# Patient Record
Sex: MALE | Race: WHITE | Employment: OTHER | URBAN - METROPOLITAN AREA
[De-identification: names, ages, dates, MRNs, and addresses within clinical notes are randomized per-mention and may not be internally consistent; named-entity substitution may affect disease eponyms.]

---

## 2018-06-18 ENCOUNTER — HOSPITAL ENCOUNTER (OUTPATIENT)
Dept: CT IMAGING | Age: 83
Discharge: HOME | End: 2018-06-18
Attending: FAMILY MEDICINE
Payer: MEDICARE

## 2018-06-18 DIAGNOSIS — R31.9 HEMATURIA SYNDROME: ICD-10-CM

## 2018-06-18 PROCEDURE — 74011636320 HC RX REV CODE- 636/320: Performed by: FAMILY MEDICINE

## 2018-06-18 PROCEDURE — 74011250636 HC RX REV CODE- 250/636: Performed by: FAMILY MEDICINE

## 2018-06-18 PROCEDURE — 82565 ASSAY OF CREATININE: CPT

## 2018-06-18 PROCEDURE — 74178 CT ABD&PLV WO CNTR FLWD CNTR: CPT

## 2018-06-18 RX ORDER — SODIUM CHLORIDE 0.9 % (FLUSH) 0.9 %
10 SYRINGE (ML) INJECTION
Status: COMPLETED | OUTPATIENT
Start: 2018-06-18 | End: 2018-06-18

## 2018-06-18 RX ORDER — SODIUM CHLORIDE 9 MG/ML
50 INJECTION, SOLUTION INTRAVENOUS
Status: COMPLETED | OUTPATIENT
Start: 2018-06-18 | End: 2018-06-18

## 2018-06-18 RX ADMIN — SODIUM CHLORIDE 50 ML/HR: 900 INJECTION, SOLUTION INTRAVENOUS at 13:58

## 2018-06-18 RX ADMIN — Medication 10 ML: at 13:58

## 2018-06-18 RX ADMIN — IOPAMIDOL 100 ML: 755 INJECTION, SOLUTION INTRAVENOUS at 13:58

## 2018-06-21 LAB — CREAT BLD-MCNC: 1.6 MG/DL (ref 0.6–1.3)

## 2018-10-09 ENCOUNTER — APPOINTMENT (OUTPATIENT)
Dept: GENERAL RADIOLOGY | Age: 83
DRG: 813 | End: 2018-10-09
Attending: EMERGENCY MEDICINE
Payer: MEDICARE

## 2018-10-09 ENCOUNTER — HOSPITAL ENCOUNTER (INPATIENT)
Age: 83
LOS: 3 days | Discharge: NURSING, REHAB OR LONG-TERM CARE | DRG: 813 | End: 2018-10-12
Attending: EMERGENCY MEDICINE | Admitting: INTERNAL MEDICINE
Payer: MEDICARE

## 2018-10-09 DIAGNOSIS — D64.9 ANEMIA, UNSPECIFIED TYPE: Primary | ICD-10-CM

## 2018-10-09 DIAGNOSIS — R04.0 EPISTAXIS: ICD-10-CM

## 2018-10-09 DIAGNOSIS — N17.9 AKI (ACUTE KIDNEY INJURY) (HCC): ICD-10-CM

## 2018-10-09 DIAGNOSIS — R79.1 SUPRATHERAPEUTIC INR: ICD-10-CM

## 2018-10-09 DIAGNOSIS — E03.9 HYPOTHYROIDISM, UNSPECIFIED TYPE: ICD-10-CM

## 2018-10-09 PROBLEM — Z95.2 H/O MECHANICAL AORTIC VALVE REPLACEMENT: Status: ACTIVE | Noted: 2018-10-09

## 2018-10-09 LAB
ALBUMIN SERPL-MCNC: 3 G/DL (ref 3.5–5)
ALBUMIN/GLOB SERPL: 0.9 {RATIO} (ref 1.1–2.2)
ALP SERPL-CCNC: 68 U/L (ref 45–117)
ALT SERPL-CCNC: 22 U/L (ref 12–78)
ANION GAP SERPL CALC-SCNC: 12 MMOL/L (ref 5–15)
AST SERPL-CCNC: 39 U/L (ref 15–37)
BASOPHILS # BLD: 0 K/UL (ref 0–0.1)
BASOPHILS NFR BLD: 0 % (ref 0–1)
BILIRUB SERPL-MCNC: 1.5 MG/DL (ref 0.2–1)
BNP SERPL-MCNC: 1870 PG/ML (ref 0–450)
BUN SERPL-MCNC: 60 MG/DL (ref 6–20)
BUN/CREAT SERPL: 26 (ref 12–20)
CALCIUM SERPL-MCNC: 8 MG/DL (ref 8.5–10.1)
CHLORIDE SERPL-SCNC: 102 MMOL/L (ref 97–108)
CO2 SERPL-SCNC: 21 MMOL/L (ref 21–32)
CREAT SERPL-MCNC: 2.34 MG/DL (ref 0.7–1.3)
DIFFERENTIAL METHOD BLD: ABNORMAL
EOSINOPHIL # BLD: 0 K/UL (ref 0–0.4)
EOSINOPHIL NFR BLD: 0 % (ref 0–7)
ERYTHROCYTE [DISTWIDTH] IN BLOOD BY AUTOMATED COUNT: 13.9 % (ref 11.5–14.5)
GLOBULIN SER CALC-MCNC: 3.2 G/DL (ref 2–4)
GLUCOSE SERPL-MCNC: 129 MG/DL (ref 65–100)
HCT VFR BLD AUTO: 22.4 % (ref 36.6–50.3)
HEMOCCULT STL QL: NEGATIVE
HGB BLD-MCNC: 7.5 G/DL (ref 12.1–17)
IMM GRANULOCYTES # BLD: 0 K/UL (ref 0–0.04)
IMM GRANULOCYTES NFR BLD AUTO: 0 % (ref 0–0.5)
INR PPP: >12.3 (ref 0.9–1.1)
LYMPHOCYTES # BLD: 0.5 K/UL (ref 0.8–3.5)
LYMPHOCYTES NFR BLD: 5 % (ref 12–49)
MAGNESIUM SERPL-MCNC: 1.9 MG/DL (ref 1.6–2.4)
MCH RBC QN AUTO: 32.8 PG (ref 26–34)
MCHC RBC AUTO-ENTMCNC: 33.5 G/DL (ref 30–36.5)
MCV RBC AUTO: 97.8 FL (ref 80–99)
MONOCYTES # BLD: 0.8 K/UL (ref 0–1)
MONOCYTES NFR BLD: 8 % (ref 5–13)
NEUTS SEG # BLD: 8.1 K/UL (ref 1.8–8)
NEUTS SEG NFR BLD: 87 % (ref 32–75)
NRBC # BLD: 0 K/UL (ref 0–0.01)
NRBC BLD-RTO: 0 PER 100 WBC
PLATELET # BLD AUTO: 162 K/UL (ref 150–400)
PMV BLD AUTO: 10.5 FL (ref 8.9–12.9)
POTASSIUM SERPL-SCNC: 3.5 MMOL/L (ref 3.5–5.1)
PROT SERPL-MCNC: 6.2 G/DL (ref 6.4–8.2)
PROTHROMBIN TIME: >120 SEC (ref 9–11.1)
RBC # BLD AUTO: 2.29 M/UL (ref 4.1–5.7)
RBC MORPH BLD: ABNORMAL
SODIUM SERPL-SCNC: 135 MMOL/L (ref 136–145)
TROPONIN I SERPL-MCNC: 0.07 NG/ML
TROPONIN I SERPL-MCNC: 0.09 NG/ML
TSH SERPL DL<=0.05 MIU/L-ACNC: 39.1 UIU/ML (ref 0.36–3.74)
WBC # BLD AUTO: 9.4 K/UL (ref 4.1–11.1)

## 2018-10-09 PROCEDURE — 84443 ASSAY THYROID STIM HORMONE: CPT

## 2018-10-09 PROCEDURE — 71046 X-RAY EXAM CHEST 2 VIEWS: CPT

## 2018-10-09 PROCEDURE — 83735 ASSAY OF MAGNESIUM: CPT

## 2018-10-09 PROCEDURE — 85025 COMPLETE CBC W/AUTO DIFF WBC: CPT

## 2018-10-09 PROCEDURE — 99285 EMERGENCY DEPT VISIT HI MDM: CPT

## 2018-10-09 PROCEDURE — 86901 BLOOD TYPING SEROLOGIC RH(D): CPT

## 2018-10-09 PROCEDURE — 82272 OCCULT BLD FECES 1-3 TESTS: CPT

## 2018-10-09 PROCEDURE — 83880 ASSAY OF NATRIURETIC PEPTIDE: CPT

## 2018-10-09 PROCEDURE — 74011250637 HC RX REV CODE- 250/637: Performed by: INTERNAL MEDICINE

## 2018-10-09 PROCEDURE — 84484 ASSAY OF TROPONIN QUANT: CPT

## 2018-10-09 PROCEDURE — 80053 COMPREHEN METABOLIC PANEL: CPT

## 2018-10-09 PROCEDURE — 96361 HYDRATE IV INFUSION ADD-ON: CPT

## 2018-10-09 PROCEDURE — 85610 PROTHROMBIN TIME: CPT

## 2018-10-09 PROCEDURE — 65660000000 HC RM CCU STEPDOWN

## 2018-10-09 PROCEDURE — 74011250636 HC RX REV CODE- 250/636: Performed by: EMERGENCY MEDICINE

## 2018-10-09 PROCEDURE — 96365 THER/PROPH/DIAG IV INF INIT: CPT

## 2018-10-09 PROCEDURE — 86923 COMPATIBILITY TEST ELECTRIC: CPT

## 2018-10-09 PROCEDURE — 74011000258 HC RX REV CODE- 258: Performed by: EMERGENCY MEDICINE

## 2018-10-09 PROCEDURE — 36415 COLL VENOUS BLD VENIPUNCTURE: CPT

## 2018-10-09 PROCEDURE — 93005 ELECTROCARDIOGRAM TRACING: CPT

## 2018-10-09 RX ORDER — SODIUM CHLORIDE 9 MG/ML
75 INJECTION, SOLUTION INTRAVENOUS CONTINUOUS
Status: DISPENSED | OUTPATIENT
Start: 2018-10-09 | End: 2018-10-10

## 2018-10-09 RX ORDER — TAMSULOSIN HYDROCHLORIDE 0.4 MG/1
0.4 CAPSULE ORAL DAILY
Status: DISCONTINUED | OUTPATIENT
Start: 2018-10-10 | End: 2018-10-12 | Stop reason: HOSPADM

## 2018-10-09 RX ORDER — NALOXONE HYDROCHLORIDE 0.4 MG/ML
0.4 INJECTION, SOLUTION INTRAMUSCULAR; INTRAVENOUS; SUBCUTANEOUS AS NEEDED
Status: DISCONTINUED | OUTPATIENT
Start: 2018-10-09 | End: 2018-10-12 | Stop reason: HOSPADM

## 2018-10-09 RX ORDER — PAROXETINE HYDROCHLORIDE 20 MG/1
20 TABLET, FILM COATED ORAL DAILY
Status: DISCONTINUED | OUTPATIENT
Start: 2018-10-10 | End: 2018-10-12 | Stop reason: HOSPADM

## 2018-10-09 RX ORDER — METOPROLOL SUCCINATE 25 MG/1
TABLET, EXTENDED RELEASE ORAL
COMMUNITY
Start: 2018-08-15 | End: 2018-10-12

## 2018-10-09 RX ORDER — ATORVASTATIN CALCIUM 40 MG/1
40 TABLET, FILM COATED ORAL
Status: DISCONTINUED | OUTPATIENT
Start: 2018-10-09 | End: 2018-10-12 | Stop reason: HOSPADM

## 2018-10-09 RX ORDER — ONDANSETRON 2 MG/ML
4 INJECTION INTRAMUSCULAR; INTRAVENOUS
Status: DISCONTINUED | OUTPATIENT
Start: 2018-10-09 | End: 2018-10-12 | Stop reason: HOSPADM

## 2018-10-09 RX ORDER — SODIUM CHLORIDE 9 MG/ML
250 INJECTION, SOLUTION INTRAVENOUS AS NEEDED
Status: DISCONTINUED | OUTPATIENT
Start: 2018-10-09 | End: 2018-10-12 | Stop reason: HOSPADM

## 2018-10-09 RX ORDER — ALLOPURINOL 300 MG/1
300 TABLET ORAL DAILY
Status: DISCONTINUED | OUTPATIENT
Start: 2018-10-10 | End: 2018-10-12 | Stop reason: HOSPADM

## 2018-10-09 RX ORDER — SODIUM CHLORIDE 0.9 % (FLUSH) 0.9 %
5-10 SYRINGE (ML) INJECTION EVERY 8 HOURS
Status: DISCONTINUED | OUTPATIENT
Start: 2018-10-09 | End: 2018-10-12 | Stop reason: HOSPADM

## 2018-10-09 RX ORDER — METOPROLOL SUCCINATE 25 MG/1
25 TABLET, EXTENDED RELEASE ORAL DAILY
Status: DISCONTINUED | OUTPATIENT
Start: 2018-10-10 | End: 2018-10-12 | Stop reason: HOSPADM

## 2018-10-09 RX ORDER — FINASTERIDE 5 MG/1
5 TABLET, FILM COATED ORAL DAILY
Status: DISCONTINUED | OUTPATIENT
Start: 2018-10-10 | End: 2018-10-12 | Stop reason: HOSPADM

## 2018-10-09 RX ORDER — HYDROCODONE BITARTRATE AND ACETAMINOPHEN 5; 325 MG/1; MG/1
0.5 TABLET ORAL
Status: DISCONTINUED | OUTPATIENT
Start: 2018-10-09 | End: 2018-10-09

## 2018-10-09 RX ORDER — HYDROCODONE BITARTRATE AND ACETAMINOPHEN 5; 325 MG/1; MG/1
0.5 TABLET ORAL
Status: DISCONTINUED | OUTPATIENT
Start: 2018-10-09 | End: 2018-10-12 | Stop reason: HOSPADM

## 2018-10-09 RX ORDER — OLMESARTAN MEDOXOMIL AND HYDROCHLOROTHIAZIDE 20/12.5 20; 12.5 MG/1; MG/1
TABLET ORAL
COMMUNITY
Start: 2018-09-22 | End: 2018-10-12

## 2018-10-09 RX ORDER — LEVOTHYROXINE SODIUM 100 UG/1
100 TABLET ORAL
Status: DISCONTINUED | OUTPATIENT
Start: 2018-10-10 | End: 2018-10-12 | Stop reason: HOSPADM

## 2018-10-09 RX ORDER — AMLODIPINE BESYLATE 5 MG/1
2.5 TABLET ORAL DAILY
Status: DISCONTINUED | OUTPATIENT
Start: 2018-10-10 | End: 2018-10-12

## 2018-10-09 RX ORDER — SODIUM CHLORIDE 0.9 % (FLUSH) 0.9 %
5-10 SYRINGE (ML) INJECTION AS NEEDED
Status: DISCONTINUED | OUTPATIENT
Start: 2018-10-09 | End: 2018-10-12 | Stop reason: HOSPADM

## 2018-10-09 RX ADMIN — ATORVASTATIN CALCIUM 40 MG: 40 TABLET, FILM COATED ORAL at 23:30

## 2018-10-09 RX ADMIN — Medication 10 ML: at 23:29

## 2018-10-09 RX ADMIN — SODIUM CHLORIDE 1000 ML: 900 INJECTION, SOLUTION INTRAVENOUS at 19:07

## 2018-10-09 RX ADMIN — PHYTONADIONE 5 MG: 10 INJECTION, EMULSION INTRAMUSCULAR; INTRAVENOUS; SUBCUTANEOUS at 17:53

## 2018-10-09 RX ADMIN — SODIUM CHLORIDE 1000 ML: 900 INJECTION, SOLUTION INTRAVENOUS at 16:14

## 2018-10-09 NOTE — ED NOTES
Pt. Presents to ED today for complaints of CP that started yesterday. PT. Also reports a nose bleed yesterday. Pt. Alert and oriented x4. PT. Placed in position of comfort with call bell in reach.

## 2018-10-09 NOTE — ED NOTES
PT. BP trending down at this time. Pt. Asymptomatic. Spoke with Dr. Nash Jones who is aware. MD to place orders.

## 2018-10-09 NOTE — ED NOTES
Pt. Type and screen hemolysed for a second time. Spoke with Dr. Raymon Torres who is aware. Will send third tube.

## 2018-10-09 NOTE — CONSULTS
Pt seen and examined. Full consult dictated.     Recc Echo, correct INR, repeat trop in AM. No clear evidence of MI.

## 2018-10-09 NOTE — ED TRIAGE NOTES
Dr. Randa Santos at bedside o/a of patient to ED, ekg given to MD by Darius Reyes 7 ems o/a.  Dr. Anel Hercules is patient's cards

## 2018-10-09 NOTE — ED PROVIDER NOTES
EMERGENCY DEPARTMENT HISTORY AND PHYSICAL EXAM      Date: 10/9/2018  Patient Name: Ovi Kendrick    History of Presenting Illness     Chief Complaint   Patient presents with    Chest Pain     from PCP with irreg heart rate, chest pain since last night. , /60. Cardioogis is [place]       History Provided By: Patient, EMS and PCP    HPI: Ovi Kendrick, 80 y.o. male with PMHx significant for aortic valve replacement on Coumadin, HTN, hyperlipidemia, thyroid disease, presents via EMS referred by his PCP to the ED for evaluation of irregular heart rate and hypoxia in the mid 80s on RA while he was at his PCP's office. Per EMS, 12 lead was concerning for STEMI. Pt received 4 tablets of ASA en route. Pt reports constant CP since last night as wells as worsening LENNON x 1 week. He also notes nosebleed from R nare ongoing since last night. Pt states nose bleed is likely from him scratching his nose. He reports SOB has been worsening for the past week and is only able to walk short distances. Pt further adds decreased appetite for the past 3 days. He states he has had very little PO intake including water. Pt endorses N/V 3 days ago, but denies any N/V today. He denies any home treatment or modifying symptoms. Pt specifically denies any recent abdominal pain, diarrhea, fevers, chills, urinary symptoms, leg pain, leg swelling, diaphoresis, or any other complaints. There are no other complaints, changes, or physical findings at this time.     PCP: Frankie Odom., MD    Current Facility-Administered Medications   Medication Dose Route Frequency Provider Last Rate Last Dose    0.9% sodium chloride infusion 250 mL  250 mL IntraVENous PRN Perez Treviño MD        allopurinol (ZYLOPRIM) tablet 300 mg  300 mg Oral DAILY Dinah Potts MD        amLODIPine (NORVASC) tablet 2.5 mg  2.5 mg Oral DAILY Dinah Potts MD        atorvastatin (LIPITOR) tablet 40 mg  40 mg Oral QHS [place] [facility] Gricelda Aaron MD   40 mg at 10/09/18 2330    finasteride (PROSCAR) tablet 5 mg  5 mg Oral DAILY Daisy Sierra MD        levothyroxine (SYNTHROID) tablet 100 mcg  100 mcg Oral 6am Daisy Sierra MD        metoprolol succinate (TOPROL-XL) XL tablet 25 mg  25 mg Oral DAILY Daisy Sierra MD        PARoxetine (PAXIL) tablet 20 mg  20 mg Oral DAILY Daisy Sierra MD        tamsulosin (FLOMAX) capsule 0.4 mg  0.4 mg Oral DAILY Daisy Sierra MD        0.9% sodium chloride infusion  75 mL/hr IntraVENous CONTINUOUS Daisy Sierra MD        sodium chloride (NS) flush 5-10 mL  5-10 mL IntraVENous Q8H Daisy Sierra MD   10 mL at 10/09/18 2329    sodium chloride (NS) flush 5-10 mL  5-10 mL IntraVENous PRN Daisy Sierra MD        naloxone [facility]) injection 0.4 mg  0.4 mg IntraVENous PRN Daisy Sierra MD        ondansetron (ZOFRAN) injection 4 mg  4 mg IntraVENous Q4H PRN Daisy Sierra MD        WARFARIN INFORMATION NOTE (COUMADIN)   Other QPM Zack Hong MD        Warfarin - HOLD dose 10/9   Other Rubén Richardson MD   Stopped at 10/09/18 2210    HYDROcodone-acetaminophen (NORCO) 5-325 mg per tablet 0.5 Tab  0.5 Tab Oral Q4H PRN Zack Hong MD           Past History     Past Medical History:  Past Medical History:   Diagnosis Date    Anger     TAKES PAROXETINE TO CONTROL ANGER ISSUES    CAD (coronary artery disease)     Foot drop, left     WEARS METAL BRACE    Gout     Hard of hearing     High cholesterol     Hypertension     Prostate enlargement     Thyroid disease     Unspecified sleep apnea     DOESN'T USE CPAP; \"IT MADE HIM SICK-COLDS, SINUS\", PER WIFE       Past Surgical History:  Past Surgical History:   Procedure Laterality Date    CARDIAC SURG PROCEDURE UNLIST      ANGIOPLASTY WITH CORONARY STENT    CARDIAC SURG PROCEDURE UNLIST  1996    AORTIC VALVE REPLACEMENT    HX ORTHOPAEDIC  2001    ORIF COMPOUND FRACTURE LEFT ANKLE  HX RETINAL DETACHMENT REPAIR  1980s    LEFT EYE       Family History:  Family History   Problem Relation Age of Onset    Stroke Mother      ANEURYSM    Stroke Father        Social History:  Social History   Substance Use Topics    Smoking status: Never Smoker    Smokeless tobacco: None    Alcohol use 1.5 oz/week     3 Standard drinks or equivalent per week       Allergies:  No Known Allergies      Review of Systems   Review of Systems   Constitutional: Positive for appetite change (decreased). Negative for chills, fatigue and fever. HENT: Positive for nosebleeds. Negative for congestion, ear pain and rhinorrhea. Eyes: Negative for pain and visual disturbance. Respiratory: Positive for shortness of breath. Negative for cough. Cardiovascular: Positive for chest pain. Negative for leg swelling. Gastrointestinal: Negative for abdominal pain, diarrhea, nausea and vomiting. Genitourinary: Negative for dysuria and flank pain. Musculoskeletal: Negative for back pain and neck pain. Skin: Negative for rash and wound. Neurological: Negative for dizziness, syncope and headaches. Psychiatric/Behavioral: Negative for self-injury and suicidal ideas. Physical Exam   Physical Exam     GENERAL: alert and oriented, no acute distress  EYES: PEERL, No injection, discharge or icterus. HENT: Mucous membranes pink and moist.  NECK: Supple  LUNGS: Airway patent. Non-labored respirations. Breath sounds clear with good air entry bilaterally. HEART: tachycardiac in 120s and irregularly irregular rhythm. No peripheral edema  ABDOMEN: Non-distended and non-tender, without guarding or rebound.   SKIN:  warm, dry, he appears pale  MSK/ EXTREMITIES: Without swelling, tenderness or deformity, symmetric with normal ROM  NEUROLOGICAL: Alert, oriented    Diagnostic Study Results     Labs -     Recent Results (from the past 12 hour(s))   CBC WITH AUTOMATED DIFF    Collection Time: 10/09/18  3:32 PM   Result Value Ref Range    WBC 9.4 4.1 - 11.1 K/uL    RBC 2.29 (L) 4.10 - 5.70 M/uL    HGB 7.5 (L) 12.1 - 17.0 g/dL    HCT 22.4 (L) 36.6 - 50.3 %    MCV 97.8 80.0 - 99.0 FL    MCH 32.8 26.0 - 34.0 PG    MCHC 33.5 30.0 - 36.5 g/dL    RDW 13.9 11.5 - 14.5 %    PLATELET 474 974 - 130 K/uL    MPV 10.5 8.9 - 12.9 FL    NRBC 0.0 0  WBC    ABSOLUTE NRBC 0.00 0.00 - 0.01 K/uL    NEUTROPHILS 87 (H) 32 - 75 %    LYMPHOCYTES 5 (L) 12 - 49 %    MONOCYTES 8 5 - 13 %    EOSINOPHILS 0 0 - 7 %    BASOPHILS 0 0 - 1 %    IMMATURE GRANULOCYTES 0 0.0 - 0.5 %    ABS. NEUTROPHILS 8.1 (H) 1.8 - 8.0 K/UL    ABS. LYMPHOCYTES 0.5 (L) 0.8 - 3.5 K/UL    ABS. MONOCYTES 0.8 0.0 - 1.0 K/UL    ABS. EOSINOPHILS 0.0 0.0 - 0.4 K/UL    ABS. BASOPHILS 0.0 0.0 - 0.1 K/UL    ABS. IMM. GRANS. 0.0 0.00 - 0.04 K/UL    DF SMEAR SCANNED      RBC COMMENTS ANISOCYTOSIS  1+       METABOLIC PANEL, COMPREHENSIVE    Collection Time: 10/09/18  3:32 PM   Result Value Ref Range    Sodium 135 (L) 136 - 145 mmol/L    Potassium 3.5 3.5 - 5.1 mmol/L    Chloride 102 97 - 108 mmol/L    CO2 21 21 - 32 mmol/L    Anion gap 12 5 - 15 mmol/L    Glucose 129 (H) 65 - 100 mg/dL    BUN 60 (H) 6 - 20 MG/DL    Creatinine 2.34 (H) 0.70 - 1.30 MG/DL    BUN/Creatinine ratio 26 (H) 12 - 20      GFR est AA 32 (L) >60 ml/min/1.73m2    GFR est non-AA 27 (L) >60 ml/min/1.73m2    Calcium 8.0 (L) 8.5 - 10.1 MG/DL    Bilirubin, total 1.5 (H) 0.2 - 1.0 MG/DL    ALT (SGPT) 22 12 - 78 U/L    AST (SGOT) 39 (H) 15 - 37 U/L    Alk.  phosphatase 68 45 - 117 U/L    Protein, total 6.2 (L) 6.4 - 8.2 g/dL    Albumin 3.0 (L) 3.5 - 5.0 g/dL    Globulin 3.2 2.0 - 4.0 g/dL    A-G Ratio 0.9 (L) 1.1 - 2.2     MAGNESIUM    Collection Time: 10/09/18  3:32 PM   Result Value Ref Range    Magnesium 1.9 1.6 - 2.4 mg/dL   TROPONIN I    Collection Time: 10/09/18  3:32 PM   Result Value Ref Range    Troponin-I, Qt. 0.07 (H) <0.05 ng/mL   NT-PRO BNP    Collection Time: 10/09/18  3:32 PM   Result Value Ref Range    NT pro-BNP 1870 (H) 0 - 450 PG/ML   PROTHROMBIN TIME + INR    Collection Time: 10/09/18  3:32 PM   Result Value Ref Range    INR >12.3 (HH) 0.9 - 1.1    Prothrombin time >120.0 (H) 9.0 - 11.1 sec   TSH 3RD GENERATION    Collection Time: 10/09/18  3:58 PM   Result Value Ref Range    TSH 39.10 (H) 0.36 - 3.74 uIU/mL   OCCULT BLOOD, STOOL    Collection Time: 10/09/18  5:14 PM   Result Value Ref Range    Occult blood, stool NEGATIVE  NEG     TROPONIN I    Collection Time: 10/09/18  7:14 PM   Result Value Ref Range    Troponin-I, Qt. 0.09 (H) <0.05 ng/mL   TYPE + CROSSMATCH    Collection Time: 10/09/18  7:50 PM   Result Value Ref Range    Crossmatch Expiration 10/12/2018     ABO/Rh(D) O POSITIVE     Antibody screen NEG     Unit number [id number]     Blood component type  LR     Unit division 00     Status of unit ALLOCATED     Crossmatch result Compatible     Unit number [id number]     Blood component type  LR     Unit division 00     Status of unit ISSUED     Crossmatch result Compatible        Radiologic Studies -   XR CHEST PA LAT   Final Result        CT Results  (Last 48 hours)    None        CXR Results  (Last 48 hours)               10/09/18 1724  XR CHEST PA LAT Final result    Impression:  IMPRESSION: No evidence of pneumonia. Unchanged cardiomegaly. Narrative:  EXAM:  XR CHEST PA LAT       INDICATION:   Chest Pain       COMPARISON: Chest radiograph 1/29/2006. FINDINGS: PA and lateral radiographs of the chest were obtained. There are postsurgical changes of aortic valve replacement. There are chronic   healed right-sided rib fractures. There is unchanged cardiomegaly. There is no   focal consolidation, pleural effusion, or pneumothorax. The level degenerative   changes of the thoracic spine. Medical Decision Making   I am the first provider for this patient.     I reviewed the vital signs, available nursing notes, past medical history, past surgical history, family history and social history. Vital Signs-Reviewed the patient's vital signs. Patient Vitals for the past 12 hrs:   Temp Pulse Resp BP SpO2   10/10/18 0129 97.9 °F (36.6 °C) 83 18 123/62 100 %   10/10/18 0055 97.8 °F (36.6 °C) 66 18 (!) 108/91 92 %   10/10/18 0036 97.8 °F (36.6 °C) 78 18 102/70 97 %   10/10/18 0022 97.6 °F (36.4 °C) 78 18 98/51 98 %   10/09/18 2238 97 °F (36.1 °C) 83 18 117/57 100 %   10/09/18 2153 - (!) 104 15 131/54 96 %   10/09/18 2130 - 84 24 121/70 -   10/09/18 2000 - 76 20 110/70 97 %   10/09/18 1931 - 82 21 101/53 91 %   10/09/18 1918 - 79 17 92/64 99 %   10/09/18 1900 98.1 °F (36.7 °C) 79 21 (!) 83/49 98 %   10/09/18 1845 - 82 24 93/59 99 %   10/09/18 1830 - 75 22 95/61 99 %   10/09/18 1800 - 78 20 98/45 97 %   10/09/18 1730 - 86 18 92/73 98 %   10/09/18 1700 - 78 19 (!) 88/59 97 %   10/09/18 1630 - 86 18 (!) 86/53 93 %   10/09/18 1612 - 87 17 (!) 81/49 98 %   10/09/18 1600 - 96 24 (!) 72/50 100 %   10/09/18 1533 - 91 23 (!) 119/104 -       Pulse Oximetry Analysis - 100% on RA    Cardiac Monitor:   Rate: 93 bpm  Rhythm: Atrial Fibrillation     EKG interpretation: (Preliminary) 1522  Rhythm: afib with premature ventricular or aberrantly conducted complexes; and irregular. Rate (approx.): 93; Axis: normal; QRS interval: normal ; ST/T wave: normal; Other findings: LBBB. No ischemia. Written by EDMOND Messer, as dictated by Chu Vale MD.    Records Reviewed: Nursing Notes, Old Medical Records, Previous electrocardiograms, Ambulance Run Sheet, Previous Radiology Studies and Previous Laboratory Studies    Provider Notes (Medical Decision Making): On presentation the patient is  in no acute distress with reassurnig vital signs. Based on the history and exam the differential diagnosis for this patient includes ACS, PE, anemia, arrhyhtmia, dehydration, electrolyte abnormalities.   Labs notable for anemia with hgb 7.4, elevated trop 0.07, AYAZ, hypothyroidism and an INR 12.  Rectal exam with hemoccult negative stool so feel that anemia likely 2/2 epistaxis yesterday evening which has since resolved. With the patient's aortic valve, will be careful to not over correct INR in the abscess of bleeding so will give 5mg vitamin K and plan to hold coumadin. With AYAZ, elevated trop and LENNON will transfuse 1 unit PRBC after fluid resuscitation. Will admit for further management. ED Course:   Initial assessment performed. The patients presenting problems have been discussed, and they are in agreement with the care plan formulated and outlined with them. I have encouraged them to ask questions as they arise throughout their visit. Wayne Older  1935  Time EMS pre-alert: 1753  Time STEMI called: 7404  Time arrived on Unit: 1520, Zenia Christy, * in room  Time EKG completed: 1522  Time EKG read by provider: 1522  Time cardiology/cath lab paged: 1520  Time Cardiologist arrived on Unit: 1523  Code STEMI canceled after Dr. Elvin Mehta and Dr. Tyson Joshua read the EKG. CONSULT NOTE:   3:23 PM  Aashish Joshua MD spoke with Dr. Elvin Mehta,   Specialty: cardiology  Consultant is at bedside evaluating patient. PROGRESS NOTE  3:40 PM   Dr. Elvin Mehta evaluated pt and asks he is updated. 4:11 PM  Reassessed pt. He currently denies any CP and is asymptomatic. Procedure Note - Rectal Exam:   5:10 PM  Performed by: Dane Watt. Tyson Joshua MD  Chaperoned by: Alysia Holm RN  Rectal exam performed. Brown stool was collected. No blood noted. Stool was collected and sent to the lab for Hemoccult testing. The procedure took 1-15 minutes, and pt tolerated well    CONSULT NOTE:   5:41 PM  Aashish Joshua MD spoke with Dr. Abhijit Jason,   Specialty: cardiology  Discussed pt's hx, disposition, and available diagnostic and imaging results. Reviewed care plans. Consultant agrees with plans as outlined.  Agrees with plan for vitamin K and blood transfusion and admission to hospitalist. CONSULT NOTE:   7:30 PM  Aashish Oviedo MD spoke with Dr. Aurora Mishra,   Specialty: Hospitalist  Discussed pt's hx, disposition, and available diagnostic and imaging results. Reviewed care plans. Consultant will evaluate pt for admission. CRITICAL CARE NOTE:  6:01 PM  IMPENDING DETERIORATION -Cardiovascular and Renal  ASSOCIATED RISK FACTORS - Hypotension, Bleeding, Dysrhythmia and Dehydration  MANAGEMENT- Bedside Assessment and Supervision of Care  INTERPRETATION -  Xrays and ECG  INTERVENTIONS - fluid resuscitation blood transfusion, vitamin K  CASE REVIEW - Hospitalist and Medical Sub-Specialist  TREATMENT RESPONSE -Improved  PERFORMED BY - Self  NOTES   :  I have spent 35 minutes of critical care time involved in lab review, consultations with specialist, family decision- making, bedside attention and documentation. During this entire length of time I was immediately available to the patient. PLAN:  1. Admit to Hospitalist    Admission Note:  7:34 PM  Patient is being admitted to the hospital by Dr. Aurora Mishra. The results of their tests and reasons for their admission have been discussed with them and available family. They convey agreement and understanding for the need to be admitted and for their admission diagnosis. Diagnosis     Clinical Impression:   1. Anemia, unspecified type    2. Epistaxis    3. Supratherapeutic INR    4. AYAZ (acute kidney injury) ([address]    5. Hypothyroidism, unspecified type        Attestations: This note is prepared by Katheryn Kowalski, acting as Scribe for [employer]. MD Kayleen.    The scribe's documentation has been prepared under my direction and personally reviewed by me in its entirety. I confirm that the note above accurately reflects all work, treatment, procedures, and medical decision making performed by me. Mary Oviedo MD

## 2018-10-09 NOTE — IP AVS SNAPSHOT
[address] [facility] 
[phone] Patient: Denver Cha MRN: [id number] About your hospitalization You were admitted on:  October 9, 2018 You last received care in the:  [facility] 2 CARDIOPULMONARY CARE You were discharged on:  October 12, 2018 Why you were hospitalized Your primary diagnosis was:  Not on File Your diagnoses also included:  Anemia, Hypothyroidism, Supratherapeutic Inr, H/O Mechanical Aortic Valve Replacement Follow-up Information Follow up With Details Comments Contact Info [address] ([facility] [address])   [address] [facility] 
[phone] Renard Mckenzie MD In 1 day hospital follow-up [address] [facility] 
[phone] Jessica Ricardo MD In 2 days hospital follow-up [address] [facility] 
[phone] Jose L Beaulieu MD In 1 week hospital follow-up [address] [facility] 
[phone] Discharge Orders None A check vic indicates which time of day the medication should be taken. My Medications START taking these medications Instructions Each Dose to Equal  
 Morning Noon Evening Bedtime  
 aspirin 81 mg chewable tablet Start taking on:  10/13/2018 Your last dose was: Your next dose is: Take 1 Tab by mouth daily for 30 days. 81 mg  
    
   
   
   
  
 losartan 50 mg tablet Commonly known as:  COZAAR Start taking on:  10/13/2018 Your last dose was: Your next dose is: Take 1 Tab by mouth daily for 30 days. 50 mg  
    
   
   
   
  
 warfarin 5 mg tablet Commonly known as:  COUMADIN Your last dose was: Your next dose is: Take 1 Tab by mouth daily for 30 days. 5 mg CHANGE how you take these medications Instructions Each Dose to Equal  
 Morning Noon Evening Bedtime  
 allopurinol 300 mg tablet Commonly known as:  [place]en Rodriguez Start taking on:  10/13/2018 What changed:  how much to take Your last dose was: Your next dose is: Take 1 Tab by mouth daily for 30 days. 300 mg  
    
   
   
   
  
 atorvastatin 40 mg tablet Commonly known as:  LIPITOR What changed:   
- how much to take - when to take this Your last dose was: Your next dose is: Take 1 Tab by mouth nightly for 30 days. 40 mg  
    
   
   
   
  
 cholecalciferol (vitamin D3) 2,000 unit Tab Commonly known as:  VITAMIN D3 What changed:  how much to take Your last dose was: Your next dose is: Take 1 Tab by mouth daily for 30 days. 2000 Int'l Units  
    
   
   
   
  
 folic acid 1 mg tablet Commonly known as:  Google What changed:  medication strength Your last dose was: Your next dose is: Take 2.5 Tabs by mouth daily for 30 days. TAKES WHEN TAKING METHOTREXATE FOR PSORIASIS OUTBREAKS ONLY. 2.5 mg  
    
   
   
   
  
 levothyroxine 100 mcg tablet Commonly known as:  SYNTHROID Start taking on:  10/13/2018 What changed:   
- how much to take - when to take this Your last dose was: Your next dose is: Take 1 Tab by mouth every morning for 30 days. 100 mcg  
    
   
   
   
  
 metoprolol succinate 25 mg XL tablet Commonly known as:  TOPROL-XL Start taking on:  10/13/2018 What changed:   
- how much to take 
- how to take this - when to take this Your last dose was: Your next dose is: Take 1 Tab by mouth daily for 30 days. 25 mg CONTINUE taking these medications Instructions Each Dose to Equal  
 Morning Noon Evening Bedtime  
 finasteride 5 mg tablet Commonly known as:  PROSCAR Start taking on:  10/13/2018 Your last dose was: Your next dose is: Take 1 Tab by mouth daily for 30 days. 5 mg PARoxetine 20 mg tablet Commonly known as:  PAXIL Your last dose was: Your next dose is: Take 1 Tab by mouth daily for 30 days. 20 mg  
    
   
   
   
  
 tamsulosin 0.4 mg capsule Commonly known as:  FLOMAX Start taking on:  10/13/2018 Your last dose was: Your next dose is: Take 1 Cap by mouth daily for 30 days. 0.4 mg  
    
   
   
   
  
  
STOP taking these medications   
 amLODIPine 2.5 mg tablet Commonly known as:  NORVASC  
   
  
 aspirin, buffered 81 mg Tab CENTRUM SILVER PO  
   
  
 COLCRYS 0.6 mg tablet Generic drug:  colchicine DIOVAN -12.5 mg per tablet Generic drug:  valsartan-hydroCHLOROthiazide FISH OIL PO  
   
  
 GLUCOSAMINE-CHONDROITIN PO  
   
  
 HYDROcodone-acetaminophen 2.5-500 mg per tablet Commonly known as:  [place] METAMUCIL PO  
   
  
 olmesartan-hydroCHLOROthiazide 20-12.5 mg per tablet Commonly known as:  BENICAR HCT  
   
  
 oxybutynin chloride XL 10 mg CR tablet Commonly known as:  DITROPAN XL  
   
  
 oxyCODONE IR 5 mg immediate release tablet Commonly known as:  ROXICODONE  
   
  
 SUPER B COMPLEX PO  
   
  
 VITAMIN C PO Where to Get Your Medications Information on where to get these meds will be given to you by the nurse or doctor. ! Ask your nurse or doctor about these medications  
  allopurinol 300 mg tablet  
 aspirin 81 mg chewable tablet  
 atorvastatin 40 mg tablet  
 cholecalciferol (vitamin D3) 2,000 unit Tab  
 finasteride 5 mg tablet  
 folic acid 1 mg tablet  
 levothyroxine 100 mcg tablet  
 losartan 50 mg tablet  
 metoprolol succinate 25 mg XL tablet PARoxetine 20 mg tablet  
 tamsulosin 0.4 mg capsule warfarin 5 mg tablet Discharge Instructions HOSPITALIST DISCHARGE INSTRUCTIONS 
 
NAME: Alexey Rebolledo :  1935 MRN:  [id number] Date/Time:  10/12/2018 10:10 AM 
 
ADMIT DATE: 10/9/2018 DISCHARGE DATE: 10/12/2018 Attending Physician: Lonia Snellen, MD 
 
DISCHARGE DIAGNOSIS: 
Presented with Nose bleed secondary to  Coumadin toxicity POA H/o Mechanical Aortic Valve x  Newly found Afib -rate controlled, likely Chronic Anemia POA- likely due to chronic blood loss Pro-BNP elevated but patient with History of congestive heart failure, known left ventricular systolic dysfunction s/p 1 PRBC transfusion, hgb Within Acceptable Range. S/p Vit K x1 in ER Bridging coumadin with Lovenox for a therapeutic goal of 2.5 to3.5 Cardiology help appreciated Resumed ASA Continue current medications. likely AYAZ on CKD  
 
CAD s/p CABG Systolic CHF 
HTN Cont home meds Hypothyroidism Elevated TSH 
pt is not compliant with meds a per wife, needs follow up by PCP Also needs weekly coumadin check. Cont synthroid BPH Cont proscar, flomax Gout Cont allopurinol Body mass index is 30.3 kg/(m^2).: 18.5 - 24.9 Normal weight 
  
Code status: Full Prophylaxis: Coumadin Recommended Disposition: SNF Medications: Per above medication reconciliation. Pain Management: per above medications Recommended diet: Cardiac Diet, Low fat, Low cholesterol and Renal Diet Recommended activity: PT/OT Eval and Treat Wound care: None Indwelling devices:  None Supplemental Oxygen: None Required Lab work: Weekly BMP, Weekly CBC and Daily INR Glucose management:  None Code status: Full Outside physician follow up: Follow-up Information Follow up With Details Comments Contact Info [address] 
[phone] Vishal Wright MD In 1 day hospital follow-up [address] [facility] 
[phone] Margo Nj MD In 2 days hospital follow-up [address] [facility] 
[phone] Skilled nursing facility/ SNF MD responsible for above on discharge. Information obtained by : 
I understand that if any problems occur once I am at home I am to contact my physician. I understand and acknowledge receipt of the instructions indicated above. Physician's or R.N.'s Signature                                                                  Date/Time Patient or Repres PathJumphart Announcement We are excited to announce that we are making your provider's discharge notes available to you in Al[employer]. You will see these notes when they are completed and signed by the physician that discharged you from your recent hospital stay. If you have any questions or concerns about any information you see in AltaV[employer], please call the Health Information Department where you were seen or reach out to your Primary Care Provider for more information about your plan of care. Introducing [facility]! Neto Stroud introduces Al[employer]Vitas patient portal. Now you can access parts of your medical record, email your doctor's office, and request medication refills online. 1. In your internet browser, go to [email or web address] 2. Click on the First Time User? Click Here link in the Sign In box. You will see the New Member Sign Up page. 3. Enter your [employer] Access Code exactly as it appears below.  You will not need to use this code after youve completed the sign-up process. If you do not sign up before the expiration date, you must request a new code. · [employer] Access Code: [email or web address] Expires: 1/7/2019  3:41 PM 
 
4. Enter the last four digits of your Social Security Number (xxxx) and Date of Birth (mm/dd/yyyy) as indicated and click Submit. You will be taken to the next sign-up page. 5. Create a GridMarkets ID. This will be your GridMarkets login ID and cannot be changed, so think of one that is secure and easy to remember. 6. Create a [employer]Markets password. You can change your password at any time. 7. Enter your Password Reset Question and Answer. This can be used at a later time if you forget your password. 8. Enter your e-mail address. You will receive e-mail notification when new information is available in [address] 9. Click Sign Up. You can now view and download portions of your medical record. 10. Click the Download Summary menu link to download a portable copy of your medical information. If you have questions, please visit the Frequently Asked Questions section of the [employer] website. Remember, [employer]ets is NOT to be used for urgent needs. For medical emergencies, dial 911. Now available from your iPhone and Android! Introducing Gio Robles As a [facility] patient, I wanted to make you aware of our electronic visit tool called Gio Jaxsonabenaalejandro. [facility] 24/7 allows you to connect within minutes with a medical provider 24 hours a day, seven days a week via a mobile device or tablet or logging into a secure website from your computer. You can access Gio Robles from anywhere in the [place].  
 
A virtual visit might be right for you when you have a simple condition and feel like you just dont want to get out of bed, or cant get away from work for an appointment, when your regular [facility] Zacarias provider is not available (evenings, weekends or holidays), or when youre out of town and need minor care. Electronic visits cost only $49 and if the [employer] 24/7 provider determines a prescription is needed to treat your condition, one can be electronically transmitted to a nearby pharmacy*. Please take a moment to enroll today if you have not already done so. The enrollment process is free and takes just a few minutes. To enroll, please download the [employer] 24/7 anh to your tablet or phone, or visit [email or web address] to enroll on your computer. And, as an [address] [place] [address] patient with a Pull account, the results of your visits will be scanned into your electronic medical record and your primary care provider will be able to view the scanned results. We urge you to continue to see your regular [employer] provider for your ongoing medical care. And while your primary care provider may not be the one available when you seek a Digital Trowelabenafin virtual visit, the peace of mind you get from getting a real diagnosis real time can be priceless. For more information on Digital Trowelabenafin, view our Frequently Asked Questions (FAQs) at [email or web address]. Sincerely, 
 
Mercedes Low MD 
Chief Medical Officer [address] [place] [address] Segun *:  certain medications cannot be prescribed via Digital Trowelabenafin Providers Seen During Your Hospitalization Provider Specialty Primary office phone Mackenzie Alcantara MD Emergency Medicine [phone] Isha Rodriguez MD Hospitalist [phone] Petr Galeana MD Internal Medicine [phone] Your Primary Care Physician (PCP) Primary Care Physician Office Phone Office Fax Sandra Light [phone] You are allergic to the following No active allergies Recent Documentation Height Weight BMI Smoking Status 1.651 m 83.6 kg 30.67 kg/m2 Never Smoker Emergency Contacts Name Discharge Info Relation Home Work Mobile [address] CAREGIVER [3] Spouse [3] [phone] Patient Belongings The following personal items are in your possession at time of discharge: 
  Dental Appliances: Partials  Visual Aid: None      Home Medications: None   Jewelry: None  Clothing: At bedside    Other Valuables: None Please provide this summary of care documentation to your next provider. Signatures-by signing, you are acknowledging that this After Visit Summary has been reviewed with you and you have received a copy. Patient Signature:  ____________________________________________________________ Date:  ____________________________________________________________  
  
[facility] Provider Signature:  ____________________________________________________________ Date:  ____________________________________________________________

## 2018-10-09 NOTE — CONSULTS
[address] Slava Chambers  MR#: [id number]  : 1935  ACCOUNT #: [de-identified]   DATE OF SERVICE: 10/09/2018    REQUESTING PHYSICIAN:  Dr. Cyrus Moss MD, [address] [place] [address]    REASON FOR CONSULTATION:  Possible MI.      CHIEF COMPLAINT:  Weakness. HISTORY OF PRESENT ILLNESS:  Patient is an 70-year-old man who is well known to me. He has history of coronary artery disease and prior coronary artery bypass grafting as well as mechanical aortic valve replacement back in . He has known left ventricular dysfunction and when last seen in the office in  had an ejection fraction of around 30% by echo. He had a nuclear study showing a fixed inferior defect with periinfarct ischemia. It was decided to treat him conservatively. The patient for some reason has not followed up for over 3 years. He came into the [address] [place] [address] ER today by rescue squad as a possible acute ST elevation MI. He states that he has been weak for the last few days and yesterday had some chest discomfort and abdominal discomfort. He felt very fatigued today and decided to call the rescue squad. His EKG showed what appeared to be atrial fibrillation as well as an interventricular conduction delay. EKG in the emergency room did not show ST elevation. Cardiac risk factors include hypertension and dyslipidemia. PAST MEDICAL HISTORY:  Remarkable for hypothyroidism, sleep apnea, gout, nephrolithiasis. PAST SURGICAL HISTORY:  Prior mechanical aortic valve replacement and bypass surgery, prior surgery for spinal stenosis about 3 years ago, prior right eye surgery. CURRENT MEDICATIONS:  Not entirely clear. MEDICATION LIST:  Includes Norvasc, Lortab, aspirin, valsartan, Lipitor, warfarin, iron sulfate, Paxil, Flomax, Proscar, L-thyroxine, colchicine. SOCIAL HISTORY:  The patient does not smoke. He drinks alcohol occasionally. FAMILY HISTORY:  Noncontributory.     REVIEW OF SYSTEMS:  No fever, no chills, no melena, no hematochezia, no unusual back pain, positive abdominal pain. No nausea or vomiting. PHYSICAL EXAMINATION:  GENERAL:  Reveals an elderly white male in no acute distress. VITAL SIGNS:  Blood pressure 95/61, pulse 75, respirations 22. HEENT:  Pupils are equal.  Oropharynx showed moist oral mucosa. NECK:  Supple. No masses or thyromegaly. No cervical or supraclavicular adenopathy. No definite carotid bruits or JVD. CHEST:  Relatively clear. No wheezes or crackles. SKIN:  Warm and dry. CARDIAC:  Irregular rhythm, II/VI systolic murmur with crisp valve sounds. No diastolic murmur or gallop heard. ABDOMEN:  Soft, nontender. No obvious masses or organomegaly. Bowel sounds positive. EXTREMITIES:  No cyanosis or clubbing. Trace pedal edema. Distal pulses not palpable in the feet. NEUROLOGIC:  No obvious gross motor deficits. LABORATORY DATA:  TSH 39, hemoglobin 7.5. INR greater than 12. BUN 60, creatinine 2.3, proBNP 1870. Troponin 0.07. Chest x-ray showed cardiomegaly, no definite infiltrate. EKG probable AFib, nonspecific interventricular conduction delay, nonspecific ST-T changes. IMPRESSION:    1. Anemia with probable GI bleed of uncertain source. 2.  Supratherapeutic INR. 3.  Prior mechanical aortic valve replacement. 4.  Coronary artery disease with prior coronary artery bypass grafting. No clear evidence of an acute coronary syndrome. 5.  History of congestive heart failure, known left ventricular systolic dysfunction. 6.  Hypertension. 7.  Dyslipidemia. 8.  Possible atrial fibrillation. 9.  Acute on chronic renal failure. 10.  Hypothyroidism. 11.  Probable medication noncompliance. 12.  Sleep apnea. 15.  Advanced age. RECOMMENDATIONS:  The patient will be admitted by the hospitalist service. Obviously, his INR needs to be corrected and the anemia and possible GI bleeding needs to be worked up.   I would repeat a troponin in the morning and order an echocardiogram.  At this point, there is no clear evidence of an acute coronary syndrome. Treatment should probably be conservative, given the patient's age and comorbidities. We will make additional recommendations after the repeat echocardiogram.    Thank you for this consult.       Marge Quinn MD       [address] [place] [address] / LAURA  D: 10/09/2018 19:03     T: 10/09/2018 19:28  JOB #: [id number]  CC: Chase Baker MD

## 2018-10-10 LAB
ANION GAP SERPL CALC-SCNC: 9 MMOL/L (ref 5–15)
BUN SERPL-MCNC: 53 MG/DL (ref 6–20)
BUN/CREAT SERPL: 34 (ref 12–20)
CALCIUM SERPL-MCNC: 7.9 MG/DL (ref 8.5–10.1)
CHLORIDE SERPL-SCNC: 108 MMOL/L (ref 97–108)
CO2 SERPL-SCNC: 23 MMOL/L (ref 21–32)
CREAT SERPL-MCNC: 1.55 MG/DL (ref 0.7–1.3)
ERYTHROCYTE [DISTWIDTH] IN BLOOD BY AUTOMATED COUNT: 14.5 % (ref 11.5–14.5)
GLUCOSE SERPL-MCNC: 115 MG/DL (ref 65–100)
HCT VFR BLD AUTO: 23 % (ref 36.6–50.3)
HGB BLD-MCNC: 7.7 G/DL (ref 12.1–17)
INR PPP: 1.5 (ref 0.9–1.1)
MCH RBC QN AUTO: 32.2 PG (ref 26–34)
MCHC RBC AUTO-ENTMCNC: 33.5 G/DL (ref 30–36.5)
MCV RBC AUTO: 96.2 FL (ref 80–99)
NRBC # BLD: 0 K/UL (ref 0–0.01)
NRBC BLD-RTO: 0 PER 100 WBC
PLATELET # BLD AUTO: 128 K/UL (ref 150–400)
PMV BLD AUTO: 9.8 FL (ref 8.9–12.9)
POTASSIUM SERPL-SCNC: 3.2 MMOL/L (ref 3.5–5.1)
PROTHROMBIN TIME: 15.2 SEC (ref 9–11.1)
RBC # BLD AUTO: 2.39 M/UL (ref 4.1–5.7)
SODIUM SERPL-SCNC: 140 MMOL/L (ref 136–145)
WBC # BLD AUTO: 6.4 K/UL (ref 4.1–11.1)

## 2018-10-10 PROCEDURE — 74011250637 HC RX REV CODE- 250/637: Performed by: INTERNAL MEDICINE

## 2018-10-10 PROCEDURE — 65660000000 HC RM CCU STEPDOWN

## 2018-10-10 PROCEDURE — 80048 BASIC METABOLIC PNL TOTAL CA: CPT | Performed by: INTERNAL MEDICINE

## 2018-10-10 PROCEDURE — 93005 ELECTROCARDIOGRAM TRACING: CPT

## 2018-10-10 PROCEDURE — 74011250636 HC RX REV CODE- 250/636: Performed by: INTERNAL MEDICINE

## 2018-10-10 PROCEDURE — 30233N1 TRANSFUSION OF NONAUTOLOGOUS RED BLOOD CELLS INTO PERIPHERAL VEIN, PERCUTANEOUS APPROACH: ICD-10-PCS | Performed by: INTERNAL MEDICINE

## 2018-10-10 PROCEDURE — 85027 COMPLETE CBC AUTOMATED: CPT | Performed by: INTERNAL MEDICINE

## 2018-10-10 PROCEDURE — 74011250636 HC RX REV CODE- 250/636: Performed by: HOSPITALIST

## 2018-10-10 PROCEDURE — 93306 TTE W/DOPPLER COMPLETE: CPT

## 2018-10-10 PROCEDURE — 85610 PROTHROMBIN TIME: CPT | Performed by: INTERNAL MEDICINE

## 2018-10-10 PROCEDURE — 74011250637 HC RX REV CODE- 250/637: Performed by: NURSE PRACTITIONER

## 2018-10-10 PROCEDURE — 36415 COLL VENOUS BLD VENIPUNCTURE: CPT | Performed by: INTERNAL MEDICINE

## 2018-10-10 PROCEDURE — 36430 TRANSFUSION BLD/BLD COMPNT: CPT

## 2018-10-10 PROCEDURE — P9016 RBC LEUKOCYTES REDUCED: HCPCS

## 2018-10-10 RX ORDER — WARFARIN SODIUM 5 MG/1
5 TABLET ORAL ONCE
Status: COMPLETED | OUTPATIENT
Start: 2018-10-10 | End: 2018-10-10

## 2018-10-10 RX ORDER — LORAZEPAM 2 MG/ML
0.5 INJECTION INTRAMUSCULAR ONCE
Status: COMPLETED | OUTPATIENT
Start: 2018-10-10 | End: 2018-10-10

## 2018-10-10 RX ORDER — POTASSIUM CHLORIDE 20 MEQ/1
20 TABLET, EXTENDED RELEASE ORAL ONCE
Status: COMPLETED | OUTPATIENT
Start: 2018-10-10 | End: 2018-10-10

## 2018-10-10 RX ORDER — ENOXAPARIN SODIUM 100 MG/ML
80 INJECTION SUBCUTANEOUS EVERY 12 HOURS
Status: DISCONTINUED | OUTPATIENT
Start: 2018-10-10 | End: 2018-10-12 | Stop reason: HOSPADM

## 2018-10-10 RX ADMIN — PAROXETINE 20 MG: 20 TABLET, FILM COATED ORAL at 08:47

## 2018-10-10 RX ADMIN — Medication 10 ML: at 05:37

## 2018-10-10 RX ADMIN — SODIUM CHLORIDE 75 ML/HR: 900 INJECTION, SOLUTION INTRAVENOUS at 03:38

## 2018-10-10 RX ADMIN — WARFARIN SODIUM 5 MG: 5 TABLET ORAL at 17:58

## 2018-10-10 RX ADMIN — ATORVASTATIN CALCIUM 40 MG: 40 TABLET, FILM COATED ORAL at 21:26

## 2018-10-10 RX ADMIN — METOPROLOL SUCCINATE 25 MG: 25 TABLET, EXTENDED RELEASE ORAL at 08:47

## 2018-10-10 RX ADMIN — ENOXAPARIN SODIUM 80 MG: 100 INJECTION SUBCUTANEOUS at 16:03

## 2018-10-10 RX ADMIN — AMLODIPINE BESYLATE 2.5 MG: 5 TABLET ORAL at 08:47

## 2018-10-10 RX ADMIN — LORAZEPAM 0.5 MG: 2 INJECTION INTRAMUSCULAR; INTRAVENOUS at 23:36

## 2018-10-10 RX ADMIN — LEVOTHYROXINE SODIUM 100 MCG: 100 TABLET ORAL at 05:34

## 2018-10-10 RX ADMIN — POTASSIUM CHLORIDE 20 MEQ: 20 TABLET, EXTENDED RELEASE ORAL at 13:25

## 2018-10-10 RX ADMIN — Medication 10 ML: at 21:26

## 2018-10-10 RX ADMIN — FINASTERIDE 5 MG: 5 TABLET, FILM COATED ORAL at 08:47

## 2018-10-10 RX ADMIN — TAMSULOSIN HYDROCHLORIDE 0.4 MG: 0.4 CAPSULE ORAL at 08:47

## 2018-10-10 RX ADMIN — Medication 10 ML: at 13:25

## 2018-10-10 RX ADMIN — ALLOPURINOL 300 MG: 300 TABLET ORAL at 08:47

## 2018-10-10 NOTE — PROGRESS NOTES
TRANSFER - IN REPORT:    Verbal report received from RHYS Mon(name) on Gilda Hernadez  being received from ED(unit) for routine progression of care      Report consisted of patients Situation, Background, Assessment and   Recommendations(SBAR). Information from the following report(s) SBAR, Kardex, Intake/Output, Recent Results and Cardiac Rhythm AFib was reviewed with the receiving nurse. Opportunity for questions and clarification was provided. Assessment completed upon patients arrival to unit and care assumed. 65 Pt does not know current medications and his wife has list and is not with pt. Pt also stated he has advanced directive that may not be on file. Called home number given by patient with did not get an answer. No option to leave an answer. Will pass on to day shift nurse and encouraged pt to ask wife to bring in if he speaks to her.    Arthur Bending in pt room to get VS and pt has nosebleed. Multiple blood soaked tissues on bedside table. Pt encouraged to not shove tissues up his nose. Bleeding from nose minimal and seems to be stopped but I will continue to monitor. Pt has no complaints of pain or SOB. Call bell in reach. Primary Nurse Abel Moya and Seymour Martinez RN performed a dual skin assessment on this patient No impairment noted  Jose score is 20      Bedside shift change report given to David Lilly RN (oncoming nurse) by Olya Vega RN (offgoing nurse). Report included the following information SBAR, Kardex, Intake/Output, MAR, Recent Results and Cardiac Rhythm AFib.

## 2018-10-10 NOTE — PROGRESS NOTES
Pharmacy Daily Dosing of Warfarin    Indication: Mechanical AVR  Goal INR: 2-3    PTA Warfarin Dose: 4 mg on Mon; 3 mg on Tues/Wed; 4 mg Thurs/Fri/Sat/Sun    Concurrent anticoagulants: None  Concurrent antiplatelet: None    Major Interacting Medications    Drugs that may increase INR: Allopurinol    Drugs that may decrease INR: None    Conditions that may increase/decrease INR (CHF, C. diff, cirrhosis, thyroid disorder, hypoalbuminemia): None    Labs:  Recent Labs      10/10/18   0451  10/09/18   1532   INR  1.5*  >12.3*   HGB  7.7*  7.5*   PLT  128*  162   SGOT   --   39*   TBILI   --   1.5*   ALB   --   3.0*           Impression/Plan: Will give 5 mg x 1 dose today   Patient received vitamin K 5 mg IV once on 10/9/18. Daily INR  CBC w/o diff QOD     Pharmacy will follow daily and adjust the dose as appropriate. Thanks  Lady Manuel PHARMD      [email or web address]

## 2018-10-10 NOTE — H&P
Hospitalist Admission Note    NAME: Gretchen Hutchins   :  1935   MRN:  [id number]     Date/Time:  10/9/2018 9:30 PM    Patient PCP: Chacorta Torrez MD  ______________________________________________________________________  Given the patient's current clinical presentation, I have a high level of concern for decompensation if discharged from the emergency department. Complex decision making was performed, which includes reviewing the patient's available past medical records, laboratory results, and x-ray films. My assessment of this patient's clinical condition and my plan of care is as follows. Assessment / Plan:  Coumadin toxicity POA- INR >12  Causing Nose bleed POA- now seems to have stopped in ER  H/o Mechanical Aortic Valve x   Newly found Afib -rate controlled, likely Chronic  Anemia POA- likely due to chronic blood loss  CXR neg  ProBNP  Trop neg x 2  Hb 7.5  Stool Guaiac negative in ER    Admit to tele bed  S/p Vit K x1 in ER, follow INR daily, resume Coumadin once close to 3.0  Cardiology consulted in ER - recommends conservative management  Check Echo  Cont metoprolol  Cont statin  Holding ASA for now  Agree to transfuse 1 unit PRBC  CBC in AM    Probable AYAZ POA  R/o CKD   Pt has not seen Dr Her Seen in a while  Nephrology Consulted for insight on Cr  IVF overnight  BMP in AM    CAD s/p CABG  Systolic CHF  HTN  Cont home meds    Hypothyroidism- pt is not compliant with meds a per wife  Cont synthroid    BPH  Cont proscar, flomax    Gout  Cont allopurinol        Code Status: Full  Surrogate Decision Maker: wife Robby # [phone]    DVT Prophylaxis: INR elevated/coumadin  GI Prophylaxis: IV PPI    Baseline: Pt is living with wife at home, seems to be non compliant with some meds as per wife at home      Subjective:   CHIEF COMPLAINT: Palpitation with chest discomfort at PCP's office    HISTORY OF PRESENT ILLNESS:     Kareem Stanton is a 80 y.o.    male who presents with above complains from PCP's office via EMS. Pt presented with CC of palpitation with generalized weakness x 1 week  H/o associated nose bleed- now stooped in ER  H/o mechanical AV in 1996- on coumadin  H/o CAD s/p CABG with systolic CHF EF 11% in 4784    Pt was found to have Hb 7.5  & Cr 2.4 on workup in ER with INR >12. We were asked to admit for work up and evaluation of the above problems. Past Medical History:   Diagnosis Date    Anger     TAKES PAROXETINE TO CONTROL ANGER ISSUES    CAD (coronary artery disease)     Foot drop, left     WEARS METAL BRACE    Gout     Hard of hearing     High cholesterol     Hypertension     Prostate enlargement     Thyroid disease     Unspecified sleep apnea     DOESN'T USE CPAP; \"IT MADE HIM SICK-COLDS, SINUS\", PER WIFE        Past Surgical History:   Procedure Laterality Date    CARDIAC SURG PROCEDURE UNLIST      ANGIOPLASTY WITH CORONARY STENT    CARDIAC SURG PROCEDURE UNLIST  1996    AORTIC VALVE REPLACEMENT    HX ORTHOPAEDIC  2001    ORIF COMPOUND FRACTURE LEFT ANKLE    HX RETINAL DETACHMENT REPAIR  1980s    LEFT EYE       Social History   Substance Use Topics    Smoking status: Never Smoker    Smokeless tobacco: Not on file    Alcohol use 1.5 oz/week     3 Standard drinks or equivalent per week        Family History   Problem Relation Age of Onset    Stroke Mother      ANEURYSM    Stroke Father      No Known Allergies     Prior to Admission medications    Medication Sig Start Date End Date Taking? Authorizing Provider   olmesartan-hydroCHLOROthiazide [facility] HCT) 20-12.5 mg per tablet  9/22/18   Historical Provider   metoprolol succinate (TOPROL-XL) 25 mg XL tablet  8/15/18   Historical Provider   amLODIPine (NORVASC) 2.5 mg tablet Take 1 Tab by mouth daily. 10/29/13   Ian Wilkinson NP   HYDROcodone-acetaminophen (LORTAB) 2.5-500 mg per tablet Take 1 Tab by mouth every four (4) hours as needed for Pain.  10/29/13   Darline Vishal Anton NP   oxyCODONE IR (ROXICODONE) 5 mg immediate release tablet Take 1 Tab by mouth every four (4) hours as needed for Pain. 10/25/13   STEPAN Laguna   MULTIVITAMIN W-MINERALS/LUTEIN (CENTRUM SILVER PO) Take  by mouth daily. Historical Provider   ASCORBATE CALCIUM (VITAMIN C PO) Take 1,000 mg by mouth daily. Historical Provider   Aspirin, Buffered 81 mg Tab Take  by mouth daily. Historical Provider   allopurinol (ZYLOPRIM) 300 mg tablet Take  by mouth daily. Historical Provider   atorvastatin (LIPITOR) 40 mg tablet Take  by mouth daily. Historical Provider   FERROUS FUMARATE/VIT BCOMP&C (SUPER B COMPLEX PO) Take  by mouth daily. Historical Provider   PARoxetine (PAXIL) 20 mg tablet Take 20 mg by mouth daily. Historical Provider   oxybutynin chloride XL 10 mg CR tablet Take 10 mg by mouth daily. Historical Provider   amLODIPine (NORVASC) 2.5 mg tablet Take  by mouth daily. Historical Provider   DOCOSAHEXANOIC ACID/EPA (FISH OIL PO) Take 1,200 mg by mouth daily. Historical Provider   PSYLLIUM SEED, WITH SUGAR, (METAMUCIL PO) Take  by mouth daily. Historical Provider   GLUCOSAMINE/CHONDROITIN SULF A (GLUCOSAMINE-CHONDROITIN PO) Take  by mouth daily. Historical Provider   levothyroxine (SYNTHROID) 100 mcg tablet Take  by mouth Daily (before breakfast). Historical Provider   colchicine (COLCRYS) 0.6 mg tablet Take 0.6 mg by mouth daily as needed. Indications: GOUT    Historical Provider   FOLIC ACID PO Take 2.5 mg by mouth daily. TAKES WHEN TAKING METHOTREXATE FOR PSORIASIS OUTBREAKS ONLY. Historical Provider   finasteride (PROSCAR) 5 mg tablet Take 5 mg by mouth daily. Historical Provider   tamsulosin (FLOMAX) 0.4 mg capsule Take 0.4 mg by mouth daily. Historical Provider   cholecalciferol, vitamin D3, (VITAMIN D3) 2,000 unit Tab Take  by mouth daily.     Historical Provider       REVIEW OF SYSTEMS:           Total of 12 systems reviewed as follows: POSITIVE= underlined text  Negative = text not underlined  General:  fever, chills, sweats, generalized weakness, weight loss/gain,      loss of appetite   Eyes:    blurred vision, eye pain, loss of vision, double vision  ENT:    rhinorrhea, pharyngitis , Nose bleed   Respiratory:   cough, sputum production, SOB, LENNON, wheezing, pleuritic pain   Cardiology:   chest pain, palpitations, orthopnea, PND, edema, syncope   Gastrointestinal:  abdominal pain , N/V, diarrhea, dysphagia, constipation, bleeding   Genitourinary:  frequency, urgency, dysuria, hematuria, incontinence   Muskuloskeletal :  arthralgia, myalgia, back pain  Hematology:  easy bruising, nose or gum bleeding, lymphadenopathy   Dermatological: rash, ulceration, pruritis, color change / jaundice  Endocrine:   hot flashes or polydipsia   Neurological:  headache, dizziness, confusion, focal weakness, paresthesia,     Speech difficulties, memory loss, gait difficulty  Psychological: Feelings of anxiety, depression, agitation    Objective:   VITALS:    Visit Vitals    /70    Pulse 76    Temp 98.1 °F (36.7 °C)    Resp 20    SpO2 97%       PHYSICAL EXAM:    General:    Alert, cooperative, no distress, appears stated age. HEENT: Atraumatic, anicteric sclerae, pink conjunctivae, Dried blood in the Bares noted +     No oral ulcers, mucosa moist, throat clear, dentition fair  Neck:  Supple, symmetrical,  thyroid: non tender  Lungs:   Clear to auscultation bilaterally. No Wheezing or Rhonchi. No rales. Chest wall:  No tenderness  No Accessory muscle use. Heart:   irregular Rhythm noted +,  Harsh systolic murmur noted +  No edema  Abdomen:   Soft, non-tender. Not distended. Bowel sounds normal  Extremities: No cyanosis. No clubbing,      Skin turgor normal, Capillary refill normal, Radial dial pulse 2+  Skin:     Not pale. Not Jaundiced  No rashes, multiple bruises noted on extremities   Psych:  Good insight. Not depressed.   Not anxious or agitated. Neurologic: EOMs intact. No facial asymmetry. No aphasia or slurred speech. Symmetrical strength, Sensation grossly intact. Alert and oriented X 4.     _______________________________________________________________________  Care Plan discussed with:    Comments   Patient x    Family  x At bedside in ER 23   RN x    Care Manager                    Consultant:  x ED MD Kenna Steiner   _______________________________________________________________________  Expected  Disposition:   Home with Family x   HH/PT/OT/RN x   SNF/LTC    ROXY    ________________________________________________________________________  TOTAL TIME:  64 Minutes    Critical Care Provided     Minutes non procedure based      Comments    x Reviewed previous records   >50% of visit spent in counseling and coordination of care x Discussion with patient and family and questions answered       ________________________________________________________________________  Signed: Dante Alicia MD    Procedures: see electronic medical records for all procedures/Xrays and details which were not copied into this note but were reviewed prior to creation of Plan. LAB DATA REVIEWED:    Recent Results (from the past 24 hour(s))   CBC WITH AUTOMATED DIFF    Collection Time: 10/09/18  3:32 PM   Result Value Ref Range    WBC 9.4 4.1 - 11.1 K/uL    RBC 2.29 (L) 4.10 - 5.70 M/uL    HGB 7.5 (L) 12.1 - 17.0 g/dL    HCT 22.4 (L) 36.6 - 50.3 %    MCV 97.8 80.0 - 99.0 FL    MCH 32.8 26.0 - 34.0 PG    MCHC 33.5 30.0 - 36.5 g/dL    RDW 13.9 11.5 - 14.5 %    PLATELET 141 080 - 900 K/uL    MPV 10.5 8.9 - 12.9 FL    NRBC 0.0 0  WBC    ABSOLUTE NRBC 0.00 0.00 - 0.01 K/uL    NEUTROPHILS 87 (H) 32 - 75 %    LYMPHOCYTES 5 (L) 12 - 49 %    MONOCYTES 8 5 - 13 %    EOSINOPHILS 0 0 - 7 %    BASOPHILS 0 0 - 1 %    IMMATURE GRANULOCYTES 0 0.0 - 0.5 %    ABS. NEUTROPHILS 8.1 (H) 1.8 - 8.0 K/UL    ABS. LYMPHOCYTES 0.5 (L) 0.8 - 3.5 K/UL    ABS.  MONOCYTES 0.8 0.0 - 1.0 K/UL ABS. EOSINOPHILS 0.0 0.0 - 0.4 K/UL    ABS. BASOPHILS 0.0 0.0 - 0.1 K/UL    ABS. IMM. GRANS. 0.0 0.00 - 0.04 K/UL    DF SMEAR SCANNED      RBC COMMENTS ANISOCYTOSIS  1+       METABOLIC PANEL, COMPREHENSIVE    Collection Time: 10/09/18  3:32 PM   Result Value Ref Range    Sodium 135 (L) 136 - 145 mmol/L    Potassium 3.5 3.5 - 5.1 mmol/L    Chloride 102 97 - 108 mmol/L    CO2 21 21 - 32 mmol/L    Anion gap 12 5 - 15 mmol/L    Glucose 129 (H) 65 - 100 mg/dL    BUN 60 (H) 6 - 20 MG/DL    Creatinine 2.34 (H) 0.70 - 1.30 MG/DL    BUN/Creatinine ratio 26 (H) 12 - 20      GFR est AA 32 (L) >60 ml/min/1.73m2    GFR est non-AA 27 (L) >60 ml/min/1.73m2    Calcium 8.0 (L) 8.5 - 10.1 MG/DL    Bilirubin, total 1.5 (H) 0.2 - 1.0 MG/DL    ALT (SGPT) 22 12 - 78 U/L    AST (SGOT) 39 (H) 15 - 37 U/L    Alk.  phosphatase 68 45 - 117 U/L    Protein, total 6.2 (L) 6.4 - 8.2 g/dL    Albumin 3.0 (L) 3.5 - 5.0 g/dL    Globulin 3.2 2.0 - 4.0 g/dL    A-G Ratio 0.9 (L) 1.1 - 2.2     MAGNESIUM    Collection Time: 10/09/18  3:32 PM   Result Value Ref Range    Magnesium 1.9 1.6 - 2.4 mg/dL   TROPONIN I    Collection Time: 10/09/18  3:32 PM   Result Value Ref Range    Troponin-I, Qt. 0.07 (H) <0.05 ng/mL   NT-PRO BNP    Collection Time: 10/09/18  3:32 PM   Result Value Ref Range    NT pro-BNP 1870 (H) 0 - 450 PG/ML   PROTHROMBIN TIME + INR    Collection Time: 10/09/18  3:32 PM   Result Value Ref Range    INR >12.3 (HH) 0.9 - 1.1    Prothrombin time >120.0 (H) 9.0 - 11.1 sec   TSH 3RD GENERATION    Collection Time: 10/09/18  3:58 PM   Result Value Ref Range    TSH 39.10 (H) 0.36 - 3.74 uIU/mL   OCCULT BLOOD, STOOL    Collection Time: 10/09/18  5:14 PM   Result Value Ref Range    Occult blood, stool NEGATIVE  NEG     TROPONIN I    Collection Time: 10/09/18  7:14 PM   Result Value Ref Range    Troponin-I, Qt. 0.09 (H) <0.05 ng/mL

## 2018-10-10 NOTE — PROGRESS NOTES
Hospitalist Progress Note    NAME: Margaret Lucia   :  1935   MRN:  [id number]       Assessment / Plan:  Coumadin toxicity POA- INR >12   Causing Nose bleed POA  H/o Mechanical Aortic Valve x   Newly found Afib -rate controlled, likely Chronic  Anemia POA- likely due to chronic blood loss  Pro-BNP elevated but patient with History of congestive heart failure, known left ventricular systolic dysfunction  Trop neg x 2  Stool Guaiac negative in ER  s/p 1 PRBC transfusion, hgb is 7.5 in am.  Admit to tele bed  S/p Vit K x1 in ER, INR this morning was 1.5   Back on coumadin and pharmacy helping dosing. Bridging with Lovenox for a therapeutic goal of 2.5 to3.5  Cardiology help appreciated   Holding ASA for now  Continue current medications. likely AYAZ on CKD   Cr better this morning  2.34-->1.55   CAD s/p CABG  Systolic CHF  HTN  Cont home meds  Hypothyroidism  Elevated TSH  pt is not compliant with meds a per wife  Cont synthroid  BPH  Cont proscar, flomax  Gout  Cont allopurinol  There is no height or weight on file to calculate BMI.: 18.5 - 24.9 Normal weight    Code status: Full  Prophylaxis: Coumadin  Recommended Disposition: TBD     Subjective:     Chief Complaint / Reason for Physician Visit  \"i do not have any more nose bleeding \". Discussed with RN events overnight. Review of Systems:  Symptom Y/N Comments  Symptom Y/N Comments   Fever/Chills n   Chest Pain n    Poor Appetite    Edema     Cough    Abdominal Pain n    Sputum    Joint Pain     SOB/LENNON n   Pruritis/Rash     Nausea/vomit n   Tolerating PT/OT     Diarrhea    Tolerating Diet     Constipation    Other       Could NOT obtain due to:      Objective:     VITALS:   Last 24hrs VS reviewed since prior progress note.  Most recent are:  Patient Vitals for the past 24 hrs:   Temp Pulse Resp BP SpO2   10/10/18 0745 97.4 °F (36.3 °C) 78 18 122/70 100 %   10/10/18 0420 97.8 °F (36.6 °C) 84 18 100/59 99 %   10/10/18 0305 97.9 °F (36.6 °C) 97 20 114/77 98 %   10/10/18 0200 98.1 °F (36.7 °C) 71 18 114/50 100 %   10/10/18 0129 97.9 °F (36.6 °C) 83 18 123/62 100 %   10/10/18 0055 97.8 °F (36.6 °C) 66 18 (!) 108/91 92 %   10/10/18 0036 97.8 °F (36.6 °C) 78 18 102/70 97 %   10/10/18 0022 97.6 °F (36.4 °C) 78 18 98/51 98 %   10/09/18 2238 97 °F (36.1 °C) 83 18 117/57 100 %   10/09/18 2153 - (!) 104 15 131/54 96 %   10/09/18 2130 - 84 24 121/70 -   10/09/18 2000 - 76 20 110/70 97 %   10/09/18 1931 - 82 21 101/53 91 %   10/09/18 1918 - 79 17 92/64 99 %   10/09/18 1900 98.1 °F (36.7 °C) 79 21 (!) 83/49 98 %   10/09/18 1845 - 82 24 93/59 99 %   10/09/18 1830 - 75 22 95/61 99 %   10/09/18 1800 - 78 20 98/45 97 %   10/09/18 1730 - 86 18 92/73 98 %   10/09/18 1700 - 78 19 (!) 88/59 97 %   10/09/18 1630 - 86 18 (!) 86/53 93 %   10/09/18 1612 - 87 17 (!) 81/49 98 %   10/09/18 1600 - 96 24 (!) 72/50 100 %   10/09/18 1533 - 91 23 (!) 119/104 -       Intake/Output Summary (Last 24 hours) at 10/10/18 0844  Last data filed at 10/10/18 0835   Gross per 24 hour   Intake              340 ml   Output              950 ml   Net             -610 ml        PHYSICAL EXAM:  General: WD, WN. Alert, cooperative, no acute distress    EENT:  EOMI. Anicteric sclerae. MMM. No more nose bleeding. Resp:  CTA bilaterally, no wheezing or rales. No accessory muscle use  CV:  Regular  rhythm,  No edema  GI:  Soft, Non distended, Non tender.  +Bowel sounds  Neurologic:  Alert and oriented X 3, normal speech, some cognitive function decline noted.     Reviewed most current lab test results and cultures  YES  Reviewed most current radiology test results   YES  Review and summation of old records today    NO  Reviewed patient's current orders and MAR    YES  PMH/ reviewed - no change compared to H&P  ________________________________________________________________________  Care Plan discussed with:    Comments   Patient y    Family      RN y    Care Manager     Consultant Multidiciplinary team rounds were held today with , nursing, pharmacist and clinical coordinator. Patient's plan of care was discussed; medications were reviewed and discharge planning was addressed. ________________________________________________________________________  Total NON critical care TIME:  30  Minutes    Total CRITICAL CARE TIME Spent:   Minutes non procedure based      Comments   >50% of visit spent in counseling and coordination of care     ________________________________________________________________________  Isha Rodriguez MD     Procedures: see electronic medical records for all procedures/Xrays and details which were not copied into this note but were reviewed prior to creation of Plan. LABS:  I reviewed today's most current labs and imaging studies. Pertinent labs include:  Recent Labs      10/10/18   0451  10/09/18   1532   WBC  6.4  9.4   HGB  7.7*  7.5*   HCT  23.0*  22.4*   PLT  128*  162     Recent Labs      10/10/18   0451  10/09/18   1532   NA  140  135*   K  3.2*  3.5   CL  108  102   CO2  23  21   GLU  115*  129*   BUN  53*  60*   CREA  1.55*  2.34*   CA  7.9*  8.0*   MG   --   1.9   ALB   --   3.0*   TBILI   --   1.5*   SGOT   --   39*   ALT   --   22   INR  1.5*  >12.3*       Signed:  Isha Rodriguez MD

## 2018-10-10 NOTE — PROGRESS NOTES
0700: Received bedside report from Harpreet Iverson, off going nurse. Assumed care of patient    1100: Received verbal orders from Dr. Tamir Alvarez for an EKG    1520: Pt showing possible Vtach on tele, called telemetry, advised it was not Vtach, rather his afib. Pt has been in afib consistently. Pt stable, /59, HR 86, no palpitations or dizziness. Will continue to monitor. 1900: Bedside shift change report given to , RN (oncoming nurse). Report included the following information SBAR, Kardex, Intake/Output, MAR, Recent Results and Cardiac Rhythm Afib. SHIFT SUMMARY: pt more confused this afternoon, able to reorient. Echo done today, possible D/C tomorrow. [address] NURSING NOTE   Admission Date 10/9/2018   Admission Diagnosis Supratherapeutic INR  Anemia  H/O mechanical aortic valve replacement  Hypothyroidism   Consults IP CONSULT TO NEPHROLOGY      Cardiac Monitoring [x] Yes [] No      Purposeful Hourly Rounding [x] Yes    Juany Score Total Score: 4   Juany score 3 or > [x] Bed Alarm [] Avasys [] 1:1 sitter [] Patient refused (Signed refusal form in chart)   Jose Score Jose Score: 20   Jose score 14 or < [] PMT consult [] Wound Care consult    []  Specialty bed  [] Nutrition consult      Influenza Vaccine Received Flu Vaccine for Current Season (usually Sept-March): Yes           Oxygen needs? [x] Room air Oxygen @  []1L    []2L    []3L   []4L    []5L   []6L via  NC   Chronic home O2 use?  [] Yes [] No  Perform O2 challenge test and document in progress note using smartphrase (.Homeoxygen)      Last bowel movement Last Bowel Movement Date: 10/06/18      Urinary Catheter             LDAs               Peripheral IV 10/09/18 Right Antecubital (Active)   Site Assessment Clean, dry, & intact 10/10/2018  4:00 PM   Phlebitis Assessment 0 10/10/2018  4:00 PM   Infiltration Assessment 0 10/10/2018  4:00 PM   Dressing Status Clean, dry, & intact 10/10/2018  4:00 PM   Dressing Type Transparent 10/10/2018  4:00 PM   Hub Color/Line Status Pink;Flushed;Patent 10/10/2018  4:00 PM       Peripheral IV 10/09/18 Left Hand (Active)   Site Assessment Clean, dry, & intact 10/10/2018  4:00 PM   Phlebitis Assessment 0 10/10/2018  4:00 PM   Infiltration Assessment 0 10/10/2018  4:00 PM   Dressing Status Clean, dry, & intact 10/10/2018  4:00 PM   Dressing Type Transparent 10/10/2018  4:00 PM   Hub Color/Line Status Pink;Flushed;Patent 10/10/2018  4:00 PM                         Readmission Risk Assessment Tool Score Low Risk            10       Total Score        3 Has Seen PCP in Last 6 Months (Yes=3, No=0)    2 . Living with Significant Other. Assisted Living. LTAC. SNF. or   Rehab    5 Pt.  Coverage (Medicare=5 , Medicaid, or Self-Pay=4)        Criteria that do not apply:    Patient Length of Stay (>5 days = 3)    IP Visits Last 12 Months (1-3=4, 4=9, >4=11)    Charlson Comorbidity Score (Age + Comorbid Conditions)       Expected Length of Stay 2d 7h   Actual Length of Stay 1

## 2018-10-10 NOTE — PROGRESS NOTES
Cardiology Progress Note      10/10/2018 10:13 AM    Admit Date: 10/9/2018          Subjective:Denies further chest pain or other new c/o          Visit Vitals    /70 (BP 1 Location: Left arm, BP Patient Position: Supine; Head of bed elevated (Comment degrees))  Comment (BP Patient Position): 30 degrees    Pulse 78    Temp 97.4 °F (36.3 °C)    Resp 18    SpO2 100%     10/08 1901 - 10/10 0700  In: 100 [P.O.:100]  Out: 950 [Urine:950]        Objective:      Physical Exam:  VS as above  Chest scattered crackles bilat  Card Ireeg irreg crisp valve sounds 3/6 CELIA     Data Review:   Labs:    Hgb 7.7  BUN 53  Creat 1.55  INR 1.5     Telemetry: afib IVCD R 80       Assessment:     1. Anemia with probable GI bleed of uncertain source. 2.  Supratherapeutic INR. 3.  Prior mechanical aortic valve replacement. 4.  Coronary artery disease with prior coronary artery bypass grafting. No clear evidence of an acute coronary syndrome. 5.  History of congestive heart failure, known left ventricular systolic dysfunction. 6.  Hypertension. 7.  Dyslipidemia. 8.  atrial fibrillation. ? Duration   9.  renal insuff- better   10. Hypothyroidism. 11.  Probable medication noncompliance. 12.  Sleep apnea. 15.  Advanced age. ? Dementia        Plan: Await echo. Will reorder 12 lead lead. Needs to get back on anticoag if needs active GI bleeding. Would cover with Lovenox until INR over 2.  May need SNF if compliance an issue

## 2018-10-10 NOTE — PROGRESS NOTES
Pharmacy Daily Dosing of Warfarin    Indication: mech. AVR    Goal INR: 2-3    PTA Warfarin Dose: 4 mg on Mon; 3 mg on Tues/Wed; 4 mg Thurs/Fri/Sat/Sun    Concurrent anticoagulants:     Concurrent antiplatelet:     Major Interacting Medications    Drugs that may increase INR:    Drugs that may decrease INR:     Conditions that may increase/decrease INR (CHF, C. diff, cirrhosis, thyroid disorder, hypoalbuminemia):     Labs:  Recent Labs      10/09/18   1532   INR  >12.3*   HGB  7.5*   PLT  162   SGOT  39*   TBILI  1.5*   ALB  3.0*           Impression/Plan: HOLD warfarin x 1 dose. Patient received vitamin K 5 mg IV once in ED. Patient did not take warfarin today. Daily INR  CBC w/o diff QOD     Pharmacy will follow daily and adjust the dose as appropriate.     Thanks  Justa Zhao, PHARMD

## 2018-10-10 NOTE — ED NOTES
Pharmacy called to confirm patient's coumadin dose patient informed this nurse that he takes 4mg on Mon, 3mg on Tuesday and Wednesday, then 4mg for the remaining of the week.

## 2018-10-10 NOTE — PROGRESS NOTES
Bedside and Verbal shift change report given to Stephanie Gallardo RN (oncoming nurse). Report included the following information SBAR, Intake/Output, MAR and Recent Results. SHIFT SUMMARY:        [address] NURSING NOTE   Admission Date 10/9/2018   Admission Diagnosis Supratherapeutic INR  Anemia  H/O mechanical aortic valve replacement  Hypothyroidism   Consults IP CONSULT TO NEPHROLOGY      Cardiac Monitoring [x] Yes [] No      Purposeful Hourly Rounding [x] Yes    Juany Score Total Score: 4   Juany score 3 or > [x] Bed Alarm [] Avasys [] 1:1 sitter [] Patient refused (Signed refusal form in chart)   Jose Score Jose Score: 20   Jose score 14 or < [] PMT consult [] Wound Care consult    []  Specialty bed  [] Nutrition consult      Influenza Vaccine Received Flu Vaccine for Current Season (usually Sept-March): Yes           Oxygen needs? [x] Room air Oxygen @  []1L    []2L    []3L   []4L    []5L   []6L via  NC   Chronic home O2 use?  [x] Yes [] No  Perform O2 challenge test and document in progress note using smartphrase (.Homeoxygen)      Last bowel movement Last Bowel Movement Date: 10/06/18      Urinary Catheter             LDAs               Peripheral IV 10/09/18 Right Antecubital (Active)   Site Assessment Clean, dry, & intact 10/10/2018  7:19 PM   Phlebitis Assessment 0 10/10/2018  4:00 PM   Infiltration Assessment 0 10/10/2018  4:00 PM   Dressing Status Clean, dry, & intact 10/10/2018  4:00 PM   Dressing Type Transparent 10/10/2018  4:00 PM   Hub Color/Line Status Pink;Flushed;Patent 10/10/2018  4:00 PM       Peripheral IV 10/09/18 Left Hand (Active)   Site Assessment Clean, dry, & intact 10/10/2018  7:19 PM   Phlebitis Assessment 0 10/10/2018  4:00 PM   Infiltration Assessment 0 10/10/2018  4:00 PM   Dressing Status Clean, dry, & intact 10/10/2018  4:00 PM   Dressing Type Transparent 10/10/2018  4:00 PM   Hub Color/Line Status Pink;Flushed;Patent 10/10/2018  4:00 PM                         Readmission Risk Assessment Tool Score Low Risk            10       Total Score        3 Has Seen PCP in Last 6 Months (Yes=3, No=0)    2 . Living with Significant Other. Assisted Living. LTAC. SNF. or   Rehab    5 Pt.  Coverage (Medicare=5 , Medicaid, or Self-Pay=4)        Criteria that do not apply:    Patient Length of Stay (>5 days = 3)    IP Visits Last 12 Months (1-3=4, 4=9, >4=11)    Charlson Comorbidity Score (Age + Comorbid Conditions)       Expected Length of Stay 2d 7h   Actual Length of Stay 1

## 2018-10-10 NOTE — PROGRESS NOTES
Problem: Falls - Risk of  Goal: *Absence of Falls  Document Juany Fall Risk and appropriate interventions in the flowsheet.    Outcome: Progressing Towards Goal  Fall Risk Interventions:  Mobility Interventions: Bed/chair exit alarm, Communicate number of staff needed for ambulation/transfer, OT consult for ADLs, Patient to call before getting OOB, PT Consult for mobility concerns, Utilize walker, cane, or other assistive device, Strengthening exercises (ROM-active/passive), PT Consult for assist device competence         Medication Interventions: Bed/chair exit alarm, Evaluate medications/consider consulting pharmacy, Patient to call before getting OOB, Teach patient to arise slowly         History of Falls Interventions: Consult care management for discharge planning, Bed/chair exit alarm, Door open when patient unattended, Evaluate medications/consider consulting pharmacy, Investigate reason for fall, Room close to nurse's station

## 2018-10-11 LAB
ALBUMIN SERPL-MCNC: 3.1 G/DL (ref 3.5–5)
ALBUMIN/GLOB SERPL: 1 {RATIO} (ref 1.1–2.2)
ALP SERPL-CCNC: 71 U/L (ref 45–117)
ALT SERPL-CCNC: 25 U/L (ref 12–78)
ANION GAP SERPL CALC-SCNC: 6 MMOL/L (ref 5–15)
AST SERPL-CCNC: 46 U/L (ref 15–37)
ATRIAL RATE: 72 BPM
ATRIAL RATE: 96 BPM
BASOPHILS # BLD: 0 K/UL (ref 0–0.1)
BASOPHILS NFR BLD: 0 % (ref 0–1)
BILIRUB SERPL-MCNC: 1.9 MG/DL (ref 0.2–1)
BUN SERPL-MCNC: 42 MG/DL (ref 6–20)
BUN/CREAT SERPL: 34 (ref 12–20)
CALCIUM SERPL-MCNC: 8.2 MG/DL (ref 8.5–10.1)
CALCULATED P AXIS, ECG09: 91 DEGREES
CALCULATED R AXIS, ECG10: -7 DEGREES
CALCULATED T AXIS, ECG11: -163 DEGREES
CALCULATED T AXIS, ECG11: 154 DEGREES
CHLORIDE SERPL-SCNC: 111 MMOL/L (ref 97–108)
CO2 SERPL-SCNC: 25 MMOL/L (ref 21–32)
CREAT SERPL-MCNC: 1.25 MG/DL (ref 0.7–1.3)
DIAGNOSIS, 93000: NORMAL
DIAGNOSIS, 93000: NORMAL
DIFFERENTIAL METHOD BLD: ABNORMAL
EOSINOPHIL # BLD: 0.1 K/UL (ref 0–0.4)
EOSINOPHIL NFR BLD: 3 % (ref 0–7)
ERYTHROCYTE [DISTWIDTH] IN BLOOD BY AUTOMATED COUNT: 14.8 % (ref 11.5–14.5)
GLOBULIN SER CALC-MCNC: 3.2 G/DL (ref 2–4)
GLUCOSE SERPL-MCNC: 102 MG/DL (ref 65–100)
HCT VFR BLD AUTO: 23.1 % (ref 36.6–50.3)
HGB BLD-MCNC: 7.9 G/DL (ref 12.1–17)
IMM GRANULOCYTES # BLD: 0 K/UL (ref 0–0.04)
IMM GRANULOCYTES NFR BLD AUTO: 1 % (ref 0–0.5)
INR PPP: 1.5 (ref 0.9–1.1)
LYMPHOCYTES # BLD: 0.8 K/UL (ref 0.8–3.5)
LYMPHOCYTES NFR BLD: 15 % (ref 12–49)
MCH RBC QN AUTO: 33.1 PG (ref 26–34)
MCHC RBC AUTO-ENTMCNC: 34.2 G/DL (ref 30–36.5)
MCV RBC AUTO: 96.7 FL (ref 80–99)
MONOCYTES # BLD: 0.4 K/UL (ref 0–1)
MONOCYTES NFR BLD: 8 % (ref 5–13)
NEUTS SEG # BLD: 4 K/UL (ref 1.8–8)
NEUTS SEG NFR BLD: 73 % (ref 32–75)
NRBC # BLD: 0 K/UL (ref 0–0.01)
NRBC BLD-RTO: 0 PER 100 WBC
P-R INTERVAL, ECG05: 210 MS
PLATELET # BLD AUTO: 147 K/UL (ref 150–400)
PMV BLD AUTO: 9.7 FL (ref 8.9–12.9)
POTASSIUM SERPL-SCNC: 3.7 MMOL/L (ref 3.5–5.1)
PROT SERPL-MCNC: 6.3 G/DL (ref 6.4–8.2)
PROTHROMBIN TIME: 14.9 SEC (ref 9–11.1)
Q-T INTERVAL, ECG07: 388 MS
Q-T INTERVAL, ECG07: 416 MS
QRS DURATION, ECG06: 160 MS
QRS DURATION, ECG06: 160 MS
QTC CALCULATION (BEZET), ECG08: 455 MS
QTC CALCULATION (BEZET), ECG08: 482 MS
RBC # BLD AUTO: 2.39 M/UL (ref 4.1–5.7)
SODIUM SERPL-SCNC: 142 MMOL/L (ref 136–145)
VENTRICULAR RATE, ECG03: 72 BPM
VENTRICULAR RATE, ECG03: 93 BPM
WBC # BLD AUTO: 5.5 K/UL (ref 4.1–11.1)

## 2018-10-11 PROCEDURE — 97116 GAIT TRAINING THERAPY: CPT

## 2018-10-11 PROCEDURE — G8988 SELF CARE GOAL STATUS: HCPCS | Performed by: OCCUPATIONAL THERAPIST

## 2018-10-11 PROCEDURE — 74011250637 HC RX REV CODE- 250/637: Performed by: INTERNAL MEDICINE

## 2018-10-11 PROCEDURE — 97162 PT EVAL MOD COMPLEX 30 MIN: CPT

## 2018-10-11 PROCEDURE — 97535 SELF CARE MNGMENT TRAINING: CPT | Performed by: OCCUPATIONAL THERAPIST

## 2018-10-11 PROCEDURE — 65660000000 HC RM CCU STEPDOWN

## 2018-10-11 PROCEDURE — 97165 OT EVAL LOW COMPLEX 30 MIN: CPT | Performed by: OCCUPATIONAL THERAPIST

## 2018-10-11 PROCEDURE — 85025 COMPLETE CBC W/AUTO DIFF WBC: CPT | Performed by: INTERNAL MEDICINE

## 2018-10-11 PROCEDURE — G8979 MOBILITY GOAL STATUS: HCPCS

## 2018-10-11 PROCEDURE — G8978 MOBILITY CURRENT STATUS: HCPCS

## 2018-10-11 PROCEDURE — 74011250636 HC RX REV CODE- 250/636: Performed by: INTERNAL MEDICINE

## 2018-10-11 PROCEDURE — 36415 COLL VENOUS BLD VENIPUNCTURE: CPT | Performed by: INTERNAL MEDICINE

## 2018-10-11 PROCEDURE — 85610 PROTHROMBIN TIME: CPT | Performed by: INTERNAL MEDICINE

## 2018-10-11 PROCEDURE — G8987 SELF CARE CURRENT STATUS: HCPCS | Performed by: OCCUPATIONAL THERAPIST

## 2018-10-11 PROCEDURE — 80053 COMPREHEN METABOLIC PANEL: CPT | Performed by: INTERNAL MEDICINE

## 2018-10-11 PROCEDURE — 97530 THERAPEUTIC ACTIVITIES: CPT

## 2018-10-11 RX ORDER — GUAIFENESIN 100 MG/5ML
81 LIQUID (ML) ORAL DAILY
Status: DISCONTINUED | OUTPATIENT
Start: 2018-10-11 | End: 2018-10-12 | Stop reason: HOSPADM

## 2018-10-11 RX ORDER — WARFARIN SODIUM 5 MG/1
5 TABLET ORAL ONCE
Status: COMPLETED | OUTPATIENT
Start: 2018-10-11 | End: 2018-10-11

## 2018-10-11 RX ADMIN — Medication 10 ML: at 21:14

## 2018-10-11 RX ADMIN — ASPIRIN 81 MG CHEWABLE TABLET 81 MG: 81 TABLET CHEWABLE at 10:52

## 2018-10-11 RX ADMIN — Medication 10 ML: at 14:00

## 2018-10-11 RX ADMIN — METOPROLOL SUCCINATE 25 MG: 25 TABLET, EXTENDED RELEASE ORAL at 08:33

## 2018-10-11 RX ADMIN — ALLOPURINOL 300 MG: 300 TABLET ORAL at 08:32

## 2018-10-11 RX ADMIN — Medication 10 ML: at 05:30

## 2018-10-11 RX ADMIN — TAMSULOSIN HYDROCHLORIDE 0.4 MG: 0.4 CAPSULE ORAL at 08:32

## 2018-10-11 RX ADMIN — LEVOTHYROXINE SODIUM 100 MCG: 100 TABLET ORAL at 05:29

## 2018-10-11 RX ADMIN — PAROXETINE 20 MG: 20 TABLET, FILM COATED ORAL at 08:33

## 2018-10-11 RX ADMIN — ATORVASTATIN CALCIUM 40 MG: 40 TABLET, FILM COATED ORAL at 21:13

## 2018-10-11 RX ADMIN — WARFARIN SODIUM 5 MG: 5 TABLET ORAL at 17:14

## 2018-10-11 RX ADMIN — ENOXAPARIN SODIUM 80 MG: 100 INJECTION SUBCUTANEOUS at 16:50

## 2018-10-11 RX ADMIN — AMLODIPINE BESYLATE 2.5 MG: 5 TABLET ORAL at 08:33

## 2018-10-11 RX ADMIN — FINASTERIDE 5 MG: 5 TABLET, FILM COATED ORAL at 08:32

## 2018-10-11 RX ADMIN — ENOXAPARIN SODIUM 80 MG: 100 INJECTION SUBCUTANEOUS at 02:34

## 2018-10-11 NOTE — CONSULTS
Consultation Note    NAME: Gilda Hernadez   :  1935   MRN:  [id number]     Date/Time:  10/10/2018 8:50 PM    I have been asked to see this patient by Dr. Enoc Quiroz  for advice/opinion re: teresa on ckd. Assessment :    Plan:  teresa on ckd  Hypotension - resolved  Hypokalemia  BPH  Anemia  Thrombocytopenia, chronic  H/o kidney stones  Systolic HF (EF 20 %)  Admitted with coumadin toxicity/nose bleed  Mechanical AV  CAD/CABD Creatinine peaked at 2.3, now 1.55; bland urine; ARB/HCT held    Mr. Abimael Nieves admits to sporadic medication adherence and with poor office f/u    Given bland urine, low BP and improvement in creatinine by holding arb/hct and NS bolus, seems like prerenal azotemia. Given poor EF, he is at risk for cardiorenal syndrome as well. Continue as is and will follow along closely    On proscar/flomax       Subjective:   CHIEF COMPLAINT:  teresa on ckd     HISTORY OF PRESENT ILLNESS:     Giorgi Winchester is a 80 y.o.   male who has a history of ckd followed by Dr. Dilma Hansen. Mr. Abimael Nieves states he came to the ER b/c weakness after working on yard equipment. He has some bruising to his legs. He states decrease intake for a week or more. Decreased uop prior to admission. No hematuria. No nsaids. He has not been taking medications as directed, tired of taking medications after all these years. Some abdominal and chest pains.      Past Medical History:   Diagnosis Date    Anger     TAKES PAROXETINE TO CONTROL ANGER ISSUES    CAD (coronary artery disease)     Foot drop, left     WEARS METAL BRACE    Gout     Hard of hearing     High cholesterol     Hypertension     Prostate enlargement     Thyroid disease     Unspecified sleep apnea     DOESN'T USE CPAP; \"IT MADE HIM SICK-COLDS, SINUS\", PER WIFE      Past Surgical History:   Procedure Laterality Date    CARDIAC SURG PROCEDURE UNLIST      ANGIOPLASTY WITH CORONARY STENT    CARDIAC SURG PROCEDURE UNLIST      AORTIC VALVE REPLACEMENT    HX ORTHOPAEDIC 2001    ORIF COMPOUND FRACTURE LEFT ANKLE    HX RETINAL DETACHMENT REPAIR  1980s    LEFT EYE     Social History   Substance Use Topics    Smoking status: Never Smoker    Smokeless tobacco: Not on file    Alcohol use 1.5 oz/week     3 Standard drinks or equivalent per week      Family History   Problem Relation Age of Onset    Stroke Mother      ANEURYSM    Stroke Father       No Known Allergies   Prior to Admission medications    Medication Sig Start Date End Date Taking? Authorizing Provider   amLODIPine (NORVASC) 2.5 mg tablet Take  by mouth daily. Yes Historical Provider   olmesartan-hydroCHLOROthiazide [facility] HCT) 20-12.5 mg per tablet  9/22/18   Historical Provider   metoprolol succinate (TOPROL-XL) 25 mg XL tablet  8/15/18   Historical Provider   amLODIPine (NORVASC) 2.5 mg tablet Take 1 Tab by mouth daily. 10/29/13   Viola Ha NP   HYDROcodone-acetaminophen (LORTAB) 2.5-500 mg per tablet Take 1 Tab by mouth every four (4) hours as needed for Pain. 10/29/13   Viola Ha NP   oxyCODONE IR (ROXICODONE) 5 mg immediate release tablet Take 1 Tab by mouth every four (4) hours as needed for Pain. 10/25/13   STEPAN Zarate   MULTIVITAMIN W-MINERALS/LUTEIN (CENTRUM SILVER PO) Take  by mouth daily. Historical Provider   ASCORBATE CALCIUM (VITAMIN C PO) Take 1,000 mg by mouth daily. Historical Provider   Aspirin, Buffered 81 mg Tab Take  by mouth daily. Historical Provider   allopurinol (ZYLOPRIM) 300 mg tablet Take  by mouth daily. Historical Provider   atorvastatin (LIPITOR) 40 mg tablet Take  by mouth daily. Historical Provider   FERROUS FUMARATE/VIT BCOMP&C (SUPER B COMPLEX PO) Take  by mouth daily. Historical Provider   PARoxetine (PAXIL) 20 mg tablet Take 20 mg by mouth daily. Historical Provider   oxybutynin chloride XL 10 mg CR tablet Take 10 mg by mouth daily. Historical Provider   DOCOSAHEXANOIC ACID/EPA (FISH OIL PO) Take 1,200 mg by mouth daily. Historical Provider   PSYLLIUM SEED, WITH SUGAR, (METAMUCIL PO) Take  by mouth daily. Historical Provider   GLUCOSAMINE/CHONDROITIN SULF A (GLUCOSAMINE-CHONDROITIN PO) Take  by mouth daily. Historical Provider   levothyroxine (SYNTHROID) 100 mcg tablet Take  by mouth Daily (before breakfast). Historical Provider   colchicine (COLCRYS) 0.6 mg tablet Take 0.6 mg by mouth daily as needed. Indications: GOUT    Historical Provider   FOLIC ACID PO Take 2.5 mg by mouth daily. TAKES WHEN TAKING METHOTREXATE FOR PSORIASIS OUTBREAKS ONLY. Historical Provider   finasteride (PROSCAR) 5 mg tablet Take 5 mg by mouth daily. Historical Provider   tamsulosin (FLOMAX) 0.4 mg capsule Take 0.4 mg by mouth daily. Historical Provider   cholecalciferol, vitamin D3, (VITAMIN D3) 2,000 unit Tab Take  by mouth daily.     Historical Provider     REVIEW OF SYSTEMS:     []  Unable to obtain reliable ROS due to  [] mental status  [] sedated   [] intubated   [x] Total of 12 systems reviewed as follows:  Constitutional: negative fever, negative chills, negative weight loss  Eyes:   negative visual changes  ENT:   negative sore throat, tongue or lip swelling  Respiratory:  negative cough, negative dyspnea  Cards:  negative for chest pain, palpitations, lower extremity edema  GI:   negative for nausea, vomiting, diarrhea, and abdominal pain  :  negative for frequency, dysuria  Integument:  negative for rash and pruritus  Heme:  negative for easy bruising and gum/nose bleeding  Musculoskel: negative for myalgias,  back pain +muscle weakness  Neuro:  negative for headaches, dizziness, vertigo  Psych:  negative for feelings of anxiety, depression   Travel?: none    Objective:   VITALS:    Visit Vitals    /68 (BP 1 Location: Left arm, BP Patient Position: At rest)    Pulse 96    Temp 97.3 °F (36.3 °C)    Resp 18    Ht 5' 5\" (1.651 m)    Wt 82.6 kg (182 lb 1.6 oz)    SpO2 100%    BMI 30.3 kg/m2     PHYSICAL EXAM:  Gen:  [x]  WD [x]  WN  [] cachectic []  thin []  obese []  disheveled             []  ill apearing  []   Critical  []   Chronic    []  No acute distress    HEENT:   [x] NC/AT/PERRLA/EOMI    [] pink conjunctivae      [] pale conjunctivae                  PERRL  [] yes  [] no      [] moist mucosa    [] dry mucosa    hearing intact to voice [] yes  [] No                 NECK:   supple [x] yes  [] no        masses [] yes  [] No               thyroid  []  non tender  []  tender    RESP:   [x] CTA bilaterally/no wheezing/rhonchi/rales/crackles    [] rhonchi bilaterally - no dullness  [] wheezing   [] rhonchi   [] crackles     use of accessory muscles [] yes [x] no    CARD:   [x]  regular rate and rhythm/No murmurs/rubs/gallops    murmur  [] yes ()  [] no      Rubs  [] yes  [] no       Gallops [] yes  [] no    Rate []  regular  [x]  irregular        carotid bruits  [] Right  []  Left                 LE edema [] yes  [x] no           JVP  []  yes   []  no    ABD:    [x] soft/non distended/non tender/+bowel sounds/no HSM    []  Rigid    tenderness [] yes [] no   Liver enlargement  []  yes []  no                Spleen enlargement  []  yes []  no     distended []  yes [] no     bowel sound  [] hypoactive   [] hyperactive    LYMPH:    Neck []  yes [x]  no       Axillae []  yes []  no    SKIN:   Rashes []  yes   [x]  no    Ulcers []  yes   []  no               [] tight to palpitation    skin turgor []  good  [] poor  [] decreased               Cyanosis/clubbing []  yes []  no    NEUR:   [x] cranial nerves II-XII grossly intact       [] Cranial nerves deficit                 []  facial droop    []  slurred speech   [] aphasic     [] Strength normal     []  weakness  []  LUE  []   RUE/ []  LLE  []   RLE    follows commands  [x]  yes []  no           PSYCH:   insight [] poor [] good   Alert and Oriented to  [x] person  [x] place  [x]  time                    [] depressed [] anxious [] agitated  [] lethargic [] stuporous  [] sedated     LAB DATA REVIEWED:    Recent Labs      10/10/18   0451  10/09/18   1532   WBC  6.4  9.4   HGB  7.7*  7.5*   HCT  23.0*  22.4*   PLT  128*  162     Recent Labs      10/10/18   0451  10/09/18   1532   NA  140  135*   K  3.2*  3.5   CL  108  102   CO2  23  21   BUN  53*  60*   CREA  1.55*  2.34*   GLU  115*  129*   CA  7.9*  8.0*   MG   --   1.9     Recent Labs      10/09/18   1532   SGOT  39*   ALT  22   AP  68   TBILI  1.5*   ALB  3.0*   GLOB  3.2     Recent Labs      10/10/18   0451  10/09/18   1532   INR  1.5*  >12.3*   PTP  15.2*  >120.0*      No results for input(s): FE, TIBC, PSAT, FERR in the last 72 hours. No results for input(s): PH, PCO2, PO2 in the last 72 hours. No results for input(s): CPK, CKMB in the last 72 hours. No lab exists for component: TROPONINI  Lab Results   Component Value Date/Time    Glucose (POC) 119 (H) 10/24/2013 06:28 AM    Glucose (POC) 112 (H) 10/23/2013 09:54 PM       Procedures: see electronic medical records for all procedures/Xrays and details which were not copied into this note but were reviewed prior to creation of Plan.    ________________________________________________________________________       ___________________________________________________  Consulting Physician:  Luda Solitario MD

## 2018-10-11 NOTE — PROGRESS NOTES
Reason for Admission:   Supratherapeutic INR, anemia, h/o mechanical aortic valve replacement,       hypothyroidism                   RRAT Score:          10           Plan for utilizing home health:      discharpe plan includs SNF                    Likelihood of Readmission:  Low to moderate                         Transition of Care Plan:           SNF    Cm met with patient and wife. Patient is hard of hearing and pleasant. Sitting in chair on room air. Pt name and  confirmed as pt identifiers. Demographics confirmed. Patient is  and lives in a 2 story home with his wife. 8 steps entry, unknown number of steps between floors. Wife informs Cm patient was independent with steps prior to admission. ADL's/iADL's  - independent prior to admission. To include driving. DME - occassional use of cane. Preferred RX - [id number] [place] - yes  SNf - no previous experience. CM reviewed PTOT recommendations of SNF with patient and wife. They would like referral submitted to [facility]. FOC signed. Copy on chart and to patient. Referral submitted via cclink. Care Management Interventions  PCP Verified by CM:  Yes  Mode of Transport at Discharge:  (tbd)  Transition of Care Consult (CM Consult): SNF  Partner SNF: Yes  Discharge Durable Medical Equipment: No  Physical Therapy Consult: Yes  Occupational Therapy Consult: Yes  Current Support Network: Lives with Spouse  Confirm Follow Up Transport: Family  Plan discussed with Pt/Family/Caregiver: Yes  Discharge Location  Discharge Placement: Skilled nursing facility    Steph Burciaga RN CM  Ext [phone]

## 2018-10-11 NOTE — PROGRESS NOTES
NAME: Primo Chan        :  1935        MRN:  [id number]        Assessment :    Plan:  --July Ramachandran on ckd  Hypotension - resolved  Hypokalemia  BPH  Anemia  Thrombocytopenia, chronic  H/o kidney stones  Systolic HF (EF 20 %)  Admitted with coumadin toxicity/nose bleed  Mechanical AV  CAD/CABD --Creatinine peaked at 2.3, now 1.55 to 1.3; bland urine; ARB/HCT is on hold      Given bland urine, low BP and improvement in creatinine by holding arb/hct and NS bolus, seems like prerenal azotemia. Given poor EF, he is at risk for cardiorenal syndrome as well.     Continue as is and will follow along closely     On proscar/flomax       Subjective:     Chief Complaint:  oob in chair. Denies complaint. Feeling better. Review of Systems:    Symptom Y/N Comments  Symptom Y/N Comments   Fever/Chills    Chest Pain     Poor Appetite    Edema     Cough    Abdominal Pain     Sputum    Joint Pain     SOB/LENNON    Pruritis/Rash     Nausea/vomit    Tolerating PT/OT     Diarrhea    Tolerating Diet     Constipation    Other       Could not obtain due to:      Objective:     VITALS:   Last 24hrs VS reviewed since prior progress note. Most recent are:  Visit Vitals    /47 (BP 1 Location: Left arm, BP Patient Position: At rest;Sitting)    Pulse 63    Temp 97.6 °F (36.4 °C)    Resp 16    Ht 5' 5\" (1.651 m)    Wt 82.6 kg (182 lb 1.6 oz)    SpO2 100%    BMI 30.3 kg/m2       Intake/Output Summary (Last 24 hours) at 10/11/18 1250  Last data filed at 10/11/18 0942   Gross per 24 hour   Intake              480 ml   Output              575 ml   Net              -95 ml      Telemetry Reviewed:     PHYSICAL EXAM:  General: WD, WN. Alert, cooperative, no acute distress  Resp:  CTA Bilaterally. No Wheezing/Rhonchi/Rales. No access. muscle use  CV:  Regular  rhythm,  No murmur (), No Rubs, No Gallops.  Trace at most edema  GI:  Soft, Non distended, Non tender.  +Bowel sounds, no HSM      Lab Data Reviewed: (see below)    Medications Reviewed: (see below)    PMH/SH reviewed - no change compared to H&P  ________________________________________________________________________  Care Plan discussed with:  Patient y    Family      RN     Care Manager                    Consultant:          Comments   >50% of visit spent in counseling and coordination of care       ________________________________________________________________________  Gerardo Mejia MD     Procedures: see electronic medical records for all procedures/Xrays and details which  were not copied into this note but were reviewed prior to creation of Plan. LABS:  Recent Labs      10/11/18   0220  10/10/18   0451   WBC  5.5  6.4   HGB  7.9*  7.7*   HCT  23.1*  23.0*   PLT  147*  128*     Recent Labs      10/11/18   0220  10/10/18   0451  10/09/18   1532   NA  142  140  135*   K  3.7  3.2*  3.5   CL  111*  108  102   CO2  25  23  21   BUN  42*  53*  60*   CREA  1.25  1.55*  2.34*   GLU  102*  115*  129*   CA  8.2*  7.9*  8.0*   MG   --    --   1.9     Recent Labs      10/11/18   0220  10/09/18   1532   SGOT  46*  39*   AP  71  68   TP  6.3*  6.2*   ALB  3.1*  3.0*   GLOB  3.2  3.2     Recent Labs      10/11/18   0220  10/10/18   0451  10/09/18   1532   INR  1.5*  1.5*  >12.3*   PTP  14.9*  15.2*  >120.0*      No results for input(s): FE, TIBC, PSAT, FERR in the last 72 hours. No results found for: FOL, RBCF   No results for input(s): PH, PCO2, PO2 in the last 72 hours. No results for input(s): CPK, CKMB in the last 72 hours.     No lab exists for component: TROPONINI  No components found for: [place]  Lab Results   Component Value Date/Time    Color YELLOW/STRAW 10/08/2013 10:41 AM    Appearance CLEAR 10/08/2013 10:41 AM    Specific gravity 1.014 10/08/2013 10:41 AM    pH (UA) 6.5 10/08/2013 10:41 AM    Protein NEGATIVE  10/08/2013 10:41 AM    Glucose NEGATIVE  10/08/2013 10:41 AM    Ketone NEGATIVE 10/08/2013 10:41 AM    Bilirubin NEGATIVE  10/08/2013 10:41 AM    Urobilinogen 0.2 10/08/2013 10:41 AM    Nitrites NEGATIVE  10/08/2013 10:41 AM    Leukocyte Esterase NEGATIVE  10/08/2013 10:41 AM    Epithelial cells FEW 10/08/2013 10:41 AM    Bacteria NEGATIVE  10/08/2013 10:41 AM    WBC 0-4 10/08/2013 10:41 AM    RBC 0-5 10/08/2013 10:41 AM       MEDICATIONS:  Current Facility-Administered Medications   Medication Dose Route Frequency    warfarin (COUMADIN) tablet 5 mg  5 mg Oral ONCE    aspirin chewable tablet 81 mg  81 mg Oral DAILY    enoxaparin (LOVENOX) injection 80 mg  80 mg SubCUTAneous Q12H    0.9% sodium chloride infusion 250 mL  250 mL IntraVENous PRN    allopurinol (ZYLOPRIM) tablet 300 mg  300 mg Oral DAILY    amLODIPine (NORVASC) tablet 2.5 mg  2.5 mg Oral DAILY    atorvastatin (LIPITOR) tablet 40 mg  40 mg Oral QHS    finasteride (PROSCAR) tablet 5 mg  5 mg Oral DAILY    levothyroxine (SYNTHROID) tablet 100 mcg  100 mcg Oral 6am    metoprolol succinate (TOPROL-XL) XL tablet 25 mg  25 mg Oral DAILY    PARoxetine (PAXIL) tablet 20 mg  20 mg Oral DAILY    tamsulosin (FLOMAX) capsule 0.4 mg  0.4 mg Oral DAILY    sodium chloride (NS) flush 5-10 mL  5-10 mL IntraVENous Q8H    sodium chloride (NS) flush 5-10 mL  5-10 mL IntraVENous PRN    naloxone (NARCAN) injection 0.4 mg  0.4 mg IntraVENous PRN    ondansetron (ZOFRAN) injection 4 mg  4 mg IntraVENous Q4H PRN    WARFARIN INFORMATION NOTE (COUMADIN)   Other QPM    HYDROcodone-acetaminophen (NORCO) 5-325 mg per tablet 0.5 Tab  0.5 Tab Oral Q4H PRN

## 2018-10-11 NOTE — PROGRESS NOTES
Pharmacy Daily Dosing of Warfarin    Indication: Hx of Mechanical AVR / New Afib   Goal INR: 2-3    PTA Warfarin Dose: 4 mg on Mon; 3 mg on Tues/Wed; 4 mg Thurs/Fri/Sat/Sun    Concurrent anticoagulants: None  Concurrent antiplatelet: None    Major Interacting Medications    Drugs that may increase INR: Allopurinol    Drugs that may decrease INR: None    Conditions that may increase/decrease INR (CHF, C. diff, cirrhosis, thyroid disorder, hypoalbuminemia): CHF (EF 20%)    Labs:  Recent Labs      10/11/18   0220  10/10/18   0451  10/09/18   1532   INR  1.5*  1.5*  >12.3*   HGB  7.9*  7.7*  7.5*   PLT  147*  128*  162   SGOT  46*   --   39*   TBILI  1.9*   --   1.5*   ALB  3.1*   --   3.0*       Impression/Plan:   INR remains subtherapeutic for goal today   Anemic   Given recent history of supratherapeutic INR and vitamin K  x 1 administration in the ED, will remain cautious and order warfarin 5 mg x 1 for tonight. Continue with enoxaparin 80 mg subQ Q12h bridge with warfarin until INR > 2     Pharmacy will follow daily and adjust the dose as appropriate.     Thank you,  Thu Denney Dancer, PharmD

## 2018-10-11 NOTE — PROGRESS NOTES
Hospitalist Progress Note    NAME: Primo Chan   :  1935   MRN:  [id number]       Assessment / Plan:  Presented with Nose bleed secondary to  Coumadin toxicity POA  H/o Mechanical Aortic Valve x   Newly found Afib -rate controlled, likely Chronic  Anemia POA- likely due to chronic blood loss  Pro-BNP elevated but patient with History of congestive heart failure, known left ventricular systolic dysfunction  s/p 1 PRBC transfusion, hgb Within Acceptable Range. S/p Vit K x1 in ER, INR this morning was 1.5   Bridging coumadin with Lovenox for a therapeutic goal of 2.5 to3.5  Cardiology help appreciated   Holding ASA for now  Continue current medications. likely AYAZ on CKD   Cr improvin.34-->1.55-->1.25  CAD s/p CABG  Systolic CHF  HTN  Cont home meds  Hypothyroidism  Elevated TSH  pt is not compliant with meds a per wife, needs follow up by PCP   Also needs weekly coumadin check. Cont synthroid  BPH  Cont proscar, flomax  Gout  Cont allopurinol  Body mass index is 30.3 kg/(m^2).: 18.5 - 24.9 Normal weight    Code status: Full  Prophylaxis: Coumadin  Recommended Disposition: TBD     Subjective:     Chief Complaint / Reason for Physician Visit  \"i do not have any more nose bleeding and I am feeling better now. \".  Discussed with RN events overnight. Review of Systems:  Symptom Y/N Comments  Symptom Y/N Comments   Fever/Chills n   Chest Pain n    Poor Appetite    Edema     Cough    Abdominal Pain n    Sputum    Joint Pain     SOB/LENNON n   Pruritis/Rash     Nausea/vomit n   Tolerating PT/OT     Diarrhea    Tolerating Diet     Constipation    Other       Could NOT obtain due to:      Objective:     VITALS:   Last 24hrs VS reviewed since prior progress note.  Most recent are:  Patient Vitals for the past 24 hrs:   Temp Pulse Resp BP SpO2   10/11/18 1101 97.6 °F (36.4 °C) 63 16 102/47 100 %   10/11/18 0926 - 100 - 129/71 100 %   10/11/18 0924 - (!) 104 - 147/72 -   10/11/18 0920 - 89 - 129/77 - 10/11/18 0740 97.4 °F (36.3 °C) 85 16 130/52 100 %   10/11/18 0246 97.1 °F (36.2 °C) (!) 54 16 137/71 100 %   10/10/18 2322 97.4 °F (36.3 °C) (!) 101 18 129/85 100 %   10/10/18 1932 97.3 °F (36.3 °C) 96 18 111/68 100 %   10/10/18 1821 - 91 - 134/68 -   10/10/18 1545 97.9 °F (36.6 °C) 71 20 109/68 99 %       Intake/Output Summary (Last 24 hours) at 10/11/18 1154  Last data filed at 10/11/18 0942   Gross per 24 hour   Intake              480 ml   Output              575 ml   Net              -95 ml        PHYSICAL EXAM:  General: WD, WN. Alert, cooperative, no acute distress    EENT:  EOMI. Anicteric sclerae. MMM. No more nose bleeding. Resp:  CTA bilaterally, no wheezing or rales. No accessory muscle use  CV:  Regular  rhythm,  No edema  GI:  Soft, Non distended, Non tender.  +Bowel sounds  Neurologic:  Alert and oriented X 3, normal speech, some cognitive function decline noted. Reviewed most current lab test results and cultures  YES  Reviewed most current radiology test results   YES  Review and summation of old records today    NO  Reviewed patient's current orders and MAR    YES  PMH/ reviewed - no change compared to H&P  ________________________________________________________________________  Care Plan discussed with:    Comments   Patient y    Family      RN y    Care Manager     Consultant                        Multidiciplinary team rounds were held today with , nursing, pharmacist and clinical coordinator. Patient's plan of care was discussed; medications were reviewed and discharge planning was addressed.      ________________________________________________________________________  Total NON critical care TIME:  30  Minutes    Total CRITICAL CARE TIME Spent:   Minutes non procedure based      Comments   >50% of visit spent in counseling and coordination of care     ________________________________________________________________________  Gunjan Shell MD     Procedures: see electronic medical records for all procedures/Xrays and details which were not copied into this note but were reviewed prior to creation of Plan. LABS:  I reviewed today's most current labs and imaging studies. Pertinent labs include:  Recent Labs      10/11/18   0220  10/10/18   0451  10/09/18   1532   WBC  5.5  6.4  9.4   HGB  7.9*  7.7*  7.5*   HCT  23.1*  23.0*  22.4*   PLT  147*  128*  162     Recent Labs      10/11/18   0220  10/10/18   0451  10/09/18   1532   NA  142  140  135*   K  3.7  3.2*  3.5   CL  111*  108  102   CO2  25  23  21   GLU  102*  115*  129*   BUN  42*  53*  60*   CREA  1.25  1.55*  2.34*   CA  8.2*  7.9*  8.0*   MG   --    --   1.9   ALB  3.1*   --   3.0*   TBILI  1.9*   --   1.5*   SGOT  46*   --   39*   ALT  25   --   22   INR  1.5*  1.5*  >12.3*       Signed:  Isha Rodriguez MD

## 2018-10-11 NOTE — PROGRESS NOTES
Problem: Self Care Deficits Care Plan (Adult)  Goal: *Acute Goals and Plan of Care (Insert Text)  Occupational Therapy Goals:  Initiated 10/11/2018  1. Patient will perform grooming standing with modified independence within 7 days. 2. Patient will perform lower body dressing with item retrieval with modified independence within 7 days. 3. Patient will perform toileting with modified independence within 7 days. 4. Patient will obtain objects from high and low surfaces with modified independence within 7 days. 5. Patient will transfer from toilet with modified independence using the least restrictive device and appropriate durable medical equipment within 7 days. Occupational Therapy EVALUATION  Patient: Angela Elizabeth (81 y.o. male)  Date: 10/11/2018  Primary Diagnosis: Supratherapeutic INR  Anemia  H/O mechanical aortic valve replacement  Hypothyroidism        Precautions:   Bed Alarm    ASSESSMENT :  Based on the objective data described below, the patient presents with mild confusion with [place]. Pt was very impulsive at times during ADL mobility with multiple losses of balance with min assist to correct overall. Performed bed mobility with CGA and pt was very impulsive and quick moving with sit to stand initial moderate assist for posterior loss of balance. Pt uses momentum Pt did have on major loss of balance in bathroom with turns without assist devices and needed moderate assist to correct. Pt was able to perform toileting managing urinal and boxers with min assist for balance. Stood at sink with CGA to occasional min assist to wash hands. Declined further ADLS or mobility and returned back to bedside chair attempting to sit prior to completion of being aligned with surface. Pt does not like using AD at baseline but reinforced with pt on the importance of using AD for safe mobility and decreased risk for falls. Pt was not terribly receptive to this.   Recommend either home health with initial 24/7 assist vs. SNF. Patient will benefit from skilled intervention to address the above impairments. Patients rehabilitation potential is considered to be Fair  Factors which may influence rehabilitation potential include:   []             None noted  []             Mental ability/status  []             Medical condition  []             Home/family situation and support systems  [x]             Safety awareness  []             Pain tolerance/management  []             Other:      PLAN :  Recommendations and Planned Interventions:  [x]               Self Care Training                  [x]        Therapeutic Activities  [x]               Functional Mobility Training    []        Cognitive Retraining  [x]               Therapeutic Exercises           []        Endurance Activities  [x]               Balance Training                   []        Neuromuscular Re-Education  []               Visual/Perceptual Training     [x]   Home Safety Training  [x]               Patient Education                 [x]        Family Training/Education  []               Other (comment):    Frequency/Duration: Patient will be followed by occupational therapy 4 times a week to address goals. Discharge Recommendations: Home Health with initial 24/7 assist vs. SNF  Further Equipment Recommendations for Discharge: shower chair     SUBJECTIVE:   Patient stated I don't like using the cane. I don't .     OBJECTIVE DATA SUMMARY:   HISTORY:   Past Medical History:   Diagnosis Date    Anger     TAKES PAROXETINE TO CONTROL ANGER ISSUES    CAD (coronary artery disease)     Foot drop, left     WEARS METAL BRACE    Gout     Hard of hearing     High cholesterol     Hypertension     Prostate enlargement     Thyroid disease     Unspecified sleep apnea     DOESN'T USE CPAP; \"IT MADE HIM SICK-COLDS, SINUS\", PER WIFE     Past Surgical History:   Procedure Laterality Date    CARDIAC SURG PROCEDURE UNLIST      ANGIOPLASTY WITH CORONARY STENT    CARDIAC SURG PROCEDURE UNLIST  1996    AORTIC VALVE REPLACEMENT    HX ORTHOPAEDIC  2001    ORIF COMPOUND FRACTURE LEFT ANKLE    HX RETINAL DETACHMENT REPAIR  1980s    LEFT EYE       Prior Level of Function/Environment/Context: ambulated with SPC at times but does not like to use it; reports performing all ADLS/IADLs without assist; reports chronic balance deficits   Expanded or extensive additional review of patient history:     Home Situation  Home Environment: Private residence  # Steps to Enter: 7  Rails to Enter: Yes  Hand Rails : Left  One/Two Story Residence: Two story  # of Interior Steps: 23 (with landing)  [employer]: Right  Living Alone: No  Support Systems: Spouse/Significant Other/Partner (3 cats)  Patient Expects to be Discharged to[de-identified] Private residence  Current DME Used/Available at Home: [employer] Romance, straight, Walker, rolling  Tub or Shower Type: Tub/Shower combination    Hand dominance: Right    EXAMINATION OF PERFORMANCE DEFICITS:  Cognitive/Behavioral Status:  Neurologic State: Alert  Orientation Level: Oriented to person;Oriented to place;Oriented to time  Cognition: Decreased attention/concentration  Perception: Appears intact  Perseveration: No perseveration noted  Safety/Judgement: Awareness of environment; Fall prevention;Home safety      Hearing: Auditory  Auditory Impairment: Hard of hearing, bilateral    Vision/Perceptual:                           Acuity:  (grossly intact)         Range of Motion:    AROM: Generally decreased, functional  PROM: Generally decreased, functional                      Strength:    Strength: Generally decreased, functional                Coordination:  Coordination: Within functional limits  Fine Motor Skills-Upper: Left Intact; Right Intact    Gross Motor Skills-Upper: Left Intact; Right Intact    Tone & Sensation:    Tone: Normal                         Balance:  Sitting: Intact; Without support  Standing: Impaired; With support  Standing - Static: Constant support; Fair  Standing - Dynamic : Poor    Functional Mobility and Transfers for ADLs:  Bed Mobility:  Rolling: Stand-by assistance  Supine to Sit: Stand-by assistance  Scooting: Stand-by assistance    Transfers:  Sit to Stand: Contact guard assistance;Minimum assistance  Stand to Sit: Minimum assistance  Bed to Chair: Minimum assistance; Moderate assistance;Assist x1  Bathroom Mobility: Minimum assistance (with one moment of moderate assist for LOB with turns)  Toilet Transfer : Minimum assistance    ADL Assessment:  Feeding: Independent    Oral Facial Hygiene/Grooming: Minimum assistance (balance standing)    Bathing: Minimum assistance    Upper Body Dressing: Setup    Lower Body Dressing: Minimum assistance (balance)    Toileting: Minimum assistance                ADL Intervention and task modifications:  See assessment       Cognitive Retraining  Safety/Judgement: Awareness of environment; Fall prevention;Home safety      Functional Measure:  Barthel Index:    Bathin  Bladder: 10  Bowels: 10  Groomin  Dressin  Feeding: 10  Mobility: 0  Stairs: 0  Toilet Use: 5  Transfer (Bed to Chair and Back): 10  Total: 55       Barthel and G-code impairment scale:  Percentage of impairment CH  0% CI  1-19% CJ  20-39% CK  40-59% CL  60-79% CM  80-99% CN  100%   Barthel Score 0-100 100 99-80 79-60 59-40 20-39 1-19   0   Barthel Score 0-20 20 17-19 13-16 9-12 5-8 1-4 0      The Barthel ADL Index: Guidelines  1. The index should be used as a record of what a patient does, not as a record of what a patient could do. 2. The main aim is to establish degree of independence from any help, physical or verbal, however minor and for whatever reason. 3. The need for supervision renders the patient not independent. 4. A patient's performance should be established using the best available evidence.  Asking the patient, friends/relatives and nurses are the usual sources, but direct observation and common sense are also important. However direct testing is not needed. 5. Usually the patient's performance over the preceding 24-48 hours is important, but occasionally longer periods will be relevant. 6. Middle categories imply that the patient supplies over 50 per cent of the effort. 7. Use of aids to be independent is allowed. Carmine Mock., Barthel, D.W. ([id number]). Functional evaluation: the Barthel Index. [facility] (14)2. Mattie Herrera demi EDMUND Palmer, Lucrecia Nguyen., Cyrus Vegas., [place], [address] [place] [address] (1999). Measuring the change indisability after inpatient rehabilitation; comparison of the responsiveness of the Barthel Index and Functional [place] Measure. Journal of Neurology, Neurosurgery, and Psychiatry, 66(4), 312-068. REYNALDO Nunez, YOKASTA Conway, & Sandip Brown MARSH. (2004.) Assessment of post-stroke quality of life in cost-effectiveness studies: The usefulness of the Barthel Index and the EuroQoL-5D. Quality of Life Research, 13, 086-92         G codes: In compliance with CMSs Claims Based Outcome Reporting, the following G-code set was chosen for this patient based on their primary functional limitation being treated: The outcome measure chosen to determine the severity of the functional limitation was the barthel with a score of 55/100 which was correlated with the impairment scale. ?  Self Care:     - CURRENT STATUS: CK - 40%-59% impaired, limited or restricted    - GOAL STATUS: CJ - 20%-39% impaired, limited or restricted    - D/C STATUS:  ---------------To be determined---------------     Occupational Therapy Evaluation Charge Determination   History Examination Decision-Making   LOW Complexity : Brief history review  LOW Complexity : 1-3 performance deficits relating to physical, cognitive , or psychosocial skils that result in activity limitations and / or participation restrictions  MEDIUM Complexity : Patient may present with comorbidities that affect occupational performnce. Miniml to moderate modification of tasks or assistance (eg, physical or verbal ) with assesment(s) is necessary to enable patient to complete evaluation       Based on the above components, the patient evaluation is determined to be of the following complexity level: LOW   Pain:  Pain Scale 1: Numeric (0 - 10)  Pain Intensity 1: 0              Activity Tolerance:     Please refer to the flowsheet for vital signs taken during this treatment. After treatment:   [x] Patient left in no apparent distress sitting up in chair  [] Patient left in no apparent distress in bed  [x] Call bell left within reach  [x] Nursing notified  [] Caregiver present  [x] Bed alarm activated    COMMUNICATION/EDUCATION:   The patients plan of care was discussed with: Physical Therapist, Registered Nurse and patient. [x] Home safety education was provided and the patient/caregiver indicated understanding. [x] Patient have participated as able in goal setting and plan of care. [] Patient/family agree to work toward stated goals and plan of care. [] Patient understands intent and goals of therapy, but is neutral about his/her participation. [] Patient is unable to participate in goal setting and plan of care. This patients plan of care is appropriate for delegation to [facility].     Thank you for this referral.  Hugo Stewart OTR/L  Time Calculation: 26 mins

## 2018-10-11 NOTE — PROGRESS NOTES
Cardiology Progress Note      10/11/2018 9:17 AM    Admit Date: 10/9/2018          Subjective:  Echo showed EF 20%, mild- mod MR, 19 mm gradient across AV. On lovenox now while INR subtherapeutic. No chest pain. Unsteady on feet. Visit Vitals    /52 (BP 1 Location: Left arm, BP Patient Position: At rest)    Pulse 85    Temp 97.4 °F (36.3 °C)    Resp 16    Ht 5' 5\" (1.651 m)    Wt 82.6 kg (182 lb 1.6 oz)    SpO2 100%    BMI 30.3 kg/m2     10/09 1901 - 10/11 0700  In: 580 [P.O.:580]  Out: 1450 [Urine:1450]        Objective:      Physical Exam:  VS as above   Chest occas crackles  Card Irreg 2/6 sys murmur, valve sounds crisp     Data Review:   Labs:    INR 1.5  BUN 42  Creat 1.3  Hgb 7.9     Telemetry: afib  EKG does appear to show some SR with 1st deg AVB       Assessment:     1.  Anemia , ? GI blood loss   2.  Supratherapeutic INR- resolved   3.  Prior mechanical aortic valve replacement. - stable   4.  Coronary artery disease with prior coronary artery bypass grafting.  No clear evidence of an acute coronary syndrome. 5.  History of congestive heart failure, EF 20% by echo   6.  Hypertension. 7.  Dyslipidemia. 8.  atrial fibrillation. , intermittant   9.  renal insuff- better   10.  Hypothyroidism. 11.  Probable medication noncompliance. 12.  Sleep apnea. 15.  Advanced age. ? Dementia     Plan:adjust meds with INR 2-3. Suspect he needs assisted living at this point. Consrvative Rx. Would not cath at this point.  Add low dose ASA

## 2018-10-11 NOTE — PROGRESS NOTES
0700: Received bedside report from VODECLIC, [facility] care of patient. 1900: Bedside shift change report given to Linette Rivera RN (oncoming nurse). Report included the following information SBAR, Kardex, Intake/Output, MAR, Recent Results and Cardiac Rhythm Afib. SHIFT SUMMARY: pt occasionally confused to situation and place, able to reorient, worked w/ PT/OT, up to chair. [facility] NURSING NOTE   Admission Date 10/9/2018   Admission Diagnosis Supratherapeutic INR  Anemia  H/O mechanical aortic valve replacement  Hypothyroidism   Consults IP CONSULT TO NEPHROLOGY      Cardiac Monitoring [x] Yes [] No      Purposeful Hourly Rounding [x] Yes    Juany Score Total Score: 5   Juany score 3 or > [x] Bed Alarm [] Avasys [] 1:1 sitter [] Patient refused (Signed refusal form in chart)   Jose Score Jose Score: 20   Jose score 14 or < [] PMT consult [] Wound Care consult    []  Specialty bed  [] Nutrition consult      Influenza Vaccine Received Flu Vaccine for Current Season (usually Sept-March): Yes           Oxygen needs? [x] Room air Oxygen @  []1L    []2L    []3L   []4L    []5L   []6L via  NC   Chronic home O2 use?  [] Yes [] No  Perform O2 challenge test and document in progress note using smartphrase (.Homeoxygen)      Last bowel movement Last Bowel Movement Date: 10/06/18      Urinary Catheter             LDAs               Peripheral IV 10/09/18 Right Antecubital (Active)   Site Assessment Clean, dry, & intact 10/11/2018  2:57 PM   Phlebitis Assessment 0 10/11/2018  2:57 PM   Infiltration Assessment 0 10/11/2018  2:57 PM   Dressing Status Clean, dry, & intact 10/11/2018  2:57 PM   Dressing Type Transparent 10/11/2018  2:57 PM   Hub Color/Line Status Pink;Flushed;Patent 10/11/2018  2:57 PM       Peripheral IV 10/09/18 Left Hand (Active)   Site Assessment Clean, dry, & intact 10/11/2018  2:57 PM   Phlebitis Assessment 0 10/11/2018  2:57 PM   Infiltration Assessment 0 10/11/2018  2:57 PM   Dressing Status Clean, dry, & intact 10/11/2018  2:57 PM   Dressing Type Transparent 10/11/2018  2:57 PM   Hub Color/Line Status Pink;Flushed;Patent 10/11/2018  2:57 PM                         Readmission Risk Assessment Tool Score Low Risk            10       Total Score        3 Has Seen PCP in Last 6 Months (Yes=3, No=0)    2 . Living with Significant Other. Assisted Living. LTAC. SNF. or   Rehab    5 Pt.  Coverage (Medicare=5 , Medicaid, or Self-Pay=4)        Criteria that do not apply:    Patient Length of Stay (>5 days = 3)    IP Visits Last 12 Months (1-3=4, 4=9, >4=11)    Charlson Comorbidity Score (Age + Comorbid Conditions)       Expected Length of Stay 2d 7h   Actual Length of Stay 2

## 2018-10-12 VITALS
OXYGEN SATURATION: 97 % | HEART RATE: 96 BPM | WEIGHT: 184.3 LBS | RESPIRATION RATE: 16 BRPM | TEMPERATURE: 98 F | DIASTOLIC BLOOD PRESSURE: 72 MMHG | BODY MASS INDEX: 30.71 KG/M2 | SYSTOLIC BLOOD PRESSURE: 104 MMHG | HEIGHT: 65 IN

## 2018-10-12 LAB
ALBUMIN SERPL-MCNC: 3 G/DL (ref 3.5–5)
ALBUMIN/GLOB SERPL: 1 {RATIO} (ref 1.1–2.2)
ALP SERPL-CCNC: 69 U/L (ref 45–117)
ALT SERPL-CCNC: 23 U/L (ref 12–78)
ANION GAP SERPL CALC-SCNC: 5 MMOL/L (ref 5–15)
AST SERPL-CCNC: 33 U/L (ref 15–37)
BASOPHILS # BLD: 0 K/UL (ref 0–0.1)
BASOPHILS NFR BLD: 1 % (ref 0–1)
BILIRUB SERPL-MCNC: 1.3 MG/DL (ref 0.2–1)
BUN SERPL-MCNC: 40 MG/DL (ref 6–20)
BUN/CREAT SERPL: 36 (ref 12–20)
CALCIUM SERPL-MCNC: 7.9 MG/DL (ref 8.5–10.1)
CHLORIDE SERPL-SCNC: 110 MMOL/L (ref 97–108)
CO2 SERPL-SCNC: 26 MMOL/L (ref 21–32)
CREAT SERPL-MCNC: 1.12 MG/DL (ref 0.7–1.3)
DIFFERENTIAL METHOD BLD: ABNORMAL
EOSINOPHIL # BLD: 0.2 K/UL (ref 0–0.4)
EOSINOPHIL NFR BLD: 3 % (ref 0–7)
ERYTHROCYTE [DISTWIDTH] IN BLOOD BY AUTOMATED COUNT: 15 % (ref 11.5–14.5)
GLOBULIN SER CALC-MCNC: 3 G/DL (ref 2–4)
GLUCOSE SERPL-MCNC: 115 MG/DL (ref 65–100)
HCT VFR BLD AUTO: 22.8 % (ref 36.6–50.3)
HGB BLD-MCNC: 7.4 G/DL (ref 12.1–17)
IMM GRANULOCYTES # BLD: 0 K/UL (ref 0–0.04)
IMM GRANULOCYTES NFR BLD AUTO: 1 % (ref 0–0.5)
INR PPP: 2.6 (ref 0.9–1.1)
LYMPHOCYTES # BLD: 0.7 K/UL (ref 0.8–3.5)
LYMPHOCYTES NFR BLD: 15 % (ref 12–49)
MCH RBC QN AUTO: 32.2 PG (ref 26–34)
MCHC RBC AUTO-ENTMCNC: 32.5 G/DL (ref 30–36.5)
MCV RBC AUTO: 99.1 FL (ref 80–99)
MONOCYTES # BLD: 0.4 K/UL (ref 0–1)
MONOCYTES NFR BLD: 7 % (ref 5–13)
NEUTS SEG # BLD: 3.5 K/UL (ref 1.8–8)
NEUTS SEG NFR BLD: 73 % (ref 32–75)
NRBC # BLD: 0 K/UL (ref 0–0.01)
NRBC BLD-RTO: 0 PER 100 WBC
PLATELET # BLD AUTO: 155 K/UL (ref 150–400)
PMV BLD AUTO: 9.4 FL (ref 8.9–12.9)
POTASSIUM SERPL-SCNC: 3.8 MMOL/L (ref 3.5–5.1)
PROT SERPL-MCNC: 6 G/DL (ref 6.4–8.2)
PROTHROMBIN TIME: 25.5 SEC (ref 9–11.1)
RBC # BLD AUTO: 2.3 M/UL (ref 4.1–5.7)
SODIUM SERPL-SCNC: 141 MMOL/L (ref 136–145)
WBC # BLD AUTO: 4.8 K/UL (ref 4.1–11.1)

## 2018-10-12 PROCEDURE — 74011250637 HC RX REV CODE- 250/637: Performed by: INTERNAL MEDICINE

## 2018-10-12 PROCEDURE — 74011250636 HC RX REV CODE- 250/636: Performed by: INTERNAL MEDICINE

## 2018-10-12 PROCEDURE — 36415 COLL VENOUS BLD VENIPUNCTURE: CPT | Performed by: INTERNAL MEDICINE

## 2018-10-12 PROCEDURE — 85610 PROTHROMBIN TIME: CPT | Performed by: INTERNAL MEDICINE

## 2018-10-12 PROCEDURE — 80053 COMPREHEN METABOLIC PANEL: CPT | Performed by: INTERNAL MEDICINE

## 2018-10-12 PROCEDURE — 85025 COMPLETE CBC W/AUTO DIFF WBC: CPT | Performed by: INTERNAL MEDICINE

## 2018-10-12 RX ORDER — WARFARIN SODIUM 5 MG/1
5 TABLET ORAL DAILY
Qty: 30 TAB | Refills: 0 | Status: SHIPPED | OUTPATIENT
Start: 2018-10-12 | End: 2018-11-11

## 2018-10-12 RX ORDER — ALLOPURINOL 300 MG/1
300 TABLET ORAL DAILY
Qty: 30 TAB | Refills: 0 | Status: SHIPPED | OUTPATIENT
Start: 2018-10-13 | End: 2018-11-12

## 2018-10-12 RX ORDER — GUAIFENESIN 100 MG/5ML
81 LIQUID (ML) ORAL DAILY
Qty: 30 TAB | Refills: 0 | Status: SHIPPED | OUTPATIENT
Start: 2018-10-13 | End: 2018-11-12

## 2018-10-12 RX ORDER — LOSARTAN POTASSIUM 50 MG/1
50 TABLET ORAL DAILY
Qty: 30 TAB | Refills: 0 | Status: SHIPPED | OUTPATIENT
Start: 2018-10-13 | End: 2018-11-12

## 2018-10-12 RX ORDER — LOSARTAN POTASSIUM 50 MG/1
50 TABLET ORAL DAILY
Status: DISCONTINUED | OUTPATIENT
Start: 2018-10-12 | End: 2018-10-12 | Stop reason: HOSPADM

## 2018-10-12 RX ORDER — ATORVASTATIN CALCIUM 40 MG/1
40 TABLET, FILM COATED ORAL
Qty: 30 TAB | Refills: 0 | Status: SHIPPED | OUTPATIENT
Start: 2018-10-12 | End: 2018-11-11

## 2018-10-12 RX ORDER — FOLIC ACID 1 MG/1
2.5 TABLET ORAL DAILY
Qty: 75 TAB | Refills: 0 | Status: SHIPPED | OUTPATIENT
Start: 2018-10-12 | End: 2018-11-11

## 2018-10-12 RX ORDER — FINASTERIDE 5 MG/1
5 TABLET, FILM COATED ORAL DAILY
Qty: 30 TAB | Refills: 0 | Status: SHIPPED | OUTPATIENT
Start: 2018-10-13 | End: 2018-11-12

## 2018-10-12 RX ORDER — WARFARIN 2 MG/1
2 TABLET ORAL ONCE
Status: DISCONTINUED | OUTPATIENT
Start: 2018-10-12 | End: 2018-10-12 | Stop reason: HOSPADM

## 2018-10-12 RX ORDER — CHOLECALCIFEROL (VITAMIN D3) 125 MCG
2000 CAPSULE ORAL DAILY
Qty: 30 TAB | Refills: 0 | Status: SHIPPED | OUTPATIENT
Start: 2018-10-12 | End: 2018-11-11

## 2018-10-12 RX ORDER — TAMSULOSIN HYDROCHLORIDE 0.4 MG/1
0.4 CAPSULE ORAL DAILY
Qty: 30 CAP | Refills: 0 | Status: SHIPPED | OUTPATIENT
Start: 2018-10-13 | End: 2018-11-12

## 2018-10-12 RX ORDER — PAROXETINE HYDROCHLORIDE 20 MG/1
20 TABLET, FILM COATED ORAL DAILY
Qty: 30 TAB | Refills: 0 | Status: SHIPPED | OUTPATIENT
Start: 2018-10-12 | End: 2018-11-11

## 2018-10-12 RX ORDER — LEVOTHYROXINE SODIUM 100 UG/1
100 TABLET ORAL
Qty: 30 TAB | Refills: 0 | Status: SHIPPED | OUTPATIENT
Start: 2018-10-13 | End: 2018-11-12

## 2018-10-12 RX ORDER — METOPROLOL SUCCINATE 25 MG/1
25 TABLET, EXTENDED RELEASE ORAL DAILY
Qty: 30 TAB | Refills: 0 | Status: SHIPPED | OUTPATIENT
Start: 2018-10-13 | End: 2018-11-12

## 2018-10-12 RX ADMIN — ASPIRIN 81 MG CHEWABLE TABLET 81 MG: 81 TABLET CHEWABLE at 09:00

## 2018-10-12 RX ADMIN — ENOXAPARIN SODIUM 80 MG: 100 INJECTION SUBCUTANEOUS at 02:56

## 2018-10-12 RX ADMIN — PAROXETINE 20 MG: 20 TABLET, FILM COATED ORAL at 09:00

## 2018-10-12 RX ADMIN — LOSARTAN POTASSIUM 50 MG: 50 TABLET ORAL at 09:01

## 2018-10-12 RX ADMIN — LEVOTHYROXINE SODIUM 100 MCG: 100 TABLET ORAL at 06:29

## 2018-10-12 RX ADMIN — ALLOPURINOL 300 MG: 300 TABLET ORAL at 09:00

## 2018-10-12 RX ADMIN — METOPROLOL SUCCINATE 25 MG: 25 TABLET, EXTENDED RELEASE ORAL at 09:01

## 2018-10-12 RX ADMIN — Medication 10 ML: at 06:29

## 2018-10-12 RX ADMIN — FINASTERIDE 5 MG: 5 TABLET, FILM COATED ORAL at 09:01

## 2018-10-12 RX ADMIN — TAMSULOSIN HYDROCHLORIDE 0.4 MG: 0.4 CAPSULE ORAL at 09:00

## 2018-10-12 NOTE — PROGRESS NOTES
NAME: Zoya Fsoter        :  1935        MRN:  [id number]        Assessment :    Plan:  --Ellyarias Velacsoa on ckd    BPH    Anemia    Thrombocytopenia, chronic    H/o kidney stones    Systolic HF (EF 20 %)  Mechanical AV  CAD/CABD --Creatinine peaked at 2.3, now 1.1; bland urine      Resolved prerenal azotemia. Given poor EF, he is at risk for cardiorenal syndrome as well. Start Losartan 50 mg daily (not hct). Stop amlodipine as still somewhat labile bp which is low at times.      On proscar/flomax    Weight is up a couple of pounds; room air sat 100    To go to SNF today. Will arrange for f/u with Dr. Lidia Merlin in a few weeks. Subjective:     Chief Complaint:  In bed. Denies complaint. Feeling better. We discussed the above. Review of Systems:    Symptom Y/N Comments  Symptom Y/N Comments   Fever/Chills    Chest Pain     Poor Appetite    Edema     Cough    Abdominal Pain     Sputum    Joint Pain     SOB/LENNON    Pruritis/Rash     Nausea/vomit    Tolerating PT/OT     Diarrhea    Tolerating Diet     Constipation    Other       Could not obtain due to:      Objective:     VITALS:   Last 24hrs VS reviewed since prior progress note. Most recent are:  Visit Vitals    BP 93/55    Pulse 73    Temp 97.4 °F (36.3 °C)    Resp 16    Ht 5' 5\" (1.651 m)    Wt 83.6 kg (184 lb 4.9 oz)    SpO2 94%    BMI 30.67 kg/m2       Intake/Output Summary (Last 24 hours) at 10/12/18 0292  Last data filed at 10/11/18 1451   Gross per 24 hour   Intake              480 ml   Output              375 ml   Net              105 ml      Telemetry Reviewed:     PHYSICAL EXAM:  General: WD, WN. Alert, cooperative, no acute distress  Resp:  CTA Bilaterally. No Wheezing/Rhonchi/Rales. No access. muscle use  CV:  Regular  rhythm,  No murmur (), No Rubs, No Gallops.  No le edema  GI:  Soft, Non distended, Non tender.  +Bowel sounds, no HSM      Lab Data Reviewed: (see below)    Medications Reviewed: (see below)    PMH/SH reviewed - no change compared to H&P  ________________________________________________________________________  Care Plan discussed with:  Patient y    Family      RN     Care Manager                    Consultant:          Comments   >50% of visit spent in counseling and coordination of care       ________________________________________________________________________  Jez Rm MD     Procedures: see electronic medical records for all procedures/Xrays and details which  were not copied into this note but were reviewed prior to creation of Plan. LABS:  Recent Labs      10/12/18   0259  10/11/18   0220   WBC  4.8  5.5   HGB  7.4*  7.9*   HCT  22.8*  23.1*   PLT  155  147*     Recent Labs      10/12/18   0259  10/11/18   0220  10/10/18   0451  10/09/18   1532   NA  141  142  140  135*   K  3.8  3.7  3.2*  3.5   CL  110*  111*  108  102   CO2  26  25  23  21   BUN  40*  42*  53*  60*   CREA  1.12  1.25  1.55*  2.34*   GLU  115*  102*  115*  129*   CA  7.9*  8.2*  7.9*  8.0*   MG   --    --    --   1.9     Recent Labs      10/12/18   0259  10/11/18   0220  10/09/18   1532   SGOT  33  46*  39*   AP  69  71  68   TP  6.0*  6.3*  6.2*   ALB  3.0*  3.1*  3.0*   GLOB  3.0  3.2  3.2     Recent Labs      10/12/18   0259  10/11/18   0220  10/10/18   0451   INR  2.6*  1.5*  1.5*   PTP  25.5*  14.9*  15.2*      No results for input(s): FE, TIBC, PSAT, FERR in the last 72 hours. No results found for: FOL, RBCF   No results for input(s): PH, PCO2, PO2 in the last 72 hours. No results for input(s): CPK, CKMB in the last 72 hours.     No lab exists for component: TROPONINI  No components found for: [place]  Lab Results   Component Value Date/Time    Color YELLOW/STRAW 10/08/2013 10:41 AM    Appearance CLEAR 10/08/2013 10:41 AM    Specific gravity 1.014 10/08/2013 10:41 AM    pH (UA) 6.5 10/08/2013 10:41 AM    Protein NEGATIVE  10/08/2013 10:41 AM    Glucose NEGATIVE  10/08/2013 10:41 AM    Ketone NEGATIVE  10/08/2013 10:41 AM    Bilirubin NEGATIVE  10/08/2013 10:41 AM    Urobilinogen 0.2 10/08/2013 10:41 AM    Nitrites NEGATIVE  10/08/2013 10:41 AM    Leukocyte Esterase NEGATIVE  10/08/2013 10:41 AM    Epithelial cells FEW 10/08/2013 10:41 AM    Bacteria NEGATIVE  10/08/2013 10:41 AM    WBC 0-4 10/08/2013 10:41 AM    RBC 0-5 10/08/2013 10:41 AM       MEDICATIONS:  Current Facility-Administered Medications   Medication Dose Route Frequency    aspirin chewable tablet 81 mg  81 mg Oral DAILY    enoxaparin (LOVENOX) injection 80 mg  80 mg SubCUTAneous Q12H    0.9% sodium chloride infusion 250 mL  250 mL IntraVENous PRN    allopurinol (ZYLOPRIM) tablet 300 mg  300 mg Oral DAILY    amLODIPine (NORVASC) tablet 2.5 mg  2.5 mg Oral DAILY    atorvastatin (LIPITOR) tablet 40 mg  40 mg Oral QHS    finasteride (PROSCAR) tablet 5 mg  5 mg Oral DAILY    levothyroxine (SYNTHROID) tablet 100 mcg  100 mcg Oral 6am    metoprolol succinate (TOPROL-XL) XL tablet 25 mg  25 mg Oral DAILY    PARoxetine (PAXIL) tablet 20 mg  20 mg Oral DAILY    tamsulosin (FLOMAX) capsule 0.4 mg  0.4 mg Oral DAILY    sodium chloride (NS) flush 5-10 mL  5-10 mL IntraVENous Q8H    sodium chloride (NS) flush 5-10 mL  5-10 mL IntraVENous PRN    naloxone (NARCAN) injection 0.4 mg  0.4 mg IntraVENous PRN    ondansetron (ZOFRAN) injection 4 mg  4 mg IntraVENous Q4H PRN    WARFARIN INFORMATION NOTE (COUMADIN)   Other QPM    HYDROcodone-acetaminophen (NORCO) 5-325 mg per tablet 0.5 Tab  0.5 Tab Oral Q4H PRN

## 2018-10-12 NOTE — DISCHARGE SUMMARY
Hospitalist Discharge Summary     Patient ID:  Vaishali Rodriguez  [id number]  84 y.o.  1935    PCP on record: Shannon Tellez MD    Admit date: 10/9/2018  Discharge date and time: 10/12/2018      DISCHARGE DIAGNOSIS:    Presented with Nose bleed secondary to  Coumadin toxicity POA  H/o Mechanical Aortic Valve x   Newly found Afib -rate controlled, likely Chronic  Anemia POA- likely due to chronic blood loss  Pro-BNP elevated but patient with History of congestive heart failure, known left ventricular systolic dysfunction  s/p 1 PRBC transfusion, hgb Within Acceptable Range. S/p Vit K x1 in ER, INR therapeutic  Bridging coumadin with Lovenox for a therapeutic goal of 2.5 to 3.5  Cardiology help appreciated   Resume asa  Continue current medications. likely AYAZ on CKD   Cr improvin.34-->1.55-->1.25-->1.1  CAD s/p CABG  Systolic CHF  HTN  Cont home meds  Hypothyroidism  Elevated TSH  pt is not compliant with meds a per wife, needs follow up by PCP   Also needs weekly coumadin check. Cont synthroid  BPH  Cont proscar, flomax  Gout  Cont allopurinol  Body mass index is 30.3 kg/(m^2).: 18.5 - 24.9 Normal weight     Code status: Full  Prophylaxis: Coumadin  Recommended Disposition: snf      CONSULTATIONS:  IP CONSULT TO NEPHROLOGY    Excerpted HPI from H&P of Dante Alicia MD:  CHIEF COMPLAINT: Palpitation with chest discomfort at PCP's office     HISTORY OF PRESENT ILLNESS:     Rakan Brown is a 80 y.o.  male who presents with above complains from PCP's office via EMS. Pt presented with CC of palpitation with generalized weakness x 1 week  H/o associated nose bleed- now stooped in ER  H/o mechanical AV in - on coumadin  H/o CAD s/p CABG with systolic CHF EF 86% in 5442     Pt was found to have Hb 7.5  & Cr 2.4 on workup in ER with INR >12.     We were asked to admit for work up and evaluation of the above problems.      ______________________________________________________________________  DISCHARGE SUMMARY/HOSPITAL COURSE:  for full details see H&P, daily progress notes, labs, consult notes. _______________________________________________________________________  Patient seen and examined by me on discharge day. Pertinent Findings:  Gen:    Not in distress  Chest: Clear lungs  CVS:   Regular rhythm. No edema  Abd:  Soft, not distended, not tender  Neuro:  Alert, awake, oriented x 3, grossly intact  _______________________________________________________________________  DISCHARGE MEDICATIONS:   Current Discharge Medication List      START taking these medications    Details   aspirin 81 mg chewable tablet Take 1 Tab by mouth daily for 30 days. Qty: 30 Tab, Refills: 0      losartan (COZAAR) 50 mg tablet Take 1 Tab by mouth daily for 30 days. Qty: 30 Tab, Refills: 0      warfarin (COUMADIN) 5 mg tablet Take 1 Tab by mouth daily for 30 days. Qty: 30 Tab, Refills: 0         CONTINUE these medications which have CHANGED    Details   tamsulosin (FLOMAX) 0.4 mg capsule Take 1 Cap by mouth daily for 30 days. Qty: 30 Cap, Refills: 0      PARoxetine (PAXIL) 20 mg tablet Take 1 Tab by mouth daily for 30 days. Qty: 30 Tab, Refills: 0      metoprolol succinate (TOPROL-XL) 25 mg XL tablet Take 1 Tab by mouth daily for 30 days. Qty: 30 Tab, Refills: 0      levothyroxine (SYNTHROID) 100 mcg tablet Take 1 Tab by mouth every morning for 30 days. Qty: 30 Tab, Refills: 0      allopurinol (ZYLOPRIM) 300 mg tablet Take 1 Tab by mouth daily for 30 days. Qty: 30 Tab, Refills: 0      atorvastatin (LIPITOR) 40 mg tablet Take 1 Tab by mouth nightly for 30 days. Qty: 30 Tab, Refills: 0      cholecalciferol, vitamin D3, (VITAMIN D3) 2,000 unit tab Take 1 Tab by mouth daily for 30 days. Qty: 30 Tab, Refills: 0      finasteride (PROSCAR) 5 mg tablet Take 1 Tab by mouth daily for 30 days.   Qty: 30 Tab, Refills: 0      folic acid (FOLVITE) 1 mg tablet Take 2.5 Tabs by mouth daily for 30 days. TAKES WHEN TAKING METHOTREXATE FOR PSORIASIS OUTBREAKS ONLY. Qty: 75 Tab, Refills: 0         STOP taking these medications       amLODIPine (NORVASC) 2.5 mg tablet Comments:   Reason for Stopping:         olmesartan-hydroCHLOROthiazide (BENICAR HCT) 20-12.5 mg per tablet Comments:   Reason for Stopping:         amLODIPine (NORVASC) 2.5 mg tablet Comments:   Reason for Stopping:         HYDROcodone-acetaminophen (LORTAB) 2.5-500 mg per tablet Comments:   Reason for Stopping:         oxyCODONE IR (ROXICODONE) 5 mg immediate release tablet Comments:   Reason for Stopping:         MULTIVITAMIN W-MINERALS/LUTEIN (CENTRUM SILVER PO) Comments:   Reason for Stopping:         ASCORBATE CALCIUM (VITAMIN C PO) Comments:   Reason for Stopping:         Aspirin, Buffered 81 mg Tab Comments:   Reason for Stopping:         FERROUS FUMARATE/VIT BCOMP&C (SUPER B COMPLEX PO) Comments:   Reason for Stopping:         oxybutynin chloride XL 10 mg CR tablet Comments:   Reason for Stopping:         DOCOSAHEXANOIC ACID/EPA (FISH OIL PO) Comments:   Reason for Stopping:         PSYLLIUM SEED, WITH SUGAR, (METAMUCIL PO) Comments:   Reason for Stopping:         GLUCOSAMINE/CHONDROITIN SULF A (GLUCOSAMINE-CHONDROITIN PO) Comments:   Reason for Stopping:         colchicine (COLCRYS) 0.6 mg tablet Comments:   Reason for Stopping:         valsartan-hydrochlorothiazide (DIOVAN HCT) 160-12.5 mg per tablet Comments:   Reason for Stopping:               My Recommended Diet, Activity, Wound Care, and follow-up labs are listed in the patient's Discharge Insturctions which I have personally completed and reviewed.     ______________________________________________________________________    Risk of deterioration: Low    Condition at Discharge: Stable  ______________________________________________________________________    Disposition  SNF/LTC  ______________________________________________________________________    Care Plan discussed with:   Patient, Family, RN, Care Manager, Consultant    ______________________________________________________________________    Code Status: Full Code  ______________________________________________________________________      Follow up with:   PCP : Adele Clarke MD  Follow-up Information     Follow up With Details Comments Contact Info    [address] [facility]   [address]      Adele Clarke MD In 1 day hospital follow-up [address]  [address]      Nash Cazares MD In 2 days hospital follow-up [address]  [address] [facility]  [address]  [phone]      Cristofer Ibarra MD In 1 week hospital follow-up [address]  [id number]  [address] [phone]                Total time in minutes spent coordinating this discharge (includes going over instructions, follow-up, prescriptions, and preparing report for sign off to her PCP) :  60 minutes    Signed:  Sukhdeep Wade MD

## 2018-10-12 NOTE — DISCHARGE INSTRUCTIONS
HOSPITALIST DISCHARGE INSTRUCTIONS    NAME: Lizy Ma   :  1935   MRN:  [id number]     Date/Time:  10/12/2018 10:10 AM    ADMIT DATE: 10/9/2018   DISCHARGE DATE: 10/12/2018     Attending Physician: Aleksandr Arcos MD    DISCHARGE DIAGNOSIS:  Presented with Nose bleed secondary to  Coumadin toxicity POA  H/o Mechanical Aortic Valve x   Newly found Afib -rate controlled, likely Chronic  Anemia POA- likely due to chronic blood loss  Pro-BNP elevated but patient with History of congestive heart failure, known left ventricular systolic dysfunction  s/p 1 PRBC transfusion, hgb Within Acceptable Range. S/p Vit K x1 in ER  Bridging coumadin with Lovenox for a therapeutic goal of 2.5 to3.5  Cardiology help appreciated   Resumed ASA  Continue current medications. likely AYAZ on CKD     CAD s/p CABG  Systolic CHF  HTN  Cont home meds  Hypothyroidism  Elevated TSH  pt is not compliant with meds a per wife, needs follow up by PCP   Also needs weekly coumadin check. Cont synthroid  BPH  Cont proscar, flomax  Gout  Cont allopurinol  Body mass index is 30.3 kg/(m^2).: 18.5 - 24.9 Normal weight     Code status: Full  Prophylaxis: Coumadin  Recommended Disposition: SNF      Medications: Per above medication reconciliation. Pain Management: per above medications    Recommended diet: Cardiac Diet, Low fat, Low cholesterol and Renal Diet    Recommended activity: PT/OT Eval and Treat    Wound care: None    Indwelling devices:  None    Supplemental Oxygen: None    Required Lab work: Weekly BMP, Weekly CBC and Daily INR    Glucose management:  None    Code status: Full        Outside physician follow up:     Follow-up Information     Follow up With Details Comments Contact Info    [address] [facility]   [address]      Azalia Verdin MD In 1 day hospital follow-up [address]  [phone]      Michael Gurrola Gena Cox MD [facility]  [address]  [facility]  [phone]                 Skilled nursing facility/ SNF MD responsible for above on discharge. Information obtained by :  I understand that if any problems occur once I am at home I am to contact my physician. I understand and acknowledge receipt of the instructions indicated above.                                                                                                                                            Physician's or R.N.'s Signature                                                                  Date/Time                                                                                                                                              Patient or Repres

## 2018-10-12 NOTE — PROGRESS NOTES
0700: Bedside report received from Maryann brown, [facility].    4176: Discharge instructions reviewed including medications and side-effects and follow-up appointments. Opportunity for questions provided. Pt stable for discharge to [address] [place] [address],[address] at this time.

## 2018-10-12 NOTE — PROGRESS NOTES
NOÉ's made q 2 hours this shift no new changes noted, Patient is resting without distress at this time denies needs or c/o.

## 2018-10-12 NOTE — PROGRESS NOTES
Hospital to [address] [place] [address]                                                                        80 y.o.   male    [address]   Room: [address] [place] [address] 2 CARDIOPULMONARY CARE  Unit Phone# :  [phone]      [address]  [facility] [address] [facility]  [address] [address]  Dept: [phone]  Loc: [phone]                    SITUATION     Admitted:  10/9/2018         Attending Provider:  Soha Cleaning MD       Consultations:  IP CONSULT TO NEPHROLOGY    PCP:  Marquez Garrido MD   [phone]    Treatment Team: Attending Provider: Soha Cleaning MD; Consulting Provider:  Dania Nielson MD; Utilization Review: Jovanni Melara RN; Care Manager: Pilar Goodpasture, RN    Admitting Dx:  Supratherapeutic INR  Anemia  H/O mechanical aortic valve replacement  Hypothyroidism       Principal Problem: <principal problem not specified>    * No surgery found * of      BY: * Surgery not found *             ON: * No surgery found *                  Code Status: Full Code                Advance Directives:   Advance Care Planning 10/9/2018   Patient's Healthcare Decision Maker is: Legal Next of Bria 69   Primary Decision Maker Name Uri Bassett   Primary Decision Maker Phone Number [phone]   Primary Decision Maker Relationship to Patient Spouse   Confirm Advance Directive Yes, not on file    (Send w/patient)   Yes Not W Pt       Isolation:  There are currently no Active Isolations       MDRO: No current active infections    Pain Medications given:  none    Last dose    Special Equipment needed: no  Type of equipment:    (Not currently on dialysis)  (Not currently on dialysis)  (Not currently on dialysis)     BACKGROUND     Allergies:  No Known Allergies    Past Medical History:   Diagnosis Date    Anger     TAKES PAROXETINE TO CONTROL ANGER ISSUES    CAD (coronary artery disease)     Foot drop, left     WEARS METAL BRACE    Gout     Hard of hearing     High cholesterol     Hypertension     Prostate enlargement     Thyroid disease     Unspecified sleep apnea     DOESN'T USE CPAP; \"IT MADE HIM SICK-COLDS, SINUS\", PER WIFE       Past Surgical History:   Procedure Laterality Date    CARDIAC SURG PROCEDURE UNLIST      ANGIOPLASTY WITH CORONARY STENT    CARDIAC SURG PROCEDURE UNLIST  1996    AORTIC VALVE REPLACEMENT    HX ORTHOPAEDIC  2001    ORIF COMPOUND FRACTURE LEFT ANKLE    HX RETINAL DETACHMENT REPAIR  1980s    LEFT EYE       Prescriptions Prior to Admission   Medication Sig    amLODIPine (NORVASC) 2.5 mg tablet Take  by mouth daily.  olmesartan-hydroCHLOROthiazide (BENICAR HCT) 20-12.5 mg per tablet     metoprolol succinate (TOPROL-XL) 25 mg XL tablet     amLODIPine (NORVASC) 2.5 mg tablet Take 1 Tab by mouth daily.  HYDROcodone-acetaminophen (LORTAB) 2.5-500 mg per tablet Take 1 Tab by mouth every four (4) hours as needed for Pain.  oxyCODONE IR (ROXICODONE) 5 mg immediate release tablet Take 1 Tab by mouth every four (4) hours as needed for Pain.  MULTIVITAMIN W-MINERALS/LUTEIN (CENTRUM SILVER PO) Take  by mouth daily.  ASCORBATE CALCIUM (VITAMIN C PO) Take 1,000 mg by mouth daily.  Aspirin, Buffered 81 mg Tab Take  by mouth daily.  allopurinol (ZYLOPRIM) 300 mg tablet Take  by mouth daily.  atorvastatin (LIPITOR) 40 mg tablet Take  by mouth daily.  FERROUS FUMARATE/VIT BCOMP&C (SUPER B COMPLEX PO) Take  by mouth daily.  oxybutynin chloride XL 10 mg CR tablet Take 10 mg by mouth daily.  DOCOSAHEXANOIC ACID/EPA (FISH OIL PO) Take 1,200 mg by mouth daily.  PSYLLIUM SEED, WITH SUGAR, (METAMUCIL PO) Take  by mouth daily.  GLUCOSAMINE/CHONDROITIN SULF A (GLUCOSAMINE-CHONDROITIN PO) Take  by mouth daily.  levothyroxine (SYNTHROID) 100 mcg tablet Take  by mouth Daily (before breakfast).  colchicine (COLCRYS) 0.6 mg tablet Take 0.6 mg by mouth daily as needed.  Indications: GOUT    finasteride (PROSCAR) 5 mg tablet Take 5 mg by mouth daily.  tamsulosin (FLOMAX) 0.4 mg capsule Take 0.4 mg by mouth daily. Hard scripts included in transfer packet yes    Vaccinations:    Immunization History   Administered Date(s) Administered    Influenza High Dose Vaccine PF 10/24/2013       Readmission Risks:    Known Risks: Falls        The Charlson CoMorbitiy Index tool is an evidenced based tool that has more automatic generated information. The tool looks at many different items such as the age of the patient, how many times they were admitted in the last calendar year, current length of stay in the hospital and their diagnosis. All of these items are pulled automatically from information documented in the chart from various places and will generate a score that predicts whether a patient is at low (less than 13), medium (13-20) or high (21 or greater) risk of being readmitted. ASSESSMENT                Temp: 98 °F (36.7 °C) (10/12/18 1201) Pulse (Heart Rate): 96 (10/12/18 1201)     Resp Rate: 16 (10/12/18 1201)           BP: 104/72 (10/12/18 1201)     O2 Sat (%): 97 % (10/12/18 1201)     Weight: 83.6 kg (184 lb 4.9 oz)    Height: 5' 5\" (165.1 cm) (10/10/18 1139)       If above not within 1 hour of discharge:    BP:_____  P:____  R:____ T:_____ O2 Sat: ___%  O2: ______    Active Orders   Diet    DIET CARDIAC Regular         Orientation: oriented to time, place, person and situation, periodic confusion    Active Behaviors: None                                   Active Lines/Drains:  (Peg Tube / Underwood / CL or S/L?): no    Urinary Status: Voiding     Last BM: Last Bowel Movement Date: 10/09/18     Skin Integrity: Scars (comment)             Mobility: Slightly limited   Weight Bearing Status: WBAT (Weight Bearing as Tolerated)      Gait Training  Assistive Device: Gait belt  Ambulation - Level of Assistance:  Moderate assistance, Minimal assistance, Assist x1  Distance (ft): 50 Feet (ft)         Lab Results Component Value Date/Time    Glucose 115 (H) 10/12/2018 02:59 AM    Hemoglobin A1c 5.9 10/08/2013 10:41 AM    INR 2.6 (H) 10/12/2018 02:59 AM    INR 1.5 (H) 10/11/2018 02:20 AM    HGB 7.4 (L) 10/12/2018 02:59 AM    HGB 7.9 (L) 10/11/2018 02:20 AM        RECOMMENDATION     See After Visit Summary (AVS) for:  · Discharge instructions  · [facility]   · Special equipment needed (entered pre-discharge by Care Management)  · Medication Reconciliation    · Follow up Appointment(s)         Report given/sent by:  Francisco Javier Pedroza                    Verbal report given to:  Juan Joseph           Estimated discharge time:  10/12/2018 at 1300

## 2018-10-12 NOTE — PROGRESS NOTES
[address] has accepted patient. Attending informed. This Cm has acknowledged discharge order. CM informed Sania at [address] of discharge for today. Patient can be accepted today. Room ass 103A - [place] [facility]  RN to call report to:  [phone]    Requested transport from [address] for 1300. They cannot accommodate. [facility] will provide transport at [phone]  Patient is pleasantly confused. Decreased endurance and general weakness. Fall Risk. PCS, H&P and facesheet on chart. RN informed. 1045am - facesheet and H&P faxed to [facility]  [phone]  This CM informed patient and wife of discharge plan for today. Care Management Interventions  PCP Verified by CM:  Yes  Mode of Transport at Discharge: BLS  Transition of Care Consult (CM Consult): SNF  Partner SNF: Yes  Discharge Durable Medical Equipment: No  Physical Therapy Consult: Yes  Occupational Therapy Consult: Yes  Current Support Network: Lives with Spouse  Confirm Follow Up Transport: Family  Plan discussed with Pt/Family/Caregiver: Yes  Discharge Location  Discharge Placement: Skilled nursing facility        Edwin Rivas RN CM  Ext [phone]

## 2018-10-13 LAB
ABO + RH BLD: NORMAL
BLD PROD TYP BPU: NORMAL
BLD PROD TYP BPU: NORMAL
BLOOD GROUP ANTIBODIES SERPL: NORMAL
BPU ID: NORMAL
BPU ID: NORMAL
CROSSMATCH RESULT,%XM: NORMAL
CROSSMATCH RESULT,%XM: NORMAL
SPECIMEN EXP DATE BLD: NORMAL
STATUS OF UNIT,%ST: NORMAL
STATUS OF UNIT,%ST: NORMAL
UNIT DIVISION, %UDIV: 0
UNIT DIVISION, %UDIV: 0

## 2019-02-28 ENCOUNTER — OFFICE VISIT (OUTPATIENT)
Dept: CARDIOLOGY CLINIC | Age: 84
End: 2019-02-28

## 2019-02-28 VITALS
BODY MASS INDEX: 32.09 KG/M2 | HEART RATE: 89 BPM | HEIGHT: 65 IN | RESPIRATION RATE: 16 BRPM | WEIGHT: 192.6 LBS | SYSTOLIC BLOOD PRESSURE: 110 MMHG | OXYGEN SATURATION: 94 % | DIASTOLIC BLOOD PRESSURE: 60 MMHG

## 2019-02-28 DIAGNOSIS — I10 HYPERTENSION, ESSENTIAL: ICD-10-CM

## 2019-02-28 DIAGNOSIS — I50.22 CHRONIC SYSTOLIC CONGESTIVE HEART FAILURE (HCC): Primary | ICD-10-CM

## 2019-02-28 DIAGNOSIS — I25.110 CORONARY ARTERY DISEASE INVOLVING NATIVE CORONARY ARTERY OF NATIVE HEART WITH UNSTABLE ANGINA PECTORIS (HCC): ICD-10-CM

## 2019-02-28 DIAGNOSIS — I49.3 PVC (PREMATURE VENTRICULAR CONTRACTION): ICD-10-CM

## 2019-02-28 DIAGNOSIS — R06.09 DOE (DYSPNEA ON EXERTION): ICD-10-CM

## 2019-02-28 DIAGNOSIS — E78.5 DYSLIPIDEMIA: ICD-10-CM

## 2019-02-28 DIAGNOSIS — I35.0 SEVERE AORTIC STENOSIS: ICD-10-CM

## 2019-02-28 DIAGNOSIS — Z95.1 S/P CABG (CORONARY ARTERY BYPASS GRAFT): ICD-10-CM

## 2019-02-28 DIAGNOSIS — Z95.2 H/O MECHANICAL AORTIC VALVE REPLACEMENT: ICD-10-CM

## 2019-02-28 DIAGNOSIS — T82.898S: ICD-10-CM

## 2019-02-28 RX ORDER — AMOXICILLIN 875 MG/1
875 TABLET, FILM COATED ORAL 2 TIMES DAILY
COMMUNITY
Start: 2019-02-19 | End: 2019-06-21 | Stop reason: ALTCHOICE

## 2019-02-28 RX ORDER — FINASTERIDE 5 MG/1
5 TABLET, FILM COATED ORAL DAILY
COMMUNITY
Start: 2019-02-08

## 2019-02-28 RX ORDER — WARFARIN 4 MG/1
4 TABLET ORAL
COMMUNITY
Start: 2018-12-07 | End: 2019-03-07

## 2019-02-28 RX ORDER — WARFARIN 1 MG/1
TABLET ORAL
COMMUNITY
Start: 2018-12-07 | End: 2019-03-01

## 2019-02-28 RX ORDER — ATORVASTATIN CALCIUM 40 MG/1
40 TABLET, FILM COATED ORAL DAILY
COMMUNITY
Start: 2019-01-11 | End: 2020-02-20

## 2019-02-28 RX ORDER — OXYBUTYNIN CHLORIDE 10 MG/1
10 TABLET, EXTENDED RELEASE ORAL DAILY
COMMUNITY
Start: 2019-02-08 | End: 2019-06-25 | Stop reason: SDUPTHER

## 2019-02-28 RX ORDER — TAMSULOSIN HYDROCHLORIDE 0.4 MG/1
0.4 CAPSULE ORAL
COMMUNITY
Start: 2018-11-29

## 2019-02-28 RX ORDER — LEVOTHYROXINE SODIUM 100 UG/1
100 TABLET ORAL
COMMUNITY
Start: 2019-02-08 | End: 2019-03-07

## 2019-02-28 RX ORDER — FUROSEMIDE 20 MG/1
20 TABLET ORAL DAILY
Qty: 90 TAB | Refills: 1 | Status: SHIPPED | OUTPATIENT
Start: 2019-02-28

## 2019-02-28 RX ORDER — PAROXETINE HYDROCHLORIDE 20 MG/1
20 TABLET, FILM COATED ORAL DAILY
COMMUNITY
Start: 2019-02-08 | End: 2020-06-11

## 2019-02-28 RX ORDER — WARFARIN 2 MG/1
2 TABLET ORAL
Status: ON HOLD | COMMUNITY
Start: 2018-12-07 | End: 2019-03-07 | Stop reason: SDUPTHER

## 2019-02-28 RX ORDER — ALLOPURINOL 300 MG/1
300 TABLET ORAL DAILY
COMMUNITY
Start: 2018-12-27 | End: 2020-04-05

## 2019-02-28 NOTE — PROGRESS NOTES
Visit Vitals  /60 (BP 1 Location: Left arm)   Pulse 89   Resp 16   Ht 5' 5\" (1.651 m)   Wt 192 lb 9.6 oz (87.4 kg)   SpO2 94%   BMI 32.05 kg/m²
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
## 2019-02-28 NOTE — PATIENT INSTRUCTIONS
Your ejection fraction is low which means your heart is not pumping well and you need to be admitted for your MEMO and a pacemaker. Please report to [facility] ER at 8am tomorrow to be admitted.

## 2019-03-01 ENCOUNTER — HOSPITAL ENCOUNTER (INPATIENT)
Age: 84
LOS: 6 days | Discharge: NURSING, REHAB OR LONG-TERM CARE | DRG: 226 | End: 2019-03-07
Attending: EMERGENCY MEDICINE | Admitting: SPECIALIST
Payer: MEDICARE

## 2019-03-01 ENCOUNTER — APPOINTMENT (OUTPATIENT)
Dept: GENERAL RADIOLOGY | Age: 84
DRG: 226 | End: 2019-03-01
Attending: EMERGENCY MEDICINE
Payer: MEDICARE

## 2019-03-01 ENCOUNTER — ANESTHESIA (OUTPATIENT)
Dept: CARDIAC CATH/INVASIVE PROCEDURES | Age: 84
DRG: 226 | End: 2019-03-01
Payer: MEDICARE

## 2019-03-01 ENCOUNTER — ANESTHESIA EVENT (OUTPATIENT)
Dept: CARDIAC CATH/INVASIVE PROCEDURES | Age: 84
DRG: 226 | End: 2019-03-01
Payer: MEDICARE

## 2019-03-01 ENCOUNTER — APPOINTMENT (OUTPATIENT)
Dept: CARDIAC CATH/INVASIVE PROCEDURES | Age: 84
DRG: 226 | End: 2019-03-01
Attending: SPECIALIST
Payer: MEDICARE

## 2019-03-01 DIAGNOSIS — Z95.0 BIVENTRICULAR CARDIAC PACEMAKER IN SITU: ICD-10-CM

## 2019-03-01 DIAGNOSIS — Z71.89 ADVANCED CARE PLANNING/COUNSELING DISCUSSION: ICD-10-CM

## 2019-03-01 DIAGNOSIS — R79.1 SUPRATHERAPEUTIC INR: ICD-10-CM

## 2019-03-01 DIAGNOSIS — R06.02 SOB (SHORTNESS OF BREATH): ICD-10-CM

## 2019-03-01 DIAGNOSIS — Z95.2 HEART VALVE REPLACED BY TRANSPLANT: ICD-10-CM

## 2019-03-01 DIAGNOSIS — I48.20 CHRONIC ATRIAL FIBRILLATION (HCC): ICD-10-CM

## 2019-03-01 DIAGNOSIS — Z95.2 H/O MECHANICAL AORTIC VALVE REPLACEMENT: ICD-10-CM

## 2019-03-01 DIAGNOSIS — Z71.89 GOALS OF CARE, COUNSELING/DISCUSSION: ICD-10-CM

## 2019-03-01 DIAGNOSIS — I48.92 ATRIAL FLUTTER, UNSPECIFIED TYPE (HCC): Primary | ICD-10-CM

## 2019-03-01 DIAGNOSIS — R06.02 SHORTNESS OF BREATH: ICD-10-CM

## 2019-03-01 DIAGNOSIS — R52 PAIN: ICD-10-CM

## 2019-03-01 DIAGNOSIS — R53.1 WEAKNESS GENERALIZED: ICD-10-CM

## 2019-03-01 PROBLEM — I50.23 ACUTE ON CHRONIC SYSTOLIC HEART FAILURE, NYHA CLASS 4 (HCC): Status: ACTIVE | Noted: 2019-03-01

## 2019-03-01 PROBLEM — I48.91 A-FIB (HCC): Status: ACTIVE | Noted: 2019-03-01

## 2019-03-01 LAB
ALBUMIN SERPL-MCNC: 3.8 G/DL (ref 3.5–5)
ALBUMIN/GLOB SERPL: 1.2 {RATIO} (ref 1.1–2.2)
ALP SERPL-CCNC: 130 U/L (ref 45–117)
ALT SERPL-CCNC: 70 U/L (ref 12–78)
ANION GAP SERPL CALC-SCNC: 10 MMOL/L (ref 5–15)
AST SERPL-CCNC: 61 U/L (ref 15–37)
ATRIAL RATE: 300 BPM
BASOPHILS # BLD: 0 K/UL (ref 0–0.1)
BASOPHILS NFR BLD: 0 % (ref 0–1)
BILIRUB SERPL-MCNC: 1.2 MG/DL (ref 0.2–1)
BNP SERPL-MCNC: ABNORMAL PG/ML (ref 0–450)
BUN SERPL-MCNC: 24 MG/DL (ref 6–20)
BUN/CREAT SERPL: 14 (ref 12–20)
CALCIUM SERPL-MCNC: 9 MG/DL (ref 8.5–10.1)
CALCULATED P AXIS, ECG09: 85 DEGREES
CALCULATED R AXIS, ECG10: 136 DEGREES
CALCULATED T AXIS, ECG11: -39 DEGREES
CHLORIDE SERPL-SCNC: 107 MMOL/L (ref 97–108)
CO2 SERPL-SCNC: 26 MMOL/L (ref 21–32)
COMMENT, HOLDF: NORMAL
CREAT SERPL-MCNC: 1.7 MG/DL (ref 0.7–1.3)
DIAGNOSIS, 93000: NORMAL
DIFFERENTIAL METHOD BLD: ABNORMAL
EOSINOPHIL # BLD: 0.1 K/UL (ref 0–0.4)
EOSINOPHIL NFR BLD: 1 % (ref 0–7)
ERYTHROCYTE [DISTWIDTH] IN BLOOD BY AUTOMATED COUNT: 15.8 % (ref 11.5–14.5)
GLOBULIN SER CALC-MCNC: 3.1 G/DL (ref 2–4)
GLUCOSE SERPL-MCNC: 132 MG/DL (ref 65–100)
HCT VFR BLD AUTO: 46.9 % (ref 36.6–50.3)
HGB BLD-MCNC: 14.5 G/DL (ref 12.1–17)
IMM GRANULOCYTES # BLD AUTO: 0.1 K/UL (ref 0–0.04)
IMM GRANULOCYTES NFR BLD AUTO: 1 % (ref 0–0.5)
INR PPP: 3.2 (ref 0.9–1.1)
LYMPHOCYTES # BLD: 0.4 K/UL (ref 0.8–3.5)
LYMPHOCYTES NFR BLD: 6 % (ref 12–49)
MAGNESIUM SERPL-MCNC: 2 MG/DL (ref 1.6–2.4)
MCH RBC QN AUTO: 31.2 PG (ref 26–34)
MCHC RBC AUTO-ENTMCNC: 30.9 G/DL (ref 30–36.5)
MCV RBC AUTO: 100.9 FL (ref 80–99)
MONOCYTES # BLD: 0.3 K/UL (ref 0–1)
MONOCYTES NFR BLD: 5 % (ref 5–13)
NEUTS SEG # BLD: 5.3 K/UL (ref 1.8–8)
NEUTS SEG NFR BLD: 87 % (ref 32–75)
NRBC # BLD: 0 K/UL (ref 0–0.01)
NRBC BLD-RTO: 0 PER 100 WBC
PLATELET # BLD AUTO: 112 K/UL (ref 150–400)
PMV BLD AUTO: 11.2 FL (ref 8.9–12.9)
POTASSIUM SERPL-SCNC: 3.5 MMOL/L (ref 3.5–5.1)
PROT SERPL-MCNC: 6.9 G/DL (ref 6.4–8.2)
PROTHROMBIN TIME: 30.3 SEC (ref 9–11.1)
Q-T INTERVAL, ECG07: 414 MS
QRS DURATION, ECG06: 166 MS
QTC CALCULATION (BEZET), ECG08: 474 MS
RBC # BLD AUTO: 4.65 M/UL (ref 4.1–5.7)
RBC MORPH BLD: ABNORMAL
SAMPLES BEING HELD,HOLD: NORMAL
SODIUM SERPL-SCNC: 143 MMOL/L (ref 136–145)
TROPONIN I SERPL-MCNC: 0.11 NG/ML
TSH SERPL DL<=0.05 MIU/L-ACNC: 45 UIU/ML (ref 0.36–3.74)
VENTRICULAR RATE, ECG03: 79 BPM
WBC # BLD AUTO: 6.2 K/UL (ref 4.1–11.1)

## 2019-03-01 PROCEDURE — 83735 ASSAY OF MAGNESIUM: CPT

## 2019-03-01 PROCEDURE — 74011250636 HC RX REV CODE- 250/636: Performed by: PHYSICIAN ASSISTANT

## 2019-03-01 PROCEDURE — B246ZZ4 ULTRASONOGRAPHY OF RIGHT AND LEFT HEART, TRANSESOPHAGEAL: ICD-10-PCS | Performed by: SPECIALIST

## 2019-03-01 PROCEDURE — 83880 ASSAY OF NATRIURETIC PEPTIDE: CPT

## 2019-03-01 PROCEDURE — 80053 COMPREHEN METABOLIC PANEL: CPT

## 2019-03-01 PROCEDURE — 94760 N-INVAS EAR/PLS OXIMETRY 1: CPT

## 2019-03-01 PROCEDURE — 85025 COMPLETE CBC W/AUTO DIFF WBC: CPT

## 2019-03-01 PROCEDURE — 84443 ASSAY THYROID STIM HORMONE: CPT

## 2019-03-01 PROCEDURE — 93312 ECHO TRANSESOPHAGEAL: CPT

## 2019-03-01 PROCEDURE — 84484 ASSAY OF TROPONIN QUANT: CPT

## 2019-03-01 PROCEDURE — 71046 X-RAY EXAM CHEST 2 VIEWS: CPT

## 2019-03-01 PROCEDURE — 74011250636 HC RX REV CODE- 250/636

## 2019-03-01 PROCEDURE — 74011250637 HC RX REV CODE- 250/637: Performed by: PHYSICIAN ASSISTANT

## 2019-03-01 PROCEDURE — 99285 EMERGENCY DEPT VISIT HI MDM: CPT

## 2019-03-01 PROCEDURE — 36415 COLL VENOUS BLD VENIPUNCTURE: CPT

## 2019-03-01 PROCEDURE — 93005 ELECTROCARDIOGRAM TRACING: CPT

## 2019-03-01 PROCEDURE — 74011000250 HC RX REV CODE- 250: Performed by: SPECIALIST

## 2019-03-01 PROCEDURE — 65660000000 HC RM CCU STEPDOWN

## 2019-03-01 PROCEDURE — 76060000031 HC ANESTHESIA FIRST 0.5 HR

## 2019-03-01 PROCEDURE — 85610 PROTHROMBIN TIME: CPT

## 2019-03-01 PROCEDURE — 76450000000

## 2019-03-01 RX ORDER — WARFARIN 4 MG/1
4 TABLET ORAL
Status: DISCONTINUED | OUTPATIENT
Start: 2019-03-01 | End: 2019-03-01

## 2019-03-01 RX ORDER — FUROSEMIDE 40 MG/1
40 TABLET ORAL DAILY
Status: DISCONTINUED | OUTPATIENT
Start: 2019-03-01 | End: 2019-03-02

## 2019-03-01 RX ORDER — WARFARIN 2 MG/1
2 TABLET ORAL
Status: DISCONTINUED | OUTPATIENT
Start: 2019-03-01 | End: 2019-03-01

## 2019-03-01 RX ORDER — ASPIRIN 81 MG/1
81 TABLET ORAL DAILY
Status: DISCONTINUED | OUTPATIENT
Start: 2019-03-01 | End: 2019-03-07 | Stop reason: HOSPADM

## 2019-03-01 RX ORDER — TAMSULOSIN HYDROCHLORIDE 0.4 MG/1
0.8 CAPSULE ORAL
Status: DISCONTINUED | OUTPATIENT
Start: 2019-03-01 | End: 2019-03-07 | Stop reason: HOSPADM

## 2019-03-01 RX ORDER — FINASTERIDE 5 MG/1
5 TABLET, FILM COATED ORAL DAILY
Status: DISCONTINUED | OUTPATIENT
Start: 2019-03-01 | End: 2019-03-07 | Stop reason: HOSPADM

## 2019-03-01 RX ORDER — THERA TABS 400 MCG
1 TAB ORAL DAILY
COMMUNITY
End: 2022-10-07

## 2019-03-01 RX ORDER — ASPIRIN 81 MG/1
81 TABLET ORAL DAILY
COMMUNITY

## 2019-03-01 RX ORDER — SODIUM CHLORIDE 0.9 % (FLUSH) 0.9 %
10 SYRINGE (ML) INJECTION AS NEEDED
Status: DISCONTINUED | OUTPATIENT
Start: 2019-03-01 | End: 2019-03-01

## 2019-03-01 RX ORDER — ASCORBIC ACID 500 MG
1000 TABLET ORAL DAILY
COMMUNITY
End: 2022-10-07

## 2019-03-01 RX ORDER — FENTANYL CITRATE 50 UG/ML
25-200 INJECTION, SOLUTION INTRAMUSCULAR; INTRAVENOUS
Status: DISCONTINUED | OUTPATIENT
Start: 2019-03-01 | End: 2019-03-01

## 2019-03-01 RX ORDER — ATORVASTATIN CALCIUM 40 MG/1
40 TABLET, FILM COATED ORAL DAILY
Status: DISCONTINUED | OUTPATIENT
Start: 2019-03-01 | End: 2019-03-07 | Stop reason: HOSPADM

## 2019-03-01 RX ORDER — ACETAMINOPHEN 325 MG/1
650 TABLET ORAL
Status: DISCONTINUED | OUTPATIENT
Start: 2019-03-01 | End: 2019-03-07 | Stop reason: HOSPADM

## 2019-03-01 RX ORDER — ALLOPURINOL 300 MG/1
300 TABLET ORAL DAILY
Status: DISCONTINUED | OUTPATIENT
Start: 2019-03-01 | End: 2019-03-07 | Stop reason: HOSPADM

## 2019-03-01 RX ORDER — MIDAZOLAM HYDROCHLORIDE 1 MG/ML
.5-1 INJECTION, SOLUTION INTRAMUSCULAR; INTRAVENOUS
Status: DISCONTINUED | OUTPATIENT
Start: 2019-03-01 | End: 2019-03-01

## 2019-03-01 RX ORDER — LEVOTHYROXINE SODIUM 100 UG/1
100 TABLET ORAL
Status: DISCONTINUED | OUTPATIENT
Start: 2019-03-02 | End: 2019-03-06

## 2019-03-01 RX ORDER — ATROPINE SULFATE 0.1 MG/ML
1 INJECTION INTRAVENOUS AS NEEDED
Status: DISCONTINUED | OUTPATIENT
Start: 2019-03-01 | End: 2019-03-01

## 2019-03-01 RX ORDER — WARFARIN 2 MG/1
2 TABLET ORAL ONCE
Status: COMPLETED | OUTPATIENT
Start: 2019-03-01 | End: 2019-03-01

## 2019-03-01 RX ORDER — SODIUM CHLORIDE 9 MG/ML
INJECTION, SOLUTION INTRAVENOUS
Status: DISCONTINUED | OUTPATIENT
Start: 2019-03-01 | End: 2019-03-01 | Stop reason: HOSPADM

## 2019-03-01 RX ORDER — MELATONIN
1000
COMMUNITY
End: 2020-08-18 | Stop reason: ALTCHOICE

## 2019-03-01 RX ORDER — MILRINONE LACTATE 0.2 MG/ML
0.3 INJECTION, SOLUTION INTRAVENOUS CONTINUOUS
Status: DISCONTINUED | OUTPATIENT
Start: 2019-03-01 | End: 2019-03-03

## 2019-03-01 RX ORDER — CARVEDILOL 3.12 MG/1
3.12 TABLET ORAL 2 TIMES DAILY
Status: DISCONTINUED | OUTPATIENT
Start: 2019-03-01 | End: 2019-03-02

## 2019-03-01 RX ORDER — PROPOFOL 10 MG/ML
INJECTION, EMULSION INTRAVENOUS AS NEEDED
Status: DISCONTINUED | OUTPATIENT
Start: 2019-03-01 | End: 2019-03-01 | Stop reason: HOSPADM

## 2019-03-01 RX ORDER — SPIRONOLACTONE 25 MG/1
25 TABLET ORAL DAILY
Status: DISCONTINUED | OUTPATIENT
Start: 2019-03-01 | End: 2019-03-07 | Stop reason: HOSPADM

## 2019-03-01 RX ADMIN — WARFARIN SODIUM 2 MG: 2 TABLET ORAL at 16:57

## 2019-03-01 RX ADMIN — PROPOFOL 20 MG: 10 INJECTION, EMULSION INTRAVENOUS at 13:50

## 2019-03-01 RX ADMIN — SPIRONOLACTONE 25 MG: 25 TABLET ORAL at 15:24

## 2019-03-01 RX ADMIN — ALLOPURINOL 300 MG: 300 TABLET ORAL at 15:24

## 2019-03-01 RX ADMIN — CARVEDILOL 3.12 MG: 3.12 TABLET, FILM COATED ORAL at 15:25

## 2019-03-01 RX ADMIN — PROPOFOL 30 MG: 10 INJECTION, EMULSION INTRAVENOUS at 13:48

## 2019-03-01 RX ADMIN — CARVEDILOL 3.12 MG: 3.12 TABLET, FILM COATED ORAL at 22:45

## 2019-03-01 RX ADMIN — PROPOFOL 30 MG: 10 INJECTION, EMULSION INTRAVENOUS at 13:43

## 2019-03-01 RX ADMIN — SACUBITRIL AND VALSARTAN 1 TABLET: 24; 26 TABLET, FILM COATED ORAL at 22:44

## 2019-03-01 RX ADMIN — SODIUM CHLORIDE: 9 INJECTION, SOLUTION INTRAVENOUS at 13:32

## 2019-03-01 RX ADMIN — PROPOFOL 20 MG: 10 INJECTION, EMULSION INTRAVENOUS at 13:52

## 2019-03-01 RX ADMIN — ASPIRIN 81 MG: 81 TABLET, COATED ORAL at 15:24

## 2019-03-01 RX ADMIN — FUROSEMIDE 40 MG: 40 TABLET ORAL at 15:24

## 2019-03-01 RX ADMIN — BENZOCAINE, BUTAMBEN, AND TETRACAINE HYDROCHLORIDE 1 SPRAY: .028; .004; .004 AEROSOL, SPRAY TOPICAL at 13:35

## 2019-03-01 RX ADMIN — TAMSULOSIN HYDROCHLORIDE 0.8 MG: 0.4 CAPSULE ORAL at 22:44

## 2019-03-01 RX ADMIN — PROPOFOL 20 MG: 10 INJECTION, EMULSION INTRAVENOUS at 13:46

## 2019-03-01 RX ADMIN — PROPOFOL 20 MG: 10 INJECTION, EMULSION INTRAVENOUS at 13:44

## 2019-03-01 RX ADMIN — ATORVASTATIN CALCIUM 40 MG: 40 TABLET, FILM COATED ORAL at 15:24

## 2019-03-01 RX ADMIN — MILRINONE LACTATE IN DEXTROSE 0.2 MCG/KG/MIN: 200 INJECTION, SOLUTION INTRAVENOUS at 11:32

## 2019-03-01 NOTE — Clinical Note
clipped, prepped with ChloraPrep and draped. Wet prep solution applied at: 1552. Wet prep solution dried at: 1558. Wet prep elapsed drying time: 6 mins.

## 2019-03-01 NOTE — PROGRESS NOTES
Spiritual Care Assessment/Progress Note  [facility]      NAME: Antoinette Coyle      MRN: [id number]  AGE: 80 y.o. SEX: male  Yarsanism Affiliation: Protestant   Language: English     3/1/2019     Total Time (in minutes): 15     Spiritual Assessment begun in [facility] through conversation with:         []Patient        [x] Family    [] Friend(s)        Reason for Consult: Follow-up, critical     Spiritual beliefs: (Please include comment if needed)     [x] Identifies with a keyana tradition:         [x] Supported by a keyana community:            [] Claims no spiritual orientation:           [] Seeking spiritual identity:                [] Adheres to an individual form of spirituality:           [] Not able to assess:                           Identified resources for coping:      [x] Prayer                               [] Music                  [] Guided Imagery     [x] Family/friends                 [] Pet visits     [] Devotional reading                         [] Unknown     [] Other:                                             Interventions offered during this visit: (See comments for more details)          Family/Friend(s):  Affirmation of emotions/emotional suffering, Affirmation of keyana, Coping skills reviewed/reinforced, Iconic (affirming the presence of God/Higher Power), Prayer (actual), Prayer (assurance of), Yarsanism beliefs/image of God discussed     Plan of Care:     [x] Support spiritual and/or cultural needs    [] Support AMD and/or advance care planning process      [] Support grieving process   [] Coordinate Rites and/or Rituals    [] Coordination with community clergy   [] No spiritual needs identified at this time   [] Detailed Plan of Care below (See Comments)  [] Make referral to Music Therapy  [] Make referral to Pet Therapy     [] Make referral to Addiction services  [] Make referral to [facility]  [] Make referral to Spiritual Care Partner  [] No future visits requested [x] Follow up visits as needed     Comments:Provided prayer and support to pt and family     Chaplain Radha Intern, MDiv  [phone])

## 2019-03-01 NOTE — PROGRESS NOTES
1315:   Cardiac Cath Lab Procedure Area Arrival Note:    Ortiz Shah arrived to Cardiac Cath Lab, Procedure Area. Patient identifiers verified with NAME and DATE OF BIRTH. Procedure verified with patient. Consent forms verified. Allergies verified. Patient informed of procedure and plan of care. Questions answered with review. Patient voiced understanding of procedure and plan of care. Patient on cardiac monitor, non-invasive blood pressure, SPO2 monitor. On RA placed on O2 @ 2 lpm via NC.  IV of NS on pump at 25 ml/hr Milrinone at 5.3ml/hr. Patient status doing well without problems. Patient is A&Ox 4. Patient reports no pain. Patient medicated during procedure with orders obtained and verified by Dr. Darlin Castanon.    Refer to patients Cardiac Cath Lab PROCEDURE REPORT for vital signs, assessment, status, and response during procedure, printed at end of case. Printed report on chart or scanned into chart. 1425: TRANSFER - OUT REPORT:    Verbal report given to Roel Chung RN(name) on Ortiz Shah  being transferred to (unit) for routine post - op       Report consisted of patients Situation, Background, Assessment and   Recommendations(SBAR). Information from the following report(s) SBAR, Procedure Summary and MAR was reviewed with the receiving nurse. Lines:   Peripheral IV 03/01/19 Left Antecubital (Active)   Site Assessment Clean, dry, & intact 3/1/2019  9:11 AM   Phlebitis Assessment 0 3/1/2019  9:11 AM   Infiltration Assessment 0 3/1/2019  9:11 AM   Dressing Status Clean, dry, & intact 3/1/2019  9:11 AM   Hub Color/Line Status Pink 3/1/2019  9:11 AM        Opportunity for questions and clarification was provided.       Patient transported with:   Monitor  O2 @ 2 liters  Registered Nurse

## 2019-03-01 NOTE — ED TRIAGE NOTES
TRIAGE:Pt arrives with c/o fatigue, sob and cp for 6 months. Was at Dr Celestina Cloud office yesterday and told to come in to get a pacemaker today at ten. Denies any pain or dizziness or complaints at this time.  +dyspnea on exertion

## 2019-03-01 NOTE — ANESTHESIA PREPROCEDURE EVALUATION
Anesthetic History   No history of anesthetic complications            Review of Systems / Medical History  Patient summary reviewed, nursing notes reviewed and pertinent labs reviewed    Pulmonary        Sleep apnea           Neuro/Psych   Within defined limits           Cardiovascular    Hypertension        Dysrhythmias   CAD         GI/Hepatic/Renal  Within defined limits              Endo/Other      Hypothyroidism       Other Findings              Physical Exam    Airway  Mallampati: II  TM Distance: > 6 cm  Neck ROM: normal range of motion   Mouth opening: Normal     Cardiovascular  Regular rate and rhythm,  S1 and S2 normal,  no murmur, click, rub, or gallop             Dental  No notable dental hx       Pulmonary  Breath sounds clear to auscultation               Abdominal  GI exam deferred       Other Findings            Anesthetic Plan    ASA: 4  Anesthesia type: MAC            Anesthetic plan and risks discussed with: Patient

## 2019-03-01 NOTE — PROGRESS NOTES
TRANSFER - IN REPORT:    Verbal report received from Tamara Libman, [facility] (name) on Betsy Krishnamurthy  being received from ED (unit) for routine progression of care      Report consisted of patients Situation, Background, Assessment and Recommendations(SBAR). Information from the following report(s) SBAR, Kardex, ED Summary, Intake/Output, MAR, Accordion, Recent Results, Med Rec Status and Cardiac Rhythm A flutter was reviewed with the receiving nurse. Opportunity for questions and clarification was provided. Assessment completed upon patients arrival to unit and care assumed.

## 2019-03-01 NOTE — Clinical Note
Patient transported with a Registered Nurse. Patient transported to: HOLDING AREA.   
Report to be given in holding area at bedside

## 2019-03-01 NOTE — Clinical Note
A venogram was performed on the coronary sinus. Injected with single hand injection. Injection volume  = 5 mL.

## 2019-03-01 NOTE — PROGRESS NOTES
Admission Medication Reconciliation:    Information obtained from: Patient, RX query, wife, RX bottles, medication list    Significant PMH/Disease States:   Past Medical History:   Diagnosis Date    Anger     TAKES PAROXETINE TO CONTROL ANGER ISSUES    CAD (coronary artery disease)     Foot drop, left     WEARS METAL BRACE    Gout     Hard of hearing     High cholesterol     Hypertension     Prostate enlargement     Thyroid disease     Unspecified sleep apnea     DOESN'T USE CPAP; \"IT MADE HIM SICK-COLDS, SINUS\", PER WIFE       Chief Complaint for this Admission:  Referral    Allergies:  Patient has no known allergies. Prior to Admission Medications:   Prior to Admission Medications   Prescriptions Last Dose Informant Patient Reported? Taking? B.infantis-B.ani-B.long-B.bifi (PROBIOTIC 4X) 10-15 mg TbEC 2/28/2019 at Unknown time  Yes Yes   Sig: Take 1 Tab by mouth daily. FERROUS GLUCONATE PO 2/28/2019 at Unknown time  Yes Yes   Sig: Take 325 mg by mouth daily. PARoxetine (PAXIL) 20 mg tablet 2/28/2019 at Unknown time  Yes Yes   Sig: Take 20 mg by mouth daily. SYNTHROID 100 mcg tablet 2/28/2019 at Unknown time  Yes Yes   Sig: Take 100 mcg by mouth Daily (before breakfast). allopurinol (ZYLOPRIM) 300 mg tablet 2/28/2019 at Unknown time  Yes Yes   Sig: Take 300 mg by mouth daily. amoxicillin (AMOXIL) 875 mg tablet 2/28/2019 at Unknown time  Yes Yes   Sig: Take 875 mg by mouth two (2) times a day. ascorbic acid, vitamin C, (VITAMIN C) 500 mg tablet 2/28/2019 at Unknown time  Yes Yes   Sig: Take 1,000 mg by mouth daily. aspirin delayed-release 81 mg tablet 2/28/2019 at Unknown time  Yes Yes   Sig: Take 81 mg by mouth daily. atorvastatin (LIPITOR) 40 mg tablet 2/28/2019 at Unknown time  Yes Yes   Sig: Take 40 mg by mouth daily. calcium citrate (CITRACAL PO) 2/28/2019 at Unknown time  Yes Yes   Sig: Take 1 Tab by mouth nightly.    cholecalciferol (VITAMIN D3) 1,000 unit tablet 2/28/2019 at Unknown time  Yes Yes   Sig: Take 1,000 Units by mouth nightly. finasteride (PROSCAR) 5 mg tablet 2/28/2019 at Unknown time  Yes Yes   Sig: Take 5 mg by mouth daily. furosemide (LASIX) 20 mg tablet 2/28/2019 at Unknown time  No Yes   Sig: Take 1 Tab by mouth daily. oxybutynin chloride XL (DITROPAN XL) 10 mg CR tablet 2/28/2019 at Unknown time  Yes Yes   Sig: Take 10 mg by mouth daily. tamsulosin (FLOMAX) 0.4 mg capsule 2/28/2019 at Unknown time  Yes Yes   Sig: Take 0.8 mg by mouth nightly. therapeutic multivitamin (THERAGRAN) tablet 2/28/2019 at Unknown time  Yes Yes   Sig: Take 1 Tab by mouth daily. warfarin (COUMADIN) 2 mg tablet 2/28/2019 at Unknown time  Yes Yes   Sig: Take 2 mg by mouth six (6) days a week. warfarin (COUMADIN) 4 mg tablet 2/22/2019  Yes yes   Sig: Take 4 mg by mouth Every Friday. EVERY FRIDAY      Facility-Administered Medications: None         Comments/Recommendations:   1. Patient's warfarin regimen currently 2mg every day except 4mg on Friday. Last dose taken yesterday. 2. Added probiotic, Coq10, aspirin, Vitamin C, Vitamin D, Citracal, MVI  3. Wife expressed concern about potential calcium plaques from supplementation affecting valves based on literature she has read Calcium was recommended by nephrologist. Eduardo Parada to discuss with cardiologist.   4. Pt currently on day 6 of 10 of amoxicillin for cough.

## 2019-03-01 NOTE — CONSULTS
Cardiac Electrophysiology Hospital Consultation Note     Subjective:      Cali Colon is a 80 y.o. patient who is seen for evaluation/management of AF/AFL with RVR & consideration for ICD for primary convention of sudden cardiac death. He was admitted on 03/01/2019 for acute on chronic systolic heart failure, NYHA III-IV. He was seen in clinic on 02/28/2019, & direct admit was not available, so he presented to the ED for admission. Echo on 02/28/2019 showed LVEF 10% (20% in 2016). Started furosemide & milrinone. Reports worsening SOB with exertion. Occasional chest discomfort, not associated with SOB. MEMO today, results pending. Anticoagulated with warfarin, no bleeding issues. AF/AFL on telemetry, rate controlled 70s-80s. BP stable, 151/91 post procedure. Echo Preliminary (02/28/2019): LVEF <15%, mod LV dilation, mod concentric LVH. RV global systolic function mod reduced. Mod MAC, mod MR. Prosthetic mechanical aortic valve, mild to mod AS, mild centralized leaking. Mod TR, mod pulmonary hypertension. Mild MI. Mild ascending aorta dilatation. Mod elevated CVP. Previous:  CABG & mechanical AVR 1996. Known occlusion to RCA & SVG-RCA. Lexiscan stress test (06/24/2015): LVEF 38%, inferior septal kinesis. Inferior infarction with small amount of emani infarct ischemia. Holter (04/2013): 21 beat VT & NSVT (4 beats) rate 140.  14% total beats ventricular. Previous ARB-HCTZ, discontinued due to renal dysfunction. Followed by nephrology.     Followed by Dr. Felton Schaffer.      Problem List  Date Reviewed: 10/9/2018          Codes Class Noted    A-fib ([facility] [address] ICD-10-CM: I48.91  ICD-9-CM: 427.31  3/1/2019        Acute on chronic systolic heart failure, NYHA class 4 ([facility] [address] ICD-10-CM: I50.23  ICD-9-CM: 428.23  3/1/2019        Anemia ICD-10-CM: D64.9  ICD-9-CM: 285.9  10/9/2018        Hypothyroidism ICD-10-CM: E03.9  ICD-9-CM: 244.9  10/9/2018        Supratherapeutic INR ICD-10-CM: R79.1  ICD-9-CM: 790.92  10/9/2018        H/O mechanical aortic valve replacement ICD-10-CM: Z95.2  ICD-9-CM: V43.3  10/9/2018              Current Facility-Administered Medications   Medication Dose Route Frequency Provider Last Rate Last Dose    acetaminophen (TYLENOL) tablet 650 mg  650 mg Oral Q4H PRN Marline Martinez PA-C        allopurinol (ZYLOPRIM) tablet 300 mg  300 mg Oral DAILY Marline Martinez PA-C   300 mg at 03/01/19 1524    aspirin delayed-release tablet 81 mg  81 mg Oral DAILY Marline Martinez PA-C   81 mg at 03/01/19 1524    atorvastatin (LIPITOR) tablet 40 mg  40 mg Oral DAILY Marline Martinez PA-C   40 mg at 03/01/19 1524    finasteride (PROSCAR) tablet 5 mg  5 mg Oral DAILY Marline Martinez PA-C        [START ON 3/2/2019] levothyroxine (SYNTHROID) tablet 100 mcg  100 mcg Oral ACB Marline Martinez PA-C        tamsulosin [facility]) capsule 0.8 mg  0.8 mg Oral QHS Marline Martinez PA-C        carvedilol (COREG) tablet 3.125 mg  3.125 mg Oral BID Marline Martinez PA-C   3.125 mg at 03/01/19 1525    sacubitril-valsartan (ENTRESTO) 24-26 mg tablet 1 Tab  1 Tab Oral Q12H Marline Martinez PA-C        spironolactone (ALDACTONE) tablet 25 mg  25 mg Oral DAILY Marline Martinez PA-C   25 mg at 03/01/19 1524    furosemide (LASIX) tablet 40 mg  40 mg Oral DAILY Marline Martinez PA-C   40 mg at 03/01/19 1524    milrinone (PRIMACOR) 20 MG/100 ML D5W infusion  0.2 mcg/kg/min IntraVENous CONTINUOUS Marline Martinez PA-C 5.2 mL/hr at 03/01/19 1132 0.2 mcg/kg/min at 03/01/19 1132    Warfarin - pharmacy to dose   Other Rx Dosing/Monitoring Marline Martinez PA-C        warfarin (COUMADIN) tablet 2 mg  2 mg Oral ONCE Jennifer NEUMANN PA-C         No Known Allergies  Past Medical History:   Diagnosis Date    Anger     TAKES PAROXETINE TO CONTROL ANGER ISSUES    CAD (coronary artery disease)     Foot drop, left     WEARS METAL BRACE    Gout     Hard of hearing     High cholesterol     Hypertension     Prostate enlargement     Thyroid disease     Unspecified sleep apnea     DOESN'T USE CPAP; \"IT MADE HIM SICK-COLDS, SINUS\", PER WIFE     Past Surgical History:   Procedure Laterality Date    CARDIAC SURG PROCEDURE UNLIST      ANGIOPLASTY WITH CORONARY STENT    CARDIAC SURG PROCEDURE UNLIST  1996    AORTIC VALVE REPLACEMENT    HX ORTHOPAEDIC  2001    ORIF COMPOUND FRACTURE LEFT ANKLE    HX RETINAL DETACHMENT REPAIR  1980s    LEFT EYE     Family History   Problem Relation Age of Onset    Stroke Mother         ANEURYSM    Stroke Father      Social History     Tobacco Use    Smoking status: Never Smoker    Smokeless tobacco: Never Used   Substance Use Topics    Alcohol use: Yes     Alcohol/week: 1.5 oz     Types: 3 Standard drinks or equivalent per week        Review of Systems:   Constitutional: Negative for fever, chills, weight loss, malaise/fatigue. HEENT: Negative for nosebleeds, vision changes. Respiratory: Negative for cough, hemoptysis  Cardiovascular: + occasional chest pain, + palpitations, + leg swelling, no syncope, and no PND. + LENNON  Gastrointestinal: Negative for nausea, vomiting, diarrhea, blood in stool and melena. Genitourinary: Negative for dysuria, and hematuria. Musculoskeletal: Negative for myalgias, arthralgia. Skin: Negative for rash. Heme: Does not bleed or bruise easily. Neurological: Negative for speech change and focal weakness     Objective:     Visit Vitals  BP (!) 151/91   Pulse 73   Temp 97.5 °F (36.4 °C)   Resp 16   Ht 5' 5\" (1.651 m)   Wt 192 lb (87.1 kg)   SpO2 99%   BMI 31.95 kg/m²      Physical Exam:   Constitutional: well-developed and well-nourished. No respiratory distress. Head: Normocephalic and atraumatic. Eyes: Pupils are equal, round  ENT: hearing normal  Neck: supple. No JVD present. Cardiovascular: Normal rate, irregular rhythm. Exam reveals no gallop and no friction rub. No murmur heard.   Pulmonary/Chest: Effort normal at rest.  Rales right base. Abdominal: Soft, no tenderness. Obese. Musculoskeletal: Bilat lower extremity edema, R>L. Neurological: Alert,oriented. Skin: Skin is warm and dry  Psychiatric: Normal mood and affect. Behavior is normal. Judgment and thought content normal.      EKG: Atrial flutter with PVCs, ventricular rate 79 bpm.   IVCD, QRSd 166 ms. Assessment/Plan:   Mr. Devin Hernandez has acute on chronic systolic heart failure, NYHA III-IV, now on milrinone gtt, LVEF 10%. Started carvedilol, Entresto, & spironolactone. Atrial flutter with RVR upon admission, now rate controlled. However, he may have recurrence of RVR due to milrinone over the weekend. Some aspect of worsening LVEF may be tachycardia induced. Previous CABG, known RCA disease. Per Dr. Osiel Winslow, consider cardiac cath at some point, but not at the moment. MEMO results pending. Preliminary report indicates that valve is not source of heart failure exacerbation. Anticoagulated with warfarin, denies bleeding issues. Patient is unlikely to tolerate AF ablation or have a successful ablation. Would recommend AV node ablation for long-term rate control. He meets indication for biventricular ICD implant per Medicare guidelines. He will be pacer dependent, has LVEF 10%, & QRSd 166 ms. Risks/benefits of procedures discussed, & he agrees to procedure, scheduled for 03/04/2019.     JULIA Minor  [facility] [place]  03/01/19    Addendum from EP attending:  I have seen, examined patient, and discussed with nurse practitioner, registered nurse, reviewed, updated note and agree with the assessment and plan    I have talked to him today after and before MEMO  He has been dyspneic and has leg edema  Vital signs with atrial fibrillation and occasional RVR  Exam shows irregular rhythm   Lungs with crackles  Legs edema  Assessment and Plan:  Continue to diurese and try to rate control with av node ablation Monday  Hence he will need biventricular pacing  With LVEF 10% he will also need ICD at the same time  Stay on coumadin with mechanical valve  He agrees to proceed Monday 3 pm  Keep NPO after breakfast Monday    Thank you for involving me in this patient's care and please call with further concerns or questions. Herminio Careron M.D.   Electrophysiology/Cardiology  [facility] [place]  [address], [facility] [address] [place] [address]  [phone]

## 2019-03-01 NOTE — Clinical Note
A venogram was performed on the Other (document in comment). Injected with single hand injection. Injection volume  = 10 mL.  Injection of the RFV and the ABD IVC

## 2019-03-01 NOTE — PROGRESS NOTES
Pt in Cath Lab at time of visit.  met with family in Cath lab waiting area. Family stated that they have dealt with several medical emergencies in the last few months. Family relies on their keyana as a means of coping.  listened as family members shared about family dynamics. Family shared that their mother is in ER and would like for the  to visit.  assured family that he would visit.  prayed with family and assured family of continued prayer and spiritual support.     Valerie Maldonado,  Intern, MDiv  [phone]

## 2019-03-01 NOTE — PROGRESS NOTES
Pharmacist Note - Warfarin Dosing  Consult provided for this 83 y.o.male to manage warfarin for Atrial Fibrillation    INR Goal: 2 - 3    Home regimen/ tablet size: 4 mg every Friday and 2 mg on all other days    Drugs that may increase INR: None  Drugs that may decrease INR: None  Other current anticoagulants/ drugs that may increase bleeding risk: allopurinol  Risk factors: Age > 65 and Decompensated Heart Failure  Daily INR ordered: YES    Recent Labs     03/01/19  0930   HGB 14.5   INR 3.2*     Date               INR                  Dose  3/1  3.2  2 mg                                                                                Assessment/ Plan:  INR slightly elevated today. Will order warfarin 2 mg PO x 1 dose today. This is a decrease from his usual Friday dose of 4 mg. Pharmacy will continue to monitor daily and adjust therapy as indicated. Please contact the pharmacist at  for outpatient recommendations if needed.

## 2019-03-01 NOTE — H&P
Cardiology Admission Note      Patient Name: Joseline Sweet  : 1935 MRN: [id number]  Date: 3/1/2019  Time: 10:35 AM    Admit Diagnosis: Acute on chronic systolic heart failure, NYHA class 4 ([facility] [address] [I50.23]  A-fib ([facility] [I48.91]    Admitting/Attending Cardiologist: Andres Martinez. David Mackenzie M.D.    HPI:  Joseline Sweet is a 80 y.o. male admitted on 3/1/2019  for Acute on chronic systolic heart failure, NYHA class 4 ([facility] [address] [I50.23]  A-fib ([facility] [I48.91]. has a past medical history of Anger, CAD (coronary artery disease), Foot drop, left, Gout, Hard of hearing, High cholesterol, Hypertension, Prostate enlargement, Thyroid disease, and Unspecified sleep apnea. Presents to the ED for admission regarding acute on chronic systolic heart failure and Afib. Unable to be directly admitted. Evaluated in office by Dr. David Mackenzie, his EF is below 10%. He has Afib with RVR, LENNON, intermittent CP and cough with sputum production. He is being admitted for management of his HF and afib with RVR. Plan for BiV PPM and cardiac catheterization +/- on this visit. Subjective:  No c/o CP at this time. Still with LENNON. Aflutter on telemetry      Assessment and Plan     1. Systolic HF - acute on chronic   - EF 10% with LV dilation. Global systolic function is reduced. AV prosthetic valve noted. Mild to moderate AV stenosis, moderate MR, moderate PHTN   - NYHA class IV   - Daily weights, strick I/O   - Milrinone gtt   - Coreg 3.125 mg BID   - Entresto 24/26 mg BID   - Lasix 40 mg daily - no volume overloaded   - Aldactone 25 mg daily   - proBNP 26800+  2. Aflutter   - with some RVR   - Coreg   - Coumadin PTA - Pharmacy to dose   - Consult placed to Dr. Tali Roa for evaluation of BiV ICD/aflutter  3. CAD   - h/o CABG   - will need cath at some point   - Coreg, ASA, Lipitor  4. HLD   - Lipitor 40 mg daily  5. HTN   - Coreg, Lisinopril, Lasix, Aldactone  6. Hypothyroidism   - Synthroid 100 mcg   - check TSH  7. AS   - s/p AVR   - coumadin per pharm    Admission for management of A/c systolic HF, EF 22%. Plan for MEMO to evaluate prosthetic AV. Consult to EP for evaluation of BiV AICD and afib. Will need cath, +/- on this admission. Assessment/Plan/Discussion:Cardiology Attending:     Patient seen on the day of progress note and examined  and agree with Advance Practice Provider (YVETTE, NP,PA)  assessment and plans. Jhon Musa is a 80 y.o. male   CABG mechanical AVR 1996  Ef 3 years ago was 20%  In afib last October  Not been seeing cardiologist for some time  Now with AF RVR, LENNON at 100ft, CHF with EF 10%  AVR looks ok on MEMO today  Plan for EP evalu for BiV AICD  EP to decide on anti-coagulation emani-procedure  CAD likely worse but not sure cath right now would be best course  Will see how he does with inotropes and device  Ree Allen MD            Patient Active Problem List   Diagnosis Code    Anemia D64.9    Hypothyroidism E03.9    Supratherapeutic INR R79.1    H/O mechanical aortic valve replacement Z95.2    A-fib ([address] I48.91    Acute on chronic systolic heart failure, NYHA class 4 ([address] I50.23     No specialty comments available.     Review of Systems:     GENERAL   Recent weight loss - no   Fever -----------------   no   Chills -----------------   no     EYES, VISION   Visual Changes - no     EARS, NOSE, THROAT   Hearing loss ----------- no   Swallowing difficulties - no     CARDIOVASCULAR   Chest pain/pressure ---- no   Arrhythmia/palpitations - no       RESPIRATORY   Cough ------------------ no   Shortness of breath - yes   Wheezing -------------- no   GASTROINTESTINAL   Abdominal pain - no   Heartburn -------- no   Bloody stool ----- no     GENITOURINARY   Frequent urination - no   Urgency -------------- no     MUSCULOSKELETAL   Joint pain/swelling ---- no   Musculoskeletal pain - no     SKIN & INTEGUMENTARY   Rashes - no   Sores --- no         NEUROLOGICAL   Numbness/tingling - no   Sensation loss ------ no     PSYCHIATRIC   Nervousness/anxiety - no   Depression -------------- no     ENDOCRINE   Heat/cold intolerance - no   Excessive thirst -------- no     HEMATOLOGIC/LYMPHATIC   Abnormal bleeding - no     ALL/IMMUN   Allergic reaction ------ no   Recurrent infections - no       Previous treatment/evaluation includes   Coronary Artery Bypass Graft, echocardiogram and cardiac catheterization . Cardiac risk factors:   smoking/ tobacco exposure, dyslipidemia, male gender, hypertension.     Past Medical History:   Diagnosis Date    Anger     TAKES PAROXETINE TO CONTROL ANGER ISSUES    CAD (coronary artery disease)     Foot drop, left     WEARS METAL BRACE    Gout     Hard of hearing     High cholesterol     Hypertension     Prostate enlargement     Thyroid disease     Unspecified sleep apnea     DOESN'T USE CPAP; \"IT MADE HIM SICK-COLDS, SINUS\", PER WIFE     Past Surgical History:   Procedure Laterality Date    CARDIAC SURG PROCEDURE UNLIST      ANGIOPLASTY WITH CORONARY STENT    CARDIAC SURG PROCEDURE UNLIST  1996    AORTIC VALVE REPLACEMENT    HX ORTHOPAEDIC  2001    ORIF COMPOUND FRACTURE LEFT ANKLE    HX RETINAL DETACHMENT REPAIR  1980s    LEFT EYE     Current Facility-Administered Medications   Medication Dose Route Frequency    acetaminophen (TYLENOL) tablet 650 mg  650 mg Oral Q4H PRN    allopurinol (ZYLOPRIM) tablet 300 mg  300 mg Oral DAILY    aspirin delayed-release tablet 81 mg  81 mg Oral DAILY    atorvastatin (LIPITOR) tablet 40 mg  40 mg Oral DAILY    finasteride (PROSCAR) tablet 5 mg  5 mg Oral DAILY    [START ON 3/2/2019] levothyroxine (SYNTHROID) tablet 100 mcg  100 mcg Oral ACB    tamsulosin (FLOMAX) capsule 0.8 mg  0.8 mg Oral QHS    warfarin (COUMADIN) tablet 2 mg  2 mg Oral Once per day on Mon Tue Wed Thu Fri Sat    warfarin (COUMADIN) tablet 4 mg  4 mg Oral every Friday    carvedilol (COREG) tablet 3.125 mg  3.125 mg Oral BID    sacubitril-valsartan (ENTRESTO) 24-26 mg tablet 1 Tab  1 Tab Oral Q12H    spironolactone (ALDACTONE) tablet 25 mg  25 mg Oral DAILY    furosemide (LASIX) tablet 40 mg  40 mg Oral DAILY     Current Outpatient Medications   Medication Sig    aspirin delayed-release 81 mg tablet Take 81 mg by mouth daily.  therapeutic multivitamin (THERAGRAN) tablet Take 1 Tab by mouth daily.  ascorbic acid, vitamin C, (VITAMIN C) 500 mg tablet Take 1,000 mg by mouth daily.  B.infantis-B.ani-B.long-B.bifi (PROBIOTIC 4X) 10-15 mg TbEC Take 1 Tab by mouth daily.  cholecalciferol (VITAMIN D3) 1,000 unit tablet Take 1,000 Units by mouth nightly.  calcium citrate (CITRACAL PO) Take 1 Tab by mouth nightly.  COQ10, UBIQUINOL, PO Take 100-200 mg by mouth daily.  allopurinol (ZYLOPRIM) 300 mg tablet Take 300 mg by mouth daily.  amoxicillin (AMOXIL) 875 mg tablet Take 875 mg by mouth two (2) times a day.  atorvastatin (LIPITOR) 40 mg tablet Take 40 mg by mouth daily.  finasteride (PROSCAR) 5 mg tablet Take 5 mg by mouth daily.  SYNTHROID 100 mcg tablet Take 100 mcg by mouth Daily (before breakfast).  oxybutynin chloride XL (DITROPAN XL) 10 mg CR tablet Take 10 mg by mouth daily.  PARoxetine (PAXIL) 20 mg tablet Take 20 mg by mouth daily.  warfarin (COUMADIN) 2 mg tablet Take 2 mg by mouth six (6) days a week.  tamsulosin (FLOMAX) 0.4 mg capsule Take 0.8 mg by mouth nightly.  FERROUS GLUCONATE PO Take 325 mg by mouth daily.  furosemide (LASIX) 20 mg tablet Take 1 Tab by mouth daily.  warfarin (COUMADIN) 4 mg tablet Take 4 mg by mouth Every Friday.  EVERY FRIDAY       No Known Allergies   Family History   Problem Relation Age of Onset    Stroke Mother         ANEURYSM    Stroke Father       Social History     Socioeconomic History    Marital status:      Spouse name: Not on file    Number of children: Not on file    Years of education: Not on file  Highest education level: Not on file   Tobacco Use    Smoking status: Never Smoker    Smokeless tobacco: Never Used   Substance and Sexual Activity    Alcohol use: Yes     Alcohol/week: 1.5 oz     Types: 3 Standard drinks or equivalent per week    Drug use: No       Objective:    Physical Exam    Vitals:   Vitals:    03/01/19 0909   BP: 153/86   Pulse: 90   Resp: 16   Temp: 98.1 °F (36.7 °C)   SpO2: 97%       General:    Alert, cooperative, no distress, appears stated age. Neck:   Supple, no carotid bruit and no JVD. Back:     Symmetric, normal curvature. Lungs:     Diminished BS to auscultation bilaterally. Heart[de-identified]    Irregular rate and rhythm, S1, S2 normal, no murmur, click, rub or gallop. Abdomen:     Soft, non-tender. Bowel sounds normal.    Extremities:   Extremities normal, atraumatic, no cyanosis or edema. Vascular:   Pulses - 2+ radials   Skin:   Skin color normal. No rashes or lesions   Neurologic:   CN II-XII grossly intact. Telemetry: AFIB    ECG:   EKG Results     Procedure 720 Value Units Date/Time    EKG 12 LEAD INITIAL [[id number]] Collected:  03/01/19 0914    Order Status:  Completed Updated:  03/01/19 0916     Ventricular Rate 79 BPM      Atrial Rate 300 BPM      QRS Duration 166 ms      Q-T Interval 414 ms      QTC Calculation (Bezet) 474 ms      Calculated P Axis 85 degrees      Calculated R Axis 136 degrees      Calculated T Axis -39 degrees      Diagnosis --     Atrial flutter with variable AV block with premature ventricular or   aberrantly conducted complexes  Nonspecific intraventricular block  When compared with ECG of 10-OCT-2018 12:40,  Atrial flutter has replaced Atrial fibrillation  QRS axis shifted right              Data Review:     Radiology:   XR Results (most recent):  Results from Hospital Encounter encounter on 03/01/19   XR CHEST PA LAT    Narrative EXAM:  XR CHEST PA LAT    INDICATION:   sob    COMPARISON: 10/9/2018.     FINDINGS: PA and lateral radiographs of the chest demonstrate shallow depth of  inspiration with minor basilar atelectasis. . Cardiomegaly is stable. Patient  status post aortic valve replacement. .  There are degenerative changes in the  mid to lower thoracic spine. Impression IMPRESSION: Shallow inspiration. No acute process is identified. No results for input(s): CPK, TROIQ in the last 72 hours. No lab exists for component: CKQMB, CPKMB, BMPP  No results for input(s): NA, K, CL, CO2, BUN, CREA, GLU, PHOS, CA in the last 72 hours. Recent Labs     03/01/19  0930   WBC 6.2   HGB 14.5   HCT 46.9   *     No results for input(s): PTP, INR, GPT, SGOT, AP in the last 72 hours. No lab exists for component: PTTP, INREXT  No results for input(s): CHOL, LDLC in the last 72 hours. No lab exists for component: TGL, HDLC,  HBA1C  No results for input(s): CRP, TSH, TSHEXT in the last 72 hours. No lab exists for component: ESR    Panchito Winslow M.D. [facility] [address] [place] [address] Rd[address] [facility] [address] [facility] [address]     [facility]     [phone]    CC: Jm Iglesias DO

## 2019-03-01 NOTE — Clinical Note
Bilateral groin clipped, and draped. Wet prep solution applied at: 1425. Wet prep solution dried at: 1431. Wet prep elapsed drying time: 6 mins.

## 2019-03-01 NOTE — ED PROVIDER NOTES
80 y.o. male with past medical history significant for CAD, HTN, [place], high cholesterol, thyroid disease, prostate enlargement, sleep apnea, s/p angioplasty with coronary stent, aortic valve replacement, who presents ambulatory with wife to the ED with cc of referral for admission. Pt reports he was evaluated by Dr. Obi Mukherjee yesterday and instructed to come to [facility] ED at 8:00AM today for admission for pacemaker placement at 10:00AM (see below). Pt reports shortness of breath on exertion x 4-5 months, for which he was previously followed by a different cardiologist. He reports associated diaphoresis \"at times,\" intermittent mild chest pain, and cough productive of yellow sputum. Pt endorses use of Coumadin, last taken last night. Pt denies history of PNA. He specifically denies any fevers, chills, nausea, or vomiting. From Dr. Jocelyn Aceves note from yesterday's office visit:  \"Your ejection fraction is low which means your heart is not pumping well and you need to be admitted for your MEMO and a pacemaker. Please report to [facility] ER at 8am tomorrow to be admitted. \"    There are no other acute medical concerns at this time. Social Hx: Never Tobacco use, yes EtOH use, denies Illicit Drug use  PCP: Lux Montes., MD  Cardiologist: Jesusita Solitario MD    Note written by Nathan Mello, as dictated by Heather Hutchins MD 9:16 AM        The history is provided by the patient. No  was used.         Past Medical History:   Diagnosis Date    Anger     TAKES PAROXETINE TO CONTROL ANGER ISSUES    CAD (coronary artery disease)     Foot drop, left     WEARS METAL BRACE    Gout     Hard of hearing     High cholesterol     Hypertension     Prostate enlargement     Thyroid disease     Unspecified sleep apnea     DOESN'T USE CPAP; \"IT MADE HIM SICK-COLDS, SINUS\", PER WIFE       Past Surgical History:   Procedure Laterality Date    CARDIAC SURG PROCEDURE UNLIST      ANGIOPLASTY WITH CORONARY STENT    CARDIAC SURG PROCEDURE UNLIST  1996    AORTIC VALVE REPLACEMENT    HX ORTHOPAEDIC  2001    ORIF COMPOUND FRACTURE LEFT ANKLE    HX RETINAL DETACHMENT REPAIR  1980s    LEFT EYE         Family History:   Problem Relation Age of Onset    Stroke Mother         ANEURYSM    Stroke Father        Social History     Socioeconomic History    Marital status:      Spouse name: Not on file    Number of children: Not on file    Years of education: Not on file    Highest education level: Not on file   Social Needs    Financial resource strain: Not on file    Food insecurity - worry: Not on file    Food insecurity - inability: Not on file   [employer] needs - medical: Not on file   [employer] needs - non-medical: Not on file   Occupational History    Not on file   Tobacco Use    Smoking status: Never Smoker    Smokeless tobacco: Never Used   Substance and Sexual Activity    Alcohol use: Yes     Alcohol/week: 1.5 oz     Types: 3 Standard drinks or equivalent per week    Drug use: No    Sexual activity: Not on file   Other Topics Concern    Not on file   Social History Narrative    Not on file         ALLERGIES: Patient has no known allergies. Review of Systems   Constitutional: Positive for diaphoresis. Negative for chills and fever. HENT: Negative for congestion, postnasal drip, rhinorrhea and sore throat. Eyes: Negative for photophobia, discharge, redness and visual disturbance. Respiratory: Positive for cough and shortness of breath. Negative for chest tightness and wheezing. Cardiovascular: Positive for chest pain. Negative for palpitations and leg swelling. Gastrointestinal: Negative for abdominal distention, abdominal pain, blood in stool, constipation, diarrhea, nausea and vomiting. Genitourinary: Negative for difficulty urinating, dysuria, frequency, hematuria and urgency.    Musculoskeletal: Negative for arthralgias, back pain, joint swelling and myalgias. Skin: Negative for color change and rash. Neurological: Negative for dizziness, speech difficulty, weakness, light-headedness, numbness and headaches. Psychiatric/Behavioral: Negative for confusion. The patient is not nervous/anxious. All other systems reviewed and are negative. Vitals:    03/01/19 0909   BP: 153/86   Pulse: 90   Resp: 16   Temp: 98.1 °F (36.7 °C)   SpO2: 97%            Physical Exam   Constitutional: He is oriented to person, place, and time. He appears well-developed and well-nourished. No distress. HENT:   Head: Normocephalic and atraumatic. Right Ear: External ear normal.   Left Ear: External ear normal.   Nose: Nose normal.   Mouth/Throat: Oropharynx is clear and moist.   Eyes: Conjunctivae and EOM are normal. Pupils are equal, round, and reactive to light. No scleral icterus. Neck: Normal range of motion. Neck supple. No JVD present. No tracheal deviation present. No thyromegaly present. Cardiovascular: Normal rate and normal heart sounds. An irregularly irregular rhythm present. Exam reveals no gallop and no friction rub. No murmur heard. Pulmonary/Chest: Effort normal and breath sounds normal. No respiratory distress. He has no wheezes. He has no rales. He exhibits no tenderness. Abdominal: Soft. Bowel sounds are normal. He exhibits no distension and no mass. There is no tenderness. There is no rebound and no guarding. Musculoskeletal: Normal range of motion. He exhibits edema (trace pre-tibial). He exhibits no tenderness. Lymphadenopathy:     He has no cervical adenopathy. Neurological: He is alert and oriented to person, place, and time. He has normal strength. He displays no atrophy and no tremor. No cranial nerve deficit. He exhibits normal muscle tone. Coordination and gait normal.   Skin: Skin is warm and dry. No rash noted. He is not diaphoretic. No erythema. Psychiatric: He has a normal mood and affect.  His behavior is normal. Judgment and thought content normal.   Nursing note and vitals reviewed. Note written by Christina Rodriguez, as dictated by Gracie Sparks MD 9:16 AM    MDM  Number of Diagnoses or Management Options  Diagnosis management comments: Leny Jerome:  Impression: 43-year-old male presenting to the emergency department for admission to the hospital for a MEMO echocardiogram as well as pacemaker placement. The patient was sent in  to the emergency department as her no hospital beds for direct admission. Plan of care B. Baseline labs will contact cardiology service. Procedures    ED EKG interpretation: 0914  Atrial flutter with variable AV block with premature ventricular or aberrantly conducted complexes, rate 79, nonspecific intraventricular block. Note written by Christina Rodriguez, as dictated by Gracie Sparks MD 9:15 AM    CONSULT NOTE:  10:45 AM Gracie Sparks MD spoke with Osie Heimlich, PA, Consult for Cardiology in the ED. Discussed available diagnostic tests and clinical findings. Osie Heimlich, PA, states pt will admitted to cardiology service for pacemaker. ADMIT NOTE:  10:50 AM  Patient is being admitted to the hospital.  The results of their tests and reasons for their admission have been discussed with them and/or available family. They convey agreement and understanding for the need to be admitted and for their admission diagnosis.

## 2019-03-01 NOTE — CONSULTS
Palliative medicine    Consult received ,appreciate opportunity to be part of pt's care. Currently in cardiac cath lab. Will see later today or Monday 3/4/19.

## 2019-03-01 NOTE — ROUTINE PROCESS
TRANSFER - OUT REPORT:    Verbal report given to [facility] RN(name) on Fabi Man  being transferred to [facility] (unit) for routine progression of care       Report consisted of patients Situation, Background, Assessment and   Recommendations(SBAR). Information from the following report(s) SBAR and ED Summary was reviewed with the receiving nurse. Lines:   Peripheral IV 03/01/19 Left Antecubital (Active)   Site Assessment Clean, dry, & intact 3/1/2019  9:11 AM   Phlebitis Assessment 0 3/1/2019  9:11 AM   Infiltration Assessment 0 3/1/2019  9:11 AM   Dressing Status Clean, dry, & intact 3/1/2019  9:11 AM   Hub Color/Line Status Pink 3/1/2019  9:11 AM        Opportunity for questions and clarification was provided.       Patient transported with:   [employer]

## 2019-03-01 NOTE — Clinical Note
A Bovie was used. Blend setting: blend. Mode: monopolar. Coagulation Settin.  Cut Settin. Site (pad location): upper leg. Laterality: left.

## 2019-03-01 NOTE — ANESTHESIA POSTPROCEDURE EVALUATION
* No procedures listed *. Anesthesia Post Evaluation      Multimodal analgesia: multimodal analgesia used between 6 hours prior to anesthesia start to PACU discharge  Patient location during evaluation: bedside  Patient participation: waiting for patient participation  Level of consciousness: awake  Pain management: adequate  Airway patency: patent  Anesthetic complications: no  Cardiovascular status: acceptable  Respiratory status: unassisted  Hydration status: acceptable  Comments: Post-Anesthesia Evaluation and Assessment    I have evaluated the patient and they are ready for PACU discharge. Patient: Jennyfer Schaeffer MRN: [id number]  SSN: [id number]   YOB: 1935  Age: 80 y.o. Sex: male      Cardiovascular Function/Vital Signs  /75 (BP 1 Location: Right arm, BP Patient Position: At rest)   Pulse 88   Temp 36.7 °C (98.1 °F)   Resp 21   SpO2 98%     Patient is status post * No anesthesia type entered * anesthesia for * No procedures listed *. Nausea/Vomiting: None    Postoperative hydration reviewed and adequate. Pain:  Pain Scale 1: Numeric (0 - 10) (03/01/19 0909)  Pain Intensity 1: 0 (03/01/19 0909)   Managed    Neurological Status: At baseline    Mental Status, Level of Consciousness: Alert and  oriented to person, place, and time    Pulmonary Status:   O2 Device: CO2 nasal cannula (03/01/19 1410)   Adequate oxygenation and airway patent    Complications related to anesthesia: None    Post-anesthesia assessment completed.  No concerns    Signed By: Scooter Hunter MD    March 1, 2019                   Visit Vitals  /75 (BP 1 Location: Right arm, BP Patient Position: At rest)   Pulse 88   Temp 36.7 °C (98.1 °F)   Resp 21   SpO2 98%

## 2019-03-02 LAB
ANION GAP SERPL CALC-SCNC: 8 MMOL/L (ref 5–15)
BUN SERPL-MCNC: 20 MG/DL (ref 6–20)
BUN/CREAT SERPL: 15 (ref 12–20)
CALCIUM SERPL-MCNC: 8.6 MG/DL (ref 8.5–10.1)
CHLORIDE SERPL-SCNC: 109 MMOL/L (ref 97–108)
CO2 SERPL-SCNC: 27 MMOL/L (ref 21–32)
CREAT SERPL-MCNC: 1.32 MG/DL (ref 0.7–1.3)
ERYTHROCYTE [DISTWIDTH] IN BLOOD BY AUTOMATED COUNT: 15.6 % (ref 11.5–14.5)
GLUCOSE SERPL-MCNC: 109 MG/DL (ref 65–100)
HCT VFR BLD AUTO: 40.7 % (ref 36.6–50.3)
HGB BLD-MCNC: 12.9 G/DL (ref 12.1–17)
INR PPP: 3.2 (ref 0.9–1.1)
MCH RBC QN AUTO: 31.2 PG (ref 26–34)
MCHC RBC AUTO-ENTMCNC: 31.7 G/DL (ref 30–36.5)
MCV RBC AUTO: 98.3 FL (ref 80–99)
NRBC # BLD: 0 K/UL (ref 0–0.01)
NRBC BLD-RTO: 0 PER 100 WBC
PLATELET # BLD AUTO: 106 K/UL (ref 150–400)
PMV BLD AUTO: 11 FL (ref 8.9–12.9)
POTASSIUM SERPL-SCNC: 3.3 MMOL/L (ref 3.5–5.1)
PROTHROMBIN TIME: 30.4 SEC (ref 9–11.1)
RBC # BLD AUTO: 4.14 M/UL (ref 4.1–5.7)
SODIUM SERPL-SCNC: 144 MMOL/L (ref 136–145)
WBC # BLD AUTO: 5.2 K/UL (ref 4.1–11.1)

## 2019-03-02 PROCEDURE — 85610 PROTHROMBIN TIME: CPT

## 2019-03-02 PROCEDURE — 80048 BASIC METABOLIC PNL TOTAL CA: CPT

## 2019-03-02 PROCEDURE — 74011250636 HC RX REV CODE- 250/636: Performed by: SPECIALIST

## 2019-03-02 PROCEDURE — 74011250637 HC RX REV CODE- 250/637: Performed by: SPECIALIST

## 2019-03-02 PROCEDURE — 36415 COLL VENOUS BLD VENIPUNCTURE: CPT

## 2019-03-02 PROCEDURE — 74011250637 HC RX REV CODE- 250/637: Performed by: PHYSICIAN ASSISTANT

## 2019-03-02 PROCEDURE — 74011250636 HC RX REV CODE- 250/636: Performed by: PHYSICIAN ASSISTANT

## 2019-03-02 PROCEDURE — 65660000000 HC RM CCU STEPDOWN

## 2019-03-02 PROCEDURE — 85027 COMPLETE CBC AUTOMATED: CPT

## 2019-03-02 RX ORDER — SODIUM CHLORIDE 0.9 % (FLUSH) 0.9 %
5-40 SYRINGE (ML) INJECTION AS NEEDED
Status: DISCONTINUED | OUTPATIENT
Start: 2019-03-02 | End: 2019-03-04 | Stop reason: HOSPADM

## 2019-03-02 RX ORDER — CARVEDILOL 6.25 MG/1
6.25 TABLET ORAL 2 TIMES DAILY
Status: DISCONTINUED | OUTPATIENT
Start: 2019-03-02 | End: 2019-03-07 | Stop reason: HOSPADM

## 2019-03-02 RX ORDER — SODIUM CHLORIDE 0.9 % (FLUSH) 0.9 %
5-40 SYRINGE (ML) INJECTION EVERY 8 HOURS
Status: DISCONTINUED | OUTPATIENT
Start: 2019-03-02 | End: 2019-03-04 | Stop reason: HOSPADM

## 2019-03-02 RX ORDER — CEFAZOLIN SODIUM/WATER 2 G/20 ML
2 SYRINGE (ML) INTRAVENOUS ONCE
Status: COMPLETED | OUTPATIENT
Start: 2019-03-04 | End: 2019-03-04

## 2019-03-02 RX ORDER — WARFARIN 1 MG/1
1 TABLET ORAL ONCE
Status: COMPLETED | OUTPATIENT
Start: 2019-03-02 | End: 2019-03-02

## 2019-03-02 RX ADMIN — FUROSEMIDE 40 MG: 40 TABLET ORAL at 08:15

## 2019-03-02 RX ADMIN — TAMSULOSIN HYDROCHLORIDE 0.8 MG: 0.4 CAPSULE ORAL at 22:27

## 2019-03-02 RX ADMIN — CARVEDILOL 6.25 MG: 6.25 TABLET, FILM COATED ORAL at 17:07

## 2019-03-02 RX ADMIN — MILRINONE LACTATE IN DEXTROSE 0.2 MCG/KG/MIN: 200 INJECTION, SOLUTION INTRAVENOUS at 04:10

## 2019-03-02 RX ADMIN — WARFARIN SODIUM 1 MG: 1 TABLET ORAL at 17:23

## 2019-03-02 RX ADMIN — ASPIRIN 81 MG: 81 TABLET, COATED ORAL at 08:13

## 2019-03-02 RX ADMIN — ALLOPURINOL 300 MG: 300 TABLET ORAL at 08:14

## 2019-03-02 RX ADMIN — LEVOTHYROXINE SODIUM 100 MCG: 100 TABLET ORAL at 07:23

## 2019-03-02 RX ADMIN — FINASTERIDE 5 MG: 5 TABLET, FILM COATED ORAL at 08:55

## 2019-03-02 RX ADMIN — SACUBITRIL AND VALSARTAN 1 TABLET: 24; 26 TABLET, FILM COATED ORAL at 11:52

## 2019-03-02 RX ADMIN — CARVEDILOL 3.12 MG: 3.12 TABLET, FILM COATED ORAL at 08:14

## 2019-03-02 RX ADMIN — SPIRONOLACTONE 25 MG: 25 TABLET ORAL at 08:14

## 2019-03-02 RX ADMIN — ATORVASTATIN CALCIUM 40 MG: 40 TABLET, FILM COATED ORAL at 08:14

## 2019-03-02 RX ADMIN — MILRINONE LACTATE IN DEXTROSE 0.3 MCG/KG/MIN: 200 INJECTION, SOLUTION INTRAVENOUS at 17:59

## 2019-03-02 NOTE — PROGRESS NOTES
Bedside and Verbal shift change report given to [address] (oncoming nurse) by Prasanna Magaña (offgoing nurse). Report included the following information SBAR, Kardex, Procedure Summary, Intake/Output, MAR, Med Rec Status and Cardiac Rhythm a fib.

## 2019-03-02 NOTE — CDMP QUERY
#2 
 
Patient admitted with CHF, noted to have elevated troponin level If possible, please document in progress notes and d/c summary if you are evaluating and/or treating any of the following: 
 
=> Type 2 MI 
=> NSTEMI 
=> Other Explanation of clinical findings 
=> Clinically Undetermined (no explanation for clinical findings) The medical record reflects the following: 
   Risk Factors: CHF, A fib Clinical Indicators: Troponin-I, Qt.: 0.11 ED-TRIAGE:Pt arrives with c/o fatigue, sob and cp for 6 months Reports worsening SOB with exertion. Occasional chest discomfort, not associated with SOB. Atrial flutter with variable AV block with premature ventricular or  
aberrantly conducted complexes Nonspecific intraventricular block When compared with ECG of 10-OCT-2018 12:40, Atrial flutter has replaced Atrial fibrillation QRS axis shifted right Treatment: EKG Please clarify and document your clinical opinion in the progress notes and discharge summary including the definitive and/or presumptive diagnosis, (suspected or probable), related to the above clinical findings. Please include clinical findings supporting your diagnosis. Type 2 (MI secondary to an ischemic imbalance): MI consequent to increased oxygen demand or decreased supply (eg, coronary endothelial dysfunction, coronary artery spasm, coronary artery embolus, tachy-/jim-arrhythmias, anemia, respiratory failure, hypertension, or hypotension). Reference Cora TABARES, Cynthia Mcduffie MD MPH, Liz Lima. (July 5, 2016). Criteria for the Diagnosis of Acute Myocardial Infarction. In UpToDate (Topic  52, Version 36.0). Retrieved from [employer] Thank you, 
       [facility], [address]

## 2019-03-02 NOTE — PROGRESS NOTES
Pharmacist Note - Warfarin Dosing  Consult provided for this 83 y.o.male to manage warfarin for Atrial Fibrillation     INR Goal: 2 - 3     Home regimen/ tablet size: 4 mg every Friday and 2 mg on all other days     Drugs that may increase INR: None  Drugs that may decrease INR: None  Other current anticoagulants/ drugs that may increase bleeding risk: allopurinol  Risk factors: Age > 65 and Decompensated Heart Failure  Daily INR ordered: YES    Recent Labs     03/02/19  0409 03/01/19  0930   HGB 12.9 14.5   INR 3.2* 3.2*     Date               INR                  Dose  3/1    3.2  2 mg  3/2                   3.2                  1 mg                                                                                Assessment/ Plan:  INR remains slightly elevated Will order warfarin 1 mg PO x 1 dose. Pharmacy will continue to monitor daily and adjust therapy as indicated. Please contact the pharmacist at   for outpatient recommendations if needed.

## 2019-03-02 NOTE — PROGRESS NOTES
3/2/2019   Harry Hutchins MD  [facility]    . Jamilyn Daily Michaelyn Daily Michaelyn Daily Devin Hernandez is a 80 y.o. male   Feels better with the milrinone  Less sob but still weak, frail  LENNON at minimal exertion  No angina  AFib with better rate since he is sedentary  MEMO done yesterday with intact mechanical AVR  Very poor LV fx, EF 10%  EP saw and I have spoken to Dr Elizabeth Gutiérrez for plans Monday  TSH is 45! Probably not been taking meds? Discussion/Plans/Recs    1. Acute on chronic systolic CHF  --EF 48%KUAF LV dilation. Global systolic function is reduced. AV prosthetic valve noted. Mild to moderate AV stenosis, moderate MR, moderate PHTN  Milrinone gtt              - Coreg 3.125 mg BID              - Entresto 24/26 mg BID              - Lasix 40 mg daily - no volume overloaded              - Aldactone 25 mg daily              - proBNP 42730+        NYHA 3 Patient seems to be NYHA Class 3 ( Comfortable at rest; Less than ordinary physical activity results in fatigue, dyspnea, palpitations or anginal pain.)  On milrinone    2. AFlutter with RVR  --rate control meds, ablation of AVN on Monday  Coreg              - Coumadin PTA - Pharmacy to dose              - Consult placed to Dr. Elizabeth Gutiérrez for evaluation of BiV ICD/aflutter      3. CRT  Plan BiV AICD on Monday    4. CAD CABg 1995, needs cath at some point   h/o CABG              - will need cath at some point              - Coreg, ASA, Lipitor      5. AS- Mechanical AVR intact on MEMO    6. HTN multiple meds  7. Hypothyroidism              - Synthroid 100 mcg              - check TSH    Lab Results   Component Value Date/Time    TSH 45.00 (H) 03/01/2019 09:10 AM    note this is new finding       Cardiac Studies/Hx:  No specialty comments available.    Patient Vitals for the past 12 hrs:   Temp Pulse Resp BP SpO2   03/02/19 0751 97.3 °F (36.3 °C) 68 18 134/90 96 %   03/02/19 0402 97.7 °F (36.5 °C) 77 18 122/78 96 %   03/01/19 2336 97.8 °F (36.6 °C) 69 18 123/67 93 %          Past Medical History:   Diagnosis Date    Anger     TAKES PAROXETINE TO CONTROL ANGER ISSUES    CAD (coronary artery disease)     Foot drop, left     WEARS METAL BRACE    Gout     Hard of hearing     High cholesterol     Hypertension     Prostate enlargement     Thyroid disease     Unspecified sleep apnea     DOESN'T USE CPAP; \"IT MADE HIM SICK-COLDS, SINUS\", PER WIFE      ROS-pertinents  negative except as above  The pertinent portions of the medical history,physician and nursing notes, meds,vitals , labs and Ins/Outs,are reviewed in the electronic record.     Results for orders placed or performed during the hospital encounter of 03/01/19   EKG, 12 LEAD, INITIAL   Result Value Ref Range    Ventricular Rate 79 BPM    Atrial Rate 300 BPM    QRS Duration 166 ms    Q-T Interval 414 ms    QTC Calculation (Bezet) 474 ms    Calculated P Axis 85 degrees    Calculated R Axis 136 degrees    Calculated T Axis -39 degrees    Diagnosis       Atrial flutter with variable AV block with premature ventricular or   aberrantly conducted complexes  Nonspecific intraventricular block  When compared with ECG of 10-OCT-2018 12:40,  Atrial flutter has replaced Atrial fibrillation  QRS axis shifted right  Confirmed by Anthony Kumar MD. ([id number]) on 3/1/2019 10:40:26 PM        Vitals:    03/01/19 2244 03/01/19 2336 03/02/19 0402 03/02/19 0751   BP: 128/78 123/67 122/78 134/90   BP 1 Location:  Right arm Right arm Right arm   BP Patient Position:  At rest At rest At rest;Head of bed elevated (Comment degrees)   Pulse: 76 69 77 68   Resp:  18 18 18   Temp:  97.8 °F (36.6 °C) 97.7 °F (36.5 °C) 97.3 °F (36.3 °C)   SpO2:  93% 96% 96%   Weight:   186 lb 1.6 oz (84.4 kg)    Height:           Objective:    Physical Exam:   Patient Vitals for the past 12 hrs:   Temp Pulse Resp BP SpO2   03/02/19 0751 97.3 °F (36.3 °C) 68 18 134/90 96 %   03/02/19 0402 97.7 °F (36.5 °C) 77 18 122/78 96 %   03/01/19 2336 97.8 °F (36.6 °C) 69 18 123/67 93 %      General:  alert, cooperative, no distress, appears stated age   ENT, Neck:  no jvd   Chest Wall: inspection normal - no chest wall deformities or tenderness, tachypnea   Lung: Diminished BS to auscultation bilaterally   Heart:  no murmurs noted, no gallops noted, irregularly irregular rhythm with rate 80   Abdomen: nondistended   Extremities: extremities normal, atraumatic, no cyanosis or edema     Last 24hr Input/Output:    Intake/Output Summary (Last 24 hours) at 3/2/2019 1111  Last data filed at 3/2/2019 0751  Gross per 24 hour   Intake 445.65 ml   Output 2200 ml   Net -1754.35 ml        Data Review:   Recent Results (from the past 24 hour(s))   PROTHROMBIN TIME + INR    Collection Time: 03/02/19  4:09 AM   Result Value Ref Range    INR 3.2 (H) 0.9 - 1.1      Prothrombin time 30.4 (H) 9.0 - 12.3 sec   METABOLIC PANEL, BASIC    Collection Time: 03/02/19  4:09 AM   Result Value Ref Range    Sodium 144 136 - 145 mmol/L    Potassium 3.3 (L) 3.5 - 5.1 mmol/L    Chloride 109 (H) 97 - 108 mmol/L    CO2 27 21 - 32 mmol/L    Anion gap 8 5 - 15 mmol/L    Glucose 109 (H) 65 - 100 mg/dL    BUN 20 6 - 20 MG/DL    Creatinine 1.32 (H) 0.70 - 1.30 MG/DL    BUN/Creatinine ratio 15 12 - 20      GFR est AA >60 >60 ml/min/1.73m2    GFR est non-AA 52 (L) >60 ml/min/1.73m2    Calcium 8.6 8.5 - 10.1 MG/DL   CBC W/O DIFF    Collection Time: 03/02/19  4:09 AM   Result Value Ref Range    WBC 5.2 4.1 - 11.1 K/uL    RBC 4.14 4.10 - 5.70 M/uL    HGB 12.9 12.1 - 17.0 g/dL    HCT 40.7 36.6 - 50.3 %    MCV 98.3 80.0 - 99.0 FL    MCH 31.2 26.0 - 34.0 PG    MCHC 31.7 30.0 - 36.5 g/dL    RDW 15.6 (H) 11.5 - 14.5 %    PLATELET 532 (L) 589 - 400 K/uL    MPV 11.0 8.9 - 12.9 FL    NRBC 0.0 0  WBC    ABSOLUTE NRBC 0.00 0.00 - 0.01 K/uL      Charlie Chung MD 3/2/2019

## 2019-03-02 NOTE — PROGRESS NOTES
Problem: Falls - Risk of  Goal: *Absence of Falls  Document Juany Fall Risk and appropriate interventions in the flowsheet.   Outcome: Progressing Towards Goal  Fall Risk Interventions:                 Elimination Interventions: Call light in reach, Urinal in reach

## 2019-03-02 NOTE — CDMP QUERY
#1 
 
Patient admitted with CHF and on lasix at home Noted to have abnormal renal function tests If possible, please document in progress notes and d/c summary if you are evaluating and/or treating any of the following: 
 
=> Acute kidney injury 
=> Acute kidney insufficiency  
=> Other explanation of clinical findings 
=> Clinically Undetermined (no explanation for clinical findings) The medical record reflects the following: 
   Risk Factors: lasix Clinical Indicators:  
admission labs        GFR 39     BUN: 24     Creatinine: 1.70  
baseline  labs        GFR >60   BUN: 40     Creatinine: 1.12 Treatment: lab monitoring Thank you, 
       Theodor [facility], [address] [facility]

## 2019-03-03 LAB
ANION GAP SERPL CALC-SCNC: 9 MMOL/L (ref 5–15)
BUN SERPL-MCNC: 20 MG/DL (ref 6–20)
BUN/CREAT SERPL: 15 (ref 12–20)
CALCIUM SERPL-MCNC: 7.9 MG/DL (ref 8.5–10.1)
CHLORIDE SERPL-SCNC: 106 MMOL/L (ref 97–108)
CO2 SERPL-SCNC: 28 MMOL/L (ref 21–32)
CREAT SERPL-MCNC: 1.37 MG/DL (ref 0.7–1.3)
ERYTHROCYTE [DISTWIDTH] IN BLOOD BY AUTOMATED COUNT: 15.5 % (ref 11.5–14.5)
GLUCOSE SERPL-MCNC: 114 MG/DL (ref 65–100)
HCT VFR BLD AUTO: 43.5 % (ref 36.6–50.3)
HGB BLD-MCNC: 13.8 G/DL (ref 12.1–17)
INR PPP: 4 (ref 0.9–1.1)
MCH RBC QN AUTO: 31.1 PG (ref 26–34)
MCHC RBC AUTO-ENTMCNC: 31.7 G/DL (ref 30–36.5)
MCV RBC AUTO: 98 FL (ref 80–99)
NRBC # BLD: 0 K/UL (ref 0–0.01)
NRBC BLD-RTO: 0 PER 100 WBC
PLATELET # BLD AUTO: 118 K/UL (ref 150–400)
PMV BLD AUTO: 10.6 FL (ref 8.9–12.9)
POTASSIUM SERPL-SCNC: 3.3 MMOL/L (ref 3.5–5.1)
PROTHROMBIN TIME: 37.7 SEC (ref 9–11.1)
RBC # BLD AUTO: 4.44 M/UL (ref 4.1–5.7)
SODIUM SERPL-SCNC: 143 MMOL/L (ref 136–145)
WBC # BLD AUTO: 6.5 K/UL (ref 4.1–11.1)

## 2019-03-03 PROCEDURE — 74011250637 HC RX REV CODE- 250/637: Performed by: PHYSICIAN ASSISTANT

## 2019-03-03 PROCEDURE — 85610 PROTHROMBIN TIME: CPT

## 2019-03-03 PROCEDURE — 74011250637 HC RX REV CODE- 250/637: Performed by: SPECIALIST

## 2019-03-03 PROCEDURE — 80048 BASIC METABOLIC PNL TOTAL CA: CPT

## 2019-03-03 PROCEDURE — 65660000000 HC RM CCU STEPDOWN

## 2019-03-03 PROCEDURE — 36415 COLL VENOUS BLD VENIPUNCTURE: CPT

## 2019-03-03 PROCEDURE — 85027 COMPLETE CBC AUTOMATED: CPT

## 2019-03-03 RX ADMIN — LEVOTHYROXINE SODIUM 100 MCG: 100 TABLET ORAL at 07:26

## 2019-03-03 RX ADMIN — ALLOPURINOL 300 MG: 300 TABLET ORAL at 10:46

## 2019-03-03 RX ADMIN — ASPIRIN 81 MG: 81 TABLET, COATED ORAL at 08:10

## 2019-03-03 RX ADMIN — FINASTERIDE 5 MG: 5 TABLET, FILM COATED ORAL at 08:13

## 2019-03-03 RX ADMIN — SPIRONOLACTONE 25 MG: 25 TABLET ORAL at 08:10

## 2019-03-03 RX ADMIN — CARVEDILOL 6.25 MG: 6.25 TABLET, FILM COATED ORAL at 08:10

## 2019-03-03 RX ADMIN — SACUBITRIL AND VALSARTAN 1 TABLET: 24; 26 TABLET, FILM COATED ORAL at 10:46

## 2019-03-03 RX ADMIN — Medication 10 ML: at 21:58

## 2019-03-03 RX ADMIN — Medication 10 ML: at 13:46

## 2019-03-03 RX ADMIN — ATORVASTATIN CALCIUM 40 MG: 40 TABLET, FILM COATED ORAL at 08:11

## 2019-03-03 RX ADMIN — TAMSULOSIN HYDROCHLORIDE 0.8 MG: 0.4 CAPSULE ORAL at 21:57

## 2019-03-03 RX ADMIN — CARVEDILOL 6.25 MG: 6.25 TABLET, FILM COATED ORAL at 17:53

## 2019-03-03 RX ADMIN — SACUBITRIL AND VALSARTAN 1 TABLET: 24; 26 TABLET, FILM COATED ORAL at 22:00

## 2019-03-03 NOTE — PROGRESS NOTES
Reason for Admission:   Heart failure                  RRAT Score:    14              Do you (patient/family) have any concerns for transition/discharge? no              Plan for utilizing home health:   Yes, prefers [facility] [address] of readmission?   moderate            Transition of Care Plan:    Pt lives with with wife in a 2-story private residence with 8 steps to enter. Pt has been to [facility] for SNF rehab placement in the past.  And pt/wife prefer [facility] [address] for home health services, or Outpatient therapy at [facility]. Cm received a consult for \"heart failure\" so cm sent home health orders to [facility] [address] through [employer], and they have confirmed that they will accept. CM will continue to follow. Care Management Interventions  Mode of Transport at Discharge:  Other (see comment)  Transition of Care Consult (CM Consult): [address]: No  Reason Outside [place]: Patient already serviced by other home care/hospice agency([facility])  Discharge Durable Medical Equipment: No  Physical Therapy Consult: No  Occupational Therapy Consult: No  Speech Therapy Consult: No  Current Support Network: Lives with Spouse  Confirm Follow Up Transport: Family  Plan discussed with Pt/Family/Caregiver: Yes  Freedom of Choice Offered: Yes  [place] Resource Information Provided?: No

## 2019-03-03 NOTE — PROGRESS NOTES
Pharmacist Note - Warfarin Dosing  Consult provided for this 83 y.o.male to manage warfarin for Atrial Fibrillation     INR Goal: 2 - 3     Home regimen/ tablet size: 4 mg every Friday and 2 mg on all other days     Drugs that may increase INR: None  Drugs that may decrease INR: None  Other current anticoagulants/ drugs that may increase bleeding risk: allopurinol  Risk factors: Age > 65 and Decompensated Heart Failure  Daily INR ordered: YES    Recent Labs     03/03/19  0603 03/02/19  0409 03/01/19  0930   HGB 13.8 12.9 14.5   INR 4.0* 3.2* 3.2*     Date               INR                  Dose  3/1    3.2  2 mg  3/2                   3.2                  1 mg   3/3                   4.0                   HOLD                                                                               Assessment/ Plan: Will hold warfarin today. Pharmacy will continue to monitor daily and adjust therapy as indicated. Please contact the pharmacist at   for outpatient recommendations if needed.

## 2019-03-03 NOTE — PROGRESS NOTES
Pt in aflutter with frequent multifocal PVCs, pt also with new hypotension. Paged on call MD to alert to change in condition. Awaiting return call. 0200  Milrinone discontinued per md order at time specified on MAR. Will continue to monitor.

## 2019-03-03 NOTE — PROGRESS NOTES
3/3/2019   Jamil Park MD  [facility]    . Naoma Broccoli Naoma Broccoli Dimple Liu Ace is a 80 y.o. male   Feels better with the milrinone  Less sob but still weak, frail  LENNON at minimal exertion  No angina  AFib with better rate since he is sedentary  MEMO done yesterday with intact mechanical AVR  Very poor LV fx, EF 10%  EP saw and I have spoken to Dr Brittney Thayer for plans Monday  TSH is 45! Probably not been taking meds? Discussion/Plans/Recs    1. Acute on chronic systolic CHF  --EF 69%RCOS LV dilation. Global systolic function is reduced. AV prosthetic valve noted. Mild to moderate AV stenosis, moderate MR, moderate PHTN  Milrinone gtt              - Coreg 3.125 mg BID              - Entresto 24/26 mg BID              - Lasix 40 mg daily - no volume overloaded              - Aldactone 25 mg daily              - proBNP 16867+        NYHA 3 Patient seems to be NYHA Class 3 ( Comfortable at rest; Less than ordinary physical activity results in fatigue, dyspnea, palpitations or anginal pain.)  On milrinone    2. AFlutter with RVR  --rate control meds, ablation of AVN on Monday  Coreg              - Coumadin PTA - Pharmacy to dose              - Consult placed to Dr. Brittney Thayer for evaluation of BiV ICD/aflutter      3. CRT  Plan BiV AICD on Monday    4. CAD CABg 1995, needs cath at some point   h/o CABG              - will need cath at some point              - Coreg, ASA, Lipitor      5. AS- Mechanical AVR intact on MEMO    6. HTN multiple meds  7. Hypothyroidism              - Synthroid 100 mcg              - check TSH    Lab Results   Component Value Date/Time    TSH 45.00 (H) 03/01/2019 09:10 AM    note this is new finding     He is feeling better for AVN ablation and BiV pacer tomorrow. Milrinone stopped overnight for frequent PVC's and low BP. Cardiac Studies/Hx:  No specialty comments available.    Patient Vitals for the past 12 hrs:   Temp Pulse Resp BP SpO2   03/03/19 1227 98.2 °F (36.8 °C) 70 16 124/85 98 %   03/03/19 0823 97.6 °F (36.4 °C) 65 16 131/83 95 %   03/03/19 0535 97.4 °F (36.3 °C) 71 16 124/76 98 %          Past Medical History:   Diagnosis Date    Anger     TAKES PAROXETINE TO CONTROL ANGER ISSUES    CAD (coronary artery disease)     Foot drop, left     WEARS METAL BRACE    Gout     Hard of hearing     High cholesterol     Hypertension     Prostate enlargement     Thyroid disease     Unspecified sleep apnea     DOESN'T USE CPAP; \"IT MADE HIM SICK-COLDS, SINUS\", PER WIFE      ROS-pertinents  negative except as above  The pertinent portions of the medical history,physician and nursing notes, meds,vitals , labs and Ins/Outs,are reviewed in the electronic record.     Results for orders placed or performed during the hospital encounter of 03/01/19   EKG, 12 LEAD, INITIAL   Result Value Ref Range    Ventricular Rate 79 BPM    Atrial Rate 300 BPM    QRS Duration 166 ms    Q-T Interval 414 ms    QTC Calculation (Bezet) 474 ms    Calculated P Axis 85 degrees    Calculated R Axis 136 degrees    Calculated T Axis -39 degrees    Diagnosis       Atrial flutter with variable AV block with premature ventricular or   aberrantly conducted complexes  Nonspecific intraventricular block  When compared with ECG of 10-OCT-2018 12:40,  Atrial flutter has replaced Atrial fibrillation  QRS axis shifted right  Confirmed by Steven Clarke MD. ([id number]) on 3/1/2019 10:40:26 PM        Vitals:    03/03/19 0535 03/03/19 0654 03/03/19 0823 03/03/19 1227   BP: 124/76  131/83 124/85   BP 1 Location:   Right arm Left arm   BP Patient Position:   At rest At rest;Head of bed elevated (Comment degrees)  Comment: 50 degrees   Pulse: 71  65 70   Resp: 16  16 16   Temp: 97.4 °F (36.3 °C)  97.6 °F (36.4 °C) 98.2 °F (36.8 °C)   SpO2: 98%  95% 98%   Weight:  84.9 kg (187 lb 2.7 oz)     Height:           Objective:    Physical Exam:   Patient Vitals for the past 12 hrs:   Temp Pulse Resp BP SpO2   03/03/19 1227 98.2 °F (36.8 °C) 70 16 124/85 98 %   03/03/19 0823 97.6 °F (36.4 °C) 65 16 131/83 95 %   03/03/19 0535 97.4 °F (36.3 °C) 71 16 124/76 98 %      General:  alert, cooperative, no distress, appears stated age   ENT, Neck:  no jvd   Chest Wall: inspection normal - no chest wall deformities or tenderness, tachypnea   Lung: Diminished BS to auscultation bilaterally   Heart:  no murmurs noted, no gallops noted, irregularly irregular rhythm with rate 80   Abdomen: nondistended   Extremities: extremities normal, atraumatic, no cyanosis or edema     Last 24hr Input/Output:    Intake/Output Summary (Last 24 hours) at 3/3/2019 1537  Last data filed at 3/3/2019 0823  Gross per 24 hour   Intake 282.4 ml   Output 1040 ml   Net -757.6 ml        Data Review:   Recent Results (from the past 24 hour(s))   PROTHROMBIN TIME + INR    Collection Time: 03/03/19  6:03 AM   Result Value Ref Range    INR 4.0 (H) 0.9 - 1.1      Prothrombin time 37.7 (H) 9.0 - 82.8 sec   METABOLIC PANEL, BASIC    Collection Time: 03/03/19  6:03 AM   Result Value Ref Range    Sodium 143 136 - 145 mmol/L    Potassium 3.3 (L) 3.5 - 5.1 mmol/L    Chloride 106 97 - 108 mmol/L    CO2 28 21 - 32 mmol/L    Anion gap 9 5 - 15 mmol/L    Glucose 114 (H) 65 - 100 mg/dL    BUN 20 6 - 20 MG/DL    Creatinine 1.37 (H) 0.70 - 1.30 MG/DL    BUN/Creatinine ratio 15 12 - 20      GFR est AA >60 >60 ml/min/1.73m2    GFR est non-AA 50 (L) >60 ml/min/1.73m2    Calcium 7.9 (L) 8.5 - 10.1 MG/DL   CBC W/O DIFF    Collection Time: 03/03/19  6:03 AM   Result Value Ref Range    WBC 6.5 4.1 - 11.1 K/uL    RBC 4.44 4.10 - 5.70 M/uL    HGB 13.8 12.1 - 17.0 g/dL    HCT 43.5 36.6 - 50.3 %    MCV 98.0 80.0 - 99.0 FL    MCH 31.1 26.0 - 34.0 PG    MCHC 31.7 30.0 - 36.5 g/dL    RDW 15.5 (H) 11.5 - 14.5 %    PLATELET 690 (L) 349 - 400 K/uL    MPV 10.6 8.9 - 12.9 FL    NRBC 0.0 0  WBC    ABSOLUTE NRBC 0.00 0.00 - 0.01 K/uL      Priti Resendez MD 3/3/2019

## 2019-03-04 ENCOUNTER — APPOINTMENT (OUTPATIENT)
Dept: GENERAL RADIOLOGY | Age: 84
DRG: 226 | End: 2019-03-04
Attending: NURSE PRACTITIONER
Payer: MEDICARE

## 2019-03-04 ENCOUNTER — ANESTHESIA EVENT (OUTPATIENT)
Dept: CARDIAC CATH/INVASIVE PROCEDURES | Age: 84
End: 2019-03-04

## 2019-03-04 ENCOUNTER — PATIENT OUTREACH (OUTPATIENT)
Dept: CASE MANAGEMENT | Age: 84
End: 2019-03-04

## 2019-03-04 ENCOUNTER — ANESTHESIA (OUTPATIENT)
Dept: CARDIAC CATH/INVASIVE PROCEDURES | Age: 84
End: 2019-03-04

## 2019-03-04 PROBLEM — Z98.890 STATUS POST CATHETER ABLATION OF ATRIAL FLUTTER: Status: ACTIVE | Noted: 2019-03-04

## 2019-03-04 PROBLEM — I48.3 TYPICAL ATRIAL FLUTTER (HCC): Status: ACTIVE | Noted: 2019-03-04

## 2019-03-04 PROBLEM — Z95.810 BIVENTRICULAR ICD (IMPLANTABLE CARDIOVERTER-DEFIBRILLATOR) IN PLACE: Status: ACTIVE | Noted: 2019-03-04

## 2019-03-04 LAB
ANION GAP SERPL CALC-SCNC: 6 MMOL/L (ref 5–15)
BUN SERPL-MCNC: 18 MG/DL (ref 6–20)
BUN/CREAT SERPL: 13 (ref 12–20)
CALCIUM SERPL-MCNC: 8.1 MG/DL (ref 8.5–10.1)
CHLORIDE SERPL-SCNC: 106 MMOL/L (ref 97–108)
CO2 SERPL-SCNC: 30 MMOL/L (ref 21–32)
CREAT SERPL-MCNC: 1.35 MG/DL (ref 0.7–1.3)
ERYTHROCYTE [DISTWIDTH] IN BLOOD BY AUTOMATED COUNT: 15.6 % (ref 11.5–14.5)
GLUCOSE SERPL-MCNC: 108 MG/DL (ref 65–100)
HCT VFR BLD AUTO: 45.9 % (ref 36.6–50.3)
HGB BLD-MCNC: 14.5 G/DL (ref 12.1–17)
INR PPP: 3.1 (ref 0.9–1.1)
MCH RBC QN AUTO: 31.3 PG (ref 26–34)
MCHC RBC AUTO-ENTMCNC: 31.6 G/DL (ref 30–36.5)
MCV RBC AUTO: 99.1 FL (ref 80–99)
NRBC # BLD: 0 K/UL (ref 0–0.01)
NRBC BLD-RTO: 0 PER 100 WBC
PLATELET # BLD AUTO: 123 K/UL (ref 150–400)
PMV BLD AUTO: 10.6 FL (ref 8.9–12.9)
POTASSIUM SERPL-SCNC: 3.8 MMOL/L (ref 3.5–5.1)
PROTHROMBIN TIME: 30 SEC (ref 9–11.1)
RBC # BLD AUTO: 4.63 M/UL (ref 4.1–5.7)
SODIUM SERPL-SCNC: 142 MMOL/L (ref 136–145)
WBC # BLD AUTO: 5.8 K/UL (ref 4.1–11.1)

## 2019-03-04 PROCEDURE — C1730 CATH, EP, 19 OR FEW ELECT: HCPCS | Performed by: INTERNAL MEDICINE

## 2019-03-04 PROCEDURE — C1898 LEAD, PMKR, OTHER THAN TRANS: HCPCS | Performed by: INTERNAL MEDICINE

## 2019-03-04 PROCEDURE — 77030018733 HC ELECTRD KT ENSITE STJU -G: Performed by: INTERNAL MEDICINE

## 2019-03-04 PROCEDURE — 77030019580 HC CBL PACE MEDT -B: Performed by: INTERNAL MEDICINE

## 2019-03-04 PROCEDURE — 77030013797 HC KT TRNSDUC PRSSR EDWD -A: Performed by: INTERNAL MEDICINE

## 2019-03-04 PROCEDURE — 77030018673: Performed by: INTERNAL MEDICINE

## 2019-03-04 PROCEDURE — C1894 INTRO/SHEATH, NON-LASER: HCPCS | Performed by: INTERNAL MEDICINE

## 2019-03-04 PROCEDURE — C1769 GUIDE WIRE: HCPCS | Performed by: INTERNAL MEDICINE

## 2019-03-04 PROCEDURE — 74011000250 HC RX REV CODE- 250: Performed by: INTERNAL MEDICINE

## 2019-03-04 PROCEDURE — 02HK3KZ INSERTION OF DEFIBRILLATOR LEAD INTO RIGHT VENTRICLE, PERCUTANEOUS APPROACH: ICD-10-PCS | Performed by: INTERNAL MEDICINE

## 2019-03-04 PROCEDURE — 85610 PROTHROMBIN TIME: CPT

## 2019-03-04 PROCEDURE — C1900 LEAD, CORONARY VENOUS: HCPCS | Performed by: INTERNAL MEDICINE

## 2019-03-04 PROCEDURE — 77030010880 HC CBL EP SUPRME STJU -C: Performed by: INTERNAL MEDICINE

## 2019-03-04 PROCEDURE — 74011250636 HC RX REV CODE- 250/636

## 2019-03-04 PROCEDURE — 77030018547 HC SUT ETHBND1 J&J -B: Performed by: INTERNAL MEDICINE

## 2019-03-04 PROCEDURE — 02K83ZZ MAP CONDUCTION MECHANISM, PERCUTANEOUS APPROACH: ICD-10-PCS | Performed by: INTERNAL MEDICINE

## 2019-03-04 PROCEDURE — 99152 MOD SED SAME PHYS/QHP 5/>YRS: CPT | Performed by: INTERNAL MEDICINE

## 2019-03-04 PROCEDURE — C1751 CATH, INF, PER/CENT/MIDLINE: HCPCS | Performed by: INTERNAL MEDICINE

## 2019-03-04 PROCEDURE — C1733 CATH, EP, OTHR THAN COOL-TIP: HCPCS | Performed by: INTERNAL MEDICINE

## 2019-03-04 PROCEDURE — 71045 X-RAY EXAM CHEST 1 VIEW: CPT

## 2019-03-04 PROCEDURE — 74011250637 HC RX REV CODE- 250/637: Performed by: NURSE PRACTITIONER

## 2019-03-04 PROCEDURE — 74011250636 HC RX REV CODE- 250/636: Performed by: INTERNAL MEDICINE

## 2019-03-04 PROCEDURE — 77030031139 HC SUT VCRL2 J&J -A: Performed by: INTERNAL MEDICINE

## 2019-03-04 PROCEDURE — 80048 BASIC METABOLIC PNL TOTAL CA: CPT

## 2019-03-04 PROCEDURE — 77030039046 HC PAD DEFIB RADIOTRNSPNT CNMD -B: Performed by: INTERNAL MEDICINE

## 2019-03-04 PROCEDURE — 0JH609Z INSERTION OF CARDIAC RESYNCHRONIZATION DEFIBRILLATOR PULSE GENERATOR INTO CHEST SUBCUTANEOUS TISSUE AND FASCIA, OPEN APPROACH: ICD-10-PCS | Performed by: INTERNAL MEDICINE

## 2019-03-04 PROCEDURE — 4A0234Z MEASUREMENT OF CARDIAC ELECTRICAL ACTIVITY, PERCUTANEOUS APPROACH: ICD-10-PCS | Performed by: INTERNAL MEDICINE

## 2019-03-04 PROCEDURE — 65660000000 HC RM CCU STEPDOWN

## 2019-03-04 PROCEDURE — C1893 INTRO/SHEATH, FIXED,NON-PEEL: HCPCS | Performed by: INTERNAL MEDICINE

## 2019-03-04 PROCEDURE — 99153 MOD SED SAME PHYS/QHP EA: CPT | Performed by: INTERNAL MEDICINE

## 2019-03-04 PROCEDURE — 02HL3KZ INSERTION OF DEFIBRILLATOR LEAD INTO LEFT VENTRICLE, PERCUTANEOUS APPROACH: ICD-10-PCS | Performed by: INTERNAL MEDICINE

## 2019-03-04 PROCEDURE — C1882 AICD, OTHER THAN SING/DUAL: HCPCS | Performed by: INTERNAL MEDICINE

## 2019-03-04 PROCEDURE — 74011250637 HC RX REV CODE- 250/637: Performed by: PHYSICIAN ASSISTANT

## 2019-03-04 PROCEDURE — 74011250637 HC RX REV CODE- 250/637: Performed by: SPECIALIST

## 2019-03-04 PROCEDURE — 84481 FREE ASSAY (FT-3): CPT

## 2019-03-04 PROCEDURE — 33224 INSERT PACING LEAD & CONNECT: CPT | Performed by: INTERNAL MEDICINE

## 2019-03-04 PROCEDURE — 77030016899 HC CBL EP EXT4 BSC -B: Performed by: INTERNAL MEDICINE

## 2019-03-04 PROCEDURE — 93621 COMP EP EVL L PAC&REC C SINS: CPT | Performed by: INTERNAL MEDICINE

## 2019-03-04 PROCEDURE — 74011000272 HC RX REV CODE- 272: Performed by: INTERNAL MEDICINE

## 2019-03-04 PROCEDURE — 93653 COMPRE EP EVAL TX SVT: CPT | Performed by: INTERNAL MEDICINE

## 2019-03-04 PROCEDURE — 77030032060 HC PWDR HEMSTAT ARISTA ASRB 3GM BARD -C: Performed by: INTERNAL MEDICINE

## 2019-03-04 PROCEDURE — 93642 EP EVL 1/2CHMB TRNSVNS CVDFB: CPT | Performed by: INTERNAL MEDICINE

## 2019-03-04 PROCEDURE — 74011250636 HC RX REV CODE- 250/636: Performed by: NURSE PRACTITIONER

## 2019-03-04 PROCEDURE — C1887 CATHETER, GUIDING: HCPCS | Performed by: INTERNAL MEDICINE

## 2019-03-04 PROCEDURE — 77030003390 HC NDL ANGI MRTM -A: Performed by: INTERNAL MEDICINE

## 2019-03-04 PROCEDURE — 02583ZZ DESTRUCTION OF CONDUCTION MECHANISM, PERCUTANEOUS APPROACH: ICD-10-PCS | Performed by: INTERNAL MEDICINE

## 2019-03-04 PROCEDURE — 85027 COMPLETE CBC AUTOMATED: CPT

## 2019-03-04 PROCEDURE — 93613 INTRACARDIAC EPHYS 3D MAPG: CPT | Performed by: INTERNAL MEDICINE

## 2019-03-04 PROCEDURE — 77030037029 HC IMPL ENV ICD ANTIBACT ABSRB TYRX MEDT -G: Performed by: INTERNAL MEDICINE

## 2019-03-04 PROCEDURE — 94760 N-INVAS EAR/PLS OXIMETRY 1: CPT

## 2019-03-04 PROCEDURE — 36415 COLL VENOUS BLD VENIPUNCTURE: CPT

## 2019-03-04 PROCEDURE — C1777 LEAD, AICD, ENDO SINGLE COIL: HCPCS | Performed by: INTERNAL MEDICINE

## 2019-03-04 PROCEDURE — 33249 INSJ/RPLCMT DEFIB W/LEAD(S): CPT | Performed by: INTERNAL MEDICINE

## 2019-03-04 DEVICE — LEFT HEART LEAD
Type: IMPLANTABLE DEVICE | Site: HEART | Status: FUNCTIONAL
Brand: QUARTET™

## 2019-03-04 DEVICE — LEAD PCMKR TENDRIL 52CM --: Type: IMPLANTABLE DEVICE | Site: HEART | Status: FUNCTIONAL

## 2019-03-04 DEVICE — DEFIBRILLATION LEAD
Type: IMPLANTABLE DEVICE | Site: HEART | Status: FUNCTIONAL
Brand: DURATA™

## 2019-03-04 DEVICE — ENVELOPE CMRM6133 ABSORB LRG US
Type: IMPLANTABLE DEVICE | Site: CHEST  WALL | Status: FUNCTIONAL
Brand: TYRX™

## 2019-03-04 DEVICE — CARDIAC RESYNCHRONIZATION DEVICE, TIERED-THERAPY CARDIOVERTER/DEFIBRILLATOR VVED DDDRV
Type: IMPLANTABLE DEVICE | Site: HEART | Status: FUNCTIONAL
Brand: QUADRA ASSURA MP™

## 2019-03-04 RX ORDER — SODIUM CHLORIDE 0.9 % (FLUSH) 0.9 %
5-40 SYRINGE (ML) INJECTION AS NEEDED
Status: DISCONTINUED | OUTPATIENT
Start: 2019-03-04 | End: 2019-03-07 | Stop reason: HOSPADM

## 2019-03-04 RX ORDER — CEFAZOLIN SODIUM/WATER 2 G/20 ML
2 SYRINGE (ML) INTRAVENOUS EVERY 8 HOURS
Status: COMPLETED | OUTPATIENT
Start: 2019-03-04 | End: 2019-03-05

## 2019-03-04 RX ORDER — ONDANSETRON 2 MG/ML
4 INJECTION INTRAMUSCULAR; INTRAVENOUS
Status: DISCONTINUED | OUTPATIENT
Start: 2019-03-04 | End: 2019-03-07 | Stop reason: HOSPADM

## 2019-03-04 RX ORDER — SODIUM CHLORIDE 0.9 % (FLUSH) 0.9 %
5-40 SYRINGE (ML) INJECTION EVERY 8 HOURS
Status: DISCONTINUED | OUTPATIENT
Start: 2019-03-04 | End: 2019-03-07 | Stop reason: HOSPADM

## 2019-03-04 RX ORDER — FENTANYL CITRATE 50 UG/ML
INJECTION, SOLUTION INTRAMUSCULAR; INTRAVENOUS AS NEEDED
Status: DISCONTINUED | OUTPATIENT
Start: 2019-03-04 | End: 2019-03-04 | Stop reason: HOSPADM

## 2019-03-04 RX ORDER — MIDAZOLAM HYDROCHLORIDE 1 MG/ML
INJECTION, SOLUTION INTRAMUSCULAR; INTRAVENOUS AS NEEDED
Status: DISCONTINUED | OUTPATIENT
Start: 2019-03-04 | End: 2019-03-04 | Stop reason: HOSPADM

## 2019-03-04 RX ORDER — LIDOCAINE HYDROCHLORIDE 10 MG/ML
INJECTION INFILTRATION; PERINEURAL AS NEEDED
Status: DISCONTINUED | OUTPATIENT
Start: 2019-03-04 | End: 2019-03-04 | Stop reason: HOSPADM

## 2019-03-04 RX ORDER — CEPHALEXIN 250 MG/1
250 CAPSULE ORAL 3 TIMES DAILY
Status: DISCONTINUED | OUTPATIENT
Start: 2019-03-05 | End: 2019-03-07 | Stop reason: HOSPADM

## 2019-03-04 RX ORDER — HYDROCODONE BITARTRATE AND ACETAMINOPHEN 5; 325 MG/1; MG/1
1 TABLET ORAL
Status: DISCONTINUED | OUTPATIENT
Start: 2019-03-04 | End: 2019-03-07 | Stop reason: HOSPADM

## 2019-03-04 RX ADMIN — TAMSULOSIN HYDROCHLORIDE 0.8 MG: 0.4 CAPSULE ORAL at 22:00

## 2019-03-04 RX ADMIN — HYDROCODONE BITARTRATE AND ACETAMINOPHEN 1 TABLET: 5; 325 TABLET ORAL at 23:06

## 2019-03-04 RX ADMIN — CARVEDILOL 6.25 MG: 6.25 TABLET, FILM COATED ORAL at 19:28

## 2019-03-04 RX ADMIN — Medication 2 G: at 22:01

## 2019-03-04 RX ADMIN — FINASTERIDE 5 MG: 5 TABLET, FILM COATED ORAL at 10:07

## 2019-03-04 RX ADMIN — ASPIRIN 81 MG: 81 TABLET, COATED ORAL at 10:05

## 2019-03-04 RX ADMIN — LEVOTHYROXINE SODIUM 100 MCG: 100 TABLET ORAL at 07:08

## 2019-03-04 RX ADMIN — SACUBITRIL AND VALSARTAN 1 TABLET: 24; 26 TABLET, FILM COATED ORAL at 10:05

## 2019-03-04 RX ADMIN — Medication 10 ML: at 22:00

## 2019-03-04 RX ADMIN — SACUBITRIL AND VALSARTAN 1 TABLET: 24; 26 TABLET, FILM COATED ORAL at 22:00

## 2019-03-04 RX ADMIN — Medication 10 ML: at 05:28

## 2019-03-04 RX ADMIN — ALLOPURINOL 300 MG: 300 TABLET ORAL at 10:05

## 2019-03-04 RX ADMIN — SPIRONOLACTONE 25 MG: 25 TABLET ORAL at 10:05

## 2019-03-04 RX ADMIN — ATORVASTATIN CALCIUM 40 MG: 40 TABLET, FILM COATED ORAL at 10:05

## 2019-03-04 RX ADMIN — CARVEDILOL 6.25 MG: 6.25 TABLET, FILM COATED ORAL at 10:05

## 2019-03-04 NOTE — PROGRESS NOTES
Left message for patient that she is very iron deficient, his very very low iron stores and her bone marrow. She is also significantly anemic, which could contribute greatly to her tachycardia.   Patient has been instructed to obtain [facility], and take 1 thais TRANSFER - OUT REPORT:    Verbal report given to Gila on Cali Colon being transferred to  for routine progression of care       Report consisted of patients Situation, Background, Assessment and   Recommendations(SBAR). Information from the following report(s) SBAR, Procedure Summary and MAR was reviewed with the receiving nurse. Opportunity for questions and clarification was provided.

## 2019-03-04 NOTE — PROGRESS NOTES
TRANSFER - IN REPORT:    Verbal report received from [address] [place] [address] on Steffen Henson  being received from procedure area for routine progression of care. Report consisted of patients Situation, Background, Assessment and Recommendations(SBAR). Information from the following report(s) SBAR, Procedure Summary, MAR, Recent Results and Cardiac Rhythm Paced was reviewed with the receiving clinician. Opportunity for questions and clarification was provided. Assessment completed upon patients arrival to [facility] and care assumed. Cardiac Cath Lab Recovery Arrival Note:    Steffen Henson arrived to [facility] recovery area. Patient procedure= BIVI/ICD. Patient on cardiac monitor, non-invasive blood pressure, SPO2 monitor. On O2 @ 2 lpm via NC.  IV  of NS on pump at 25 ml/hr. Patient status doing well without problems. Patient is A&Ox 4. Patient reports No PAin. PROCEDURE SITE CHECK:    Procedure site:without any bleeding and No Hematoma, No pain/discomfort reported at procedure site. No change in patient status. Continue to monitor patient and status.

## 2019-03-04 NOTE — CONSULTS
PALLIATIVE MEDICINE          Consult noted and appreciated. Pt is off the floor at this time. Will see tomorrow 3/5/19. Brian Trujillo.  [address] MSN, FNP-BC, [facility]

## 2019-03-04 NOTE — PROGRESS NOTES
Problem: Falls - Risk of  Goal: *Absence of Falls  Document Juany Fall Risk and appropriate interventions in the flowsheet. Outcome: Progressing Towards Goal  Fall Risk Interventions:       Mentation Interventions: Adequate sleep, hydration, pain control    Medication Interventions: Assess postural VS orthostatic hypotension    Elimination Interventions: Bed/chair exit alarm, Call light in reach    History of Falls Interventions: Door open when patient unattended        Problem: Pressure Injury - Risk of  Goal: *Prevention of pressure injury  Document Jose Scale and appropriate interventions in the flowsheet.   Outcome: Progressing Towards Goal  Pressure Injury Interventions:       Moisture Interventions: Absorbent underpads    Activity Interventions: Assess need for specialty bed, PT/OT evaluation         Nutrition Interventions: Document food/fluid/supplement intake    Friction and Shear Interventions: HOB 30 degrees or less

## 2019-03-04 NOTE — PROCEDURES
Cardiac Procedure Note   Patient: Alejo Trammell  MRN: [id number]  SSN: [id number]   YOB: 1935 Age: 80 y.o. Sex: male    Date of Procedure: 3/4/2019   Pre-procedure Diagnosis: old MI, mechanical AVR, ischemic cardiomyopathy, CAD, NYHA class 3, LBBB 166 ms, LVEF 10%, typical atrial flutter  Post-procedure Diagnosis: same  Procedure:  1. EP study and ablation of typical atrial flutter  2. Biventricular pacer ICD  3.  Defibrillation threshold test  :  Dr. Sugar Bond MD    Assistant(s):  None  Anesthesia: Moderate Sedation   Estimated Blood Loss: Less than 20 mL   Specimens Removed: None  Findings:  Atrial flutter ablation to sinus rhythm with LBBB and long MT over 350 ms  BIV ICD implant with RV lead apex, LV lead in anterolateral branch  RAA lead in appendage  DFT < 25 J  Complications: None   Implants: [employer] BIV ICD  Signed by:  Sugar Bond MD  3/4/2019  5:55 PM

## 2019-03-04 NOTE — PROGRESS NOTES
Cardiac Cath Lab Procedure Area Arrival Note:    Jackelyn Mclaughlin arrived to Cardiac Cath Lab, Procedure Area. Patient identifiers verified with NAME and DATE OF BIRTH. Procedure verified with patient. Consent forms verified. Allergies verified. Patient informed of procedure and plan of care. Questions answered with review. Patient voiced understanding of procedure and plan of care. Patient on cardiac monitor, non-invasive blood pressure, SPO2 monitor. On ra, placed on O2 @ 2 lpm via nc. IV of ns on pump at 25 ml/hr. Patient status doing well without problems. Patient is A&Ox 3. Patient reports no pain. Patient medicated during procedure with orders obtained and verified by Dr. Charmayne Angst. Refer to patients Cardiac Cath Lab PROCEDURE REPORT for vital signs, assessment, status, and response during procedure, printed at end of case. Printed report on chart or scanned into chart.

## 2019-03-04 NOTE — PROGRESS NOTES
Cardiac Electrophysiology Hospital Progress Note     Subjective:      Brenda Gallegos is a 80 y.o. patient who is seen for evaluation/management of AF/AFL with RVR & consideration for ICD for primary convention of sudden cardiac death. He was admitted on 03/01/2019 for acute on chronic systolic heart failure, NYHA III-IV. He was seen in clinic on 02/28/2019, & direct admit was not available, so he presented to the ED for admission. Echo on 02/28/2019 showed LVEF 10% (20% in 2016). Started furosemide & milrinone. Reported worsening SOB with exertion. Occasional chest discomfort, not associated with SOB. MEMO 03/01/2019, LVEF <15% & no significant findings regarding mechanical aortic valve. Milrinone discontinued on 03/03/2019. Anticoagulated with warfarin, no bleeding issues. Warfarin held overnight, INR today 3.1. Typical AFL on telemetry, rate controlled 70s. BP stable, 137/84 this morning. No new complaints. Echo (02/28/2019): LVEF <15%, mod LV dilation, mod concentric LVH. RV global systolic function mod reduced. Mod MAC, mod MR. Prosthetic mechanical aortic valve, mild to mod AS. Mod TR, mod pulmonary hypertension. Mild AK. Ascending aorta mod dilated. Mod elevated CVP. MEMO (03/01/2019): LVEF <15%, mechanical prosthetic aortic valve. Previous:  Echo (10/10/2018): LVEF 20%, severe diffuse hypokinesis, mod concentric LVH. LA mod to severely dilated, RA mildly dilated. Mild to mod MR. Mechanical prosthetic aortic valve, mean grad 19 mmHg. Mild TR. CABG & mechanical AVR 1996. Known occlusion to RCA & SVG-RCA. Lexiscan stress test (06/24/2015): LVEF 38%, inferior septal kinesis. Inferior infarction with small amount of emani infarct ischemia. Holter (04/2013): 21 beat VT & NSVT (4 beats) rate 140.  14% total beats ventricular. Previous ARB-HCTZ, discontinued due to renal dysfunction. Followed by nephrology.     Followed by  Robina.      Problem List  Date Reviewed: 10/9/2018          Codes Class Noted    A-fib [facility]) ICD-10-CM: I48.91  ICD-9-CM: 427.31  3/1/2019        Acute on chronic systolic heart failure, NYHA class 4 ([facility] [address] ICD-10-CM: I50.23  ICD-9-CM: 428.23  3/1/2019        Anemia ICD-10-CM: D64.9  ICD-9-CM: 285.9  10/9/2018        Hypothyroidism ICD-10-CM: E03.9  ICD-9-CM: 244.9  10/9/2018        Supratherapeutic INR ICD-10-CM: R79.1  ICD-9-CM: 790.92  10/9/2018        H/O mechanical aortic valve replacement ICD-10-CM: Z95.2  ICD-9-CM: V43.3  10/9/2018          . med    Current Facility-Administered Medications   Medication Dose Route Frequency Provider Last Rate Last Dose    sodium chloride (NS) flush 5-40 mL  5-40 mL IntraVENous Q8H Cong SYED NP   10 mL at 03/04/19 0528    sodium chloride (NS) flush 5-40 mL  5-40 mL IntraVENous PRN Jorge Ronquillo NP        ceFAZolin (ANCEF) 2 g/20 mL in sterile water IV syringe  2 g IntraVENous ONCE Jorge Ronquillo NP        carvedilol (COREG) tablet 6.25 mg  6.25 mg Oral BID Jose Maria Quarles MD   6.25 mg at 03/03/19 1753    acetaminophen (TYLENOL) tablet 650 mg  650 mg Oral Q4H PRN Dyana Patton PA-C        allopurinol (ZYLOPRIM) tablet 300 mg  300 mg Oral DAILY Dyana Patton PA-C   300 mg at 03/03/19 1046    aspirin delayed-release tablet 81 mg  81 mg Oral DAILY Dyana Patton PA-C   81 mg at 03/03/19 0810    atorvastatin (LIPITOR) tablet 40 mg  40 mg Oral DAILY Dyana Patton PA-C   40 mg at 03/03/19 8514    finasteride (PROSCAR) tablet 5 mg  5 mg Oral DAILY Dyana Patton PA-C   5 mg at 03/03/19 0813    levothyroxine (SYNTHROID) tablet 100 mcg  100 mcg Oral ACB Dyana Patton PA-C   100 mcg at 03/04/19 0708    tamsulosin (FLOMAX) capsule 0.8 mg  0.8 mg Oral QHS Dyana Patton PA-C   0.8 mg at 03/03/19 2157    sacubitril-valsartan (ENTRESTO) 24-26 mg tablet 1 Tab  1 Tab Oral Q12H Dyana Patton PA-C   1 Tab at 03/03/19 2200    spironolactone (ALDACTONE) tablet 25 mg  25 mg Oral DAILY Anurag Koehler PA-C   25 mg at 03/03/19 0830    Warfarin - pharmacy to dose   Other Rx Dosing/Monitoring Anurag Koehler PA-C         No Known Allergies  Past Medical History:   Diagnosis Date    Anger     TAKES PAROXETINE TO CONTROL ANGER ISSUES    CAD (coronary artery disease)     Foot drop, left     WEARS METAL BRACE    Gout     Hard of hearing     High cholesterol     Hypertension     Prostate enlargement     Thyroid disease     Unspecified sleep apnea     DOESN'T USE CPAP; \"IT MADE HIM SICK-COLDS, SINUS\", PER WIFE     Past Surgical History:   Procedure Laterality Date    CARDIAC SURG PROCEDURE UNLIST      ANGIOPLASTY WITH CORONARY STENT    CARDIAC SURG PROCEDURE UNLIST  1996    AORTIC VALVE REPLACEMENT    HX ORTHOPAEDIC  2001    ORIF COMPOUND FRACTURE LEFT ANKLE    HX RETINAL DETACHMENT REPAIR  1980s    LEFT EYE     Family History   Problem Relation Age of Onset    Stroke Mother         ANEURYSM    Stroke Father      Social History     Tobacco Use    Smoking status: Never Smoker    Smokeless tobacco: Never Used   Substance Use Topics    Alcohol use: Yes     Alcohol/week: 1.5 oz     Types: 3 Standard drinks or equivalent per week        Review of Systems:   Constitutional: Negative for fever, chills, weight loss, malaise/fatigue. HEENT: Negative for nosebleeds, vision changes. Respiratory: Negative for cough, hemoptysis  Cardiovascular: + occasional chest pain, + palpitations, + leg swelling, no syncope, and no PND. + LENNON  Gastrointestinal: Negative for nausea, vomiting, diarrhea, blood in stool and melena. Genitourinary: Negative for dysuria, and hematuria. Musculoskeletal: Negative for myalgias, arthralgia. Skin: Negative for rash. Heme: Does not bleed or bruise easily.    Neurological: Negative for speech change and focal weakness     Objective:     Visit Vitals  /84 (BP 1 Location: Left arm, BP Patient Position: At rest;Supine)   Pulse 74   Temp 97.7 °F (36.5 °C)   Resp 16   Ht 5' 5\" (1.651 m)   Wt 180 lb 3.2 oz (81.7 kg)   SpO2 97%   BMI 29.99 kg/m²      Physical Exam:   Constitutional: well-developed and well-nourished. No respiratory distress. Head: Normocephalic and atraumatic. Eyes: Pupils are equal, round  ENT: hearing normal  Neck: supple. No JVD present. Cardiovascular: Normal rate, irregular rhythm. Exam reveals no gallop and no friction rub. No murmur heard. Pulmonary/Chest: Effort normal at rest.  Rales right base. Abdominal: Soft, no tenderness. Obese. Musculoskeletal: Bilat lower extremity edema, R>L. Neurological: Alert,oriented. Skin: Skin is warm and dry  Psychiatric: Normal mood and affect. Behavior is normal. Judgment and thought content normal.      EKG (03/01/2019): typical Atrial flutter with PVCs, ventricular rate 79 bpm.   IVCD, QRSd 166 ms. Assessment/Plan:   Mr. Lali Higginbotham has acute on chronic systolic heart failure, NYHA III-IV, was temporarily on milrinone gtt, LVEF 10%. Started carvedilol, Entresto, & spironolactone. Typical Atrial flutter with RVR upon admission, now rate controlled. Some aspect of worsening LVEF may be tachycardia induced. Previous CABG, known RCA disease. MEMO indicates that valve is not source of heart failure exacerbation. Anticoagulated with warfarin, denies bleeding issues. Patient is unlikely to tolerate AF ablation or have a successful AF ablation. He needs AV node ablation to ensure biventricular pacing for systolic CHF. He has typical atrial flutter now so he could have av synchrony with atrial flutter ablation. Discussed biventricular ICD implant for primary prevention of sudden cardiac death (LVEF 10%, QRSd 166 ms). LVEF had been documented at 20% in 10/2018, had been unable to complete 3 months of BB with ACE/ARB due to renal dysfunction & low BP.   Risks/benefits of procedures discussed with patient & family, & he agrees to procedure later today. JULIA Hardin [address] [facility]  03/04/19    Addendum from EP attending:  I have seen, examined patient, and discussed with nurse practitioner, registered nurse, reviewed, updated note and agree with the assessment and plan    I have talked to him and his wife this am. He is feeling better after milrinone over the weekend and it is off this morning  Vital signs as above  Visit Vitals  /84 (BP 1 Location: Left arm, BP Patient Position: At rest;Supine)   Pulse 74   Temp 97.7 °F (36.5 °C)   Resp 16   Ht 5' 5\" (1.651 m)   Wt 180 lb 3.2 oz (81.7 kg)   SpO2 97%   BMI 29.99 kg/m²       Exam shows irregular rhythm and 2/6 systolic murmur with valve click  Lungs clear  No leg edema  Assessment and Plan:  He has had chronic systolic CHF and LVEF has been lower than 35% over 3 months  It is worse now and he has IVCD/LBBB with QRS > 160 ms  NYHA class 3 now  He is candidate for BIV ICD  With atrial fibrillation and occasional RVR in the past AV node ablation would be needed for BIV pacing long term  This admission he has typical atrial flutter and ablation of atrial flutter can help with AV synchrony  He agrees to proceed  INR 3.1 due to mechanical AVR    Thank you for involving me in this patient's care and please call with further concerns or questions. Chelsi Riley M.D.   Electrophysiology/Cardiology  [facility] [place]  [address], [place] [address]  [phone]

## 2019-03-04 NOTE — PROGRESS NOTES
TRANSFER - OUT REPORT:    Verbal report given to Jenn JOINER on Lali Higginbotham being transferred to Room 362 for routine progression of care       Report consisted of patients Situation, Background, Assessment and   Recommendations(SBAR). Information from the following report(s) SBAR, Procedure Summary, MAR, Recent Results and Cardiac Rhythm Paced was reviewed with the receiving nurse. Opportunity for questions and clarification was provided.

## 2019-03-04 NOTE — PROGRESS NOTES
Pharmacist Note - Warfarin Dosing  Consult provided for this 83 y.o.male to manage warfarin for Atrial Fibrillation  -mechanical AVR 1996     INR Goal: 2 - 3     Home regimen/ tablet size: 4 mg every Friday and 2 mg on all other days     Drugs that may increase INR: None  Drugs that may decrease INR: None  Other current anticoagulants/ drugs that may increase bleeding risk: allopurinol  Risk factors: Age > 65 and Decompensated Heart Failure  Daily INR ordered: YES    Recent Labs     03/04/19  0527 03/03/19  0603 03/02/19  0409   HGB 14.5 13.8 12.9   INR 3.1* 4.0* 3.2*     Date               INR                  Dose  3/1    3.2  2 mg  3/2                   3.2                  1 mg   3/3                   4.0                   HOLD  3/4  3.1  Hold per cardiology                                                                                 Assessment/ Plan: Will hold warfarin today. Pharmacy will continue to monitor daily and adjust therapy as indicated. Please contact the pharmacist at   for outpatient recommendations if needed.         Yayo Darling, PharmD

## 2019-03-04 NOTE — DISCHARGE INSTRUCTIONS
Patient Instructions Post-ICD Implant    1. No heavy lifting or exercises with the left arm for 4 weeks. This includes the following:  Do not raise arm above the shoulder level to comb hair, pull on clothes, etc... Do not use the affected arm to pull up or push up from a seated or laying  down position. Do not allow anyone else to pull on the affected arm. 2.  Keep site clean & dry. No showers until after follow up. 3.  Dressing will be removed in clinic. 4.  Do not rub or massage your ICD site. 5.  Do not drive for 3 days. 6.  Call Dr. Yolanda Ferris at [phone] if you experience any of the following symptoms:  Redness at the ICD site  Swelling at or around the ICD or in the left arm  Pain around the ICD  Dizziness, lightheadedness, fainting spells  Lack of energy  Shortness of breath  Rapid heart rate  Chest or muscle twitches    7. Call Dr. Yolanda Ferris at [phone] if your ICD delivers a shock. The physician may   or may not want to see you in the office (that decision will be made when you call). 8.  Follow-up with Dr. Etta Duron office for wound check on 03/14/2019 at 3:30 pm.  Future Appointments   Date Time Provider Lexie Moreno   3/14/2019  3:30  [place], [address] [facility]   3/14/2019  3:40 PM MD Africa Beck M.D. [facility]  Electrophysiology/Cardiology  [facility] [place]  [address], [place] [address] [place] [address]  [phone]               Patient Instructions Post-EP study and Ablation    1. No heavy lifting or exercises for 1 week. This includes the following:  Do not push or move furniture, jog or run    2. Do not drive for 3 days.     3.  Call Dr. Yolanda Ferris at [phone] if you experience any of the following symptoms:  Dizziness, lightheadedness, fainting spells  Lack of energy  Shortness of breath  Rapid heart rate  Chest or muscle twitches  Blurry vision, double vision, weakness, numbness  Nausea, vomiting  Fever  Bleeding in the stool, black stool  Leg swelling, pain    4.   Follow-up with Dr. Alethea Lake   Date Time Provider Lexie Tiffanie   3/14/2019  3:30  [facility], [address] [facility]   3/14/2019  3:40 PM Buzz Bliss  [address]

## 2019-03-04 NOTE — NURSE NAVIGATOR
Chart reviewed by Heart Failure Nurse Navigator. Heart Failure database completed. EF:  10%    ACEi/ARB/ARNi: sacubitril-valsartan 24/26 mg every 12 hours    BB: coreg 3.125 mg twice daily    Aldosterone Antagonist: spironolactone 25 mg daily    Obstructive Sleep Apnea Screening: unspecified sleep apnea per history, does not use CPAP as \"it makes him sick\"   STOP-BANG score:   Referred to Sleep Medicine:     CRT: scheduled for 3/4/19 for primary prevention of sudden cardiac death    NYHA Functional Class III on admission. Heart Failure Teach Back in Patient Education. Heart Failure Avoiding Triggers on Discharge Instructions. Cardiologist: Dr. Aiden Trevizo. Dena Jacobo (CAV)      Post discharge follow up phone call to be made within 48-72 hours of discharge.

## 2019-03-04 NOTE — PROGRESS NOTES
[address] [place] [address] met with patient and wife today to provide an introduction to self, the [id number] at [facility]. Bundled Payment Care Program:    Patient was provided a copy of the [facility] letter. Patient states that they have a functioning scale at home. Patient was not provided a scale during this visit. Reinforced the importance of checking their weight at the same time daily and calling the cardiologist if their weight is up 3 lbs. in a day or 5 lbs. in a week (or per MD guidelines). Patient  has received a \"Living with Heart Failure\" patient education book. [facility] At Home Program:    Provided patient demonstration of [employer] program on training iPad. Patient was not interested in the [employer] program.     All questions answered at this time. Provided patient contact number for the [address] [place] [address] for further questions or concerns. Patient verbalized understanding of all information discussed.       Appointments:     Patient stated best day(s) of the week for provider appointment(s): no preference    Patient stated best time of day for provider appointment(s):  PM

## 2019-03-05 ENCOUNTER — DOCUMENTATION ONLY (OUTPATIENT)
Dept: CASE MANAGEMENT | Age: 84
End: 2019-03-05

## 2019-03-05 LAB
INR PPP: 2.6 (ref 0.9–1.1)
PROTHROMBIN TIME: 24.7 SEC (ref 9–11.1)

## 2019-03-05 PROCEDURE — 85610 PROTHROMBIN TIME: CPT

## 2019-03-05 PROCEDURE — 74011250637 HC RX REV CODE- 250/637: Performed by: SPECIALIST

## 2019-03-05 PROCEDURE — 94760 N-INVAS EAR/PLS OXIMETRY 1: CPT

## 2019-03-05 PROCEDURE — 36415 COLL VENOUS BLD VENIPUNCTURE: CPT

## 2019-03-05 PROCEDURE — 74011250637 HC RX REV CODE- 250/637: Performed by: NURSE PRACTITIONER

## 2019-03-05 PROCEDURE — 74011250637 HC RX REV CODE- 250/637: Performed by: PHYSICIAN ASSISTANT

## 2019-03-05 PROCEDURE — 65660000000 HC RM CCU STEPDOWN

## 2019-03-05 PROCEDURE — 74011250636 HC RX REV CODE- 250/636: Performed by: NURSE PRACTITIONER

## 2019-03-05 RX ORDER — WARFARIN 2 MG/1
2 TABLET ORAL ONCE
Status: COMPLETED | OUTPATIENT
Start: 2019-03-05 | End: 2019-03-05

## 2019-03-05 RX ORDER — WARFARIN 2 MG/1
2 TABLET ORAL
Status: DISCONTINUED | OUTPATIENT
Start: 2019-03-05 | End: 2019-03-05 | Stop reason: DRUGHIGH

## 2019-03-05 RX ADMIN — Medication 2 G: at 07:09

## 2019-03-05 RX ADMIN — SPIRONOLACTONE 25 MG: 25 TABLET ORAL at 09:13

## 2019-03-05 RX ADMIN — WARFARIN SODIUM 2 MG: 2 TABLET ORAL at 17:24

## 2019-03-05 RX ADMIN — ALLOPURINOL 300 MG: 300 TABLET ORAL at 09:13

## 2019-03-05 RX ADMIN — CEPHALEXIN 250 MG: 250 CAPSULE ORAL at 17:24

## 2019-03-05 RX ADMIN — LEVOTHYROXINE SODIUM 100 MCG: 100 TABLET ORAL at 07:08

## 2019-03-05 RX ADMIN — Medication 10 ML: at 06:00

## 2019-03-05 RX ADMIN — TAMSULOSIN HYDROCHLORIDE 0.8 MG: 0.4 CAPSULE ORAL at 21:30

## 2019-03-05 RX ADMIN — CEPHALEXIN 250 MG: 250 CAPSULE ORAL at 21:31

## 2019-03-05 RX ADMIN — CARVEDILOL 6.25 MG: 6.25 TABLET, FILM COATED ORAL at 09:13

## 2019-03-05 RX ADMIN — SACUBITRIL AND VALSARTAN 1 TABLET: 24; 26 TABLET, FILM COATED ORAL at 23:43

## 2019-03-05 RX ADMIN — SACUBITRIL AND VALSARTAN 1 TABLET: 24; 26 TABLET, FILM COATED ORAL at 11:15

## 2019-03-05 RX ADMIN — ATORVASTATIN CALCIUM 40 MG: 40 TABLET, FILM COATED ORAL at 09:13

## 2019-03-05 RX ADMIN — Medication 10 ML: at 21:31

## 2019-03-05 RX ADMIN — HYDROCODONE BITARTRATE AND ACETAMINOPHEN 1 TABLET: 5; 325 TABLET ORAL at 11:15

## 2019-03-05 RX ADMIN — FINASTERIDE 5 MG: 5 TABLET, FILM COATED ORAL at 11:15

## 2019-03-05 RX ADMIN — ASPIRIN 81 MG: 81 TABLET, COATED ORAL at 09:13

## 2019-03-05 NOTE — PROGRESS NOTES
Problem: Falls - Risk of  Goal: *Absence of Falls  Document Juany Fall Risk and appropriate interventions in the flowsheet. Outcome: Progressing Towards Goal  Fall Risk Interventions:       Mentation Interventions: Adequate sleep, hydration, pain control    Medication Interventions: Assess postural VS orthostatic hypotension    Elimination Interventions: Bed/chair exit alarm    History of Falls Interventions: Door open when patient unattended        Problem: Pressure Injury - Risk of  Goal: *Prevention of pressure injury  Document Jose Scale and appropriate interventions in the flowsheet.   Outcome: Progressing Towards Goal  Pressure Injury Interventions:       Moisture Interventions: Absorbent underpads    Activity Interventions: Assess need for specialty bed         Nutrition Interventions: Document food/fluid/supplement intake    Friction and Shear Interventions: Feet elevated on foot rest

## 2019-03-05 NOTE — PROGRESS NOTES
Transitional Care Team: Follow-Up [facility] Note    Date of Assessment: 03/05/19  Time of Assessment:  3:40 PM    Assessment/Plan: The following opportunities were noted following assessment:   Advance Directive: on file; will try to review with him before D/C. Medication reconciliation not yet performed.      Discharge Needs: Has worked with PT today. Their recommendation is skilled nursing for rehab and he and his family are considering [address] [facility] [facility] which is near his home in [place]. He seems to still want to return home but doesn't know that his wife has suffered a mild stroke and was just discharged from here this weekend. Mrs. Suyapa Trevino and the family have felt it best to keep this information from him while he has been undergoing his procedure.       Awareness of medical conditions: did not discuss today      Patient's willingness to go to SNF or inpt rehab if necessary: considering [facility] [facility] at this point.       [facility] NP will return with [facility] calender/follow up appointments/ Ambulatory Nurse Navigation information when patient ready for discharge. Left my card at bedside. Spoke today to Mrs. Suyapa Trevino and described the [facility] program to her. She is happy to have this extra support and has taken down my phone # for any additional questions she may have.  [address] information given to patient.       Continue to follow  documentation.     Discussed this information with Mrs. Suyapa Trevino, focused on readmission zones as described as: The Red Zone: High risk for readmission, days 1-21                          The Yellow Zone: Moderate risk for readmission, days 22-29                          The Green Zone: Lower risk for readmission, days 30 and after    Charlott Cheadle is a 80 y.o. male inpatient at [facility] admitted for pacemaker placement on  3/1/19. Chart reviewed by Donna Manuel NP and [facility] Understanding of Goals) program partially introduced to patient.   Discussed program with Mrs. Rena Bacon today by phone. The Transitional Care Team bridges the gaps in care and education surrounding discharge from the acute care facility. The objective is to empower the patient and family in taking a proactive role in the task of preventing readmission within the first thirty days after discharge from the acute care setting. The team is also involved in the efforts to reduce readmission to the acute care setting after stabilization and discharge from the acute care environment either to the skilled nursing facilities or community. The TC Team will follow patient from a distance while inpatient as well as be available for further transition disposition as needed. The BELEN TEAM will continue to offer support during the 30- 90 day discharge from acute care setting. Will notify Ambulatory [facility] Nurse Navigator, Julian Adame RN, when patient is ready for D/C.     Past Medical History:   Diagnosis Date    Anger     TAKES PAROXETINE TO CONTROL ANGER ISSUES    CAD (coronary artery disease)     Foot drop, left     WEARS METAL BRACE    Gout     Hard of hearing     High cholesterol     Hypertension     Prostate enlargement     Thyroid disease     Unspecified sleep apnea     DOESN'T USE CPAP; \"IT MADE HIM Sushil Creamer PER WIFE       Advance Care Planning 3/1/2019   Patient's Healthcare Decision Maker is: Legal Next of Kin   Primary Decision Maker Name -   Primary Decision Maker Phone Number -   Primary Decision Maker Relationship to Patient -   Confirm Advance Directive Yes, not on file   Does the patient have other document types Power of        Primary Diagnosis  Acute on chronic systolic HF, Class IV on admission
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
## 2019-03-05 NOTE — PROGRESS NOTES
Pharmacist Note - Warfarin Dosing  Consult provided for this 83 y.o.male to manage warfarin for atrial fibrillation + mechanical aortic valve. INR Goal: 2 - 3     Home regimen/ tablet size: 2 mg x 6 days; 4 mg on Friday     Drugs that may increase INR: None  Drugs that may decrease INR: None  Other current anticoagulants/ drugs that may increase bleeding risk: Aspirin  Risk factors: Age > 65  Daily INR ordered: YES    Recent Labs     03/05/19  0415 03/04/19  0527 03/03/19  0603   HGB  --  14.5 13.8   INR 2.6* 3.1* 4.0*     Date               INR                  Dose  3/1  3.2  2 mg    3/2  3.2  1 mg    3/3  4  Held    3/4  3.1  Held (per Dr. Elizabeth Gutiérrez)   3/5  2.6  2 mg                                                                                 Assessment/ Plan: Will order warfarin 2 mg PO x 1 dose. Pharmacy will continue to monitor daily and adjust therapy as indicated. Please contact the pharmacist at x [phone] or  for outpatient recommendations if needed.

## 2019-03-05 NOTE — PROGRESS NOTES
Bedside and Verbal shift change report given to Tiffany Haskins (oncoming nurse) by Mickey Frye (offgoing nurse). Report included the following information SBAR, Kardex, ED Summary, Procedure Summary, Intake/Output, MAR, Recent Results, Alarm Parameters  and Procedure Verification.

## 2019-03-05 NOTE — CARDIO/PULMONARY
Cardiac Rehab: Living with Heart Failure Booklet given to Vicente Zhao. Added [address] education. Educated using teach back method. Discussed diagnosis definition and assessed patient understanding. Reviewed importance of daily weight monitoring and Low Sodium diet (2889-4637 mg. Daily). Vicente Zhao checks his weight at home and knows the weight parameters. Encouraged activity and rest periods within symptom limitations and as ordered by physician. Reviewed coreg purpose of medication, potential side effects, compliance, and what to do if dose is missed. Discussed importance of reporting signs and symptoms of exacerbation, and when to report them to the doctor, to prevent re-hospitalization. Vicente Zhao was encouraged to keep all appointments with doctor. His daughter joined us during the education and was included. Discussed the purpose of the AICD and post discharge instructions including pain management, left arm movement restrictions, daily temperature monitoring,showering and driving restrictions and signs and symptoms of infection to observe for at incision. Vicente Zhao  Currently has the left arm in a sling but admits to soreness and requesting pain medicine. Staff nurse was notified and will address. Vicente Zhao and his daughter verbalized understanding. Ricky Mcburney, RN      .

## 2019-03-05 NOTE — PROGRESS NOTES
Reviewed chart for transitions of care. At Home care can accept for [facility] visit for \"heart failure\" diagnosis. CM will follow for any other discharge needs.     Souleymane Lawson [facility]

## 2019-03-05 NOTE — PROGRESS NOTES
Cardiac Electrophysiology Hospital Progress Note     Subjective:      Lali Higginbotham is a 80 y.o. patient who was seen for evaluation/management of AF/AFL with RVR & consideration for ICD for primary convention of sudden cardiac death. He was admitted on 03/01/2019 for acute on chronic systolic heart failure, NYHA III-IV. He was seen in clinic on 02/28/2019, & direct admit was not available, so he presented to the ED for admission. Echo on 02/28/2019 showed LVEF 10% (20% in 2016). Started furosemide & milrinone. Reported worsening SOB with exertion. Occasional chest discomfort, not associated with SOB. MEMO 03/01/2019, LVEF <15% & no significant findings regarding mechanical aortic valve. Milrinone discontinued on 03/03/2019. He underwent ablation of typical atrial flutter & [employer] Jordan biventricular ICD implant on 03/04/2019. Device check this morning shows proper lead & generator function. Slightly suboptimal biventricular pacing due to PVCs. Anticoagulated with warfarin, no bleeding issues. Warfarin held for a few days, INR today 2.6. Atrial sensing, ventricular pacing on telemetry, occasional PVCs. BP stable, 114/73 this morning. No new complaints. Echo (02/28/2019): LVEF <15%, mod LV dilation, mod concentric LVH. RV global systolic function mod reduced. Mod MAC, mod MR. Prosthetic mechanical aortic valve, mild to mod AS. Mod TR, mod pulmonary hypertension. Mild MT. Ascending aorta mod dilated. Mod elevated CVP. MEMO (03/01/2019): LVEF <15%, mechanical prosthetic aortic valve. Previous:  Echo (10/10/2018): LVEF 20%, severe diffuse hypokinesis, mod concentric LVH. LA mod to severely dilated, RA mildly dilated. Mild to mod MR. Mechanical prosthetic aortic valve, mean grad 19 mmHg. Mild TR. CABG & mechanical AVR 1996. Known occlusion to RCA & SVG-RCA. Lexiscan stress test (06/24/2015): LVEF 38%, inferior septal kinesis.   Inferior infarction with small amount of emani infarct ischemia. Holter (04/2013): 21 beat VT & NSVT (4 beats) rate 140.  14% total beats ventricular. Previous ARB-HCTZ, discontinued due to renal dysfunction. Followed by nephrology.     Followed by Dr. Jessica Dunbar.      Problem List  Date Reviewed: 10/9/2018          Codes Class Noted    Typical atrial flutter ([facility] [address] ICD-10-CM: I48.3  ICD-9-CM: 427.32  3/4/2019        Status post catheter ablation of atrial flutter ICD-10-CM: Z98.890  ICD-9-CM: V45.89  3/4/2019    Overview Signed 3/4/2019  5:55 PM by Ej Beth MD     3/4/2019 to NSR and bidrectional line of block             Biventricular ICD (implantable cardioverter-defibrillator) in place ICD-10-CM: Z95.810  ICD-9-CM: V45.02  3/4/2019    Overview Signed 3/4/2019  5:55 PM by Ej Beth MD     3/4/2019 [employer] Jordan BIV ICD             A-fib [facility]) ICD-10-CM: I48.91  ICD-9-CM: 427.31  3/1/2019        Acute on chronic systolic heart failure, NYHA class 4 ([facility] [address] ICD-10-CM: I50.23  ICD-9-CM: 428.23  3/1/2019        Anemia ICD-10-CM: D64.9  ICD-9-CM: 285.9  10/9/2018        Hypothyroidism ICD-10-CM: E03.9  ICD-9-CM: 244.9  10/9/2018        Supratherapeutic INR ICD-10-CM: R79.1  ICD-9-CM: 790.92  10/9/2018        H/O mechanical aortic valve replacement ICD-10-CM: Z95.2  ICD-9-CM: V43.3  10/9/2018              Current Facility-Administered Medications   Medication Dose Route Frequency Provider Last Rate Last Dose    warfarin (COUMADIN) tablet 2 mg  2 mg Oral QHS Ann Grumbles B, NP        sodium chloride (NS) flush 5-40 mL  5-40 mL IntraVENous Q8H Ann Grumbles B, NP   10 mL at 03/05/19 0600    sodium chloride (NS) flush 5-40 mL  5-40 mL IntraVENous PRN Adriana Shiver, NP        HYDROcodone-acetaminophen (NORCO) 5-325 mg per tablet 1 Tab  1 Tab Oral Q4H PRN Ann Lucia B, NP   1 Tab at 03/04/19 2306    ondansetron (ZOFRAN) injection 4 mg  4 mg IntraVENous Q4H PRN Adriana Shiver, NP        cephALEXin (KEFLEX) capsule 250 mg  250 mg Oral TID Donna Murillo NP        carvedilol (COREG) tablet 6.25 mg  6.25 mg Oral BID Jose Maria Quarles MD   6.25 mg at 03/04/19 1928    acetaminophen (TYLENOL) tablet 650 mg  650 mg Oral Q4H PRN Kenyon Handley PA-C        allopurinol (ZYLOPRIM) tablet 300 mg  300 mg Oral DAILY Kenyon Handley PA-C   300 mg at 03/04/19 1005    aspirin delayed-release tablet 81 mg  81 mg Oral DAILY Kenyon Handley PA-C   81 mg at 03/04/19 1005    atorvastatin (LIPITOR) tablet 40 mg  40 mg Oral DAILY Kenyon Handley PA-C   40 mg at 03/04/19 1005    finasteride (PROSCAR) tablet 5 mg  5 mg Oral DAILY Kenyon Handley PA-C   5 mg at 03/04/19 1007    levothyroxine (SYNTHROID) tablet 100 mcg  100 mcg Oral ACB Kenyon Handley PA-C   100 mcg at 03/05/19 0580    tamsulosin (FLOMAX) capsule 0.8 mg  0.8 mg Oral QHS Kenyon Handley PA-C   0.8 mg at 03/04/19 2200    sacubitril-valsartan (ENTRESTO) 24-26 mg tablet 1 Tab  1 Tab Oral Q12H Kenyon Handley PA-C   1 Tab at 03/04/19 2200    spironolactone (ALDACTONE) tablet 25 mg  25 mg Oral DAILY Kenyon Handley PA-C   25 mg at 03/04/19 1005    Warfarin - pharmacy to dose   Other Rx Dosing/Monitoring Kenyon Handley PA-C         No Known Allergies  Past Medical History:   Diagnosis Date    Anger     TAKES PAROXETINE TO CONTROL ANGER ISSUES    CAD (coronary artery disease)     Foot drop, left     WEARS METAL BRACE    Gout     Hard of hearing     High cholesterol     Hypertension     Prostate enlargement     Thyroid disease     Unspecified sleep apnea     DOESN'T USE CPAP; \"IT MADE HIM SICK-COLDS, SINUS\", PER WIFE     Past Surgical History:   Procedure Laterality Date    CARDIAC SURG PROCEDURE UNLIST      ANGIOPLASTY WITH CORONARY STENT    CARDIAC SURG PROCEDURE UNLIST  1996    AORTIC VALVE REPLACEMENT    HX ORTHOPAEDIC  2001    ORIF COMPOUND FRACTURE LEFT ANKLE    HX RETINAL DETACHMENT REPAIR  1980s    LEFT EYE     Family History   Problem Relation Age of Onset    Stroke Mother         ANEURYSM    Stroke Father      Social History     Tobacco Use    Smoking status: Never Smoker    Smokeless tobacco: Never Used   Substance Use Topics    Alcohol use: Yes     Alcohol/week: 1.5 oz     Types: 3 Standard drinks or equivalent per week        Review of Systems:   Constitutional: Negative for fever, chills, weight loss, malaise/fatigue. HEENT: Negative for nosebleeds, vision changes. Respiratory: Negative for cough, hemoptysis  Cardiovascular: + occasional chest pain, + recent palpitations, + leg swelling, no syncope, and no PND. + LENNON  Gastrointestinal: Negative for nausea, vomiting, diarrhea, blood in stool and melena. Genitourinary: Negative for dysuria, and hematuria. Musculoskeletal: Negative for myalgias, arthralgia. Skin: Negative for rash. Heme: Does not bleed or bruise easily. Neurological: Negative for speech change and focal weakness     Objective:     Visit Vitals  /73 (BP 1 Location: Right arm, BP Patient Position: At rest;Head of bed elevated (Comment degrees)) Comment (BP Patient Position): 10 degrees   Pulse 83   Temp 97.9 °F (36.6 °C)   Resp 18   Ht 5' 5\" (1.651 m)   Wt 185 lb 9.6 oz (84.2 kg)   SpO2 94%   BMI 30.89 kg/m²      Physical Exam:   Constitutional: well-developed and well-nourished. No respiratory distress. Head: Normocephalic and atraumatic. Eyes: Pupils are equal, round  ENT: hearing normal  Neck: supple. No JVD present. Cardiovascular: Normal rate, irregular rhythm. Exam reveals no gallop and no friction rub. No murmur heard. Pulmonary/Chest: Effort normal at rest.  Rales right base. Abdominal: Soft, no tenderness. Obese. Musculoskeletal: Bilat lower extremity edema, R>L. Neurological: Alert,oriented. Skin: Skin is warm and dry. Left chest ICD site without active drainage or hematoma, small area of sanguinous drainage noted on dressing appears to be old.   RFV site without drainage or hematoma. Psychiatric: Normal mood and affect. Behavior is normal. Judgment and thought content normal.         Assessment/Plan:   Mr. Mickey Lopes has acute on chronic systolic heart failure, NYHA III, was temporarily on milrinone gtt, LVEF 10%. Started carvedilol, Entresto, & spironolactone. Continue GDMT. S/p [employer]. Jordan biventricular ICD (DOI 03/04/2019), shows proper lead & generator function on device check. Slightly suboptimal biventricular pacing due to PVCs. Carvedilol currently prescribed, should help to suppress. Typical atrial flutter with RVR upon admission, now s/p ablation of typical atrial flutter on 03/04/2019. No further AFL noted on telemetry. Previous AVR, known RCA disease. MEMO indicates that mechanical valve is not source of heart failure exacerbation. Anticoagulated with warfarin, denies bleeding issues. Warfarin had been held x a few days, may restart today with previous parameters. JULIA [address] [facility] [place]  03/05/19    Addendum from EP attending:   I have seen, examined patient, and discussed with nurse practitioner, registered nurse, reviewed, updated note and agree with the assessment and plan   I am seeing him today for Dr Renee Woo since he is out today   I have talked to him this am. He is feeling fine, no concerns  Vital signs are stable  Exam shows regular rhythm and no rub  Lungs clear  Chest wall without swelling in pacer/ICD pocket. There is dressing over  Assessment and Plan:  Continue with GDMT and coumadin  Ambulate today  Device check showed proper function, remained in NSR and BIV pacing PVCs      Thank you for involving me in this patient's care and please call with further concerns or questions. Tata Benavidez M.D.   Electrophysiology/Cardiology  [facility] [place]  [address], [place] [address], [address] [facility] [address]  [phone]                                        [phone]

## 2019-03-06 ENCOUNTER — APPOINTMENT (OUTPATIENT)
Dept: ULTRASOUND IMAGING | Age: 84
DRG: 226 | End: 2019-03-06
Attending: PHYSICIAN ASSISTANT
Payer: MEDICARE

## 2019-03-06 LAB
INR PPP: 2.3 (ref 0.9–1.1)
PROTHROMBIN TIME: 21.9 SEC (ref 9–11.1)
T3FREE SERPL-MCNC: 1.8 PG/ML (ref 2.2–4)
T4 SERPL-MCNC: 4.9 UG/DL (ref 4.5–12.1)

## 2019-03-06 PROCEDURE — 97161 PT EVAL LOW COMPLEX 20 MIN: CPT

## 2019-03-06 PROCEDURE — 84436 ASSAY OF TOTAL THYROXINE: CPT

## 2019-03-06 PROCEDURE — 76536 US EXAM OF HEAD AND NECK: CPT

## 2019-03-06 PROCEDURE — 94760 N-INVAS EAR/PLS OXIMETRY 1: CPT

## 2019-03-06 PROCEDURE — 74011250637 HC RX REV CODE- 250/637: Performed by: SPECIALIST

## 2019-03-06 PROCEDURE — 74011250637 HC RX REV CODE- 250/637: Performed by: PHYSICIAN ASSISTANT

## 2019-03-06 PROCEDURE — 97116 GAIT TRAINING THERAPY: CPT

## 2019-03-06 PROCEDURE — 65660000000 HC RM CCU STEPDOWN

## 2019-03-06 PROCEDURE — 97535 SELF CARE MNGMENT TRAINING: CPT

## 2019-03-06 PROCEDURE — 74011250637 HC RX REV CODE- 250/637: Performed by: NURSE PRACTITIONER

## 2019-03-06 PROCEDURE — 97165 OT EVAL LOW COMPLEX 30 MIN: CPT

## 2019-03-06 PROCEDURE — 36415 COLL VENOUS BLD VENIPUNCTURE: CPT

## 2019-03-06 PROCEDURE — 85610 PROTHROMBIN TIME: CPT

## 2019-03-06 RX ORDER — WARFARIN 2 MG/1
2 TABLET ORAL ONCE
Status: COMPLETED | OUTPATIENT
Start: 2019-03-06 | End: 2019-03-06

## 2019-03-06 RX ORDER — LEVOTHYROXINE SODIUM 150 UG/1
150 TABLET ORAL
Status: DISCONTINUED | OUTPATIENT
Start: 2019-03-07 | End: 2019-03-07 | Stop reason: HOSPADM

## 2019-03-06 RX ADMIN — Medication 10 ML: at 06:00

## 2019-03-06 RX ADMIN — Medication 10 ML: at 21:13

## 2019-03-06 RX ADMIN — ASPIRIN 81 MG: 81 TABLET, COATED ORAL at 08:10

## 2019-03-06 RX ADMIN — LEVOTHYROXINE SODIUM 100 MCG: 100 TABLET ORAL at 06:55

## 2019-03-06 RX ADMIN — ALLOPURINOL 300 MG: 300 TABLET ORAL at 08:10

## 2019-03-06 RX ADMIN — CARVEDILOL 6.25 MG: 6.25 TABLET, FILM COATED ORAL at 17:16

## 2019-03-06 RX ADMIN — CEPHALEXIN 250 MG: 250 CAPSULE ORAL at 08:10

## 2019-03-06 RX ADMIN — SPIRONOLACTONE 25 MG: 25 TABLET ORAL at 08:10

## 2019-03-06 RX ADMIN — FINASTERIDE 5 MG: 5 TABLET, FILM COATED ORAL at 08:16

## 2019-03-06 RX ADMIN — ATORVASTATIN CALCIUM 40 MG: 40 TABLET, FILM COATED ORAL at 08:10

## 2019-03-06 RX ADMIN — CEPHALEXIN 250 MG: 250 CAPSULE ORAL at 17:16

## 2019-03-06 RX ADMIN — SACUBITRIL AND VALSARTAN 1 TABLET: 24; 26 TABLET, FILM COATED ORAL at 10:56

## 2019-03-06 RX ADMIN — CEPHALEXIN 250 MG: 250 CAPSULE ORAL at 21:12

## 2019-03-06 RX ADMIN — CARVEDILOL 6.25 MG: 6.25 TABLET, FILM COATED ORAL at 08:10

## 2019-03-06 RX ADMIN — TAMSULOSIN HYDROCHLORIDE 0.8 MG: 0.4 CAPSULE ORAL at 21:12

## 2019-03-06 RX ADMIN — ACETAMINOPHEN 650 MG: 325 TABLET ORAL at 08:36

## 2019-03-06 RX ADMIN — WARFARIN SODIUM 2 MG: 2 TABLET ORAL at 17:16

## 2019-03-06 RX ADMIN — Medication 10 ML: at 17:17

## 2019-03-06 RX ADMIN — SACUBITRIL AND VALSARTAN 1 TABLET: 24; 26 TABLET, FILM COATED ORAL at 22:15

## 2019-03-06 NOTE — PROGRESS NOTES
Cm received phone call from patient's wife concerned about her 's discharge disposition. CM noted that the patient is adamant about going home. MD is recommending patient needing SNF. CM met with patient and his daughter in the room to discuss discharge placement. CM explained that it is important to have a safe discharge plan and that he would benefit from going to a SNF, patient and daughter are agreeable. Mrs. Anita Luz would like the patient to go to [facility] in [place] as the patient has been to in the past. CM send referral, and will update accordingly.      Conrado Moeller, [facility]

## 2019-03-06 NOTE — PROGRESS NOTES
Bedside and Verbal shift change report given to johan (oncoming nurse) by lorri (offgoing nurse). Report included the following information SBAR, MAR and Cardiac Rhythm paced.

## 2019-03-06 NOTE — PROGRESS NOTES
Problem: Heart Failure: Day 5  Goal: Medications  Outcome: Progressing Towards Goal  VS stable, bp controlled. Explained medications and benefit for heart failure.

## 2019-03-06 NOTE — PROGRESS NOTES
Pharmacist Note - Warfarin Dosing  Consult provided for this 83 y.o.male to manage warfarin for atrial fibrillation + mechanical aortic valve. INR Goal: 2 - 3     Home regimen/ tablet size: 2 mg x 6 days; 4 mg on Friday     Drugs that may increase INR: None  Drugs that may decrease INR: None  Other current anticoagulants/ drugs that may increase bleeding risk: Aspirin  Risk factors: Age > 65  Daily INR ordered: YES    Recent Labs     03/06/19  0405 03/05/19  0415 03/04/19  0527   HGB  --   --  14.5   INR 2.3* 2.6* 3.1*     Date               INR                  Dose  3/1  3.2  2 mg    3/2  3.2  1 mg    3/3  4  Held    3/4  3.1  Held (per Dr. Susi Jimenez)   3/5  2.6  2 mg    3/6  2.2  2 mg                                                                                 Assessment/ Plan: Will order warfarin 2 mg PO x 1 dose. Pharmacy will continue to monitor daily and adjust therapy as indicated. Please contact the pharmacist at x [phone] or  for outpatient recommendations if needed.

## 2019-03-06 NOTE — PROGRESS NOTES
Cardiology Progress Note     Subjective:      Joseline Sweet is a 80 y.o. patient who is seen for Dr David Mackenzei today for cardiology  She was seen for evaluation/management of AF/AFL with RVR & consideration for ICD for primary convention of sudden cardiac death. He was admitted on 03/01/2019 for acute on chronic systolic heart failure, NYHA III-IV. He was seen in clinic on 02/28/2019, & direct admit was not available, so he presented to the ED for admission. Echo on 02/28/2019 showed LVEF 10% (20% in 2016). Started furosemide & milrinone. Reported worsening SOB with exertion. Occasional chest discomfort, not associated with SOB. MEMO 03/01/2019, LVEF <15% & no significant findings regarding mechanical aortic valve. Milrinone discontinued on 03/03/2019. He underwent ablation of typical atrial flutter & [employer] Jordan biventricular ICD implant on 03/04/2019. Device check on 03/05/2019 shows proper lead & generator function. Slightly suboptimal biventricular pacing due to PVCs. Anticoagulated with warfarin, no bleeding issues. Warfarin held for a few days, restarted yesterday. INR today 2.3. Atrial sensing, biventricular pacing on telemetry, frequent PVCs. BP stable, 131/75 this morning. Some discomfort with arm movement on left side that he relates to ICD, but states this is improving. When discussing discharge planning, he vocalizes that he prefers home health vs SNF or rehab. He has had home health previously. Echo (02/28/2019): LVEF <15%, mod LV dilation, mod concentric LVH. RV global systolic function mod reduced. Mod MAC, mod MR. Prosthetic mechanical aortic valve, mild to mod AS. Mod TR, mod pulmonary hypertension. Mild [place]. Ascending aorta mod dilated. Mod elevated CVP. MEMO (03/01/2019): LVEF <15%, mechanical prosthetic aortic valve. Previous:  Echo (10/10/2018): LVEF 20%, severe diffuse hypokinesis, mod concentric LVH.   LA mod to severely dilated, RA mildly dilated. Mild to mod MR. Mechanical prosthetic aortic valve, mean grad 19 mmHg. Mild TR. CABG & mechanical AVR 1996. Known occlusion to RCA & SVG-RCA. Lexiscan stress test (06/24/2015): LVEF 38%, inferior septal kinesis. Inferior infarction with small amount of emani infarct ischemia. Holter (04/2013): 21 beat VT & NSVT (4 beats) rate 140.  14% total beats ventricular. Previous ARB-HCTZ, discontinued due to renal dysfunction. Followed by nephrology.     Followed by Dr. Carranza.      Problem List  Date Reviewed: 10/9/2018          Codes Class Noted    Typical atrial flutter ([facility] [address] ICD-10-CM: I48.3  ICD-9-CM: 427.32  3/4/2019        Status post catheter ablation of atrial flutter ICD-10-CM: Z98.890  ICD-9-CM: V45.89  3/4/2019    Overview Signed 3/4/2019  5:55 PM by Rachael Key MD     3/4/2019 to NSR and bidrectional line of block             Biventricular ICD (implantable cardioverter-defibrillator) in place ICD-10-CM: Z95.810  ICD-9-CM: V45.02  3/4/2019    Overview Signed 3/4/2019  5:55 PM by Rachael Key MD     3/4/2019 [employer] Jordan BIV ICD             A-fib [facility]) ICD-10-CM: I48.91  ICD-9-CM: 427.31  3/1/2019        Acute on chronic systolic heart failure, NY[facility] class [facility] ICD-10-CM: I50.23  ICD-9-CM: 428.23  3/1/2019        Anemia ICD-10-CM: D64.9  ICD-9-CM: 285.9  10/9/2018        Hypothyroidism ICD-10-CM: E03.9  ICD-9-CM: 244.9  10/9/2018        Supratherapeutic INR ICD-10-CM: R79.1  ICD-9-CM: 790.92  10/9/2018        H/O mechanical aortic valve replacement ICD-10-CM: Z95.2  ICD-9-CM: V43.3  10/9/2018              Current Facility-Administered Medications   Medication Dose Route Frequency Provider Last Rate Last Dose    sodium chloride (NS) flush 5-40 mL  5-40 mL IntraVENous Q8H Karri SYED NP   10 mL at 03/06/19 0600    sodium chloride (NS) flush 5-40 mL  5-40 mL IntraVENous PRN Nestor Muro NP        HYDROcodone-acetaminophen (NORCO) 5-325 mg per tablet 1 Tab  1 Tab Oral Q4H PRN Viktor Brasher NP   1 Tab at 03/05/19 1115    ondansetron (ZOFRAN) injection 4 mg  4 mg IntraVENous Q4H PRN Viktor Brasher NP        cephALEXin (KEFLEX) capsule 250 mg  250 mg Oral TID Jarrett SYED NP   250 mg at 03/06/19 0810    carvedilol (COREG) tablet 6.25 mg  6.25 mg Oral BID Jose Maria Quarles MD   6.25 mg at 03/06/19 0810    acetaminophen (TYLENOL) tablet 650 mg  650 mg Oral Q4H PRN Franca Lopez PA-C   650 mg at 03/06/19 4463    allopurinol (ZYLOPRIM) tablet 300 mg  300 mg Oral DAILY Franca Lopez PA-C   300 mg at 03/06/19 7000    aspirin delayed-release tablet 81 mg  81 mg Oral DAILY Franca Lopez PA-C   81 mg at 03/06/19 4749    atorvastatin (LIPITOR) tablet 40 mg  40 mg Oral DAILY Franca Lopez PA-C   40 mg at 03/06/19 0720    finasteride (PROSCAR) tablet 5 mg  5 mg Oral DAILY Franca Lopez PA-C   5 mg at 03/06/19 4606    levothyroxine (SYNTHROID) tablet 100 mcg  100 mcg Oral ACB Franca Lopez PA-C   100 mcg at 03/06/19 2777    tamsulosin (FLOMAX) capsule 0.8 mg  0.8 mg Oral QHS Franca Lopez PA-C   0.8 mg at 03/05/19 2130    sacubitril-valsartan (ENTRESTO) 24-26 mg tablet 1 Tab  1 Tab Oral Q12H Franca Lopez PA-C   1 Tab at 03/05/19 2343    spironolactone (ALDACTONE) tablet 25 mg  25 mg Oral DAILY Franca Lopez PA-C   25 mg at 03/06/19 3837    Warfarin - pharmacy to dose   Other Rx Dosing/Monitoring Franca Lopez PA-C         No Known Allergies  Past Medical History:   Diagnosis Date    Anger     TAKES PAROXETINE TO CONTROL ANGER ISSUES    CAD (coronary artery disease)     Foot drop, left     WEARS METAL BRACE    Gout     Hard of hearing     High cholesterol     Hypertension     Prostate enlargement     Thyroid disease     Unspecified sleep apnea     DOESN'T USE CPAP; \"IT MADE HIM SICK-COLDS, SINUS\", PER WIFE     Past Surgical History:   Procedure Laterality Date    CARDIAC SURG PROCEDURE UNLIST      ANGIOPLASTY WITH CORONARY STENT    CARDIAC SURG PROCEDURE UNLIST  1996    AORTIC VALVE REPLACEMENT    HX ORTHOPAEDIC  2001    ORIF COMPOUND FRACTURE LEFT ANKLE    HX RETINAL DETACHMENT REPAIR  1980s    LEFT EYE    WI COMPRE ELECTROPHYSIOL XM W/LEFT ATRIAL PACNG/REC N/A 3/4/2019    Lt Atrial Pace & Record During Ep Study performed by Estrella Serna MD at [place] [address], [facility] CATH LAB    WI EPHYS EVAL PACG CVDFB PRGRMG/REPRGRMG PARAMETERS N/A 3/4/2019    Eval Icd Generator & Leads W Testing At Implant performed by Estrella Serna MD at [place] [address], [facility] CATH LAB    WI EPHYS EVAL W/ABLATION [address] N/A 3/4/2019    Ablation A-Flutter performed by Estrella Serna MD at [place] [address], [facility] [address] CATH LAB    WI INSJ/RPLCMT PERM DFB W/TRNSVNS LDS 1/DUAL CHMBR N/A 3/4/2019    INSERT ICD BIV MULTI performed by Estrella Serna MD at [place] [address], [facility] [address] CATH LAB    WI INTRACARDIAC ELECTROPHYSIOLOGIC 3D MAPPING N/A 3/4/2019    Ep 3d Mapping performed by Estrella Serna MD at [place] [address], [facility] [address] CATH LAB     Family History   Problem Relation Age of Onset    Stroke Mother         ANEURYSM    Stroke Father      Social History     Tobacco Use    Smoking status: Never Smoker    Smokeless tobacco: Never Used   Substance Use Topics    Alcohol use: Yes     Alcohol/week: 1.5 oz     Types: 3 Standard drinks or equivalent per week        Review of Systems:   Constitutional: Negative for fever, chills, weight loss, malaise/fatigue. HEENT: Negative for nosebleeds, vision changes. Respiratory: Negative for cough, hemoptysis  Cardiovascular: + occasional chest pain, + recent palpitations, + leg swelling, no syncope, and no PND. + LENNON  Gastrointestinal: Negative for nausea, vomiting, diarrhea, blood in stool and melena. Genitourinary: Negative for dysuria, and hematuria. Musculoskeletal: Negative for myalgias, arthralgia. Skin: Negative for rash. Heme: Does not bleed or bruise easily.    Neurological: Negative for speech change and focal weakness     Objective:     Visit Vitals  /75 (BP 1 Location: Right arm, BP Patient Position: At rest)   Pulse 89   Temp 98 °F (36.7 °C)   Resp 18   Ht 5' 5\" (1.651 m)   Wt 178 lb 3.2 oz (80.8 kg)   SpO2 97%   BMI 29.65 kg/m²      Physical Exam:   Constitutional: well-developed and well-nourished. No respiratory distress. Head: Normocephalic and atraumatic. Eyes: Pupils are equal, round  ENT: hearing normal  Neck: supple. No JVD present. Cardiovascular: Normal rate, irregular rhythm. Exam reveals no gallop and no friction rub. No murmur heard. Pulmonary/Chest: Effort normal at rest.  Rales right base. Abdominal: Soft, no tenderness. Obese. Musculoskeletal: Trace lower extremity edema, R>L. Neurological: Alert,oriented. Skin: Skin is warm and dry. Left chest ICD site without active drainage or hematoma, small area of sanguinous drainage noted on dressing appears to be old. Psychiatric: Normal mood and affect. Behavior is normal. Judgment and thought content normal.         Assessment/Plan:   Mr. Guerda Dietz has acute on chronic systolic heart failure, NYHA III, was temporarily on milrinone gtt, LVEF 10%. Started carvedilol, Entresto, & spironolactone. Continue GDMT as currently prescribed. S/p [employer] biventricular ICD (DOI 03/04/2019), shows proper lead & generator function on device check. Slightly suboptimal biventricular pacing due to PVCs. Carvedilol currently prescribed, should help to suppress. Increase in carvedilol not currently possible due to intermittent low BP (98/62 on 03/05/2019). Typical atrial flutter with RVR upon admission, now s/p ablation of typical atrial flutter on 03/04/2019. No further AFL noted on telemetry. Previous AVR, known RCA disease. Continue statin & ASA. MEMO indicates that mechanical valve is not source of heart failure exacerbation. Anticoagulated with warfarin, denies bleeding issues.    Warfarin had been held x a few days, restarted 03/05/2019 today with INR target 2-3. Physical therapy &  to evaluate patient's discharge needs today, begin arrangements as indicated. Seen for Dr. Niraj Germain, [facility]  [address]  03/06/19    Addendum from Cardiology attending   I have seen, examined patient, and discussed with nurse practitioner, registered nurse, reviewed, updated note and agree with the assessment and plan    I have talked to him this am. She is feeling fine, but still weak and need 1:1 help per his nurse. His gait is not steady per her  He wants to decide today about where to go, home vs SNF  He needs to talk to his wife  Vital signs are stable  Exam shows regular rhythm    Chest wall without swelling in BIV ICD pocket  Assessment and Plan:  His bp tolerates beta blocker and entresto  Coumadin per pharmacist  I am in favor of him going to SNF because if he falls on coumadin it can be worse  He wants to agree to SNF but he needs to discuss with his family first and then contact   Dr Ele Coe will see him tomorrow    Thank you for involving me in this patient's care and please call with further concerns or questions. Mela Keller M.D.   Electrophysiology/Cardiology  [facility] [place]  [address], [place] [address] , [address]  [phone]

## 2019-03-06 NOTE — PROGRESS NOTES
Problem: Self Care Deficits Care Plan (Adult)  Goal: *Acute Goals and Plan of Care (Insert Text)  Occupational Therapy Goals  Initiated 3/6/2019  1. Patient will perform upper body dressing with modified independence within 7 day(s). 2.  Patient will perform upper body dressing with modified independence within 7 day(s). 3.  Patient will perform grooming with modified independence within 7 day(s). 4.  Patient will perform toilet transfers with independence within 7 day(s). 5.  Patient will perform all aspects of toileting with modified independence within 7 day(s). 6.  Patient will utilize energy conservation techniques during functional activities with verbal cues within 7 day(s). Occupational Therapy EVALUATION  Patient: Blas Oro (20 y.o. male)  Date: 3/6/2019  Primary Diagnosis: Acute on chronic systolic heart failure, NYHA class 4 ([facility]) [I50.23]  A-fib ([facility]) [I48.91]  Procedure(s) (LRB):  INSERT ICD BIV MULTI (N/A)  Ablation A-Flutter (N/A)  Ep 3d Mapping (N/A)  Lt Atrial Pace & Record During Ep Study (N/A)  Eval Icd Generator & Leads W Testing At Implant (N/A) 2 Days Post-Op   Precautions:  Fall(sling L arm due to ICD placement 3/4)    ASSESSMENT :  Based on the objective data described below, the patient presents with largely MIN A-CGA for ADL transfers and tasks s/p ICD placement POD 2 with patient presenting with the following performance deficits including but not limited to: impaired dynamic standing balance, generalized weakness, decreased endurance, hard of hearing, decreased insight into deficits, and decreased safety awareness. PTA, patient was largely MOD I for ADL transfers and tasks using a cane, grocery shopping, performing medication management, however patient's wife managing finances. Patient presenting with significant safety concerns with functional mobility and will require 24/7 supervision and HHOT at discharge.      Patient will benefit from skilled intervention to address the above impairments. Patients rehabilitation potential is considered to be Good  Factors which may influence rehabilitation potential include:   []             None noted  []             Mental ability/status  []             Medical condition  []             Home/family situation and support systems  []             Safety awareness  []             Pain tolerance/management  []             Other:      PLAN :  Recommendations and Planned Interventions:  []               Self Care Training                  []        Therapeutic Activities  []               Functional Mobility Training    []        Cognitive Retraining  []               Therapeutic Exercises           []        Endurance Activities  []               Balance Training                   []        Neuromuscular Re-Education  []               Visual/Perceptual Training     []   Home Safety Training  []               Patient Education                 []        Family Training/Education  []               Other (comment):    Frequency/Duration: Patient will be followed by occupational therapy 5 times a week to address goals. Discharge Recommendations: Home Health  Further Equipment Recommendations for Discharge: TBD     SUBJECTIVE:   Patient stated I don't know why I have to where this sling, y'all gave it to me (OT explained LUE precautions s/p ICD placement).     OBJECTIVE DATA SUMMARY:   HISTORY:   Past Medical History:   Diagnosis Date    Anger     TAKES PAROXETINE TO CONTROL ANGER ISSUES    CAD (coronary artery disease)     Foot drop, left     WEARS METAL BRACE    Gout     Hard of hearing     High cholesterol     Hypertension     Prostate enlargement     Thyroid disease     Unspecified sleep apnea     DOESN'T USE CPAP; \"IT MADE HIM SICK-COLDS, SINUS\", PER WIFE     Past Surgical History:   Procedure Laterality Date    CARDIAC SURG PROCEDURE UNLIST      ANGIOPLASTY WITH CORONARY STENT    CARDIAC SURG PROCEDURE UNLIST  1996    AORTIC VALVE REPLACEMENT    HX ORTHOPAEDIC  2001    ORIF COMPOUND FRACTURE LEFT ANKLE    HX RETINAL DETACHMENT REPAIR  1980s    LEFT EYE    AK COMPRE ELECTROPHYSIOL XM W/LEFT ATRIAL PACNG/REC N/A 3/4/2019    Lt Atrial Pace & Record During Ep Study performed by Keyon Rivers MD at [place] [address], [facility]  CATH LAB    AK EPHYS EVAL PACG CVDFB PRGRMG/REPRGRMG PARAMETERS N/A 3/4/2019    Eval Icd Generator & Leads W Testing At Implant performed by Keyon Rivers MD at [place] [address], [facility] Dr CATH LAB    AK EPHYS EVAL W/ABLATION [address] N/A 3/4/2019    Ablation A-Flutter performed by Keyon Rivers MD at [place] [address], [facility] CATH LAB    AK INSJ/RPLCMT PERM DFB W/TRNSVNS LDS 1/DUAL CHMBR N/A 3/4/2019    INSERT ICD BIV MULTI performed by Keyon Rivers MD at [place] [address], [facility]  CATH LAB    AK INTRACARDIAC ELECTROPHYSIOLOGIC 3D MAPPING N/A 3/4/2019    Ep 3d Mapping performed by Keyon Rivers MD at [place] [address], [facility] CATH LAB       Prior Level of Function/Environment/Context: PTA, patient was largely IND for ADL and IADL tasks with the exception of financial management in which patient's wife managed.     Expanded or extensive additional review of patient history:     Home Situation  Home Environment: Private residence  # Steps to Enter: 5  Rails to Enter: Yes  Hand Rails : Left  One/Two Story Residence: Two story  # of Interior Steps: 21  Interior Rails: Both  Lift Chair Available: Yes  Living Alone: No  Support Systems: Spouse/Significant Other/Partner  Patient Expects to be Discharged to[de-identified] Private residence  Current DME Used/Available at Home: Donna Handy, rolling, Cane, straight, Wheelchair, Commode, bedside, Other (comment), Shower chair(slide board)  Tub or Shower Type: Tub/Shower combination    Hand dominance: Right    EXAMINATION OF PERFORMANCE DEFICITS:  Cognitive/Behavioral Status:  Neurologic State: Alert  Orientation Level: Oriented X4  Cognition: Appropriate for age attention/concentration  Perception: Appears intact  Perseveration: No perseveration noted  Safety/Judgement: Decreased insight into deficits; Decreased awareness of need for safety;Decreased awareness of need for assistance    Skin: exposed areas grossly intact    Edema: none noted    Hearing: Auditory  Auditory Impairment: Hard of hearing, bilateral, Hearing aid(s)  Hearing Aids/Status: At bedside    Vision/Perceptual:                                Corrective Lenses: Glasses    Range of Motion:  BUE  AROM: Generally decreased, functional(LUE PPM)                         Strength:  BUE  Strength: Generally decreased, functional(LUE PPM)                Coordination:  Coordination: Generally decreased, functional  Fine Motor Skills-Upper: Left Impaired;Right Intact    Gross Motor Skills-Upper: Left Impaired;Right Intact(LUE PPM)    Tone & Sensation:  BUE  Tone: Normal                         Balance:  Sitting: Intact; With support  Standing: Impaired; Without support  Standing - Static: Fair  Standing - Dynamic : Poor    Functional Mobility and Transfers for ADLs:  Bed Mobility:  Supine to Sit: Modified independent  Sit to Supine: Modified independent    Transfers:  Sit to Stand: Contact guard assistance  Stand to Sit: Contact guard assistance  Bathroom Mobility: Contact guard assistance  Toilet Transfer : Contact guard assistance    ADL Assessment:  Feeding: Modified independent    Oral Facial Hygiene/Grooming: Stand-by assistance(infer 2* static standing balance and LUE PPM)    Bathing: Contact guard assistance(infer 2* dynamic standing balance)    Upper Body Dressing: Minimum assistance(infer A for LUE sling)    Lower Body Dressing: Contact guard assistance(infer 2* dynamic standing balance)    Toileting: Contact guard assistance(infer 2* dynamic standing balance)                ADL Intervention and task modifications:      OT facilitated functional mobility <> bathroom with patient requiring MIN A for dynamic standing balance and verbal cues for safety with toilet transfers x MIN A using sink for support. OT educated patient on LUE precautions s/p ICD placement and adjusted patient's LUE sling 2* patient moving shoulder and sling too loose. Patient required education for energy conservation strategies secondary to decreased endurance and activity tolerance. OT also provided education on home safety to include keeping all areas well lit, free of clutter, and utilizing AD for support. Patient needs reinforcement with safety training to maximize safety post discharge. Lower Body Dressing Assistance  Socks: Supervision/set-up(simulated)  Leg Crossed Method Used: Yes  Position Performed: Seated in chair  Cues: Doff;Don;Verbal cues provided         Cognitive Retraining  Safety/Judgement: Decreased insight into deficits; Decreased awareness of need for safety;Decreased awareness of need for assistance      Functional Measure:  Barthel Index:    Bathin  Bladder: 10  Bowels: 5  Groomin  Dressin  Feeding: 10  Mobility: 10  Stairs: 5  Toilet Use: 5  Transfer (Bed to Chair and Back): 10  Total: 65/100        Percentage of impairment   0%   1-19%   20-39%   40-59%   60-79%   80-99%   100%   Barthel Score 0-100 100 99-80 79-60 59-40 20-39 1-19   0     The Barthel ADL Index: Guidelines  1. The index should be used as a record of what a patient does, not as a record of what a patient could do. 2. The main aim is to establish degree of independence from any help, physical or verbal, however minor and for whatever reason. 3. The need for supervision renders the patient not independent. 4. A patient's performance should be established using the best available evidence. Asking the patient, friends/relatives and nurses are the usual sources, but direct observation and common sense are also important. However direct testing is not needed. 5. Usually the patient's performance over the preceding 24-48 hours is important, but occasionally longer periods will be relevant.   6. Middle categories imply that the patient supplies over 50 per cent of the effort. 7. Use of aids to be independent is allowed. Arminda Santana., Barthel, D.W. ([id number]). Functional evaluation: the Barthel Index. [address] (14)2. EDMUND Reveles Cathern Arnt., Silvina Ernandez., [place], [address] (1999). Measuring the change indisability after inpatient rehabilitation; comparison of the responsiveness of the Barthel Index and Functional [place] Measure. Journal of Neurology, Neurosurgery, and Psychiatry, 66(4), 221-175. KAYDEN Cowart.A, YOKASTA Conway, & Jaspal Washington M.A. (2004.) Assessment of post-stroke quality of life in cost-effectiveness studies: The usefulness of the Barthel Index and the EuroQoL-5D. Quality of Life Research, 15, 700-34       Occupational Therapy Evaluation Charge Determination   History Examination Decision-Making   LOW Complexity : Brief history review  MEDIUM Complexity : 3-5 performance deficits relating to physical, cognitive , or psychosocial skils that result in activity limitations and / or participation restrictions MEDIUM Complexity : Patient may present with comorbidities that affect occupational performnce. Miniml to moderate modification of tasks or assistance (eg, physical or verbal ) with assesment(s) is necessary to enable patient to complete evaluation       Based on the above components, the patient evaluation is determined to be of the following complexity level: LOW   Pain:  Pain Scale 1: Numeric (0 - 10)  Pain Intensity 1: 0  Pain Location 1: Shoulder  Pain Orientation 1: Left  Pain Description 1: Aching; Sore  Pain Intervention(s) 1: Medication (see MAR); Rest;Repositioned  Activity Tolerance:   VSS  Please refer to the flowsheet for vital signs taken during this treatment.   After treatment:   [x] Patient left in no apparent distress sitting up in chair  [] Patient left in no apparent distress in bed  [x] Call bell left within reach  [x] Nursing notified  [] Caregiver present  [] Bed alarm activated    COMMUNICATION/EDUCATION:   The patients plan of care was discussed with: Physical Therapist and Registered Nurse. [x] Home safety education was provided and the patient/caregiver indicated understanding. [x] Patient/family have participated as able in goal setting and plan of care. [x] Patient/family agree to work toward stated goals and plan of care. [] Patient understands intent and goals of therapy, but is neutral about his/her participation. [] Patient is unable to participate in goal setting and plan of care. This patients plan of care is appropriate for delegation to [facility].     Thank you for this referral.  Nikole Latif  Time Calculation: 15 mins

## 2019-03-06 NOTE — PROGRESS NOTES
Problem: Falls - Risk of  Goal: *Absence of Falls  Document Juany Fall Risk and appropriate interventions in the flowsheet.   Outcome: Progressing Towards Goal  Fall Risk Interventions:  Mobility Interventions: Patient to call before getting OOB    Mentation Interventions: Increase mobility    Medication Interventions: Patient to call before getting OOB, Teach patient to arise slowly    Elimination Interventions: Patient to call for help with toileting needs    History of Falls Interventions: Door open when patient unattended

## 2019-03-07 VITALS
DIASTOLIC BLOOD PRESSURE: 84 MMHG | HEIGHT: 65 IN | HEART RATE: 80 BPM | SYSTOLIC BLOOD PRESSURE: 129 MMHG | WEIGHT: 176.59 LBS | OXYGEN SATURATION: 93 % | RESPIRATION RATE: 18 BRPM | TEMPERATURE: 97.6 F | BODY MASS INDEX: 29.42 KG/M2

## 2019-03-07 LAB
ANION GAP SERPL CALC-SCNC: 7 MMOL/L (ref 5–15)
BUN SERPL-MCNC: 20 MG/DL (ref 6–20)
BUN/CREAT SERPL: 17 (ref 12–20)
CALCIUM SERPL-MCNC: 8.5 MG/DL (ref 8.5–10.1)
CHLORIDE SERPL-SCNC: 105 MMOL/L (ref 97–108)
CO2 SERPL-SCNC: 28 MMOL/L (ref 21–32)
CREAT SERPL-MCNC: 1.16 MG/DL (ref 0.7–1.3)
GLUCOSE SERPL-MCNC: 113 MG/DL (ref 65–100)
INR PPP: 3.8 (ref 0.9–1.1)
POTASSIUM SERPL-SCNC: 3.9 MMOL/L (ref 3.5–5.1)
PROTHROMBIN TIME: 36 SEC (ref 9–11.1)
SODIUM SERPL-SCNC: 140 MMOL/L (ref 136–145)

## 2019-03-07 PROCEDURE — 97116 GAIT TRAINING THERAPY: CPT

## 2019-03-07 PROCEDURE — 36415 COLL VENOUS BLD VENIPUNCTURE: CPT

## 2019-03-07 PROCEDURE — 85610 PROTHROMBIN TIME: CPT

## 2019-03-07 PROCEDURE — 80048 BASIC METABOLIC PNL TOTAL CA: CPT

## 2019-03-07 PROCEDURE — 94760 N-INVAS EAR/PLS OXIMETRY 1: CPT

## 2019-03-07 PROCEDURE — 74011250637 HC RX REV CODE- 250/637: Performed by: NURSE PRACTITIONER

## 2019-03-07 PROCEDURE — 74011250637 HC RX REV CODE- 250/637: Performed by: PHYSICIAN ASSISTANT

## 2019-03-07 PROCEDURE — 97535 SELF CARE MNGMENT TRAINING: CPT

## 2019-03-07 PROCEDURE — 74011250637 HC RX REV CODE- 250/637: Performed by: SPECIALIST

## 2019-03-07 RX ORDER — CEPHALEXIN 250 MG/1
250 CAPSULE ORAL 3 TIMES DAILY
Qty: 9 CAP | Refills: 0 | Status: SHIPPED | OUTPATIENT
Start: 2019-03-07 | End: 2019-03-10

## 2019-03-07 RX ORDER — SPIRONOLACTONE 25 MG/1
25 TABLET ORAL DAILY
Qty: 30 TAB | Refills: 2 | Status: SHIPPED | OUTPATIENT
Start: 2019-03-08 | End: 2019-06-25 | Stop reason: SDUPTHER

## 2019-03-07 RX ORDER — LEVOTHYROXINE SODIUM 150 UG/1
150 TABLET ORAL
Qty: 30 TAB | Refills: 2 | Status: SHIPPED | OUTPATIENT
Start: 2019-03-08 | End: 2020-06-11

## 2019-03-07 RX ORDER — CARVEDILOL 6.25 MG/1
6.25 TABLET ORAL 2 TIMES DAILY
Qty: 60 TAB | Refills: 3 | Status: SHIPPED | OUTPATIENT
Start: 2019-03-07 | End: 2019-06-04 | Stop reason: SDUPTHER

## 2019-03-07 RX ADMIN — LEVOTHYROXINE SODIUM 150 MCG: 150 TABLET ORAL at 09:12

## 2019-03-07 RX ADMIN — ALLOPURINOL 300 MG: 300 TABLET ORAL at 09:12

## 2019-03-07 RX ADMIN — SACUBITRIL AND VALSARTAN 1 TABLET: 24; 26 TABLET, FILM COATED ORAL at 12:39

## 2019-03-07 RX ADMIN — ATORVASTATIN CALCIUM 40 MG: 40 TABLET, FILM COATED ORAL at 09:12

## 2019-03-07 RX ADMIN — CEPHALEXIN 250 MG: 250 CAPSULE ORAL at 16:02

## 2019-03-07 RX ADMIN — Medication 10 ML: at 07:10

## 2019-03-07 RX ADMIN — CARVEDILOL 6.25 MG: 6.25 TABLET, FILM COATED ORAL at 09:12

## 2019-03-07 RX ADMIN — Medication 10 ML: at 13:45

## 2019-03-07 RX ADMIN — CEPHALEXIN 250 MG: 250 CAPSULE ORAL at 09:12

## 2019-03-07 RX ADMIN — FINASTERIDE 5 MG: 5 TABLET, FILM COATED ORAL at 09:12

## 2019-03-07 RX ADMIN — SPIRONOLACTONE 25 MG: 25 TABLET ORAL at 09:12

## 2019-03-07 RX ADMIN — ACETAMINOPHEN 650 MG: 325 TABLET ORAL at 03:58

## 2019-03-07 RX ADMIN — ASPIRIN 81 MG: 81 TABLET, COATED ORAL at 09:12

## 2019-03-07 NOTE — ROUTINE PROCESS
Verbal shift change report given to Seth Cid (oncoming nurse) by Pily Chu (offgoing nurse). Report included the following information SBAR, Kardex, Procedure Summary, Recent Results and Cardiac Rhythm Paced.

## 2019-03-07 NOTE — PROGRESS NOTES
Problem: Falls - Risk of  Goal: *Absence of Falls  Document Juany Fall Risk and appropriate interventions in the flowsheet. Outcome: Progressing Towards Goal  Fall Risk Interventions:  Mobility Interventions: Patient to call before getting OOB    Mentation Interventions: Adequate sleep, hydration, pain control, Door open when patient unattended, Toileting rounds, Update white board    Medication Interventions: Patient to call before getting OOB, Teach patient to arise slowly    Elimination Interventions: Patient to call for help with toileting needs, Toilet paper/wipes in reach, Toileting schedule/hourly rounds, Urinal in reach    History of Falls Interventions: Door open when patient unattended        Problem: Pressure Injury - Risk of  Goal: *Prevention of pressure injury  Document Jose Scale and appropriate interventions in the flowsheet.   Outcome: Progressing Towards Goal  Pressure Injury Interventions:  Sensory Interventions: Assess changes in LOC, Minimize linen layers    Moisture Interventions: Absorbent underpads, Offer toileting Q_hr, Check for incontinence Q2 hours and as needed    Activity Interventions: Pressure redistribution bed/mattress(bed type)    Mobility Interventions: Pressure redistribution bed/mattress (bed type)    Nutrition Interventions: Document food/fluid/supplement intake    Friction and Shear Interventions: Minimize layers, Transferring/repositioning devices

## 2019-03-07 NOTE — PROGRESS NOTES
Transitional Care Team: Initial HUG Note    Date of Assessment: 03/07/19  Time of Assessment:  2:17 PM  Hospital Nurse Navigator: Dyan Cai. Namrata Corral is a 80 y.o. male inpatient at [facility] with Acute on chronic systolic heart failure, NYHA class 4 ([facility]) [I50.23]  A-fib ([address] [I48.91] on  3/1/2019. Assessment & Plan   Discharge pending to [address] [place] [address],[address]. NN reviewed chart patient's INR 3.8 today. Contacted PharmD Sheridan Walsh who will make Coumadin dosing  recommendations prior to patient discharge to SNF. Dr. Ubaldo Schirmer also aware of elevated INR. Patient's remains unaware that his wife has sustained a stroke however she  is at home, communicative and independent in ADLs except for driving. Patient has daughter who can assist. He is not happy about returning to [facility] however feels like he can tolerate one week or so. Patient has Advance Care Plan. He is not interested in [facility]. Please note   documentation for further information regarding discharge. Medication Reconciliation:  was not  Performed however PharmD is aware of patient needs. 9. Can patient afford medications: Yes    10. Who manages medications at home: Patients wife. [address] is aware of patient's hospital admission and will resume care once patient is discharged from SNF. 15. NN is sending patient's wife [facility] information and Care & Capabilities booklet. The Transitional Care Team bridges the gaps in care and education surrounding discharge from the acute care facility.  The objective is to empower the patient and family in taking a proactive role in the task of preventing readmission within the first thirty days after discharge from the acute care setting, The team is also involved in the efforts to reduce readmission to the acute care setting after stabilization and discharge from the acute care environment either to skilled nursing facilities or community. Future Appointments   Date Time Provider Lexie Tiffanie   3/11/2019  3:40 PM Oretha Boxer [address]   3/14/2019  3:30 PM Bia Saunders Located within [facility]   3/14/2019  3:40 PM Ginny Stafford  [address]     Patient education focused on readmission zones as described as: The Red Zone: High risk for readmission, days 1-21   The Yellow Zone: Moderate  risk for readmission, days 22-29   The Green Zone: Lower risk for readmission, days 30 and after    The TC Team will follow patient from a distance while inpatient as well as be available for further transition disposition as needed. The BELEN TEAM will continue to offer support during the 30- 90 day discharge from acute care setting.      Past Medical History:   Diagnosis Date    Anger     TAKES PAROXETINE TO CONTROL ANGER ISSUES    CAD (coronary artery disease)     Foot drop, left     WEARS METAL BRACE    Gout     Hard of hearing     High cholesterol     Hypertension     Prostate enlargement     Thyroid disease     Unspecified sleep apnea     DOESN'T USE CPAP; \"IT MADE HIM SICK-COLDS, SINUS\", PER WIFE

## 2019-03-07 NOTE — PROGRESS NOTES
DISCHARGE - TRANSFER - OUT REPORT:    Verbal report given to Brooks Muñiz on Charlott Cheadle  being transferred to [address] [place] [address] for routine progression of care- discharge to rehab. Report consisted of patients Situation, Background, Assessment and Recommendations(SBAR). Information from the following report(s) SBAR, Kardex, Procedure Summary, Intake/Output, MAR, Accordion, Recent Results, Med Rec Status and Cardiac Rhythm AV Paced was reviewed with the receiving nurse. Opportunity for questions and clarification was provided.       Patient transported with:   Registered Nurse

## 2019-03-07 NOTE — PROGRESS NOTES
CM arranged transportation for patient to discharge to [facility] in [place] for 1630 p.m. Report can be called to . Patient has used [facility] in the past, and they will follow him while he is at [facility]. Folder placed on chart.     Christopher Mojica [facility]

## 2019-03-07 NOTE — PROGRESS NOTES
[facility] SNF can accept patient when ready for discharge.     Conrado Moeller [facility]

## 2019-03-07 NOTE — DISCHARGE SUMMARY
Cardiology Discharge Summary     Patient ID:  Jasmin Archer  [id number]  28 y.o.  1935    Admit Date: 3/1/2019  Discharge Date: 03/07/19  Admitting Physician: Yayo Martinez MD   Discharge Physician: Flavia Mcelroy. Marylin Ho M.D. Admission Diagnoses:   Acute on chronic systolic heart failure, NYHA class 4 (HCC) [I50.23]  A-fib ([address] [I48.91]    Discharge Diagnoses: Active Problems:    A-fib ([address] (3/1/2019)      Acute on chronic systolic heart failure, NYHA class 4 ([address] (3/1/2019)      Typical atrial flutter ([address] (3/4/2019)      Status post catheter ablation of atrial flutter (3/4/2019)      Overview: 3/4/2019 to NSR and bidrectional line of block      Biventricular ICD (implantable cardioverter-defibrillator) in place (3/4/2019)      Overview: 3/4/2019 [employer] BIV ICD        Discharge Condition: Good    Cardiology Procedures this Admission:    1. EP study and ablation of typical atrial flutter  2. Biventricular pacer ICD  3. Defibrillation threshold test  4. MEMO    Consults: EP    Hospital Course:  Jasmin Archer is a 80 y.o. male admitted on 3/1/2019  for Acute on chronic systolic heart failure, NYHA class 4 (HCC) [I50.23]  A-fib ([address] [I48.91]. has a past medical history of Anger, CAD (coronary artery disease), Foot drop, left, Gout, Hard of hearing, High cholesterol, Hypertension, Prostate enlargement, Thyroid disease, and Unspecified sleep apnea.    Presents to the ED for admission regarding acute on chronic systolic heart failure and Afib. Unable to be directly admitted. Evaluated in office by Dr. Marylin Ho, his EF is below 10%. He has Afib with RVR, LENNON, intermittent CP and cough with sputum production. He is being admitted for management of his HF and afib with RVR. Plan for BiV PPM and cardiac catheterization +/- on this visit.   Admitted for CHF cardiomyopathy, EF less than 10%, LENNON and aflutter.     Started on [employer] as well as Entresto,  Coreg and Aldactone  MEMO showed normal fx of AVR  Under went BiV ICD placement. ablation of typical atrial flutter & [employer] biventricular ICD implant on 03/04/2019. Device check on 03/05/2019 shows proper lead & generator function. Slightly suboptimal biventricular pacing due to PVCs  TSH noted to be elevated to 45, thyroid US negative. Synthroid increased  LENNON improved as well as overall condition  Stable for discharge to SNF. Plan on ischemic work up in near future    Lab Results   Component Value Date/Time    NT pro-BNP 10,528 (H) 03/01/2019 09:30 AM    NT pro-BNP 1,870 (H) 10/09/2018 03:32 PM     Lab Results   Component Value Date/Time    Creatinine (POC) 1.6 (H) 06/18/2018 01:48 PM    Creatinine 1.16 03/07/2019 03:51 AM      03/01/19   ECHO MEMO W OR WO CONTRAST 03/06/2019 3/6/2019    Narrative · Left ventricular severely decreased systolic function. Estimated left   ventricular ejection fraction is <15%. Left ventricular cavity size is   dilated. Left ventricular mild concentric hypertrophy. Left ventricular   global hypokinesis. · Aortic valve is prosthetic. Mild aortic valve stenosis is present. There   is a mechanical aortic valve. Prosthesis is normal. Prosthetic valve   function is sufficient. · Left atrial cavity size is moderately dilated. No spontaneous echo   contrast  · Right ventricular global systolic function is reduced. Signed by: Nancy Campos MD        Visit Vitals  /84   Pulse 80   Temp 97.6 °F (36.4 °C)   Resp 18   Ht 5' 5\" (1.651 m)   Wt 176 lb 9.4 oz (80.1 kg)   SpO2 93%   BMI 29.39 kg/m²       Physical Exam  Extremities: no LE edema, + PP bilaterally   Heart: regular rate and rhythm, S1, S2 normal, no murmurs, clicks, rubs or gallops  Lungs: clear to auscultation bilaterally  Neurologic: Grossly normal    Labs: No results for input(s): WBC, HGB, HCT, PLT, HGBEXT, HCTEXT, PLTEXT, HGBEXT, HCTEXT, PLTEXT in the last 72 hours.   Recent Labs     03/07/19  0351 03/06/19  0405 03/05/19  0415     --   --    K 3.9  --   --      --   --    CO2 28  --   --    *  --   --    BUN 20  --   --    CREA 1.16  --   --    CA 8.5  --   --    INR 3.8* 2.3* 2.6*       Lab Results   Component Value Date/Time    NT pro-BNP 10,528 (H) 03/01/2019 09:30 AM    NT pro-BNP 1,870 (H) 10/09/2018 03:32 PM          Disposition: [facility]    Patient Instructions:      Medication List      START taking these medications    carvedilol 6.25 mg tablet  Commonly known as:  COREG  Take 1 Tab by mouth two (2) times a day. cephALEXin 250 mg capsule  Commonly known as:  KEFLEX  Take 1 Cap by mouth three (3) times daily for 3 days. sacubitril-valsartan 24-26 mg tablet  Commonly known as:  ENTRESTO  Take 1 Tab by mouth every twelve (12) hours. spironolactone 25 mg tablet  Commonly known as:  ALDACTONE  Take 1 Tab by mouth daily. Start taking on:  3/8/2019        CHANGE how you take these medications    levothyroxine 150 mcg tablet  Commonly known as:  SYNTHROID  Take 1 Tab by mouth Daily (before breakfast). Start taking on:  3/8/2019  What changed:    · medication strength  · how much to take     warfarin 1 mg tablet  Commonly known as:  COUMADIN  Take 1 Tab by mouth daily. Start taking on:  3/9/2019  What changed:    · medication strength  · how much to take  · when to take this  · These instructions start on 3/9/2019. If you are unsure what to do until then, ask your doctor or other care provider. · Another medication with the same name was removed. Continue taking this medication, and follow the directions you see here.         CONTINUE taking these medications    allopurinol 300 mg tablet  Commonly known as:  ZYLOPRIM     amoxicillin 875 mg tablet  Commonly known as:  AMOXIL     ascorbic acid (vitamin C) 500 mg tablet  Commonly known as:  VITAMIN C     aspirin delayed-release 81 mg tablet     atorvastatin 40 mg tablet  Commonly known as:  LIPITOR     cholecalciferol 1,000 unit tablet  Commonly known as:  VITAMIN D3     CITRACAL PO COQ10 (UBIQUINOL) PO     FERROUS GLUCONATE PO     finasteride 5 mg tablet  Commonly known as:  PROSCAR     furosemide 20 mg tablet  Commonly known as:  LASIX  Take 1 Tab by mouth daily. oxybutynin chloride XL 10 mg CR tablet  Commonly known as:  DITROPAN XL     PARoxetine 20 mg tablet  Commonly known as:  PAXIL     PROBIOTIC 4X 10-15 mg Tbec  Generic drug:  B.infantis-B.ani-B.long-B.bifi     tamsulosin 0.4 mg capsule  Commonly known as:  FLOMAX     therapeutic multivitamin tablet  Commonly known as:  Gustavo Dowse           Where to Get Your Medications      These medications were sent to [facility], [address] [place] [address], [address] [place] [address],[address]    Phone:  [phone]   · carvedilol 6.25 mg tablet  · cephALEXin 250 mg capsule  · levothyroxine 150 mcg tablet  · sacubitril-valsartan 24-26 mg tablet  · spironolactone 25 mg tablet  · warfarin 1 mg tablet         Reference discharge instructions provided by nursing for diet and activity. Follow-up with   Future Appointments   Date Time Provider Lexie Moreno   3/11/2019  3:40 PM Beverlyn Mohs [address]   3/14/2019  3:30 PM Iron Hassan [place] [address] [facility]   3/14/2019  3:40 PM Andriy Barbosa  [address]       Signed:  Art Damian PA-C      Patient seen on the day of progress note and examined  and agree with Advance Practice Provider (YVETTE, NP,PA)  assessment and plans as amended.  Michael Lorenzo MD

## 2019-03-07 NOTE — PROGRESS NOTES
Problem: Self Care Deficits Care Plan (Adult)  Goal: *Acute Goals and Plan of Care (Insert Text)  Occupational Therapy Goals  Initiated 3/6/2019  1. Patient will perform upper body dressing with modified independence within 7 day(s). 2.  Patient will perform upper body dressing with modified independence within 7 day(s). 3.  Patient will perform grooming with modified independence within 7 day(s). 4.  Patient will perform toilet transfers with independence within 7 day(s). 5.  Patient will perform all aspects of toileting with modified independence within 7 day(s). 6.  Patient will utilize energy conservation techniques during functional activities with verbal cues within 7 day(s). Occupational Therapy TREATMENT  Patient: Yessenia Lopez (50 y.o. male)  Date: 3/7/2019  Diagnosis: Acute on chronic systolic heart failure, NYHA class 4 ([facility]) [I50.23]  A-fib ([facility]) [I48.91] <principal problem not specified>  Procedure(s) (LRB):  INSERT ICD BIV MULTI (N/A)  Ablation A-Flutter (N/A)  Ep 3d Mapping (N/A)  Lt Atrial Pace & Record During Ep Study (N/A)  Eval Icd Generator & Leads W Testing At Implant (N/A) 3 Days Post-Op  Precautions: Fall(sling L arm due to ICD placement 3/4)  Chart, occupational therapy assessment, plan of care, and goals were reviewed. ASSESSMENT:  Patient received in bed willing to work; min A bed mobility, min/mod A sit to stand and scooting; max A for socks 1 handed but decreased memory for doing task 1 handed; donned and doffed sling and hospital gown x 2- patient with marked impairment in Not using L shoulder in abduction with physical restraint needed. He will benefit from training/retetion for max safety and he acknowledges that his wife is unable to assist for all he needs. Aware of plan for recommended SNF level rehab. Recommend [facility] next tx session.   Progression toward goals:  [x]       Improving appropriately and progressing toward goals  []       Improving slowly and progressing toward goals  []       Not making progress toward goals and plan of care will be adjusted     PLAN:  Patient continues to benefit from skilled intervention to address the above impairments. Continue treatment per established plan of care. Discharge Recommendations:  Skilled Nursing Facility  Further Equipment Recommendations for Discharge:  TBA     SUBJECTIVE:   Patient stated My wife just had skin cancer surgery.     OBJECTIVE DATA SUMMARY:   Cognitive/Behavioral Status:  Neurologic State: Alert  Orientation Level: Oriented X4  Cognition: Follows commands; Impulsive; Impaired decision making;Decreased attention/concentration(\"I cant move my arm 2* it hurts\")        Safety/Judgement: Decreased insight into deficits; Decreased awareness of need for safety;Decreased awareness of need for assistance    Functional Mobility and Transfers for ADLs:  Bed Mobility:  Rolling: Minimum assistance  Supine to Sit: Supervision; Additional time; Adaptive equipment  Sit to Supine: Supervision  Scooting: Minimum assistance; Moderate assistance; Additional time    Transfers:  Sit to Stand: Minimum assistance; Moderate assistance; Additional time(repeatedly trying to pull on staff)     Bed to Chair: Minimum assistance; Moderate assistance; Additional time    Balance:  Standing: Impaired; Without support  Standing - Static: Good;Constant support  Standing - Dynamic : Poor(decreased balance in stepping )    ADL Intervention:  Feeding  Container Management: Supervision/set-up  Cutting Food: Supervision/set-up(increased time 1 handed which he needs or he over uses L UE)                   Upper Body Dressing Assistance  Orthotics(Brace): (D for sling- trained thumb strap use; better by end of sessi)  Hospital Gown: Maximum assistance;Proximal stability; Compensatory technique training(max A to not move L shoulder with UE dressing)    Lower Body Dressing Assistance  Socks: Maximum assistance(unable to perform with 1 hand on)         Cognitive Retraining  Safety/Judgement: Decreased insight into deficits; Decreased awareness of need for safety;Decreased awareness of need for assistance               Therapeutic Exercises:   Trained [facility] exercises for AROM and edema management  Pain:  Pain Scale 1: Numeric (0 - 10)  Pain Intensity 1: 0              Activity Tolerance:   improving  Please refer to the flowsheet for vital signs taken during this treatment.   After treatment:   [] Patient left in no apparent distress sitting up in chair  [x] Patient left in no apparent distress in bed  [x] Call bell left within reach  [x] Nursing notified  [] Caregiver present  [] Bed alarm activated    COMMUNICATION/COLLABORATION:   The patients plan of care was discussed with: Registered Nurse, Physician and     Clydene Krabbe  Time Calculation: 22 mins

## 2019-03-07 NOTE — PROGRESS NOTES
Pharmacist Note - Warfarin Dosing  Consult provided for this 84 y.o.male to manage warfarin for atrial fibrillation + mechanical aortic valve. INR Goal: 2 - 3     Home regimen/ tablet size: 2 mg x 6 days; 4 mg on Friday     Drugs that may increase INR: None  Drugs that may decrease INR: None  Other current anticoagulants/ drugs that may increase bleeding risk: Aspirin  Risk factors: Age > 65  Daily INR ordered: YES    Recent Labs     03/07/19  0351 03/06/19  0405 03/05/19  0415   INR 3.8* 2.3* 2.6*     Date               INR                  Dose  3/1  3.2  2 mg    3/2  3.2  1 mg    3/3  4  Held    3/4  3.1  Held (per Dr. Narcisa Archer)   3/5  2.6  2 mg    3/6  2.2  2 mg    3/7  3.8  Held                                                                                Assessment/ Plan: Will hold warfarin x 1 dose. For discharge I would recommend restarting warfarin at 1 mg PO daily on 3/9/19. Pharmacy will continue to monitor daily and adjust therapy as indicated. Please contact the pharmacist at [phone] or  for outpatient recommendations if needed.

## 2019-03-07 NOTE — PROGRESS NOTES
Problem: Mobility Impaired (Adult and Pediatric)  Goal: *Acute Goals and Plan of Care (Insert Text)  Physical Therapy Goals  Initiated 3/6/2019  1. Patient will transfer from bed to chair and chair to bed with modified independence using the least restrictive device within 7 day(s). 2.  Patient will perform sit to stand with modified independence within 7 day(s). 3.  Patient will ambulate with modified independence for 500 feet with the least restrictive device within 7 day(s). 4.  Patient will ascend/descend 21 stairs with 1 handrail(s) with modified independence within 7 day(s). physical Therapy TREATMENT  Patient: Robert Flores (78 y.o. male)  Date: 3/7/2019  Diagnosis: Acute on chronic systolic heart failure, NYHA class 4 ([facility]) [I50.23]  A-fib ([facility]) [I48.91] <principal problem not specified>  Procedure(s) (LRB):  INSERT ICD BIV MULTI (N/A)  Ablation A-Flutter (N/A)  Ep 3d Mapping (N/A)  Lt Atrial Pace & Record During Ep Study (N/A)  Eval Icd Generator & Leads W Testing At Implant (N/A) 3 Days Post-Op  Precautions: Fall(sling L arm due to ICD placement 3/4)  Chart, physical therapy assessment, plan of care and goals were reviewed. ASSESSMENT:  Pt agreeable to therapy. Reviewed pacer precautions. Pt required v.c to adhere. Pt was able to complete 6MWT. Pt has an unsteady gait requiring min A. Pt typically uses a cane in the right UE. Pt could benefit from SNF to progress balance and safety with mobility. Progression toward goals:  [x]    Improving appropriately and progressing toward goals  []    Improving slowly and progressing toward goals  []    Not making progress toward goals and plan of care will be adjusted     PLAN:  Patient continues to benefit from skilled intervention to address the above impairments. Continue treatment per established plan of care.   Discharge Recommendations:  Yariel Rodriguez  Further Equipment Recommendations for Discharge:  NATHAN     SUBJECTIVE:   Patient stated My legs are a little soft.     OBJECTIVE DATA SUMMARY:   Critical Behavior:  Neurologic State: Alert  Orientation Level: Oriented X4  Cognition: Follows commands, Impulsive, Impaired decision making, Decreased attention/concentration(\"I cant move my arm 2* it hurts\")  Safety/Judgement: Decreased insight into deficits, Decreased awareness of need for safety, Decreased awareness of need for assistance  Functional Mobility Training:  Bed Mobility:  Rolling: Minimum assistance  Supine to Sit: Supervision; Additional time; Adaptive equipment  Sit to Supine: Supervision  Scooting: Minimum assistance; Moderate assistance; Additional time        Transfers:  Sit to Stand: Minimum assistance           Bed to Chair: Minimum assistance; Moderate assistance; Additional time                    Balance:  Sitting: Impaired  Sitting - Static: Fair (occasional)  Sitting - Dynamic: Fair (occasional)  Standing: Impaired  Standing - Static: Fair  Standing - Dynamic : Fair  Ambulation/Gait Training:  Distance (ft): 401 Feet (ft)  Assistive Device: Gait belt(periodic HHA)  Ambulation - Level of Assistance: Minimal assistance        Gait Abnormalities: Decreased step clearance; Path deviations;Trunk sway increased        Base of Support: Widened     Speed/Niki: Pace decreased (<100 feet/min)  Step Length: Left shortened;Right shortened          6 MWT results: (Specify if any supplemental oxygen is used, the type, pre, during and post sats.)  Distance Walked in Feet (ft): 401 ft. Post Heart Rate: 79   Post O2 Saturation: 95   Assistive device used: Assistive Device: Gait belt(periodic HHA)       Normative data:   Men 39-80 years old = 1889 feet; Women 3680 years oyy=0404 feet  Modified 10 point Ashely RPE scale utilized:  0 = no breathlessness at all ---> 10 = maximum exertion  Please refer to the flowsheet for any additional vital signs taken during this treatment.                  Stairs:              Neuro Re-Education:    Therapeutic Exercises:     Pain:  Pain Scale 1: Numeric (0 - 10)  Pain Intensity 1: 0              Activity Tolerance:   Limited   Please refer to the flowsheet for vital signs taken during this treatment.   After treatment:   [x]    Patient left in no apparent distress sitting up in chair  []    Patient left in no apparent distress in bed  [x]    Call bell left within reach  [x]    Nursing notified  []    Caregiver present  []    Bed alarm activated     COMMUNICATION/COLLABORATION:   The patients plan of care was discussed with: Registered Nurse    Leonides Burciaga PTA   Time Calculation: 18 mins

## 2019-03-11 ENCOUNTER — TELEPHONE (OUTPATIENT)
Dept: CARDIOLOGY CLINIC | Age: 84
End: 2019-03-11

## 2019-03-11 NOTE — TELEPHONE ENCOUNTER
Left a message for Alirio Cortes about r/s this appointment and that I do not have appointments until June/July that patient needs to keep this appointment because we need to have a wound check and device check.

## 2019-03-11 NOTE — TELEPHONE ENCOUNTER
Karlo Hanks from [facility] is calling to see if she r/s patient's pacemaker and wound checks. They are trying to figure out transportation and it would be easier to r/s.    Phone: [phone]

## 2019-03-14 ENCOUNTER — PATIENT OUTREACH (OUTPATIENT)
Dept: CASE MANAGEMENT | Age: 84
End: 2019-03-14

## 2019-03-14 ENCOUNTER — OFFICE VISIT (OUTPATIENT)
Dept: CARDIOLOGY CLINIC | Age: 84
End: 2019-03-14

## 2019-03-14 ENCOUNTER — CLINICAL SUPPORT (OUTPATIENT)
Dept: CARDIOLOGY CLINIC | Age: 84
End: 2019-03-14

## 2019-03-14 VITALS
BODY MASS INDEX: 29.16 KG/M2 | HEIGHT: 65 IN | WEIGHT: 175 LBS | DIASTOLIC BLOOD PRESSURE: 60 MMHG | HEART RATE: 68 BPM | SYSTOLIC BLOOD PRESSURE: 90 MMHG | OXYGEN SATURATION: 97 %

## 2019-03-14 DIAGNOSIS — Z95.810 BIVENTRICULAR ICD (IMPLANTABLE CARDIOVERTER-DEFIBRILLATOR) IN PLACE: Primary | ICD-10-CM

## 2019-03-14 DIAGNOSIS — Z95.810 CARDIAC DEFIBRILLATOR IN SITU: Primary | ICD-10-CM

## 2019-03-14 DIAGNOSIS — Z95.2 H/O MECHANICAL AORTIC VALVE REPLACEMENT: ICD-10-CM

## 2019-03-14 DIAGNOSIS — I48.0 PAF (PAROXYSMAL ATRIAL FIBRILLATION) (HCC): ICD-10-CM

## 2019-03-14 DIAGNOSIS — Z98.890 STATUS POST CATHETER ABLATION OF ATRIAL FLUTTER: ICD-10-CM

## 2019-03-14 DIAGNOSIS — I48.3 TYPICAL ATRIAL FLUTTER (HCC): ICD-10-CM

## 2019-03-14 NOTE — PROGRESS NOTES
Wound Check   Patient is here for wound check. No fever, drainage   I removed dressing and steri strips  Some ecchymosis   Physical Exam   Constitutional: well-developed and well-nourished. Skin: Left side pocket is healed without redness, drainage, hematoma       ASSESSMENT and PLAN       ICD-10-CM ICD-9-CM    1. Biventricular ICD (implantable cardioverter-defibrillator) in place Z95.810 V45.02    2. H/O mechanical aortic valve replacement Z95.2 V43.3    3. Status post catheter ablation of atrial flutter Z98.890 V45.89    4. Typical atrial flutter (HCC) I48.3 427.32    5.  PAF (paroxysmal atrial fibrillation) (HCC) I48.0 427.31        current treatment plan is effective, no change in therapy continue with coumadin  Device check shows proper lead and generator functions  PAF and atypical atrial flutter noted  73% CRT    Follow-up Disposition:  Return 3 months   May need AF left sided AFL ablation or AV node ablation in 3 months if BIV pacing is not adequate

## 2019-03-14 NOTE — PROGRESS NOTES
Community Care Team documentation for patient in [facility]  Initial Follow Up       Patient was discharged to [facility]. Information included in this progress note has been provided to SNF. Hospital Admission and Diagnosis: [facility] 3/1-3/7  A-fib   RRAT Score: 17   Advance Care Planning:  Advance Directive on file      PCP : Ragini Marquez, DO    SNF Attending:  Rachel Mcduffie MD    Spoke with SNF team. Ensured patient arrived to SNF safely with admission packet in order. Provided needed hospital follow up appointments: Cardiology Matt Haskins MD on 3/14 at 3:30 PM for pacemaker check and 3:40 to see doctor; Cardiology Nancy Faith PA-C on 3/11/2019 at 3:40 PM was canceled due to no transportation and no communication from wife. Patient is full code in SNF. PT,OT,ST providing skilled therapy in SNF. ST for cognition. Currently ambulating 100 ft with single point cane and contact guard assist with rest breaks. Contact guard assist with transfers. Stand by assist with all ADLs. Anticipate 2-4 more weeks LOS. Barrier - spouse (had recent stroke) concerned about transportation to follow up appointment with Dr. Neeraj Garcia on 3/14, and will contact daughter to see if she can help. Plan for discharge to home with wife. Has previously used At [facility], [facility], or [facility] outpatient therapy. Community Care Team will follow up weekly with [facility] until discharge. Medications were not reconciled and general patient assessment was not completed during this [facility] outreach.       Leny Bravo, MSN, RN, ACNS-BC, [facility]  Nurse Navigator, [place] [phone]

## 2019-03-21 ENCOUNTER — PATIENT OUTREACH (OUTPATIENT)
Dept: CASE MANAGEMENT | Age: 84
End: 2019-03-21

## 2019-03-21 NOTE — PROGRESS NOTES
Community Care Team Documentation for Patient in [facility]  Subsequent Follow up     Patient remains at [facility] ([facility]). See previous [facility] Team notes. PCP : Marge Hernández    Spoke with SNF team.  PT/OT continue. Currently ambulating 200ft x 3 SBA to CGA with safety cues, ambulate w/o device 20ft x2 at [facility], ADLs stand by assist. 2-3 wks LOS anticipated due to impulsivity and wife health. Cardiology follow up attended last week with Dr Charmayne Angst. Plan for discharge to home with wife. Has previously used At [facility], [facility], or [facility] outpatient therapy. Medications were not reconciled and general patient assessment was not completed during this skilled nursing facility outreach.

## 2019-03-28 ENCOUNTER — PATIENT OUTREACH (OUTPATIENT)
Dept: CASE MANAGEMENT | Age: 84
End: 2019-03-28

## 2019-03-28 NOTE — PROGRESS NOTES
Community Care Team Documentation for Patient in [facility]  Subsequent Follow up     Patient remains at [facility] ([facility]). See previous [facility] Team notes. PCP : Morgan Benítez    Spoke with SNF team. PT/OT continue. Currently ambulating 200 ft supervision with cane, completing 4 steps with handrail and cane, upper body ADLs set up, lower body ADLs supervision. Wife requested discharge 4/1/19 home with [facility]. Medications were not reconciled and general patient assessment was not completed during this skilled nursing facility outreach.

## 2019-04-03 ENCOUNTER — OFFICE VISIT (OUTPATIENT)
Dept: CARDIOLOGY CLINIC | Age: 84
End: 2019-04-03

## 2019-04-03 DIAGNOSIS — Z95.810 PRESENCE OF BIVENTRICULAR AICD: Primary | ICD-10-CM

## 2019-04-04 ENCOUNTER — PATIENT OUTREACH (OUTPATIENT)
Dept: CASE MANAGEMENT | Age: 84
End: 2019-04-04

## 2019-04-04 NOTE — PROGRESS NOTES
Community Care Team Documentation for Patient in [facility]  Subsequent Follow up     Patient remains at [facility] ([facility]). See previous [facility] Team notes. PCP : Jp Matos DO    Spoke with SNF team.  Confirmed pt discharged 4/1/19 home with wife and [facility]. Medications were not reconciled and general patient assessment was not completed during this skilled nursing facility outreach.

## 2019-04-05 ENCOUNTER — TELEPHONE (OUTPATIENT)
Dept: CARDIOLOGY CLINIC | Age: 84
End: 2019-04-05

## 2019-04-05 NOTE — TELEPHONE ENCOUNTER
Call placed to [facility] at [facility]. Informed her atients coumadin does not get followed at this office. [facility] reports she had called another patients doctor as well to report result because they were not sure who was managing.

## 2019-04-05 NOTE — TELEPHONE ENCOUNTER
Katie with [facility] [place] would like to report the patient's INR 2.7 and PT 32.3 and is on 2.5 mg of coumadin/daily.     London Aguila  Phone #: [phone]  Thanks

## 2019-04-07 ENCOUNTER — DOCUMENTATION ONLY (OUTPATIENT)
Dept: CARDIOLOGY CLINIC | Age: 84
End: 2019-04-07

## 2019-04-07 PROBLEM — Z95.1 S/P CABG (CORONARY ARTERY BYPASS GRAFT): Status: ACTIVE | Noted: 2019-04-07

## 2019-04-07 PROBLEM — I35.0 SEVERE AORTIC STENOSIS: Status: ACTIVE | Noted: 2019-04-07

## 2019-04-07 PROBLEM — T82.898A BLOCKAGE OF CORONARY ARTERY BYPASS VEIN GRAFT (HCC): Status: ACTIVE | Noted: 2019-04-07

## 2019-04-07 PROBLEM — I50.22 CHRONIC SYSTOLIC CONGESTIVE HEART FAILURE (HCC): Status: ACTIVE | Noted: 2019-04-07

## 2019-04-07 PROBLEM — R06.09 DOE (DYSPNEA ON EXERTION): Status: ACTIVE | Noted: 2019-04-07

## 2019-04-07 PROBLEM — E78.5 DYSLIPIDEMIA: Status: ACTIVE | Noted: 2019-04-07

## 2019-04-07 PROBLEM — I25.110 CORONARY ARTERY DISEASE INVOLVING NATIVE CORONARY ARTERY OF NATIVE HEART WITH UNSTABLE ANGINA PECTORIS (HCC): Status: ACTIVE | Noted: 2019-04-07

## 2019-04-07 PROBLEM — I49.3 PVC (PREMATURE VENTRICULAR CONTRACTION): Status: ACTIVE | Noted: 2019-04-07

## 2019-04-07 PROBLEM — I10 HYPERTENSION, ESSENTIAL: Status: ACTIVE | Noted: 2019-04-07

## 2019-04-07 NOTE — PROGRESS NOTES
Since his in clinic check on 3-14-19, BiV pacing has gone from 73% to 63%. Upper rated was decreased to 110 bpm at his initial visit. AVN conduction or PVC's possible source for low BiV pacing (s/p Atrial flutter ablation). Peak V rate noted to be 151 bpm.   Paced/sensed AV delay is programmed to 350ms/325ms (not a factor during his mode switch during atrial fib).  This will be reprogrammed at his in clinic visit.      Will recheck in clinic    Future Appointments   Date Time Provider Lexie Moreno   6/20/2019  1:45 PM Nesha Hand, [address] [address] [facility]   6/20/2019  2:00 PM Coral Fernandez  [address]

## 2019-04-15 ENCOUNTER — OFFICE VISIT (OUTPATIENT)
Dept: CARDIOLOGY CLINIC | Age: 84
End: 2019-04-15

## 2019-04-15 VITALS
HEART RATE: 64 BPM | SYSTOLIC BLOOD PRESSURE: 130 MMHG | OXYGEN SATURATION: 98 % | BODY MASS INDEX: 28.92 KG/M2 | RESPIRATION RATE: 16 BRPM | DIASTOLIC BLOOD PRESSURE: 80 MMHG | WEIGHT: 173.6 LBS | HEIGHT: 65 IN

## 2019-04-15 DIAGNOSIS — I10 HYPERTENSION, ESSENTIAL: ICD-10-CM

## 2019-04-15 DIAGNOSIS — I35.0 SEVERE AORTIC STENOSIS: ICD-10-CM

## 2019-04-15 DIAGNOSIS — I25.118 CORONARY ARTERY DISEASE OF NATIVE ARTERY OF NATIVE HEART WITH STABLE ANGINA PECTORIS (HCC): ICD-10-CM

## 2019-04-15 DIAGNOSIS — I48.20 CHRONIC ATRIAL FIBRILLATION (HCC): ICD-10-CM

## 2019-04-15 DIAGNOSIS — I50.22 CHRONIC SYSTOLIC CONGESTIVE HEART FAILURE (HCC): Primary | ICD-10-CM

## 2019-04-15 DIAGNOSIS — E78.5 DYSLIPIDEMIA: ICD-10-CM

## 2019-04-15 NOTE — PATIENT INSTRUCTIONS
Please get your blood work completed Friday or Monday. You will be scheduled for a 2 month follow up. You will be scheduled for nuclear stress testing after your appointment today. Wear comfortable clothing (shorts or pants with a shirt or blouse- no underwire bras) and walking or athletic shoes. Do not eat or drink anything, except water, for at least 2 hours prior to your appointment. Avoid tobacco products for at least 6 hours prior to your test.    Do not eat or drink anything containing caffeine, including but not limited to the following: chocolate, regular and decaffeinated coffee, soft drinks, or tea for at least 12-24 hours prior to your test.    Do not hold your scheduled medications prior to your test. If you are a diabetic, please ask your physician how to adjust your food and insulin prior to your test. Please bring all medications you are currently taking. Your test will be performed on a 1 day protocol. This is determined by your height, weight, and other risk factors. For a 2 day test, please allow for 2 hours in the office each day. For a 1 day test, please allow for 4 hours in the office that day. The radioactive isotope used for your testing is different from any of the dyes that are commonly used in x-ray procedures, and is ordered specially for your test. Please call to cancel or reschedule your appointment at least 24 hours prior to your scheduled appointment to avoid being billed for the expensive isotope.

## 2019-04-15 NOTE — PROGRESS NOTES
Visit Vitals  /80 (BP 1 Location: Left arm, BP Patient Position: Sitting)   Pulse 64   Resp 16   Ht 5' 5\" (1.651 m)   Wt 173 lb 9.6 oz (78.7 kg)   SpO2 98%   BMI 28.89 kg/m²

## 2019-04-23 ENCOUNTER — HOSPITAL ENCOUNTER (OUTPATIENT)
Dept: LAB | Age: 84
Discharge: HOME | End: 2019-04-23
Payer: MEDICARE

## 2019-04-23 PROCEDURE — 36415 COLL VENOUS BLD VENIPUNCTURE: CPT

## 2019-04-23 PROCEDURE — 80048 BASIC METABOLIC PNL TOTAL CA: CPT

## 2019-04-23 PROCEDURE — 84443 ASSAY THYROID STIM HORMONE: CPT

## 2019-04-23 PROCEDURE — 85018 HEMOGLOBIN: CPT

## 2019-04-23 PROCEDURE — 83880 ASSAY OF NATRIURETIC PEPTIDE: CPT

## 2019-04-24 ENCOUNTER — TELEPHONE (OUTPATIENT)
Dept: CARDIOLOGY CLINIC | Age: 84
End: 2019-04-24

## 2019-04-24 LAB
BNP SERPL-MCNC: 339.7 PG/ML (ref 0–100)
BUN SERPL-MCNC: 20 MG/DL (ref 8–27)
BUN/CREAT SERPL: 19 (ref 10–24)
CALCIUM SERPL-MCNC: 8.7 MG/DL (ref 8.6–10.2)
CHLORIDE SERPL-SCNC: 105 MMOL/L (ref 96–106)
CO2 SERPL-SCNC: 26 MMOL/L (ref 20–29)
CREAT SERPL-MCNC: 1.06 MG/DL (ref 0.76–1.27)
GLUCOSE SERPL-MCNC: 91 MG/DL (ref 65–99)
HCT VFR BLD AUTO: 37.9 % (ref 37.5–51)
HGB BLD-MCNC: 12.3 G/DL (ref 13–17.7)
POTASSIUM SERPL-SCNC: 4.1 MMOL/L (ref 3.5–5.2)
SODIUM SERPL-SCNC: 143 MMOL/L (ref 134–144)
TSH SERPL DL<=0.005 MIU/L-ACNC: 0.74 UIU/ML (ref 0.45–4.5)

## 2019-04-24 NOTE — PROGRESS NOTES
Labs look good  Kidneys have recovered  Minimal anemia, really pretty good!   Most importantly the thyroid has gone all the way back to normal  Keep same meds etc

## 2019-04-24 NOTE — TELEPHONE ENCOUNTER
Attempted to reach patient by telephone. A message was left for return call. Verified patient with two types of identifiers. Spoke to Robby (patient's wife on HIPAA) and gave her results of labs. Reiterated instructions for nuclear stress test and follow up appointment in June. She verbalized understanding.     ----- Message from Katharina Woodruff MD sent at 4/24/2019 12:08 PM EDT -----  Labs look good  Kidneys have recovered  Minimal anemia, really pretty good!   Most importantly the thyroid has gone all the way back to normal  Keep same meds etc

## 2019-05-17 DIAGNOSIS — I50.22 CHRONIC SYSTOLIC CONGESTIVE HEART FAILURE (HCC): Primary | ICD-10-CM

## 2019-05-17 NOTE — TELEPHONE ENCOUNTER
Pharmacy confirmed. Patient's wife states he took the last pill today. Requesting a 90 day supply because it would save them some money.      Phone: [phone]

## 2019-05-17 NOTE — TELEPHONE ENCOUNTER
Request for entresto 24/26mg BID. Last office visit 4-15-19, next office visit 6-21-19.  Refills per verbal order from Dr. Foster Koo.

## 2019-06-04 DIAGNOSIS — I50.22 CHRONIC SYSTOLIC CONGESTIVE HEART FAILURE (HCC): Primary | ICD-10-CM

## 2019-06-04 DIAGNOSIS — I48.20 CHRONIC ATRIAL FIBRILLATION (HCC): ICD-10-CM

## 2019-06-04 RX ORDER — CARVEDILOL 3.12 MG/1
3.12 TABLET ORAL 2 TIMES DAILY WITH MEALS
Qty: 180 TAB | Refills: 3 | OUTPATIENT
Start: 2019-06-04

## 2019-06-04 RX ORDER — WARFARIN 2.5 MG/1
TABLET ORAL
Qty: 30 TAB | Refills: 0 | OUTPATIENT
Start: 2019-06-04

## 2019-06-04 RX ORDER — CARVEDILOL 6.25 MG/1
6.25 TABLET ORAL 2 TIMES DAILY
Qty: 180 TAB | Refills: 2 | Status: SHIPPED | OUTPATIENT
Start: 2019-06-04 | End: 2020-06-12

## 2019-06-06 ENCOUNTER — TELEPHONE (OUTPATIENT)
Dept: CARDIOLOGY CLINIC | Age: 84
End: 2019-06-06

## 2019-06-06 NOTE — TELEPHONE ENCOUNTER
Verified patient with two types of identifiers. Spoke to Inga Brunson (on HIPAA) and verified dosage of carvedilol at 6.25mg BID. Patient's wife verbalized understanding and will call with any other questions.

## 2019-06-20 ENCOUNTER — OFFICE VISIT (OUTPATIENT)
Dept: CARDIOLOGY CLINIC | Age: 84
End: 2019-06-20

## 2019-06-20 VITALS
HEIGHT: 65 IN | DIASTOLIC BLOOD PRESSURE: 70 MMHG | WEIGHT: 173 LBS | SYSTOLIC BLOOD PRESSURE: 110 MMHG | HEART RATE: 70 BPM | RESPIRATION RATE: 14 BRPM | BODY MASS INDEX: 28.82 KG/M2 | OXYGEN SATURATION: 99 %

## 2019-06-20 DIAGNOSIS — Z95.810 BIVENTRICULAR ICD (IMPLANTABLE CARDIOVERTER-DEFIBRILLATOR) IN PLACE: Primary | ICD-10-CM

## 2019-06-20 DIAGNOSIS — I48.3 TYPICAL ATRIAL FLUTTER (HCC): ICD-10-CM

## 2019-06-20 DIAGNOSIS — Z98.890 STATUS POST CATHETER ABLATION OF ATRIAL FLUTTER: ICD-10-CM

## 2019-06-20 DIAGNOSIS — I48.20 CHRONIC ATRIAL FIBRILLATION (HCC): ICD-10-CM

## 2019-06-20 DIAGNOSIS — I10 HYPERTENSION, ESSENTIAL: ICD-10-CM

## 2019-06-20 DIAGNOSIS — I48.0 PAF (PAROXYSMAL ATRIAL FIBRILLATION) (HCC): ICD-10-CM

## 2019-06-20 DIAGNOSIS — I35.0 SEVERE AORTIC STENOSIS: ICD-10-CM

## 2019-06-20 DIAGNOSIS — I50.22 CHRONIC SYSTOLIC CONGESTIVE HEART FAILURE (HCC): ICD-10-CM

## 2019-06-20 NOTE — PROGRESS NOTES
Cardiac Electrophysiology OFFICE Note     Subjective:      Reynold Miller is a 80 y.o. patient who is seen for follow up, is s/p [employer] biventricular ICD (DOI 03/04/2019). Device check shows proper lead & generator function. Generator longevity estimated 8.1 years. 9.8% AP, 69% biventricular pacing since last check, but closer to 80% recently. AT/AF burden 54% (compared to 68% previously), longest episode 32 days. 3.5% PVCs, may be more with occasional shorter intrinsic conduction. Sync AV turned on, longer AV delays to allow for triple fusion between biventricular pacing & intrinsic conduction. He saw Dr. Lokesh Venegas in 04/2019, reported improvement to NYHA II from NYHA IV, started Entresto. He is due to see him again tomorrow. SOB has improved, now only notes it when working in the yard for hours. Anticoagulated with warfarin, denies bleeding issues. PCP had been monitoring INR, is looking for new PCP who has in house lab. Previous:  Nuclear stress test (04/26/2019): Fixed infarct in inferior wall in known area of occluded RCA & RCA graft closure; unchanged from previous. Echo (04/15/2019): LVEF 40%, mild LVH. RV mildly dilated. RA mildly dilated. Mild MAC, mild to mod MR. Mechanical prosthetic aortic valve, mild AR. Mild to mod TR, pulmonary HTN. ICD check in 04/2019 showed that since check in 03/2019, biventricular pacing decreased from 73% to 63%. Upper rate decreased to 110 bpm at initial visit. AV node conduction or PVCs are possible source for low biventricular pacing (s/p AFL RFA). Peak V rate 151. Paced/sensed AV delay programmed to 350 ms/325 ms (not a factor during AMS during AF), to be reprogrammed.         Problem List  Date Reviewed: 4/19/2019          Codes Class Noted    Chronic systolic congestive heart failure ([facility] [address] ICD-10-CM: I50.22  ICD-9-CM: 428.22, 428.0  4/7/2019        LENNON (dyspnea on exertion) ICD-10-CM: R06.09  ICD-9-CM: 786.09  4/7/2019 Coronary artery disease involving native coronary artery of native heart with unstable angina pectoris (HCC) ICD-10-CM: I25.110  ICD-9-CM: 414.01, 411.1  4/7/2019        S/P CABG (coronary artery bypass graft) ICD-10-CM: Z95.1  ICD-9-CM: V45.81  4/7/2019        Severe aortic stenosis ICD-10-CM: I35.0  ICD-9-CM: 424.1  4/7/2019        Hypertension, essential ICD-10-CM: I10  ICD-9-CM: 401.9  4/7/2019        Dyslipidemia ICD-10-CM: E78.5  ICD-9-CM: 272.4  4/7/2019        PVC (premature ventricular contraction) ICD-10-CM: I49.3  ICD-9-CM: 427.69  4/7/2019        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 ([facility] [address] ICD-10-CM: V41.396K  ICD-9-CM: 996.72  4/7/2019        Typical atrial flutter (HCC) ICD-10-CM: I48.3  ICD-9-CM: 427.32  3/4/2019        Status post catheter ablation of atrial flutter ICD-10-CM: Z98.890  ICD-9-CM: V45.89  3/4/2019    Overview Signed 3/4/2019  5:55 PM by Bianca Barber MD     3/4/2019 to NSR and bidrectional line of block             Biventricular ICD (implantable cardioverter-defibrillator) in place ICD-10-CM: Z95.810  ICD-9-CM: V45.02  3/4/2019    Overview Signed 3/4/2019  5:55 PM by Bianca Barber MD     3/4/2019 [employer] BIV ICD             A-fib [facility]) ICD-10-CM: I48.91  ICD-9-CM: 427.31  3/1/2019        Acute on chronic systolic heart failure, NYHA class 4 ([facility] [address] ICD-10-CM: I50.23  ICD-9-CM: 428.23  3/1/2019        Anemia ICD-10-CM: D64.9  ICD-9-CM: 285.9  10/9/2018        Hypothyroidism ICD-10-CM: E03.9  ICD-9-CM: 244.9  10/9/2018        Supratherapeutic INR ICD-10-CM: R79.1  ICD-9-CM: 790.92  10/9/2018        H/O mechanical aortic valve replacement ICD-10-CM: Z95.2  ICD-9-CM: V43.3  10/9/2018              Current Outpatient Medications   Medication Sig Dispense Refill    carvedilol (COREG) 6.25 mg tablet Take 1 Tab by mouth two (2) times a day. 180 Tab 2    sacubitril-valsartan (ENTRESTO) 24 mg/26 mg tablet Take 1 Tab by mouth every twelve (12) hours.  180 Tab 3    levothyroxine (SYNTHROID) 150 mcg tablet Take 1 Tab by mouth Daily (before breakfast). 30 Tab 2    spironolactone (ALDACTONE) 25 mg tablet Take 1 Tab by mouth daily. 30 Tab 2    warfarin (COUMADIN) 1 mg tablet Take 1 Tab by mouth daily. (Patient taking differently: Take 2.5 mg by mouth daily.) 60 Tab 1    aspirin delayed-release 81 mg tablet Take 81 mg by mouth daily.  therapeutic multivitamin (THERAGRAN) tablet Take 1 Tab by mouth daily.  ascorbic acid, vitamin C, (VITAMIN C) 500 mg tablet Take 1,000 mg by mouth daily.  B.infantis-B.ani-B.long-B.bifi (PROBIOTIC 4X) 10-15 mg TbEC Take 1 Tab by mouth daily.  cholecalciferol (VITAMIN D3) 1,000 unit tablet Take 1,000 Units by mouth nightly.  calcium citrate (CITRACAL PO) Take 1 Tab by mouth nightly.  COQ10, UBIQUINOL, PO Take 100-200 mg by mouth daily.  allopurinol (ZYLOPRIM) 300 mg tablet Take 300 mg by mouth daily.  atorvastatin (LIPITOR) 40 mg tablet Take 40 mg by mouth daily.  finasteride (PROSCAR) 5 mg tablet Take 5 mg by mouth daily.  oxybutynin chloride XL (DITROPAN XL) 10 mg CR tablet Take 10 mg by mouth daily.  PARoxetine (PAXIL) 20 mg tablet Take 20 mg by mouth daily.  tamsulosin (FLOMAX) 0.4 mg capsule Take 0.8 mg by mouth nightly.  FERROUS GLUCONATE PO Take 325 mg by mouth daily.  furosemide (LASIX) 20 mg tablet Take 1 Tab by mouth daily. 90 Tab 1    amoxicillin (AMOXIL) 875 mg tablet Take 875 mg by mouth two (2) times a day.        No Known Allergies  Past Medical History:   Diagnosis Date    Anger     TAKES PAROXETINE TO CONTROL ANGER ISSUES    CAD (coronary artery disease)     Foot drop, left     WEARS METAL BRACE    Gout     Hard of hearing     High cholesterol     Hypertension     Prostate enlargement     Thyroid disease     Unspecified sleep apnea     DOESN'T USE CPAP; \"IT MADE HIM SICK-COLDS, SINUS\", PER WIFE     Past Surgical History:   Procedure Laterality Date    CARDIAC SURG PROCEDURE UNLIST      ANGIOPLASTY WITH CORONARY STENT    CARDIAC SURG PROCEDURE UNLIST  1996    AORTIC VALVE REPLACEMENT    HX ORTHOPAEDIC  2001    ORIF COMPOUND FRACTURE LEFT ANKLE    HX RETINAL DETACHMENT REPAIR  1980s    LEFT EYE    [place] COMPRE ELECTROPHYSIOL XM W/LEFT ATRIAL PACNG/REC N/A 3/4/2019    Lt Atrial Pace & Record During Ep Study performed by Emil Mojica MD at [place] [address], [facility] Dr CATH LAB    [place] EPHYS EVAL PACG CVDFB PRGRMG/REPRGRMG PARAMETERS N/A 3/4/2019    Eval Icd Generator & Leads W Testing At Implant performed by Emil Mojica MD at [place] [address], [facility]/[address] CATH LAB    [place] EPHYS EVAL W/ABLATION [address] N/A 3/4/2019    Ablation A-Flutter performed by Emil Mojica MD at [place] [address], [facility] [address] CATH LAB    [place] INSJ/RPLCMT PERM DFB W/TRNSVNS LDS 1/DUAL CHMBR N/A 3/4/2019    INSERT ICD BIV MULTI performed by Emil Mojica MD at [place] [address], [facility] Dr CATH LAB    [place] INTRACARDIAC ELECTROPHYSIOLOGIC 3D MAPPING N/A 3/4/2019    Ep 3d Mapping performed by Emil Mojica MD at [place] [address], [facility] [address] CATH LAB     Family History   Problem Relation Age of Onset    Stroke Mother         ANEURYSM    Stroke Father      Social History     Tobacco Use    Smoking status: Never Smoker    Smokeless tobacco: Never Used   Substance Use Topics    Alcohol use: Yes     Alcohol/week: 1.5 oz     Types: 3 Standard drinks or equivalent per week        Review of Systems:   Constitutional: Negative for fever, chills, weight loss, malaise/fatigue. HEENT: Negative for nosebleeds, vision changes. Respiratory: Negative for cough, hemoptysis  Cardiovascular: Negative for chest pain, palpitations, orthopnea, claudication, leg swelling, syncope, and PND. + mild SOB with significant exertion  Gastrointestinal: Negative for nausea, vomiting, diarrhea, blood in stool and melena. Genitourinary: Negative for dysuria, and hematuria. Musculoskeletal: + chronic left leg pain r/t previous surgery  Skin: Negative for rash. Heme: Does not bleed or bruise easily.    Neurological: Negative for speech change and focal weakness     Objective:     Visit Vitals  /70 (BP 1 Location: Left arm, BP Patient Position: Sitting)   Pulse 70   Resp 14   Ht 5' 5\" (1.651 m)   Wt 173 lb (78.5 kg)   SpO2 99%   BMI 28.79 kg/m²      Physical Exam:   Constitutional: well-developed and well-nourished. No respiratory distress. Head: Normocephalic and atraumatic. Eyes: Pupils are equal, round  ENT: Platinum. Hearing aid in place. Neck: supple. No JVD present. Cardiovascular: Normal rate, irregular rhythm. Exam reveals no gallop and no friction rub. No murmur heard. Pulmonary/Chest: Effort normal and breath sounds normal. No wheezes. Abdominal: Soft, no tenderness. Musculoskeletal: Trace lower extremity edema, L>R. Neurological: alert,oriented. Skin: Skin is warm and dry. Left chest ICD site well healed. Psychiatric: normal mood and affect. Behavior is normal. Judgment and thought content normal.        Assessment/Plan:       ICD-10-CM ICD-9-CM    1. Biventricular ICD (implantable cardioverter-defibrillator) in place Z95.810 V45.02    2. Chronic atrial fibrillation (HCC) I48.2 427.31    3. Chronic systolic congestive heart failure (HCC) I50.22 428.22      428.0    4. Severe aortic stenosis I35.0 424.1    5. Hypertension, essential I10 401.9    6. Typical atrial flutter ([facility]) I48.3 427.32    7. Status post catheter ablation of atrial flutter Z98.890 V45.89    8. PAF (paroxysmal atrial fibrillation) ([facility]) I48.0 427.31      Mr. Lawrence Aggarwal is s/p [employer] biventricular ICD (DOI 03/04/2019), shows proper lead & generator function on device check. Generator longevity estimated 8.1 years. 9.8% AP, 69% biventricular pacing since last check, but closer to 80% recently. AT/AF burden 54%, longest episode 32 days. 3.5% PVCs, may be more with occasional shorter intrinsic conduction.   Sync AV turned on, longer AV delays to allow for triple fusion between biventricular pacing & intrinsic conduction. Chronic systolic heart failure, improved to NYHA II since CRT. Well compensated on exam.  Tolerating Entresto well as prescribed by Dr. Michael Hernandez as previously prescribed. Unable to increase carvedilol due to intermittently low normal BP.     S/p ablation of atrial flutter at time of device implant. AT/AF burden decreasing on device checks, now 54%. Previous AVR, known RCA disease. Continue statin & ASA.     /70 today. Remote ICD checks q 3 months. EP clinic follow up in 1 year, sooner for new/worsening issues. Hopefully CRT % will continue to increase & AT/AF burden will continue to decrease. Follow up with Dr. Andrew Maddox as previously scheduled. Future Appointments   Date Time Provider [facility]   6/21/2019 11:20 AM Aleksandra Neumann  [address]   10/2/2019  3:45 PM REMOTE1, [address] [address]   1/20/2020  2:00 PM REMOTE1, [address]   4/29/2020  8:30 AM REMOTE1, [address] [address]   8/6/2020  3:00 PM [address] [place] [address] [place], [address]   8/6/2020  3:20 PM Coral Fernandez  [address]       Thank you for involving me in this patient's care and please call with further concerns or questions.     JULIA BrownMorgan  [facility]  06/21/19

## 2019-06-21 ENCOUNTER — OFFICE VISIT (OUTPATIENT)
Dept: CARDIOLOGY CLINIC | Age: 84
End: 2019-06-21

## 2019-06-21 VITALS
OXYGEN SATURATION: 97 % | RESPIRATION RATE: 16 BRPM | SYSTOLIC BLOOD PRESSURE: 80 MMHG | HEART RATE: 68 BPM | DIASTOLIC BLOOD PRESSURE: 50 MMHG | HEIGHT: 65 IN | BODY MASS INDEX: 29.24 KG/M2 | WEIGHT: 175.5 LBS

## 2019-06-21 DIAGNOSIS — Z95.2 H/O MECHANICAL AORTIC VALVE REPLACEMENT: ICD-10-CM

## 2019-06-21 DIAGNOSIS — I10 HYPERTENSION, ESSENTIAL: ICD-10-CM

## 2019-06-21 DIAGNOSIS — Z95.810 PRESENCE OF BIVENTRICULAR AICD: ICD-10-CM

## 2019-06-21 DIAGNOSIS — I35.0 SEVERE AORTIC STENOSIS: ICD-10-CM

## 2019-06-21 DIAGNOSIS — I25.118 CORONARY ARTERY DISEASE OF NATIVE ARTERY OF NATIVE HEART WITH STABLE ANGINA PECTORIS (HCC): ICD-10-CM

## 2019-06-21 DIAGNOSIS — Z95.810 BIVENTRICULAR ICD (IMPLANTABLE CARDIOVERTER-DEFIBRILLATOR) IN PLACE: ICD-10-CM

## 2019-06-21 DIAGNOSIS — Z95.1 S/P CABG (CORONARY ARTERY BYPASS GRAFT): ICD-10-CM

## 2019-06-21 DIAGNOSIS — E78.5 DYSLIPIDEMIA: ICD-10-CM

## 2019-06-21 DIAGNOSIS — I48.20 CHRONIC ATRIAL FIBRILLATION (HCC): ICD-10-CM

## 2019-06-21 DIAGNOSIS — I50.22 CHRONIC SYSTOLIC CONGESTIVE HEART FAILURE (HCC): Primary | ICD-10-CM

## 2019-06-21 RX ORDER — MULTIVIT WITH MINERALS/HERBS
1 TABLET ORAL DAILY
COMMUNITY

## 2019-06-21 NOTE — PROGRESS NOTES
Chief Complaint   Patient presents with    Follow-up     2 months. Occassional chest pain. Improved shortness of breath. Denies swelling/dizziness.        Visit Vitals  BP (!) 80/50 (BP 1 Location: Right arm, BP Patient Position: Sitting)   Pulse 68   Resp 16   Ht 5' 5\" (1.651 m)   Wt 175 lb 8 oz (79.6 kg)   SpO2 97%   BMI 29.20 kg/m²

## 2019-06-21 NOTE — PATIENT INSTRUCTIONS
Please restart your spironolactone 25mg daily. Please follow up with Dr. Noemy Beebe [phone]. Please keep a blood pressure diary. Please check your blood pressure daily. Be sure to make sure you are sitting still for at least five minutes with both feet flat on the floor and arm heart level. Please write down the blood pressure, heart rate, date and time. Bring the numbers to your 2 week follow up.

## 2019-06-21 NOTE — PROGRESS NOTES
Joana Cao     1935       Jose Maria Jenkins MD, [facility]  Date of Visit-6/21/2019   PCP is none  [facility] [place]  [facility] [address] [place] [address]  HPI:  Joana Cao is a 80 y.o. male   Seen in April. Had a hospital stay in March for acute heart failure. We did lab work, his kidney and thyroid function has improved. He had a nuclear stress test which showed a fixed infarct in the inferior wall. This was similar to 2015. This defect is in a known area of occluded native and graft RCA. Hospital stay 03/04-03/07 for acute heart failure NYHA 4   had ablation of atrial flutter on 03/04 with an upgrade to a biventricular pacer. The ejection fraction in the hospital was below 10%. His TSH was very high at 45 and went to nursing facility. Rehab from 03/07 to 04/01. He saw Dr. Tangela Hernandez for wound check on 03/14/2019. Home health has been following INR. Overall the pt states he is doing well. Pt reports having increased SOB with exertion. Blood count was checked 2 weeks ago with Dr. Evette Morton at [facility]. Pt states feeling some CP that is relieved when he lays on his side with his knees pulled to his chest. BP is low in office today. Assessment/Plan:     1. Chronic systolic congestive heart failure (HCC)  Restart Aldactone, watching  for hypotension, keep BP diary. has improved from NYHA 4 to NYHA 1-2. EF has gone from below 10% to 40%. Was in the hospital on Primicor (milrinone). Has now been on Coreg and Lasix & Entresto   EF is better because of the BI-V, the Afib rate control, and the thyroid. Initially his thyroid was a large factor in the EF. Presence of biventricular AICD  2. Coronary artery disease of native artery of native heart with stable angina pectoris (HCC)S/P CABG (coronary artery bypass graft)    Reviewed last stress test.   Native, No Angina: Given drop in EF, did stress test though I will be conservative in terms of a Cath.     3. Severe aortic stenosis-H/O mechanical aortic valve replacement    4. Chronic atrial fibrillation (HCC)  had RVR, now with ablation of flutter and being paced. Continue coreg, coumadin. 5. Biventricular ICD (implantable cardioverter-defibrillator) in place    6. Hypertension, essential-stable    7. Dyslipidemia Lipids on high potency statin as appropriate for secondary prevention. F/u in 2 weeks with INR. Future Appointments   Date Time Provider Lexie Moreno   10/2/2019  3:45 PM Edi REID [facility] [address]   10/4/2019 11:40 AM Maximo Roy  [address]   10/8/2019  9:30 AM Young Peñaloza MD [address]   1/20/2020  2:00 PM REMOTE1, [address]   4/29/2020  8:30 AM REMOTE1, [address]   8/6/2020  3:00 PM PACEMAKER3, [address]   8/6/2020  3:20 PM Emilie Jacob  [address]      Patient Instructions   Please restart your spironolactone 25mg daily  Please keep a blood pressure diary. Please check your blood pressure daily. Be sure to make sure you are sitting still for at least five minutes with both feet flat on the floor and arm heart level. Please write down the blood pressure, heart rate, date and time. Bring the numbers to your 2 week follow up. Key CAD CHF Meds             carvedilol (COREG) 6.25 mg tablet Take 1 Tab by mouth two (2) times a day. sacubitril-valsartan (ENTRESTO) 24 mg/26 mg tablet Take 1 Tab by mouth every twelve (12) hours. spironolactone (ALDACTONE) 25 mg tablet Take 1 Tab by mouth daily. warfarin (COUMADIN) 1 mg tablet Take 1 Tab by mouth daily. aspirin delayed-release 81 mg tablet Take 81 mg by mouth daily. atorvastatin (LIPITOR) 40 mg tablet Take 40 mg by mouth daily. furosemide (LASIX) 20 mg tablet Take 1 Tab by mouth daily. Impression:   1. Chronic systolic congestive heart failure ([address]    2.  Coronary artery disease of native artery of native heart with stable angina pectoris ([address]    3. Severe aortic stenosis    4. Chronic atrial fibrillation (HCC)    5. Biventricular ICD (implantable cardioverter-defibrillator) in place    6. Hypertension, essential    7. Dyslipidemia    8. H/O mechanical aortic valve replacement    9. Presence of biventricular AICD    10. S/P CABG (coronary artery bypass graft)       Cardiac History:   CAD s/p CABG 1996 with mechanical AVR for severe AS,     known occlusion to the RCA and vein graft to the RCA. [facility] [place] nuclear 6/24/15 showed inferior infarction with a small amount of emani infarct ischemia. There was inferior septal kinesis with an EF of 38%. Carotids 1/7/13 showed minimal disease. Monitor holter 4/11/13 showed 21 beats of Vtach and 4 beats of Vtach with at rate of 140, 14% of the beats were ventricular. Echo October 2018 showed EF showed EF of 20, moderate LVH, with dilated atrial, mechanical AVR seems to be functioning well, mild to moderate MR.     ROS-except as noted above. . A complete cardiac and respiratory are reviewed and negative except as above ; Resp-denies wheezing  or productive cough,. Const- No unusual weight loss or fever; Neuro-no recent seizure or CVA ; GI- No BRBPR, abdom pain, bloating ; - no  hematuria   see supplement sheet, initialed and to be scanned by staff  Past Medical History:   Diagnosis Date    Anger     TAKES PAROXETINE TO CONTROL ANGER ISSUES    CAD (coronary artery disease)     Foot drop, left     WEARS METAL BRACE    Gout     Hard of hearing     High cholesterol     Hypertension     Prostate enlargement     Thyroid disease     Unspecified sleep apnea     DOESN'T USE CPAP; \"IT MADE HIM SICK-COLDS, SINUS\", PER WIFE      Social Hx= reports that he has never smoked. He has never used smokeless tobacco. He reports that he drinks about 1.5 oz of alcohol per week. He reports that he does not use drugs.      Exam and Labs:    Visit Vitals  Wt 175 lb 1.6 oz (79.4 kg)   BMI 29.14 kg/m² @Constitutional:  NAD, comfortable  Head: NC,AT. Eyes: No scleral icterus. Neck:  Neck supple. No JVD present. Throat: moist mucous membranes. Chest: Effort normal & normal respiratory excursion . Neurological: alert, conversant and oriented . Skin: Skin is not cold. No obvious systemic rash noted. Not diaphoretic. No erythema. Psychiatric:  Grossly normal mood and affect. Behavior appears normal. Extremities:  no clubbing or cyanosis. Abdomen: non distended    Lungs:breath sounds normal. No stridor. distress, wheezes or  Rales. Heart:  IRIR with a mechanical A2, normal S1, S2, no murmurs, rubs, clicks or gallops, PMI non displaced. Edema: Edema is none. No results found for: CHOL, CHOLX, CHLST, CHOLV, HDL, LDL, LDLC, DLDLP, TGLX, TRIGL, TRIGP, CHHD, CHHDX  Lab Results   Component Value Date/Time    Sodium 143 04/23/2019 03:59 PM    Potassium 4.1 04/23/2019 03:59 PM    Chloride 105 04/23/2019 03:59 PM    CO2 26 04/23/2019 03:59 PM    Anion gap 7 03/07/2019 03:51 AM    Glucose 91 04/23/2019 03:59 PM    BUN 20 04/23/2019 03:59 PM    Creatinine 1.06 04/23/2019 03:59 PM    BUN/Creatinine ratio 19 04/23/2019 03:59 PM    GFR est AA 74 04/23/2019 03:59 PM    GFR est non-AA 64 04/23/2019 03:59 PM    Calcium 8.7 04/23/2019 03:59 PM      Wt Readings from Last 3 Encounters:   06/21/19 175 lb 1.6 oz (79.4 kg)   06/20/19 173 lb (78.5 kg)   04/26/19 173 lb (78.5 kg)      BP Readings from Last 3 Encounters:   06/20/19 110/70   04/15/19 130/80   03/14/19 90/60      Current Outpatient Medications   Medication Sig    carvedilol (COREG) 6.25 mg tablet Take 1 Tab by mouth two (2) times a day.  sacubitril-valsartan (ENTRESTO) 24 mg/26 mg tablet Take 1 Tab by mouth every twelve (12) hours.  levothyroxine (SYNTHROID) 150 mcg tablet Take 1 Tab by mouth Daily (before breakfast).  spironolactone (ALDACTONE) 25 mg tablet Take 1 Tab by mouth daily.  warfarin (COUMADIN) 1 mg tablet Take 1 Tab by mouth daily.  (Patient taking differently: Take 2.5 mg by mouth daily.)    aspirin delayed-release 81 mg tablet Take 81 mg by mouth daily.  therapeutic multivitamin (THERAGRAN) tablet Take 1 Tab by mouth daily.  ascorbic acid, vitamin C, (VITAMIN C) 500 mg tablet Take 1,000 mg by mouth daily.  B.infantis-B.ani-B.long-B.bifi (PROBIOTIC 4X) 10-15 mg TbEC Take 1 Tab by mouth daily.  cholecalciferol (VITAMIN D3) 1,000 unit tablet Take 1,000 Units by mouth nightly.  calcium citrate (CITRACAL PO) Take 1 Tab by mouth nightly.  COQ10, UBIQUINOL, PO Take 100-200 mg by mouth daily.  allopurinol (ZYLOPRIM) 300 mg tablet Take 300 mg by mouth daily.  amoxicillin (AMOXIL) 875 mg tablet Take 875 mg by mouth two (2) times a day.  atorvastatin (LIPITOR) 40 mg tablet Take 40 mg by mouth daily.  finasteride (PROSCAR) 5 mg tablet Take 5 mg by mouth daily.  oxybutynin chloride XL (DITROPAN XL) 10 mg CR tablet Take 10 mg by mouth daily.  PARoxetine (PAXIL) 20 mg tablet Take 20 mg by mouth daily.  tamsulosin (FLOMAX) 0.4 mg capsule Take 0.8 mg by mouth nightly.  FERROUS GLUCONATE PO Take 325 mg by mouth daily.  furosemide (LASIX) 20 mg tablet Take 1 Tab by mouth daily. No current facility-administered medications for this visit. Needs PCP in [place], given area practice doctors   Impression see above.

## 2019-06-24 ENCOUNTER — TELEPHONE (OUTPATIENT)
Dept: CARDIOLOGY CLINIC | Age: 84
End: 2019-06-24

## 2019-06-24 NOTE — TELEPHONE ENCOUNTER
Patients wife is calling again to let you know dr Azra Tai is accepting new patients but they wouldn't be able to get in until December   Phone: [phone]

## 2019-06-24 NOTE — TELEPHONE ENCOUNTER
Patient's wife states there were two prescriptions needing to be called in for patient that Dr. Andrew Maddox told him to start. She states when they went to pick them up at the drug store (confimed) they had not been called in. Please advise.      Phone: [phone]

## 2019-06-25 RX ORDER — SPIRONOLACTONE 25 MG/1
25 TABLET ORAL DAILY
Qty: 90 TAB | Refills: 3 | Status: SHIPPED | OUTPATIENT
Start: 2019-06-25 | End: 2019-12-07

## 2019-06-25 RX ORDER — OXYBUTYNIN CHLORIDE 10 MG/1
10 TABLET, EXTENDED RELEASE ORAL DAILY
Qty: 90 TAB | Refills: 1 | Status: SHIPPED | OUTPATIENT
Start: 2019-06-25 | End: 2019-11-22

## 2019-06-25 NOTE — TELEPHONE ENCOUNTER
Ok they can also try Dr Davonte Barillas and Joann Keller or CRISTELA Zaldivar also as near them  All of them plus Dr Méndez Heading would be great choices for PCP  In meantime until he can establish PCP near him, we can fill both meds including oxybutynin

## 2019-07-01 ENCOUNTER — ANTI-COAG VISIT (OUTPATIENT)
Dept: CARDIOLOGY CLINIC | Age: 84
End: 2019-07-01

## 2019-07-01 ENCOUNTER — OFFICE VISIT (OUTPATIENT)
Dept: CARDIOLOGY CLINIC | Age: 84
End: 2019-07-01

## 2019-07-01 VITALS
HEIGHT: 65 IN | OXYGEN SATURATION: 99 % | RESPIRATION RATE: 18 BRPM | DIASTOLIC BLOOD PRESSURE: 60 MMHG | SYSTOLIC BLOOD PRESSURE: 100 MMHG | WEIGHT: 175.6 LBS | BODY MASS INDEX: 29.26 KG/M2 | HEART RATE: 62 BPM

## 2019-07-01 DIAGNOSIS — I10 HYPERTENSION, ESSENTIAL: ICD-10-CM

## 2019-07-01 DIAGNOSIS — Z95.810 PRESENCE OF BIVENTRICULAR AICD: ICD-10-CM

## 2019-07-01 DIAGNOSIS — I48.20 CHRONIC ATRIAL FIBRILLATION (HCC): ICD-10-CM

## 2019-07-01 DIAGNOSIS — Z98.890 STATUS POST CATHETER ABLATION OF ATRIAL FLUTTER: ICD-10-CM

## 2019-07-01 DIAGNOSIS — Z95.2 H/O MECHANICAL AORTIC VALVE REPLACEMENT: ICD-10-CM

## 2019-07-01 DIAGNOSIS — I35.0 SEVERE AORTIC STENOSIS: ICD-10-CM

## 2019-07-01 DIAGNOSIS — E78.5 DYSLIPIDEMIA: ICD-10-CM

## 2019-07-01 DIAGNOSIS — I25.118 CORONARY ARTERY DISEASE OF NATIVE ARTERY OF NATIVE HEART WITH STABLE ANGINA PECTORIS (HCC): ICD-10-CM

## 2019-07-01 DIAGNOSIS — Z95.1 S/P CABG (CORONARY ARTERY BYPASS GRAFT): ICD-10-CM

## 2019-07-01 DIAGNOSIS — I48.3 TYPICAL ATRIAL FLUTTER (HCC): Primary | ICD-10-CM

## 2019-07-01 DIAGNOSIS — I50.22 CHRONIC SYSTOLIC CONGESTIVE HEART FAILURE (HCC): Primary | ICD-10-CM

## 2019-07-01 LAB
INR BLD: 2.3
PT POC: NORMAL SECONDS
VALID INTERNAL CONTROL?: YES

## 2019-07-01 NOTE — Clinical Note
7/1/19 Patient: Darius Vegas YOB: 1935 Date of Visit: 7/1/2019 Eloisa Mondragon MD 
[address] [facility] [address] [address] VIA In Basket Dear Eloisa Mondragon MD, Thank you for referring Mr. Darius Vegas to [facility] for evaluation. My notes for this consultation are attached. If you have questions, please do not hesitate to call me. I look forward to following your patient along with you.  
 
 
Sincerely, 
 
Michael Sherman MD

## 2019-07-01 NOTE — PROGRESS NOTES
Visit Vitals  /60 (BP 1 Location: Left arm, BP Patient Position: Sitting)   Pulse 62   Resp 18   Ht 5' 5\" (1.651 m)   Wt 175 lb 9.6 oz (79.7 kg)   SpO2 99%   BMI 29.22 kg/m²

## 2019-07-01 NOTE — PATIENT INSTRUCTIONS
You will be scheduled for a 3 month follow up, please get your blood work completed about one week prior. Please get INR rechecked at [facility] by [facility] on Monday July 15th, they have a standing order. Please keep a BP diary, if the top number is under 90 and you feel dizzy or weak please call Esequiel at [phone].

## 2019-07-01 NOTE — PROGRESS NOTES
A full discussion of the nature of anticoagulants has been carried out. A benefit risk analysis has been presented to the patient, so that they understand the justification for choosing anticoagulation at this time. The need for frequent and regular monitoring, precise dosage adjustment and compliance is stressed. Side effects of potential bleeding are discussed. The patient should avoid any OTC items containing aspirin or ibuprofen, and should avoid great swings in general diet. Avoid alcohol consumption. Call if any signs of abnormal bleeding. Next PT/INR test in 2 weeks. Patients INR was being monitored by [facility] previously. PCP has retired. Trying to get new PCP but cant get appt till December. Patient lives in [place]. Will go to [facility] for [facility]R checks across from [facility].  Standing order sent. Instructed patient to recheck levels in 2 weeks and call office after labs drawn. Patient verbalized understanding. Patient thinks he is on 2.5 mg daily. Unsure of INR goal range.         Anticoagulation Summary  As of 2019    INR goal:   2.0-3.0   TTR:   --   INR used for dosin.3 (2019)   Warfarin maintenance plan:   2.5 mg (2.5 mg x 1) every day   Weekly warfarin total:   17.5 mg   Plan last modified:   Shravan Dorman RN (2019)   Next INR check:   7/15/2019   Target end date:       Indications    A-fib ([address] [I48.91]  H/O mechanical aortic valve replacement [Z95.2]  Typical atrial flutter ([address] [I48.3]             Anticoagulation Episode Summary     INR check location:       Preferred lab:       Send INR reminders to:       Comments:         Anticoagulation Care Providers     Provider Role Specialty Phone number    Sloane Steen MD Responsible Cardiology [phone]          Future Appointments   Date Time Provider Lexie Moreno   10/2/2019  3:45 PM [address], [address] [address]   2019 10:00 AM Mark Copeland MD [address]   2020 2:00 PM REMOTE1, [address]   4/29/2020  8:30 AM REMOTE1, [address]   8/6/2020  3:00 PM [address] [place] [address], [address]   8/6/2020  3:20 PM Michael Amaro  [address]

## 2019-07-01 NOTE — PROGRESS NOTES
Jonelle Nuno     1935       Jose Maria Crews MD, [facility]  Date of Visit-7/1/2019   PCP is Héctor James MD   [facility] [place]  [facility]  HPI:  Jonelle Nuno is a 80 y.o. male   Admitted for acute CHF in March, f/u visit in April with a fixed defect in the inferior wall. Similar to 2015, this is in an area of a known occlusion of the right coronary artery. When last seen 06/21/19, he had some increasing SOB and so we restarted Aldactone. Pt was hypotensive at 80 but asymptomatic. Today's weight is stable and his BP has improved to 100/60. INR at 2.3. Overall the pt states he is doing well. Mr. Viviana Gonzales has not yet noticed improvement in SOB with Aldactone since last visit. However, he states that he does feel SOB on exertion, for example walking in the grocery store. He is here with his wife today, and she inquired about a referral to a different PCP. Of note, he was given a prescription by urologist for Myrbetriq. Denies chest pain, edema, or syncope  Has no tachycardia, palpitations or sense of arrythmia     Assessment/Plan:     1. Chronic systolic congestive heart failure (HCC)  NYHA 2. BP is at about the limit of what we can add for meds. On Coreg and Entresto and spironolactone. Has AICD and BiV. On optimal medical therapy. 04/15/19   ECHO ADULT COMPLETE 04/15/2019 4/15/2019    Narrative · Left ventricular moderately decreased systolic function. Calculated left   ventricular ejection fraction is 40%. Left ventricular mild concentric   hypertrophy. · Left atrial cavity size is moderately dilated. · Right ventricular cavity size is mildly dilated. Pacer/ICD present. · Right atrial cavity size is mildly dilated. · Aortic valve is mechanical prosthetic. Mild aortic valve regurgitation   is present. · Mild mitral annular calcification. Mild to moderate mitral valve   regurgitation. · Mild to moderate tricuspid valve regurgitation is present. Pulmonary   hypertension is present. Signed by: Tammie Beaulieu MD     2. Coronary artery disease of native artery of native heart with stable angina pectoris ([address]  Fixed RCA disease with CABG in 1996, has no angina. 3. Severe aortic stenosis  Mechanical prosthesis is well functioning by echo in April. 4. Chronic atrial fibrillation (HCC)  Remains likely in flutter and fib. This may impact use of BiV, will discuss with Dr. Radha Ramirez is an AVN ablation would be helpful. 5. Hypertension, essential  BP diary varies between 84 and 133. We discussed that BP even in 80's is okay as long as he is not symptomatic. 6. Dyslipidemia  Lipids on high potency statin as appropriate for secondary prevention. 7. Status post catheter ablation of atrial flutter  8. H/O mechanical aortic valve replacement  9. Presence of biventricular AICD  10. S/P CABG (coronary artery bypass graft)    F/u in 3 months. We will assist with PCP referral     Future Appointments   Date Time Provider Lexie Moreno   10/2/2019  3:45 PM [address], [address]   10/4/2019 11:40 AM Tammie Beaulieu  [address]   10/8/2019  9:30 AM Young Lee MD [address]   1/20/2020  2:00 PM REMOTE1, [address]   4/29/2020  8:30 AM REMOTE1, [address]   8/6/2020  3:00  [place], [address]   8/6/2020  3:20 PM Ben Boswell  [address]      Patient Instructions   You will be scheduled for a 3 month follow up, please get your blood work completed about one week prior. Please get INR rechecked at [facility] on Monday July 15th, they have a standing order. Please keep a BP diary, if the top number is under 90 and you feel dizzy or weak please call Esequiel at [phone]. Key CAD CHF Meds             spironolactone (ALDACTONE) 25 mg tablet (Taking) Take 1 Tab by mouth daily.     carvedilol (COREG) 6.25 mg tablet (Taking) Take 1 Tab by mouth two (2) times a day. sacubitril-valsartan (ENTRESTO) 24 mg/26 mg tablet (Taking) Take 1 Tab by mouth every twelve (12) hours. warfarin (COUMADIN) 1 mg tablet (Taking) Take 1 Tab by mouth daily. aspirin delayed-release 81 mg tablet (Taking) Take 81 mg by mouth daily. atorvastatin (LIPITOR) 40 mg tablet (Taking) Take 40 mg by mouth daily. furosemide (LASIX) 20 mg tablet (Taking) Take 1 Tab by mouth daily. Impression:   1. Chronic systolic congestive heart failure ([address]    2. Coronary artery disease of native artery of native heart with stable angina pectoris ([facility]    3. Severe aortic stenosis    4. Chronic atrial fibrillation (HCC)    5. Hypertension, essential    6. Dyslipidemia    7. Status post catheter ablation of atrial flutter    8. H/O mechanical aortic valve replacement    9. Presence of biventricular AICD    10. S/P CABG (coronary artery bypass graft)       Cardiac History:   CAD s/p CABG 1996 with mechanical AVR for severe AS,     known occlusion to the RCA and vein graft to the RCA. [place] nuclear 6/24/15 showed inferior infarction with a small amount of emani infarct ischemia. There was inferior septal kinesis with an EF of 38%. Carotids 1/7/13 showed minimal disease. Monitor holter 4/11/13 showed 21 beats of Vtach and 4 beats of Vtach with at rate of 140, 14% of the beats were ventricular. Echo October 2018 showed EF showed EF of 20, moderate LVH, with dilated atrial, mechanical AVR seems to be functioning well, mild to moderate MR.     ROS-except as noted above. . A complete cardiac and respiratory are reviewed and negative except as above ; Resp-denies wheezing  or productive cough,.  Const- No unusual weight loss or fever; Neuro-no recent seizure or CVA ; GI- No BRBPR, abdom pain, bloating ; - no  hematuria   see supplement sheet, initialed and to be scanned by staff  Past Medical History:   Diagnosis Date    Anger     TAKES PAROXETINE TO CONTROL ANGER ISSUES    CAD (coronary artery disease)     Foot drop, left     WEARS METAL BRACE    Gout     Hard of hearing     High cholesterol     Hypertension     Prostate enlargement     Thyroid disease     Unspecified sleep apnea     DOESN'T USE CPAP; \"IT MADE HIM SICK-COLDS, SINUS\", PER WIFE      Social Hx= reports that he has never smoked. He has never used smokeless tobacco. He reports that he drinks about 1.5 oz of alcohol per week. He reports that he does not use drugs. Exam and Labs:    Visit Vitals  /60 (BP 1 Location: Left arm, BP Patient Position: Sitting)   Pulse 62   Resp 18   Ht 5' 5\" (1.651 m)   Wt 175 lb 9.6 oz (79.7 kg)   SpO2 99%   BMI 29.22 kg/m²    @Constitutional:  NAD, comfortable  Head: NC,AT. Eyes: No scleral icterus. Neck:  Neck supple. No JVD present. Throat: moist mucous membranes. Chest: Effort normal & normal respiratory excursion . Neurological: alert, conversant and oriented . Skin: Skin is not cold. No obvious systemic rash noted. Not diaphoretic. No erythema. Psychiatric:  Grossly normal mood and affect. Behavior appears normal. Extremities:  no clubbing or cyanosis. Abdomen: non distended    Lungs:  breath sounds normal. No stridor. distress, wheezes or  Rales. Heart: IRIR with a 8-4/4 systolic murmur and a mechanical A2, normal S1, S2, no rubs, clicks or gallops, PMI non displaced. Edema:  Edema is none.     Lab Results   Component Value Date/Time    Sodium 143 04/23/2019 03:59 PM    Potassium 4.1 04/23/2019 03:59 PM    Chloride 105 04/23/2019 03:59 PM    CO2 26 04/23/2019 03:59 PM    Anion gap 7 03/07/2019 03:51 AM    Glucose 91 04/23/2019 03:59 PM    BUN 20 04/23/2019 03:59 PM    Creatinine 1.06 04/23/2019 03:59 PM    BUN/Creatinine ratio 19 04/23/2019 03:59 PM    GFR est AA 74 04/23/2019 03:59 PM    GFR est non-AA 64 04/23/2019 03:59 PM    Calcium 8.7 04/23/2019 03:59 PM      Wt Readings from Last 3 Encounters:   07/01/19 175 lb 9.6 oz (79.7 kg)   06/21/19 175 lb 8 oz (79.6 kg)   06/20/19 173 lb (78.5 kg)      BP Readings from Last 3 Encounters:   07/01/19 100/60   06/21/19 (!) 80/50   06/20/19 110/70      Current Outpatient Medications   Medication Sig    spironolactone (ALDACTONE) 25 mg tablet Take 1 Tab by mouth daily.  b complex vitamins (B COMPLEX 1) tablet Take 1 Tab by mouth daily.  CYANOCOBALAMIN, VITAMIN B-12, PO Take 1 Tab by mouth daily.  carvedilol (COREG) 6.25 mg tablet Take 1 Tab by mouth two (2) times a day.  sacubitril-valsartan (ENTRESTO) 24 mg/26 mg tablet Take 1 Tab by mouth every twelve (12) hours.  levothyroxine (SYNTHROID) 150 mcg tablet Take 1 Tab by mouth Daily (before breakfast).  warfarin (COUMADIN) 1 mg tablet Take 1 Tab by mouth daily. (Patient taking differently: Take 2.5 mg by mouth daily.)    aspirin delayed-release 81 mg tablet Take 81 mg by mouth daily.  therapeutic multivitamin (THERAGRAN) tablet Take 1 Tab by mouth daily.  ascorbic acid, vitamin C, (VITAMIN C) 500 mg tablet Take 1,000 mg by mouth daily.  B.infantis-B.ani-B.long-B.bifi (PROBIOTIC 4X) 10-15 mg TbEC Take 1 Tab by mouth daily.  cholecalciferol (VITAMIN D3) 1,000 unit tablet Take 1,000 Units by mouth nightly.  calcium citrate (CITRACAL PO) Take 1 Tab by mouth nightly.  COQ10, UBIQUINOL, PO Take 100-200 mg by mouth daily.  allopurinol (ZYLOPRIM) 300 mg tablet Take 300 mg by mouth daily.  atorvastatin (LIPITOR) 40 mg tablet Take 40 mg by mouth daily.  finasteride (PROSCAR) 5 mg tablet Take 5 mg by mouth daily.  PARoxetine (PAXIL) 20 mg tablet Take 20 mg by mouth daily.  tamsulosin (FLOMAX) 0.4 mg capsule Take 0.8 mg by mouth nightly.  FERROUS GLUCONATE PO Take 325 mg by mouth daily.  furosemide (LASIX) 20 mg tablet Take 1 Tab by mouth daily.  oxybutynin chloride XL (DITROPAN XL) 10 mg CR tablet Take 1 Tab by mouth daily. No current facility-administered medications for this visit. Impression see above. Written by Mini Quezada, as dictated by Fawn Aquino MD.

## 2019-07-15 ENCOUNTER — HOSPITAL ENCOUNTER (OUTPATIENT)
Dept: LAB | Age: 84
Discharge: HOME | End: 2019-07-15
Payer: MEDICARE

## 2019-07-15 PROCEDURE — 80048 BASIC METABOLIC PNL TOTAL CA: CPT

## 2019-07-15 PROCEDURE — 85025 COMPLETE CBC W/AUTO DIFF WBC: CPT

## 2019-07-15 PROCEDURE — 36415 COLL VENOUS BLD VENIPUNCTURE: CPT

## 2019-07-16 ENCOUNTER — TELEPHONE (OUTPATIENT)
Dept: CARDIOLOGY CLINIC | Age: 84
End: 2019-07-16

## 2019-07-16 LAB
BASOPHILS # BLD AUTO: 0 X10E3/UL (ref 0–0.2)
BASOPHILS NFR BLD AUTO: 0 %
BUN SERPL-MCNC: 24 MG/DL (ref 8–27)
BUN/CREAT SERPL: 19 (ref 10–24)
CALCIUM SERPL-MCNC: 9.6 MG/DL (ref 8.6–10.2)
CHLORIDE SERPL-SCNC: 105 MMOL/L (ref 96–106)
CO2 SERPL-SCNC: 26 MMOL/L (ref 20–29)
CREAT SERPL-MCNC: 1.25 MG/DL (ref 0.76–1.27)
EOSINOPHIL # BLD AUTO: 0.2 X10E3/UL (ref 0–0.4)
EOSINOPHIL NFR BLD AUTO: 4 %
ERYTHROCYTE [DISTWIDTH] IN BLOOD BY AUTOMATED COUNT: 13.8 % (ref 12.3–15.4)
GLUCOSE SERPL-MCNC: 82 MG/DL (ref 65–99)
HCT VFR BLD AUTO: 39.2 % (ref 37.5–51)
HGB BLD-MCNC: 13.6 G/DL (ref 13–17.7)
INTERPRETATION: NORMAL
LYMPHOCYTES # BLD AUTO: 0.7 X10E3/UL (ref 0.7–3.1)
LYMPHOCYTES NFR BLD AUTO: 13 %
MCH RBC QN AUTO: 32.3 PG (ref 26.6–33)
MCHC RBC AUTO-ENTMCNC: 34.7 G/DL (ref 31.5–35.7)
MCV RBC AUTO: 93 FL (ref 79–97)
MONOCYTES # BLD AUTO: 0.4 X10E3/UL (ref 0.1–0.9)
MONOCYTES NFR BLD AUTO: 8 %
NEUTROPHILS # BLD AUTO: 4.1 X10E3/UL (ref 1.4–7)
NEUTROPHILS NFR BLD AUTO: 75 %
PLATELET # BLD AUTO: 170 X10E3/UL (ref 150–450)
POTASSIUM SERPL-SCNC: 4 MMOL/L (ref 3.5–5.2)
RBC # BLD AUTO: 4.21 X10E6/UL (ref 4.14–5.8)
SODIUM SERPL-SCNC: 144 MMOL/L (ref 134–144)
WBC # BLD AUTO: 5.4 X10E3/UL (ref 3.4–10.8)

## 2019-07-16 NOTE — PROGRESS NOTES
His labs look fine  Kidney fx is good on his CHF meds and no anemia  Future Appointments  10/2/2019  3:45 PM    REMOTE1, [address]  10/4/2019  11:40 AM   MD [address]  10/8/2019  9:30 AM    Young Ridley MD    [address]  1/20/2020  2:00 PM    REMOTE1, [address]  4/29/2020  8:30 AM    REMOTE1, [address]  8/6/2020   3:00 PM    [address] [place] [address], [address]  8/6/2020   3:20 PM    MD [address]

## 2019-07-16 NOTE — TELEPHONE ENCOUNTER
Called and spoke with patients wife, identifiers x2. Patient was instructed to have INR lab drawn 7/15/19. When looking for result in chart, other blood work was drawn but not INR. Standing order had been sent to [facility] for INR previously. Patients wife states patient went alone to lab so she is unsure what he told them. Informed her patient will need to go back to the lab to have it drawn. She states patient will go tomorrow. Will refax over standing order again in case [facility] does not have.

## 2019-07-17 ENCOUNTER — TELEPHONE (OUTPATIENT)
Dept: CARDIOLOGY CLINIC | Age: 84
End: 2019-07-17

## 2019-07-17 LAB
INR PPP: 1.5 (ref 0.8–1.2)
INR, EXTERNAL: 1.5
PROTHROMBIN TIME: 15.2 SEC (ref 9.1–12)

## 2019-07-17 NOTE — TELEPHONE ENCOUNTER
Standing lab order faxed to [facility] 2x prior (7/1/19 and 7/16/19)- refaxed standing order again to number listed below with confirmation

## 2019-07-17 NOTE — TELEPHONE ENCOUNTER
[facility] is calling requesting an order for PTINR. The patient is in the office. Please advise.     Phone #: [phone]  Fax #: [phone]  Thanks

## 2019-07-18 ENCOUNTER — TELEPHONE (OUTPATIENT)
Dept: CARDIOLOGY CLINIC | Age: 84
End: 2019-07-18

## 2019-07-18 ENCOUNTER — TELEPHONE ANTICOAG (OUTPATIENT)
Dept: CARDIOLOGY CLINIC | Age: 84
End: 2019-07-18

## 2019-07-18 DIAGNOSIS — Z95.2 H/O MECHANICAL AORTIC VALVE REPLACEMENT: ICD-10-CM

## 2019-07-18 DIAGNOSIS — I48.3 TYPICAL ATRIAL FLUTTER (HCC): ICD-10-CM

## 2019-07-18 DIAGNOSIS — I48.20 CHRONIC ATRIAL FIBRILLATION (HCC): ICD-10-CM

## 2019-07-18 RX ORDER — WARFARIN 2.5 MG/1
2.5 TABLET ORAL DAILY
COMMUNITY
End: 2020-02-06 | Stop reason: SDUPTHER

## 2019-07-18 NOTE — PROGRESS NOTES
INR reported as 1.7 by [facility]. Please call him and advise what he needs to do with his coumadin dose.

## 2019-07-18 NOTE — PROGRESS NOTES
Spoke with patients wife, identifiers x2. She reports no missed doses of Coumadin. Level was 1.5. Wife reports she received a call last night at midnight and was told to give the patient 2 tabs. Instructed to take 1 tab daily except Wednesdays take 2 tabs. Recheck labs in I week (7/26/19). Verbalized understanding.

## 2019-08-01 LAB — INR, EXTERNAL: 2.4

## 2019-08-02 ENCOUNTER — TELEPHONE ANTICOAG (OUTPATIENT)
Dept: CARDIOLOGY CLINIC | Age: 84
End: 2019-08-02

## 2019-08-02 DIAGNOSIS — I48.20 CHRONIC ATRIAL FIBRILLATION (HCC): ICD-10-CM

## 2019-08-02 DIAGNOSIS — I48.3 TYPICAL ATRIAL FLUTTER (HCC): ICD-10-CM

## 2019-08-02 DIAGNOSIS — Z95.2 H/O MECHANICAL AORTIC VALVE REPLACEMENT: ICD-10-CM

## 2019-08-02 LAB
INR PPP: 2.4 (ref 0.8–1.2)
PROTHROMBIN TIME: 23.9 SEC (ref 9.1–12)

## 2019-08-02 NOTE — PROGRESS NOTES
Called and spoke with patients wife, identifiers x2. Informed her patients INR from [facility] was 2.4. Wife reports patient has been taking 2.5 mg daily which is different than instructed regimen from last phone call. Instructed to continue the 2.5 mg daily and recheck levels in 2 weeks (8/16/19). Wife verbalized understanding.

## 2019-08-21 ENCOUNTER — TELEPHONE (OUTPATIENT)
Dept: CARDIOLOGY CLINIC | Age: 84
End: 2019-08-21

## 2019-08-21 NOTE — TELEPHONE ENCOUNTER
A voicemail was left requesting a call back. I would like Mr Jomar Rodriguez to come into clinic on a Tuesday/Thursday, next available slot. In clinic testing of the LV lead is needed.

## 2019-08-30 ENCOUNTER — TELEPHONE ANTICOAG (OUTPATIENT)
Dept: CARDIOLOGY CLINIC | Age: 84
End: 2019-08-30

## 2019-08-30 DIAGNOSIS — I48.3 TYPICAL ATRIAL FLUTTER (HCC): ICD-10-CM

## 2019-08-30 DIAGNOSIS — I48.20 CHRONIC ATRIAL FIBRILLATION (HCC): ICD-10-CM

## 2019-08-30 DIAGNOSIS — Z95.2 H/O MECHANICAL AORTIC VALVE REPLACEMENT: ICD-10-CM

## 2019-08-30 LAB
INR PPP: 2 (ref 0.8–1.2)
PROTHROMBIN TIME: 20 SEC (ref 9.1–12)

## 2019-08-30 NOTE — PROGRESS NOTES
Spoke to patient's wife. Identifiers x 2. Informed of INR results. To continue coumadin 2.5 mg daily and recheck INR in 2 weeks. Verbalized understanding.

## 2019-09-11 LAB — INR, EXTERNAL: 1.3

## 2019-09-12 ENCOUNTER — DOCUMENTATION ONLY (OUTPATIENT)
Dept: CARDIOLOGY CLINIC | Age: 84
End: 2019-09-12

## 2019-09-12 ENCOUNTER — CLINICAL SUPPORT (OUTPATIENT)
Dept: CARDIOLOGY CLINIC | Age: 84
End: 2019-09-12

## 2019-09-12 ENCOUNTER — HOSPITAL ENCOUNTER (OUTPATIENT)
Dept: GENERAL RADIOLOGY | Age: 84
Discharge: HOME | End: 2019-09-12
Attending: INTERNAL MEDICINE
Payer: MEDICARE

## 2019-09-12 ENCOUNTER — TELEPHONE ANTICOAG (OUTPATIENT)
Dept: CARDIOLOGY CLINIC | Age: 84
End: 2019-09-12

## 2019-09-12 DIAGNOSIS — T82.120A DISPLACEMENT OF IMPLANTABLE CARDIOVERTER-DEFIBRILLATOR (ICD) LEAD, INITIAL ENCOUNTER: ICD-10-CM

## 2019-09-12 DIAGNOSIS — T82.120A DISPLACEMENT OF IMPLANTABLE CARDIOVERTER-DEFIBRILLATOR (ICD) LEAD, INITIAL ENCOUNTER: Primary | ICD-10-CM

## 2019-09-12 DIAGNOSIS — I48.3 TYPICAL ATRIAL FLUTTER (HCC): ICD-10-CM

## 2019-09-12 DIAGNOSIS — I48.20 CHRONIC ATRIAL FIBRILLATION (HCC): ICD-10-CM

## 2019-09-12 DIAGNOSIS — Z95.810 PRESENCE OF BIVENTRICULAR AUTOMATIC CARDIOVERTER/DEFIBRILLATOR (AICD): ICD-10-CM

## 2019-09-12 DIAGNOSIS — Z95.2 H/O MECHANICAL AORTIC VALVE REPLACEMENT: ICD-10-CM

## 2019-09-12 LAB
INR PPP: 1.3 (ref 0.8–1.2)
PROTHROMBIN TIME: 12.7 SEC (ref 9.1–12)

## 2019-09-12 PROCEDURE — 71046 X-RAY EXAM CHEST 2 VIEWS: CPT

## 2019-09-12 NOTE — PROGRESS NOTES
LV lead pacing threshold went up remotely so he came in today with his wife and I was present during test  Will get chest xray for LV lead displacement to CS proper or tricuspid valve    Future Appointments   Date Time Provider Lexie Moreno   10/4/2019 11:40 AM Rafael Spencer  [address]   10/8/2019  9:30 AM Young Hall MD [address]   1/20/2020  2:00 PM REMOTE1, [address]   4/29/2020  8:30 AM REMOTE1, [address]   8/6/2020  3:00 PM [address] [place] [address] Crossing, [address]   8/6/2020  3:20 PM Desire Gastelum  [address]

## 2019-09-12 NOTE — PROGRESS NOTES
Patient was in office for pacemaker check. Informed patient/wife that INR from [facility] was 1.3. Unsure if missed any doses. Wife reports increase in vit K foods. Previously therapeutic on current regimen. Instructed to take 2 tabs today then resume prior dosing and recheck levels in 1 week. Verbalized understanding.

## 2019-09-17 ENCOUNTER — TELEPHONE (OUTPATIENT)
Dept: CARDIOLOGY CLINIC | Age: 84
End: 2019-09-17

## 2019-09-17 DIAGNOSIS — I48.3 TYPICAL ATRIAL FLUTTER (HCC): Primary | ICD-10-CM

## 2019-09-17 DIAGNOSIS — I48.20 CHRONIC ATRIAL FIBRILLATION (HCC): ICD-10-CM

## 2019-09-17 DIAGNOSIS — T82.120A DISPLACEMENT OF IMPLANTABLE CARDIOVERTER-DEFIBRILLATOR (ICD) LEAD, INITIAL ENCOUNTER: ICD-10-CM

## 2019-09-17 NOTE — TELEPHONE ENCOUNTER
Called Patient Left a message to return call to office at their earliest convenience. Called to notify pt about changing procedure to 9/3/2019 @ 2:30.

## 2019-09-17 NOTE — LETTER
9/17/2019 11:10 AM 
 
Mr. Mauricio Lora [address] [address] Your Lead Revision  procedure has been scheduled for 9/30/2019 at 2:30, at [facility]. 
 
Please report to Admitting Department by 1:30, or 1 hour prior to your scheduled procedure. Please bring a list of your current medications and medication bottles, if able, to the hospital on this day. You will be unable to drive after your procedure so please make sure to bring someone with you to your procedure. You will need to have nothing to eat or drink after midnight, the night prior to your procedure. You may have small sips of water, if needed, to take with your medication. You will need labs drawn prior to your procedure. Please go to [facility] to have this done as soon as possible. You should not stop your medication. After your procedure, you will need to follow up with Dr. Daya Hart.  Your follow-up appointment has been scheduled for 10/11/19 at 8:45 am.  
 
 
 
 
 
Sincerely, 
 
 
Roro Colin MD

## 2019-09-19 NOTE — TELEPHONE ENCOUNTER
Called Patient Left a message to return call to office at their earliest convenience.     Changed procedure to 9/30/19 not 9/3/2019

## 2019-09-20 NOTE — TELEPHONE ENCOUNTER
Pt wife called back. Notified wife of date and time change and to look out for instructions coming in the mail. Pt verbalized understanding of information discussed w/ no further questions at this time.

## 2019-09-23 ENCOUNTER — HOSPITAL ENCOUNTER (OUTPATIENT)
Dept: LAB | Age: 84
Discharge: HOME | End: 2019-09-23
Payer: MEDICARE

## 2019-09-23 ENCOUNTER — TELEPHONE (OUTPATIENT)
Dept: CARDIOLOGY CLINIC | Age: 84
End: 2019-09-23

## 2019-09-23 PROCEDURE — 80048 BASIC METABOLIC PNL TOTAL CA: CPT

## 2019-09-23 PROCEDURE — 36415 COLL VENOUS BLD VENIPUNCTURE: CPT

## 2019-09-23 PROCEDURE — 85025 COMPLETE CBC W/AUTO DIFF WBC: CPT

## 2019-09-23 NOTE — TELEPHONE ENCOUNTER
Called Patient Left a message to return call to office at their earliest convenience.     Called Pt to notify procedure was changed to 10/2/2019 @10:45

## 2019-09-24 LAB
BASOPHILS # BLD AUTO: 0 X10E3/UL (ref 0–0.2)
BASOPHILS NFR BLD AUTO: 0 %
BUN SERPL-MCNC: 29 MG/DL (ref 8–27)
BUN/CREAT SERPL: 21 (ref 10–24)
CALCIUM SERPL-MCNC: 9.4 MG/DL (ref 8.6–10.2)
CHLORIDE SERPL-SCNC: 100 MMOL/L (ref 96–106)
CO2 SERPL-SCNC: 24 MMOL/L (ref 20–29)
CREAT SERPL-MCNC: 1.36 MG/DL (ref 0.76–1.27)
EOSINOPHIL # BLD AUTO: 0.1 X10E3/UL (ref 0–0.4)
EOSINOPHIL NFR BLD AUTO: 2 %
ERYTHROCYTE [DISTWIDTH] IN BLOOD BY AUTOMATED COUNT: 14 % (ref 12.3–15.4)
GLUCOSE SERPL-MCNC: 105 MG/DL (ref 65–99)
HCT VFR BLD AUTO: 38.6 % (ref 37.5–51)
HGB BLD-MCNC: 13.5 G/DL (ref 13–17.7)
INTERPRETATION: NORMAL
LYMPHOCYTES # BLD AUTO: 0.8 X10E3/UL (ref 0.7–3.1)
LYMPHOCYTES NFR BLD AUTO: 15 %
MCH RBC QN AUTO: 32.9 PG (ref 26.6–33)
MCHC RBC AUTO-ENTMCNC: 35 G/DL (ref 31.5–35.7)
MCV RBC AUTO: 94 FL (ref 79–97)
MONOCYTES # BLD AUTO: 0.4 X10E3/UL (ref 0.1–0.9)
MONOCYTES NFR BLD AUTO: 8 %
NEUTROPHILS # BLD AUTO: 4 X10E3/UL (ref 1.4–7)
NEUTROPHILS NFR BLD AUTO: 75 %
PLATELET # BLD AUTO: 139 X10E3/UL (ref 150–450)
POTASSIUM SERPL-SCNC: 4.2 MMOL/L (ref 3.5–5.2)
RBC # BLD AUTO: 4.1 X10E6/UL (ref 4.14–5.8)
SODIUM SERPL-SCNC: 141 MMOL/L (ref 134–144)
WBC # BLD AUTO: 5.4 X10E3/UL (ref 3.4–10.8)

## 2019-09-24 NOTE — TELEPHONE ENCOUNTER
Platelets slightly low (139), but not patient's lowest.  Cr 1.36, slightly higher than baseline. OK to proceed with upcoming procedure as scheduled.

## 2019-09-25 NOTE — TELEPHONE ENCOUNTER
Called Patient Left a message to return call to office at their earliest convenience. When pt calls back please do not leave message need to speak with pt.

## 2019-09-26 NOTE — TELEPHONE ENCOUNTER
Called Patient Left a message to return call to office at their earliest convenience. When the pt calls back we need to talk to him please don't send a message.

## 2019-09-26 NOTE — TELEPHONE ENCOUNTER
Nell Thayer called Irlanda Booutant Carmela's daughter. Suellen Bowers stated Mrs. Mariola Castro had a stroke and is at [address] her Dad's procedure has been changed to 10/2/2019 at 10:45 am. Suellen Bowers verbalized understanding of information discussed w/ no further questions at this time.

## 2019-09-27 RX ORDER — CEFAZOLIN SODIUM/WATER 2 G/20 ML
2 SYRINGE (ML) INTRAVENOUS ONCE
Status: CANCELLED | OUTPATIENT
Start: 2019-09-27 | End: 2019-09-27

## 2019-09-27 RX ORDER — SODIUM CHLORIDE 0.9 % (FLUSH) 0.9 %
5-40 SYRINGE (ML) INJECTION AS NEEDED
Status: CANCELLED | OUTPATIENT
Start: 2019-09-27

## 2019-09-27 RX ORDER — SODIUM CHLORIDE 0.9 % (FLUSH) 0.9 %
5-40 SYRINGE (ML) INJECTION EVERY 8 HOURS
Status: CANCELLED | OUTPATIENT
Start: 2019-09-27

## 2019-10-02 ENCOUNTER — TELEPHONE (OUTPATIENT)
Dept: CARDIOLOGY CLINIC | Age: 84
End: 2019-10-02

## 2019-10-02 ENCOUNTER — HOSPITAL ENCOUNTER (OUTPATIENT)
Age: 84
Setting detail: OUTPATIENT SURGERY
Discharge: HOME | End: 2019-10-04
Attending: INTERNAL MEDICINE | Admitting: INTERNAL MEDICINE

## 2019-10-02 DIAGNOSIS — Z95.810 STATUS POST INTERNAL CARDIAC DEFIBRILLATOR PROCEDURE: ICD-10-CM

## 2019-10-02 NOTE — TELEPHONE ENCOUNTER
----- Message from Vero Ding MD sent at 10/2/2019 10:16 AM EDT -----  Daughter Page Judge at [phone] is with him, said his house well water has been infected and is waiting for repair   He did not use hibiclens scrubs before coming to hospital today for LV lead repositioning    I canceled his procedure today  Please call Rigo Hicks to arrange new date but he needs antibiotic scrubbing solution prescribed again and she will pick it up and make sure he will use

## 2019-10-02 NOTE — H&P
Cardiac Electrophysiology H/P Note     Subjective:      Tameka  is a 80 y.o. patient who is seen for evaluation of LV lead dislodgement  LV lead pacing threshold went up remotely and confirmed in office  chest xray showed LV lead displacement out of coronary sinus   Wife is now sick in rehab after stroke  Daughter Bebe Wnog at [phone] is with him, said his house well water has been infected and is waiting for repair   He did not use hibiclens scrubs before coming to hospital today for LV lead repositioning    Dr Haroon Mckay is his cardiologist  He had AFL with RVR and was ablated     He was admitted on 03/01/2019 for acute on chronic systolic heart failure, NYHA III-IV. He was seen in clinic on 02/28/2019, & direct admit was not available, so he presented to the ED for admission. Echo on 02/28/2019 showed LVEF 10% (20% in 2016). MEMO 03/01/2019, LVEF <15%   [employer] biventricular ICD implant on 03/04/2019. Device check on 03/05/2019 shows proper lead & generator function. Slightly suboptimal biventricular pacing due to PVCs.     Anticoagulated with warfarin, no bleeding issues.        Echo (02/28/2019): LVEF <15%, mod LV dilation, mod concentric LVH. RV global systolic function mod reduced. Mod MAC, mod MR. Prosthetic mechanical aortic valve, mild to mod AS. Mod TR, mod pulmonary hypertension. Mild AL. Ascending aorta mod dilated. Mod elevated CVP.     MEMO (03/01/2019): LVEF <15%, mechanical prosthetic aortic valve.        Previous:  Echo (10/10/2018): LVEF 20%, severe diffuse hypokinesis, mod concentric LVH. LA mod to severely dilated, RA mildly dilated. Mild to mod MR. Mechanical prosthetic aortic valve, mean grad 19 mmHg. Mild TR.     CABG & mechanical AVR 1996. Known occlusion to RCA & SVG-RCA.     Lexiscan stress test (06/24/2015): LVEF 38%, inferior septal kinesis.   Inferior infarction with small amount of emani infarct ischemia.     Holter (04/2013): 21 beat VT & NSVT (4 beats) rate 140.  14% total beats ventricular.     Previous ARB-HCTZ, discontinued due to renal dysfunction.     Followed by nephrology.     04/15/19   ECHO ADULT COMPLETE 04/15/2019 4/15/2019    Narrative · Left ventricular moderately decreased systolic function. Calculated left   ventricular ejection fraction is 40%. Left ventricular mild concentric   hypertrophy. · Left atrial cavity size is moderately dilated. · Right ventricular cavity size is mildly dilated. Pacer/ICD present. · Right atrial cavity size is mildly dilated. · Aortic valve is mechanical prosthetic. Mild aortic valve regurgitation   is present. · Mild mitral annular calcification. Mild to moderate mitral valve   regurgitation. · Mild to moderate tricuspid valve regurgitation is present. Pulmonary   hypertension is present. Signed by: Lisa Scherer MD         Patient Active Problem List   Diagnosis Code    Anemia D64.9    Hypothyroidism E03.9    Supratherapeutic INR R79.1    H/O mechanical aortic valve replacement Z95.2    A-fib ([address] I48.91    Acute on chronic systolic heart failure, NYHA class 4 ([facility]) I50.23    Typical atrial flutter ([facility]) I48.3    Status post catheter ablation of atrial flutter Z98.890    Biventricular ICD (implantable cardioverter-defibrillator) in place Z95.810    Chronic systolic congestive heart failure ([facility]) I50.22    LENNON (dyspnea on exertion) R06.09    Coronary artery disease involving native coronary artery of native heart with unstable angina pectoris ([facility]) I25.110    S/P CABG (coronary artery bypass graft) Z95.1    Severe aortic stenosis I35.0    Hypertension, essential I10    Dyslipidemia E78.5    PVC (premature ventricular contraction) I49.3    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       Current Outpatient Medications on File Prior to Encounter   Medication Sig Dispense Refill    warfarin (COUMADIN) 2.5 mg tablet Take 2.5 mg by mouth daily. Take 1 tab daily      spironolactone (ALDACTONE) 25 mg tablet Take 1 Tab by mouth daily. 90 Tab 3    oxybutynin chloride XL (DITROPAN XL) 10 mg CR tablet Take 1 Tab by mouth daily. 90 Tab 1    b complex vitamins (B COMPLEX 1) tablet Take 1 Tab by mouth daily.  CYANOCOBALAMIN, VITAMIN B-12, PO Take 1 Tab by mouth daily.  carvedilol (COREG) 6.25 mg tablet Take 1 Tab by mouth two (2) times a day. 180 Tab 2    sacubitril-valsartan (ENTRESTO) 24 mg/26 mg tablet Take 1 Tab by mouth every twelve (12) hours. 180 Tab 3    levothyroxine (SYNTHROID) 150 mcg tablet Take 1 Tab by mouth Daily (before breakfast). 30 Tab 2    warfarin (COUMADIN) 1 mg tablet Take 1 Tab by mouth daily. (Patient taking differently: Take 2.5 mg by mouth daily.) 60 Tab 1    aspirin delayed-release 81 mg tablet Take 81 mg by mouth daily.  therapeutic multivitamin (THERAGRAN) tablet Take 1 Tab by mouth daily.  ascorbic acid, vitamin C, (VITAMIN C) 500 mg tablet Take 1,000 mg by mouth daily.  B.infantis-B.ani-B.long-B.bifi (PROBIOTIC 4X) 10-15 mg TbEC Take 1 Tab by mouth daily.  cholecalciferol (VITAMIN D3) 1,000 unit tablet Take 1,000 Units by mouth nightly.  calcium citrate (CITRACAL PO) Take 1 Tab by mouth nightly.  COQ10, UBIQUINOL, PO Take 100-200 mg by mouth daily.  allopurinol (ZYLOPRIM) 300 mg tablet Take 300 mg by mouth daily.  atorvastatin (LIPITOR) 40 mg tablet Take 40 mg by mouth daily.  finasteride (PROSCAR) 5 mg tablet Take 5 mg by mouth daily.  PARoxetine (PAXIL) 20 mg tablet Take 20 mg by mouth daily.  tamsulosin (FLOMAX) 0.4 mg capsule Take 0.8 mg by mouth nightly.  FERROUS GLUCONATE PO Take 325 mg by mouth daily.  furosemide (LASIX) 20 mg tablet Take 1 Tab by mouth daily.  90 Tab 1       No Known Allergies  Past Medical History:   Diagnosis Date    Anger     TAKES PAROXETINE TO CONTROL ANGER ISSUES    CAD (coronary artery disease)     Foot drop, left     WEARS METAL BRACE    Gout     Hard of hearing     High cholesterol     Hypertension     Prostate enlargement     Thyroid disease     Unspecified sleep apnea     DOESN'T USE CPAP; \"IT MADE HIM SICK-COLDS, SINUS\", PER WIFE     Past Surgical History:   Procedure Laterality Date    CARDIAC SURG PROCEDURE UNLIST      ANGIOPLASTY WITH CORONARY STENT    CARDIAC SURG PROCEDURE UNLIST  1996    AORTIC VALVE REPLACEMENT    HX ORTHOPAEDIC  2001    ORIF COMPOUND FRACTURE LEFT ANKLE    HX RETINAL DETACHMENT REPAIR  1980s    LEFT EYE    VT COMPRE ELECTROPHYSIOL XM W/LEFT ATRIAL PACNG/REC N/A 3/4/2019    Lt Atrial Pace & Record During Ep Study performed by Catalina Michel MD at [place] [address], [facility] [address] CATH LAB    VT EPHYS EVAL PACG CVDFB PRGRMG/REPRGRMG PARAMETERS N/A 3/4/2019    Eval Icd Generator & Leads W Testing At Implant performed by Catalina Michel MD at [place] [address], [facility] Dr CATH LAB    VT EPHYS EVAL W/ABLATION [address] N/A 3/4/2019    Ablation A-Flutter performed by Catalina Michel MD at [place] [address], [facility] [address] CATH LAB    VT INSJ/RPLCMT PERM DFB W/TRNSVNS LDS 1/DUAL CHMBR N/A 3/4/2019    INSERT ICD BIV MULTI performed by Catalina Michel MD at [place] [address], [facility] Dr CATH LAB    VT INTRACARDIAC ELECTROPHYSIOLOGIC 3D MAPPING N/A 3/4/2019    Ep 3d Mapping performed by Catalina Michel MD at [place] [address], [facility] Dr CATH LAB     Family History   Problem Relation Age of Onset    Stroke Mother         ANEURYSM    Stroke Father      Social History     Tobacco Use    Smoking status: Never Smoker    Smokeless tobacco: Never Used   Substance Use Topics    Alcohol use: Yes     Alcohol/week: 2.5 standard drinks     Types: 3 Standard drinks or equivalent per week        Review of Systems:   Constitutional: Negative for fever, chills, weight loss, malaise/fatigue. HEENT: Negative for nosebleeds, vision changes.    Respiratory: Negative for cough, hemoptysis  Cardiovascular: Negative for chest pain, palpitations, orthopnea, claudication, leg swelling, syncope, and PND. Gastrointestinal: Negative for nausea, vomiting, diarrhea, blood in stool and melena. Genitourinary: Negative for dysuria, and hematuria. Musculoskeletal: Negative for myalgias, arthralgia. Skin: Negative for rash. Heme: Does not bleed or bruise easily. Neurological: Negative for speech change and focal weakness     Objective:        Physical Exam:   Constitutional: well-developed and well-nourished. No respiratory distress. Head: Normocephalic and atraumatic. Eyes: Pupils are equal, round  ENT: hearing normal  Neck: supple. No JVD present. Cardiovascular: Normal rate, regular rhythm. Exam reveals no gallop and no friction rub. No murmur heard. Pulmonary/Chest: Effort normal and breath sounds normal. No wheezes. Abdominal: Soft, no tenderness. Musculoskeletal: no edema. Neurological: alert,oriented. Skin: Skin is warm and dry  Psychiatric: normal mood and affect. Behavior is normal. Judgment and thought content normal.         Assessment/Plan:   LV lead of BIV ICD dislodgement  With BIV pacing his LVEF had improved to 40% from 15% and felt better  Patient is stable for now without BIV pacing and was going to have LV lead reimplant but in light of the recent house water source infection and he did not use hibiclens scrubbing last 5 days there is risk of pocket infection if procedure is done today so I recommend and his daughter agree to postpone this until the house water source is clean and he will need to use hibiclens scrubs before the procedure     She will discuss with my nurse  His wife is in rehab     Thank you for involving me in this patient's care and please call with further concerns or questions. Melvi Hernández M.D.   Electrophysiology/Cardiology  [address] [place] [address] and [facility]  [address] [place] [address]                             [phone]

## 2019-10-02 NOTE — TELEPHONE ENCOUNTER
Called Viviane pt [address] her VMB was full will call her back later.      Scheduled Pt for 10/14/2019 at 2:30 pm

## 2019-10-03 RX ORDER — CHLORHEXIDINE GLUCONATE 4 G/100ML
1 SOLUTION TOPICAL
Qty: 118 ML | Refills: 0 | Status: SHIPPED | OUTPATIENT
Start: 2019-10-03 | End: 2019-10-03

## 2019-10-03 NOTE — TELEPHONE ENCOUNTER
Called Gloria Pt daughter two identifiers confirmed. Notified Mp Graft of when the procedure has been moved to 10/14/2019. Notified Mp Graft to make sure to restart coumadin but hold it 2 days before on 10/12/2019. Pt verbalized understanding of information discussed w/ no further questions at this time.

## 2019-10-03 NOTE — DISCHARGE INSTRUCTIONS
Procedure is postponed  Future Appointments   Date Time Provider Lexie Moreno   10/4/2019 11:40 AM Migel Baltazar  [address]   10/8/2019  9:30 AM [facility]    10/11/2019  8:45 AM Wahl Kush, [address]   10/11/2019  9:00 AM HOLTER, [address]   1/20/2020  2:00 PM REMOTE1, [address]   4/29/2020  8:30 AM REMOTE1, [address]   8/6/2020  3:00 PM Wahl Kush, [address]   8/6/2020  3:20 PM Grant Navarro  [address]

## 2019-10-07 ENCOUNTER — TELEPHONE (OUTPATIENT)
Dept: CARDIOLOGY CLINIC | Age: 84
End: 2019-10-07

## 2019-10-07 NOTE — TELEPHONE ENCOUNTER
Verified patient with two types of identifiers. Patient's daughter wanted to know if patient should take antibiotic before procedure. Notified daughter that unless patient is showing signs of possible infection no abx would be needed. Patient will get abx during procedure and after. Reviewed pre-procedure instructions with daughter. Daughter did state that patient cut his leg the other day. Notified daughter to have leg evaluated if concerned. Patient verbalized understanding and will call with any other questions.

## 2019-10-07 NOTE — TELEPHONE ENCOUNTER
Patient's daughter, Sridhar Mooney, stated that she has questions regarding the patient's upcoming procedure. Please advise.     Phone #: [phone]  Thanks

## 2019-10-09 ENCOUNTER — TELEPHONE (OUTPATIENT)
Dept: CARDIOLOGY CLINIC | Age: 84
End: 2019-10-09

## 2019-10-09 NOTE — TELEPHONE ENCOUNTER
Patient daughter has a few questions regarding the patients upcoming procedure. She states that the patients well water was contaminated and that the patient has not drank or showered in it recently but would like to know If he needs to have his blood tested before the surgery. Please advise.      Phone: [phone]

## 2019-10-09 NOTE — TELEPHONE ENCOUNTER
Vern Long not on HIPAA form. Discussed concerns in message with patient's daughter, Cody Ruiz, who is on HIPAA form on 10/7/19. Attempted to reach patient's daughter, Cody Ruiz, by telephone. A message was left for return call to answer any follow up questions. ,

## 2019-10-10 ENCOUNTER — TELEPHONE (OUTPATIENT)
Dept: INTERNAL MEDICINE CLINIC | Age: 84
End: 2019-10-10

## 2019-10-10 NOTE — TELEPHONE ENCOUNTER
Appointment not available     Caller's first and last name and relationship to patient (if not the patient):       Best contact number:       Preferred date and time:       Scheduled appointment date and time:       Reason for appointment:   [facility] Patient appointment     Details to clarify the request: Patient is a heart patient and drank some contaminated well water he had a New Patient appointment on December 5, 2019 it was cancelled but he does not remember canceling.  His number is [phone] this is his daughter's number [address] [facility] [address]

## 2019-10-10 NOTE — TELEPHONE ENCOUNTER
Verified patient with two types of identifiers. Spoke with daughter, verified on HIPAA who states that they are concerned because patient used ice cubes from well water that had been infected. Logan Navarro states patient is now reporting diarrhea, not feeling well for the past 2 days, and not a lot of color to his face. Logan Navarro is unsure regarding fever. Spoke with Dr. Génesis Winkler who states that since the well has been treated the ice should be okay. Dr. Génesis Winkler suggests patient go see his PCP to be further evaluated for listed symptoms. As of now we will proceed as scheduled for procedure Monday. Dr. Génesis Winkler states procedure is not urgent so if they would prefer to wait that is okay as well. Logan Navarro states she will call back with update after having patient evaluated. Patient's daughter verbalized understanding and will call with any other questions.

## 2019-10-11 ENCOUNTER — TELEPHONE (OUTPATIENT)
Dept: CARDIOLOGY CLINIC | Age: 84
End: 2019-10-11

## 2019-10-11 NOTE — TELEPHONE ENCOUNTER
Verified patient with two types of identifiers. Spoke with Reinier Moya, verified on HIPAA and patient is requesting to reschedule his procedure currently scheduled for Monday. Notified Reinier Moya once everything was sorted out with their well system and patient is feeling better we will reschedule procedure. Patient's daughter verbalized understanding and will call with any other questions.

## 2019-10-11 NOTE — TELEPHONE ENCOUNTER
Patient's daughter calling states she called patient's PCP and was told his appt had been canceled and the doctor was not taking NP appts until March. They are now concerned because she explained the whole water situation to them and asked if doctor could still order a blood culture. They told her they would send message to doctor and call back. They are worried that they won't receive a call back in time to get blood culture done today. She is requesting a call back to discuss other options. Please call back at [phone].

## 2019-10-12 LAB
INR PPP: 2.4 (ref 0.8–1.2)
PROTHROMBIN TIME: 23.6 SEC (ref 9.1–12)

## 2019-10-15 ENCOUNTER — TELEPHONE ANTICOAG (OUTPATIENT)
Dept: CARDIOLOGY CLINIC | Age: 84
End: 2019-10-15

## 2019-10-15 DIAGNOSIS — I48.91 ATRIAL FIBRILLATION, UNSPECIFIED TYPE (HCC): ICD-10-CM

## 2019-10-15 DIAGNOSIS — Z95.2 H/O MECHANICAL AORTIC VALVE REPLACEMENT: ICD-10-CM

## 2019-10-15 DIAGNOSIS — I48.3 TYPICAL ATRIAL FLUTTER (HCC): ICD-10-CM

## 2019-10-16 ENCOUNTER — TELEPHONE (OUTPATIENT)
Dept: CARDIOLOGY CLINIC | Age: 84
End: 2019-10-16

## 2019-10-16 NOTE — PROGRESS NOTES
Called and spoke with patients wife, identifiers x2. Informed her patients INR from [facility] was 2.4. Confirmed current regimen. Instructed to continue same regimen and recheck levels in 1 month. Verbalized understanding.

## 2019-10-16 NOTE — TELEPHONE ENCOUNTER
Spoke with patients wife. She reports their well water is contaminated and advised patient not to drink or use the water but states patient has been using ice. She cancelled an upcoming procedure with Dr. Génesis Winkler due to this issue and is requesting if one of the providers can order bloodwork for pathogens for the patient to be done at [address] [place] [address].  They currently have no primary care since the previous one left and cant get an appt with a new one till March.

## 2019-10-16 NOTE — TELEPHONE ENCOUNTER
Verified patient with two types of identifiers. Patient's wife wanted to let us know that she thinks patient is anemic and was told that one of the bugs found in their water lie \"eats iron. \" Patient's wife reported recent Hgb as 13 and Hct 38. Notified patient's wife those are WNL. Patients wife also reported normal VS. Notified wife that when she comes in for her wound check we will reschedule patient's procedure at that point. Patient's wife verbalized understanding and will call with any other questions.

## 2019-10-25 ENCOUNTER — DOCUMENTATION ONLY (OUTPATIENT)
Dept: CARDIOLOGY CLINIC | Age: 84
End: 2019-10-25

## 2019-10-25 DIAGNOSIS — I48.3 TYPICAL ATRIAL FLUTTER (HCC): Primary | ICD-10-CM

## 2019-10-25 DIAGNOSIS — I48.20 CHRONIC ATRIAL FIBRILLATION (HCC): ICD-10-CM

## 2019-10-25 DIAGNOSIS — I25.118 CORONARY ARTERY DISEASE OF NATIVE ARTERY OF NATIVE HEART WITH STABLE ANGINA PECTORIS (HCC): ICD-10-CM

## 2019-10-25 DIAGNOSIS — I48.91 ATRIAL FIBRILLATION, UNSPECIFIED TYPE (HCC): ICD-10-CM

## 2019-10-25 NOTE — PROGRESS NOTES
Received labs from [facility], dated 10/11/2019.     WBC 4.6  Hgb 13  Hct 38.3  Plt 124  K 4  CO2 25  Glu 119  BUN 33  Cr 1.6

## 2019-11-08 LAB — CREATININE, EXTERNAL: 1.14

## 2019-11-20 ENCOUNTER — HOSPITAL ENCOUNTER (OUTPATIENT)
Dept: LAB | Age: 84
Discharge: HOME | End: 2019-11-20
Payer: MEDICARE

## 2019-11-20 PROCEDURE — 80048 BASIC METABOLIC PNL TOTAL CA: CPT

## 2019-11-20 PROCEDURE — 36415 COLL VENOUS BLD VENIPUNCTURE: CPT

## 2019-11-20 PROCEDURE — 85027 COMPLETE CBC AUTOMATED: CPT

## 2019-11-21 LAB
BUN SERPL-MCNC: 21 MG/DL (ref 8–27)
BUN/CREAT SERPL: 16 (ref 10–24)
CALCIUM SERPL-MCNC: 9.6 MG/DL (ref 8.6–10.2)
CHLORIDE SERPL-SCNC: 101 MMOL/L (ref 96–106)
CO2 SERPL-SCNC: 27 MMOL/L (ref 20–29)
CREAT SERPL-MCNC: 1.29 MG/DL (ref 0.76–1.27)
ERYTHROCYTE [DISTWIDTH] IN BLOOD BY AUTOMATED COUNT: 12.7 % (ref 12.3–15.4)
GLUCOSE SERPL-MCNC: 80 MG/DL (ref 65–99)
HCT VFR BLD AUTO: 38.9 % (ref 37.5–51)
HGB BLD-MCNC: 13.3 G/DL (ref 13–17.7)
INR PPP: 2.8 (ref 0.8–1.2)
INTERPRETATION: NORMAL
MCH RBC QN AUTO: 33.5 PG (ref 26.6–33)
MCHC RBC AUTO-ENTMCNC: 34.2 G/DL (ref 31.5–35.7)
MCV RBC AUTO: 98 FL (ref 79–97)
PLATELET # BLD AUTO: 145 X10E3/UL (ref 150–450)
POTASSIUM SERPL-SCNC: 4.7 MMOL/L (ref 3.5–5.2)
PROTHROMBIN TIME: 26.7 SEC (ref 9.1–12)
RBC # BLD AUTO: 3.97 X10E6/UL (ref 4.14–5.8)
SODIUM SERPL-SCNC: 140 MMOL/L (ref 134–144)
WBC # BLD AUTO: 5.3 X10E3/UL (ref 3.4–10.8)

## 2019-11-22 ENCOUNTER — OFFICE VISIT (OUTPATIENT)
Dept: CARDIOLOGY CLINIC | Age: 84
End: 2019-11-22

## 2019-11-22 ENCOUNTER — TELEPHONE ANTICOAG (OUTPATIENT)
Dept: CARDIOLOGY CLINIC | Age: 84
End: 2019-11-22

## 2019-11-22 VITALS
DIASTOLIC BLOOD PRESSURE: 52 MMHG | OXYGEN SATURATION: 98 % | HEART RATE: 69 BPM | HEIGHT: 65 IN | WEIGHT: 180 LBS | SYSTOLIC BLOOD PRESSURE: 98 MMHG | RESPIRATION RATE: 16 BRPM | BODY MASS INDEX: 29.99 KG/M2

## 2019-11-22 VITALS
WEIGHT: 180 LBS | HEART RATE: 69 BPM | RESPIRATION RATE: 16 BRPM | BODY MASS INDEX: 29.99 KG/M2 | SYSTOLIC BLOOD PRESSURE: 98 MMHG | DIASTOLIC BLOOD PRESSURE: 52 MMHG | HEIGHT: 65 IN

## 2019-11-22 DIAGNOSIS — Z95.2 H/O MECHANICAL AORTIC VALVE REPLACEMENT: ICD-10-CM

## 2019-11-22 DIAGNOSIS — I48.3 TYPICAL ATRIAL FLUTTER (HCC): ICD-10-CM

## 2019-11-22 DIAGNOSIS — I48.20 CHRONIC ATRIAL FIBRILLATION (HCC): ICD-10-CM

## 2019-11-22 DIAGNOSIS — I48.91 ATRIAL FIBRILLATION, UNSPECIFIED TYPE (HCC): ICD-10-CM

## 2019-11-22 DIAGNOSIS — I50.22 CHRONIC SYSTOLIC CONGESTIVE HEART FAILURE (HCC): Primary | ICD-10-CM

## 2019-11-22 DIAGNOSIS — I50.22 CHRONIC SYSTOLIC CONGESTIVE HEART FAILURE (HCC): ICD-10-CM

## 2019-11-22 DIAGNOSIS — I35.0 SEVERE AORTIC STENOSIS: ICD-10-CM

## 2019-11-22 DIAGNOSIS — Z95.810 BIVENTRICULAR ICD (IMPLANTABLE CARDIOVERTER-DEFIBRILLATOR) IN PLACE: ICD-10-CM

## 2019-11-22 DIAGNOSIS — Z98.890 STATUS POST CATHETER ABLATION OF ATRIAL FLUTTER: ICD-10-CM

## 2019-11-22 DIAGNOSIS — I49.3 PVC (PREMATURE VENTRICULAR CONTRACTION): ICD-10-CM

## 2019-11-22 DIAGNOSIS — Z95.1 S/P CABG (CORONARY ARTERY BYPASS GRAFT): ICD-10-CM

## 2019-11-22 DIAGNOSIS — I25.10 CORONARY ARTERY DISEASE INVOLVING NATIVE CORONARY ARTERY OF NATIVE HEART WITHOUT ANGINA PECTORIS: ICD-10-CM

## 2019-11-22 DIAGNOSIS — I10 HYPERTENSION, ESSENTIAL: ICD-10-CM

## 2019-11-22 DIAGNOSIS — Z95.810 PRESENCE OF BIVENTRICULAR AUTOMATIC CARDIOVERTER/DEFIBRILLATOR (AICD): ICD-10-CM

## 2019-11-22 DIAGNOSIS — T82.120D DISPLACEMENT OF IMPLANTABLE CARDIOVERTER-DEFIBRILLATOR (ICD) LEAD, SUBSEQUENT ENCOUNTER: Primary | ICD-10-CM

## 2019-11-22 DIAGNOSIS — E78.5 DYSLIPIDEMIA: ICD-10-CM
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
## 2019-11-22 NOTE — Clinical Note
11/22/19 Patient: Tameka  YOB: 1935 Date of Visit: 11/22/2019 None None (395) Patient Stated That They Have No Pcp 
VIA Dear None, Thank you for referring Mr. Tameka Marquez to [facility] for evaluation. My notes for this consultation are attached. If you have questions, please do not hesitate to call me. I look forward to following your patient along with you.  
 
 
Sincerely, 
 
Ambar Curtis MD
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
## 2019-11-22 NOTE — PATIENT INSTRUCTIONS
Your Left Ventricular Lead Placement procedure has been scheduled for 12/6/19 at 2:30 pm, at [facility].    Please report to Admitting Department by 12:30 pm, or 2 hours prior to your scheduled procedure. Please bring a list of your current medications and medication bottles, if able, to the hospital on this day. You will be unable to drive after your procedure so please make sure to bring someone with you to your procedure. You will need to have nothing to eat or drink after 8:00 am the morning of your procedure,  You may have small sips of water, if needed, to take with your medication. Please have your PT/INR checked at [facility] on 12/3/19. Based on that value we will decide if you need to hold your Warfarin prior to your procedure. .    After your procedure, you will need to follow up with Dr. Farhad Singh nurse for a wound and device check. Your follow-up appointment has been scheduled for 12/20/19 at 11:15 am.     Hibiclens 4% topical solution has been ordered and sent into your pharmacy  Patient it start Hibiclens application 5 days prior to procedure date    Directions Hibiclens 4%: Start cleanse 5 days prior to procedure   1. Rinse area (upper chest and upper arms) with water. 2. Apply minimum amount necessary to scrub the upper chest area from shoulder/neck to mid line of chest and to below the nipple each of  5 nights before the day of the procedure  3. Let solution dry.

## 2019-11-22 NOTE — PROGRESS NOTES
INR from [facility] was 2.8. Patient and wife in office to see MD.  Informed them of INR result. Current regimen confirmed. Instructed to return to the lab in 1 month. Verbalized understanding.      Anticoagulation Summary  As of 2019    INR goal:   2.0-3.0   TTR:   65.1 % (4.4 mo)   INR used for dosin.8 (2019)   Warfarin maintenance plan:   2.5 mg (2.5 mg x 1) every day   Weekly warfarin total:   17.5 mg   Plan last modified:   Addy Gusman RN (2019)   Next INR check:   2019   Target end date:       Indications    A-fib ([address] [I48.91]  H/O mechanical aortic valve replacement [Z95.2]  Typical atrial flutter ([address] [I48.3]             Anticoagulation Episode Summary     INR check location:       Preferred lab:       Send INR reminders to:       Comments:         Anticoagulation Care Providers     Provider Role Specialty Phone number    Dayami Elliott MD Responsible Cardiology [phone]          Future Appointments   Date Time Provider Lexie Cooli   2019 10:20 AM Dayami Elliott  [address]   2019 11:40 AM Fernando Magdaleno  [address]   2019 11:15 AM PACEMAKER3,  [address]   2019 11:30 AM HOLTER,  [address]   2020  2:00 PM REMOTE1,  [address]   3/10/2020 10:00 AM Jose Manuel Hopkins MD [address]   2020  8:30 AM Clem Kush,  [address]   2020  3:00  [place],  [address]   2020  3:20 PM Carine Al  [address]

## 2019-11-22 NOTE — PROGRESS NOTES
Visit Vitals  BP 98/52 (BP 1 Location: Left arm, BP Patient Position: Sitting)   Pulse 69   Resp 16   Ht 5' 5\" (1.651 m)   Wt 180 lb (81.6 kg)   SpO2 98%   BMI 29.95 kg/m²

## 2019-11-29 RX ORDER — SODIUM CHLORIDE 0.9 % (FLUSH) 0.9 %
5-40 SYRINGE (ML) INJECTION AS NEEDED
Status: CANCELLED | OUTPATIENT
Start: 2019-11-29

## 2019-11-29 RX ORDER — SODIUM CHLORIDE 0.9 % (FLUSH) 0.9 %
5-40 SYRINGE (ML) INJECTION EVERY 8 HOURS
Status: CANCELLED | OUTPATIENT
Start: 2019-11-29

## 2019-11-29 RX ORDER — CEFAZOLIN SODIUM/WATER 2 G/20 ML
2 SYRINGE (ML) INTRAVENOUS ONCE
Status: CANCELLED | OUTPATIENT
Start: 2019-11-29 | End: 2019-11-29

## 2019-12-03 ENCOUNTER — HOSPITAL ENCOUNTER (OUTPATIENT)
Dept: LAB | Age: 84
Discharge: HOME | End: 2019-12-03
Payer: MEDICARE

## 2019-12-03 DIAGNOSIS — Z95.2 H/O MECHANICAL AORTIC VALVE REPLACEMENT: ICD-10-CM

## 2019-12-03 DIAGNOSIS — I49.3 PVC (PREMATURE VENTRICULAR CONTRACTION): ICD-10-CM

## 2019-12-03 DIAGNOSIS — T82.120D DISPLACEMENT OF IMPLANTABLE CARDIOVERTER-DEFIBRILLATOR (ICD) LEAD, SUBSEQUENT ENCOUNTER: ICD-10-CM

## 2019-12-03 DIAGNOSIS — I48.3 TYPICAL ATRIAL FLUTTER (HCC): ICD-10-CM

## 2019-12-03 DIAGNOSIS — I48.91 ATRIAL FIBRILLATION, UNSPECIFIED TYPE (HCC): ICD-10-CM

## 2019-12-03 DIAGNOSIS — Z95.1 S/P CABG (CORONARY ARTERY BYPASS GRAFT): ICD-10-CM

## 2019-12-03 DIAGNOSIS — I50.22 CHRONIC SYSTOLIC CONGESTIVE HEART FAILURE (HCC): ICD-10-CM

## 2019-12-03 DIAGNOSIS — Z95.810 PRESENCE OF BIVENTRICULAR AUTOMATIC CARDIOVERTER/DEFIBRILLATOR (AICD): ICD-10-CM

## 2019-12-03 PROCEDURE — 36415 COLL VENOUS BLD VENIPUNCTURE: CPT

## 2019-12-03 PROCEDURE — 85610 PROTHROMBIN TIME: CPT

## 2019-12-04 LAB
INR PPP: 2.4 (ref 0.8–1.2)
PROTHROMBIN TIME: 23.2 SEC (ref 9.1–12)

## 2019-12-06 ENCOUNTER — HOSPITAL ENCOUNTER (OUTPATIENT)
Age: 84
Setting detail: OBSERVATION
Discharge: HOME | End: 2019-12-07
Attending: INTERNAL MEDICINE | Admitting: INTERNAL MEDICINE
Payer: MEDICARE

## 2019-12-06 ENCOUNTER — APPOINTMENT (OUTPATIENT)
Dept: GENERAL RADIOLOGY | Age: 84
End: 2019-12-06
Attending: NURSE PRACTITIONER
Payer: MEDICARE

## 2019-12-06 DIAGNOSIS — Z95.810 S/P ICD (INTERNAL CARDIAC DEFIBRILLATOR) PROCEDURE: Primary | ICD-10-CM

## 2019-12-06 DIAGNOSIS — T82.120S: ICD-10-CM

## 2019-12-06 PROBLEM — Z95.0 HX OF CARDIAC PACEMAKER: Status: ACTIVE | Noted: 2019-12-06

## 2019-12-06 PROBLEM — T82.120A DISPLACEMENT OF ELECTRODE LEAD OF CARDIAC PACEMAKER: Status: ACTIVE | Noted: 2019-12-06

## 2019-12-06 LAB
ANION GAP SERPL CALC-SCNC: 4 MMOL/L (ref 5–15)
BUN SERPL-MCNC: 23 MG/DL (ref 6–20)
BUN/CREAT SERPL: 18 (ref 12–20)
CALCIUM SERPL-MCNC: 8.8 MG/DL (ref 8.5–10.1)
CHLORIDE SERPL-SCNC: 109 MMOL/L (ref 97–108)
CO2 SERPL-SCNC: 26 MMOL/L (ref 21–32)
CREAT SERPL-MCNC: 1.29 MG/DL (ref 0.7–1.3)
ERYTHROCYTE [DISTWIDTH] IN BLOOD BY AUTOMATED COUNT: 13.3 % (ref 11.5–14.5)
GLUCOSE SERPL-MCNC: 99 MG/DL (ref 65–100)
HCT VFR BLD AUTO: 41.4 % (ref 36.6–50.3)
HGB BLD-MCNC: 13.6 G/DL (ref 12.1–17)
INR PPP: 2.2 (ref 0.9–1.1)
MCH RBC QN AUTO: 33.3 PG (ref 26–34)
MCHC RBC AUTO-ENTMCNC: 32.9 G/DL (ref 30–36.5)
MCV RBC AUTO: 101.5 FL (ref 80–99)
NRBC # BLD: 0 K/UL (ref 0–0.01)
NRBC BLD-RTO: 0 PER 100 WBC
PLATELET # BLD AUTO: 153 K/UL (ref 150–400)
PMV BLD AUTO: 10.1 FL (ref 8.9–12.9)
POTASSIUM SERPL-SCNC: 5.7 MMOL/L (ref 3.5–5.1)
PROTHROMBIN TIME: 21.8 SEC (ref 9–11.1)
RBC # BLD AUTO: 4.08 M/UL (ref 4.1–5.7)
SODIUM SERPL-SCNC: 139 MMOL/L (ref 136–145)
WBC # BLD AUTO: 7.1 K/UL (ref 4.1–11.1)

## 2019-12-06 PROCEDURE — 99218 HC RM OBSERVATION: CPT

## 2019-12-06 PROCEDURE — C1773 RET DEV, INSERTABLE: HCPCS | Performed by: INTERNAL MEDICINE

## 2019-12-06 PROCEDURE — 33225 L VENTRIC PACING LEAD ADD-ON: CPT | Performed by: INTERNAL MEDICINE

## 2019-12-06 PROCEDURE — C1893 INTRO/SHEATH, FIXED,NON-PEEL: HCPCS | Performed by: INTERNAL MEDICINE

## 2019-12-06 PROCEDURE — C1751 CATH, INF, PER/CENT/MIDLINE: HCPCS | Performed by: INTERNAL MEDICINE

## 2019-12-06 PROCEDURE — C1887 CATHETER, GUIDING: HCPCS | Performed by: INTERNAL MEDICINE

## 2019-12-06 PROCEDURE — 77030018673: Performed by: INTERNAL MEDICINE

## 2019-12-06 PROCEDURE — 36415 COLL VENOUS BLD VENIPUNCTURE: CPT

## 2019-12-06 PROCEDURE — 77030019593 HC DEV LD CUT SPEC -B: Performed by: INTERNAL MEDICINE

## 2019-12-06 PROCEDURE — C1769 GUIDE WIRE: HCPCS | Performed by: INTERNAL MEDICINE

## 2019-12-06 PROCEDURE — 77030019580 HC CBL PACE MEDT -B: Performed by: INTERNAL MEDICINE

## 2019-12-06 PROCEDURE — 85027 COMPLETE CBC AUTOMATED: CPT

## 2019-12-06 PROCEDURE — 33217 INSERT 2 ELECTRODE PM-DEFIB: CPT

## 2019-12-06 PROCEDURE — 74011250637 HC RX REV CODE- 250/637: Performed by: INTERNAL MEDICINE

## 2019-12-06 PROCEDURE — 71045 X-RAY EXAM CHEST 1 VIEW: CPT

## 2019-12-06 PROCEDURE — 77030032060 HC PWDR HEMSTAT ARISTA ASRB 3GM BARD -C: Performed by: INTERNAL MEDICINE

## 2019-12-06 PROCEDURE — 85610 PROTHROMBIN TIME: CPT

## 2019-12-06 PROCEDURE — 74011636320 HC RX REV CODE- 636/320: Performed by: INTERNAL MEDICINE

## 2019-12-06 PROCEDURE — 33244 REMOVE ELCTRD TRANSVENOUSLY: CPT | Performed by: INTERNAL MEDICINE

## 2019-12-06 PROCEDURE — 74011000272 HC RX REV CODE- 272: Performed by: INTERNAL MEDICINE

## 2019-12-06 PROCEDURE — 99152 MOD SED SAME PHYS/QHP 5/>YRS: CPT | Performed by: INTERNAL MEDICINE

## 2019-12-06 PROCEDURE — 74011000250 HC RX REV CODE- 250: Performed by: INTERNAL MEDICINE

## 2019-12-06 PROCEDURE — 77030019594 HC DEV LD LCK SPEC -C: Performed by: INTERNAL MEDICINE

## 2019-12-06 PROCEDURE — 80048 BASIC METABOLIC PNL TOTAL CA: CPT

## 2019-12-06 PROCEDURE — 77030039046 HC PAD DEFIB RADIOTRNSPNT CNMD -B: Performed by: INTERNAL MEDICINE

## 2019-12-06 PROCEDURE — 77030031139 HC SUT VCRL2 J&J -A: Performed by: INTERNAL MEDICINE

## 2019-12-06 PROCEDURE — 77030033819 HC SELCT VEIN WORLEY MRTM -C: Performed by: INTERNAL MEDICINE

## 2019-12-06 PROCEDURE — 33224 INSERT PACING LEAD & CONNECT: CPT | Performed by: INTERNAL MEDICINE

## 2019-12-06 PROCEDURE — C1900 LEAD, CORONARY VENOUS: HCPCS | Performed by: INTERNAL MEDICINE

## 2019-12-06 PROCEDURE — 93642 EP EVL 1/2CHMB TRNSVNS CVDFB: CPT | Performed by: INTERNAL MEDICINE

## 2019-12-06 PROCEDURE — 77030028698 HC BLD TISS PLSM MEDT -D: Performed by: INTERNAL MEDICINE

## 2019-12-06 PROCEDURE — 99153 MOD SED SAME PHYS/QHP EA: CPT | Performed by: INTERNAL MEDICINE

## 2019-12-06 PROCEDURE — 74011250636 HC RX REV CODE- 250/636: Performed by: INTERNAL MEDICINE

## 2019-12-06 PROCEDURE — 77030018547 HC SUT ETHBND1 J&J -B: Performed by: INTERNAL MEDICINE

## 2019-12-06 PROCEDURE — C1730 CATH, EP, 19 OR FEW ELECT: HCPCS | Performed by: INTERNAL MEDICINE

## 2019-12-06 DEVICE — LEFT HEART LEAD
Type: IMPLANTABLE DEVICE | Status: FUNCTIONAL
Brand: QUARTET™

## 2019-12-06 RX ORDER — SODIUM CHLORIDE 0.9 % (FLUSH) 0.9 %
5-40 SYRINGE (ML) INJECTION EVERY 8 HOURS
Status: DISCONTINUED | OUTPATIENT
Start: 2019-12-06 | End: 2019-12-07 | Stop reason: HOSPADM

## 2019-12-06 RX ORDER — SODIUM CHLORIDE 0.9 % (FLUSH) 0.9 %
5-40 SYRINGE (ML) INJECTION AS NEEDED
Status: DISCONTINUED | OUTPATIENT
Start: 2019-12-06 | End: 2019-12-07 | Stop reason: HOSPADM

## 2019-12-06 RX ORDER — TAMSULOSIN HYDROCHLORIDE 0.4 MG/1
0.8 CAPSULE ORAL
Status: DISCONTINUED | OUTPATIENT
Start: 2019-12-06 | End: 2019-12-07 | Stop reason: HOSPADM

## 2019-12-06 RX ORDER — PAROXETINE HYDROCHLORIDE 20 MG/1
20 TABLET, FILM COATED ORAL DAILY
Status: DISCONTINUED | OUTPATIENT
Start: 2019-12-07 | End: 2019-12-07 | Stop reason: HOSPADM

## 2019-12-06 RX ORDER — MULTIVIT WITH MINERALS/HERBS
1 TABLET ORAL DAILY
Status: DISCONTINUED | OUTPATIENT
Start: 2019-12-07 | End: 2019-12-07 | Stop reason: HOSPADM

## 2019-12-06 RX ORDER — ASPIRIN 81 MG/1
81 TABLET ORAL DAILY
Status: DISCONTINUED | OUTPATIENT
Start: 2019-12-07 | End: 2019-12-07 | Stop reason: HOSPADM

## 2019-12-06 RX ORDER — HYDROCODONE BITARTRATE AND ACETAMINOPHEN 5; 325 MG/1; MG/1
1 TABLET ORAL
Status: DISCONTINUED | OUTPATIENT
Start: 2019-12-06 | End: 2019-12-07 | Stop reason: HOSPADM

## 2019-12-06 RX ORDER — CARVEDILOL 6.25 MG/1
6.25 TABLET ORAL 2 TIMES DAILY
Status: DISCONTINUED | OUTPATIENT
Start: 2019-12-06 | End: 2019-12-07 | Stop reason: HOSPADM

## 2019-12-06 RX ORDER — ALLOPURINOL 300 MG/1
300 TABLET ORAL DAILY
Status: DISCONTINUED | OUTPATIENT
Start: 2019-12-07 | End: 2019-12-07 | Stop reason: HOSPADM

## 2019-12-06 RX ORDER — HYDROCODONE BITARTRATE AND ACETAMINOPHEN 5; 325 MG/1; MG/1
1 TABLET ORAL
Status: DISCONTINUED | OUTPATIENT
Start: 2019-12-06 | End: 2019-12-06

## 2019-12-06 RX ORDER — ATORVASTATIN CALCIUM 40 MG/1
40 TABLET, FILM COATED ORAL DAILY
Status: DISCONTINUED | OUTPATIENT
Start: 2019-12-07 | End: 2019-12-07 | Stop reason: HOSPADM

## 2019-12-06 RX ORDER — LEVOTHYROXINE SODIUM 150 UG/1
150 TABLET ORAL
Status: DISCONTINUED | OUTPATIENT
Start: 2019-12-07 | End: 2019-12-07 | Stop reason: HOSPADM

## 2019-12-06 RX ORDER — CEFAZOLIN SODIUM/WATER 2 G/20 ML
2 SYRINGE (ML) INTRAVENOUS ONCE
Status: COMPLETED | OUTPATIENT
Start: 2019-12-06 | End: 2019-12-06

## 2019-12-06 RX ORDER — ONDANSETRON 2 MG/ML
4 INJECTION INTRAMUSCULAR; INTRAVENOUS
Status: DISCONTINUED | OUTPATIENT
Start: 2019-12-06 | End: 2019-12-07 | Stop reason: HOSPADM

## 2019-12-06 RX ORDER — CEPHALEXIN 250 MG/1
250 CAPSULE ORAL 3 TIMES DAILY
Qty: 15 CAP | Refills: 0 | Status: SHIPPED | OUTPATIENT
Start: 2019-12-06 | End: 2019-12-11

## 2019-12-06 RX ORDER — ACETAMINOPHEN 325 MG/1
650 TABLET ORAL
Status: DISCONTINUED | OUTPATIENT
Start: 2019-12-06 | End: 2019-12-07 | Stop reason: HOSPADM

## 2019-12-06 RX ORDER — FENTANYL CITRATE 50 UG/ML
INJECTION, SOLUTION INTRAMUSCULAR; INTRAVENOUS AS NEEDED
Status: DISCONTINUED | OUTPATIENT
Start: 2019-12-06 | End: 2019-12-06 | Stop reason: HOSPADM

## 2019-12-06 RX ORDER — ACETAMINOPHEN 325 MG/1
650 TABLET ORAL
Status: DISCONTINUED | OUTPATIENT
Start: 2019-12-06 | End: 2019-12-06

## 2019-12-06 RX ORDER — ASCORBIC ACID 500 MG
1000 TABLET ORAL DAILY
Status: DISCONTINUED | OUTPATIENT
Start: 2019-12-07 | End: 2019-12-07 | Stop reason: HOSPADM

## 2019-12-06 RX ORDER — VANCOMYCIN HYDROCHLORIDE 1 G/20ML
INJECTION, POWDER, LYOPHILIZED, FOR SOLUTION INTRAVENOUS AS NEEDED
Status: DISCONTINUED | OUTPATIENT
Start: 2019-12-06 | End: 2019-12-06 | Stop reason: HOSPADM

## 2019-12-06 RX ORDER — FUROSEMIDE 20 MG/1
20 TABLET ORAL DAILY
Status: DISCONTINUED | OUTPATIENT
Start: 2019-12-07 | End: 2019-12-07 | Stop reason: HOSPADM

## 2019-12-06 RX ORDER — WARFARIN 2.5 MG/1
2.5 TABLET ORAL DAILY
Status: DISCONTINUED | OUTPATIENT
Start: 2019-12-06 | End: 2019-12-07 | Stop reason: HOSPADM

## 2019-12-06 RX ORDER — MIDAZOLAM HYDROCHLORIDE 1 MG/ML
INJECTION, SOLUTION INTRAMUSCULAR; INTRAVENOUS AS NEEDED
Status: DISCONTINUED | OUTPATIENT
Start: 2019-12-06 | End: 2019-12-06 | Stop reason: HOSPADM

## 2019-12-06 RX ORDER — LIDOCAINE HYDROCHLORIDE 10 MG/ML
INJECTION INFILTRATION; PERINEURAL AS NEEDED
Status: DISCONTINUED | OUTPATIENT
Start: 2019-12-06 | End: 2019-12-06 | Stop reason: HOSPADM

## 2019-12-06 RX ORDER — FINASTERIDE 5 MG/1
5 TABLET, FILM COATED ORAL DAILY
Status: DISCONTINUED | OUTPATIENT
Start: 2019-12-07 | End: 2019-12-07 | Stop reason: HOSPADM

## 2019-12-06 RX ORDER — MELATONIN
1000
Status: DISCONTINUED | OUTPATIENT
Start: 2019-12-06 | End: 2019-12-07 | Stop reason: HOSPADM

## 2019-12-06 RX ADMIN — Medication 10 ML: at 21:48

## 2019-12-06 RX ADMIN — SACUBITRIL AND VALSARTAN 1 TABLET: 24; 26 TABLET, FILM COATED ORAL at 21:47

## 2019-12-06 RX ADMIN — WARFARIN SODIUM 2.5 MG: 2.5 TABLET ORAL at 21:48

## 2019-12-06 RX ADMIN — TAMSULOSIN HYDROCHLORIDE 0.8 MG: 0.4 CAPSULE ORAL at 21:48

## 2019-12-06 RX ADMIN — CARVEDILOL 6.25 MG: 6.25 TABLET, FILM COATED ORAL at 21:48

## 2019-12-06 RX ADMIN — MELATONIN 1 TABLET: at 21:48

## 2019-12-06 NOTE — PROCEDURES
Cardiac Procedure Note   Patient: Ashby Claude  MRN: [id number]  SSN: [id number]   YOB: 1935 Age: 80 y.o. Sex: male    Date of Procedure: 12/6/2019   Pre-procedure Diagnosis: BIV ICD lead dislodgement  Post-procedure Diagnosis: same  Procedure:  1. LV pacing lead extraction   2. Implantation of new LV pacing lead  3.  BIV ICD DFT  :  Dr. Puneet Goodrich MD    Assistant(s):  None  Anesthesia: Moderate Sedation   Estimated Blood Loss: Less than 30 mL INR 2.2 mechanical AVR  Specimens Removed:[employer] LV lead  Findings: LV lead needs extraction, tight binding at left subclavian vein, innominate vein  12/6/2019 LV lead of BIV ICD dislodgement so it was extracted and replaced with a new LV lead  DFT 25 J failed, 36 J successful  Complications: None   Implants: [employer] [place] new LV lead to BIV ICD  Signed by:  Puneet Goodrich MD  12/6/2019  6:25 PM

## 2019-12-06 NOTE — ROUTINE PROCESS
Cardiac Cath Lab Recovery Arrival Note:      SNOBSWAPnasirZazengo arrived to Cardiac Cath Lab, Recovery Area. Staff introduced to patient. Patient identifiers verified with NAME and DATE OF BIRTH. Procedure verified with patient. Consent forms reviewed and signed by patient or authorized representative and verified. Allergies verified. Patient informed of procedure and plan of care. Questions answered with review. Patient prepped for procedure, per orders from physician, prior to arrival.    Patient on cardiac monitor, non-invasive blood pressure, SPO2 monitor. Patient is A&Ox 4. Patient reports no pain, no chest pain, no pain, no n/v. Patient in stretcher, in low position, with side rails up, call bell within reach, patient instructed to call of assistance as needed. Patient prep in: [facility] Recovery Area, Bed# 2. Family in: [facility] [phone].    Prep by: Yadira Brooks RN

## 2019-12-06 NOTE — PROGRESS NOTES
TRANSFER - OUT REPORT:    Verbal report given to Niki Fernandez (name) on [facility]  being transferred to cath lab recovery room (unit) for routine progression of care       Report consisted of patients Situation, Background, Assessment and   Recommendations(SBAR). Information from the following report(s) Procedure Summary was reviewed with the receiving nurse. Lines:   Peripheral IV 12/06/19 Left Arm (Active)   Site Assessment Clean, dry, & intact; Clean;Dry 12/6/2019  3:30 PM   Phlebitis Assessment 0 12/6/2019  3:30 PM   Infiltration Assessment 0 12/6/2019  3:30 PM   Dressing Status Clean, dry, & intact; Clean;Dry 12/6/2019  3:30 PM   Alcohol Cap Used Yes 12/6/2019  3:30 PM        Opportunity for questions and clarification was provided.       Patient transported with:   [employer]

## 2019-12-06 NOTE — INTERVAL H&P NOTE
H&P Update:  Yohana Collado was seen and examined. History and physical has been reviewed. The patient has been examined.  There have been no significant clinical changes since the completion of the originally dated History and Physical.

## 2019-12-06 NOTE — Clinical Note
Patient transported with a Registered Nurse. Patient transported to: \[facility]\""iding area.    Report to be given at bedside in the holding area

## 2019-12-06 NOTE — PROGRESS NOTES
TRANSFER - IN REPORT:    Verbal report received from Audrey Delgado CVT on Lesa Spence  being received from procedure area for routine progression of care. Report consisted of patients Situation, Background, Assessment and Recommendations(SBAR). Information from the following report(s) SBAR, Procedure Summary, MAR, Recent Results and Cardiac Rhythm Paced was reviewed with the receiving clinician. Opportunity for questions and clarification was provided. Assessment completed upon patients arrival to [address] [place] [address] and care assumed. Cardiac Cath Lab Recovery Arrival Note:    Lesa Spence arrived to [facility] recovery area. Patient procedure= Lead Revision. Patient on cardiac monitor, non-invasive blood pressure, SPO2 monitor. On O2 @ 2 lpm via NC.  IV  of NS on pump at 25 ml/hr. Patient status doing well without problems. Patient is sedated and arousable to name. Patient reports No Pain. PROCEDURE SITE CHECK:    Procedure site:without any bleeding and No Hematoma, No pain/discomfort reported at procedure site. No change in patient status. Continue to monitor patient and status.

## 2019-12-06 NOTE — DISCHARGE INSTRUCTIONS
Patient Instructions Post-ICD Lead     Stop spironolactone (high potassium)    1. No heavy lifting or exercises with the left arm for 4 weeks. This includes the following:  Do not raise arm above the shoulder level to comb hair, pull on clothes, etc... Do not use the affected arm to pull up or push up from a seated or laying  down position. Do not allow anyone else to pull on the affected arm. 2.  Keep site clean & dry. No showers until after follow up. 3.  Dressing will be removed in clinic at your follow up appointment. 4.  Do not rub or massage your ICD site. 5.  Do not drive for 3 days. 6.  Call Dr. Shaniqua Her at [phone] if you experience any of the following symptoms:  Redness at the ICD site  Swelling at or around the ICD or in the left arm  Pain around the ICD  Dizziness, lightheadedness, fainting spells  Lack of energy  Shortness of breath  Rapid heart rate  Chest or muscle twitches    7. Call Dr. Shaniqua Her at [phone] if your ICD delivers a shock. The physician may   or may not want to see you in the office (that decision will be made when you call). 8.  Follow-up with Dr. Boom Smith office for wound check on 12/20/2019 at 11:15 AM.  Future Appointments   Date Time Provider Lexie Moreno   12/20/2019 11:15 AM PACEMAKER3, [address]   12/20/2019 11:30 AM WOUND CHECK (NURSE), [address]   1/20/2020  2:00 PM REMOTE1, [address]   2/19/2020  2:20 PM Kevyn Del Valle  [address]   3/10/2020 10:00 AM Sruthi Camejo MD [address]   4/29/2020  8:30 AM REMOTE1, [address]   5/29/2020 11:40 AM Kevyn Del Valle  [address]   8/6/2020  3:00 PM [address] [place] [address], [address]   8/6/2020  3:20 PM Otf Valentine  [address]     9. A prescription for antibiotics was sent to your pharmacy on record. Please pick it up & take as directed.       Leatha Bui M.D. [facility]  Electrophysiology/Cardiology  [facility] [place]  [address], [address] , Henry Dacosta [address] [place] [address]  [phone]

## 2019-12-06 NOTE — PROGRESS NOTES
Cardiac Cath Lab Procedure Area Arrival Note:    Rachael Minor arrived to Cardiac Cath Lab, Procedure Area. Patient identifiers verified with NAME and DATE OF BIRTH. Procedure verified with patient. Consent forms verified. Allergies verified. Patient informed of procedure and plan of care. Questions answered with review. Patient voiced understanding of procedure and plan of care. Patient on cardiac monitor, non-invasive blood pressure, SPO2 monitor. On room air then placed on O2 @ 2 lpm via NC.  IV of normal saline on pump at 25 ml/hr. Patient status doing well without problems. Patient is A&Ox 4. Patient reports no pain. Patient medicated during procedure with orders obtained and verified by Dr. Buffy Villatoro. Refer to patients Cardiac Cath Lab PROCEDURE REPORT for vital signs, assessment, status, and response during procedure, printed at end of case. Printed report on chart or scanned into chart.

## 2019-12-07 VITALS
BODY MASS INDEX: 27.38 KG/M2 | RESPIRATION RATE: 15 BRPM | SYSTOLIC BLOOD PRESSURE: 106 MMHG | WEIGHT: 180.69 LBS | OXYGEN SATURATION: 98 % | DIASTOLIC BLOOD PRESSURE: 59 MMHG | HEART RATE: 70 BPM | HEIGHT: 68 IN | TEMPERATURE: 97.8 F

## 2019-12-07 LAB
COMMENT, HOLDF: NORMAL
INR PPP: 1.9 (ref 0.9–1.1)
PROTHROMBIN TIME: 18.5 SEC (ref 9–11.1)
SAMPLES BEING HELD,HOLD: NORMAL

## 2019-12-07 PROCEDURE — 74011250637 HC RX REV CODE- 250/637: Performed by: INTERNAL MEDICINE

## 2019-12-07 PROCEDURE — 85610 PROTHROMBIN TIME: CPT

## 2019-12-07 PROCEDURE — 36415 COLL VENOUS BLD VENIPUNCTURE: CPT

## 2019-12-07 PROCEDURE — 99218 HC RM OBSERVATION: CPT

## 2019-12-07 RX ADMIN — ALLOPURINOL 300 MG: 300 TABLET ORAL at 09:00

## 2019-12-07 RX ADMIN — ATORVASTATIN CALCIUM 40 MG: 40 TABLET, FILM COATED ORAL at 08:15

## 2019-12-07 RX ADMIN — Medication 1 TABLET: at 09:00

## 2019-12-07 RX ADMIN — OXYCODONE HYDROCHLORIDE AND ACETAMINOPHEN 1000 MG: 500 TABLET ORAL at 08:15

## 2019-12-07 RX ADMIN — Medication 10 ML: at 07:02

## 2019-12-07 RX ADMIN — SACUBITRIL AND VALSARTAN 1 TABLET: 24; 26 TABLET, FILM COATED ORAL at 08:16

## 2019-12-07 RX ADMIN — ASPIRIN 81 MG: 81 TABLET, COATED ORAL at 08:15

## 2019-12-07 RX ADMIN — LEVOTHYROXINE SODIUM 150 MCG: 150 TABLET ORAL at 07:02

## 2019-12-07 RX ADMIN — CARVEDILOL 6.25 MG: 6.25 TABLET, FILM COATED ORAL at 08:15

## 2019-12-07 RX ADMIN — PAROXETINE HYDROCHLORIDE 20 MG: 20 TABLET, FILM COATED ORAL at 08:16

## 2019-12-07 RX ADMIN — FUROSEMIDE 20 MG: 20 TABLET ORAL at 08:17

## 2019-12-07 RX ADMIN — FINASTERIDE 5 MG: 5 TABLET, FILM COATED ORAL at 08:15

## 2019-12-07 NOTE — DISCHARGE SUMMARY
Cardiology Discharge Summary               Patient ID:  Tameka Marquez  [id number]  82 y.o.  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 (12/6/2019)      Overview: 12/6/2019 LV lead of BIV ICD dislodgement so it was extracted and replaced       with a new LV lead    Active Problems:    Biventricular ICD (implantable cardioverter-defibrillator) in place (3/4/2019)      Overview: 3/4/2019 [employer] BIV ICD      LV lead 12/6/2019        Discharge Condition: Stable    Cardiology Procedures this Admission:  new LV lead implant and removal of the old dislodged LV lead    Hospital Course: he stayed overnight because of late procedure and need to observe after sedation  His wife lives with him and she requested that  He did not have any event  This am BIV ICD was checked and showed proper function  Best LV pacing now is from electrodes 2-4    Consults: None    Discharge Exam:     Visit Vitals  /59 (BP 1 Location: Right arm, BP Patient Position: At rest)   Pulse 70   Temp 97.8 °F (36.6 °C)   Resp 15   Ht 5' 8\" (1.727 m)   Wt 180 lb 11 oz (82 kg)   SpO2 98%   BMI 27.47 kg/m²     General Appearance:  Well developed, well nourished,alert and oriented x 3, and individual in no acute distress. Ears/Nose/Mouth/Throat:   Hard hearing          Neck: Supple. Chest:   Lungs clear to auscultation bilaterally. Cardiovascular:  Regular rhythm, no murmur. Abdomen:   Soft, non-tender    Extremities: No edema bilaterally. Skin: Warm and dry.  Left sided BIV ICD pocket no hematoma and dressing is clean               Disposition: home    Patient Instructions:   Current Discharge Medication List      START taking these medications    Details   cephALEXin (KEFLEX) 250 mg capsule Take 1 Cap by mouth three (3) times daily for 5 days. Indications: Post surgical infection prophylaxis  Qty: 15 Cap, Refills: 0    Associated Diagnoses: S/P ICD (internal cardiac defibrillator) procedure         CONTINUE these medications which have NOT CHANGED    Details   !! warfarin (COUMADIN) 2.5 mg tablet Take 2.5 mg by mouth daily. Take 1 tab daily      b complex vitamins (B COMPLEX 1) tablet Take 1 Tab by mouth daily. CYANOCOBALAMIN, VITAMIN B-12, PO Take 1 Tab by mouth daily. carvedilol (COREG) 6.25 mg tablet Take 1 Tab by mouth two (2) times a day. Qty: 180 Tab, Refills: 2    Associated Diagnoses: Chronic systolic congestive heart failure ([address] Chronic atrial fibrillation      sacubitril-valsartan (ENTRESTO) 24 mg/26 mg tablet Take 1 Tab by mouth every twelve (12) hours. Qty: 180 Tab, Refills: 3    Associated Diagnoses: Chronic systolic congestive heart failure (HCC)      levothyroxine (SYNTHROID) 150 mcg tablet Take 1 Tab by mouth Daily (before breakfast). Qty: 30 Tab, Refills: 2      !! warfarin (COUMADIN) 1 mg tablet Take 1 Tab by mouth daily. Qty: 60 Tab, Refills: 1      aspirin delayed-release 81 mg tablet Take 81 mg by mouth daily. therapeutic multivitamin (THERAGRAN) tablet Take 1 Tab by mouth daily. ascorbic acid, vitamin C, (VITAMIN C) 500 mg tablet Take 1,000 mg by mouth daily. B.infantis-B.ani-B.long-B.bifi (PROBIOTIC 4X) 10-15 mg TbEC Take 1 Tab by mouth daily. cholecalciferol (VITAMIN D3) 1,000 unit tablet Take 1,000 Units by mouth nightly. calcium citrate (CITRACAL PO) Take 1 Tab by mouth nightly. COQ10, UBIQUINOL, PO Take 100-200 mg by mouth daily. allopurinol (ZYLOPRIM) 300 mg tablet Take 300 mg by mouth daily. atorvastatin (LIPITOR) 40 mg tablet Take 40 mg by mouth daily.       finasteride (PROSCAR) 5 mg tablet Take 5 mg by mouth daily.      PARoxetine (PAXIL) 20 mg tablet Take 20 mg by mouth daily. tamsulosin (FLOMAX) 0.4 mg capsule Take 0.8 mg by mouth nightly. FERROUS GLUCONATE PO Take 325 mg by mouth daily. furosemide (LASIX) 20 mg tablet Take 1 Tab by mouth daily. Qty: 90 Tab, Refills: 1    Associated Diagnoses: Chronic systolic congestive heart failure ([address]       ! ! - Potential duplicate medications found. Please discuss with provider. STOP taking these medications       spironolactone (ALDACTONE) 25 mg tablet Comments:   Reason for Stopping: elevated potassium                Referenced discharge instructions provided by nursing for diet and activity.     Follow-up    Future Appointments   Date Time Provider Lexie Moreno   12/20/2019 11:15 AM PACEMAKER3, [address]   12/20/2019 11:30 AM Wound check, [address]   1/20/2020  2:00 PM REMOTE1, [address]   2/19/2020  2:20 PM Kurtis Bobo  [address]   3/10/2020 10:00 AM Michael Martin MD [address]   4/29/2020  8:30 AM REMOTE1, [address]   5/29/2020 11:40 AM Kurtis Bobo  [address]   8/6/2020  3:00 PM [address] [place] [address] Crossing, [address]   8/6/2020  3:20 PM Shamir Mccray  [address]       Signed:  Willie Bynum MD  12/7/2019  7:56 AM

## 2019-12-07 NOTE — PROGRESS NOTES
0730 Bedside shift change report given to Logan Navarro RN (oncoming nurse) by Celine Mccann RN (offgoing nurse). Report included the following information SBAR, Kardex, Intake/Output, MAR and Recent Results. 1130 PIV and tele removed. I have reviewed discharge instructions with the patient and daughter. The patient and daughter verbalized understanding. Current Discharge Medication List      START taking these medications    Details   cephALEXin (KEFLEX) 250 mg capsule Take 1 Cap by mouth three (3) times daily for 5 days. Indications: Post surgical infection prophylaxis  Qty: 15 Cap, Refills: 0    Associated Diagnoses: S/P ICD (internal cardiac defibrillator) procedure         CONTINUE these medications which have NOT CHANGED    Details   !! warfarin (COUMADIN) 2.5 mg tablet Take 2.5 mg by mouth daily. Take 1 tab daily      b complex vitamins (B COMPLEX 1) tablet Take 1 Tab by mouth daily. CYANOCOBALAMIN, VITAMIN B-12, PO Take 1 Tab by mouth daily. carvedilol (COREG) 6.25 mg tablet Take 1 Tab by mouth two (2) times a day. Qty: 180 Tab, Refills: 2    Associated Diagnoses: Chronic systolic congestive heart failure ([facility] [address] [facility] Chronic atrial fibrillation      sacubitril-valsartan (ENTRESTO) 24 mg/26 mg tablet Take 1 Tab by mouth every twelve (12) hours. Qty: 180 Tab, Refills: 3    Associated Diagnoses: Chronic systolic congestive heart failure (HCC)      levothyroxine (SYNTHROID) 150 mcg tablet Take 1 Tab by mouth Daily (before breakfast). Qty: 30 Tab, Refills: 2      !! warfarin (COUMADIN) 1 mg tablet Take 1 Tab by mouth daily. Qty: 60 Tab, Refills: 1      aspirin delayed-release 81 mg tablet Take 81 mg by mouth daily. therapeutic multivitamin (THERAGRAN) tablet Take 1 Tab by mouth daily. ascorbic acid, vitamin C, (VITAMIN C) 500 mg tablet Take 1,000 mg by mouth daily. B.infantis-B.ani-B.long-B.bifi (PROBIOTIC 4X) 10-15 mg TbEC Take 1 Tab by mouth daily.       cholecalciferol (VITAMIN D3) 1,000 unit tablet Take 1,000 Units by mouth nightly. calcium citrate (CITRACAL PO) Take 1 Tab by mouth nightly. COQ10, UBIQUINOL, PO Take 100-200 mg by mouth daily. allopurinol (ZYLOPRIM) 300 mg tablet Take 300 mg by mouth daily. atorvastatin (LIPITOR) 40 mg tablet Take 40 mg by mouth daily. finasteride (PROSCAR) 5 mg tablet Take 5 mg by mouth daily. PARoxetine (PAXIL) 20 mg tablet Take 20 mg by mouth daily. tamsulosin (FLOMAX) 0.4 mg capsule Take 0.8 mg by mouth nightly. FERROUS GLUCONATE PO Take 325 mg by mouth daily. furosemide (LASIX) 20 mg tablet Take 1 Tab by mouth daily. Qty: 90 Tab, Refills: 1    Associated Diagnoses: Chronic systolic congestive heart failure ([facility] [address] [facility]       ! ! - Potential duplicate medications found. Please discuss with provider.       STOP taking these medications       spironolactone (ALDACTONE) 25 mg tablet Comments:   Reason for Stopping:

## 2019-12-07 NOTE — PROGRESS NOTES
.Observation notice provided in writing to patient and/or caregiver as well as verbal explanation of the policy. Patients who are in outpatient status also receive the Observation notice. Patient's daughter signed the Medicare and State Obs. Letters. Patient will be discharged home today and daughter will provide transportation home. Amelia Robb MSA, RN, CRM.

## 2019-12-07 NOTE — PROGRESS NOTES
TRANSFER - OUT REPORT:    Verbal report given to [facility] RN on Anamaria Martinez being transferred to Room 469 for routine progression of care       Report consisted of patients Situation, Background, Assessment and   Recommendations(SBAR). Information from the following report(s) SBAR, Procedure Summary, MAR, Recent Results and Cardiac Rhythm Paced was reviewed with the receiving nurse. Opportunity for questions and clarification was provided.

## 2019-12-07 NOTE — PROGRESS NOTES
1921: TRANSFER - IN REPORT:    Verbal report received from [facility]) on Micheal Miramontes  being received from cath lab(unit) for routine progression of care      Report consisted of patients Situation, Background, Assessment and   Recommendations(SBAR). Information from the following report(s) SBAR, Kardex, Intake/Output, MAR and Recent Results was reviewed with the receiving nurse. Opportunity for questions and clarification was provided. 1930: Bedside and Verbal shift change report given to [address] [place] [address] (oncoming nurse) by Maria Fernanda/Aleah JOINER (offgoing nurse). Report included the following information SBAR, Kardex, Intake/Output, MAR and Recent Results.

## 2019-12-07 NOTE — PROGRESS NOTES
1930: Bedside and Verbal shift change report given to RHYS De León (oncoming nurse) by Logan Matthews RN (offgoing nurse). Report included the following information SBAR, Kardex, Intake/Output, MAR, Recent Results and Cardiac Rhythm Paced. 0730: Bedside and Verbal shift change report given to Cody Ruiz RN (oncoming nurse) by RHYS De León (offgoing nurse). Report included the following information SBAR, Kardex, Intake/Output, MAR, Recent Results and Cardiac Rhythm Paced.

## 2019-12-15 PROBLEM — I50.23 ACUTE ON CHRONIC SYSTOLIC HEART FAILURE, NYHA CLASS 4 (HCC): Status: RESOLVED | Noted: 2019-03-01 | Resolved: 2019-12-15

## 2019-12-20 ENCOUNTER — CLINICAL SUPPORT (OUTPATIENT)
Dept: CARDIOLOGY CLINIC | Age: 84
End: 2019-12-20

## 2019-12-20 DIAGNOSIS — Z95.810 PRESENCE OF BIVENTRICULAR AUTOMATIC CARDIOVERTER/DEFIBRILLATOR (AICD): Primary | ICD-10-CM

## 2019-12-20 DIAGNOSIS — Z95.810 BIVENTRICULAR ICD (IMPLANTABLE CARDIOVERTER-DEFIBRILLATOR) IN PLACE: Primary | ICD-10-CM

## 2019-12-20 NOTE — PROGRESS NOTES
Cardiac Electrophysiology Wound Check Note      Wound Check   Patient is here for wound check s/p LV lead reposition. No fever, drainage   Physical Exam   Constitutional: well-developed and well-nourished. Skin: Left side pocket is healing without redness or drainage. Patient does have a hematoma. The wound is intact      ASSESSMENT and PLAN   The incision is healing without redness or drainage/. Patient does have a hematoma. The patient has finished their anti-biotic and been compliant with arm restrictions. They will continue arms restrictions for 2 more weeks. current treatment plan is effective, no change in therapy   Device check shows proper lead and generator functions    Follow-up Disposition:  Sobia Abreu NP assessed site. VO per Sobia Abreu NP patient to hold ASA for 1 week and follow up in 1 week for a wound check.  Notified patient to call sooner if hematoma gets larger, the wound opens or he notices any drainage  Return 3 months I will check via device clinic or remote monitoring in the future

## 2019-12-26 ENCOUNTER — CLINICAL SUPPORT (OUTPATIENT)
Dept: CARDIOLOGY CLINIC | Age: 84
End: 2019-12-26

## 2019-12-26 DIAGNOSIS — Z95.810 BIVENTRICULAR ICD (IMPLANTABLE CARDIOVERTER-DEFIBRILLATOR) IN PLACE: Primary | ICD-10-CM

## 2019-12-26 NOTE — PROGRESS NOTES
Cardiac Electrophysiology Wound Check Note      Patient in for wound re-check after hematoma found at initial check on Friday, 12/20/19. Hematoma decreased in size. MD assessed site. Patient to restart his ASA 81 mg. Patient requested I take is blood pressure since he was in office. /62. Patient to return in 3 months for device check and appointment with Dr. Esteban Delgado.

## 2020-02-05 ENCOUNTER — TELEPHONE ANTICOAG (OUTPATIENT)
Dept: CARDIOLOGY CLINIC | Age: 85
End: 2020-02-05

## 2020-02-05 DIAGNOSIS — I48.20 CHRONIC ATRIAL FIBRILLATION (HCC): ICD-10-CM

## 2020-02-05 DIAGNOSIS — Z95.2 H/O MECHANICAL AORTIC VALVE REPLACEMENT: ICD-10-CM

## 2020-02-05 DIAGNOSIS — I48.3 TYPICAL ATRIAL FLUTTER (HCC): ICD-10-CM

## 2020-02-05 LAB
INR PPP: 3.7 (ref 0.8–1.2)
PROTHROMBIN TIME: 34.8 SEC (ref 9.1–12)

## 2020-02-05 NOTE — PROGRESS NOTES
Attempted to reach patient regarding his INR from [facility].  Voicemail memory full. Unable to leave message      Spoke with patients/ wife, identifiers x2. Informed INR from [facility] was elevated at 3.7. Denies changes in diet/medications. Previously therapeutic. Instructed to take 1/2 tab tonight then resume prior dosing. Recheck levels in 1 week. Verbalized understanding.      Anticoagulation Summary  As of 2/5/2020    INR goal:   2.0-3.0   TTR:   65.1 % (6.9 mo)   INR used for dosing:   3.7! (2/4/2020)   Warfarin maintenance plan:   2.5 mg (2.5 mg x 1) every day   Weekly warfarin total:   17.5 mg   Plan last modified:   Anabella Cheema RN (8/2/2019)   Next INR check:      Target end date:       Indications    A-fib ([address] [I48.91]  H/O mechanical aortic valve replacement [Z95.2]  Typical atrial flutter ([address] [I48.3]             Anticoagulation Episode Summary     INR check location:       Preferred lab:       Send INR reminders to:       Comments:         Anticoagulation Care Providers     Provider Role Specialty Phone number    Flakita Malagon MD Responsible Cardiology [phone]          Future Appointments   Date Time Provider Lexie Sloanisti   2/19/2020  2:20 PM Flakita Malagon  [address]   3/10/2020 10:00 AM Kenzie Jones MD [address]   3/31/2020  3:30  [facility] [address] [facility]   3/31/2020  4:00 PM Funmi Collins  [address]   5/29/2020 11:40 AM Flakita Malagon  [address]

## 2020-02-06 DIAGNOSIS — I48.20 CHRONIC ATRIAL FIBRILLATION (HCC): Primary | ICD-10-CM

## 2020-02-06 RX ORDER — WARFARIN 2.5 MG/1
2.5 TABLET ORAL DAILY
Qty: 30 TAB | Refills: 0 | Status: SHIPPED | OUTPATIENT
Start: 2020-02-06 | End: 2020-03-10

## 2020-02-06 NOTE — TELEPHONE ENCOUNTER
Requested Prescriptions     Signed Prescriptions Disp Refills    warfarin (COUMADIN) 2.5 mg tablet 30 Tab 0     Sig: Take 1 Tab by mouth daily. Take 1 tab daily     Authorizing Provider: Aleta Solitario     Ordering User: Asael Batista       Last office visit 11/22/19    Per Dr. Mayito Zavala   Wife requesting 2.5 mg tablet. She reports patient has been taking a 1/2 tab of the 5 mg which is not indicated in his chart.      Future Appointments   Date Time Provider Lexie Cooli   2/19/2020  2:20 PM Tyler Frias  [address]   3/10/2020 10:00 AM Marcella Palomino MD [address]   3/31/2020  3:30 PM [address] [place] [address], [address] [facility]   3/31/2020  4:00 PM Samantha Lino  [address] 5/29/2020 11:40 AM Tyler Frias  [address]

## 2020-02-18 DIAGNOSIS — E78.5 DYSLIPIDEMIA: Primary | ICD-10-CM

## 2020-02-20 RX ORDER — ATORVASTATIN CALCIUM 40 MG/1
40 TABLET, FILM COATED ORAL
Qty: 90 TAB | Refills: 3 | Status: SHIPPED | OUTPATIENT
Start: 2020-02-20 | End: 2020-06-03

## 2020-02-20 NOTE — TELEPHONE ENCOUNTER
Request for atorvastatin 40mg QHS. Last office visit 11-22-19, next office visit 4-1-20.  Refills per verbal order from Dr. Evangelista Andrea.

## 2020-03-06 DIAGNOSIS — I48.20 CHRONIC ATRIAL FIBRILLATION (HCC): ICD-10-CM

## 2020-03-09 NOTE — PROGRESS NOTES
HISTORY OF PRESENT ILLNESS  Lebron Holstein is a 80 y.o. male. HPI     Pt here to establish care  No PCP -Dr Mercedes Rodriguez  Cardiologist Dr Skyler Gramajo and Dr Headley January  Hx CHF ef 40% s/p Biv-ICD, CAD s/p CABG, severe AS s/p Mech AVR, chronic afib. htn dyslipidemia  Hypothyroid BPH hx gout  INR managed by cardiologist over last year but wife requests to be done at our office  Sees Dr Kemal Molina assi---ckd3   Sees Dr Cady Eubanks for BPH  Had recent EP procedure-new LV lead implant  No gout attacks this year--last episode was last summer  Takes paxil for anger issues and hx MDD-well controlled on paxil per wife  Uses cane for walking--b/l knee OA nad left ankle fusion  Wears hearing aids but increased hearing loss recently  Has large right groin hernia--no pain in the groin area ?  Getting larger over time   Never a smoker  Light etoh   5 grown children  Retired-owned a car dealership in [place]            Patient Active Problem List    Diagnosis Date Noted    Hx of cardiac pacemaker 12/06/2019     Priority: 1 - One    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 12/06/2019    Chronic systolic congestive heart failure ([address] 04/07/2019    LENNON (dyspnea on exertion) 04/07/2019    Coronary artery disease involving native coronary artery of native heart with unstable angina pectoris ([address] 04/07/2019    S/P CABG (coronary artery bypass graft) 04/07/2019    Severe aortic stenosis 04/07/2019    Hypertension, essential 04/07/2019    Dyslipidemia 04/07/2019    PVC (premature ventricular contraction) 04/07/2019    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 ([address] 04/07/2019    Typical atrial flutter ([address] 03/04/2019    Status post catheter ablation of atrial flutter 03/04/2019    Biventricular ICD (implantable cardioverter-defibrillator) in place 03/04/2019    A-fib ([address] 03/01/2019    Anemia 10/09/2018    Hypothyroidism 10/09/2018    Supratherapeutic INR 10/09/2018    H/O mechanical aortic valve replacement 10/09/2018     Current Outpatient Medications   Medication Sig Dispense Refill    atorvastatin (LIPITOR) 40 mg tablet Take 1 Tab by mouth nightly. 90 Tab 3    warfarin (COUMADIN) 2.5 mg tablet Take 1 Tab by mouth daily. Take 1 tab daily 30 Tab 0    b complex vitamins (B COMPLEX 1) tablet Take 1 Tab by mouth daily.  CYANOCOBALAMIN, VITAMIN B-12, PO Take 1 Tab by mouth daily.  carvedilol (COREG) 6.25 mg tablet Take 1 Tab by mouth two (2) times a day. 180 Tab 2    sacubitril-valsartan (ENTRESTO) 24 mg/26 mg tablet Take 1 Tab by mouth every twelve (12) hours. 180 Tab 3    levothyroxine (SYNTHROID) 150 mcg tablet Take 1 Tab by mouth Daily (before breakfast). 30 Tab 2    warfarin (COUMADIN) 1 mg tablet Take 1 Tab by mouth daily. (Patient taking differently: Take 2.5 mg by mouth daily.) 60 Tab 1    aspirin delayed-release 81 mg tablet Take 81 mg by mouth daily.  therapeutic multivitamin (THERAGRAN) tablet Take 1 Tab by mouth daily.  ascorbic acid, vitamin C, (VITAMIN C) 500 mg tablet Take 1,000 mg by mouth daily.  B.infantis-B.ani-B.long-B.bifi (PROBIOTIC 4X) 10-15 mg TbEC Take 1 Tab by mouth daily.  cholecalciferol (VITAMIN D3) 1,000 unit tablet Take 1,000 Units by mouth nightly.  calcium citrate (CITRACAL PO) Take 1 Tab by mouth nightly.  COQ10, UBIQUINOL, PO Take 100-200 mg by mouth daily.  allopurinol (ZYLOPRIM) 300 mg tablet Take 300 mg by mouth daily.  finasteride (PROSCAR) 5 mg tablet Take 5 mg by mouth daily.  PARoxetine (PAXIL) 20 mg tablet Take 20 mg by mouth daily.  tamsulosin (FLOMAX) 0.4 mg capsule Take 0.8 mg by mouth nightly.  FERROUS GLUCONATE PO Take 325 mg by mouth daily.  furosemide (LASIX) 20 mg tablet Take 1 Tab by mouth daily.  80 Tab 1     No Known Allergies  Past Medical History:   Diagnosis Date    Acute on chronic systolic heart failure, NYHA class 4 ([facility] 03/01/2019    admit March 2019 7 days, EF 10% got Primacor, BIV implant    Anger     TAKES PAROXETINE TO CONTROL ANGER ISSUES    CAD (coronary artery disease) 1996    CABG 1996, fu nuke 2015 fixed inferior defect    Chronic atrial fibrillation 03/2019    RVR3/2019, had ablation of flutter- persistent    Foot drop, left     WEARS METAL BRACE    Gout     Hard of hearing     High cholesterol     Hypertension     Prostate enlargement     Thyroid disease     Unspecified sleep apnea     DOESN'T USE CPAP; \"IT MADE HIM SICK-COLDS, SINUS\", PER WIFE     Past Surgical History:   Procedure Laterality Date    CARDIAC SURG PROCEDURE UNLIST      ANGIOPLASTY WITH CORONARY STENT    CARDIAC SURG PROCEDURE UNLIST  1996    AORTIC VALVE REPLACEMENT    HX ORTHOPAEDIC  2001    ORIF COMPOUND FRACTURE LEFT ANKLE    HX RETINAL DETACHMENT REPAIR  1980s    LEFT EYE    VA COMPRE ELECTROPHYSIOL XM W/LEFT ATRIAL PACNG/REC N/A 3/4/2019    Lt Atrial Pace & Record During Ep Study performed by Kory Joel MD at [place] [address], [facility] [address] CATH LAB    VA EPHYS EVAL PACG CVDFB PRGRMG/REPRGRMG PARAMETERS N/A 3/4/2019    Eval Icd Generator & Leads W Testing At Implant performed by Kory Joel MD at [place] [address], [facility] CATH LAB    VA EPHYS EVAL PACG CVDFB PRGRMG/REPRGRMG PARAMETERS N/A 12/6/2019    Eval Icd Generator & Leads W Testing At Implant performed by Kory Joel MD at [place] [address], [facility] CATH LAB    VA EPHYS EVAL W/ABLATION SUPRAVENT ARRHYTHMIA N/A 3/4/2019    Ablation A-Flutter performed by Kory Joel MD at [place] [address], [facility] CATH LAB    VA SHIMA MELO CAR KILO SYS TM INSJ DFB/PM PLS GEN N/A 12/6/2019    Lv Lead Placement performed by Kory Joel MD at [place] [address], [facility] CATH LAB    VA INSJ/RPLCMT PERM DFB W/TRNSVNS LDS 1/DUAL CHMBR N/A 3/4/2019    INSERT ICD BIV MULTI performed by Kory Joel MD at [place] [address], [facility] CATH LAB    VA INTRACARDIAC ELECTROPHYSIOLOGIC 3D MAPPING N/A 3/4/2019    Ep 3d Mapping performed by Kory Joel MD at [place] [address], [facility] CATH LAB    VA RMVL1/DUAL CHMBR IMPLTBL DFB LORIE Tate N/A 12/6/2019    Laser Lead Extraction performed by Griffin Galvan [place] [address], [facility] LANDRUM LAB     Family History   Problem Relation Age of Onset    Stroke Mother         ANEURYSM    Stroke Father      Social History     Tobacco Use    Smoking status: Never Smoker    Smokeless tobacco: Never Used   Substance Use Topics    Alcohol use: Yes     Alcohol/week: 2.5 standard drinks     Types: 3 Standard drinks or equivalent per week      Lab Results   Component Value Date/Time    WBC 7.1 12/06/2019 02:59 PM    HGB 13.6 12/06/2019 02:59 PM    HCT 41.4 12/06/2019 02:59 PM    PLATELET 936 01/57/0757 02:59 PM    .5 (H) 12/06/2019 02:59 PM     Lab Results   Component Value Date/Time    Hemoglobin A1c 5.9 10/08/2013 10:41 AM    Glucose 99 12/06/2019 02:59 PM    Glucose (POC) 119 (H) 10/24/2013 06:28 AM    Creatinine (POC) 1.6 (H) 06/18/2018 01:48 PM    Creatinine 1.29 12/06/2019 02:59 PM      No results found for: CHOL, CHOLPOCT, HDL, LDL, LDLC, LDLCPOC, LDLCEXT, TRIGL, TGLPOCT, CHHD, CHHDX  Lab Results   Component Value Date/Time    GFR est non-AA 53 (L) 12/06/2019 02:59 PM    GFRNA, POC 41 (L) 06/18/2018 01:48 PM    GFR est AA >60 12/06/2019 02:59 PM    GFRAA, POC 50 (L) 06/18/2018 01:48 PM    Creatinine 1.29 12/06/2019 02:59 PM    Creatinine (POC) 1.6 (H) 06/18/2018 01:48 PM    BUN 23 (H) 12/06/2019 02:59 PM    Sodium 139 12/06/2019 02:59 PM    Potassium 5.7 (H) 12/06/2019 02:59 PM    Chloride 109 (H) 12/06/2019 02:59 PM    CO2 26 12/06/2019 02:59 PM    Magnesium 2.0 03/01/2019 09:10 AM     Lab Results   Component Value Date/Time    TSH 0.745 04/23/2019 03:59 PM    T4, Total 4.9 03/06/2019 05:27 AM         Review of Systems   Constitutional: Negative for chills, fever, malaise/fatigue and weight loss. HENT: Positive for hearing loss. Eyes: Negative for blurred vision and double vision. Respiratory: Negative for cough and shortness of breath. Cardiovascular: Negative for chest pain and palpitations. Gastrointestinal: Negative for abdominal pain, blood in stool, constipation, diarrhea, melena, nausea and vomiting. Genitourinary: Negative for dysuria, frequency, hematuria and urgency. Musculoskeletal: Positive for joint pain. Negative for back pain, falls and myalgias. Neurological: Negative for dizziness, tremors and headaches. Endo/Heme/Allergies: Negative. Psychiatric/Behavioral: Negative. Physical Exam  Vitals signs (very [place], NAD) and nursing note reviewed. Constitutional:       General: He is not in acute distress. Appearance: He is well-developed. Comments: Appears stated age. [place]   HENT:      Head: Normocephalic. Right Ear: Tympanic membrane normal.      Left Ear: Tympanic membrane normal.      Ears:      Comments: Right ear ceruman impaction     Nose: Nose normal.      Mouth/Throat:      Mouth: Mucous membranes are moist.      Pharynx: Oropharynx is clear. Eyes:      Pupils: Pupils are equal, round, and reactive to light. Neck:      Musculoskeletal: Normal range of motion. Cardiovascular:      Rate and Rhythm: Normal rate and regular rhythm. Heart sounds: Normal heart sounds. Comments: RRR with click of AVR  Pulmonary:      Effort: Pulmonary effort is normal.      Breath sounds: Normal breath sounds. Abdominal:      Palpations: Abdomen is soft. Genitourinary:     Comments: Large rigth groin hernia -reducible  Musculoskeletal:      Comments: B/l knee joint OA changes   Skin:     General: Skin is warm. Neurological:      General: No focal deficit present. Mental Status: He is alert. Psychiatric:         Mood and Affect: Mood normal.         Behavior: Behavior normal.         Thought Content: Thought content normal.         Judgment: Judgment normal.         ASSESSMENT and PLAN  Diagnoses and all orders for this visit:    1. S/P AVR  -     AMB POC PT/INR 3.0   Refill warfarin   INR clinic appt in 4 weeks  2.  Chronic a-fib  -     AMB POC PT/INR    3. Essential hypertension  -     LIPID PANEL   controlled  4. Dyslipidemia  -     LIPID PANEL  -     TSH 3RD GENERATION   On statin  5. Excessive ear wax, right  -     REMOVE IMPACTED EAR WAX-removed with H20 irrigation and curette without difficulty    6. Right groin hernia   Pt does not appear to be low risk for elective surgery-d/w pt and wife   They will see general surgeon if develops discomfort  7. Primary osteoarthritis of both knees   ES tylenol prn   Weight loss   Can see ortho MD for cortisone--wife declines  8. Major depressive disorder, remission status unspecified, unspecified whether recurrent   Controlled on paxil  9. Depression, major, recurrent, mild ([facility]    10. Hx gout   Quiet on allopurnol  11. CHF   Compensated   F/u cardiologist    Sign release of [facility] from [facility]  Follow-up and Dispositions    · Return in about 3 months (around 6/10/2020) for CPE.

## 2020-03-10 ENCOUNTER — HOSPITAL ENCOUNTER (OUTPATIENT)
Dept: LAB | Age: 85
Discharge: HOME | End: 2020-03-10
Payer: MEDICARE

## 2020-03-10 ENCOUNTER — OFFICE VISIT (OUTPATIENT)
Dept: INTERNAL MEDICINE CLINIC | Age: 85
End: 2020-03-10

## 2020-03-10 VITALS
WEIGHT: 185 LBS | OXYGEN SATURATION: 16 % | DIASTOLIC BLOOD PRESSURE: 75 MMHG | HEART RATE: 82 BPM | HEIGHT: 65 IN | TEMPERATURE: 97.2 F | RESPIRATION RATE: 16 BRPM | BODY MASS INDEX: 30.82 KG/M2 | SYSTOLIC BLOOD PRESSURE: 130 MMHG

## 2020-03-10 DIAGNOSIS — I48.20 CHRONIC ATRIAL FIBRILLATION (HCC): ICD-10-CM

## 2020-03-10 DIAGNOSIS — K40.90 RIGHT GROIN HERNIA: ICD-10-CM

## 2020-03-10 DIAGNOSIS — F32.9 MAJOR DEPRESSIVE DISORDER, REMISSION STATUS UNSPECIFIED, UNSPECIFIED WHETHER RECURRENT: ICD-10-CM

## 2020-03-10 DIAGNOSIS — F33.0 DEPRESSION, MAJOR, RECURRENT, MILD (HCC): ICD-10-CM

## 2020-03-10 DIAGNOSIS — I48.20 CHRONIC A-FIB (HCC): ICD-10-CM

## 2020-03-10 DIAGNOSIS — I10 ESSENTIAL HYPERTENSION: ICD-10-CM

## 2020-03-10 DIAGNOSIS — Z95.2 S/P AVR: Primary | ICD-10-CM

## 2020-03-10 DIAGNOSIS — E78.5 DYSLIPIDEMIA: ICD-10-CM

## 2020-03-10 DIAGNOSIS — H61.21 EXCESSIVE EAR WAX, RIGHT: ICD-10-CM

## 2020-03-10 DIAGNOSIS — M17.0 PRIMARY OSTEOARTHRITIS OF BOTH KNEES: ICD-10-CM

## 2020-03-10 LAB
INR BLD: 3 (ref 1–1.5)
PT POC: 36 SECONDS (ref 9.1–12)
VALID INTERNAL CONTROL?: YES

## 2020-03-10 PROCEDURE — 36415 COLL VENOUS BLD VENIPUNCTURE: CPT

## 2020-03-10 PROCEDURE — 80061 LIPID PANEL: CPT

## 2020-03-10 PROCEDURE — 84443 ASSAY THYROID STIM HORMONE: CPT

## 2020-03-10 RX ORDER — WARFARIN 2.5 MG/1
TABLET ORAL
Qty: 7 TAB | Refills: 0 | Status: SHIPPED | OUTPATIENT
Start: 2020-03-10 | End: 2020-03-10 | Stop reason: SDUPTHER

## 2020-03-10 RX ORDER — WARFARIN 2.5 MG/1
TABLET ORAL
Qty: 90 TAB | Refills: 3 | Status: SHIPPED | OUTPATIENT
Start: 2020-03-10 | End: 2021-05-31

## 2020-03-10 RX ORDER — BROMPHENIRAMINE MALEATE, DEXTROMETHORPHAN HBR, PHENYLEPHRINE HCL, DIPHENHYDRAMINE HCL, PHENYLEPHRINE HCL 0.52G
1 KIT ORAL DAILY
COMMUNITY
End: 2022-10-07

## 2020-03-10 NOTE — PATIENT INSTRUCTIONS
Office Policies    Phone calls/patient messages:            Please allow up to 24 hours for someone in the office to contact you about your call or message. Be mindful your provider may be out of the office or your message may require further review. We encourage you to use [employer] for your messages as this is a faster, more efficient way to communicate with our office                         Medication Refills:            Prescription medications require 48-72 business hours to process. We encourage you to use [employer] for your refills. For controlled medications: Please allow 72 business hours to process. Certain medications may require you to  a written prescription at our office. NO narcotic/controlled medications will be prescribed after 4pm Monday through Friday or on weekends              Form/Paperwork Completion:            Please note a $25 fee may incur for all paperwork for completed by our providers. We ask that you allow 7-10 business days. Pre-payment is due prior to picking up/faxing the completed form. You may also download your forms to [employer] to have your doctor print off.

## 2020-03-11 LAB
CHOLEST SERPL-MCNC: 160 MG/DL (ref 100–199)
HDLC SERPL-MCNC: 45 MG/DL
LDLC SERPL CALC-MCNC: 83 MG/DL (ref 0–99)
TRIGL SERPL-MCNC: 160 MG/DL (ref 0–149)
TSH SERPL DL<=0.005 MIU/L-ACNC: 13.43 UIU/ML (ref 0.45–4.5)
VLDLC SERPL CALC-MCNC: 32 MG/DL (ref 5–40)

## 2020-03-12 NOTE — PROGRESS NOTES
Discussed labs with patients --stressed adherence with levothyroxine. Repeat tsh in 3-4 monthss.  Consider increased dose of lipitor--she will d/w cardiolgist

## 2020-04-06 DIAGNOSIS — I48.20 CHRONIC ATRIAL FIBRILLATION (HCC): ICD-10-CM

## 2020-04-06 DIAGNOSIS — Z95.2 H/O MECHANICAL AORTIC VALVE REPLACEMENT: Primary | ICD-10-CM

## 2020-04-13 ENCOUNTER — TELEPHONE (OUTPATIENT)
Dept: INTERNAL MEDICINE CLINIC | Age: 85
End: 2020-04-13

## 2020-04-13 NOTE — TELEPHONE ENCOUNTER
Pharmacy Progress Note - Telephone Call    Mr. Kala Major 80 y.o. was contacted via an outbound telephone call regarding his INR follow up today. Unable to leave a voicemail at this time.     Thank you,  Blanca Gandhi, PharmD, BCACP, CDE

## 2020-04-16 ENCOUNTER — OFFICE VISIT (OUTPATIENT)
Dept: CARDIOLOGY CLINIC | Age: 85
End: 2020-04-16

## 2020-04-16 DIAGNOSIS — Z95.810 PRESENCE OF BIVENTRICULAR AUTOMATIC CARDIOVERTER/DEFIBRILLATOR (AICD): Primary | ICD-10-CM

## 2020-05-26 ENCOUNTER — VIRTUAL VISIT (OUTPATIENT)
Dept: CARDIOLOGY CLINIC | Age: 85
End: 2020-05-26

## 2020-05-26 DIAGNOSIS — I10 HYPERTENSION, ESSENTIAL: ICD-10-CM

## 2020-05-26 DIAGNOSIS — Z95.1 S/P CABG (CORONARY ARTERY BYPASS GRAFT): ICD-10-CM

## 2020-05-26 DIAGNOSIS — Z95.2 H/O MECHANICAL AORTIC VALVE REPLACEMENT: ICD-10-CM

## 2020-05-26 DIAGNOSIS — Z95.810 BIVENTRICULAR ICD (IMPLANTABLE CARDIOVERTER-DEFIBRILLATOR) IN PLACE: ICD-10-CM

## 2020-05-26 DIAGNOSIS — I25.10 CORONARY ARTERY DISEASE INVOLVING NATIVE CORONARY ARTERY OF NATIVE HEART WITHOUT ANGINA PECTORIS: Primary | ICD-10-CM

## 2020-05-26 DIAGNOSIS — I50.22 CHRONIC SYSTOLIC CONGESTIVE HEART FAILURE (HCC): ICD-10-CM

## 2020-05-26 DIAGNOSIS — I35.0 SEVERE AORTIC STENOSIS: ICD-10-CM

## 2020-05-26 DIAGNOSIS — I48.20 CHRONIC ATRIAL FIBRILLATION (HCC): ICD-10-CM

## 2020-05-26 DIAGNOSIS — Z98.890 STATUS POST CATHETER ABLATION OF ATRIAL FLUTTER: ICD-10-CM

## 2020-05-26 RX ORDER — SPIRONOLACTONE 25 MG/1
25 TABLET ORAL DAILY
COMMUNITY
End: 2022-10-07

## 2020-05-26 RX ORDER — OXYBUTYNIN CHLORIDE 10 MG/1
10 TABLET, EXTENDED RELEASE ORAL DAILY
COMMUNITY

## 2020-05-26 NOTE — PROGRESS NOTES
TELEPHONE VISIT DOCUMENTATION 5/26/2020   Blair Beavers 1935  80 y. o.male evaluated via telephone on 5/26/2020 due to COVID 19 restrictions. Patient unable to participate in Virtual Visit with synchronous audio/visual technology. Gets tired sooner  Weight 182 blood pressure 126/72 pulse 84  Wt Readings from Last 3 Encounters:   03/10/20 185 lb (83.9 kg)   12/07/19 180 lb 11 oz (82 kg)   11/22/19 180 lb (81.6 kg)      04/15/19   ECHO ADULT COMPLETE 04/15/2019 4/15/2019    Narrative · Left ventricular moderately decreased systolic function. Calculated left   ventricular ejection fraction is 40%. Left ventricular mild concentric   hypertrophy. · Left atrial cavity size is moderately dilated. · Right ventricular cavity size is mildly dilated. Pacer/ICD present. · Right atrial cavity size is mildly dilated. · Aortic valve is mechanical prosthetic. Mild aortic valve regurgitation   is present. · Mild mitral annular calcification. Mild to moderate mitral valve   regurgitation. · Mild to moderate tricuspid valve regurgitation is present. Pulmonary   hypertension is present. Signed by: Jeimy Mojica MD     04/26/19   NUCLEAR CARDIAC STRESS TEST 04/30/2019 6/26/2019    Narrative · Baseline ECG: Paced rhythm, atrial fibrillation. · Gated SPECT: Left ventricular function post-stress was abnormal.   Calculated ejection fraction is 31%. There is no evidence of transient   ischemic dilation (TID). · Left ventricular perfusion is abnormal.  · Myocardial perfusion imaging defect 1: There is a defect that is   moderate to large in size present in the basal inferior location(s) that   is non-reversible. There is abnormal wall motion in the defect area. The   defect appears to probably be infarction. Perfusion defect was visually   and quantitatively present.   · Myocardial perfusion imaging defect 2: There is a defect that is   moderate in size affecting the mid to basal inferoseptal location(s) that   is non-reversible. There is abnormal wall motion in the defect area. The   possibility of artifact cannot be excluded. Perfusion defect was visually   and quantitatively present. · Myocardial perfusion imaging defect 3: There is a defect that is   moderate in size affecting the mid inferior location(s) that is   non-reversible. The possibility of artifact cannot be excluded. Perfusion   defect was visually and quantitatively present. · Abnormal myocardial perfusion imaging. Fixed defect consistent with   prior myocardial infarction. Fixed defects in mid and distal inferior and Inferior-septal walls as   described with wall motion abnormalities and overall EF of 31%. (Recent echo showed EF of 40%, nuclear imaging may be less accurate for EF   in setting of larger perfusion defects. ). Signed by: Sunday Monet MD           · CAD occluded RCA and SVG-RCA, inferior infarct on nuke  · CABG 0180  · CHF systolic EF 90% in April 7920  · Exacerbation--admit March 1-7, 2019 afib with RVR, acute CHF, Primacor, MEMO with normal fx of AVR, BIV, initial proBNP 10.528  · AS mechanical AVR 1996  · Atrial flutter -ablation 3/4/19 with BiV AICD , EF was 10%      Today feeling  \"feel pretty good\"  Legs hurt with walking  No sob, lower extremity edema    Has no tachycardia , palpitations or sense of arrythmia   NO chest pain  MORE FATIGUE  No bruising  [place]  Takes lasix prn    Mr. Yany Alvarez is seems to be doing pretty well. His wife answer mos of the  questions rell  is very hard of hearing but we did talk to him  in detail. He had heart failure the got a lot worse when he was having his atrial flutter. He had a successful ablation last year and that seemed to improve his ejection fraction. His weight gain recently has been more of the deconditioning and lack of activity with the pandemic ;he is not particularly having shortness of breath with usual exertion.   But he does seem mainly complaining of more fatigued at times according to his wife. He confirms this. He has no angina to indicate that there is a change in his known CAD. He is on blood thinner but has no excessive bruising. Overall he seems to be reasonably stable. He is coming in in September and go to see Dr. Raeann Espinosa so we will go and get an echo then and then I will talk to him and his wife both with telephone visits after that. They are trying to stay out of being into the doctors offices right now and I understand that. · Overall chronic systolic heart failure NYHA II-Entresto and Coreg  · CAD stable with no angina  · Arrhythmia status post ablation continue follow-up with device checks. · HTN at goal  · XOL Lipids on high potency statin as appropriate for secondary prevention. 1. Coronary artery disease involving native coronary artery of native heart without angina pectoris    2. Chronic atrial fibrillation (HCC)    3. Chronic systolic congestive heart failure ([address]    4. Severe aortic stenosis    5. Hypertension, essential    6. S/P CABG (coronary artery bypass graft)    7. Biventricular ICD (implantable cardioverter-defibrillator) in place    8. H/O mechanical aortic valve replacement    9. Status post catheter ablation of atrial flutter         Future Appointments   Date Time Provider [facility] [place]   7/14/2020  2:30 PM Richard Reddy MD [address]   9/24/2020  1:30  [place], [address] [facility]   9/24/2020  2:00 PM Brandee Villanueva  [address]                CAD s/p CABG 1996 with mechanical AVR for severe AS,     known occlusion to the RCA and vein graft to the RCA. [facility] nuclear 6/24/15 showed inferior infarction with a small amount of emani infarct ischemia. There was inferior septal kinesis with an EF of 38%. Carotids 1/7/13 showed minimal disease. Monitor holter 4/11/13 showed 21 beats of Vtach and 4 beats of Vtach with at rate of 140, 14% of the beats were ventricular.        Echo October 2018 showed EF showed EF of 20, moderate LVH, with dilated atrial, mechanical AVR seems to be functioning well, mild to moderate MR. Past Medical History:   Diagnosis Date    Acute on chronic systolic heart failure, NYHA class 4 ([facility] 03/01/2019    admit March 2019 7 days, EF 10% got Primacor, BIV implant    Anger     TAKES PAROXETINE TO CONTROL ANGER ISSUES    CAD (coronary artery disease) 1996    CABG 1996, fu nuke 2015 fixed inferior defect    Chronic atrial fibrillation ([facility] 03/2019    RVR3/2019, had ablation of flutter- persistent    Foot drop, left     WEARS METAL BRACE    Gout     Hard of hearing     High cholesterol     Hypertension     Prostate enlargement     Thyroid disease     Unspecified sleep apnea     DOESN'T USE CPAP; \"IT MADE HIM SICK-COLDS, SINUS\", PER WIFE     reports that he has never smoked. He has never used smokeless tobacco. He reports current alcohol use of about 2.5 standard drinks of alcohol per week. He reports that he does not use drugs. family history includes Stroke in his father and mother. Current Outpatient Medications   Medication Sig    oxybutynin chloride XL (DITROPAN XL) 10 mg CR tablet Take 10 mg by mouth daily.  spironolactone (ALDACTONE) 25 mg tablet Take 25 mg by mouth daily.  allopurinoL (ZYLOPRIM) 300 mg tablet T1T QD    omega 3-dha-epa-fish oil (FISH OIL) 100-160-1,000 mg cap Take  by mouth.  psyllium (METAMUCIL) 0.52 gram capsule Take 1 Cap by mouth daily.  warfarin (COUMADIN) 2.5 mg tablet 1 tab daily- NEEDS INR CHECK    atorvastatin (LIPITOR) 40 mg tablet Take 1 Tab by mouth nightly.  b complex vitamins (B COMPLEX 1) tablet Take 1 Tab by mouth daily.  CYANOCOBALAMIN, VITAMIN B-12, PO Take 1 Tab by mouth daily.  carvedilol (COREG) 6.25 mg tablet Take 1 Tab by mouth two (2) times a day.  sacubitril-valsartan (ENTRESTO) 24 mg/26 mg tablet Take 1 Tab by mouth every twelve (12) hours.     levothyroxine (SYNTHROID) 150 mcg tablet Take 1 Tab by mouth Daily (before breakfast).  aspirin delayed-release 81 mg tablet Take 81 mg by mouth daily.  therapeutic multivitamin (THERAGRAN) tablet Take 1 Tab by mouth daily.  ascorbic acid, vitamin C, (VITAMIN C) 500 mg tablet Take 1,000 mg by mouth daily.  B.infantis-B.ani-B.long-B.bifi (PROBIOTIC 4X) 10-15 mg TbEC Take 1 Tab by mouth daily.  cholecalciferol (VITAMIN D3) 1,000 unit tablet Take 1,000 Units by mouth nightly.  calcium citrate (CITRACAL PO) Take 1 Tab by mouth nightly.  COQ10, UBIQUINOL, PO Take 100-200 mg by mouth daily.  finasteride (PROSCAR) 5 mg tablet Take 5 mg by mouth daily.  PARoxetine (PAXIL) 20 mg tablet Take 20 mg by mouth daily.  tamsulosin (FLOMAX) 0.4 mg capsule Take 0.8 mg by mouth nightly.  FERROUS GLUCONATE PO Take 325 mg by mouth daily.  furosemide (LASIX) 20 mg tablet Take 1 Tab by mouth daily. No current facility-administered medications for this visit. No Known Allergies  Consent:   Mitul Buenrostro and/or health care decision maker is aware that that  may receive a bill for this telephone service, depending on 's insurance coverage, and has provided verbal consent to proceed: Yes  Documentation:  I communicated with the patient and/or health care decision maker about as above  Details of this discussion including any medical advice provided: as above  I affirm this is a Patient Initiated Episode with an Established Patient who has not had a related appointment within my department in the past 7 days or scheduled within the next 24 hours. Patient was made aware and verbalized understanding that an appointment will be scheduled for them for a virtual visit and/or office visit within the above time frame. Patient understanding his/her responsibility to call and change time/date if he/she so chooses.   Note: not billable if this call serves to triage the patient into an appointment for the relevant concern  Total Time: minutes: 11-20 minutes

## 2020-05-29 DIAGNOSIS — E78.5 DYSLIPIDEMIA: ICD-10-CM

## 2020-06-03 RX ORDER — ATORVASTATIN CALCIUM 40 MG/1
40 TABLET, FILM COATED ORAL
Qty: 25 TAB | Refills: 3 | Status: SHIPPED | OUTPATIENT
Start: 2020-06-03 | End: 2021-03-29

## 2020-06-03 NOTE — TELEPHONE ENCOUNTER
Cardiologist: Dr. Efra Iglesias    Last appt: 5/26/2020  Future Appointments   Date Time Provider [place]   7/14/2020  2:30 PM Zarina Harding MD [address]   9/24/2020  1:30 PM [facility], [address] [address]   9/24/2020  2:00 PM Kiara Snow  [address]       Requested Prescriptions     Signed Prescriptions Disp Refills    atorvastatin (LIPITOR) 40 mg tablet 25 Tab 3     Sig: TAKE 1 TAB BY MOUTH NIGHTLY.      Authorizing Provider: Susan De La Torre     Ordering User: [facility]         Refills VO per Dr. Efra Iglesias.

## 2020-06-11 DIAGNOSIS — I48.20 CHRONIC ATRIAL FIBRILLATION (HCC): ICD-10-CM

## 2020-06-11 DIAGNOSIS — I50.22 CHRONIC SYSTOLIC CONGESTIVE HEART FAILURE (HCC): ICD-10-CM

## 2020-06-12 RX ORDER — SACUBITRIL AND VALSARTAN 24; 26 MG/1; MG/1
TABLET, FILM COATED ORAL
Qty: 180 TAB | Refills: 2 | Status: SHIPPED | OUTPATIENT
Start: 2020-06-12 | End: 2021-09-13

## 2020-06-12 RX ORDER — CARVEDILOL 6.25 MG/1
TABLET ORAL
Qty: 180 TAB | Refills: 2 | Status: SHIPPED | OUTPATIENT
Start: 2020-06-12 | End: 2021-04-06

## 2020-07-14 ENCOUNTER — VIRTUAL VISIT (OUTPATIENT)
Dept: INTERNAL MEDICINE CLINIC | Age: 85
End: 2020-07-14

## 2020-07-14 DIAGNOSIS — I50.22 CHRONIC SYSTOLIC CONGESTIVE HEART FAILURE (HCC): ICD-10-CM

## 2020-07-14 DIAGNOSIS — Z00.00 MEDICARE ANNUAL WELLNESS VISIT, SUBSEQUENT: ICD-10-CM

## 2020-07-14 DIAGNOSIS — I48.0 PAF (PAROXYSMAL ATRIAL FIBRILLATION) (HCC): ICD-10-CM

## 2020-07-14 DIAGNOSIS — N18.30 CKD (CHRONIC KIDNEY DISEASE) STAGE 3, GFR 30-59 ML/MIN (HCC): ICD-10-CM

## 2020-07-14 DIAGNOSIS — E78.00 PURE HYPERCHOLESTEROLEMIA: ICD-10-CM

## 2020-07-14 DIAGNOSIS — M54.2 NECK PAIN: Primary | ICD-10-CM

## 2020-07-14 NOTE — PROGRESS NOTES
HISTORY OF PRESENT ILLNESS  Lesa Spence is a 80 y.o. male. HPI   VV via telephone encounter x 21 minutes d/t covid 19 pandemic  F/u HTN HLD    Medicare wellness----    C/o tingling in b/l arms but mostly left arm when driving his car to fingertips x 1-2 years . Some neck pain  Has stable LENNON per wife , saw cardiologist recently  No cp     Well overdue for INR check--did not like going to [facility] and did not get into inr clinic --wife stares per phone changed and could not accept message    She is concered he is on entresto and aldacatone for chf d/t potassium elevation concers      Last OV     Pt here to establish care  No PCP -Dr Jarvis Dorman  Cardiologist Dr Bear Ruff and Dr Shaniqua Her  Hx CHF ef 40% s/p Biv-ICD, CAD s/p CABG, severe AS s/p Mech AVR, chronic afib. htn dyslipidemia  Hypothyroid BPH hx gout  INR managed by cardiologist over last year but wife requests to be done at our office  Sees Dr Jazmín Delgado assi---ckd3   Sees Dr Danny Vogel for BPH  Had recent EP procedure-new LV lead implant  No gout attacks this year--last episode was last summer  Takes paxil for anger issues and hx MDD-well controlled on paxil per wife  Uses cane for walking--b/l knee OA nad left ankle fusion  Wears hearing aids but increased hearing loss recently  Has large right groin hernia--no pain in the groin area ?  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 12/06/2019    Chronic systolic congestive heart failure ([address] 04/07/2019    LENNON (dyspnea on exertion) 04/07/2019    Coronary artery disease involving native coronary artery of native heart with unstable angina pectoris ([address] 04/07/2019    S/P CABG (coronary artery bypass graft) 04/07/2019    Severe aortic stenosis 04/07/2019    Hypertension, essential 04/07/2019    Dyslipidemia 04/07/2019    PVC (premature ventricular contraction) 04/07/2019    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 (HCC) 04/07/2019    Typical atrial flutter ([facility] 03/04/2019    Status post catheter ablation of atrial flutter 03/04/2019    Biventricular ICD (implantable cardioverter-defibrillator) in place 03/04/2019    A-fib ([facility] 03/01/2019    Anemia 10/09/2018    Hypothyroidism 10/09/2018    Supratherapeutic INR 10/09/2018    H/O mechanical aortic valve replacement 10/09/2018     Current Outpatient Medications   Medication Sig Dispense Refill    carvediloL (COREG) 6.25 mg tablet T1T  Tab 2    Entresto 24-26 mg tablet T1T PO EVERY 12 HOURS 180 Tab 2    PARoxetine (PAXIL) 20 mg tablet TAKE 1 TABLET EVERY DAY 30 Tab 5    levothyroxine (SYNTHROID) 150 mcg tablet TAKE 1 TABLET EVERY MORNING FOR THYROID 30 Tab 5    allopurinoL (ZYLOPRIM) 300 mg tablet T1T QD TO PREVENT GOUT 30 Tab 11    atorvastatin (LIPITOR) 40 mg tablet TAKE 1 TAB BY MOUTH NIGHTLY. 25 Tab 3    oxybutynin chloride XL (DITROPAN XL) 10 mg CR tablet Take 10 mg by mouth daily.  spironolactone (ALDACTONE) 25 mg tablet Take 25 mg by mouth daily.  omega 3-dha-epa-fish oil (FISH OIL) 100-160-1,000 mg cap Take  by mouth.  psyllium (METAMUCIL) 0.52 gram capsule Take 1 Cap by mouth daily.  warfarin (COUMADIN) 2.5 mg tablet 1 tab daily- NEEDS INR CHECK 90 Tab 3    b complex vitamins (B COMPLEX 1) tablet Take 1 Tab by mouth daily.  CYANOCOBALAMIN, VITAMIN B-12, PO Take 1 Tab by mouth daily.  aspirin delayed-release 81 mg tablet Take 81 mg by mouth daily.  therapeutic multivitamin (THERAGRAN) tablet Take 1 Tab by mouth daily.  ascorbic acid, vitamin C, (VITAMIN C) 500 mg tablet Take 1,000 mg by mouth daily.       B.infantis-B.ani-B.long-B.bifi (PROBIOTIC 4X) 10-15 mg TbEC Take 1 Tab by mouth daily.      cholecalciferol (VITAMIN D3) 1,000 unit tablet Take 1,000 Units by mouth nightly.  calcium citrate (CITRACAL PO) Take 1 Tab by mouth nightly.  COQ10, UBIQUINOL, PO Take 100-200 mg by mouth daily.  finasteride (PROSCAR) 5 mg tablet Take 5 mg by mouth daily.  tamsulosin (FLOMAX) 0.4 mg capsule Take 0.8 mg by mouth nightly.  FERROUS GLUCONATE PO Take 325 mg by mouth daily.  furosemide (LASIX) 20 mg tablet Take 1 Tab by mouth daily. (Patient taking differently: Take 20 mg by mouth daily. Per patient take 1 pill base on swelling) 90 Tab 1     No Known Allergies   Lab Results   Component Value Date/Time    WBC 7.1 12/06/2019 02:59 PM    HGB 13.6 12/06/2019 02:59 PM    HCT 41.4 12/06/2019 02:59 PM    PLATELET 334 17/13/6295 02:59 PM    .5 (H) 12/06/2019 02:59 PM     Lab Results   Component Value Date/Time    Hemoglobin A1c 5.9 10/08/2013 10:41 AM    Glucose 99 12/06/2019 02:59 PM    Glucose (POC) 119 (H) 10/24/2013 06:28 AM    LDL, calculated 83 03/10/2020 11:17 AM    Creatinine (POC) 1.6 (H) 06/18/2018 01:48 PM    Creatinine 1.29 12/06/2019 02:59 PM      Lab Results   Component Value Date/Time    Cholesterol, total 160 03/10/2020 11:17 AM    HDL Cholesterol 45 03/10/2020 11:17 AM    LDL, calculated 83 03/10/2020 11:17 AM    Triglyceride 160 (H) 03/10/2020 11:17 AM     Lab Results   Component Value Date/Time    GFR est non-AA 53 (L) 12/06/2019 02:59 PM    GFRNA, POC 41 (L) 06/18/2018 01:48 PM    GFR est AA >60 12/06/2019 02:59 PM    GFRAA, POC 50 (L) 06/18/2018 01:48 PM    Creatinine 1.29 12/06/2019 02:59 PM    Creatinine (POC) 1.6 (H) 06/18/2018 01:48 PM    BUN 23 (H) 12/06/2019 02:59 PM    Sodium 139 12/06/2019 02:59 PM    Potassium 5.7 (H) 12/06/2019 02:59 PM    Chloride 109 (H) 12/06/2019 02:59 PM    CO2 26 12/06/2019 02:59 PM    Magnesium 2.0 03/01/2019 09:10 AM        ROS    Physical Exam    ASSESSMENT and PLAN  Diagnoses and all orders for this visit:    1.  Neck pain  -     REFERRAL TO ORTHOPEDICS           This is the Subsequent Medicare Annual Wellness Exam, performed 12 months or more after the Initial AWV or the last Subsequent AWV    I have reviewed the patient's medical history in detail and updated the computerized patient record.      History     Patient Active Problem List   Diagnosis Code    Anemia D64.9    Hypothyroidism E03.9    Supratherapeutic INR R79.1    H/O mechanical aortic valve replacement Z95.2    A-fib ([facility]) I48.91    Typical atrial flutter ([facility]) I48.3    Status post catheter ablation of atrial flutter Z98.890    Biventricular ICD (implantable cardioverter-defibrillator) in place Z95.810    Chronic systolic congestive heart failure ([facility]) I50.22    LENNON (dyspnea on exertion) R06.00    Coronary artery disease involving native coronary artery of native heart with unstable angina pectoris ([facility]) I25.110    S/P CABG (coronary artery bypass graft) Z95.1    Severe aortic stenosis I35.0    Hypertension, essential I10    Dyslipidemia E78.5    PVC (premature ventricular contraction) I49.3    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 ([facility]) T82.898A    Hx of cardiac pacemaker Z95.0    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 T82.120A    Depression, major, recurrent, mild ([facility]) F33.0     Past Medical History:   Diagnosis Date    Acute on chronic systolic heart failure, NYHA class 4 ([facility] 03/01/2019    admit March 2019 7 days, EF 10% got Primacor, BIV implant    Anger     TAKES PAROXETINE TO CONTROL ANGER ISSUES    CAD (coronary artery disease) 1996    CABG 1996, mukul celis 2015 fixed inferior defect    Chronic atrial fibrillation ([facility] 03/2019    RVR3/2019, had ablation of flutter- persistent    Foot drop, left     WEARS METAL BRACE    Gout     Hard of hearing     High cholesterol     Hypertension     Prostate enlargement     Thyroid disease     Unspecified sleep apnea     DOESN'T USE CPAP; \"IT MADE HIM SICK-COLDS, SINUS\", PER WIFE      Past Surgical History:   Procedure Laterality Date    CARDIAC SURG PROCEDURE UNLIST      ANGIOPLASTY WITH CORONARY STENT    CARDIAC SURG PROCEDURE UNLIST  1996    AORTIC VALVE REPLACEMENT    HX ORTHOPAEDIC  2001    ORIF COMPOUND FRACTURE LEFT ANKLE    HX RETINAL DETACHMENT REPAIR  1980s    LEFT EYE    IL COMPRE ELECTROPHYSIOL XM W/LEFT ATRIAL PACNG/REC N/A 3/4/2019    Lt Atrial Pace & Record During Ep Study performed by Azeb Rueda MD at [place] [address], [facility] CATH LAB    IL EPHYS EVAL PACG CVDFB PRGRMG/REPRGRMG PARAMETERS N/A 3/4/2019    Eval Icd Generator & Leads W Testing At Implant performed by Azeb Rueda MD at [place] [address], [facility] CATH LAB    IL EPHYS EVAL PACG CVDFB PRGRMG/REPRGRMG PARAMETERS N/A 12/6/2019    Eval Icd Generator & Leads W Testing At Implant performed by Azeb Rueda MD at [place] [address], [facility] CATH LAB    IL EPHYS EVAL W/ABLATION [address] N/A 3/4/2019    Ablation A-Flutter performed by Azeb Rueda MD at [place] [address], [facility] CATH LAB    IL INSJ ELTRD CAR KILO SYS TM INSJ DFB/PM PLS GEN N/A 12/6/2019    Lv Lead Placement performed by Azeb Rueda MD at [place] [address], [facility] CATH LAB    IL INSJ/RPLCMT PERM DFB W/TRNSVNS LDS 1/DUAL CHMBR N/A 3/4/2019    INSERT ICD BIV MULTI performed by Azeb Rueda MD at [place] [address], [facility] CATH LAB    IL INTRACARDIAC ELECTROPHYSIOLOGIC 3D [address] [facility] N/A 3/4/2019    Ep 3d Mapping performed by Azeb Rueda MD at [place] [address], [facility] CATH LAB    IL RMVL1/DUAL CHMBR IMPLTBL DFB ELTRD TRANSVNS XTRJ N/A 12/6/2019    Laser Lead Extraction performed by Azeb Rueda MD at [place] [address], [facility] CATH LAB     Current Outpatient Medications   Medication Sig Dispense Refill    carvediloL (COREG) 6.25 mg tablet T1T  Tab 2    Entresto 24-26 mg tablet T1T PO EVERY 12 HOURS 180 Tab 2    PARoxetine (PAXIL) 20 mg tablet TAKE 1 TABLET EVERY DAY 30 Tab 5    levothyroxine (SYNTHROID) 150 mcg tablet TAKE 1 TABLET EVERY MORNING FOR THYROID 30 Tab 5    allopurinoL (ZYLOPRIM) 300 mg tablet T1T QD TO PREVENT GOUT 30 Tab 11    atorvastatin (LIPITOR) 40 mg tablet TAKE 1 TAB BY MOUTH NIGHTLY. 25 Tab 3    oxybutynin chloride XL (DITROPAN XL) 10 mg CR tablet Take 10 mg by mouth daily.  spironolactone (ALDACTONE) 25 mg tablet Take 25 mg by mouth daily.  omega 3-dha-epa-fish oil (FISH OIL) 100-160-1,000 mg cap Take  by mouth.  psyllium (METAMUCIL) 0.52 gram capsule Take 1 Cap by mouth daily.  warfarin (COUMADIN) 2.5 mg tablet 1 tab daily- NEEDS INR CHECK 90 Tab 3    b complex vitamins (B COMPLEX 1) tablet Take 1 Tab by mouth daily.  CYANOCOBALAMIN, VITAMIN B-12, PO Take 1 Tab by mouth daily.  aspirin delayed-release 81 mg tablet Take 81 mg by mouth daily.  therapeutic multivitamin (THERAGRAN) tablet Take 1 Tab by mouth daily.  ascorbic acid, vitamin C, (VITAMIN C) 500 mg tablet Take 1,000 mg by mouth daily.  B.infantis-B.ani-B.long-B.bifi (PROBIOTIC 4X) 10-15 mg TbEC Take 1 Tab by mouth daily.  cholecalciferol (VITAMIN D3) 1,000 unit tablet Take 1,000 Units by mouth nightly.  calcium citrate (CITRACAL PO) Take 1 Tab by mouth nightly.  COQ10, UBIQUINOL, PO Take 100-200 mg by mouth daily.  finasteride (PROSCAR) 5 mg tablet Take 5 mg by mouth daily.  tamsulosin (FLOMAX) 0.4 mg capsule Take 0.8 mg by mouth nightly.  FERROUS GLUCONATE PO Take 325 mg by mouth daily.  furosemide (LASIX) 20 mg tablet Take 1 Tab by mouth daily. (Patient taking differently: Take 20 mg by mouth daily. Per patient take 1 pill base on swelling) 90 Tab 1     No Known Allergies    Family History   Problem Relation Age of Onset    Stroke Mother         ANEURYSM    Stroke Father      Social History     Tobacco Use    Smoking status: Never Smoker    Smokeless tobacco: Never Used   Substance Use Topics    Alcohol use:  Yes     Alcohol/week: 2.5 standard drinks     Types: 3 Standard drinks or equivalent per week     Frequency: 2-4 times a month     Drinks per session: 1 or 2 Binge frequency: Never     Comment: 4 times per month , 1 drink       Depression Risk Factor Screening:     3 most recent PHQ Screens 3/10/2020   Little interest or pleasure in doing things Not at all   Feeling down, depressed, irritable, or hopeless Nearly every day   Total Score PHQ 2 3   Trouble falling or staying asleep, or sleeping too much Not at all   Feeling tired or having little energy Nearly every day   Poor appetite, weight loss, or overeating Not at all   Feeling bad about yourself - or that you are a failure or have let yourself or your family down Several days   Trouble concentrating on things such as school, work, reading, or watching TV Not at all   Moving or speaking so slowly that other people could have noticed; or the opposite being so fidgety that others notice Not at all   Thoughts of being better off dead, or hurting yourself in some way Not at all   PHQ 9 Score 7   How difficult have these problems made it for you to do your work, take care of your home and get along with others Not difficult at all       Alcohol Risk Factor Screening (MALE > 65): Do you average more 1 drink per night or more than 7 drinks a week: No    In the past three months have you have had more than 4 drinks containing alcohol on one occasion: No      Functional Ability and Level of Safety:   Hearing: The patient wears hearing aids. Activities of Daily Living: The home contains: handrails and grab bars  Patient does total self care     Ambulation: with difficulty, uses a cane     Fall Risk:  Fall Risk Assessment, last 12 mths 3/10/2020   Able to walk? Yes   Fall in past 12 months?  No     Abuse Screen:  Patient is not abused       Cognitive Screening   Has your family/caregiver stated any concerns about your memory: no    Cognitive Screening: Normal - serial 3    Patient Care Team   Patient Care Team:  Jayant Morris MD as PCP - General (Internal Medicine)  Jayant Morris MD as PCP - REHABILITATION [facility] Provider  Jennifer Martell MD (Cardiology)  Gina Perea MD (Cardiology)    Assessment/Plan   Education and counseling provided:  Are appropriate based on today's review and evaluation  Screening for glaucoma  shingrix  avised to get PCV 13      Diagnoses and all orders for this visit:    1. Neck pain/left arm tingling  -     REFERRAL TO ORTHOPEDICS-Dr Zac Shaw    . 2. HTN   Continue medicines  3. CKd 3   Bmp   Wife concenred pt is on entresto and aldactone  and about potassium levels   4. PAF   Overdue for INR--pt did not go to [facility]    Will order at [facility] and then INR clinic   F/u cardiologist  5. CHF   Severe but compensated clinically   F/u cardiologist      Health Maintenance Due   Topic Date Due    DTaP/Tdap/Td series (1 - Tdap) 03/07/1956    Shingrix Vaccine Age 50> (1 of 2) 03/07/1985    GLAUCOMA SCREENING Q2Y  03/07/2000    Pneumococcal 65+ years (1 of 1 - PPSV23) 03/07/2000    Medicare Yearly Exam  03/20/2018       Kelly Garcia, who was evaluated through a synchronous (real-time) audio-video encounter, and/or his healthcare decision maker, is aware that it is a billable service, with coverage as determined by his insurance carrier. He provided verbal consent to proceed: Yes, and patient identification was verified. It was conducted pursuant to the emergency declaration under the [facility] [address] authority and the [employer] General Act. A caregiver was present when appropriate. Ability to conduct physical exam was limited. I was at home. The patient was at home.     Karine Bolden MD     rtc 4 months

## 2020-07-14 NOTE — PATIENT INSTRUCTIONS
This is an established visit conducted via telemedicine. The patient has been instructed that this meets HIPAA criteria and acknowledges and agrees to this method of visitation. [facility]  23/22/00  29:06 PM    Chief Complaint   Patient presents with    Cholesterol Problem     Routine     Hypertension     Routine    Medication Evaluation     Review medication    Numbness     arms       Medicare Wellness Visit, Male    The best way to live healthy is to have a lifestyle where you eat a well-balanced diet, exercise regularly, limit alcohol use, and quit all forms of tobacco/nicotine, if applicable. Regular preventive services are another way to keep healthy. Preventive services (vaccines, screening tests, monitoring & exams) can help personalize your care plan, which helps you manage your own care. Screening tests can find health problems at the earliest stages, when they are easiest to treat. [facility] follows the current, evidence-based guidelines published by the [facility] ([facility]) when recommending preventive services for our patients. Because we follow these guidelines, sometimes recommendations change over time as research supports it. (For example, a prostate screening blood test is no longer routinely recommended for men with no symptoms). Of course, you and your doctor may decide to screen more often for some diseases, based on your risk and co-morbidities (chronic disease you are already diagnosed with). Preventive services for you include:  - Medicare offers their members a free annual wellness visit, which is time for you and your primary care provider to discuss and plan for your preventive service needs. Take advantage of this benefit every year!  -All adults over age 72 should receive the recommended pneumonia vaccines. Current USPSTF guidelines recommend a series of two vaccines for the best pneumonia protection. -All adults should have a flu vaccine yearly and tetanus vaccine every 10 years.  -All adults age 48 and older should receive the shingles vaccines (series of two vaccines). -All adults age 38-68 who are overweight should have a diabetes screening test once every three years.   -Other screening tests & preventive services for persons with diabetes include: an eye exam to screen for diabetic retinopathy, a kidney function test, a foot exam, and stricter control over your cholesterol.   -Cardiovascular screening for adults with routine risk involves an electrocardiogram (ECG) at intervals determined by the provider.   -Colorectal cancer screening should be done for adults age 54-65 with no increased risk factors for colorectal cancer. There are a number of acceptable methods of screening for this type of cancer. Each test has its own benefits and drawbacks. Discuss with your provider what is most appropriate for you during your annual wellness visit. The different tests include: colonoscopy (considered the best screening method), a fecal occult blood test, a fecal DNA test, and sigmoidoscopy.  -All adults born between [facility] should be screened once for Hepatitis C.  -An Abdominal Aortic Aneurysm (AAA) Screening is recommended for men age 73-68 who has ever smoked in their lifetime.      Here is a list of your current Health Maintenance items (your personalized list of preventive services) with a due date:  Health Maintenance Due   Topic Date Due    DTaP/Tdap/Td  (1 - Tdap) 03/07/1956    Shingles Vaccine (1 of 2) 03/07/1985    Glaucoma Screening   03/07/2000    Pneumococcal Vaccine (1 of 1 - PPSV23) 03/07/2000    Annual Well Visit  03/20/2018

## 2020-07-15 ENCOUNTER — TELEPHONE (OUTPATIENT)
Dept: INTERNAL MEDICINE CLINIC | Age: 85
End: 2020-07-15

## 2020-07-15 NOTE — TELEPHONE ENCOUNTER
Pharmacy Progress Note - Telephone Call    Mr. Alana Billings 80 y.o. was contacted via an outbound telephone call regarding his INR monitoring today. A voicemail was left for patient to return my call.        Thank you,  Blanca Escobedo, PharmD, BCACP, CDE

## 2020-07-28 ENCOUNTER — HOSPITAL ENCOUNTER (OUTPATIENT)
Dept: LAB | Age: 85
Discharge: HOME | End: 2020-07-28
Payer: MEDICARE

## 2020-07-28 PROCEDURE — 36415 COLL VENOUS BLD VENIPUNCTURE: CPT

## 2020-07-28 PROCEDURE — 85610 PROTHROMBIN TIME: CPT

## 2020-07-28 PROCEDURE — 80048 BASIC METABOLIC PNL TOTAL CA: CPT

## 2020-07-29 ENCOUNTER — TELEPHONE (OUTPATIENT)
Dept: INTERNAL MEDICINE CLINIC | Age: 85
End: 2020-07-29

## 2020-07-29 LAB
BUN SERPL-MCNC: 18 MG/DL (ref 8–27)
BUN/CREAT SERPL: 15 (ref 10–24)
CALCIUM SERPL-MCNC: 9.4 MG/DL (ref 8.6–10.2)
CHLORIDE SERPL-SCNC: 106 MMOL/L (ref 96–106)
CO2 SERPL-SCNC: 20 MMOL/L (ref 20–29)
CREAT SERPL-MCNC: 1.24 MG/DL (ref 0.76–1.27)
GLUCOSE SERPL-MCNC: 105 MG/DL (ref 65–99)
INR PPP: 2.5 (ref 0.8–1.2)
POTASSIUM SERPL-SCNC: 4.2 MMOL/L (ref 3.5–5.2)
PROTHROMBIN TIME: 25.8 SEC (ref 9.1–12)
SODIUM SERPL-SCNC: 144 MMOL/L (ref 134–144)

## 2020-07-29 NOTE — TELEPHONE ENCOUNTER
Pharmacy Progress Note - Telephone Call    Mr. Acuña Artist 80 y.o. was contacted via an outbound telephone call regarding his INR result today. Voicemail is full. INR therapeutic. Will cancel today's appt. Will need to see patient in 4 weeks for f/u.      Thank you,  Blanca Conley, PharmD, BCACP, CDE      CLINICAL PHARMACY CONSULT: MED RECONCILIATION/REVIEW ADDENDUM    For Pharmacy Admin Tracking Only    PHSO: PHSO Patient?: Yes

## 2020-07-29 NOTE — PROGRESS NOTES
tellpt inr is thereapeutic--repeat in 4 -6 weeks at INR clinic-will forward to [facility] as well to get INR clinic appt. Same dose of coumadin.  Lytes and potassium levels are ok-continue current medicines

## 2020-08-09 ENCOUNTER — APPOINTMENT (OUTPATIENT)
Dept: CT IMAGING | Age: 85
End: 2020-08-09
Attending: EMERGENCY MEDICINE
Payer: MEDICARE

## 2020-08-09 ENCOUNTER — HOSPITAL ENCOUNTER (EMERGENCY)
Age: 85
Discharge: HOME | End: 2020-08-10
Attending: EMERGENCY MEDICINE | Admitting: EMERGENCY MEDICINE
Payer: MEDICARE

## 2020-08-09 VITALS
HEART RATE: 69 BPM | BODY MASS INDEX: 26.83 KG/M2 | SYSTOLIC BLOOD PRESSURE: 123 MMHG | OXYGEN SATURATION: 98 % | WEIGHT: 177.03 LBS | RESPIRATION RATE: 16 BRPM | DIASTOLIC BLOOD PRESSURE: 83 MMHG | TEMPERATURE: 97.9 F | HEIGHT: 68 IN

## 2020-08-09 DIAGNOSIS — R79.1 SUPRATHERAPEUTIC INR: Primary | ICD-10-CM

## 2020-08-09 DIAGNOSIS — R31.9 HEMATURIA, UNSPECIFIED TYPE: ICD-10-CM

## 2020-08-09 LAB
ALBUMIN SERPL-MCNC: 3.2 G/DL (ref 3.5–5)
ALBUMIN/GLOB SERPL: 1 {RATIO} (ref 1.1–2.2)
ALP SERPL-CCNC: 102 U/L (ref 45–117)
ALT SERPL-CCNC: 23 U/L (ref 12–78)
ANION GAP SERPL CALC-SCNC: 5 MMOL/L (ref 5–15)
APPEARANCE UR: CLEAR
AST SERPL-CCNC: 23 U/L (ref 15–37)
BACTERIA URNS QL MICRO: NEGATIVE /HPF
BASOPHILS # BLD: 0 K/UL (ref 0–0.1)
BASOPHILS NFR BLD: 0 % (ref 0–1)
BILIRUB SERPL-MCNC: 1.1 MG/DL (ref 0.2–1)
BILIRUB UR QL: NEGATIVE
BUN SERPL-MCNC: 17 MG/DL (ref 6–20)
BUN/CREAT SERPL: 15 (ref 12–20)
CALCIUM SERPL-MCNC: 8.1 MG/DL (ref 8.5–10.1)
CHLORIDE SERPL-SCNC: 112 MMOL/L (ref 97–108)
CO2 SERPL-SCNC: 25 MMOL/L (ref 21–32)
COLOR UR: YELLOW
CREAT SERPL-MCNC: 1.13 MG/DL (ref 0.7–1.3)
DIFFERENTIAL METHOD BLD: ABNORMAL
EOSINOPHIL # BLD: 0.1 K/UL (ref 0–0.4)
EOSINOPHIL NFR BLD: 2 % (ref 0–7)
EPITH CASTS URNS QL MICRO: ABNORMAL /LPF
ERYTHROCYTE [DISTWIDTH] IN BLOOD BY AUTOMATED COUNT: 13.9 % (ref 11.5–14.5)
GLOBULIN SER CALC-MCNC: 3.2 G/DL (ref 2–4)
GLUCOSE SERPL-MCNC: 96 MG/DL (ref 65–100)
GLUCOSE UR STRIP.AUTO-MCNC: NEGATIVE MG/DL
HCT VFR BLD AUTO: 39.3 % (ref 36.6–50.3)
HGB BLD-MCNC: 13.2 G/DL (ref 12.1–17)
HGB UR QL STRIP: ABNORMAL
IMM GRANULOCYTES # BLD AUTO: 0 K/UL (ref 0–0.04)
IMM GRANULOCYTES NFR BLD AUTO: 0 % (ref 0–0.5)
INR PPP: 11.9 (ref 0.9–1.1)
KETONES UR QL STRIP.AUTO: 15 MG/DL
LEUKOCYTE ESTERASE UR QL STRIP.AUTO: ABNORMAL
LIPASE SERPL-CCNC: 34 U/L (ref 73–393)
LYMPHOCYTES # BLD: 0.6 K/UL (ref 0.8–3.5)
LYMPHOCYTES NFR BLD: 11 % (ref 12–49)
MCH RBC QN AUTO: 32.9 PG (ref 26–34)
MCHC RBC AUTO-ENTMCNC: 33.6 G/DL (ref 30–36.5)
MCV RBC AUTO: 98 FL (ref 80–99)
MONOCYTES # BLD: 0.4 K/UL (ref 0–1)
MONOCYTES NFR BLD: 7 % (ref 5–13)
MUCOUS THREADS URNS QL MICRO: ABNORMAL /LPF
NEUTS SEG # BLD: 4.6 K/UL (ref 1.8–8)
NEUTS SEG NFR BLD: 80 % (ref 32–75)
NITRITE UR QL STRIP.AUTO: NEGATIVE
NRBC # BLD: 0 K/UL (ref 0–0.01)
NRBC BLD-RTO: 0 PER 100 WBC
PH UR STRIP: 6 [PH] (ref 5–8)
PLATELET # BLD AUTO: 125 K/UL (ref 150–400)
PMV BLD AUTO: 10.8 FL (ref 8.9–12.9)
POTASSIUM SERPL-SCNC: 3.4 MMOL/L (ref 3.5–5.1)
PROT SERPL-MCNC: 6.4 G/DL (ref 6.4–8.2)
PROT UR STRIP-MCNC: 300 MG/DL
PROTHROMBIN TIME: 101.8 SEC (ref 9–11.1)
RBC # BLD AUTO: 4.01 M/UL (ref 4.1–5.7)
RBC #/AREA URNS HPF: >100 /HPF (ref 0–5)
RBC MORPH BLD: ABNORMAL
SODIUM SERPL-SCNC: 142 MMOL/L (ref 136–145)
SP GR UR REFRACTOMETRY: 1.02 (ref 1–1.03)
UA: UC IF INDICATED,UAUC: ABNORMAL
UROBILINOGEN UR QL STRIP.AUTO: 0.2 EU/DL (ref 0.2–1)
WBC # BLD AUTO: 5.7 K/UL (ref 4.1–11.1)
WBC URNS QL MICRO: ABNORMAL /HPF (ref 0–4)

## 2020-08-09 PROCEDURE — 81001 URINALYSIS AUTO W/SCOPE: CPT

## 2020-08-09 PROCEDURE — 36415 COLL VENOUS BLD VENIPUNCTURE: CPT

## 2020-08-09 PROCEDURE — 85025 COMPLETE CBC W/AUTO DIFF WBC: CPT

## 2020-08-09 PROCEDURE — 74011250636 HC RX REV CODE- 250/636: Performed by: EMERGENCY MEDICINE

## 2020-08-09 PROCEDURE — 85610 PROTHROMBIN TIME: CPT

## 2020-08-09 PROCEDURE — 80053 COMPREHEN METABOLIC PANEL: CPT

## 2020-08-09 PROCEDURE — 83690 ASSAY OF LIPASE: CPT

## 2020-08-09 PROCEDURE — 74176 CT ABD & PELVIS W/O CONTRAST: CPT

## 2020-08-09 PROCEDURE — 99285 EMERGENCY DEPT VISIT HI MDM: CPT

## 2020-08-09 RX ADMIN — PHYTONADIONE 5 MG: 10 INJECTION, EMULSION INTRAMUSCULAR; INTRAVENOUS; SUBCUTANEOUS at 23:53

## 2020-08-09 RX ADMIN — SODIUM CHLORIDE 500 ML: 900 INJECTION, SOLUTION INTRAVENOUS at 21:48

## 2020-08-09 NOTE — ED NOTES
Bedside and Verbal shift change report given to David Brand (oncoming nurse) by Mortimer Shelter (offgoing nurse). Report included the following information SBAR, ED Summary and MAR.

## 2020-08-10 NOTE — ED NOTES
Patient and wife given verbal and written discharge instructions. All questions were answered and the patient and wife verbalized understanding.  Patient ambulated from the department with his wife

## 2020-08-10 NOTE — DISCHARGE INSTRUCTIONS
Patient Education     High INR Test Result: Care Instructions  Your Care Instructions  You had a blood test to check how long it takes your blood to clot. This test is called a PT or prothrombin time test. The result of the test is called the INR level. A high INR level can happen when you take warfarin (Coumadin). Warfarin helps prevent blood clots. To do this, it slows the amount of time it takes for your blood to clot. This raises your INR level. The INR goal for people who take warfarin is usually from 2 to 3.5. A value higher than 3.5 increases the risk of bleeding problems. Many things can affect the way warfarin works. Some natural health products and other medicines can make warfarin work too well. That can raise the risk of bleeding. If you drink a lot of alcohol, that may raise your INR. And severe diarrhea or vomiting can also raise your INR. The best way to lower your INR will depend on several things. In some cases, the doctor may have you stop taking warfarin for a few days. You may also be given other medicines to take. You will need to be tested often to make sure your INR level is going down. You will also need to watch for signs of bleeding. The doctor has checked you carefully, but problems can develop later. If you notice any problems or new symptoms, get medical treatment right away. Follow-up care is a key part of your treatment and safety. Be sure to make and go to all appointments, and call your doctor if you are having problems. It's also a good idea to know your test results and keep a list of the medicines you take. How can you care for yourself at home? Be careful with medicines and foods  · Don't start or stop taking any medicines, vitamins, or natural remedies unless you first talk to your doctor. · Keep the amount of vitamin K in your diet about the same from day to day. Do not suddenly eat a lot more or a lot less food that is rich in vitamin K than you usually do.  Vitamin K affects how warfarin works and how your blood clots. · Avoid cranberry juice and other cranberry products. They can increase the effects of warfarin. · Limit your use of alcohol. Avoid bleeding by preventing falls  · Wear slippers or shoes with nonskid soles. · Remove throw rugs and clutter. · Rearrange furniture and electrical cords to keep them out of walking paths. · Keep stairways, porches, and outside walkways well lit. Use night-lights in hallways and bathrooms. · Be extra careful when you work with sharp tools or knives. When should you call for help? Call 911 anytime you think you may need emergency care. For example, call if:  · You have a sudden, severe headache that is different from past headaches. Call your doctor now or seek immediate medical care if:  · You have any abnormal bleeding, such as:  ¨ Nosebleeds. ¨ Vaginal bleeding that is different (heavier, more frequent, at a different time of the month) than what you are used to. ¨ Bloody or black stools, or rectal bleeding. ¨ Bloody or pink urine. Watch closely for changes in your health, and be sure to contact your doctor if you have any problems. Where can you learn more? Go to [employer]  Enter C178 in the search box to learn more about \"High INR Test Result: Care Instructions. \"   © 4514-8921 Healthwise, Incorporated. Care instructions adapted under license by [facility] (which disclaims liability or warranty for this information). This care instruction is for use with your licensed healthcare professional. If you have questions about a medical condition or this instruction, always ask your healthcare professional. [place] [address] any warranty or liability for your use of this information.   Content Version: 57.5.775631; Current as of: November 20, 2015           Patient Education        Blood in the Urine: Care Instructions  Your Care Instructions     Blood in the urine, or hematuria, may make the urine look red, brown, or pink. There may be blood every time you urinate or just from time to time. You cannot always see blood in the urine, but it will show up in a urine test.  Blood in the urine may be serious. It should always be checked by a doctor. Your doctor may recommend more tests, including an X-ray, a CT scan, or a cystoscopy (which lets a doctor look inside the urethra and bladder). Blood in the urine can be a sign of another problem. Common causes are bladder infections and kidney stones. An injury to your groin or your genital area can also cause bleeding in the urinary tract. Very hard exercise--such as running a marathon--can cause blood in the urine. Blood in the urine can also be a sign of kidney disease or cancer in the bladder or kidney. Many cases of blood in the urine are caused by a harmless condition that runs in families. This is called benign familial hematuria. It does not need any treatment. Sometimes your urine may look red or brown even though it does not contain blood. For example, not getting enough fluids (dehydration), taking certain medicines, or having a liver problem can change the color of your urine. Eating foods such as beets, rhubarb, or blackberries or foods with red food coloring can make your urine look red or pink. Follow-up care is a key part of your treatment and safety. Be sure to make and go to all appointments, and call your doctor if you are having problems. It's also a good idea to know your test results and keep a list of the medicines you take. When should you call for help? Call your doctor now or seek immediate medical care if:  · You have symptoms of a urinary infection. For example:  ? You have pus in your urine. ? You have pain in your back just below your rib cage. This is called flank pain. ? You have a fever, chills, or body aches. ? It hurts to urinate. ? You have groin or belly pain. · You have more blood in your urine.   Watch closely for changes in your health, and be sure to contact your doctor if:  · You have new urination problems. · You do not get better as expected. Where can you learn more? Go to http://claus-teodora.info/  Enter A193 in the search box to learn more about \"Blood in the Urine: Care Instructions. \"  Current as of: August 22, 2019               Content Version: 12.5  © 6014-4868 [employer]. Care instructions adapted under license by [employer] (which disclaims liability or warranty for this information). If you have questions about a medical condition or this instruction, always ask your healthcare professional. [address] any warranty or liability for your use of this information.

## 2020-08-12 NOTE — ED PROVIDER NOTES
EMERGENCY DEPARTMENT HISTORY AND PHYSICAL EXAM      Date: 8/9/2020  Patient Name: Kojo Razo    History of Presenting Illness     Chief Complaint   Patient presents with    Back Pain    Blood in Urine       History Provided By: Patient    HPI: Kojo Razo, 80 y.o. male with a past medical history significant for problems as stated below presents to the ED with cc of moderate lower back pain and blood in the urine over the last 24 hours. Patient reports pain is now resolved but still noticed blood in his urine. He has not had any dysuria, urinary frequency, vomiting, fevers, chills, chest pain, trouble breathing, or any other associated symptoms. Patient reports taking all his medications as prescribed no other associated symptoms. No other exacerbating or building factors. There are no other complaints, changes, or physical findings at this time. PCP: Sarah Gaona MD    No current facility-administered medications on file prior to encounter. Current Outpatient Medications on File Prior to Encounter   Medication Sig Dispense Refill    carvediloL (COREG) 6.25 mg tablet T1T  Tab 2    Entresto 24-26 mg tablet T1T PO EVERY 12 HOURS 180 Tab 2    PARoxetine (PAXIL) 20 mg tablet TAKE 1 TABLET EVERY DAY 30 Tab 5    levothyroxine (SYNTHROID) 150 mcg tablet TAKE 1 TABLET EVERY MORNING FOR THYROID 30 Tab 5    allopurinoL (ZYLOPRIM) 300 mg tablet T1T QD TO PREVENT GOUT 30 Tab 11    atorvastatin (LIPITOR) 40 mg tablet TAKE 1 TAB BY MOUTH NIGHTLY. 25 Tab 3    oxybutynin chloride XL (DITROPAN XL) 10 mg CR tablet Take 10 mg by mouth daily.  spironolactone (ALDACTONE) 25 mg tablet Take 25 mg by mouth daily.  omega 3-dha-epa-fish oil (FISH OIL) 100-160-1,000 mg cap Take  by mouth.  psyllium (METAMUCIL) 0.52 gram capsule Take 1 Cap by mouth daily.       warfarin (COUMADIN) 2.5 mg tablet 1 tab daily- NEEDS INR CHECK 90 Tab 3    b complex vitamins (B COMPLEX 1) tablet Take 1 Tab by mouth daily.  CYANOCOBALAMIN, VITAMIN B-12, PO Take 1 Tab by mouth daily.  aspirin delayed-release 81 mg tablet Take 81 mg by mouth daily.  therapeutic multivitamin (THERAGRAN) tablet Take 1 Tab by mouth daily.  ascorbic acid, vitamin C, (VITAMIN C) 500 mg tablet Take 1,000 mg by mouth daily.  B.infantis-B.ani-B.long-B.bifi (PROBIOTIC 4X) 10-15 mg TbEC Take 1 Tab by mouth daily.  cholecalciferol (VITAMIN D3) 1,000 unit tablet Take 1,000 Units by mouth nightly.  calcium citrate (CITRACAL PO) Take 1 Tab by mouth nightly.  COQ10, UBIQUINOL, PO Take 100-200 mg by mouth daily.  finasteride (PROSCAR) 5 mg tablet Take 5 mg by mouth daily.  tamsulosin (FLOMAX) 0.4 mg capsule Take 0.8 mg by mouth nightly.  FERROUS GLUCONATE PO Take 325 mg by mouth daily.  furosemide (LASIX) 20 mg tablet Take 1 Tab by mouth daily. (Patient taking differently: Take 20 mg by mouth daily.  Per patient take 1 pill base on swelling) 90 Tab 1       Past History     Past Medical History:  Past Medical History:   Diagnosis Date    Acute on chronic systolic heart failure, NYHA class 4 ([facility] [address] 03/01/2019    admit March 2019 7 days, EF 10% got Primacor, BIV implant    Anger     TAKES PAROXETINE TO CONTROL ANGER ISSUES    CAD (coronary artery disease) 1996    CABG 1996, mukul celis 2015 fixed inferior defect    Chronic atrial fibrillation ([facility] [address] 03/2019    RVR3/2019, had ablation of flutter- persistent    Foot drop, left     WEARS METAL BRACE    Gout     Hard of hearing     High cholesterol     Hypertension     Prostate enlargement     Thyroid disease     Unspecified sleep apnea     DOESN'T USE CPAP; \"IT MADE HIM SICK-COLDS, SINUS\", PER WIFE       Past Surgical History:  Past Surgical History:   Procedure Laterality Date    CARDIAC SURG PROCEDURE UNLIST      ANGIOPLASTY WITH CORONARY STENT    CARDIAC SURG PROCEDURE UNLIST  1996    AORTIC VALVE REPLACEMENT    HX ORTHOPAEDIC  2001    ORIF COMPOUND FRACTURE LEFT ANKLE    HX RETINAL DETACHMENT REPAIR  1980s    LEFT EYE    UT COMPRE ELECTROPHYSIOL XM W/LEFT ATRIAL PACNG/REC N/A 3/4/2019    Lt Atrial Pace & Record During Ep Study performed by Shamir Mccray MD at [place] [address], [facility] [address] CATH LAB    UT EPHYS EVAL PACG CVDFB PRGRMG/REPRGRMG PARAMETERS N/A 3/4/2019    Eval Icd Generator & Leads W Testing At Implant performed by Shamir Mccray MD at [place] [address], [facility] [address] CATH LAB    UT EPHYS EVAL PACG CVDFB PRGRMG/REPRGRMG PARAMETERS N/A 12/6/2019    Eval Icd Generator & Leads W Testing At Implant performed by Shamir Mccray MD at [place] [address], [facility] [address] CATH LAB    UT EPHYS EVAL W/ABLATION [address] N/A 3/4/2019    Ablation A-Flutter performed by Shamir Mccray MD at [place] [address], [facility] [address] CATH LAB    UT INSJ ELTRD CAR KILO SYS TM INSJ DFB/PM PLS GEN N/A 12/6/2019    Lv Lead Placement performed by Shamir Mccray MD at [place] [address], [facility] [address] CATH LAB    UT INSJ/RPLCMT PERM DFB W/TRNSVNS LDS 1/DUAL CHMBR N/A 3/4/2019    INSERT ICD BIV MULTI performed by Shamir Mccray MD at [place] [address], [facility] [address] CATH LAB    UT INTRACARDIAC ELECTROPHYSIOLOGIC 3D MAPPING N/A 3/4/2019    Ep 3d Mapping performed by Shamir Mccray MD at [place] [address], [facility] [address] CATH LAB    UT RMVL1/DUAL CHMBR IMPLTBL DFB ELTRD TRANSVNS XTRJ N/A 12/6/2019    Laser Lead Extraction performed by Shamir Mccray MD at [place] [address], [facility] [address] CATH LAB       Family History:  Family History   Problem Relation Age of Onset    Stroke Mother         ANEURYSM    Stroke Father        Social History:  Social History     Tobacco Use    Smoking status: Never Smoker    Smokeless tobacco: Never Used   Substance Use Topics    Alcohol use:  Yes     Alcohol/week: 2.5 standard drinks     Types: 3 Standard drinks or equivalent per week     Frequency: 2-4 times a month     Drinks per session: 1 or 2     Binge frequency: Never     Comment: 4 times per month , 1 drink    Drug use: No       Allergies:  No Known Allergies      Review of Systems   Review of Systems   Constitutional: Negative for chills, diaphoresis, fatigue and fever. HENT: Negative for ear pain and sore throat. Eyes: Negative for pain and redness. Respiratory: Negative for cough and shortness of breath. Cardiovascular: Negative for chest pain and leg swelling. Gastrointestinal: Negative for abdominal pain, diarrhea, nausea and vomiting. Endocrine: Negative for cold intolerance and heat intolerance. Genitourinary: Positive for hematuria. Negative for flank pain. Musculoskeletal: Positive for back pain. Negative for neck stiffness. Skin: Negative for rash and wound. Neurological: Negative for dizziness, syncope and headaches. All other systems reviewed and are negative. Physical Exam   Physical Exam  Vitals signs and nursing note reviewed. Constitutional:       Appearance: He is well-developed. HENT:      Head: Normocephalic and atraumatic. Mouth/Throat:      Pharynx: No oropharyngeal exudate. Eyes:      Conjunctiva/sclera: Conjunctivae normal.      Pupils: Pupils are equal, round, and reactive to light. Neck:      Musculoskeletal: Normal range of motion. Cardiovascular:      Rate and Rhythm: Normal rate and regular rhythm. Heart sounds: No murmur. Pulmonary:      Effort: Pulmonary effort is normal. No respiratory distress. Breath sounds: Normal breath sounds. No wheezing. Abdominal:      General: Bowel sounds are normal. There is no distension. Palpations: Abdomen is soft. Tenderness: There is no abdominal tenderness. Musculoskeletal: Normal range of motion. General: No deformity. Skin:     General: Skin is warm and dry. Findings: No rash. Neurological:      Mental Status: He is alert and oriented to person, place, and time. Coordination: Coordination normal.   Psychiatric:         Behavior: Behavior normal.         Diagnostic Study Results     Labs -   No results found for this or any previous visit (from the past 24 hour(s)).   Results for RENATO Alivia Knight (MRN [id number]) as of 8/17/2020 12:35   Ref. Range 8/9/2020 20:14 8/9/2020 20:39 8/9/2020 21:15   WBC Latest Ref Range: 4.1 - 11.1 K/uL 5.7     NRBC Latest Ref Range: 0  WBC 0.0     RBC Latest Ref Range: 4.10 - 5.70 M/uL 4.01 (L)     HGB Latest Ref Range: 12.1 - 17.0 g/dL 13.2     HCT Latest Ref Range: 36.6 - 50.3 % 39.3     MCV Latest Ref Range: 80.0 - 99.0 FL 98.0     MCH Latest Ref Range: 26.0 - 34.0 PG 32.9     MCHC Latest Ref Range: 30.0 - 36.5 g/dL 33.6     RDW Latest Ref Range: 11.5 - 14.5 % 13.9     PLATELET Latest Ref Range: 150 - 400 K/uL 125 (L)     MPV Latest Ref Range: 8.9 - 12.9 FL 10.8     NEUTROPHILS Latest Ref Range: 32 - 75 % 80 (H)     LYMPHOCYTES Latest Ref Range: 12 - 49 % 11 (L)     MONOCYTES Latest Ref Range: 5 - 13 % 7     EOSINOPHILS Latest Ref Range: 0 - 7 % 2     BASOPHILS Latest Ref Range: 0 - 1 % 0     IMMATURE GRANULOCYTES Latest Ref Range: 0.0 - 0.5 % 0     DF Latest Units:   SMEAR SCANNED     ABSOLUTE NRBC Latest Ref Range: 0.00 - 0.01 K/uL 0.00     ABS. NEUTROPHILS Latest Ref Range: 1.8 - 8.0 K/UL 4.6     ABS. IMM. GRANS. Latest Ref Range: 0.00 - 0.04 K/UL 0.0     ABS. LYMPHOCYTES Latest Ref Range: 0.8 - 3.5 K/UL 0.6 (L)     ABS. MONOCYTES Latest Ref Range: 0.0 - 1.0 K/UL 0.4     ABS. EOSINOPHILS Latest Ref Range: 0.0 - 0.4 K/UL 0.1     ABS.  BASOPHILS Latest Ref Range: 0.0 - 0.1 K/UL 0.0     RBC COMMENTS Latest Units:   NORMOCYTIC, NORMOCHROMIC     Color Latest Units:     YELLOW   Appearance Latest Ref Range: CLEAR     CLEAR   Specific gravity Latest Ref Range: 1.003 - 1.030     1.017   pH (UA) Latest Ref Range: 5.0 - 8.0     6.0   Protein Latest Ref Range: NEG mg/dL   300 (A)   Glucose Latest Ref Range: NEG mg/dL   Negative   Ketone Latest Ref Range: NEG mg/dL   15 (A)   Blood Latest Ref Range: NEG     LARGE (A)   Bilirubin Latest Ref Range: NEG     Negative   Urobilinogen Latest Ref Range: 0.2 - 1.0 EU/dL   0.2   Nitrites Latest Ref Range: NEG     Negative Leukocyte Esterase Latest Ref Range: NEG     TRACE (A)   Epithelial cells Latest Ref Range: FEW /lpf   FEW   Mucus Latest Ref Range: NEG /lpf   TRACE (A)   WBC Latest Ref Range: 0 - 4 /hpf   0-4   RBC Latest Ref Range: 0 - 5 /hpf   >100 (H)   Bacteria Latest Ref Range: NEG /hpf   Negative   INR Latest Ref Range: 0.9 - 1.1     11.9 (HH)   Prothrombin time Latest Ref Range: 9.0 - 11.1 sec   101.8 (H)   Sodium Latest Ref Range: 136 - 145 mmol/L   142   Potassium Latest Ref Range: 3.5 - 5.1 mmol/L   3.4 (L)   Chloride Latest Ref Range: 97 - 108 mmol/L   112 (H)   CO2 Latest Ref Range: 21 - 32 mmol/L   25   Anion gap Latest Ref Range: 5 - 15 mmol/L   5   Glucose Latest Ref Range: 65 - 100 mg/dL   96   BUN Latest Ref Range: 6 - 20 MG/DL   17   Creatinine Latest Ref Range: 0.70 - 1.30 MG/DL   1.13   BUN/Creatinine ratio Latest Ref Range: 12 - 20     15   Calcium Latest Ref Range: 8.5 - 10.1 MG/DL   8.1 (L)   GFR est non-AA Latest Ref Range: >60 ml/min/1.73m2   >60   GFR est AA Latest Ref Range: >60 ml/min/1.73m2   >60   Bilirubin, total Latest Ref Range: 0.2 - 1.0 MG/DL   1.1 (H)   Protein, total Latest Ref Range: 6.4 - 8.2 g/dL   6.4   Albumin Latest Ref Range: 3.5 - 5.0 g/dL   3.2 (L)   Globulin Latest Ref Range: 2.0 - 4.0 g/dL   3.2   A-G Ratio Latest Ref Range: 1.1 - 2.2     1.0 (L)   ALT Latest Ref Range: 12 - 78 U/L   23   AST Latest Ref Range: 15 - 37 U/L   23   Alk. phosphatase Latest Ref Range: 45 - 117 U/L   102   Lipase Latest Ref Range: 73 - 393 U/L   34 (L)   CT ABD PELV WO CONT Unknown  Rpt      Radiologic Studies -   CT ABD PELV WO CONT   Final Result   IMPRESSION:      1. Bilateral renal calculi. No hydroureter or ureteral calculus demonstrated. 2. Small bibasilar subpleural nodules. Status post aortic valve replacement. Ascending aortic ectasia again noted. Mild-moderate cardiac contour enlargement   3. Bilateral gynecomastia. 4. 13 mm right adrenal adenoma.    5. 10 mm third portion duodenal lipoma. 6. Right inguinal hernia containing fat. CT Results  (Last 48 hours)    None        CXR Results  (Last 48 hours)    None          Medical Decision Making   I am the first provider for this patient. I reviewed the vital signs, available nursing notes, past medical history, past surgical history, family history and social history. Vital Signs-Reviewed the patient's vital signs. No data found. Records Reviewed: Nursing records and medical records reviewed    MDM:  The patient presents with acute low back pain. Stable vitals and benign exam. DDx: strain, sprain, sciatica, MSK pain, aortic dissection, AAA, kidney stone, pyelonephritis, cauda equina syndrome, epidural abscess/hematoma, metastatic malignancy, acute MI. Provider Notes (Medical Decision Making):   Patient is a 27-year-old male presenting with hematuria that is microscopic in nature. Patient with significantly elevated INR with absent hemodynamically significant bleeding. At this time I will give 1 dose of vitamin K and have patient hold his Coumadin for the next 48 hours and follow-up with his primary care doctor to have his INR rechecked. ED Course:   Initial assessment performed. The patients presenting problems have been discussed, and they are in agreement with the care plan formulated and outlined with them. I have encouraged them to ask questions as they arise throughout their visit. Critical Care:  None      Disposition:  12:37 PM  Alejandro Casey's  results have been reviewed with him. He has been counseled regarding his diagnosis. He verbally conveys understanding and agreement of the signs, symptoms, diagnosis, treatment and prognosis and additionally agrees to follow up as recommended with Dr. Joycelyn Pena MD in 24 - 48 hours. He also agrees with the care-plan and conveys that all of his questions have been answered.   I have also put together some discharge instructions for him that include: 1) educational information regarding their diagnosis, 2) how to care for their diagnosis at home, as well a 3) list of reasons why they would want to return to the ED prior to their follow-up appointment, should their condition change. DISCHARGE PLAN:  1. Discharge Medication List as of 8/9/2020 10:10 PM        2. Follow-up Information     Follow up With Specialties Details Why Contact Info    Aneudy Woodward MD Internal Medicine In 2 days For a follow-up evaluation. YOU MUST HAVE YOUR INR RE-CHECKED IN THE NEXT 48 HOURS. PLEASE RETURN WITH ANY SEVERE BLEEDING. [address]      Liban Krishna MD Nephrology In 1 week For a follow-up evaluation. Shayy Malachi  [facility] [address]  [phone]          3. Return to ED if worse     Diagnosis     Clinical Impression:   1. Supratherapeutic INR    2. Hematuria, unspecified type        Attestations:    Abby Sky MD    Please note that this dictation was completed with [employer], the computer voice recognition software. Quite often unanticipated grammatical, syntax, homophones, and other interpretive errors are inadvertently transcribed by the computer software. Please disregard these errors. Please excuse any errors that have escaped final proofreading. Thank you.

## 2020-08-18 ENCOUNTER — OFFICE VISIT (OUTPATIENT)
Dept: INTERNAL MEDICINE CLINIC | Age: 85
End: 2020-08-18
Payer: MEDICARE

## 2020-08-18 VITALS
HEART RATE: 73 BPM | SYSTOLIC BLOOD PRESSURE: 111 MMHG | HEIGHT: 68 IN | TEMPERATURE: 97.1 F | WEIGHT: 180 LBS | BODY MASS INDEX: 27.28 KG/M2 | DIASTOLIC BLOOD PRESSURE: 73 MMHG

## 2020-08-18 DIAGNOSIS — I48.3 TYPICAL ATRIAL FLUTTER (HCC): ICD-10-CM

## 2020-08-18 DIAGNOSIS — I48.20 CHRONIC ATRIAL FIBRILLATION (HCC): Primary | ICD-10-CM

## 2020-08-18 DIAGNOSIS — Z95.2 H/O MECHANICAL AORTIC VALVE REPLACEMENT: ICD-10-CM

## 2020-08-18 LAB
INR BLD: 1.6 (ref 1–1.5)
PT POC: 19.5 SECONDS (ref 9.1–12)
VALID INTERNAL CONTROL?: YES

## 2020-08-18 PROCEDURE — 99215 OFFICE O/P EST HI 40 MIN: CPT | Performed by: PHARMACIST

## 2020-08-18 PROCEDURE — G8536 NO DOC ELDER MAL SCRN: HCPCS | Performed by: PHARMACIST

## 2020-08-18 PROCEDURE — 36416 COLLJ CAPILLARY BLOOD SPEC: CPT | Performed by: PHARMACIST

## 2020-08-18 PROCEDURE — G8427 DOCREV CUR MEDS BY ELIG CLIN: HCPCS | Performed by: PHARMACIST

## 2020-08-18 PROCEDURE — G8754 DIAS BP LESS 90: HCPCS | Performed by: PHARMACIST

## 2020-08-18 PROCEDURE — 1101F PT FALLS ASSESS-DOCD LE1/YR: CPT | Performed by: PHARMACIST

## 2020-08-18 PROCEDURE — G8752 SYS BP LESS 140: HCPCS | Performed by: PHARMACIST

## 2020-08-18 PROCEDURE — G8417 CALC BMI ABV UP PARAM F/U: HCPCS | Performed by: PHARMACIST

## 2020-08-18 PROCEDURE — 85610 PROTHROMBIN TIME: CPT | Performed by: PHARMACIST

## 2020-08-18 PROCEDURE — G9717 DOC PT DX DEP/BP F/U NT REQ: HCPCS | Performed by: PHARMACIST

## 2020-08-18 PROCEDURE — 99213 OFFICE O/P EST LOW 20 MIN: CPT | Performed by: PHARMACIST

## 2020-08-18 RX ORDER — MULTIVITAMIN
1 TABLET ORAL DAILY
COMMUNITY
End: 2020-11-16 | Stop reason: ALTCHOICE

## 2020-08-18 NOTE — PATIENT INSTRUCTIONS
Today your INR was 1.6 . Your goal INR is  2.0-3.0 .    - Below 2.0, your blood is too thick.    - Above 3.0, your blood is too thin. You have a  1 mg, and 2.5 mg, 5 mg tablet of Coumadin (warfarin). Take Coumadin (warfarin) as follows: Take warfarin 5 mg tonight (08/18/20). Then tomorrow, resume 2.5 mg daily until the next INR check. Keep greens intake consistent. Come back in 1 week(s) for your next finger stick/INR blood test.        Avoid any over the counter items containing aspirin or ibuprofen, and avoid great swings in general diet. Avoid alcohol consumption. Please notify the INR nurse if you are started on any new medication including over the counter or herbal supplements. Also, please notify your INR nurse if any of your other prescription or over the counter medications have been discontinued. Call [facility] at [phone] if you have any signs of abnormal bleeding/blood clot.  ------------------------------------------------------------------------------------------------------------------  Taking Warfarin Safely: Care Instructions    Your Care Instructions  Warfarin is a medicine that you take to prevent blood clots. It is often called a blood thinner. Doctors give warfarin (such as Coumadin) to reduce the risk of blood clots. You may be at risk for blood clots if you have atrial fibrillation or deep vein thrombosis. Some other health problems may also put you at risk. Warfarin slows the amount of time it takes for your blood to clot. It can cause bleeding problems. Even if you've been taking warfarin for a while, it's important to know how to take it safely. Foods and other medicines can affect the way warfarin works. Some can make warfarin work too well. This can cause bleeding problems. And some can make it work poorly, so that it does not prevent blood clots very well.   You will need regular blood tests to check how long it takes for your blood to form a clot. This test is called a PT or prothrombin time test. The result of the test is called an INR level. Depending on the test results, your doctor or anticoagulation clinic may adjust your dose of warfarin. Follow-up care is a key part of your treatment and safety. Be sure to make and go to all appointments, and call your doctor if you are having problems. It's also a good idea to know your test results and keep a list of the medicines you take. How can you care for yourself at home? Take warfarin safely  · Take your warfarin at the same time each day. · If you miss a dose of warfarin, don't take an extra dose to make up for it. Your doctor can tell you exactly what to do so you don't take too much or too little. · Wear medical alert jewelry that lets others know that you take warfarin. You can buy this at most drugstores. · Don't take warfarin if you are pregnant or planning to get pregnant. Talk to your doctor about how you can prevent getting pregnant while you are taking it. · Don't change your dose or stop taking warfarin unless your doctor tells you to. Effects of medicines and food on warfarin  · Don't start or stop taking any medicines, vitamins, or natural remedies unless you first talk to your doctor. Many medicines can affect how warfarin works. These include aspirin and other pain relievers, over-the-counter medicines, multivitamins, dietary supplements, and herbal products. · Tell all of your doctors and pharmacists that you take warfarin. Some prescription medicines can affect how warfarin works. · Keep the amount of vitamin K in your diet about the same from day to day. Do not suddenly eat a lot more or a lot less food that is rich in vitamin K than you usually do. Vitamin K affects how warfarin works and how your blood clots. Talk with your doctor before making big changes in your diet.  Vitamin K is in many foods, such as:  ¨ Leafy greens, such as kale, cabbage, spinach, Swiss chard, and lettuce. ¨ Canola and soybean oils. ¨ Green vegetables, such as asparagus, broccoli, and [place] sprouts. ¨ Vegetable drinks, green tea leaves, and some dietary supplement drinks. · Avoid cranberry juice and other cranberry products. They can increase the effects of warfarin. · Limit your use of alcohol. Avoid bleeding by preventing falls and injuries  · Wear slippers or shoes with nonskid soles. · Remove throw rugs and clutter. · Rearrange furniture and electrical cords to keep them out of walking paths. · Keep stairways, porches, and outside walkways well lit. Use night-lights in hallways and bathrooms. · Be extra careful when you work with sharp tools or knives. When should you call for help? Call 911 anytime you think you may need emergency care. For example, call if:  · You have a sudden, severe headache that is different from past headaches. Call your doctor now or seek immediate medical care if:  · You have any abnormal bleeding, such as:  ¨ Nosebleeds. ¨ Vaginal bleeding that is different (heavier, more frequent, at a different time of the month) than what you are used to. ¨ Bloody or black stools, or rectal bleeding. ¨ Bloody or pink urine. Watch closely for changes in your health, and be sure to contact your doctor if you have any problems. Where can you learn more? Go to http://claus-teodora.info/. Enter O792 in the search box to learn more about \"Taking Warfarin Safely: Care Instructions. \"  Current as of: January 27, 2016  Content Version: 11.1  © 1322-5056 [employer]. Care instructions adapted under license by "[employer] (which disclaims liability or warranty for this information). If you have questions about a medical condition or this instruction, always ask your healthcare professional. [address] any warranty or liability for your use of this information.

## 2020-08-18 NOTE — PROGRESS NOTES
Pharmacy Progress Note  - Anticoagulation Management    S/O: Mr. Timmy Avelar  is a 80 y.o. male, referred by Dr. Harvel Boxer, MD, was seen today for anticoagulation management for the diagnosis of Atrial Fibrillation and Aortic Valve Replacement  ([id number] [facility]), s/p pacemaker for the first time. Prior to this visit, patient's anticoagulation was managed by Dr. Jose F Strong [facility] PCP). Pt is very hard of hearing. Relies on his wife to supplement most information. HPI:    ED visit on 8/10/20 for lower back pain and hematuria. INR was 11.9 that visit. Vitamin K was given and recommended to hold off on warfarin. Wife reports patient may have \"misheard\" his last warfarin direction and took \"more than he needed. \"     CT abdomen show bilateral kidney stones. Was referred to Dr. Tawana Goldman (nephro) for f/u. FHx of venous thromboembolism: none    SHx: lives with wife   Occupation: retired  Recreational drug use: No    · Warfarin start date: ~1996  · INR Goal:  2.0-3.0    · Current warfarin regimen:  2.5 mg daily                  · Warfarin tablet strength:   1 mg, 2.5 mg, 5 mg  -- wife states she does not want to split tablets in half ; generally takes 2.5 mg tab. Able to recognize tablet strength  · Duration of therapy: indefinite  · Takes warfarin in the evening    Usual vitamin K intake: 1-2 per week     Today's pertinent positives includes:    Denies CP/SOB/LE & UE pain/HA  No bleeding/bruises. Hematuria now resolved. Results for orders placed or performed in visit on 08/18/20   AMB POC PT/INR   Result Value Ref Range    VALID INTERNAL CONTROL POC Yes     Prothrombin time (POC) 19.5 (A) 9.1 - 12 seconds    INR POC 1.6 (A) 1 - 1.5     · Adherence:   · Able to recall regimen? YES  · Miss/extra dose? NO  · Need refill?  NO    Upcoming procedure(s):  NO    No Known Allergies     Past Medical History:   Diagnosis Date    Acute on chronic systolic heart failure, NYHA class 4 ([facility] [address] 03/01/2019 admit March 2019 7 days, EF 10% got Primacor, BIV implant    Anger     TAKES PAROXETINE TO CONTROL ANGER ISSUES    CAD (coronary artery disease) 1996    CABG 1996, fu nuke 2015 fixed inferior defect    Chronic atrial fibrillation ([address] 03/2019    RVR3/2019, had ablation of flutter- persistent    Foot drop, left     WEARS METAL BRACE    Gout     Hard of hearing     High cholesterol     Hypertension     Prostate enlargement     Thyroid disease     Unspecified sleep apnea     DOESN'T USE CPAP; \"IT MADE HIM SICK-COLDS, SINUS\", PER WIFE     No Known Allergies  Current Outpatient Medications   Medication Sig    carvediloL (COREG) 6.25 mg tablet T1T BID    Entresto 24-26 mg tablet T1T PO EVERY 12 HOURS    PARoxetine (PAXIL) 20 mg tablet TAKE 1 TABLET EVERY DAY    levothyroxine (SYNTHROID) 150 mcg tablet TAKE 1 TABLET EVERY MORNING FOR THYROID    allopurinoL (ZYLOPRIM) 300 mg tablet T1T QD TO PREVENT GOUT    atorvastatin (LIPITOR) 40 mg tablet TAKE 1 TAB BY MOUTH NIGHTLY.  oxybutynin chloride XL (DITROPAN XL) 10 mg CR tablet Take 10 mg by mouth daily.  spironolactone (ALDACTONE) 25 mg tablet Take 25 mg by mouth daily.  omega 3-dha-epa-fish oil (FISH OIL) 100-160-1,000 mg cap Take  by mouth.  psyllium (METAMUCIL) 0.52 gram capsule Take 1 Cap by mouth daily.  warfarin (COUMADIN) 2.5 mg tablet 1 tab daily- NEEDS INR CHECK    b complex vitamins (B COMPLEX 1) tablet Take 1 Tab by mouth daily.  CYANOCOBALAMIN, VITAMIN B-12, PO Take 1 Tab by mouth daily.  aspirin delayed-release 81 mg tablet Take 81 mg by mouth daily.  therapeutic multivitamin (THERAGRAN) tablet Take 1 Tab by mouth daily.  ascorbic acid, vitamin C, (VITAMIN C) 500 mg tablet Take 1,000 mg by mouth daily.  B.infantis-B.ani-B.long-B.bifi (PROBIOTIC 4X) 10-15 mg TbEC Take 1 Tab by mouth daily.  cholecalciferol (VITAMIN D3) 1,000 unit tablet Take 1,000 Units by mouth nightly.     calcium citrate (CITRACAL PO) Take 1 Tab by mouth nightly.  COQ10, UBIQUINOL, PO Take 100-200 mg by mouth daily.  finasteride (PROSCAR) 5 mg tablet Take 5 mg by mouth daily.  tamsulosin (FLOMAX) 0.4 mg capsule Take 0.8 mg by mouth nightly.  FERROUS GLUCONATE PO Take 325 mg by mouth daily.  furosemide (LASIX) 20 mg tablet Take 1 Tab by mouth daily. (Patient taking differently: Take 20 mg by mouth daily. Per patient take 1 pill base on swelling)     No current facility-administered medications for this visit. Wt Readings from Last 3 Encounters:   08/18/20 180 lb (81.6 kg)   08/09/20 177 lb 0.5 oz (80.3 kg)   03/10/20 185 lb (83.9 kg)     BP Readings from Last 3 Encounters:   08/18/20 111/73   08/09/20 123/83   03/10/20 130/75     Pulse Readings from Last 3 Encounters:   08/18/20 73   08/09/20 69   03/10/20 82     Lab Results   Component Value Date/Time    WBC 5.7 08/09/2020 08:14 PM    HGB 13.2 08/09/2020 08:14 PM    HCT 39.3 08/09/2020 08:14 PM    PLATELET 905 (L) 36/60/3094 08:14 PM    MCV 98.0 08/09/2020 08:14 PM     Lab Results   Component Value Date/Time    Sodium 142 08/09/2020 09:15 PM    Potassium 3.4 (L) 08/09/2020 09:15 PM    Chloride 112 (H) 08/09/2020 09:15 PM    CO2 25 08/09/2020 09:15 PM    Anion gap 5 08/09/2020 09:15 PM    Glucose 96 08/09/2020 09:15 PM    BUN 17 08/09/2020 09:15 PM    Creatinine 1.13 08/09/2020 09:15 PM    BUN/Creatinine ratio 15 08/09/2020 09:15 PM    GFR est AA >60 08/09/2020 09:15 PM    GFR est non-AA >60 08/09/2020 09:15 PM    Calcium 8.1 (L) 08/09/2020 09:15 PM    Bilirubin, total 1.1 (H) 08/09/2020 09:15 PM    Alk. phosphatase 102 08/09/2020 09:15 PM    Protein, total 6.4 08/09/2020 09:15 PM    Albumin 3.2 (L) 08/09/2020 09:15 PM    Globulin 3.2 08/09/2020 09:15 PM    A-G Ratio 1.0 (L) 08/09/2020 09:15 PM    ALT (SGPT) 23 08/09/2020 09:15 PM     Estimated Creatinine Clearance: 46.2 mL/min (by C-G formula based on SCr of 1.13 mg/dL).     INR History:   (normal INR range 0.8-1.2)     Date   INR PT   Dose/Comments  08/18/20 1.6  19.5 ED visit; (+) vitamin K; 2.5 mg daily  08/09/20 11.9  101.8   07/28/20 2.5  25.8     A/P:       Anticoagulation:  Considering Mr. Casey's past history, todays findings, and per Anticoagulation Collaborative Practice Agreement/Protocol:    1. POC INR (1.6) is subtherapeutic for INR goal today secondary to missed doses and vitamin K last week. 2. Take 5 mg tonight. Continue warfarin 2.5 mg daily. 3. Discussed INR goal and risk of elevated and subtherapeutic results. Recommend Pt keep with the same tablet strength. Need to discuss with his doctors before changing dose/tab strength. Patient was instructed to schedule an appointment in 1 week(s) prior to leaving the clinic. Medication reconciliation was completed during the visit. Medications Discontinued During This Encounter   Medication Reason    calcium citrate (CITRACAL PO) Alternate Therapy    cholecalciferol (VITAMIN D3) 1,000 unit tablet Alternate Therapy     Updated med table:  Current Outpatient Medications   Medication Sig    calcium-cholecalciferol, D3, (CALTRATE 600+D) tablet Take 1 Tab by mouth daily.  carvediloL (COREG) 6.25 mg tablet T1T BID (Patient taking differently: Take 6.25 mg by mouth two (2) times a day.)    Entresto 24-26 mg tablet T1T PO EVERY 12 HOURS (Patient taking differently: Take 1 Tab by mouth two (2) times a day.)    allopurinoL (ZYLOPRIM) 300 mg tablet T1T QD TO PREVENT GOUT (Patient taking differently: Take 300 mg by mouth daily. For gout)    atorvastatin (LIPITOR) 40 mg tablet TAKE 1 TAB BY MOUTH NIGHTLY.  oxybutynin chloride XL (DITROPAN XL) 10 mg CR tablet Take 10 mg by mouth daily.  spironolactone (ALDACTONE) 25 mg tablet Take 25 mg by mouth daily.  omega 3-dha-epa-fish oil (FISH OIL) 100-160-1,000 mg cap Take 1 Cap by mouth daily.  psyllium (METAMUCIL) 0.52 gram capsule Take 1 Cap by mouth daily.     b complex vitamins (B COMPLEX 1) tablet Take 1 Tab by mouth daily.  CYANOCOBALAMIN, VITAMIN B-12, PO Take 1 Tab by mouth daily. Sciatica    aspirin delayed-release 81 mg tablet Take 81 mg by mouth daily.  therapeutic multivitamin (THERAGRAN) tablet Take 1 Tab by mouth daily.  ascorbic acid, vitamin C, (VITAMIN C) 500 mg tablet Take 1,000 mg by mouth daily.  B.infantis-B.ani-B.long-B.bifi (PROBIOTIC 4X) 10-15 mg TbEC Take 1 Tab by mouth daily.  COQ10, UBIQUINOL, PO Take 1 Tab by mouth daily.  finasteride (PROSCAR) 5 mg tablet Take 5 mg by mouth daily.  tamsulosin (FLOMAX) 0.4 mg capsule Take 0.4 mg by mouth nightly.  FERROUS GLUCONATE PO Take 325 mg by mouth daily.  furosemide (LASIX) 20 mg tablet Take 1 Tab by mouth daily. (Patient taking differently: Take 20 mg by mouth daily. Per patient take 1 pill base on swelling)    PARoxetine (PAXIL) 20 mg tablet TAKE 1 TABLET EVERY DAY (Patient taking differently: Take 20 mg by mouth daily.)    levothyroxine (SYNTHROID) 150 mcg tablet TAKE 1 TABLET EVERY MORNING FOR THYROID (Patient taking differently: Take 150 mcg by mouth Daily (before breakfast). )    warfarin (COUMADIN) 2.5 mg tablet 1 tab daily- NEEDS INR CHECK (Patient taking differently: Take 2.5 mg by mouth daily. Note, he also has 1 mg and 5 mg tablet strengths)     No current facility-administered medications for this visit. A full discussion of the nature of anticoagulants has been carried out. A full discussion of the need for frequent and regular monitoring, precise dosage adjustment and compliance was stressed. Side effects of potential bleeding were discussed and Mr. Génesis Murguia was instructed to call [phone] if there are any signs of abnormal bleeding. Mr. Génesis Murguia was instructed to avoid any OTC items containing aspirin or ibuprofen and prior to starting any new OTC products to consult with his physician or pharmacist to ensure no drug interactions are present.   Mr. Génesis Murguia was instructed to avoid any major changes in his general diet and to avoid alcohol consumption. Mr. Lester Christian was provided information in the AVS that includes topics on understanding coumadin therapy, drug interaction considerations, vitamin K and coumadin use, interactions with foods and supplements containing vitamin K, and the use of herbal products. Mr. Lester Christian verbalized his understanding of all instructions and will call the office with any questions, concerns, or signs of abnormal bleeding or blood clot. Notifications of recommendations will be sent to Dr. Rashawn Medrano MD for review.     Thank you for the consult,  Blanca Escobedo, PharmD, BCACP, CDE               CLINICAL PHARMACY CONSULT: MED RECONCILIATION/REVIEW ADDENDUM    For Pharmacy Admin Tracking Only    PHSO: [id number] Patient?: Yes  Total # of Interventions Recommended: Count: 7  - Increased Dose #: 1  - Discontinued Medication #: 2 Discontinue Reason(s): No Longer Used  - Updated Order #: 3 Updated Order Reason(s): OTC and Other  - Maintenance Safety Lab Monitoring #: 1    Time Spent (min): 45

## 2020-08-26 ENCOUNTER — ANTI-COAG VISIT (OUTPATIENT)
Dept: INTERNAL MEDICINE CLINIC | Age: 85
End: 2020-08-26
Payer: MEDICARE

## 2020-08-26 VITALS
HEART RATE: 88 BPM | BODY MASS INDEX: 27.28 KG/M2 | DIASTOLIC BLOOD PRESSURE: 67 MMHG | WEIGHT: 180 LBS | HEIGHT: 68 IN | OXYGEN SATURATION: 100 % | TEMPERATURE: 97 F | SYSTOLIC BLOOD PRESSURE: 111 MMHG

## 2020-08-26 DIAGNOSIS — I48.3 TYPICAL ATRIAL FLUTTER (HCC): ICD-10-CM

## 2020-08-26 DIAGNOSIS — Z95.2 H/O MECHANICAL AORTIC VALVE REPLACEMENT: ICD-10-CM

## 2020-08-26 DIAGNOSIS — I48.91 ATRIAL FIBRILLATION, UNSPECIFIED TYPE (HCC): ICD-10-CM

## 2020-08-26 LAB
INR BLD: 2.4 (ref 1–1.5)
PT POC: 28.7 SECONDS (ref 9.1–12)
VALID INTERNAL CONTROL?: YES

## 2020-08-26 PROCEDURE — G0463 HOSPITAL OUTPT CLINIC VISIT: HCPCS | Performed by: PHARMACIST

## 2020-08-26 PROCEDURE — 36416 COLLJ CAPILLARY BLOOD SPEC: CPT | Performed by: PHARMACIST

## 2020-08-26 PROCEDURE — 85610 PROTHROMBIN TIME: CPT | Performed by: PHARMACIST

## 2020-08-26 PROCEDURE — 99211 OFF/OP EST MAY X REQ PHY/QHP: CPT | Performed by: PHARMACIST

## 2020-08-26 NOTE — PATIENT INSTRUCTIONS
Today your INR was 2.4. Your goal INR is  2.0-3.0 . You have a 1 mg, and 2.5 mg, 5 mg tablets of Coumadin (warfarin). Take Coumadin (warfarin) as follows:    Continue warfarin 2.5 mg daily in the evening. Come back in 2  week(s) for your next finger stick/INR blood test.        Avoid any over the counter items containing aspirin or ibuprofen, and avoid great swings in general diet. Avoid alcohol consumption. Please notify the INR nurse if you are started on any new medication including over the counter or herbal supplements. Also, please notify your INR nurse if any of your other prescription or over the counter medications have been discontinued. Call [facility] at [phone] if you have any signs of abnormal bleeding/blood clot.  ------------------------------------------------------------------------------------------------------------------  Taking Warfarin Safely: Care Instructions    Your Care Instructions  Warfarin is a medicine that you take to prevent blood clots. It is often called a blood thinner. Doctors give warfarin (such as Coumadin) to reduce the risk of blood clots. You may be at risk for blood clots if you have atrial fibrillation or deep vein thrombosis. Some other health problems may also put you at risk. Warfarin slows the amount of time it takes for your blood to clot. It can cause bleeding problems. Even if you've been taking warfarin for a while, it's important to know how to take it safely. Foods and other medicines can affect the way warfarin works. Some can make warfarin work too well. This can cause bleeding problems. And some can make it work poorly, so that it does not prevent blood clots very well. You will need regular blood tests to check how long it takes for your blood to form a clot. This test is called a PT or prothrombin time test. The result of the test is called an INR level.  Depending on the test results, your doctor or anticoagulation clinic may adjust your dose of warfarin. Follow-up care is a key part of your treatment and safety. Be sure to make and go to all appointments, and call your doctor if you are having problems. It's also a good idea to know your test results and keep a list of the medicines you take. How can you care for yourself at home? Take warfarin safely  · Take your warfarin at the same time each day. · If you miss a dose of warfarin, don't take an extra dose to make up for it. Your doctor can tell you exactly what to do so you don't take too much or too little. · Wear medical alert jewelry that lets others know that you take warfarin. You can buy this at most drugstores. · Don't take warfarin if you are pregnant or planning to get pregnant. Talk to your doctor about how you can prevent getting pregnant while you are taking it. · Don't change your dose or stop taking warfarin unless your doctor tells you to. Effects of medicines and food on warfarin  · Don't start or stop taking any medicines, vitamins, or natural remedies unless you first talk to your doctor. Many medicines can affect how warfarin works. These include aspirin and other pain relievers, over-the-counter medicines, multivitamins, dietary supplements, and herbal products. · Tell all of your doctors and pharmacists that you take warfarin. Some prescription medicines can affect how warfarin works. · Keep the amount of vitamin K in your diet about the same from day to day. Do not suddenly eat a lot more or a lot less food that is rich in vitamin K than you usually do. Vitamin K affects how warfarin works and how your blood clots. Talk with your doctor before making big changes in your diet. Vitamin K is in many foods, such as:  ¨ Leafy greens, such as kale, cabbage, spinach, Swiss chard, and lettuce. ¨ Canola and soybean oils. ¨ Green vegetables, such as asparagus, broccoli, and [place] sprouts.   ¨ Vegetable drinks, green tea leaves, and some dietary supplement drinks. · Avoid cranberry juice and other cranberry products. They can increase the effects of warfarin. · Limit your use of alcohol. Avoid bleeding by preventing falls and injuries  · Wear slippers or shoes with nonskid soles. · Remove throw rugs and clutter. · Rearrange furniture and electrical cords to keep them out of walking paths. · Keep stairways, porches, and outside walkways well lit. Use night-lights in hallways and bathrooms. · Be extra careful when you work with sharp tools or knives. When should you call for help? Call 911 anytime you think you may need emergency care. For example, call if:  · You have a sudden, severe headache that is different from past headaches. Call your doctor now or seek immediate medical care if:  · You have any abnormal bleeding, such as:  ¨ Nosebleeds. ¨ Vaginal bleeding that is different (heavier, more frequent, at a different time of the month) than what you are used to. ¨ Bloody or black stools, or rectal bleeding. ¨ Bloody or pink urine. Watch closely for changes in your health, and be sure to contact your doctor if you have any problems. Where can you learn more? Go to http://www.gray.com/. Enter I977 in the search box to learn more about \"Taking Warfarin Safely: Care Instructions. \"  Current as of: January 27, 2016  Content Version: 11.1  © 1301-9322 [employer]. Care instructions adapted under license by [employer] (which disclaims liability or warranty for this information). If you have questions about a medical condition or this instruction, always ask your healthcare professional. [place] [address] any warranty or liability for your use of this information.

## 2020-08-26 NOTE — PROGRESS NOTES
Pharmacy Progress Note - Anticoagulation Management    S/O: Mr. Rustam Boucher  is a 80 y.o. male, referred by Dr. River Mckeon MD, was seen today for anticoagulation management for the diagnosis of Atrial Fibrillation and Aortic Valve Replacement  ([id number] [facility]), s/p pacemaker. Pt is very hard of hearing. Relies on his wife to supplement most information. · Warfarin start date: ~1996  · INR Goal:  2.0-3.0    · Current warfarin regimen:  5 mg x 1, then 2.5 mg daily                  · Warfarin tablet strength:   1 mg, 2.5 mg, 5 mg  -- wife states she does not want to split tablets in half ; generally takes 2.5 mg tab. Able to recognize tablet strength  · Duration of therapy: indefinite  · Takes warfarin in the evening    Today's pertinent positives includes:  No significant changes since last visit    Results for orders placed or performed in visit on 08/26/20   AMB POC PT/INR   Result Value Ref Range    VALID INTERNAL CONTROL POC Yes     Prothrombin time (POC) 28.7 (A) 9.1 - 12 seconds    INR POC 2.4 (A) 1 - 1.5         · Adherence:   · Able to recall regimen? YES  · Miss/extra dose? NO  · Need refill?  NO    Upcoming procedure(s):  NO      Past Medical History:   Diagnosis Date    Acute on chronic systolic heart failure, NYHA class 4 ([facility] [address] 03/01/2019    admit March 2019 7 days, EF 10% got Primacor, BIV implant    Anger     TAKES PAROXETINE TO CONTROL ANGER ISSUES    CAD (coronary artery disease) 1996    CABG 1996, mukul celis 2015 fixed inferior defect    Chronic atrial fibrillation ([facility] [address] 03/2019    RVR3/2019, had ablation of flutter- persistent    Foot drop, left     WEARS METAL BRACE    Gout     Hard of hearing     High cholesterol     Hypertension     Prostate enlargement     Thyroid disease     Unspecified sleep apnea     DOESN'T USE CPAP; \"IT MADE HIM SICK-COLDS, SINUS\", PER WIFE     No Known Allergies  Current Outpatient Medications   Medication Sig    calcium-cholecalciferol, D3, (CALTRATE 600+D) tablet Take 1 Tab by mouth daily.  carvediloL (COREG) 6.25 mg tablet T1T BID (Patient taking differently: Take 6.25 mg by mouth two (2) times a day.)    Entresto 24-26 mg tablet T1T PO EVERY 12 HOURS (Patient taking differently: Take 1 Tab by mouth two (2) times a day.)    PARoxetine (PAXIL) 20 mg tablet TAKE 1 TABLET EVERY DAY (Patient taking differently: Take 20 mg by mouth daily.)    levothyroxine (SYNTHROID) 150 mcg tablet TAKE 1 TABLET EVERY MORNING FOR THYROID (Patient taking differently: Take 150 mcg by mouth Daily (before breakfast). )    allopurinoL (ZYLOPRIM) 300 mg tablet T1T QD TO PREVENT GOUT (Patient taking differently: Take 300 mg by mouth daily. For gout)    atorvastatin (LIPITOR) 40 mg tablet TAKE 1 TAB BY MOUTH NIGHTLY.  oxybutynin chloride XL (DITROPAN XL) 10 mg CR tablet Take 10 mg by mouth daily.  spironolactone (ALDACTONE) 25 mg tablet Take 25 mg by mouth daily.  omega 3-dha-epa-fish oil (FISH OIL) 100-160-1,000 mg cap Take 1 Cap by mouth daily.  psyllium (METAMUCIL) 0.52 gram capsule Take 1 Cap by mouth daily.  warfarin (COUMADIN) 2.5 mg tablet 1 tab daily- NEEDS INR CHECK (Patient taking differently: Take 2.5 mg by mouth daily. Note, he also has 1 mg and 5 mg tablet strengths)    b complex vitamins (B COMPLEX 1) tablet Take 1 Tab by mouth daily.  CYANOCOBALAMIN, VITAMIN B-12, PO Take 1 Tab by mouth daily. Sciatica    aspirin delayed-release 81 mg tablet Take 81 mg by mouth daily.  therapeutic multivitamin (THERAGRAN) tablet Take 1 Tab by mouth daily.  ascorbic acid, vitamin C, (VITAMIN C) 500 mg tablet Take 1,000 mg by mouth daily.  B.infantis-B.ani-B.long-B.bifi (PROBIOTIC 4X) 10-15 mg TbEC Take 1 Tab by mouth daily.  COQ10, UBIQUINOL, PO Take 1 Tab by mouth daily.  finasteride (PROSCAR) 5 mg tablet Take 5 mg by mouth daily.  tamsulosin (FLOMAX) 0.4 mg capsule Take 0.4 mg by mouth nightly.     FERROUS GLUCONATE PO Take 325 mg by mouth daily.  furosemide (LASIX) 20 mg tablet Take 1 Tab by mouth daily. (Patient taking differently: Take 20 mg by mouth daily. Per patient take 1 pill base on swelling)     No current facility-administered medications for this visit. Wt Readings from Last 3 Encounters:   08/26/20 180 lb (81.6 kg)   08/18/20 180 lb (81.6 kg)   08/09/20 177 lb 0.5 oz (80.3 kg)     BP Readings from Last 3 Encounters:   08/26/20 111/67   08/18/20 111/73   08/09/20 123/83     Pulse Readings from Last 3 Encounters:   08/26/20 88   08/18/20 73   08/09/20 69     Lab Results   Component Value Date/Time    WBC 5.7 08/09/2020 08:14 PM    HGB 13.2 08/09/2020 08:14 PM    HCT 39.3 08/09/2020 08:14 PM    PLATELET 541 (L) 59/71/1129 08:14 PM    MCV 98.0 08/09/2020 08:14 PM     Lab Results   Component Value Date/Time    Sodium 142 08/09/2020 09:15 PM    Potassium 3.4 (L) 08/09/2020 09:15 PM    Chloride 112 (H) 08/09/2020 09:15 PM    CO2 25 08/09/2020 09:15 PM    Anion gap 5 08/09/2020 09:15 PM    Glucose 96 08/09/2020 09:15 PM    BUN 17 08/09/2020 09:15 PM    Creatinine 1.13 08/09/2020 09:15 PM    BUN/Creatinine ratio 15 08/09/2020 09:15 PM    GFR est AA >60 08/09/2020 09:15 PM    GFR est non-AA >60 08/09/2020 09:15 PM    Calcium 8.1 (L) 08/09/2020 09:15 PM    Bilirubin, total 1.1 (H) 08/09/2020 09:15 PM    Alk. phosphatase 102 08/09/2020 09:15 PM    Protein, total 6.4 08/09/2020 09:15 PM    Albumin 3.2 (L) 08/09/2020 09:15 PM    Globulin 3.2 08/09/2020 09:15 PM    A-G Ratio 1.0 (L) 08/09/2020 09:15 PM    ALT (SGPT) 23 08/09/2020 09:15 PM     Estimated Creatinine Clearance: 46.2 mL/min (by C-G formula based on SCr of 1.13 mg/dL). INR History:   (normal INR range 0.8-1.2)     Date   INR   PT   Dose/Comments  08/26/20  2.4  28.7 5 mg x 1, then 2.5 mg daily.    08/18/20          1.6                   19.5     ED visit; (+) vitamin K; 2.5 mg daily  08/09/20          11.9                 101.8     07/28/20          2.5 25.8              A/P:       Anticoagulation:  Considering Mr. Meta Homans past history, todays findings, and per Anticoagulation Collaborative Practice Agreement/Protocol:    1. POC INR (2.4) is Therapeutic for INR goal today. 2.  Continue warfarin 2.5 mg daily ROW. Patient was instructed to schedule an appointment in 2 week(s) prior to leaving the clinic. Medication reconciliation was completed during the visit. There are no discontinued medications. A full discussion of the nature of anticoagulants has been carried out. A full discussion of the need for frequent and regular monitoring, precise dosage adjustment and compliance was stressed. Side effects of potential bleeding were discussed and Mr. Kelsey Hernandez was instructed to call [phone] if there are any signs of abnormal bleeding. Mr. Kelsey Hernandez was instructed to avoid any OTC items containing aspirin or ibuprofen and prior to starting any new OTC products to consult with his physician or pharmacist to ensure no drug interactions are present. Mr. Kelsey Hernandez was instructed to avoid any major changes in his general diet and to avoid alcohol consumption. Mr. Kelsey Hernandez was provided information in the AVS that includes topics on understanding coumadin therapy, drug interaction considerations, vitamin K and coumadin use, interactions with foods and supplements containing vitamin K, and the use of herbal products. Mr. Kelsey Hernandez verbalized his understanding of all instructions and will call the office with any questions, concerns, or signs of abnormal bleeding or blood clot. Notifications of recommendations will be sent to Dr. Trevor Raya MD for review.     Thank you,  Blanca Chatman, PharmD, BCACP, CDE       CLINICAL PHARMACY CONSULT: MED RECONCILIATION/REVIEW ADDENDUM    For Pharmacy Admin Tracking Only    PHSO: PHSO Patient?: Yes  Total # of Interventions Recommended: Count: 1  - Maintenance Safety Lab Monitoring #: 1    Time Spent (min): 15

## 2020-09-09 ENCOUNTER — ANTI-COAG VISIT (OUTPATIENT)
Dept: INTERNAL MEDICINE CLINIC | Age: 85
End: 2020-09-09
Payer: MEDICARE

## 2020-09-09 VITALS — TEMPERATURE: 97.4 F | WEIGHT: 181 LBS | BODY MASS INDEX: 27.43 KG/M2 | HEIGHT: 68 IN

## 2020-09-09 DIAGNOSIS — I48.91 ATRIAL FIBRILLATION, UNSPECIFIED TYPE (HCC): ICD-10-CM

## 2020-09-09 DIAGNOSIS — I48.3 TYPICAL ATRIAL FLUTTER (HCC): ICD-10-CM

## 2020-09-09 DIAGNOSIS — Z95.2 H/O MECHANICAL AORTIC VALVE REPLACEMENT: ICD-10-CM

## 2020-09-09 LAB
INR BLD: 2.7 (ref 1–1.5)
PT POC: 32.4 SECONDS (ref 9.1–12)
VALID INTERNAL CONTROL?: YES

## 2020-09-09 PROCEDURE — 36416 COLLJ CAPILLARY BLOOD SPEC: CPT | Performed by: PHARMACIST

## 2020-09-09 PROCEDURE — 99211 OFF/OP EST MAY X REQ PHY/QHP: CPT | Performed by: PHARMACIST

## 2020-09-09 PROCEDURE — 85610 PROTHROMBIN TIME: CPT | Performed by: PHARMACIST

## 2020-09-09 PROCEDURE — G0463 HOSPITAL OUTPT CLINIC VISIT: HCPCS | Performed by: PHARMACIST

## 2020-09-09 NOTE — PATIENT INSTRUCTIONS
Today your INR was 2.7. Your goal INR is  2.0-3.0 . You have a 1 mg, 2.5 mg, 5 mg tablet of Coumadin (warfarin). Take Coumadin (warfarin) as follows:    Continue warfarin 2.5 mg daily. Come back in 4  week(s) for your next finger stick/INR blood test.        Avoid any over the counter items containing aspirin or ibuprofen, and avoid great swings in general diet. Avoid alcohol consumption. Please notify the INR nurse if you are started on any new medication including over the counter or herbal supplements. Also, please notify your INR nurse if any of your other prescription or over the counter medications have been discontinued. Call [facility] at [phone] if you have any signs of abnormal bleeding/blood clot.  ------------------------------------------------------------------------------------------------------------------  Taking Warfarin Safely: Care Instructions    Your Care Instructions  Warfarin is a medicine that you take to prevent blood clots. It is often called a blood thinner. Doctors give warfarin (such as Coumadin) to reduce the risk of blood clots. You may be at risk for blood clots if you have atrial fibrillation or deep vein thrombosis. Some other health problems may also put you at risk. Warfarin slows the amount of time it takes for your blood to clot. It can cause bleeding problems. Even if you've been taking warfarin for a while, it's important to know how to take it safely. Foods and other medicines can affect the way warfarin works. Some can make warfarin work too well. This can cause bleeding problems. And some can make it work poorly, so that it does not prevent blood clots very well. You will need regular blood tests to check how long it takes for your blood to form a clot. This test is called a PT or prothrombin time test. The result of the test is called an INR level.  Depending on the test results, your doctor or anticoagulation clinic may adjust your dose of warfarin. Follow-up care is a key part of your treatment and safety. Be sure to make and go to all appointments, and call your doctor if you are having problems. It's also a good idea to know your test results and keep a list of the medicines you take. How can you care for yourself at home? Take warfarin safely  · Take your warfarin at the same time each day. · If you miss a dose of warfarin, don't take an extra dose to make up for it. Your doctor can tell you exactly what to do so you don't take too much or too little. · Wear medical alert jewelry that lets others know that you take warfarin. You can buy this at most drugstores. · Don't take warfarin if you are pregnant or planning to get pregnant. Talk to your doctor about how you can prevent getting pregnant while you are taking it. · Don't change your dose or stop taking warfarin unless your doctor tells you to. Effects of medicines and food on warfarin  · Don't start or stop taking any medicines, vitamins, or natural remedies unless you first talk to your doctor. Many medicines can affect how warfarin works. These include aspirin and other pain relievers, over-the-counter medicines, multivitamins, dietary supplements, and herbal products. · Tell all of your doctors and pharmacists that you take warfarin. Some prescription medicines can affect how warfarin works. · Keep the amount of vitamin K in your diet about the same from day to day. Do not suddenly eat a lot more or a lot less food that is rich in vitamin K than you usually do. Vitamin K affects how warfarin works and how your blood clots. Talk with your doctor before making big changes in your diet. Vitamin K is in many foods, such as:  ¨ Leafy greens, such as kale, cabbage, spinach, Swiss chard, and lettuce. ¨ Canola and soybean oils. ¨ Green vegetables, such as asparagus, broccoli, and [place] sprouts.   ¨ Vegetable drinks, green tea leaves, and some dietary supplement drinks. · Avoid cranberry juice and other cranberry products. They can increase the effects of warfarin. · Limit your use of alcohol. Avoid bleeding by preventing falls and injuries  · Wear slippers or shoes with nonskid soles. · Remove throw rugs and clutter. · Rearrange furniture and electrical cords to keep them out of walking paths. · Keep stairways, porches, and outside walkways well lit. Use night-lights in hallways and bathrooms. · Be extra careful when you work with sharp tools or knives. When should you call for help? Call 911 anytime you think you may need emergency care. For example, call if:  · You have a sudden, severe headache that is different from past headaches. Call your doctor now or seek immediate medical care if:  · You have any abnormal bleeding, such as:  ¨ Nosebleeds. ¨ Vaginal bleeding that is different (heavier, more frequent, at a different time of the month) than what you are used to. ¨ Bloody or black stools, or rectal bleeding. ¨ Bloody or pink urine. Watch closely for changes in your health, and be sure to contact your doctor if you have any problems. Where can you learn more? Go to http://claus-teodora.info/. Enter Y728 in the search box to learn more about \"Taking Warfarin Safely: Care Instructions. \"  Current as of: January 27, 2016  Content Version: 11.1  © 6875-2590 [employer]. Care instructions adapted under license by [employer] (which disclaims liability or warranty for this information). If you have questions about a medical condition or this instruction, always ask your healthcare professional. [place] [address] any warranty or liability for your use of this information.

## 2020-09-09 NOTE — PROGRESS NOTES
Pharmacy Progress Note - Anticoagulation Management    S/O: Mr. Puneet Casey  is a 80 y. o. male, referred by Tamy Marroquin MD, was seen today for anticoagulation management for the diagnosis of Atrial Fibrillation and Aortic Valve Replacement  ([facility] -1996), s/p pacemaker.     Pt is very hard of hearing.      · Warfarin start date: ~1996  · INR Goal:  2.0-3.0    · Current warfarin regimen:  2.5 mg daily                  · Warfarin tablet strength:   1 mg, 2.5 mg, 5 mg  -- wife states she does not want to split tablets in half ; generally takes 2.5 mg tab. Able to recognize tablet strength  · Duration of therapy: indefinite  · Takes warfarin in the evening    Today's pertinent positives includes:  No significant changes since last visit    Results for orders placed or performed in visit on 09/09/20   AMB POC PT/INR   Result Value Ref Range    VALID INTERNAL CONTROL POC Yes     Prothrombin time (POC) 32.4 (A) 9.1 - 12 seconds    INR POC 2.7 (A) 1 - 1.5     · Adherence:   · Able to recall regimen? YES  · Miss/extra dose? NO  · Need refill? NO    Upcoming procedure(s):  NO      Past Medical History:   Diagnosis Date    Acute on chronic systolic heart failure, NYHA class 4 ([facility] [address] 03/01/2019    admit March 2019 7 days, EF 10% got Primacor, BIV implant    Anger     TAKES PAROXETINE TO CONTROL ANGER ISSUES    CAD (coronary artery disease) 1996    CABG 1996, mukul celis 2015 fixed inferior defect    Chronic atrial fibrillation ([facility] [address] 03/2019    RVR3/2019, had ablation of flutter- persistent    Foot drop, left     WEARS METAL BRACE    Gout     Hard of hearing     High cholesterol     Hypertension     Prostate enlargement     Thyroid disease     Unspecified sleep apnea     DOESN'T USE CPAP; \"IT MADE HIM SICK-COLDS, SINUS\", PER WIFE     No Known Allergies  Current Outpatient Medications   Medication Sig    calcium-cholecalciferol, D3, (CALTRATE 600+D) tablet Take 1 Tab by mouth daily.     carvediloL (COREG) 6.25 mg tablet T1T BID (Patient taking differently: Take 6.25 mg by mouth two (2) times a day.)    Entresto 24-26 mg tablet T1T PO EVERY 12 HOURS (Patient taking differently: Take 1 Tab by mouth two (2) times a day.)    PARoxetine (PAXIL) 20 mg tablet TAKE 1 TABLET EVERY DAY (Patient taking differently: Take 20 mg by mouth daily.)    levothyroxine (SYNTHROID) 150 mcg tablet TAKE 1 TABLET EVERY MORNING FOR THYROID (Patient taking differently: Take 150 mcg by mouth Daily (before breakfast). )    allopurinoL (ZYLOPRIM) 300 mg tablet T1T QD TO PREVENT GOUT (Patient taking differently: Take 300 mg by mouth daily. For gout)    atorvastatin (LIPITOR) 40 mg tablet TAKE 1 TAB BY MOUTH NIGHTLY.  oxybutynin chloride XL (DITROPAN XL) 10 mg CR tablet Take 10 mg by mouth daily.  spironolactone (ALDACTONE) 25 mg tablet Take 25 mg by mouth daily.  omega 3-dha-epa-fish oil (FISH OIL) 100-160-1,000 mg cap Take 1 Cap by mouth daily.  psyllium (METAMUCIL) 0.52 gram capsule Take 1 Cap by mouth daily.  warfarin (COUMADIN) 2.5 mg tablet 1 tab daily- NEEDS INR CHECK (Patient taking differently: Take 2.5 mg by mouth daily. Note, he also has 1 mg and 5 mg tablet strengths)    b complex vitamins (B COMPLEX 1) tablet Take 1 Tab by mouth daily.  CYANOCOBALAMIN, VITAMIN B-12, PO Take 1 Tab by mouth daily. Sciatica    aspirin delayed-release 81 mg tablet Take 81 mg by mouth daily.  therapeutic multivitamin (THERAGRAN) tablet Take 1 Tab by mouth daily.  ascorbic acid, vitamin C, (VITAMIN C) 500 mg tablet Take 1,000 mg by mouth daily.  B.infantis-B.ani-B.long-B.bifi (PROBIOTIC 4X) 10-15 mg TbEC Take 1 Tab by mouth daily.  COQ10, UBIQUINOL, PO Take 1 Tab by mouth daily.  finasteride (PROSCAR) 5 mg tablet Take 5 mg by mouth daily.  tamsulosin (FLOMAX) 0.4 mg capsule Take 0.4 mg by mouth nightly.  FERROUS GLUCONATE PO Take 325 mg by mouth daily.     furosemide (LASIX) 20 mg tablet Take 1 Tab by mouth daily. (Patient taking differently: Take 20 mg by mouth daily. Per patient take 1 pill base on swelling)     No current facility-administered medications for this visit. Wt Readings from Last 3 Encounters:   09/09/20 181 lb (82.1 kg)   08/26/20 180 lb (81.6 kg)   08/18/20 180 lb (81.6 kg)     BP Readings from Last 3 Encounters:   08/26/20 111/67   08/18/20 111/73   08/09/20 123/83     Pulse Readings from Last 3 Encounters:   08/26/20 88   08/18/20 73   08/09/20 69     Lab Results   Component Value Date/Time    WBC 5.7 08/09/2020 08:14 PM    HGB 13.2 08/09/2020 08:14 PM    HCT 39.3 08/09/2020 08:14 PM    PLATELET 526 (L) 54/70/5820 08:14 PM    MCV 98.0 08/09/2020 08:14 PM     Lab Results   Component Value Date/Time    Sodium 142 08/09/2020 09:15 PM    Potassium 3.4 (L) 08/09/2020 09:15 PM    Chloride 112 (H) 08/09/2020 09:15 PM    CO2 25 08/09/2020 09:15 PM    Anion gap 5 08/09/2020 09:15 PM    Glucose 96 08/09/2020 09:15 PM    BUN 17 08/09/2020 09:15 PM    Creatinine 1.13 08/09/2020 09:15 PM    BUN/Creatinine ratio 15 08/09/2020 09:15 PM    GFR est AA >60 08/09/2020 09:15 PM    GFR est non-AA >60 08/09/2020 09:15 PM    Calcium 8.1 (L) 08/09/2020 09:15 PM    Bilirubin, total 1.1 (H) 08/09/2020 09:15 PM    Alk. phosphatase 102 08/09/2020 09:15 PM    Protein, total 6.4 08/09/2020 09:15 PM    Albumin 3.2 (L) 08/09/2020 09:15 PM    Globulin 3.2 08/09/2020 09:15 PM    A-G Ratio 1.0 (L) 08/09/2020 09:15 PM    ALT (SGPT) 23 08/09/2020 09:15 PM     Estimated Creatinine Clearance: 50 mL/min (by C-G formula based on SCr of 1.13 mg/dL). INR History:   (normal INR range 0.8-1.2)     Date   INR   PT   Dose/Comments  09/09/20 2.7  32.4 2.5 mg daily  08/26/20          2.4                   28.7     5 mg x 1, then 2.5 mg daily.    08/18/20          1.6                   19.5     ED visit; (+) vitamin K; 2.5 mg daily  08/09/20          11.9                 101. 8     07/28/20          2.5                   25.8          A/P:       Anticoagulation:  Considering Mr. Jennifer Park past history, todays findings, and per Anticoagulation Collaborative Practice Agreement/Protocol:    1. POC INR (2.7) is Therapeutic for INR goal today. 2.  Continue warfarin 2.5 mg daily. Patient was instructed to schedule an appointment in 4 week(s) prior to leaving the clinic. Medication reconciliation was completed during the visit. There are no discontinued medications. A full discussion of the nature of anticoagulants has been carried out. A full discussion of the need for frequent and regular monitoring, precise dosage adjustment and compliance was stressed. Side effects of potential bleeding were discussed and Mr. Angelo Nolen was instructed to call [phone] if there are any signs of abnormal bleeding. Mr. Angelo Nolen was instructed to avoid any OTC items containing aspirin or ibuprofen and prior to starting any new OTC products to consult with his physician or pharmacist to ensure no drug interactions are present. Mr. Angelo Nolen was instructed to avoid any major changes in his general diet and to avoid alcohol consumption. Mr. Angelo Nolen was provided information in the AVS that includes topics on understanding coumadin therapy, drug interaction considerations, vitamin K and coumadin use, interactions with foods and supplements containing vitamin K, and the use of herbal products. Mr. Angelo Nolen verbalized his understanding of all instructions and will call the office with any questions, concerns, or signs of abnormal bleeding or blood clot. Notifications of recommendations will be sent to Dr. Lise Varner MD for review.     Thank you,  Blanca Wong, PharmD, BCACP, [facility]      CLINICAL PHARMACY CONSULT: MED RECONCILIATION/REVIEW ADDENDUM    For Pharmacy Admin Tracking Only    PHSO: PHSO Patient?: Yes  Total # of Interventions Recommended: Count: 1    - Maintenance Safety Lab Monitoring #: 1    Time Spent (min): 15

## 2020-10-07 ENCOUNTER — ANTI-COAG VISIT (OUTPATIENT)
Dept: INTERNAL MEDICINE CLINIC | Age: 85
End: 2020-10-07
Payer: MEDICARE

## 2020-10-07 VITALS
DIASTOLIC BLOOD PRESSURE: 77 MMHG | SYSTOLIC BLOOD PRESSURE: 109 MMHG | BODY MASS INDEX: 26.98 KG/M2 | WEIGHT: 178 LBS | HEIGHT: 68 IN | HEART RATE: 77 BPM

## 2020-10-07 DIAGNOSIS — Z95.2 H/O MECHANICAL AORTIC VALVE REPLACEMENT: ICD-10-CM

## 2020-10-07 DIAGNOSIS — Z23 ENCOUNTER FOR IMMUNIZATION: Primary | ICD-10-CM

## 2020-10-07 DIAGNOSIS — I48.91 ATRIAL FIBRILLATION, UNSPECIFIED TYPE (HCC): ICD-10-CM

## 2020-10-07 DIAGNOSIS — I48.3 TYPICAL ATRIAL FLUTTER (HCC): ICD-10-CM

## 2020-10-07 LAB
INR BLD: 1.5 (ref 1–1.5)
PT POC: 18.6 SECONDS (ref 9.1–12)
VALID INTERNAL CONTROL?: YES

## 2020-10-07 PROCEDURE — 90694 VACC AIIV4 NO PRSRV 0.5ML IM: CPT | Performed by: PHARMACIST

## 2020-10-07 PROCEDURE — 85610 PROTHROMBIN TIME: CPT | Performed by: PHARMACIST

## 2020-10-07 PROCEDURE — 36416 COLLJ CAPILLARY BLOOD SPEC: CPT | Performed by: PHARMACIST

## 2020-10-07 PROCEDURE — 99212 OFFICE O/P EST SF 10 MIN: CPT | Performed by: PHARMACIST

## 2020-10-07 PROCEDURE — G0463 HOSPITAL OUTPT CLINIC VISIT: HCPCS | Performed by: PHARMACIST

## 2020-10-07 NOTE — PATIENT INSTRUCTIONS
Today your INR was 1.5. Your goal INR is  2.0-3.0 . You have a 2.5 mg tablet of Coumadin (warfarin). Take Coumadin (warfarin) as follows: Take 5 mg (two tablets of your 2.5 mg) tonight. Then continue warfarin 2.5 mg daily tomorrow. Come back in 3 week(s) for your next finger stick/INR blood test.        Avoid any over the counter items containing aspirin or ibuprofen, and avoid great swings in general diet. Avoid alcohol consumption. Please notify the INR nurse if you are started on any new medication including over the counter or herbal supplements. Also, please notify your INR nurse if any of your other prescription or over the counter medications have been discontinued. Call [facility] at [phone] if you have any signs of abnormal bleeding/blood clot.  ------------------------------------------------------------------------------------------------------------------  Taking Warfarin Safely: Care Instructions    Your Care Instructions  Warfarin is a medicine that you take to prevent blood clots. It is often called a blood thinner. Doctors give warfarin (such as Coumadin) to reduce the risk of blood clots. You may be at risk for blood clots if you have atrial fibrillation or deep vein thrombosis. Some other health problems may also put you at risk. Warfarin slows the amount of time it takes for your blood to clot. It can cause bleeding problems. Even if you've been taking warfarin for a while, it's important to know how to take it safely. Foods and other medicines can affect the way warfarin works. Some can make warfarin work too well. This can cause bleeding problems. And some can make it work poorly, so that it does not prevent blood clots very well. You will need regular blood tests to check how long it takes for your blood to form a clot. This test is called a PT or prothrombin time test. The result of the test is called an INR level.  Depending on the test results, your doctor or anticoagulation clinic may adjust your dose of warfarin. Follow-up care is a key part of your treatment and safety. Be sure to make and go to all appointments, and call your doctor if you are having problems. It's also a good idea to know your test results and keep a list of the medicines you take. How can you care for yourself at home? Take warfarin safely  · Take your warfarin at the same time each day. · If you miss a dose of warfarin, don't take an extra dose to make up for it. Your doctor can tell you exactly what to do so you don't take too much or too little. · Wear medical alert jewelry that lets others know that you take warfarin. You can buy this at most drugstores. · Don't take warfarin if you are pregnant or planning to get pregnant. Talk to your doctor about how you can prevent getting pregnant while you are taking it. · Don't change your dose or stop taking warfarin unless your doctor tells you to. Effects of medicines and food on warfarin  · Don't start or stop taking any medicines, vitamins, or natural remedies unless you first talk to your doctor. Many medicines can affect how warfarin works. These include aspirin and other pain relievers, over-the-counter medicines, multivitamins, dietary supplements, and herbal products. · Tell all of your doctors and pharmacists that you take warfarin. Some prescription medicines can affect how warfarin works. · Keep the amount of vitamin K in your diet about the same from day to day. Do not suddenly eat a lot more or a lot less food that is rich in vitamin K than you usually do. Vitamin K affects how warfarin works and how your blood clots. Talk with your doctor before making big changes in your diet. Vitamin K is in many foods, such as:  ¨ Leafy greens, such as kale, cabbage, spinach, Swiss chard, and lettuce. ¨ Canola and soybean oils. ¨ Green vegetables, such as asparagus, broccoli, and [place] sprouts.   ¨ Vegetable drinks, green tea leaves, and some dietary supplement drinks. · Avoid cranberry juice and other cranberry products. They can increase the effects of warfarin. · Limit your use of alcohol. Avoid bleeding by preventing falls and injuries  · Wear slippers or shoes with nonskid soles. · Remove throw rugs and clutter. · Rearrange furniture and electrical cords to keep them out of walking paths. · Keep stairways, porches, and outside walkways well lit. Use night-lights in hallways and bathrooms. · Be extra careful when you work with sharp tools or knives. When should you call for help? Call 911 anytime you think you may need emergency care. For example, call if:  · You have a sudden, severe headache that is different from past headaches. Call your doctor now or seek immediate medical care if:  · You have any abnormal bleeding, such as:  ¨ Nosebleeds. ¨ Vaginal bleeding that is different (heavier, more frequent, at a different time of the month) than what you are used to. ¨ Bloody or black stools, or rectal bleeding. ¨ Bloody or pink urine. Watch closely for changes in your health, and be sure to contact your doctor if you have any problems. Where can you learn more? Go to http://www.gray.com/. Enter J871 in the search box to learn more about \"Taking Warfarin Safely: Care Instructions. \"  Current as of: January 27, 2016  Content Version: 11.1  © 0200-0259 [employer]. Care instructions adapted under license by [employer] (which disclaims liability or warranty for this information). If you have questions about a medical condition or this instruction, always ask your healthcare professional. [place] [address] any warranty or liability for your use of this information. Vaccine Information Statement    Influenza (Flu) Vaccine (Inactivated or Recombinant):  What You Need to Know    Many Vaccine Information Statements are available in [place] and other languages. See www.immunize.org/vis  Hojas de información sobre vacunas están disponibles en español y en muchos otros idiomas. Visite www.immunize.org/vis    1. Why get vaccinated? Influenza vaccine can prevent influenza (flu). Flu is a contagious disease that spreads around the [facility] every year, usually between October and May. Anyone can get the flu, but it is more dangerous for some people. Infants and young children, people 72years of age and older, pregnant women, and people with certain health conditions or a weakened immune system are at greatest risk of flu complications. Pneumonia, bronchitis, sinus infections and ear infections are examples of flu-related complications. If you have a medical condition, such as heart disease, cancer or diabetes, flu can make it worse. Flu can cause fever and chills, sore throat, muscle aches, fatigue, cough, headache, and runny or stuffy nose. Some people may have vomiting and diarrhea, though this is more common in children than adults. Each year thousands of people in the [facility] die from flu, and many more are hospitalized. Flu vaccine prevents millions of illnesses and flu-related visits to the doctor each year. 2. Influenza vaccines     CDC recommends everyone 10months of age and older get vaccinated every flu season. Children 6 months through 6years of age may need 2 doses during a single flu season. Everyone else needs only 1 dose each flu season. It takes about 2 weeks for protection to develop after vaccination. There are many flu viruses, and they are always changing. Each year a new flu vaccine is made to protect against three or four viruses that are likely to cause disease in the upcoming flu season. Even when the vaccine doesnt exactly match these viruses, it may still provide some protection. Influenza vaccine does not cause flu. Influenza vaccine may be given at the same time as other vaccines.     3. Talk with your health care provider    Tell your vaccine provider if the person getting the vaccine:   Has had an allergic reaction after a previous dose of influenza vaccine, or has any severe, life-threatening allergies.  Has ever had Guillain-Barré Syndrome (also called GBS). In some cases, your health care provider may decide to postpone influenza vaccination to a future visit. People with minor illnesses, such as a cold, may be vaccinated. People who are moderately or severely ill should usually wait until they recover before getting influenza vaccine. Your health care provider can give you more information. 4. Risks of a reaction     Soreness, redness, and swelling where shot is given, fever, muscle aches, and headache can happen after influenza vaccine.  There may be a very small increased risk of Guillain-Barré Syndrome (GBS) after inactivated influenza vaccine (the flu shot). Dee Dee Session children who get the flu shot along with pneumococcal vaccine (PCV13), and/or DTaP vaccine at the same time might be slightly more likely to have a seizure caused by fever. Tell your health care provider if a child who is getting flu vaccine has ever had a seizure. People sometimes faint after medical procedures, including vaccination. Tell your provider if you feel dizzy or have vision changes or ringing in the ears. As with any medicine, there is a very remote chance of a vaccine causing a severe allergic reaction, other serious injury, or death. 5. What if there is a serious problem? An allergic reaction could occur after the vaccinated person leaves the clinic. If you see signs of a severe allergic reaction (hives, swelling of the face and throat, difficulty breathing, a fast heartbeat, dizziness, or weakness), call 9-1-1 and get the person to the nearest hospital.    For other signs that concern you, call your health care provider.     Adverse reactions should be reported to the Vaccine Adverse Event Reporting System (VAERS). Your health care provider will usually file this report, or you can do it yourself. Visit the VAERS website at www.vaers. hhs.gov or call 6-587.124.7667. VAERS is only for reporting reactions, and VAERS staff do not give medical advice. 6. The National Vaccine Injury Compensation Program    The [facility] Vaccine Injury Compensation Program (VICP) is a federal program that was created to compensate people who may have been injured by certain vaccines. Visit the VICP website at www.[facility].gov/vaccinecompensation or call 8-399.465.9143 to learn about the program and about filing a claim. There is a time limit to file a claim for compensation. 7. How can I learn more?  Ask your health care provider.  Call your local or state health department.  Contact the Centers for Disease Control and Prevention (CDC):  - Call 0-577.279.8241 (6-614-GCY-INFO) or  - Visit CDCs influenza website at www.cdc.gov/flu    Vaccine Information Statement (Interim)  Inactivated Influenza Vaccine   8/15/2019  [address] SHAGGY [address] [id number]   Department of Health and Human Services  Centers for Disease Control and Prevention    Office Use Only

## 2020-10-07 NOTE — PROGRESS NOTES
Pharmacy Progress Note - Anticoagulation Management    S/O: Mr. Puneet Casey  is a 80 y. o. male, referred by Natasha Dean MD, was seen today for anticoagulation management for the diagnosis of Atrial Fibrillation and Aortic Valve Replacement  ([facility] -1996), s/p pacemaker. Pt is very hard of hearing.      · Warfarin start date: ~1996  · INR Goal:  2.0-3.0    · Current warfarin regimen:  2.5 mg daily                  · Warfarin tablet strength:   1 mg, 2.5 mg, 5 mg  -- wife states she does not want to split tablets in half ; generally takes 2.5 mg tab. Able to recognize tablet strength  · Duration of therapy: indefinite  · Takes warfarin in the evening      Today's pertinent positives includes:  Change in diet/appetite     Increased in salads/greens intake this month. No CP. SOB/LE & UE pain    Cardiology appt later this month  Ezcema flares on hand. Applying vaseline ointment. Results for orders placed or performed in visit on 10/07/20   AMB POC PT/INR   Result Value Ref Range    VALID INTERNAL CONTROL POC Yes     Prothrombin time (POC) 18.6 (A) 9.1 - 12 seconds    INR POC 1.5 1 - 1.5     · Adherence:   · Able to recall regimen? YES  · Miss/extra dose? NO  · Need refill?  NO    Upcoming procedure(s):  NO      Past Medical History:   Diagnosis Date    Acute on chronic systolic heart failure, NYHA class 4 ([facility] [address] 03/01/2019    admit March 2019 7 days, EF 10% got Primacor, BIV implant    Anger     TAKES PAROXETINE TO CONTROL ANGER ISSUES    CAD (coronary artery disease) 1996    CABG 1996, mukul celis 2015 fixed inferior defect    Chronic atrial fibrillation ([facility] [address] 03/2019    RVR3/2019, had ablation of flutter- persistent    Foot drop, left     WEARS METAL BRACE    Gout     Hard of hearing     High cholesterol     Hypertension     Prostate enlargement     Thyroid disease     Unspecified sleep apnea     DOESN'T USE CPAP; \"IT MADE HIM SICK-COLDS, SINUS\", PER WIFE     No Known Allergies  Current Outpatient Medications   Medication Sig    calcium-cholecalciferol, D3, (CALTRATE 600+D) tablet Take 1 Tab by mouth daily.  carvediloL (COREG) 6.25 mg tablet T1T BID (Patient taking differently: Take 6.25 mg by mouth two (2) times a day.)    Entresto 24-26 mg tablet T1T PO EVERY 12 HOURS (Patient taking differently: Take 1 Tab by mouth two (2) times a day.)    PARoxetine (PAXIL) 20 mg tablet TAKE 1 TABLET EVERY DAY (Patient taking differently: Take 20 mg by mouth daily.)    levothyroxine (SYNTHROID) 150 mcg tablet TAKE 1 TABLET EVERY MORNING FOR THYROID (Patient taking differently: Take 150 mcg by mouth Daily (before breakfast). )    allopurinoL (ZYLOPRIM) 300 mg tablet T1T QD TO PREVENT GOUT (Patient taking differently: Take 300 mg by mouth daily. For gout)    atorvastatin (LIPITOR) 40 mg tablet TAKE 1 TAB BY MOUTH NIGHTLY.  oxybutynin chloride XL (DITROPAN XL) 10 mg CR tablet Take 10 mg by mouth daily.  spironolactone (ALDACTONE) 25 mg tablet Take 25 mg by mouth daily.  omega 3-dha-epa-fish oil (FISH OIL) 100-160-1,000 mg cap Take 1 Cap by mouth daily.  psyllium (METAMUCIL) 0.52 gram capsule Take 1 Cap by mouth daily.  warfarin (COUMADIN) 2.5 mg tablet 1 tab daily- NEEDS INR CHECK (Patient taking differently: Take 2.5 mg by mouth daily. Note, he also has 1 mg and 5 mg tablet strengths)    b complex vitamins (B COMPLEX 1) tablet Take 1 Tab by mouth daily.  CYANOCOBALAMIN, VITAMIN B-12, PO Take 1 Tab by mouth daily. Sciatica    aspirin delayed-release 81 mg tablet Take 81 mg by mouth daily.  therapeutic multivitamin (THERAGRAN) tablet Take 1 Tab by mouth daily.  ascorbic acid, vitamin C, (VITAMIN C) 500 mg tablet Take 1,000 mg by mouth daily.  B.infantis-B.ani-B.long-B.bifi (PROBIOTIC 4X) 10-15 mg TbEC Take 1 Tab by mouth daily.  COQ10, UBIQUINOL, PO Take 1 Tab by mouth daily.  finasteride (PROSCAR) 5 mg tablet Take 5 mg by mouth daily.     tamsulosin (FLOMAX) 0.4 mg capsule Take 0.4 mg by mouth nightly.  FERROUS GLUCONATE PO Take 325 mg by mouth daily.  furosemide (LASIX) 20 mg tablet Take 1 Tab by mouth daily. (Patient taking differently: Take 20 mg by mouth daily. Per patient take 1 pill base on swelling)     No current facility-administered medications for this visit. Wt Readings from Last 3 Encounters:   10/07/20 178 lb (80.7 kg)   09/09/20 181 lb (82.1 kg)   08/26/20 180 lb (81.6 kg)     BP Readings from Last 3 Encounters:   10/07/20 109/77   08/26/20 111/67   08/18/20 111/73     Pulse Readings from Last 3 Encounters:   10/07/20 77   08/26/20 88   08/18/20 73     Lab Results   Component Value Date/Time    WBC 5.7 08/09/2020 08:14 PM    HGB 13.2 08/09/2020 08:14 PM    HCT 39.3 08/09/2020 08:14 PM    PLATELET 647 (L) 56/34/3914 08:14 PM    MCV 98.0 08/09/2020 08:14 PM     Lab Results   Component Value Date/Time    Sodium 142 08/09/2020 09:15 PM    Potassium 3.4 (L) 08/09/2020 09:15 PM    Chloride 112 (H) 08/09/2020 09:15 PM    CO2 25 08/09/2020 09:15 PM    Anion gap 5 08/09/2020 09:15 PM    Glucose 96 08/09/2020 09:15 PM    BUN 17 08/09/2020 09:15 PM    Creatinine 1.13 08/09/2020 09:15 PM    BUN/Creatinine ratio 15 08/09/2020 09:15 PM    GFR est AA >60 08/09/2020 09:15 PM    GFR est non-AA >60 08/09/2020 09:15 PM    Calcium 8.1 (L) 08/09/2020 09:15 PM    Bilirubin, total 1.1 (H) 08/09/2020 09:15 PM    Alk. phosphatase 102 08/09/2020 09:15 PM    Protein, total 6.4 08/09/2020 09:15 PM    Albumin 3.2 (L) 08/09/2020 09:15 PM    Globulin 3.2 08/09/2020 09:15 PM    A-G Ratio 1.0 (L) 08/09/2020 09:15 PM    ALT (SGPT) 23 08/09/2020 09:15 PM     Estimated Creatinine Clearance: 46.2 mL/min (by C-G formula based on SCr of 1.13 mg/dL).       INR History:   (normal INR range 0.8-1.2)     Date   INR   PT   Dose/Comments  10/07/20 1.5  18.6 2.5 mg daily; (+) increased vitamin K intake  09/09/20          2.7                   32.4     2.5 mg daily  08/26/20          2.4                   28. 7     5 mg x 1, then 2.5 mg daily.   08/18/20          1.6                   19.5     ED visit; (+) vitamin K; 2.5 mg daily  08/09/20          11.9                 101. 8     07/28/20          2.5                   25.8            A/P:       Anticoagulation:  Considering Mr. Joaquín Lewis past history, todays findings, and per Anticoagulation Collaborative Practice Agreement/Protocol:    1. POC INR (1.5) is Subtherapeutic for INR goal today. 2. Take 5 mg tonight. Then Continue warfarin 2.5 mg daily. Annual flu shot given today. Patient's Immunization History:    Immunizations by Immunization family     Influenza Vaccine 10/24/2013 11/1/2019 10/7/2020         Patient was instructed to schedule an appointment in 3 week(s) prior to leaving the clinic. Medication reconciliation was completed during the visit. There are no discontinued medications. A full discussion of the nature of anticoagulants has been carried out. A full discussion of the need for frequent and regular monitoring, precise dosage adjustment and compliance was stressed. Side effects of potential bleeding were discussed and Mr. Hadley Ortiz was instructed to call [phone] if there are any signs of abnormal bleeding. Mr. Hadley Ortiz was instructed to avoid any OTC items containing aspirin or ibuprofen and prior to starting any new OTC products to consult with his physician or pharmacist to ensure no drug interactions are present. Mr. Hadley Ortiz was instructed to avoid any major changes in his general diet and to avoid alcohol consumption. Mr. Hadley Ortiz was provided information in the AVS that includes topics on understanding coumadin therapy, drug interaction considerations, vitamin K and coumadin use, interactions with foods and supplements containing vitamin K, and the use of herbal products.     Mr. Hadley Ortiz verbalized his understanding of all instructions and will call the office with any questions, concerns, or signs of abnormal bleeding or blood clot. Notifications of recommendations will be sent to Dr. Cruz García MD for review.     Thank you,  Blanca Garzon, PharmD, BCACP, [facility]      CLINICAL PHARMACY CONSULT: MED RECONCILIATION/REVIEW ADDENDUM    For Pharmacy Admin Tracking Only    PHSO: Randolph Duarte [id number] Patient?: Yes  Total # of Interventions Recommended: Count: 3  - Increased Dose #: 1  - New Order #: 1 New Medication Order Reason(s): Needs Additional Medication Therapy  - Maintenance Safety Lab Monitoring #: 1    Time Spent (min): 20

## 2020-11-12 ENCOUNTER — OFFICE VISIT (OUTPATIENT)
Dept: CARDIOLOGY CLINIC | Age: 85
End: 2020-11-12
Payer: MEDICARE

## 2020-11-12 ENCOUNTER — CLINICAL SUPPORT (OUTPATIENT)
Dept: CARDIOLOGY CLINIC | Age: 85
End: 2020-11-12
Payer: MEDICARE

## 2020-11-12 VITALS
DIASTOLIC BLOOD PRESSURE: 72 MMHG | SYSTOLIC BLOOD PRESSURE: 132 MMHG | HEART RATE: 70 BPM | HEIGHT: 68 IN | BODY MASS INDEX: 26.83 KG/M2 | WEIGHT: 177 LBS

## 2020-11-12 DIAGNOSIS — Z95.2 H/O MECHANICAL AORTIC VALVE REPLACEMENT: ICD-10-CM

## 2020-11-12 DIAGNOSIS — Z95.810 AUTOMATIC IMPLANTABLE CARDIAC DEFIBRILLATOR IN SITU: Primary | ICD-10-CM

## 2020-11-12 DIAGNOSIS — I10 HYPERTENSION, ESSENTIAL: ICD-10-CM

## 2020-11-12 DIAGNOSIS — I49.3 PVC (PREMATURE VENTRICULAR CONTRACTION): ICD-10-CM

## 2020-11-12 DIAGNOSIS — Z95.1 S/P CABG (CORONARY ARTERY BYPASS GRAFT): ICD-10-CM

## 2020-11-12 DIAGNOSIS — Z98.890 STATUS POST CATHETER ABLATION OF ATRIAL FLUTTER: ICD-10-CM

## 2020-11-12 DIAGNOSIS — Z95.810 BIVENTRICULAR ICD (IMPLANTABLE CARDIOVERTER-DEFIBRILLATOR) IN PLACE: Primary | ICD-10-CM

## 2020-11-12 DIAGNOSIS — I50.22 CHRONIC SYSTOLIC CONGESTIVE HEART FAILURE (HCC): ICD-10-CM

## 2020-11-12 DIAGNOSIS — I48.0 PAROXYSMAL ATRIAL FIBRILLATION (HCC): ICD-10-CM

## 2020-11-12 DIAGNOSIS — I48.3 TYPICAL ATRIAL FLUTTER (HCC): ICD-10-CM

## 2020-11-12 DIAGNOSIS — I25.110 CORONARY ARTERY DISEASE INVOLVING NATIVE CORONARY ARTERY OF NATIVE HEART WITH UNSTABLE ANGINA PECTORIS (HCC): ICD-10-CM

## 2020-11-12 PROCEDURE — G8754 DIAS BP LESS 90: HCPCS | Performed by: INTERNAL MEDICINE

## 2020-11-12 PROCEDURE — G8752 SYS BP LESS 140: HCPCS | Performed by: INTERNAL MEDICINE

## 2020-11-12 PROCEDURE — G0463 HOSPITAL OUTPT CLINIC VISIT: HCPCS | Performed by: INTERNAL MEDICINE

## 2020-11-12 PROCEDURE — G8417 CALC BMI ABV UP PARAM F/U: HCPCS | Performed by: INTERNAL MEDICINE

## 2020-11-12 PROCEDURE — G9717 DOC PT DX DEP/BP F/U NT REQ: HCPCS | Performed by: INTERNAL MEDICINE

## 2020-11-12 PROCEDURE — G8536 NO DOC ELDER MAL SCRN: HCPCS | Performed by: INTERNAL MEDICINE

## 2020-11-12 PROCEDURE — 1101F PT FALLS ASSESS-DOCD LE1/YR: CPT | Performed by: INTERNAL MEDICINE

## 2020-11-12 PROCEDURE — 99214 OFFICE O/P EST MOD 30 MIN: CPT | Performed by: INTERNAL MEDICINE

## 2020-11-12 PROCEDURE — G8427 DOCREV CUR MEDS BY ELIG CLIN: HCPCS | Performed by: INTERNAL MEDICINE

## 2020-11-12 PROCEDURE — 93284 PRGRMG EVAL IMPLANTABLE DFB: CPT | Performed by: INTERNAL MEDICINE

## 2020-11-12 NOTE — PROGRESS NOTES
Cardiac Electrophysiology Office Note     Subjective:      Keira Castaneda is a 80 y.o. patient who is seen for follow up of biventricular pacemaker ICD  He had LV lead dislodged and reimplanted 12/2019  Dr Errol Aguilera is his cardiologist  He had AFL with RVR and was ablated     He was admitted on 03/01/2019 for acute on chronic systolic heart failure, NYHA III-IV. He was seen in clinic on 02/28/2019, & direct admit was not available, so he presented to the ED for admission. Echo on 02/28/2019 showed LVEF 10% (20% in 2016). MEMO 03/01/2019, LVEF <15%   [employer] biventricular ICD implant on 03/04/2019.       Anticoagulated with warfarin, no bleeding issues. he checks his INR with PCP near his home     Echo (02/28/2019): LVEF <15%, mod LV dilation, mod concentric LVH. RV global systolic function mod reduced. Mod MAC, mod MR. Prosthetic mechanical aortic valve, mild to mod AS. Mod TR, mod pulmonary hypertension. Mild NC. Ascending aorta mod dilated. Mod elevated CVP.     MEMO (03/01/2019): LVEF <15%, mechanical prosthetic aortic valve.     04/15/19   ECHO ADULT COMPLETE 04/15/2019 4/15/2019    Narrative · Left ventricular moderately decreased systolic function. Calculated left   ventricular ejection fraction is 40%. Left ventricular mild concentric   hypertrophy. · Left atrial cavity size is moderately dilated. · Right ventricular cavity size is mildly dilated. Pacer/ICD present. · Right atrial cavity size is mildly dilated. · Aortic valve is mechanical prosthetic. Mild aortic valve regurgitation   is present. · Mild mitral annular calcification. Mild to moderate mitral valve   regurgitation. · Mild to moderate tricuspid valve regurgitation is present. Pulmonary   hypertension is present. Signed by: April Lozano MD          Previous:  Echo (10/10/2018): LVEF 20%, severe diffuse hypokinesis, mod concentric LVH. LA mod to severely dilated, RA mildly dilated. Mild to mod MR.   Mechanical prosthetic aortic valve, mean grad 19 mmHg. Mild TR.     CABG & mechanical AVR 1996. Known occlusion to RCA & SVG-RCA.     Lexiscan stress test (06/24/2015): LVEF 38%, inferior septal kinesis. Inferior infarction with small amount of meani infarct ischemia.     Holter (04/2013): 21 beat VT & NSVT (4 beats) rate 140.  14% total beats ventricular.     Previous ARB-HCTZ, discontinued due to renal dysfunction.     Followed by nephrology.     04/15/19   ECHO ADULT COMPLETE 04/15/2019 4/15/2019    Narrative · Left ventricular moderately decreased systolic function. Calculated left   ventricular ejection fraction is 40%. Left ventricular mild concentric   hypertrophy. · Left atrial cavity size is moderately dilated. · Right ventricular cavity size is mildly dilated. Pacer/ICD present. · Right atrial cavity size is mildly dilated. · Aortic valve is mechanical prosthetic. Mild aortic valve regurgitation   is present. · Mild mitral annular calcification. Mild to moderate mitral valve   regurgitation. · Mild to moderate tricuspid valve regurgitation is present. Pulmonary   hypertension is present.         Signed by: April Lozano MD       Problem List  Date Reviewed: 11/12/2020          Codes Class Noted    Hx of cardiac pacemaker ICD-10-CM: Z95.0  ICD-9-CM: V12.50, V45.01  12/6/2019        Depression, major, recurrent, mild ([address] ICD-10-CM: F33.0  ICD-9-CM: 296.31  3/10/2020        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 ICD-10-CM: T82.120A  ICD-9-CM: 996.01  12/6/2019    Overview Signed 12/6/2019  6:24 PM by Bella Anderson MD     12/6/2019 LV lead of BIV ICD dislodgement so it was extracted and replaced with a new LV lead             Chronic systolic congestive heart failure ([address] ICD-10-CM: I50.22  ICD-9-CM: 428.22, 428.0  4/7/2019        LENNON (dyspnea on exertion) ICD-10-CM: R06.00  ICD-9-CM: 786.09  4/7/2019        Coronary artery disease involving native coronary artery of native heart with unstable angina pectoris ([facility] ICD-10-CM: I25.110  ICD-9-CM: 414.01, 411.1  4/7/2019        S/P CABG (coronary artery bypass graft) ICD-10-CM: Z95.1  ICD-9-CM: V45.81  4/7/2019        Severe aortic stenosis ICD-10-CM: I35.0  ICD-9-CM: 424.1  4/7/2019        Hypertension, essential ICD-10-CM: I10  ICD-9-CM: 401.9  4/7/2019        Dyslipidemia ICD-10-CM: E78.5  ICD-9-CM: 272.4  4/7/2019        PVC (premature ventricular contraction) ICD-10-CM: I49.3  ICD-9-CM: 427.69  4/7/2019        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 ([facility] ICD-10-CM: Y53.853I  ICD-9-CM: 996.72  4/7/2019        Typical atrial flutter (HCC) ICD-10-CM: I48.3  ICD-9-CM: 427.32  3/4/2019        Status post catheter ablation of atrial flutter ICD-10-CM: Z98.890  ICD-9-CM: V45.89  3/4/2019    Overview Signed 3/4/2019  5:55 PM by Roseann Villatoro MD     3/4/2019 to NSR and bidrectional line of block             Biventricular ICD (implantable cardioverter-defibrillator) in place ICD-10-CM: Z95.810  ICD-9-CM: V45.02  3/4/2019    Overview Addendum 12/6/2019  4:25 PM by Roseann Villatoro MD     3/4/2019 [employer] BIV ICD  LV lead 12/6/2019             A-fib ([facility] ICD-10-CM: I48.91  ICD-9-CM: 427.31  3/1/2019        Anemia ICD-10-CM: D64.9  ICD-9-CM: 285.9  10/9/2018        Hypothyroidism ICD-10-CM: E03.9  ICD-9-CM: 244.9  10/9/2018        Supratherapeutic INR ICD-10-CM: R79.1  ICD-9-CM: 790.92  10/9/2018        H/O mechanical aortic valve replacement ICD-10-CM: Z95.2  ICD-9-CM: V43.3  10/9/2018              Current Outpatient Medications on File Prior to Visit   Medication Sig Dispense Refill    calcium-cholecalciferol, D3, (CALTRATE 600+D) tablet Take 1 Tab by mouth daily.       carvediloL (COREG) 6.25 mg tablet T1T BID (Patient taking differently: Take 6.25 mg by mouth two (2) times a day.) 180 Tab 2    Entresto 24-26 mg tablet T1T PO EVERY 12 HOURS (Patient taking differently: Take 1 Tab by mouth two (2) times a day.) 180 Tab 2    PARoxetine (PAXIL) 20 mg tablet TAKE 1 TABLET EVERY DAY (Patient taking differently: Take 20 mg by mouth daily.) 30 Tab 5    levothyroxine (SYNTHROID) 150 mcg tablet TAKE 1 TABLET EVERY MORNING FOR THYROID (Patient taking differently: Take 150 mcg by mouth Daily (before breakfast). ) 30 Tab 5    allopurinoL (ZYLOPRIM) 300 mg tablet T1T QD TO PREVENT GOUT (Patient taking differently: Take 300 mg by mouth daily. For gout) 30 Tab 11    atorvastatin (LIPITOR) 40 mg tablet TAKE 1 TAB BY MOUTH NIGHTLY. 25 Tab 3    oxybutynin chloride XL (DITROPAN XL) 10 mg CR tablet Take 10 mg by mouth daily.  spironolactone (ALDACTONE) 25 mg tablet Take 25 mg by mouth daily.  omega 3-dha-epa-fish oil (FISH OIL) 100-160-1,000 mg cap Take 1 Cap by mouth daily.  psyllium (METAMUCIL) 0.52 gram capsule Take 1 Cap by mouth daily.  warfarin (COUMADIN) 2.5 mg tablet 1 tab daily- NEEDS INR CHECK (Patient taking differently: Take 2.5 mg by mouth daily. Note, he also has 1 mg and 5 mg tablet strengths) 90 Tab 3    b complex vitamins (B COMPLEX 1) tablet Take 1 Tab by mouth daily.  CYANOCOBALAMIN, VITAMIN B-12, PO Take 1 Tab by mouth daily. Sciatica      aspirin delayed-release 81 mg tablet Take 81 mg by mouth daily.  therapeutic multivitamin (THERAGRAN) tablet Take 1 Tab by mouth daily.  ascorbic acid, vitamin C, (VITAMIN C) 500 mg tablet Take 1,000 mg by mouth daily.  B.infantis-B.ani-B.long-B.bifi (PROBIOTIC 4X) 10-15 mg TbEC Take 1 Tab by mouth daily.  COQ10, UBIQUINOL, PO Take 1 Tab by mouth daily.  finasteride (PROSCAR) 5 mg tablet Take 5 mg by mouth daily.  tamsulosin (FLOMAX) 0.4 mg capsule Take 0.4 mg by mouth nightly.  FERROUS GLUCONATE PO Take 325 mg by mouth daily.  furosemide (LASIX) 20 mg tablet Take 1 Tab by mouth daily. (Patient taking differently: Take 20 mg by mouth daily. Per patient take 1 pill base on swelling) 90 Tab 1     No current facility-administered medications on file prior to visit. No Known Allergies  Past Medical History:   Diagnosis Date    Acute on chronic systolic heart failure, NYHA class 4 ([facility] 03/01/2019    admit March 2019 7 days, EF 10% got Primacor, BIV implant    Anger     TAKES PAROXETINE TO CONTROL ANGER ISSUES    CAD (coronary artery disease) 1996    CABG 1996, fu nuke 2015 fixed inferior defect    Chronic atrial fibrillation ([facility] 03/2019    RVR3/2019, had ablation of flutter- persistent    Foot drop, left     WEARS METAL BRACE    Gout     Hard of hearing     High cholesterol     Hypertension     Prostate enlargement     Thyroid disease     Unspecified sleep apnea     DOESN'T USE CPAP; \"IT MADE HIM SICK-COLDS, SINUS\", PER WIFE     Past Surgical History:   Procedure Laterality Date    CARDIAC SURG PROCEDURE UNLIST      ANGIOPLASTY WITH CORONARY STENT    CARDIAC SURG PROCEDURE UNLIST  1996    AORTIC VALVE REPLACEMENT    HX ORTHOPAEDIC  2001    ORIF COMPOUND FRACTURE LEFT ANKLE    HX RETINAL DETACHMENT REPAIR  1980s    LEFT EYE    RI COMPRE ELECTROPHYSIOL XM W/LEFT ATRIAL PACNG/REC N/A 3/4/2019    Lt Atrial Pace & Record During Ep Study performed by Sivakumar Mccabe MD at [place] [address], [address] CATH LAB    RI EPHYS EVAL PACG CVDFB PRGRMG/REPRGRMG PARAMETERS N/A 3/4/2019    Eval Icd Generator & Leads W Testing At Implant performed by Sivakumar Mccabe MD at [place] [address] CATH LAB    RI EPHYS EVAL PACG CVDFB PRGRMG/REPRGRMG PARAMETERS N/A 12/6/2019    Eval Icd Generator & Leads W Testing At Implant performed by Sivakumar Mccabe MD at [place] [address] CATH LAB    RI EPHYS EVAL W/ABLATION [address] N/A 3/4/2019    Ablation A-Flutter performed by Sivakumar Mccabe MD at [place] [address] CATH LAB    RI INSJ ELTRD CAR KILO SYS TM INSJ DFB/PM PLS GEN N/A 12/6/2019    Lv Lead Placement performed by Sivakumar Mccabe MD at [place] [address] CATH LAB    RI INSJ/RPLCMT PERM DFB W/TRNSVNS LDS 1/DUAL CHMBR N/A 3/4/2019    INSERT ICD BIV MULTI performed by Sivakumar Mccabe MD at [place] [address] CATH LAB    RI INTRACARDIAC ELECTROPHYSIOLOGIC 3D MAPPING N/A 3/4/2019    Ep 3d Mapping performed by Luisito Mills MD at [address]  CATH LAB    CA RMVL1/DUAL CHMBR IMPLTBL DFB ELTRD TRANSVNS XTRJ N/A 12/6/2019    Laser Lead Extraction performed by Luisito Mills MD at [address]  CATH LAB     Family History   Problem Relation Age of Onset    Stroke Mother         ANEURYSM    Stroke Father      Social History     Tobacco Use    Smoking status: Never Smoker    Smokeless tobacco: Never Used   Substance Use Topics    Alcohol use: Yes     Alcohol/week: 2.5 standard drinks     Types: 3 Standard drinks or equivalent per week     Frequency: 2-4 times a month     Drinks per session: 1 or 2     Binge frequency: Never     Comment: 4 times per month , 1 drink        Review of Systems:   Constitutional: Negative for fever, chills, weight loss, malaise/fatigue. HEENT: Negative for nosebleeds, vision changes. Respiratory: Negative for cough, hemoptysis  Cardiovascular: Negative for chest pain, palpitations, orthopnea, claudication, leg swelling, syncope, and PND. Gastrointestinal: Negative for nausea, vomiting, diarrhea, blood in stool and melena. Genitourinary: Negative for dysuria, and hematuria. Musculoskeletal: Negative for myalgias, arthralgia. Skin: Negative for rash. Heme: Does not bleed or bruise easily. Neurological: Negative for speech change and focal weakness     Objective:     Visit Vitals  /72   Pulse 70   Ht 5' 8\" (1.727 m)   Wt 177 lb (80.3 kg)   BMI 26.91 kg/m²       Physical Exam:   Constitutional: well-developed and well-nourished. No respiratory distress. Head: Normocephalic and atraumatic. Eyes: Pupils are equal, round  ENT: hearing normal  Neck: supple. No JVD present. Cardiovascular: Normal rate, regular rhythm. Exam reveals no gallop and no friction rub. No murmur heard. Normal valve click  Pulmonary/Chest: Effort normal and breath sounds normal. No wheezes.    Abdominal: Soft, no tenderness. Musculoskeletal: no edema. Neurological: alert,oriented. Skin: Skin is warm and dry  Psychiatric: normal mood and affect. Behavior is normal. Judgment and thought content normal.         Assessment/Plan:       ICD-10-CM ICD-9-CM    1. Biventricular ICD (implantable cardioverter-defibrillator) in place  Z95.810 V45.02    2. Chronic systolic congestive heart failure (HCC)  I50.22 428.22      428.0    3. Coronary artery disease involving native coronary artery of native heart with unstable angina pectoris (HCC)  I25.110 414.01      411.1    4. Typical atrial flutter (HCC)  I48.3 427.32    5. S/P CABG (coronary artery bypass graft)  Z95.1 V45.81    6. Status post catheter ablation of atrial flutter  Z98.890 V45.89    7. PVC (premature ventricular contraction)  I49.3 427.69    8. H/O mechanical aortic valve replacement  Z95.2 V43.3    9. Hypertension, essential  I10 401.9    10. Paroxysmal atrial fibrillation (HCC)  I48.0 427.31      LV lead pacing 1.25 V @ 0. 6 ms  92% CRT  84% RA pacing  Batter 5.5 years  AF burden 1.2 %, 15 hrs longest  Slow NSVT    Device works well  He is feeling well and on GDMT   No dizziness  BP is optimal  Coumadin and INR is checked in PCP's office  Wife was present in office follow up with him today     Future Appointments   Date Time Provider Lexie Tiffanie   11/16/2020  9:00 AM Abdirahman Regalado MD [facility] BS AMB   3/8/2021  3:30 PM REMOTE1, BUSTER STAHL BS AMB   6/9/2021 11:45 AM REMOTE1, BUSTER STAHL BS AMB   9/13/2021  1:00 PM REMOTE1, BUSTER STAHL BS AMB   12/2/2021  3:00 PM PACEMAKER3, BUSTER STAHL BS AMB   12/2/2021  3:20 PM MD MARIBETH Fonseca BS AMB       Thank you for involving me in this patient's care and please call with further concerns or questions. Dee Vilchis M.D.   Electrophysiology/Cardiology  [facility]  [address], [address] [place] [address], [address] [place] [address] [phone]

## 2020-11-14 NOTE — PROGRESS NOTES
HISTORY OF PRESENT ILLNESS  Sylvester Arcos is a 80 y.o. male. HPI   Fu HTN HLD s/p mech AVR CHF s/p Biv -ICD ef 15 %  , Alutter s/p ablation, MDD  Due for INR--last month 1.5  No cp with activity   No increased sob  Only c/o b/l knee and hip pain  Very [place]  Denies feeling depressed    Last OV    F/u HTN HLD    Medicare wellness----     C/o tingling in b/l arms but mostly left arm when driving his car to fingertips x 1-2 years . Some neck pain  Has stable LENNON per wife , saw cardiologist recently  No cp      Well overdue for INR check--did not like going to [facility] and did not get into inr clinic --wife stares per phone changed and could not accept message     She is concered he is on entresto and aldacatone for chf d/t potassium elevation concers        Last OV     Pt here to establish care  No PCP -Dr SIMENTAL [facility]  Cardiologist Dr Doug Faith and Dr Haley Eddy  Hx CHF ef 40% s/p Biv-ICD, CAD s/p CABG, severe AS s/p Mech AVR, chronic afib. htn dyslipidemia  Hypothyroid BPH hx gout  INR managed by cardiologist over last year but wife requests to be done at our office  Sees Dr Mukesh aragon---ckd3   Sees Dr Kevin Billingsley for BPH  Had recent EP procedure-new LV lead implant  No gout attacks this year--last episode was last summer  Takes paxil for anger issues and hx MDD-well controlled on paxil per wife  Uses cane for walking--b/l knee OA nad left ankle fusion  Wears hearing aids but increased hearing loss recently  Has large right groin hernia--no pain in the groin area ?  Getting larger over time   Never a smoker  Light etoh   5 grown children  Retired-owned a car dealership in [address]    Patient Active Problem List    Diagnosis Date Noted    Hx of cardiac pacemaker 12/06/2019     Priority: 1 - One    Depression, major, recurrent, mild ([address] 03/10/2020    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 12/06/2019    Chronic systolic congestive heart failure ([address] 04/07/2019    LENNON (dyspnea on exertion) 04/07/2019    Coronary artery disease involving native coronary artery of native heart with unstable angina pectoris ([facility] 04/07/2019    S/P CABG (coronary artery bypass graft) 04/07/2019    Severe aortic stenosis 04/07/2019    Hypertension, essential 04/07/2019    Dyslipidemia 04/07/2019    PVC (premature ventricular contraction) 04/07/2019    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 (HCC) 04/07/2019    Typical atrial flutter ([facility] 03/04/2019    Status post catheter ablation of atrial flutter 03/04/2019    Biventricular ICD (implantable cardioverter-defibrillator) in place 03/04/2019    A-fib ([facility] 03/01/2019    Anemia 10/09/2018    Hypothyroidism 10/09/2018    Supratherapeutic INR 10/09/2018    H/O mechanical aortic valve replacement 10/09/2018     Current Outpatient Medications   Medication Sig Dispense Refill    carvediloL (COREG) 6.25 mg tablet T1T BID (Patient taking differently: Take 6.25 mg by mouth two (2) times a day.) 180 Tab 2    Entresto 24-26 mg tablet T1T PO EVERY 12 HOURS (Patient taking differently: Take 1 Tab by mouth two (2) times a day.) 180 Tab 2    PARoxetine (PAXIL) 20 mg tablet TAKE 1 TABLET EVERY DAY (Patient taking differently: Take 20 mg by mouth daily.) 30 Tab 5    levothyroxine (SYNTHROID) 150 mcg tablet TAKE 1 TABLET EVERY MORNING FOR THYROID (Patient taking differently: Take 150 mcg by mouth Daily (before breakfast). ) 30 Tab 5    allopurinoL (ZYLOPRIM) 300 mg tablet T1T QD TO PREVENT GOUT (Patient taking differently: Take 300 mg by mouth daily. For gout) 30 Tab 11    atorvastatin (LIPITOR) 40 mg tablet TAKE 1 TAB BY MOUTH NIGHTLY. 25 Tab 3    oxybutynin chloride XL (DITROPAN XL) 10 mg CR tablet Take 10 mg by mouth daily.  spironolactone (ALDACTONE) 25 mg tablet Take 25 mg by mouth daily.  omega 3-dha-epa-fish oil (FISH OIL) 100-160-1,000 mg cap Take 1 Cap by mouth daily.  psyllium (METAMUCIL) 0.52 gram capsule Take 1 Cap by mouth daily.       warfarin (COUMADIN) 2.5 mg tablet 1 tab daily- NEEDS INR CHECK (Patient taking differently: Take 2.5 mg by mouth daily. Note, he also has 1 mg and 5 mg tablet strengths) 90 Tab 3    b complex vitamins (B COMPLEX 1) tablet Take 1 Tab by mouth daily.  CYANOCOBALAMIN, VITAMIN B-12, PO Take 1 Tab by mouth daily. Sciatica      aspirin delayed-release 81 mg tablet Take 81 mg by mouth daily.  therapeutic multivitamin (THERAGRAN) tablet Take 1 Tab by mouth daily.  ascorbic acid, vitamin C, (VITAMIN C) 500 mg tablet Take 1,000 mg by mouth daily.  B.infantis-B.ani-B.long-B.bifi (PROBIOTIC 4X) 10-15 mg TbEC Take 1 Tab by mouth daily.  COQ10, UBIQUINOL, PO Take 1 Tab by mouth daily.  finasteride (PROSCAR) 5 mg tablet Take 5 mg by mouth daily.  tamsulosin (FLOMAX) 0.4 mg capsule Take 0.4 mg by mouth nightly.  FERROUS GLUCONATE PO Take 325 mg by mouth daily.  furosemide (LASIX) 20 mg tablet Take 1 Tab by mouth daily. (Patient taking differently: Take 20 mg by mouth daily.  Per patient take 1 pill base on swelling) 90 Tab 1     No Known Allergies   Lab Results   Component Value Date/Time    WBC 5.7 08/09/2020 08:14 PM    HGB 13.2 08/09/2020 08:14 PM    HCT 39.3 08/09/2020 08:14 PM    PLATELET 608 (L) 21/36/2781 08:14 PM    MCV 98.0 08/09/2020 08:14 PM     Lab Results   Component Value Date/Time    Hemoglobin A1c 5.9 10/08/2013 10:41 AM    Glucose 96 08/09/2020 09:15 PM    Glucose (POC) 119 (H) 10/24/2013 06:28 AM    LDL, calculated 83 03/10/2020 11:17 AM    Creatinine (POC) 1.6 (H) 06/18/2018 01:48 PM    Creatinine 1.13 08/09/2020 09:15 PM      Lab Results   Component Value Date/Time    Cholesterol, total 160 03/10/2020 11:17 AM    HDL Cholesterol 45 03/10/2020 11:17 AM    LDL, calculated 83 03/10/2020 11:17 AM    Triglyceride 160 (H) 03/10/2020 11:17 AM     Lab Results   Component Value Date/Time    GFR est non-AA >60 08/09/2020 09:15 PM    GFRNA, POC 41 (L) 06/18/2018 01:48 PM    GFR est AA >60 08/09/2020 09:15 PM    GFRAA, POC 50 (L) 06/18/2018 01:48 PM    Creatinine 1.13 08/09/2020 09:15 PM    Creatinine (POC) 1.6 (H) 06/18/2018 01:48 PM    BUN 17 08/09/2020 09:15 PM    Sodium 142 08/09/2020 09:15 PM    Potassium 3.4 (L) 08/09/2020 09:15 PM    Chloride 112 (H) 08/09/2020 09:15 PM    CO2 25 08/09/2020 09:15 PM    Magnesium 2.0 03/01/2019 09:10 AM        ROS    Physical Exam  Vitals signs and nursing note reviewed. Constitutional:       General: He is not in acute distress. Appearance: He is well-developed. Comments: Appears stated age, very [place]   HENT:      Head: Normocephalic. Cardiovascular:      Rate and Rhythm: Normal rate and regular rhythm. Heart sounds: Normal heart sounds. Pulmonary:      Effort: Pulmonary effort is normal.      Breath sounds: Normal breath sounds. Abdominal:      Palpations: Abdomen is soft. Neurological:      Mental Status: He is alert. ASSESSMENT and PLAN  Diagnoses and all orders for this visit:    1. Atrial fibrillation, unspecified type ([address]  -     PROTHROMBIN TIME + INR   Rate cotrolled     FU EP MD and card MD    2. S/P AVR (aortic valve replacement)  -     PROTHROMBIN TIME + INR   Fu INR clinic in 1 month    3. Chronic systolic congestive heart failure (HCC)  -     METABOLIC PANEL, BASIC   compensated   Fu radha TABARES  4. Major depressive disorder, remission status unspecified, unspecified whether recurrent   Remission on SSRI-  5. Encounter for immunization  -     PNEUMOCOCCAL POLYSACCHARIDE VACCINE, 23-VALENT, ADULT OR IMMUNOSUPPRESSED PT DOSE,  6. B/L Knee pain   Offered referral to ortho MD-pt declines    Follow-up and Dispositions    · Return in about 6 months (around 5/16/2021) for CHF s/p mech avr MDD .

## 2020-11-16 ENCOUNTER — OFFICE VISIT (OUTPATIENT)
Dept: INTERNAL MEDICINE CLINIC | Age: 85
End: 2020-11-16
Payer: MEDICARE

## 2020-11-16 ENCOUNTER — HOSPITAL ENCOUNTER (OUTPATIENT)
Dept: LAB | Age: 85
Discharge: HOME | End: 2020-11-16
Payer: MEDICARE

## 2020-11-16 VITALS
BODY MASS INDEX: 26.83 KG/M2 | WEIGHT: 177 LBS | HEART RATE: 68 BPM | DIASTOLIC BLOOD PRESSURE: 84 MMHG | SYSTOLIC BLOOD PRESSURE: 136 MMHG | OXYGEN SATURATION: 99 % | HEIGHT: 68 IN | RESPIRATION RATE: 18 BRPM

## 2020-11-16 DIAGNOSIS — Z95.2 S/P AVR (AORTIC VALVE REPLACEMENT): ICD-10-CM

## 2020-11-16 DIAGNOSIS — I50.22 CHRONIC SYSTOLIC CONGESTIVE HEART FAILURE (HCC): ICD-10-CM

## 2020-11-16 DIAGNOSIS — I48.91 ATRIAL FIBRILLATION, UNSPECIFIED TYPE (HCC): Primary | ICD-10-CM

## 2020-11-16 DIAGNOSIS — Z23 ENCOUNTER FOR IMMUNIZATION: ICD-10-CM

## 2020-11-16 DIAGNOSIS — F32.9 MAJOR DEPRESSIVE DISORDER, REMISSION STATUS UNSPECIFIED, UNSPECIFIED WHETHER RECURRENT: ICD-10-CM

## 2020-11-16 PROCEDURE — 36415 COLL VENOUS BLD VENIPUNCTURE: CPT

## 2020-11-16 PROCEDURE — 1101F PT FALLS ASSESS-DOCD LE1/YR: CPT | Performed by: INTERNAL MEDICINE

## 2020-11-16 PROCEDURE — G9717 DOC PT DX DEP/BP F/U NT REQ: HCPCS | Performed by: INTERNAL MEDICINE

## 2020-11-16 PROCEDURE — G8536 NO DOC ELDER MAL SCRN: HCPCS | Performed by: INTERNAL MEDICINE

## 2020-11-16 PROCEDURE — 85610 PROTHROMBIN TIME: CPT

## 2020-11-16 PROCEDURE — G8427 DOCREV CUR MEDS BY ELIG CLIN: HCPCS | Performed by: INTERNAL MEDICINE

## 2020-11-16 PROCEDURE — 99214 OFFICE O/P EST MOD 30 MIN: CPT | Performed by: INTERNAL MEDICINE

## 2020-11-16 PROCEDURE — G8417 CALC BMI ABV UP PARAM F/U: HCPCS | Performed by: INTERNAL MEDICINE

## 2020-11-16 PROCEDURE — G8754 DIAS BP LESS 90: HCPCS | Performed by: INTERNAL MEDICINE

## 2020-11-16 PROCEDURE — G0463 HOSPITAL OUTPT CLINIC VISIT: HCPCS | Performed by: INTERNAL MEDICINE

## 2020-11-16 PROCEDURE — 90732 PPSV23 VACC 2 YRS+ SUBQ/IM: CPT

## 2020-11-16 PROCEDURE — G8752 SYS BP LESS 140: HCPCS | Performed by: INTERNAL MEDICINE

## 2020-11-16 PROCEDURE — 80048 BASIC METABOLIC PNL TOTAL CA: CPT

## 2020-11-16 NOTE — PATIENT INSTRUCTIONS
1. Have you been to the ER, urgent care clinic since your last visit? Hospitalized since your last visit? No    2. Have you seen or consulted any other health care providers outside of the [address] [place] [address] since your last visit? Include any pap smears or colon screening. No    Office Policies    Phone calls/patient messages:            Please allow up to 24 hours for someone in the office to contact you about your call or message. Be mindful your provider may be out of the office or your message may require further review. We encourage you to use [employer] for your messages as this is a faster, more efficient way to communicate with our office                         Medication Refills:            Prescription medications require 48-72 business hours to process. We encourage you to use [employer] for your refills. For controlled medications: Please allow 72 business hours to process. Certain medications may require you to  a written prescription at our office. NO narcotic/controlled medications will be prescribed after 4pm Monday through Friday or on weekends              Form/Paperwork Completion:            Please note a $25 fee may incur for all paperwork for completed by our providers. We ask that you allow 7-10 business days. Pre-payment is due prior to picking up/faxing the completed form. You may also download your forms to [employer] to have your doctor print off.

## 2020-11-16 NOTE — PROGRESS NOTES
Daniela Villanueva is a 80 y.o. male who presents for routine immunizations. He denies any symptoms , reactions or allergies that would exclude them from being immunized today. Risks and adverse reactions were discussed and the VIS was given to them. All questions were addressed. He was observed for 10 min post injection. There were no reactions observed.     Alban Jeffries LPN

## 2020-11-17 LAB
BUN SERPL-MCNC: 18 MG/DL (ref 8–27)
BUN/CREAT SERPL: 14 (ref 10–24)
CALCIUM SERPL-MCNC: 8.9 MG/DL (ref 8.6–10.2)
CHLORIDE SERPL-SCNC: 108 MMOL/L (ref 96–106)
CO2 SERPL-SCNC: 20 MMOL/L (ref 20–29)
CREAT SERPL-MCNC: 1.26 MG/DL (ref 0.76–1.27)
GLUCOSE SERPL-MCNC: 106 MG/DL (ref 65–99)
INR PPP: 2.2 (ref 0.9–1.2)
POTASSIUM SERPL-SCNC: 3.9 MMOL/L (ref 3.5–5.2)
PROTHROMBIN TIME: 23.2 SEC (ref 9.1–12)
SODIUM SERPL-SCNC: 144 MMOL/L (ref 134–144)

## 2020-11-17 NOTE — PROGRESS NOTES
Linda Small, please inform pt regarding INR and to fu in INR clinic 12/16   Bmp ok  Continue medicines

## 2020-11-23 ENCOUNTER — TELEPHONE (OUTPATIENT)
Dept: INTERNAL MEDICINE CLINIC | Age: 85
End: 2020-11-23

## 2020-11-23 DIAGNOSIS — I48.3 TYPICAL ATRIAL FLUTTER (HCC): ICD-10-CM

## 2020-11-23 DIAGNOSIS — Z95.2 H/O MECHANICAL AORTIC VALVE REPLACEMENT: ICD-10-CM

## 2020-11-23 DIAGNOSIS — I48.91 ATRIAL FIBRILLATION, UNSPECIFIED TYPE (HCC): ICD-10-CM

## 2020-11-23 NOTE — TELEPHONE ENCOUNTER
Pharmacy Progress Note - Telephone Encounter    S/O: Mr. Sylvester Arcos 80 y.o. male, referred by Dr. Azul Pierre MD, was contacted via an outbound telephone call to discuss his recent lab results today. Verified patients identifiers (name & ) per HIPAA policy. Results for orders placed or performed in visit on    METABOLIC PANEL, BASIC   Result Value Ref Range    Glucose 106 (H) 65 - 99 mg/dL    BUN 18 8 - 27 mg/dL    Creatinine 1.26 0.76 - 1.27 mg/dL    GFR est non-AA 52 (L) >59 mL/min/1.73    GFR est AA 60 >59 mL/min/1.73    BUN/Creatinine ratio 14 10 - 24    Sodium 144 134 - 144 mmol/L    Potassium 3.9 3.5 - 5.2 mmol/L    Chloride 108 (H) 96 - 106 mmol/L    CO2 20 20 - 29 mmol/L    Calcium 8.9 8.6 - 10.2 mg/dL   PROTHROMBIN TIME + INR   Result Value Ref Range    INR 2.2 (H) 0.9 - 1.2    Prothrombin time 23.2 (H) 9.1 - 12.0 sec     A/P:  - Discuss patient's recent labs results. INR therapeutic. Continue warfarin 2.5 mg daily.   - Pt scheduled for 20 for INR follow up. - Patient endorses understanding to the provided information. All questions answered at this time.        Thank you,  Blanca Jackson, PharmD, BCACP, [facility]      CLINICAL PHARMACY CONSULT: MED RECONCILIATION/REVIEW ADDENDUM    For Pharmacy Admin Tracking Only    PHSO: PHSO Patient?: Yes    Time Spent (min): 8

## 2020-12-02 ENCOUNTER — TELEPHONE (OUTPATIENT)
Dept: INTERNAL MEDICINE CLINIC | Age: 85
End: 2020-12-02

## 2020-12-02 NOTE — TELEPHONE ENCOUNTER
Patient's wife, Jamar Cash states she needs a call back to discuss patient has Blood In Urine & passing some clots & needs to get an appt for patient to be seen or get an appt for [facility] for INR as that way be the problem. Please call to advise.  Thank you

## 2020-12-02 NOTE — TELEPHONE ENCOUNTER
Spoke with pt's wife, Paddy Patel. Paddy Patel stated pt noticed blood in his urine last night and has not stopped. Pt now passing blood clots. Paddy Patel stated last time this happened it was d/t pt missing his dosage of warfarin. Paddy Patel requesting pt get his INR checked asap. Pt follows Dr. Niels Villalta with urology but has not reached out to him yet. Notified Paddy Patel to call Dr. Niels Villalta. Notified Paddy Patel I would send message to Dr. Radha Juarez and call back with his recommendations either this evening or tomorrow d/t Dr. Radha Juarez being gone for the evening. Recommended pt go to the ED if not able to speak with dr. Niels Villalta. Paddy Patel verbalized understanding of information discussed w/ no further questions at this time.

## 2020-12-03 ENCOUNTER — CLINICAL SUPPORT (OUTPATIENT)
Dept: INTERNAL MEDICINE CLINIC | Age: 85
End: 2020-12-03

## 2020-12-03 DIAGNOSIS — I48.20 CHRONIC ATRIAL FIBRILLATION (HCC): ICD-10-CM

## 2020-12-03 DIAGNOSIS — R79.1 SUPRATHERAPEUTIC INR: Primary | ICD-10-CM

## 2020-12-03 LAB
INR BLD: 2
PT POC: 28 SECONDS
VALID INTERNAL CONTROL?: YES

## 2020-12-03 PROCEDURE — 85610 PROTHROMBIN TIME: CPT | Performed by: INTERNAL MEDICINE

## 2020-12-03 NOTE — TELEPHONE ENCOUNTER
INR nurse visit scheduled for today prior to urology appointment @ 10. Ortega Delatorre verbalized understanding of information discussed w/ no further questions at this time.

## 2020-12-03 NOTE — TELEPHONE ENCOUNTER
[facility] Solorz[facility] Front Office Pool               Caller's first and last name and relationship (if not the patient): Chencho Sahu, wife     Best contact number(s): [phone]     What are the symptoms: Blood in urine. Transfer successful - yes/no (include outcome): No, no answer at backline. Transfer declined - yes/no (include reason): No, no answer at backline. Was caller advised to seek appropriate level of care - yes/no:  Yes     Details to clarify the request: Pt wife calling advising her  has blood in urine very badly,   attempted to schedule appt. for in office per patient request, booking out until 12/22/20 and   patient cannot wait that long. Patient had contacted MD Yaya Louis as well for assistance, no answer. Patient advised to seek medical care in area if condition worsens. Patient requesting appt. today 12/02/20 or tomorrow 12/03/20.

## 2020-12-04 ENCOUNTER — TELEPHONE (OUTPATIENT)
Dept: INTERNAL MEDICINE CLINIC | Age: 85
End: 2020-12-04

## 2020-12-04 DIAGNOSIS — I48.3 TYPICAL ATRIAL FLUTTER (HCC): ICD-10-CM

## 2020-12-04 DIAGNOSIS — Z95.2 H/O MECHANICAL AORTIC VALVE REPLACEMENT: ICD-10-CM

## 2020-12-04 DIAGNOSIS — I48.91 ATRIAL FIBRILLATION, UNSPECIFIED TYPE (HCC): ICD-10-CM

## 2020-12-04 NOTE — TELEPHONE ENCOUNTER
Pharmacy Progress Note - Telephone Encounter    S/O: Mr. Jame Rodrigues 80 y.o. 's wife, Nancie Blank contacted office/me via an inbound telephone call to discuss patient's INR result today . Melanie's cleared for patient's PHI.     - Reports she could not join patient during his visit yesterday. Pt did not share INR result with patient. - Pt has missed Wednesday and Thursday's dose of warfarin. Current regimen warfarin 2.5 mg daily.   - Reports hematuria has \"slightly improved. \"      - Pt was supposed to see urologist yesterday but got lost on his way to their office. Appointment now r/s'ed for next Tuesday (12/7/20). Results for orders placed or performed in visit on 12/03/20   AMB POC PT/INR   Result Value Ref Range    VALID INTERNAL CONTROL POC Yes     Prothrombin time (POC) 28.0 seconds    INR POC 2.0        A/P:  - Shared patient's INR was 2.0 - therapeutic for goal.  - Resume warfarin 2.5 mg daily tonight. - Pt already has scheduled follow up with me on 12/16/20.   Henna Davis endorses understanding to the provided information. All questions answered at this time.        Thank you,  Blanca Horner, PharmD, BCACP, [facility]            CLINICAL PHARMACY CONSULT: MED RECONCILIATION/REVIEW ADDENDUM    For Pharmacy Admin Tracking Only    PHSO: PHSO Patient?: Yes  Total # of Interventions Recommended: Count: 1  - Maintenance Safety Lab Monitoring #: 1    Time Spent (min): 10

## 2020-12-04 NOTE — TELEPHONE ENCOUNTER
----- Message from Cristofer Ching sent at 12/4/2020  2:46 PM EST -----  Regarding: Dr. Aruna Crum telephone  Caller's first and last name: Penny Sequeira / Wife  Reason for call: PT/INR Results from yesterday   Callback required yes/no and why: Yes  Best contact number(s): [phone]  Details to clarify the request: Pt needs to know results because he was having blood in urine    Message from [facility]

## 2020-12-16 ENCOUNTER — TELEPHONE (OUTPATIENT)
Dept: INTERNAL MEDICINE CLINIC | Age: 85
End: 2020-12-16

## 2020-12-16 NOTE — TELEPHONE ENCOUNTER
Called patient and rescheduled his appointment with Gio Sharp on 12/16 at 10:45 am to 12/22 at 3:30 pm.

## 2020-12-16 NOTE — TELEPHONE ENCOUNTER
Pharmacy Progress Note - Telephone Call    Mr. Keira Castaneda 80 y.o. was contacted via an outbound telephone call regarding his missed INR follow up appt today. A voicemail was left for patient to return my call.        Thank you,  Blanca Welsh, PharmD, BCACP, [facility]        CLINICAL PHARMACY CONSULT: MED RECONCILIATION/REVIEW ADDENDUM    For Pharmacy Admin Tracking Only    PHSO: PHSO Patient?: Yes

## 2020-12-16 NOTE — TELEPHONE ENCOUNTER
----- Message from Manuel Sanchez sent at 12/16/2020 11:40 AM EST -----  Regarding: : Dr. Noe Harvey first and last name: Nitza Mace, wife  Reason for call: pt missed anticoag appt with Dr. Linda Gomez on 12/16/2020 at 10:45am and needs to reschedule  Callback required yes/no and why: yes/to reschedule appt  Best contact number(s): [phone]  Details to clarify the request:    Message from [facility]

## 2020-12-22 ENCOUNTER — ANTI-COAG VISIT (OUTPATIENT)
Dept: INTERNAL MEDICINE CLINIC | Age: 85
End: 2020-12-22
Payer: MEDICARE

## 2020-12-22 VITALS — SYSTOLIC BLOOD PRESSURE: 114 MMHG | DIASTOLIC BLOOD PRESSURE: 70 MMHG | HEART RATE: 70 BPM

## 2020-12-22 DIAGNOSIS — I48.91 ATRIAL FIBRILLATION, UNSPECIFIED TYPE (HCC): ICD-10-CM

## 2020-12-22 DIAGNOSIS — Z95.2 H/O MECHANICAL AORTIC VALVE REPLACEMENT: ICD-10-CM

## 2020-12-22 DIAGNOSIS — I48.3 TYPICAL ATRIAL FLUTTER (HCC): ICD-10-CM

## 2020-12-22 LAB
INR BLD: 1.8 (ref 1–1.5)
PT POC: 21.9 SECONDS (ref 9.1–12)
VALID INTERNAL CONTROL?: YES

## 2020-12-22 PROCEDURE — G0463 HOSPITAL OUTPT CLINIC VISIT: HCPCS | Performed by: PHARMACIST

## 2020-12-22 PROCEDURE — 85610 PROTHROMBIN TIME: CPT | Performed by: PHARMACIST

## 2020-12-22 PROCEDURE — 36416 COLLJ CAPILLARY BLOOD SPEC: CPT | Performed by: PHARMACIST

## 2020-12-22 NOTE — PATIENT INSTRUCTIONS
Today your INR was 1.8. Your goal INR is  2.0-3.0 . You have a  1 mg, 2.5 mg, 5 mg tablet of Coumadin (warfarin). Take Coumadin (warfarin) as follows: Take 5 mg (one tablet of 5 mg strength) tonight (12/22/20). Then continue warfarin 2.5 mg daily. Come back in 2 week(s) for your next finger stick/INR blood test.        Avoid any over the counter items containing aspirin or ibuprofen, and avoid great swings in general diet. Avoid alcohol consumption. Please notify the INR nurse if you are started on any new medication including over the counter or herbal supplements. Also, please notify your INR nurse if any of your other prescription or over the counter medications have been discontinued. Call [facility] at [phone] if you have any signs of abnormal bleeding/blood clot.  ------------------------------------------------------------------------------------------------------------------  Taking Warfarin Safely: Care Instructions    Your Care Instructions  Warfarin is a medicine that you take to prevent blood clots. It is often called a blood thinner. Doctors give warfarin (such as Coumadin) to reduce the risk of blood clots. You may be at risk for blood clots if you have atrial fibrillation or deep vein thrombosis. Some other health problems may also put you at risk. Warfarin slows the amount of time it takes for your blood to clot. It can cause bleeding problems. Even if you've been taking warfarin for a while, it's important to know how to take it safely. Foods and other medicines can affect the way warfarin works. Some can make warfarin work too well. This can cause bleeding problems. And some can make it work poorly, so that it does not prevent blood clots very well. You will need regular blood tests to check how long it takes for your blood to form a clot. This test is called a PT or prothrombin time test. The result of the test is called an INR level.  Depending on the test results, your doctor or anticoagulation clinic may adjust your dose of warfarin. Follow-up care is a key part of your treatment and safety. Be sure to make and go to all appointments, and call your doctor if you are having problems. It's also a good idea to know your test results and keep a list of the medicines you take. How can you care for yourself at home? Take warfarin safely  · Take your warfarin at the same time each day. · If you miss a dose of warfarin, don't take an extra dose to make up for it. Your doctor can tell you exactly what to do so you don't take too much or too little. · Wear medical alert jewelry that lets others know that you take warfarin. You can buy this at most drugstores. · Don't take warfarin if you are pregnant or planning to get pregnant. Talk to your doctor about how you can prevent getting pregnant while you are taking it. · Don't change your dose or stop taking warfarin unless your doctor tells you to. Effects of medicines and food on warfarin  · Don't start or stop taking any medicines, vitamins, or natural remedies unless you first talk to your doctor. Many medicines can affect how warfarin works. These include aspirin and other pain relievers, over-the-counter medicines, multivitamins, dietary supplements, and herbal products. · Tell all of your doctors and pharmacists that you take warfarin. Some prescription medicines can affect how warfarin works. · Keep the amount of vitamin K in your diet about the same from day to day. Do not suddenly eat a lot more or a lot less food that is rich in vitamin K than you usually do. Vitamin K affects how warfarin works and how your blood clots. Talk with your doctor before making big changes in your diet. Vitamin K is in many foods, such as:  ¨ Leafy greens, such as kale, cabbage, spinach, Swiss chard, and lettuce. ¨ Canola and soybean oils.   ¨ Green vegetables, such as asparagus, broccoli, and [address] sprouts. ¨ Vegetable drinks, green tea leaves, and some dietary supplement drinks. · Avoid cranberry juice and other cranberry products. They can increase the effects of warfarin. · Limit your use of alcohol. Avoid bleeding by preventing falls and injuries  · Wear slippers or shoes with nonskid soles. · Remove throw rugs and clutter. · Rearrange furniture and electrical cords to keep them out of walking paths. · Keep stairways, porches, and outside walkways well lit. Use night-lights in hallways and bathrooms. · Be extra careful when you work with sharp tools or knives. When should you call for help? Call 911 anytime you think you may need emergency care. For example, call if:  · You have a sudden, severe headache that is different from past headaches. Call your doctor now or seek immediate medical care if:  · You have any abnormal bleeding, such as:  ¨ Nosebleeds. ¨ Vaginal bleeding that is different (heavier, more frequent, at a different time of the month) than what you are used to. ¨ Bloody or black stools, or rectal bleeding. ¨ Bloody or pink urine. Watch closely for changes in your health, and be sure to contact your doctor if you have any problems. Where can you learn more? Go to http://www.gray.com/. Enter D085 in the search box to learn more about \"Taking Warfarin Safely: Care Instructions. \"  Current as of: January 27, 2016  Content Version: 11.1  © 4930-5149 [employer]. Care instructions adapted under license by [employer] (which disclaims liability or warranty for this information). If you have questions about a medical condition or this instruction, always ask your healthcare professional. [place] [address] any warranty or liability for your use of this information.

## 2020-12-22 NOTE — PROGRESS NOTES
Pharmacy Progress Note - Anticoagulation Management    S/O: Mr. Puneet Casey  is a 80 y. o. male, referred by Jayy Ritchie MD, was seen today for anticoagulation management for the diagnosis of Atrial Fibrillation and Aortic Valve Replacement  ([facility] -1996), s/p pacemaker. Was accompanied by his wife today    Interim history:  Saw urologist last week -   Hematuria now resolved. Was told it was kidney stones and recommended for pt to increase fluid intake. · Warfarin start date: ~1996  · INR Goal:  2.0-3.0    · Current warfarin regimen:  2.5 mg daily                  · Warfarin tablet strength:   1 mg, 2.5 mg, 5 mg  -- wife states she does not want to split tablets in half ; generally takes 2.5 mg tab. Able to recognize tablet strength  · Duration of therapy: indefinite  · Takes warfarin in the evening    Today's pertinent positives includes:    Denies CP/SOB/LE & UE pain/HA at this time    Results for orders placed or performed in visit on 12/22/20   AMB POC PT/INR   Result Value Ref Range    VALID INTERNAL CONTROL POC Yes     Prothrombin time (POC) 21.9 (A) 9.1 - 12 seconds    INR POC 1.8 (A) 1 - 1.5       · Adherence:   · Able to recall regimen? YES  · Miss/extra dose? NO  · Need refill?  NO    Upcoming procedure(s):  NO      Past Medical History:   Diagnosis Date    Acute on chronic systolic heart failure, NYHA class 4 ([facility] [address] 03/01/2019    admit March 2019 7 days, EF 10% got Primacor, BIV implant    Anger     TAKES PAROXETINE TO CONTROL ANGER ISSUES    CAD (coronary artery disease) 1996    CABG 1996, mukul celis 2015 fixed inferior defect    Chronic atrial fibrillation ([facility] [address] 03/2019    RVR3/2019, had ablation of flutter- persistent    Foot drop, left     WEARS METAL BRACE    Gout     Hard of hearing     High cholesterol     Hypertension     Prostate enlargement     Thyroid disease     Unspecified sleep apnea     DOESN'T USE CPAP; \"IT MADE HIM SICK-COLDS, SINUS\", PER WIFE     No Known Allergies  Current Outpatient Medications   Medication Sig    carvediloL (COREG) 6.25 mg tablet T1T BID (Patient taking differently: Take 6.25 mg by mouth two (2) times a day.)    Entresto 24-26 mg tablet T1T PO EVERY 12 HOURS (Patient taking differently: Take 1 Tab by mouth two (2) times a day.)    PARoxetine (PAXIL) 20 mg tablet TAKE 1 TABLET EVERY DAY (Patient taking differently: Take 20 mg by mouth daily.)    levothyroxine (SYNTHROID) 150 mcg tablet TAKE 1 TABLET EVERY MORNING FOR THYROID (Patient taking differently: Take 150 mcg by mouth Daily (before breakfast). )    allopurinoL (ZYLOPRIM) 300 mg tablet T1T QD TO PREVENT GOUT (Patient taking differently: Take 300 mg by mouth daily. For gout)    atorvastatin (LIPITOR) 40 mg tablet TAKE 1 TAB BY MOUTH NIGHTLY.  oxybutynin chloride XL (DITROPAN XL) 10 mg CR tablet Take 10 mg by mouth daily.  spironolactone (ALDACTONE) 25 mg tablet Take 25 mg by mouth daily.  omega 3-dha-epa-fish oil (FISH OIL) 100-160-1,000 mg cap Take 1 Cap by mouth daily.  psyllium (METAMUCIL) 0.52 gram capsule Take 1 Cap by mouth daily.  warfarin (COUMADIN) 2.5 mg tablet 1 tab daily- NEEDS INR CHECK (Patient taking differently: Take 2.5 mg by mouth daily. Note, he also has 1 mg and 5 mg tablet strengths)    b complex vitamins (B COMPLEX 1) tablet Take 1 Tab by mouth daily.  CYANOCOBALAMIN, VITAMIN B-12, PO Take 1 Tab by mouth daily. Sciatica    aspirin delayed-release 81 mg tablet Take 81 mg by mouth daily.  therapeutic multivitamin (THERAGRAN) tablet Take 1 Tab by mouth daily.  ascorbic acid, vitamin C, (VITAMIN C) 500 mg tablet Take 1,000 mg by mouth daily.  B.infantis-B.ani-B.long-B.bifi (PROBIOTIC 4X) 10-15 mg TbEC Take 1 Tab by mouth daily.  COQ10, UBIQUINOL, PO Take 1 Tab by mouth daily.  finasteride (PROSCAR) 5 mg tablet Take 5 mg by mouth daily.  tamsulosin (FLOMAX) 0.4 mg capsule Take 0.4 mg by mouth nightly.     FERROUS GLUCONATE PO Take 325 mg by mouth daily.  furosemide (LASIX) 20 mg tablet Take 1 Tab by mouth daily. (Patient taking differently: Take 20 mg by mouth daily. Per patient take 1 pill base on swelling)     No current facility-administered medications for this visit. Wt Readings from Last 3 Encounters:   11/16/20 177 lb (80.3 kg)   11/12/20 177 lb (80.3 kg)   10/07/20 178 lb (80.7 kg)     BP Readings from Last 3 Encounters:   12/22/20 114/70   11/16/20 136/84   11/12/20 132/72     Pulse Readings from Last 3 Encounters:   12/22/20 70   11/16/20 68   11/12/20 70     Lab Results   Component Value Date/Time    WBC 5.7 08/09/2020 08:14 PM    HGB 13.2 08/09/2020 08:14 PM    HCT 39.3 08/09/2020 08:14 PM    PLATELET 292 (L) 95/89/5602 08:14 PM    MCV 98.0 08/09/2020 08:14 PM     Lab Results   Component Value Date/Time    Sodium 144 11/16/2020 10:49 AM    Potassium 3.9 11/16/2020 10:49 AM    Chloride 108 (H) 11/16/2020 10:49 AM    CO2 20 11/16/2020 10:49 AM    Anion gap 5 08/09/2020 09:15 PM    Glucose 106 (H) 11/16/2020 10:49 AM    BUN 18 11/16/2020 10:49 AM    Creatinine 1.26 11/16/2020 10:49 AM    BUN/Creatinine ratio 14 11/16/2020 10:49 AM    GFR est AA 60 11/16/2020 10:49 AM    GFR est non-AA 52 (L) 11/16/2020 10:49 AM    Calcium 8.9 11/16/2020 10:49 AM    Bilirubin, total 1.1 (H) 08/09/2020 09:15 PM    Alk. phosphatase 102 08/09/2020 09:15 PM    Protein, total 6.4 08/09/2020 09:15 PM    Albumin 3.2 (L) 08/09/2020 09:15 PM    Globulin 3.2 08/09/2020 09:15 PM    A-G Ratio 1.0 (L) 08/09/2020 09:15 PM    ALT (SGPT) 23 08/09/2020 09:15 PM     CrCl cannot be calculated (Unknown ideal weight.).       INR History:   (normal INR range 0.8-1.2)     Date   INR   PT   Dose/Comments  12/22/20 1.8  21.9 2.5 mg daily  12/03/20 2.0  28.0 2.5 mg daily; (+) hematuria; missed doses  11/16/20 2.2  23.2 2.5 mg daily  10/07/20          1.5                   18.6     2.5 mg daily; (+) increased vitamin K intake  09/09/20          2.7                   32.4     2.5 mg daily  08/26/20          2.4                   28. 7     5 mg x 1, then 2.5 mg daily.   08/18/20          1.6                   19.5     ED visit; (+) vitamin K; 2.5 mg daily  08/09/20          11.9                 101. 8     07/28/20          2.5                   25.8            A/P:       Anticoagulation:  Considering Mr. Nathalie Guerra past history, todays findings, and per Anticoagulation Collaborative Practice Agreement/Protocol:    1. POC INR (1.8) is subtherapeutic for INR goal today. 2. Take 5 mg tonight. Continue warfarin 2.5 mg daily    Patient was instructed to schedule an appointment in 2 week(s) prior to leaving the clinic. Medication reconciliation was completed during the visit. There are no discontinued medications. A full discussion of the nature of anticoagulants has been carried out. A full discussion of the need for frequent and regular monitoring, precise dosage adjustment and compliance was stressed. Side effects of potential bleeding were discussed and Mr. Carolyn Padilla was instructed to call [phone] if there are any signs of abnormal bleeding. Mr. Carolyn Padilla was instructed to avoid any OTC items containing aspirin or ibuprofen and prior to starting any new OTC products to consult with his physician or pharmacist to ensure no drug interactions are present. Mr. Carolyn Padilla was instructed to avoid any major changes in his general diet and to avoid alcohol consumption. Mr. Carolyn Padilla was provided information in the AVS that includes topics on understanding coumadin therapy, drug interaction considerations, vitamin K and coumadin use, interactions with foods and supplements containing vitamin K, and the use of herbal products. Mr. Carolyn Padilla verbalized his understanding of all instructions and will call the office with any questions, concerns, or signs of abnormal bleeding or blood clot.   Notifications of recommendations will be sent to Dr. Augusto Chowdhury MD for review.     Thank you,  Blanca Miller, PharmD, BCACP, [facility]            CLINICAL PHARMACY CONSULT: MED RECONCILIATION/REVIEW ADDENDUM    For Pharmacy Admin Tracking Only    PHSO: PHSO Patient?: Yes  Total # of Interventions Recommended: Count: 2  - Increased Dose #: 1  - Maintenance Safety Lab Monitoring #: 1    Time Spent (min): 20

## 2021-01-06 ENCOUNTER — ANTI-COAG VISIT (OUTPATIENT)
Dept: INTERNAL MEDICINE CLINIC | Age: 86
End: 2021-01-06
Payer: MEDICARE

## 2021-01-06 VITALS — HEIGHT: 68 IN | WEIGHT: 179 LBS | TEMPERATURE: 96.2 F | BODY MASS INDEX: 27.13 KG/M2

## 2021-01-06 DIAGNOSIS — I48.91 ATRIAL FIBRILLATION, UNSPECIFIED TYPE (HCC): ICD-10-CM

## 2021-01-06 DIAGNOSIS — Z95.2 H/O MECHANICAL AORTIC VALVE REPLACEMENT: ICD-10-CM

## 2021-01-06 DIAGNOSIS — I48.3 TYPICAL ATRIAL FLUTTER (HCC): ICD-10-CM

## 2021-01-06 LAB
INR BLD: 1.6 (ref 1–1.5)
PT POC: 19.5 SECONDS (ref 9.1–12)
VALID INTERNAL CONTROL?: YES

## 2021-01-06 PROCEDURE — 85610 PROTHROMBIN TIME: CPT | Performed by: PHARMACIST

## 2021-01-06 PROCEDURE — 99212 OFFICE O/P EST SF 10 MIN: CPT | Performed by: PHARMACIST

## 2021-01-06 PROCEDURE — 36416 COLLJ CAPILLARY BLOOD SPEC: CPT | Performed by: PHARMACIST

## 2021-01-06 PROCEDURE — G0463 HOSPITAL OUTPT CLINIC VISIT: HCPCS | Performed by: PHARMACIST

## 2021-01-06 NOTE — PATIENT INSTRUCTIONS
Today your INR was 1.6. Your goal INR is  2.0-3.0 . You have a   1 mg , 2.5 mg, and 5 mg tablet of Coumadin (warfarin). Take Coumadin (warfarin) as follows: Take 5 mg tonight (1/6/21) and tomorrow (1/7/21). Then continue warfarin 2.5 mg daily. Come back in  2 week(s) for your next finger stick/INR blood test.        Avoid any over the counter items containing aspirin or ibuprofen, and avoid great swings in general diet. Avoid alcohol consumption. Please notify the INR nurse if you are started on any new medication including over the counter or herbal supplements. Also, please notify your INR nurse if any of your other prescription or over the counter medications have been discontinued. Call [facility] at [phone] if you have any signs of abnormal bleeding/blood clot.  ------------------------------------------------------------------------------------------------------------------  Taking Warfarin Safely: Care Instructions    Your Care Instructions  Warfarin is a medicine that you take to prevent blood clots. It is often called a blood thinner. Doctors give warfarin (such as Coumadin) to reduce the risk of blood clots. You may be at risk for blood clots if you have atrial fibrillation or deep vein thrombosis. Some other health problems may also put you at risk. Warfarin slows the amount of time it takes for your blood to clot. It can cause bleeding problems. Even if you've been taking warfarin for a while, it's important to know how to take it safely. Foods and other medicines can affect the way warfarin works. Some can make warfarin work too well. This can cause bleeding problems. And some can make it work poorly, so that it does not prevent blood clots very well. You will need regular blood tests to check how long it takes for your blood to form a clot. This test is called a PT or prothrombin time test. The result of the test is called an INR level.  Depending on the test results, your doctor or anticoagulation clinic may adjust your dose of warfarin. Follow-up care is a key part of your treatment and safety. Be sure to make and go to all appointments, and call your doctor if you are having problems. It's also a good idea to know your test results and keep a list of the medicines you take. How can you care for yourself at home? Take warfarin safely  · Take your warfarin at the same time each day. · If you miss a dose of warfarin, don't take an extra dose to make up for it. Your doctor can tell you exactly what to do so you don't take too much or too little. · Wear medical alert jewelry that lets others know that you take warfarin. You can buy this at most drugstores. · Don't take warfarin if you are pregnant or planning to get pregnant. Talk to your doctor about how you can prevent getting pregnant while you are taking it. · Don't change your dose or stop taking warfarin unless your doctor tells you to. Effects of medicines and food on warfarin  · Don't start or stop taking any medicines, vitamins, or natural remedies unless you first talk to your doctor. Many medicines can affect how warfarin works. These include aspirin and other pain relievers, over-the-counter medicines, multivitamins, dietary supplements, and herbal products. · Tell all of your doctors and pharmacists that you take warfarin. Some prescription medicines can affect how warfarin works. · Keep the amount of vitamin K in your diet about the same from day to day. Do not suddenly eat a lot more or a lot less food that is rich in vitamin K than you usually do. Vitamin K affects how warfarin works and how your blood clots. Talk with your doctor before making big changes in your diet. Vitamin K is in many foods, such as:  ¨ Leafy greens, such as kale, cabbage, spinach, Swiss chard, and lettuce. ¨ Canola and soybean oils. ¨ Green vegetables, such as asparagus, broccoli, and [place] sprouts.   ¨ Vegetable drinks, green tea leaves, and some dietary supplement drinks. · Avoid cranberry juice and other cranberry products. They can increase the effects of warfarin. · Limit your use of alcohol. Avoid bleeding by preventing falls and injuries  · Wear slippers or shoes with nonskid soles. · Remove throw rugs and clutter. · Rearrange furniture and electrical cords to keep them out of walking paths. · Keep stairways, porches, and outside walkways well lit. Use night-lights in hallways and bathrooms. · Be extra careful when you work with sharp tools or knives. When should you call for help? Call 911 anytime you think you may need emergency care. For example, call if:  · You have a sudden, severe headache that is different from past headaches. Call your doctor now or seek immediate medical care if:  · You have any abnormal bleeding, such as:  ¨ Nosebleeds. ¨ Vaginal bleeding that is different (heavier, more frequent, at a different time of the month) than what you are used to. ¨ Bloody or black stools, or rectal bleeding. ¨ Bloody or pink urine. Watch closely for changes in your health, and be sure to contact your doctor if you have any problems. Where can you learn more? Go to http://www.gray.com/. Enter C600 in the search box to learn more about \"Taking Warfarin Safely: Care Instructions. \"  Current as of: January 27, 2016  Content Version: 11.1  © 5069-6653 [employer]. Care instructions adapted under license by [employer] (which disclaims liability or warranty for this information). If you have questions about a medical condition or this instruction, always ask your healthcare professional. [address] any warranty or liability for your use of this information.

## 2021-01-06 NOTE — PROGRESS NOTES
Pharmacy Progress Note - Anticoagulation Management    S/O: Mr. Puneet Casey  is a 80 y. o. male, referred by Gerardo Harrison MD, was seen today for anticoagulation management for the diagnosis of Atrial Fibrillation and Aortic Valve Replacement  ([facility] -1996), s/p pacemaker. · Warfarin start date: ~1996  · INR Goal:  2.0-3.0    · Current warfarin regimen:  5 mg x 1, then 2.5 mg daily                  · Warfarin tablet strength:   1 mg, 2.5 mg, 5 mg  -- wife states she does not want to split tablets in half ; generally takes 2.5 mg tab. Able to recognize tablet strength  · Duration of therapy: indefinite  · Takes warfarin in the evening    Today's pertinent positives includes:  Change in diet/appetite    (+) vitamin K intake. Denies CP/SOB/HA / LE & UE pain    Results for orders placed or performed in visit on 01/06/21   AMB POC PT/INR   Result Value Ref Range    VALID INTERNAL CONTROL POC Yes     Prothrombin time (POC) 19.5 (A) 9.1 - 12 seconds    INR POC 1.6 (A) 1 - 1.5     · Adherence:   · Able to recall regimen? YES  · Miss/extra dose? NO  · Need refill?  NO    Upcoming procedure(s):  NO      Past Medical History:   Diagnosis Date    Acute on chronic systolic heart failure, NYHA class 4 ([facility] [address] 03/01/2019    admit March 2019 7 days, EF 10% got Primacor, BIV implant    Anger     TAKES PAROXETINE TO CONTROL ANGER ISSUES    CAD (coronary artery disease) 1996    CABG 1996, mukul celis 2015 fixed inferior defect    Chronic atrial fibrillation ([facility] [address] 03/2019    RVR3/2019, had ablation of flutter- persistent    Foot drop, left     WEARS METAL BRACE    Gout     Hard of hearing     High cholesterol     Hypertension     Prostate enlargement     Thyroid disease     Unspecified sleep apnea     DOESN'T USE CPAP; \"IT MADE HIM SICK-COLDS, SINUS\", PER WIFE     No Known Allergies  Current Outpatient Medications   Medication Sig    carvediloL (COREG) 6.25 mg tablet T1T BID (Patient taking differently: Take 6.25 mg by mouth two (2) times a day.)    Entresto 24-26 mg tablet T1T PO EVERY 12 HOURS (Patient taking differently: Take 1 Tab by mouth two (2) times a day.)    PARoxetine (PAXIL) 20 mg tablet TAKE 1 TABLET EVERY DAY (Patient taking differently: Take 20 mg by mouth daily.)    levothyroxine (SYNTHROID) 150 mcg tablet TAKE 1 TABLET EVERY MORNING FOR THYROID (Patient taking differently: Take 150 mcg by mouth Daily (before breakfast). )    allopurinoL (ZYLOPRIM) 300 mg tablet T1T QD TO PREVENT GOUT (Patient taking differently: Take 300 mg by mouth daily. For gout)    atorvastatin (LIPITOR) 40 mg tablet TAKE 1 TAB BY MOUTH NIGHTLY.  oxybutynin chloride XL (DITROPAN XL) 10 mg CR tablet Take 10 mg by mouth daily.  spironolactone (ALDACTONE) 25 mg tablet Take 25 mg by mouth daily.  omega 3-dha-epa-fish oil (FISH OIL) 100-160-1,000 mg cap Take 1 Cap by mouth daily.  psyllium (METAMUCIL) 0.52 gram capsule Take 1 Cap by mouth daily.  warfarin (COUMADIN) 2.5 mg tablet 1 tab daily- NEEDS INR CHECK (Patient taking differently: Take 2.5 mg by mouth daily. Note, he also has 1 mg and 5 mg tablet strengths)    b complex vitamins (B COMPLEX 1) tablet Take 1 Tab by mouth daily.  CYANOCOBALAMIN, VITAMIN B-12, PO Take 1 Tab by mouth daily. Sciatica    aspirin delayed-release 81 mg tablet Take 81 mg by mouth daily.  therapeutic multivitamin (THERAGRAN) tablet Take 1 Tab by mouth daily.  ascorbic acid, vitamin C, (VITAMIN C) 500 mg tablet Take 1,000 mg by mouth daily.  B.infantis-B.ani-B.long-B.bifi (PROBIOTIC 4X) 10-15 mg TbEC Take 1 Tab by mouth daily.  COQ10, UBIQUINOL, PO Take 1 Tab by mouth daily.  finasteride (PROSCAR) 5 mg tablet Take 5 mg by mouth daily.  tamsulosin (FLOMAX) 0.4 mg capsule Take 0.4 mg by mouth nightly.  FERROUS GLUCONATE PO Take 325 mg by mouth daily.  furosemide (LASIX) 20 mg tablet Take 1 Tab by mouth daily. (Patient taking differently: Take 20 mg by mouth daily. Per patient take 1 pill base on swelling)     No current facility-administered medications for this visit. Wt Readings from Last 3 Encounters:   01/06/21 179 lb (81.2 kg)   11/16/20 177 lb (80.3 kg)   11/12/20 177 lb (80.3 kg)     BP Readings from Last 3 Encounters:   12/22/20 114/70   11/16/20 136/84   11/12/20 132/72     Pulse Readings from Last 3 Encounters:   12/22/20 70   11/16/20 68   11/12/20 70     Lab Results   Component Value Date/Time    WBC 5.7 08/09/2020 08:14 PM    HGB 13.2 08/09/2020 08:14 PM    HCT 39.3 08/09/2020 08:14 PM    PLATELET 872 (L) 57/77/0956 08:14 PM    MCV 98.0 08/09/2020 08:14 PM     Lab Results   Component Value Date/Time    Sodium 144 11/16/2020 10:49 AM    Potassium 3.9 11/16/2020 10:49 AM    Chloride 108 (H) 11/16/2020 10:49 AM    CO2 20 11/16/2020 10:49 AM    Anion gap 5 08/09/2020 09:15 PM    Glucose 106 (H) 11/16/2020 10:49 AM    BUN 18 11/16/2020 10:49 AM    Creatinine 1.26 11/16/2020 10:49 AM    BUN/Creatinine ratio 14 11/16/2020 10:49 AM    GFR est AA 60 11/16/2020 10:49 AM    GFR est non-AA 52 (L) 11/16/2020 10:49 AM    Calcium 8.9 11/16/2020 10:49 AM    Bilirubin, total 1.1 (H) 08/09/2020 09:15 PM    Alk. phosphatase 102 08/09/2020 09:15 PM    Protein, total 6.4 08/09/2020 09:15 PM    Albumin 3.2 (L) 08/09/2020 09:15 PM    Globulin 3.2 08/09/2020 09:15 PM    A-G Ratio 1.0 (L) 08/09/2020 09:15 PM    ALT (SGPT) 23 08/09/2020 09:15 PM     Estimated Creatinine Clearance: 41.5 mL/min (by C-G formula based on SCr of 1.26 mg/dL).       INR History:   (normal INR range 0.8-1.2)     Date   INR   PT   Dose/Comments  01/06/21 1.6  19.5 5 mg x 1, then 2.5 mg daily   12/22/20          1.8                   21.9     2.5 mg daily  12/03/20          2.0                   28.0     2.5 mg daily; (+) hematuria; missed doses  11/16/20          2.2                   23.2     2.5 mg daily  10/07/20          1.5                   18.6     2.5 mg daily; (+) increased vitamin K intake  09/09/20          2.7                   32.4     2.5 mg daily  08/26/20          2.4                   28. 7     5 mg x 1, then 2.5 mg daily.   08/18/20          1.6                   19.5     ED visit; (+) vitamin K; 2.5 mg daily  08/09/20          11.9                 101. 8     07/28/20          2.5                   25.8         A/P:       Anticoagulation:  Considering Mr. Kylah Finn past history, todays findings, and per Anticoagulation Collaborative Practice Agreement/Protocol:    1. POC INR (1.6) is Therapeutic for INR goal today. 2. Take warfarin 5 mg tonight and tomorrow. Continue warfarin 2.5 mg daily. Keep greens intake consistent    Patient was instructed to schedule an appointment in 2 week(s) prior to leaving the clinic. Medication reconciliation was completed during the visit. There are no discontinued medications. A full discussion of the nature of anticoagulants has been carried out. A full discussion of the need for frequent and regular monitoring, precise dosage adjustment and compliance was stressed. Side effects of potential bleeding were discussed and Mr. Brandee Newman was instructed to call [phone] if there are any signs of abnormal bleeding. Mr. Brandee Newman was instructed to avoid any OTC items containing aspirin or ibuprofen and prior to starting any new OTC products to consult with his physician or pharmacist to ensure no drug interactions are present. Mr. Brandee Newman was instructed to avoid any major changes in his general diet and to avoid alcohol consumption. Mr. Brandee Newman was provided information in the AVS that includes topics on understanding coumadin therapy, drug interaction considerations, vitamin K and coumadin use, interactions with foods and supplements containing vitamin K, and the use of herbal products.     Mr. Brandee Newman verbalized his understanding of all instructions and will call the office with any questions, concerns, or signs of abnormal bleeding or blood clot.  Notifications of recommendations will be sent to Dr. Tianna Newsome MD for review.     Thank you,  Blanca Herndon, PharmD, BCACP, CDCES      CLINICAL PHARMACY CONSULT: MED RECONCILIATION/REVIEW ADDENDUM    For Pharmacy Admin Tracking Only    PHSO: PHSO Patient?: Yes  Total # of Interventions Recommended: Count: 2  - Increased Dose #: 1  - Maintenance Safety Lab Monitoring #: 1    Time Spent (min): 20

## 2021-01-20 ENCOUNTER — ANTI-COAG VISIT (OUTPATIENT)
Dept: INTERNAL MEDICINE CLINIC | Age: 86
End: 2021-01-20
Payer: MEDICARE

## 2021-01-20 VITALS — TEMPERATURE: 97.3 F | SYSTOLIC BLOOD PRESSURE: 134 MMHG | DIASTOLIC BLOOD PRESSURE: 84 MMHG | HEART RATE: 80 BPM

## 2021-01-20 DIAGNOSIS — I48.91 ATRIAL FIBRILLATION, UNSPECIFIED TYPE (HCC): ICD-10-CM

## 2021-01-20 DIAGNOSIS — I48.3 TYPICAL ATRIAL FLUTTER (HCC): ICD-10-CM

## 2021-01-20 DIAGNOSIS — Z95.2 H/O MECHANICAL AORTIC VALVE REPLACEMENT: ICD-10-CM

## 2021-01-20 LAB
INR BLD: 2.7 (ref 1–1.5)
PT POC: 32.3 SECONDS (ref 9.1–12)
VALID INTERNAL CONTROL?: YES

## 2021-01-20 PROCEDURE — G0463 HOSPITAL OUTPT CLINIC VISIT: HCPCS | Performed by: PHARMACIST

## 2021-01-20 PROCEDURE — 85610 PROTHROMBIN TIME: CPT | Performed by: PHARMACIST

## 2021-01-20 PROCEDURE — 99212 OFFICE O/P EST SF 10 MIN: CPT | Performed by: PHARMACIST

## 2021-01-20 PROCEDURE — 36416 COLLJ CAPILLARY BLOOD SPEC: CPT | Performed by: PHARMACIST

## 2021-01-20 NOTE — PROGRESS NOTES
Pharmacy Progress Note - Anticoagulation Management    S/O: Mr. Puneet Casey  is a 80 y. o. male, referred by Yady Arguello MD, was seen today for anticoagulation management for the diagnosis of Atrial Fibrillation and Aortic Valve Replacement  ([facility] -1996), s/p pacemaker. Pt is very hard of hearing.      · Warfarin start date: ~1996  · INR Goal:  2.0-3.0    · Current warfarin regimen:  5 mg x 2, then 2.5 mg daily                  · Warfarin tablet strength:   1 mg, 2.5 mg, 5 mg  -- wife states she does not want to split tablets in half ; generally takes 2.5 mg tab. Able to recognize tablet strength  · Duration of therapy: indefinite  · Takes warfarin in the evening    Today's pertinent positives includes:    Has cardio f/u next week  Missed nephro f/u yesterday d/t diarrhea. Now resolved  No CP/SOB/LE & UE pain/HA. Results for orders placed or performed in visit on 01/20/21   AMB POC PT/INR   Result Value Ref Range    VALID INTERNAL CONTROL POC Yes     Prothrombin time (POC) 32.3 (A) 9.1 - 12 seconds    INR POC 2.7 (A) 1 - 1.5     · Adherence:   · Able to recall regimen? YES  · Miss/extra dose? NO  · Need refill?  NO    Upcoming procedure(s):  NO      Past Medical History:   Diagnosis Date    Acute on chronic systolic heart failure, NYHA class 4 ([facility] [address] 03/01/2019    admit March 2019 7 days, EF 10% got Primacor, BIV implant    Anger     TAKES PAROXETINE TO CONTROL ANGER ISSUES    CAD (coronary artery disease) 1996    CABG 1996, mukul celis 2015 fixed inferior defect    Chronic atrial fibrillation ([facility] [address] 03/2019    RVR3/2019, had ablation of flutter- persistent    Foot drop, left     WEARS METAL BRACE    Gout     Hard of hearing     High cholesterol     Hypertension     Prostate enlargement     Thyroid disease     Unspecified sleep apnea     DOESN'T USE CPAP; \"IT MADE HIM SICK-COLDS, SINUS\", PER WIFE     No Known Allergies  Current Outpatient Medications   Medication Sig    carvediloL (COREG) 6.25 mg tablet T1T BID (Patient taking differently: Take 6.25 mg by mouth two (2) times a day.)    Entresto 24-26 mg tablet T1T PO EVERY 12 HOURS (Patient taking differently: Take 1 Tab by mouth two (2) times a day.)    PARoxetine (PAXIL) 20 mg tablet TAKE 1 TABLET EVERY DAY (Patient taking differently: Take 20 mg by mouth daily.)    levothyroxine (SYNTHROID) 150 mcg tablet TAKE 1 TABLET EVERY MORNING FOR THYROID (Patient taking differently: Take 150 mcg by mouth Daily (before breakfast). )    allopurinoL (ZYLOPRIM) 300 mg tablet T1T QD TO PREVENT GOUT (Patient taking differently: Take 300 mg by mouth daily. For gout)    atorvastatin (LIPITOR) 40 mg tablet TAKE 1 TAB BY MOUTH NIGHTLY.  oxybutynin chloride XL (DITROPAN XL) 10 mg CR tablet Take 10 mg by mouth daily.  spironolactone (ALDACTONE) 25 mg tablet Take 25 mg by mouth daily.  omega 3-dha-epa-fish oil (FISH OIL) 100-160-1,000 mg cap Take 1 Cap by mouth daily.  psyllium (METAMUCIL) 0.52 gram capsule Take 1 Cap by mouth daily.  warfarin (COUMADIN) 2.5 mg tablet 1 tab daily- NEEDS INR CHECK (Patient taking differently: Take 2.5 mg by mouth daily. Note, he also has 1 mg and 5 mg tablet strengths)    b complex vitamins (B COMPLEX 1) tablet Take 1 Tab by mouth daily.  CYANOCOBALAMIN, VITAMIN B-12, PO Take 1 Tab by mouth daily. Sciatica    aspirin delayed-release 81 mg tablet Take 81 mg by mouth daily.  therapeutic multivitamin (THERAGRAN) tablet Take 1 Tab by mouth daily.  ascorbic acid, vitamin C, (VITAMIN C) 500 mg tablet Take 1,000 mg by mouth daily.  B.infantis-B.ani-B.long-B.bifi (PROBIOTIC 4X) 10-15 mg TbEC Take 1 Tab by mouth daily.  COQ10, UBIQUINOL, PO Take 1 Tab by mouth daily.  finasteride (PROSCAR) 5 mg tablet Take 5 mg by mouth daily.  tamsulosin (FLOMAX) 0.4 mg capsule Take 0.4 mg by mouth nightly.  FERROUS GLUCONATE PO Take 325 mg by mouth daily.  furosemide (LASIX) 20 mg tablet Take 1 Tab by mouth daily. (Patient taking differently: Take 20 mg by mouth daily. Per patient take 1 pill base on swelling)     No current facility-administered medications for this visit. Wt Readings from Last 3 Encounters:   01/06/21 179 lb (81.2 kg)   11/16/20 177 lb (80.3 kg)   11/12/20 177 lb (80.3 kg)     BP Readings from Last 3 Encounters:   01/20/21 134/84   12/22/20 114/70   11/16/20 136/84     Pulse Readings from Last 3 Encounters:   01/20/21 80   12/22/20 70   11/16/20 68     Lab Results   Component Value Date/Time    WBC 5.7 08/09/2020 08:14 PM    HGB 13.2 08/09/2020 08:14 PM    HCT 39.3 08/09/2020 08:14 PM    PLATELET 709 (L) 79/17/3307 08:14 PM    MCV 98.0 08/09/2020 08:14 PM     Lab Results   Component Value Date/Time    Sodium 144 11/16/2020 10:49 AM    Potassium 3.9 11/16/2020 10:49 AM    Chloride 108 (H) 11/16/2020 10:49 AM    CO2 20 11/16/2020 10:49 AM    Anion gap 5 08/09/2020 09:15 PM    Glucose 106 (H) 11/16/2020 10:49 AM    BUN 18 11/16/2020 10:49 AM    Creatinine 1.26 11/16/2020 10:49 AM    BUN/Creatinine ratio 14 11/16/2020 10:49 AM    GFR est AA 60 11/16/2020 10:49 AM    GFR est non-AA 52 (L) 11/16/2020 10:49 AM    Calcium 8.9 11/16/2020 10:49 AM    Bilirubin, total 1.1 (H) 08/09/2020 09:15 PM    Alk. phosphatase 102 08/09/2020 09:15 PM    Protein, total 6.4 08/09/2020 09:15 PM    Albumin 3.2 (L) 08/09/2020 09:15 PM    Globulin 3.2 08/09/2020 09:15 PM    A-G Ratio 1.0 (L) 08/09/2020 09:15 PM    ALT (SGPT) 23 08/09/2020 09:15 PM     CrCl cannot be calculated (Unknown ideal weight.). INR History:   (normal INR range 0.8-1.2)     Date   INR   PT   Dose/Comments  01/20/21 2.7  32.3 5 mg x 2, then 2.5 mg daily  01/06/21          1.6                   19.5     5 mg x 1, then 2.5 mg daily   12/22/20          1.8                   21. 9     2.5 mg daily  12/03/20          2.0                   28.0     2.5 mg daily; (+) hematuria; missed doses  11/16/20          2.2                   23.2     2.5 mg daily  10/07/20          1.5                   18.6     2.5 mg daily; (+) increased vitamin K intake  09/09/20          2.7                   32.4     2.5 mg daily  08/26/20          2.4                   28. 7     5 mg x 1, then 2.5 mg daily.   08/18/20          1.6                   19.5     ED visit; (+) vitamin K; 2.5 mg daily  08/09/20          11.9                 101. 8     07/28/20          2.5                   25.8           A/P:       Anticoagulation:  Considering Mr. Donal Curiel past history, todays findings, and per Anticoagulation Collaborative Practice Agreement/Protocol:    1. POC INR (2.7) is Therapeutic for INR goal today. 2.  Continue warfarin 2.5 mg daily ROW. Patient was instructed to schedule an appointment in 3 week(s) prior to leaving the clinic. Medication reconciliation was completed during the visit. There are no discontinued medications. A full discussion of the nature of anticoagulants has been carried out. A full discussion of the need for frequent and regular monitoring, precise dosage adjustment and compliance was stressed. Side effects of potential bleeding were discussed and Mr. Linnea Morgan was instructed to call [phone] if there are any signs of abnormal bleeding. Mr. Linnea Morgan was instructed to avoid any OTC items containing aspirin or ibuprofen and prior to starting any new OTC products to consult with his physician or pharmacist to ensure no drug interactions are present. Mr. Linnea Morgan was instructed to avoid any major changes in his general diet and to avoid alcohol consumption. Mr. Linnea Morgan was provided information in the AVS that includes topics on understanding coumadin therapy, drug interaction considerations, vitamin K and coumadin use, interactions with foods and supplements containing vitamin K, and the use of herbal products.     Mr. Linnea Morgan verbalized his understanding of all instructions and will call the office with any questions, concerns, or signs of abnormal bleeding or blood clot. Notifications of recommendations will be sent to Dr. Mary Rincon MD for review.     Thank you,  Blanca Jay, PharmD, BCACP, [facility]        CLINICAL PHARMACY CONSULT: MED RECONCILIATION/REVIEW ADDENDUM    For Pharmacy Admin Tracking Only    PHSO: PHSO Patient?: Yes  Total # of Interventions Recommended: Count: 1  - Maintenance Safety Lab Monitoring #: 1    Time Spent (min): 20

## 2021-01-20 NOTE — PATIENT INSTRUCTIONS
Today your INR was 2.7. Your goal INR is  2.0-3.0 . You have a  1 mg, 2.5 mg, and  mg tablet of Coumadin (warfarin). Take Coumadin (warfarin) as follows:    Continue warfarin 2.5 mg daily. Come back in 3 week(s) for your next finger stick/INR blood test.        Avoid any over the counter items containing aspirin or ibuprofen, and avoid great swings in general diet. Avoid alcohol consumption. Please notify the INR nurse if you are started on any new medication including over the counter or herbal supplements. Also, please notify your INR nurse if any of your other prescription or over the counter medications have been discontinued. Call [facility] at [phone] if you have any signs of abnormal bleeding/blood clot.  ------------------------------------------------------------------------------------------------------------------  Taking Warfarin Safely: Care Instructions    Your Care Instructions  Warfarin is a medicine that you take to prevent blood clots. It is often called a blood thinner. Doctors give warfarin (such as Coumadin) to reduce the risk of blood clots. You may be at risk for blood clots if you have atrial fibrillation or deep vein thrombosis. Some other health problems may also put you at risk. Warfarin slows the amount of time it takes for your blood to clot. It can cause bleeding problems. Even if you've been taking warfarin for a while, it's important to know how to take it safely. Foods and other medicines can affect the way warfarin works. Some can make warfarin work too well. This can cause bleeding problems. And some can make it work poorly, so that it does not prevent blood clots very well. You will need regular blood tests to check how long it takes for your blood to form a clot. This test is called a PT or prothrombin time test. The result of the test is called an INR level.  Depending on the test results, your doctor or anticoagulation clinic may adjust your dose of warfarin. Follow-up care is a key part of your treatment and safety. Be sure to make and go to all appointments, and call your doctor if you are having problems. It's also a good idea to know your test results and keep a list of the medicines you take. How can you care for yourself at home? Take warfarin safely  · Take your warfarin at the same time each day. · If you miss a dose of warfarin, don't take an extra dose to make up for it. Your doctor can tell you exactly what to do so you don't take too much or too little. · Wear medical alert jewelry that lets others know that you take warfarin. You can buy this at most drugstores. · Don't take warfarin if you are pregnant or planning to get pregnant. Talk to your doctor about how you can prevent getting pregnant while you are taking it. · Don't change your dose or stop taking warfarin unless your doctor tells you to. Effects of medicines and food on warfarin  · Don't start or stop taking any medicines, vitamins, or natural remedies unless you first talk to your doctor. Many medicines can affect how warfarin works. These include aspirin and other pain relievers, over-the-counter medicines, multivitamins, dietary supplements, and herbal products. · Tell all of your doctors and pharmacists that you take warfarin. Some prescription medicines can affect how warfarin works. · Keep the amount of vitamin K in your diet about the same from day to day. Do not suddenly eat a lot more or a lot less food that is rich in vitamin K than you usually do. Vitamin K affects how warfarin works and how your blood clots. Talk with your doctor before making big changes in your diet. Vitamin K is in many foods, such as:  ¨ Leafy greens, such as kale, cabbage, spinach, Swiss chard, and lettuce. ¨ Canola and soybean oils. ¨ Green vegetables, such as asparagus, broccoli, and [place] sprouts.   ¨ Vegetable drinks, green tea leaves, and some dietary supplement drinks. · Avoid cranberry juice and other cranberry products. They can increase the effects of warfarin. · Limit your use of alcohol. Avoid bleeding by preventing falls and injuries  · Wear slippers or shoes with nonskid soles. · Remove throw rugs and clutter. · Rearrange furniture and electrical cords to keep them out of walking paths. · Keep stairways, porches, and outside walkways well lit. Use night-lights in hallways and bathrooms. · Be extra careful when you work with sharp tools or knives. When should you call for help? Call 911 anytime you think you may need emergency care. For example, call if:  · You have a sudden, severe headache that is different from past headaches. Call your doctor now or seek immediate medical care if:  · You have any abnormal bleeding, such as:  ¨ Nosebleeds. ¨ Vaginal bleeding that is different (heavier, more frequent, at a different time of the month) than what you are used to. ¨ Bloody or black stools, or rectal bleeding. ¨ Bloody or pink urine. Watch closely for changes in your health, and be sure to contact your doctor if you have any problems. Where can you learn more? Go to http://www.gray.com/. Enter Z559 in the search box to learn more about \"Taking Warfarin Safely: Care Instructions. \"  Current as of: January 27, 2016  Content Version: 11.1  © 0858-9167 [employer]. Care instructions adapted under license by [employer] (which disclaims liability or warranty for this information). If you have questions about a medical condition or this instruction, always ask your healthcare professional. [place] [address] any warranty or liability for your use of this information.

## 2021-02-10 ENCOUNTER — ANTI-COAG VISIT (OUTPATIENT)
Dept: INTERNAL MEDICINE CLINIC | Age: 86
End: 2021-02-10
Payer: MEDICARE

## 2021-02-10 VITALS — DIASTOLIC BLOOD PRESSURE: 84 MMHG | SYSTOLIC BLOOD PRESSURE: 133 MMHG | OXYGEN SATURATION: 100 % | HEART RATE: 90 BPM

## 2021-02-10 DIAGNOSIS — I48.3 TYPICAL ATRIAL FLUTTER (HCC): ICD-10-CM

## 2021-02-10 DIAGNOSIS — Z95.2 H/O MECHANICAL AORTIC VALVE REPLACEMENT: ICD-10-CM

## 2021-02-10 DIAGNOSIS — I48.91 ATRIAL FIBRILLATION, UNSPECIFIED TYPE (HCC): ICD-10-CM

## 2021-02-10 LAB
INR BLD: 2.3 (ref 1–1.5)
PT POC: 27.9 SECONDS (ref 9.1–12)
VALID INTERNAL CONTROL?: YES

## 2021-02-10 PROCEDURE — 85610 PROTHROMBIN TIME: CPT | Performed by: PHARMACIST

## 2021-02-10 PROCEDURE — G0463 HOSPITAL OUTPT CLINIC VISIT: HCPCS | Performed by: PHARMACIST

## 2021-02-10 PROCEDURE — 36416 COLLJ CAPILLARY BLOOD SPEC: CPT | Performed by: PHARMACIST

## 2021-02-10 PROCEDURE — 99212 OFFICE O/P EST SF 10 MIN: CPT | Performed by: PHARMACIST

## 2021-02-10 NOTE — PATIENT INSTRUCTIONS
Today your INR was 2.3. Your goal INR is  2.0-3.0 . You have a 1 mg, 2.5 mg and 5 mg tablet of Coumadin (warfarin). Take Coumadin (warfarin) as follows:    Continue warfarin 2.5 mg daily. Come back in 4 week(s) for your next finger stick/INR blood test.        Avoid any over the counter items containing aspirin or ibuprofen, and avoid great swings in general diet. Avoid alcohol consumption. Please notify the INR nurse if you are started on any new medication including over the counter or herbal supplements. Also, please notify your INR nurse if any of your other prescription or over the counter medications have been discontinued. Call [facility] at [phone] if you have any signs of abnormal bleeding/blood clot.  ------------------------------------------------------------------------------------------------------------------  Taking Warfarin Safely: Care Instructions    Your Care Instructions  Warfarin is a medicine that you take to prevent blood clots. It is often called a blood thinner. Doctors give warfarin (such as Coumadin) to reduce the risk of blood clots. You may be at risk for blood clots if you have atrial fibrillation or deep vein thrombosis. Some other health problems may also put you at risk. Warfarin slows the amount of time it takes for your blood to clot. It can cause bleeding problems. Even if you've been taking warfarin for a while, it's important to know how to take it safely. Foods and other medicines can affect the way warfarin works. Some can make warfarin work too well. This can cause bleeding problems. And some can make it work poorly, so that it does not prevent blood clots very well. You will need regular blood tests to check how long it takes for your blood to form a clot. This test is called a PT or prothrombin time test. The result of the test is called an INR level.  Depending on the test results, your doctor or anticoagulation clinic may adjust your dose of warfarin. Follow-up care is a key part of your treatment and safety. Be sure to make and go to all appointments, and call your doctor if you are having problems. It's also a good idea to know your test results and keep a list of the medicines you take. How can you care for yourself at home? Take warfarin safely  · Take your warfarin at the same time each day. · If you miss a dose of warfarin, don't take an extra dose to make up for it. Your doctor can tell you exactly what to do so you don't take too much or too little. · Wear medical alert jewelry that lets others know that you take warfarin. You can buy this at most drugstores. · Don't take warfarin if you are pregnant or planning to get pregnant. Talk to your doctor about how you can prevent getting pregnant while you are taking it. · Don't change your dose or stop taking warfarin unless your doctor tells you to. Effects of medicines and food on warfarin  · Don't start or stop taking any medicines, vitamins, or natural remedies unless you first talk to your doctor. Many medicines can affect how warfarin works. These include aspirin and other pain relievers, over-the-counter medicines, multivitamins, dietary supplements, and herbal products. · Tell all of your doctors and pharmacists that you take warfarin. Some prescription medicines can affect how warfarin works. · Keep the amount of vitamin K in your diet about the same from day to day. Do not suddenly eat a lot more or a lot less food that is rich in vitamin K than you usually do. Vitamin K affects how warfarin works and how your blood clots. Talk with your doctor before making big changes in your diet. Vitamin K is in many foods, such as:  ¨ Leafy greens, such as kale, cabbage, spinach, Swiss chard, and lettuce. ¨ Canola and soybean oils. ¨ Green vegetables, such as asparagus, broccoli, and [place] sprouts.   ¨ Vegetable drinks, green tea leaves, and some dietary supplement drinks. · Avoid cranberry juice and other cranberry products. They can increase the effects of warfarin. · Limit your use of alcohol. Avoid bleeding by preventing falls and injuries  · Wear slippers or shoes with nonskid soles. · Remove throw rugs and clutter. · Rearrange furniture and electrical cords to keep them out of walking paths. · Keep stairways, porches, and outside walkways well lit. Use night-lights in hallways and bathrooms. · Be extra careful when you work with sharp tools or knives. When should you call for help? Call 911 anytime you think you may need emergency care. For example, call if:  · You have a sudden, severe headache that is different from past headaches. Call your doctor now or seek immediate medical care if:  · You have any abnormal bleeding, such as:  ¨ Nosebleeds. ¨ Vaginal bleeding that is different (heavier, more frequent, at a different time of the month) than what you are used to. ¨ Bloody or black stools, or rectal bleeding. ¨ Bloody or pink urine. Watch closely for changes in your health, and be sure to contact your doctor if you have any problems. Where can you learn more? Go to http://www.gray.com/. Enter D203 in the search box to learn more about \"Taking Warfarin Safely: Care Instructions. \"  Current as of: January 27, 2016  Content Version: 11.1  © 2548-8057 [employer]. Care instructions adapted under license by [employer] (which disclaims liability or warranty for this information). If you have questions about a medical condition or this instruction, always ask your healthcare professional. [place] [address] any warranty or liability for your use of this information.

## 2021-02-10 NOTE — PROGRESS NOTES
Pharmacy Progress Note - Anticoagulation Management    S/O: Mr. Puneet aCsey  is a 80 y. o. male, referred by Migel Tony MD, was seen today for anticoagulation management for the diagnosis of Atrial Fibrillation and Aortic Valve Replacement  ([facility] -1996), s/p pacemaker. Pt is very hard of hearing. · Warfarin start date: ~1996  · INR Goal:  2.0-3.0    · Current warfarin regimen:   2.5 mg daily                  · Warfarin tablet strength:   1 mg, 2.5 mg, 5 mg  -- wife states she does not want to split tablets in half ; generally takes 2.5 mg tab. Able to recognize tablet strength  · Duration of therapy: indefinite  · Takes warfarin in the evening    Results for orders placed or performed in visit on 02/10/21   AMB POC PT/INR   Result Value Ref Range    VALID INTERNAL CONTROL POC Yes     Prothrombin time (POC) 27.9 (A) 9.1 - 12 seconds    INR POC 2.3 (A) 1 - 1.5     · Adherence:   · Able to recall regimen? YES  · Miss/extra dose? NO  · Need refill?  NO    Upcoming procedure(s):  NO      Past Medical History:   Diagnosis Date    Acute on chronic systolic heart failure, NYHA class 4 ([address] 03/01/2019    admit March 2019 7 days, EF 10% got Primacor, BIV implant    Anger     TAKES PAROXETINE TO CONTROL ANGER ISSUES    CAD (coronary artery disease) 1996    CABG 1996, mukul celis 2015 fixed inferior defect    Chronic atrial fibrillation ([address] 03/2019    RVR3/2019, had ablation of flutter- persistent    Foot drop, left     WEARS METAL BRACE    Gout     Hard of hearing     High cholesterol     Hypertension     Prostate enlargement     Thyroid disease     Unspecified sleep apnea     DOESN'T USE CPAP; \"IT MADE HIM SICK-COLDS, SINUS\", PER WIFE     No Known Allergies  Current Outpatient Medications   Medication Sig    carvediloL (COREG) 6.25 mg tablet T1T BID (Patient taking differently: Take 6.25 mg by mouth two (2) times a day.)    Entresto 24-26 mg tablet T1T PO EVERY 12 HOURS (Patient taking differently: Take 1 Tab by mouth two (2) times a day.)    PARoxetine (PAXIL) 20 mg tablet TAKE 1 TABLET EVERY DAY (Patient taking differently: Take 20 mg by mouth daily.)    levothyroxine (SYNTHROID) 150 mcg tablet TAKE 1 TABLET EVERY MORNING FOR THYROID (Patient taking differently: Take 150 mcg by mouth Daily (before breakfast). )    allopurinoL (ZYLOPRIM) 300 mg tablet T1T QD TO PREVENT GOUT (Patient taking differently: Take 300 mg by mouth daily. For gout)    atorvastatin (LIPITOR) 40 mg tablet TAKE 1 TAB BY MOUTH NIGHTLY.  oxybutynin chloride XL (DITROPAN XL) 10 mg CR tablet Take 10 mg by mouth daily.  spironolactone (ALDACTONE) 25 mg tablet Take 25 mg by mouth daily.  omega 3-dha-epa-fish oil (FISH OIL) 100-160-1,000 mg cap Take 1 Cap by mouth daily.  psyllium (METAMUCIL) 0.52 gram capsule Take 1 Cap by mouth daily.  warfarin (COUMADIN) 2.5 mg tablet 1 tab daily- NEEDS INR CHECK (Patient taking differently: Take 2.5 mg by mouth daily. Note, he also has 1 mg and 5 mg tablet strengths)    b complex vitamins (B COMPLEX 1) tablet Take 1 Tab by mouth daily.  CYANOCOBALAMIN, VITAMIN B-12, PO Take 1 Tab by mouth daily. Sciatica    aspirin delayed-release 81 mg tablet Take 81 mg by mouth daily.  therapeutic multivitamin (THERAGRAN) tablet Take 1 Tab by mouth daily.  ascorbic acid, vitamin C, (VITAMIN C) 500 mg tablet Take 1,000 mg by mouth daily.  B.infantis-B.ani-B.long-B.bifi (PROBIOTIC 4X) 10-15 mg TbEC Take 1 Tab by mouth daily.  COQ10, UBIQUINOL, PO Take 1 Tab by mouth daily.  finasteride (PROSCAR) 5 mg tablet Take 5 mg by mouth daily.  tamsulosin (FLOMAX) 0.4 mg capsule Take 0.4 mg by mouth nightly.  FERROUS GLUCONATE PO Take 325 mg by mouth daily.  furosemide (LASIX) 20 mg tablet Take 1 Tab by mouth daily. (Patient taking differently: Take 20 mg by mouth daily. Per patient take 1 pill base on swelling)     No current facility-administered medications for this visit. Wt Readings from Last 3 Encounters:   01/06/21 179 lb (81.2 kg)   11/16/20 177 lb (80.3 kg)   11/12/20 177 lb (80.3 kg)     BP Readings from Last 3 Encounters:   02/10/21 133/84   01/20/21 134/84   12/22/20 114/70     Pulse Readings from Last 3 Encounters:   02/10/21 90   01/20/21 80   12/22/20 70     Lab Results   Component Value Date/Time    WBC 5.7 08/09/2020 08:14 PM    HGB 13.2 08/09/2020 08:14 PM    HCT 39.3 08/09/2020 08:14 PM    PLATELET 331 (L) 50/10/1855 08:14 PM    MCV 98.0 08/09/2020 08:14 PM     Lab Results   Component Value Date/Time    Sodium 144 11/16/2020 10:49 AM    Potassium 3.9 11/16/2020 10:49 AM    Chloride 108 (H) 11/16/2020 10:49 AM    CO2 20 11/16/2020 10:49 AM    Anion gap 5 08/09/2020 09:15 PM    Glucose 106 (H) 11/16/2020 10:49 AM    BUN 18 11/16/2020 10:49 AM    Creatinine 1.26 11/16/2020 10:49 AM    BUN/Creatinine ratio 14 11/16/2020 10:49 AM    GFR est AA 60 11/16/2020 10:49 AM    GFR est non-AA 52 (L) 11/16/2020 10:49 AM    Calcium 8.9 11/16/2020 10:49 AM    Bilirubin, total 1.1 (H) 08/09/2020 09:15 PM    Alk. phosphatase 102 08/09/2020 09:15 PM    Protein, total 6.4 08/09/2020 09:15 PM    Albumin 3.2 (L) 08/09/2020 09:15 PM    Globulin 3.2 08/09/2020 09:15 PM    A-G Ratio 1.0 (L) 08/09/2020 09:15 PM    ALT (SGPT) 23 08/09/2020 09:15 PM     CrCl cannot be calculated (Unknown ideal weight.). INR History:   (normal INR range 0.8-1.2)      Date                INR                  PT        Dose/Comments  02/10/21 2.3  27.9  2.5 mg daily  01/20/21          2.7                   32.3     5 mg x 2, then 2.5 mg daily  01/06/21          1.6                   19.5     5 mg x 1, then 2.5 mg daily   12/22/20          1.8                   21. 9     2.5 mg daily  12/03/20          2.0                   28.0     2.5 mg daily; (+) hematuria; missed doses  11/16/20          2.2                   23.2     2.5 mg daily  10/07/20          1.5                   18.6     2.5 mg daily; (+) increased vitamin K intake  09/09/20          2.7                   32.4     2.5 mg daily  08/26/20          2.4                   28. 7     5 mg x 1, then 2.5 mg daily.   08/18/20          1.6                   19.5     ED visit; (+) vitamin K; 2.5 mg daily  08/09/20          11.9                 101. 8     07/28/20          2.5                   25.8           A/P:       Anticoagulation:  Considering Mr. Bindu Aly past history, todays findings, and per Anticoagulation Collaborative Practice Agreement/Protocol:    1. POC INR (2.3) is Therapeutic for INR goal today. 2.  Continue warfarin 2.5 mg daily        Patient was instructed to schedule an appointment in 4 week(s) prior to leaving the clinic. Medication reconciliation was completed during the visit. There are no discontinued medications. A full discussion of the nature of anticoagulants has been carried out. A full discussion of the need for frequent and regular monitoring, precise dosage adjustment and compliance was stressed. Side effects of potential bleeding were discussed and Mr. Neeraj Hendrickson was instructed to call [phone] if there are any signs of abnormal bleeding. Mr. Neeraj Hendrickson was instructed to avoid any OTC items containing aspirin or ibuprofen and prior to starting any new OTC products to consult with his physician or pharmacist to ensure no drug interactions are present. Mr. Neeraj Hendrickson was instructed to avoid any major changes in his general diet and to avoid alcohol consumption. Mr. Neeraj Hendrickson was provided information in the AVS that includes topics on understanding coumadin therapy, drug interaction considerations, vitamin K and coumadin use, interactions with foods and supplements containing vitamin K, and the use of herbal products. Mr. Neeraj Hendrickson verbalized his understanding of all instructions and will call the office with any questions, concerns, or signs of abnormal bleeding or blood clot.   Notifications of recommendations will be sent to Dr. Marcelino Hendrix MD for review.     Thank you,  Blanca Grajeda, PharmD, BCACP, [facility]          CLINICAL PHARMACY CONSULT: MED RECONCILIATION/REVIEW ADDENDUM    For Pharmacy Admin Tracking Only    PHSO: PHSO Patient?: Yes  Total # of Interventions Recommended: Count: 1  - Maintenance Safety Lab Monitoring #: 1    Time Spent (min): 20

## 2021-03-09 NOTE — PROGRESS NOTES
Pharmacy Progress Note - Anticoagulation Management    S/O: Mr. Puneet Casey  is a 80 y. o. male, referred by Mehreen Milan MD, was seen today for anticoagulation management for the diagnosis of Atrial Fibrillation and Aortic Valve Replacement  ([facility] -1996), s/p pacemaker.  Pt is very hard of hearing.     · Warfarin start date: ~1996  · INR Goal:  2.0-3.0    · Current warfarin regimen:   2.5 mg daily                  · Warfarin tablet strength:   1 mg, 2.5 mg, 5 mg  -- wife states she does not want to split tablets in half ; generally takes 2.5 mg tab. Able to recognize tablet strength  · Duration of therapy: indefinite  · Takes warfarin in the evening    Today's pertinent positives includes:  Falls & hematuria    Report to tripping on rug yesterday and fell on his side. Report some hematuria x 2 days that occurred a few weeks ago. Self resolved. No other s/sx of bleeding/bruises. He's staying hydrated. No urology f/u pending     Results for orders placed or performed in visit on 03/10/21   AMB POC PT/INR   Result Value Ref Range    VALID INTERNAL CONTROL POC Yes     Prothrombin time (POC) 25.5 (A) 9.1 - 12.0 seconds    INR POC 2.1 (A) 1.0 - 1.5     · Adherence:   · Able to recall regimen? YES  · Miss/extra dose? NO  · Need refill?  NO    Upcoming procedure(s):  NO      Past Medical History:   Diagnosis Date    Acute on chronic systolic heart failure, NYHA class 4 ([facility] [address] 03/01/2019    admit March 2019 7 days, EF 10% got Primacor, BIV implant    Anger     TAKES PAROXETINE TO CONTROL ANGER ISSUES    CAD (coronary artery disease) 1996    CABG 1996, mukul celis 2015 fixed inferior defect    Chronic atrial fibrillation ([facility] [address] 03/2019    RVR3/2019, had ablation of flutter- persistent    Foot drop, left     WEARS METAL BRACE    Gout     Hard of hearing     High cholesterol     Hypertension     Prostate enlargement     Thyroid disease     Unspecified sleep apnea     DOESN'T USE CPAP; \"IT MADE HIM SICK-COLDS, SINUS\", PER WIFE     No Known Allergies  Current Outpatient Medications   Medication Sig   • carvediloL (COREG) 6.25 mg tablet T1T BID (Patient taking differently: Take 6.25 mg by mouth two (2) times a day.)   • Entresto 24-26 mg tablet T1T PO EVERY 12 HOURS (Patient taking differently: Take 1 Tab by mouth two (2) times a day.)   • PARoxetine (PAXIL) 20 mg tablet TAKE 1 TABLET EVERY DAY (Patient taking differently: Take 20 mg by mouth daily.)   • levothyroxine (SYNTHROID) 150 mcg tablet TAKE 1 TABLET EVERY MORNING FOR THYROID (Patient taking differently: Take 150 mcg by mouth Daily (before breakfast).)   • allopurinoL (ZYLOPRIM) 300 mg tablet T1T QD TO PREVENT GOUT (Patient taking differently: Take 300 mg by mouth daily. For gout)   • atorvastatin (LIPITOR) 40 mg tablet TAKE 1 TAB BY MOUTH NIGHTLY.   • oxybutynin chloride XL (DITROPAN XL) 10 mg CR tablet Take 10 mg by mouth daily.   • spironolactone (ALDACTONE) 25 mg tablet Take 25 mg by mouth daily.   • omega 3-dha-epa-fish oil (FISH OIL) 100-160-1,000 mg cap Take 1 Cap by mouth daily.   • psyllium (METAMUCIL) 0.52 gram capsule Take 1 Cap by mouth daily.   • warfarin (COUMADIN) 2.5 mg tablet 1 tab daily- NEEDS INR CHECK (Patient taking differently: Take 2.5 mg by mouth daily. Note, he also has 1 mg and 5 mg tablet strengths)   • b complex vitamins (B COMPLEX 1) tablet Take 1 Tab by mouth daily.   • CYANOCOBALAMIN, VITAMIN B-12, PO Take 1 Tab by mouth daily. Sciatica   • aspirin delayed-release 81 mg tablet Take 81 mg by mouth daily.   • therapeutic multivitamin (THERAGRAN) tablet Take 1 Tab by mouth daily.   • ascorbic acid, vitamin C, (VITAMIN C) 500 mg tablet Take 1,000 mg by mouth daily.   • B.infantis-B.ani-B.long-B.bifi (PROBIOTIC 4X) 10-15 mg TbEC Take 1 Tab by mouth daily.   • COQ10, UBIQUINOL, PO Take 1 Tab by mouth daily.   • finasteride (PROSCAR) 5 mg tablet Take 5 mg by mouth daily.   • tamsulosin (FLOMAX) 0.4 mg capsule Take 0.4 mg by mouth  nightly.  FERROUS GLUCONATE PO Take 325 mg by mouth daily.  furosemide (LASIX) 20 mg tablet Take 1 Tab by mouth daily. (Patient taking differently: Take 20 mg by mouth daily. Per patient take 1 pill base on swelling)     No current facility-administered medications for this visit. Wt Readings from Last 3 Encounters:   01/06/21 179 lb (81.2 kg)   11/16/20 177 lb (80.3 kg)   11/12/20 177 lb (80.3 kg)     BP Readings from Last 3 Encounters:   03/10/21 136/86   02/10/21 133/84   01/20/21 134/84     Pulse Readings from Last 3 Encounters:   03/10/21 71   02/10/21 90   01/20/21 80     Lab Results   Component Value Date/Time    WBC 5.7 08/09/2020 08:14 PM    HGB 13.2 08/09/2020 08:14 PM    HCT 39.3 08/09/2020 08:14 PM    PLATELET 525 (L) 14/73/6776 08:14 PM    MCV 98.0 08/09/2020 08:14 PM     Lab Results   Component Value Date/Time    Sodium 144 11/16/2020 10:49 AM    Potassium 3.9 11/16/2020 10:49 AM    Chloride 108 (H) 11/16/2020 10:49 AM    CO2 20 11/16/2020 10:49 AM    Anion gap 5 08/09/2020 09:15 PM    Glucose 106 (H) 11/16/2020 10:49 AM    BUN 18 11/16/2020 10:49 AM    Creatinine 1.26 11/16/2020 10:49 AM    BUN/Creatinine ratio 14 11/16/2020 10:49 AM    GFR est AA 60 11/16/2020 10:49 AM    GFR est non-AA 52 (L) 11/16/2020 10:49 AM    Calcium 8.9 11/16/2020 10:49 AM    Bilirubin, total 1.1 (H) 08/09/2020 09:15 PM    Alk. phosphatase 102 08/09/2020 09:15 PM    Protein, total 6.4 08/09/2020 09:15 PM    Albumin 3.2 (L) 08/09/2020 09:15 PM    Globulin 3.2 08/09/2020 09:15 PM    A-G Ratio 1.0 (L) 08/09/2020 09:15 PM    ALT (SGPT) 23 08/09/2020 09:15 PM     CrCl cannot be calculated (Unknown ideal weight.). INR History:   (normal INR range 0.8-1.2)     Date   INR   PT   Dose/Comments  03/10/21 2.1  25.5 2.5 mg daily  02/10/21          2.3                   27.9     2.5 mg daily  01/20/21          2.7                   32. 3     5 mg x 2, then 2.5 mg daily  01/06/21          1.6                   19.5     5 mg x 1, then 2.5 mg daily   12/22/20          1.8                   21. 9     2.5 mg daily  12/03/20          2.0                   28.0     2.5 mg daily; (+) hematuria; missed doses  11/16/20          2.2                   23.2     2.5 mg daily  10/07/20          1.5                   18.6     2.5 mg daily; (+) increased vitamin K intake  09/09/20          2.7                   32.4     2.5 mg daily  08/26/20          2.4                   28. 7     5 mg x 1, then 2.5 mg daily.   08/18/20          1.6                   19.5     ED visit; (+) vitamin K; 2.5 mg daily  08/09/20          11.9                 101. 8     07/28/20          2.5                   25.8           A/P:       Anticoagulation:  Considering Mr. Berhane Valdes past history, todays findings, and per Anticoagulation Collaborative Practice Agreement/Protocol:    1. POC INR (2.1) is therapeutic for INR goal today. 2.  Continue warfarin 2.5 mg daily. Patient was instructed to schedule an appointment in 5 week(s) prior to leaving the clinic. Medication reconciliation was completed during the visit. There are no discontinued medications. A full discussion of the nature of anticoagulants has been carried out. A full discussion of the need for frequent and regular monitoring, precise dosage adjustment and compliance was stressed. Side effects of potential bleeding were discussed and Mr. Rob Velasco was instructed to call [phone] if there are any signs of abnormal bleeding. Mr. Rob Velasco was instructed to avoid any OTC items containing aspirin or ibuprofen and prior to starting any new OTC products to consult with his physician or pharmacist to ensure no drug interactions are present. Mr. Rob Velasco was instructed to avoid any major changes in his general diet and to avoid alcohol consumption.     Mr. Rob Velasco was provided information in the AVS that includes topics on understanding coumadin therapy, drug interaction considerations, vitamin K and coumadin use, interactions with foods and supplements containing vitamin K, and the use of herbal products. Mr. Aisha Chatterjee verbalized his understanding of all instructions and will call the office with any questions, concerns, or signs of abnormal bleeding or blood clot. Notifications of recommendations will be sent to Dr. Marcelino Hendrix MD for review. Thank you,.   Blanca Grajeda, PharmD, BCACP, [facility]            CLINICAL PHARMACY CONSULT: MED RECONCILIATION/REVIEW ADDENDUM    For Pharmacy Admin Tracking Only    PHSO: PHSO Patient?: Yes  Total # of Interventions Recommended: Count: 1  - Maintenance Safety Lab Monitoring #: 1    Time Spent (min): 15

## 2021-03-09 NOTE — PATIENT INSTRUCTIONS
Today your INR was 2.1. Your goal INR is  2.0-3.0 . You have a 1 mg and 2.5 mg and 5 mg tablet of Coumadin (warfarin). Take Coumadin (warfarin) as follows:    Continue warfarin 2.5 mg daily. Come back in  5 week(s) for your next finger stick/INR blood test.        Avoid any over the counter items containing aspirin or ibuprofen, and avoid great swings in general diet. Avoid alcohol consumption. Please notify the INR nurse if you are started on any new medication including over the counter or herbal supplements. Also, please notify your INR nurse if any of your other prescription or over the counter medications have been discontinued. Call [facility] at [phone] if you have any signs of abnormal bleeding/blood clot.  ------------------------------------------------------------------------------------------------------------------  Taking Warfarin Safely: Care Instructions    Your Care Instructions  Warfarin is a medicine that you take to prevent blood clots. It is often called a blood thinner. Doctors give warfarin (such as Coumadin) to reduce the risk of blood clots. You may be at risk for blood clots if you have atrial fibrillation or deep vein thrombosis. Some other health problems may also put you at risk. Warfarin slows the amount of time it takes for your blood to clot. It can cause bleeding problems. Even if you've been taking warfarin for a while, it's important to know how to take it safely. Foods and other medicines can affect the way warfarin works. Some can make warfarin work too well. This can cause bleeding problems. And some can make it work poorly, so that it does not prevent blood clots very well. You will need regular blood tests to check how long it takes for your blood to form a clot. This test is called a PT or prothrombin time test. The result of the test is called an INR level.  Depending on the test results, your doctor or anticoagulation clinic may adjust your dose of warfarin. Follow-up care is a key part of your treatment and safety. Be sure to make and go to all appointments, and call your doctor if you are having problems. It's also a good idea to know your test results and keep a list of the medicines you take. How can you care for yourself at home? Take warfarin safely  · Take your warfarin at the same time each day. · If you miss a dose of warfarin, don't take an extra dose to make up for it. Your doctor can tell you exactly what to do so you don't take too much or too little. · Wear medical alert jewelry that lets others know that you take warfarin. You can buy this at most drugstores. · Don't take warfarin if you are pregnant or planning to get pregnant. Talk to your doctor about how you can prevent getting pregnant while you are taking it. · Don't change your dose or stop taking warfarin unless your doctor tells you to. Effects of medicines and food on warfarin  · Don't start or stop taking any medicines, vitamins, or natural remedies unless you first talk to your doctor. Many medicines can affect how warfarin works. These include aspirin and other pain relievers, over-the-counter medicines, multivitamins, dietary supplements, and herbal products. · Tell all of your doctors and pharmacists that you take warfarin. Some prescription medicines can affect how warfarin works. · Keep the amount of vitamin K in your diet about the same from day to day. Do not suddenly eat a lot more or a lot less food that is rich in vitamin K than you usually do. Vitamin K affects how warfarin works and how your blood clots. Talk with your doctor before making big changes in your diet. Vitamin K is in many foods, such as:  ¨ Leafy greens, such as kale, cabbage, spinach, Swiss chard, and lettuce. ¨ Canola and soybean oils. ¨ Green vegetables, such as asparagus, broccoli, and [place] sprouts.   ¨ Vegetable drinks, green tea leaves, and some dietary supplement drinks. · Avoid cranberry juice and other cranberry products. They can increase the effects of warfarin. · Limit your use of alcohol. Avoid bleeding by preventing falls and injuries  · Wear slippers or shoes with nonskid soles. · Remove throw rugs and clutter. · Rearrange furniture and electrical cords to keep them out of walking paths. · Keep stairways, porches, and outside walkways well lit. Use night-lights in hallways and bathrooms. · Be extra careful when you work with sharp tools or knives. When should you call for help? Call 911 anytime you think you may need emergency care. For example, call if:  · You have a sudden, severe headache that is different from past headaches. Call your doctor now or seek immediate medical care if:  · You have any abnormal bleeding, such as:  ¨ Nosebleeds. ¨ Vaginal bleeding that is different (heavier, more frequent, at a different time of the month) than what you are used to. ¨ Bloody or black stools, or rectal bleeding. ¨ Bloody or pink urine. Watch closely for changes in your health, and be sure to contact your doctor if you have any problems. Where can you learn more? Go to http://www.gray.com/. Enter L244 in the search box to learn more about \"Taking Warfarin Safely: Care Instructions. \"  Current as of: January 27, 2016  Content Version: 11.1  © 5916-5321 [employer]. Care instructions adapted under license by [employer] (which disclaims liability or warranty for this information). If you have questions about a medical condition or this instruction, always ask your healthcare professional. [place] [address] any warranty or liability for your use of this information.

## 2021-03-10 ENCOUNTER — ANTI-COAG VISIT (OUTPATIENT)
Dept: INTERNAL MEDICINE CLINIC | Age: 86
End: 2021-03-10
Payer: MEDICARE

## 2021-03-10 VITALS — SYSTOLIC BLOOD PRESSURE: 136 MMHG | DIASTOLIC BLOOD PRESSURE: 86 MMHG | HEART RATE: 71 BPM | TEMPERATURE: 97.2 F

## 2021-03-10 DIAGNOSIS — I48.0 PAF (PAROXYSMAL ATRIAL FIBRILLATION) (HCC): ICD-10-CM

## 2021-03-10 DIAGNOSIS — Z95.2 S/P AVR (AORTIC VALVE REPLACEMENT): Primary | ICD-10-CM

## 2021-03-10 DIAGNOSIS — I48.3 TYPICAL ATRIAL FLUTTER (HCC): ICD-10-CM

## 2021-03-10 DIAGNOSIS — Z95.2 H/O MECHANICAL AORTIC VALVE REPLACEMENT: ICD-10-CM

## 2021-03-10 LAB
INR BLD: 2.1 (ref 1–1.5)
PT POC: 25.5 SECONDS (ref 9.1–12)
VALID INTERNAL CONTROL?: YES

## 2021-03-10 PROCEDURE — G0463 HOSPITAL OUTPT CLINIC VISIT: HCPCS | Performed by: PHARMACIST

## 2021-03-10 PROCEDURE — 85610 PROTHROMBIN TIME: CPT | Performed by: PHARMACIST

## 2021-03-10 PROCEDURE — 99212 OFFICE O/P EST SF 10 MIN: CPT | Performed by: PHARMACIST

## 2021-03-15 ENCOUNTER — IMMUNIZATION (OUTPATIENT)
Dept: FAMILY MEDICINE CLINIC | Age: 86
End: 2021-03-15
Payer: MEDICARE

## 2021-03-15 DIAGNOSIS — Z23 ENCOUNTER FOR IMMUNIZATION: Primary | ICD-10-CM

## 2021-03-15 PROCEDURE — 91300 COVID-19, MRNA, LNP-S, PF, 30MCG/0.3ML DOSE(PFIZER): CPT

## 2021-03-22 DIAGNOSIS — E78.5 DYSLIPIDEMIA: ICD-10-CM

## 2021-03-26 ENCOUNTER — OFFICE VISIT (OUTPATIENT)
Dept: CARDIOLOGY CLINIC | Age: 86
End: 2021-03-26
Payer: MEDICARE

## 2021-03-26 DIAGNOSIS — Z95.810 PRESENCE OF BIVENTRICULAR AUTOMATIC CARDIOVERTER/DEFIBRILLATOR (AICD): Primary | ICD-10-CM

## 2021-03-26 PROCEDURE — 93296 REM INTERROG EVL PM/IDS: CPT | Performed by: INTERNAL MEDICINE

## 2021-03-26 PROCEDURE — 93295 DEV INTERROG REMOTE 1/2/MLT: CPT | Performed by: INTERNAL MEDICINE

## 2021-03-29 RX ORDER — ATORVASTATIN CALCIUM 40 MG/1
40 TABLET, FILM COATED ORAL DAILY
Qty: 90 TAB | Refills: 0 | Status: SHIPPED | OUTPATIENT
Start: 2021-03-29 | End: 2021-09-13

## 2021-03-29 NOTE — TELEPHONE ENCOUNTER
Requested Prescriptions     Signed Prescriptions Disp Refills    atorvastatin (LIPITOR) 40 mg tablet 90 Tab 0     Sig: Take 1 Tab by mouth daily.  Please schedule follow up with Dr. Robert Ivey.     Authorizing Provider: Dary Key     Ordering User: Catia Major     Verbal order per Dr. Robert Ivey.

## 2021-04-02 DIAGNOSIS — I50.22 CHRONIC SYSTOLIC CONGESTIVE HEART FAILURE (HCC): ICD-10-CM

## 2021-04-02 DIAGNOSIS — I48.20 CHRONIC ATRIAL FIBRILLATION (HCC): ICD-10-CM

## 2021-04-06 RX ORDER — CARVEDILOL 6.25 MG/1
6.25 TABLET ORAL 2 TIMES DAILY WITH MEALS
Qty: 180 TAB | Refills: 3 | Status: SHIPPED | OUTPATIENT
Start: 2021-04-06 | End: 2021-09-14

## 2021-04-10 NOTE — PROGRESS NOTES
Pharmacy Progress Note - Anticoagulation Management    S/O: Mr. Puneet Casey  is a 80 y. o. male, referred by Jurgen Morrell MD, was seen today for anticoagulation management for the diagnosis of Atrial Fibrillation and Aortic Valve Replacement  ([facility] -1996), s/p pacemaker.  Pt is very hard of hearing.      · Warfarin start date: ~1996  · INR Goal:  2.0-3.0    · Current warfarin regimen:   2.5 mg daily                  · Warfarin tablet strength:   1 mg, 2.5 mg, 5 mg  -- wife states she does not want to split tablets in half ; generally takes 2.5 mg tab. Able to recognize tablet strength  · Duration of therapy: indefinite  · Takes warfarin in the evening    Today's pertinent positives includes:  No significant changes since last visit     Pt received his 1st COVID 19 vaccine but missed the appt for his 2nd [facility] Peter dose. Results for orders placed or performed in visit on 04/14/21   AMB POC PT/INR   Result Value Ref Range    VALID INTERNAL CONTROL POC Yes     Prothrombin time (POC) 27.4 (A) 9.1 - 12.0 seconds    INR POC 2.3 (A) 1.0 - 1.5     · Adherence:   · Able to recall regimen? YES  · Miss/extra dose? NO  · Need refill?  NO    Upcoming procedure(s):  NO      Past Medical History:   Diagnosis Date    Acute on chronic systolic heart failure, NYHA class 4 ([facility] [address] 03/01/2019    admit March 2019 7 days, EF 10% got Primacor, BIV implant    Anger     TAKES PAROXETINE TO CONTROL ANGER ISSUES    CAD (coronary artery disease) 1996    CABG 1996, mukul celis 2015 fixed inferior defect    Chronic atrial fibrillation ([facility] [address] 03/2019    RVR3/2019, had ablation of flutter- persistent    Foot drop, left     WEARS METAL BRACE    Gout     Hard of hearing     High cholesterol     Hypertension     Prostate enlargement     Thyroid disease     Unspecified sleep apnea     DOESN'T USE CPAP; \"IT MADE HIM SICK-COLDS, SINUS\", PER WIFE     No Known Allergies  Current Outpatient Medications   Medication Sig    carvediloL (COREG) 6.25 mg tablet Take 1 Tab by mouth two (2) times daily (with meals).  atorvastatin (LIPITOR) 40 mg tablet Take 1 Tab by mouth daily. Please schedule follow up with Dr. Johnnie Rogers.   [facility] Entresto 24-26 mg tablet T1T PO EVERY 12 HOURS (Patient taking differently: Take 1 Tab by mouth two (2) times a day.)    PARoxetine (PAXIL) 20 mg tablet TAKE 1 TABLET EVERY DAY (Patient taking differently: Take 20 mg by mouth daily.)    levothyroxine (SYNTHROID) 150 mcg tablet TAKE 1 TABLET EVERY MORNING FOR THYROID (Patient taking differently: Take 150 mcg by mouth Daily (before breakfast). )    allopurinoL (ZYLOPRIM) 300 mg tablet T1T QD TO PREVENT GOUT (Patient taking differently: Take 300 mg by mouth daily. For gout)    oxybutynin chloride XL (DITROPAN XL) 10 mg CR tablet Take 10 mg by mouth daily.  spironolactone (ALDACTONE) 25 mg tablet Take 25 mg by mouth daily.  omega 3-dha-epa-fish oil (FISH OIL) 100-160-1,000 mg cap Take 1 Cap by mouth daily.  psyllium (METAMUCIL) 0.52 gram capsule Take 1 Cap by mouth daily.  warfarin (COUMADIN) 2.5 mg tablet 1 tab daily- NEEDS INR CHECK (Patient taking differently: Take 2.5 mg by mouth daily. Note, he also has 1 mg and 5 mg tablet strengths)    b complex vitamins (B COMPLEX 1) tablet Take 1 Tab by mouth daily.  CYANOCOBALAMIN, VITAMIN B-12, PO Take 1 Tab by mouth daily. Sciatica    aspirin delayed-release 81 mg tablet Take 81 mg by mouth daily.  therapeutic multivitamin (THERAGRAN) tablet Take 1 Tab by mouth daily.  ascorbic acid, vitamin C, (VITAMIN C) 500 mg tablet Take 1,000 mg by mouth daily.  B.infantis-B.ani-B.long-B.bifi (PROBIOTIC 4X) 10-15 mg TbEC Take 1 Tab by mouth daily.  COQ10, UBIQUINOL, PO Take 1 Tab by mouth daily.  finasteride (PROSCAR) 5 mg tablet Take 5 mg by mouth daily.  tamsulosin (FLOMAX) 0.4 mg capsule Take 0.4 mg by mouth nightly.  FERROUS GLUCONATE PO Take 325 mg by mouth daily.     furosemide (LASIX) 20 mg tablet Take 1 Tab by mouth daily. (Patient taking differently: Take 20 mg by mouth daily. Per patient take 1 pill base on swelling)     No current facility-administered medications for this visit. Wt Readings from Last 3 Encounters:   04/14/21 175 lb 12.8 oz (79.7 kg)   01/06/21 179 lb (81.2 kg)   11/16/20 177 lb (80.3 kg)     BP Readings from Last 3 Encounters:   03/10/21 136/86   02/10/21 133/84   01/20/21 134/84     Pulse Readings from Last 3 Encounters:   03/10/21 71   02/10/21 90   01/20/21 80     Lab Results   Component Value Date/Time    WBC 5.7 08/09/2020 08:14 PM    HGB 13.2 08/09/2020 08:14 PM    HCT 39.3 08/09/2020 08:14 PM    PLATELET 174 (L) 18/55/8955 08:14 PM    MCV 98.0 08/09/2020 08:14 PM     Lab Results   Component Value Date/Time    Sodium 144 11/16/2020 10:49 AM    Potassium 3.9 11/16/2020 10:49 AM    Chloride 108 (H) 11/16/2020 10:49 AM    CO2 20 11/16/2020 10:49 AM    Anion gap 5 08/09/2020 09:15 PM    Glucose 106 (H) 11/16/2020 10:49 AM    BUN 18 11/16/2020 10:49 AM    Creatinine 1.26 11/16/2020 10:49 AM    BUN/Creatinine ratio 14 11/16/2020 10:49 AM    GFR est AA 60 11/16/2020 10:49 AM    GFR est non-AA 52 (L) 11/16/2020 10:49 AM    Calcium 8.9 11/16/2020 10:49 AM    Bilirubin, total 1.1 (H) 08/09/2020 09:15 PM    Alk. phosphatase 102 08/09/2020 09:15 PM    Protein, total 6.4 08/09/2020 09:15 PM    Albumin 3.2 (L) 08/09/2020 09:15 PM    Globulin 3.2 08/09/2020 09:15 PM    A-G Ratio 1.0 (L) 08/09/2020 09:15 PM    ALT (SGPT) 23 08/09/2020 09:15 PM     Estimated Creatinine Clearance: 40.7 mL/min (by C-G formula based on SCr of 1.26 mg/dL). INR History:   (normal INR range 0.8-1.2)     Date   INR   PT   Dose/Comments  04/14/21 2.3  27.4 2.5 mg daily  03/10/21          2.1                   25.5     2.5 mg daily  02/10/21          2.3                   27.9     2.5 mg daily  01/20/21          2.7                   32. 3     5 mg x 2, then 2.5 mg daily  01/06/21          1.6                   19.5     5 mg x 1, then 2.5 mg daily   12/22/20          1.8                   21. 9     2.5 mg daily  12/03/20          2.0                   28.0     2.5 mg daily; (+) hematuria; missed doses  11/16/20          2.2                   23.2     2.5 mg daily  10/07/20          1.5                   18.6     2.5 mg daily; (+) increased vitamin K intake  09/09/20          2.7                   32.4     2.5 mg daily  08/26/20          2.4                   28. 7     5 mg x 1, then 2.5 mg daily.   08/18/20          1.6                   19.5     ED visit; (+) vitamin K; 2.5 mg daily  08/09/20          11.9                 101. 8     07/28/20          2.5                   25.8           A/P:       Anticoagulation:  Considering Mr. Poly Alcantara past history, todays findings, and per Anticoagulation Collaborative Practice Agreement/Protocol:    1. POC INR (2.3) remains therapeutic for INR goal today. 2.  Continue warfarin 2.5 mg daily. Patient was instructed to schedule an appointment in 5 week(s) prior to leaving the clinic. Medication reconciliation was completed during the visit. There are no discontinued medications. A full discussion of the nature of anticoagulants has been carried out. A full discussion of the need for frequent and regular monitoring, precise dosage adjustment and compliance was stressed. Side effects of potential bleeding were discussed and Mr. Fernando Prajapati was instructed to call [phone] if there are any signs of abnormal bleeding. Mr. Fernando Prajapati was instructed to avoid any OTC items containing aspirin or ibuprofen and prior to starting any new OTC products to consult with his physician or pharmacist to ensure no drug interactions are present. Mr. Fernando Prajapati was instructed to avoid any major changes in his general diet and to avoid alcohol consumption.     Mr. Fernando Prajapati was provided information in the AVS that includes topics on understanding coumadin therapy, drug interaction considerations, vitamin K and coumadin use, interactions with foods and supplements containing vitamin K, and the use of herbal products. Mr. Carley Hinton verbalized his understanding of all instructions and will call the office with any questions, concerns, or signs of abnormal bleeding or blood clot. Notifications of recommendations will be sent to Dr. Smitha Nuno MD for review.     Thank you,  Blanca Eastman, PharmD, BCACP, [facility]          CLINICAL PHARMACY CONSULT: MED RECONCILIATION/REVIEW ADDENDUM    For Pharmacy Admin Tracking Only    PHSO: PHSO Patient?: Yes  Total # of Interventions Recommended: Count: 1  - Maintenance Safety Lab Monitoring #: 1    Time Spent (min): 15

## 2021-04-14 ENCOUNTER — ANTI-COAG VISIT (OUTPATIENT)
Dept: INTERNAL MEDICINE CLINIC | Age: 86
End: 2021-04-14
Payer: MEDICARE

## 2021-04-14 VITALS — TEMPERATURE: 97.5 F | HEIGHT: 68 IN | WEIGHT: 175.8 LBS | BODY MASS INDEX: 26.64 KG/M2

## 2021-04-14 DIAGNOSIS — I48.20 CHRONIC A-FIB (HCC): Primary | ICD-10-CM

## 2021-04-14 DIAGNOSIS — Z95.2 H/O MECHANICAL AORTIC VALVE REPLACEMENT: ICD-10-CM

## 2021-04-14 DIAGNOSIS — Z95.2 S/P AVR: ICD-10-CM

## 2021-04-14 DIAGNOSIS — I48.3 TYPICAL ATRIAL FLUTTER (HCC): ICD-10-CM

## 2021-04-14 LAB
INR BLD: 2.3 (ref 1–1.5)
PT POC: 27.4 SECONDS (ref 9.1–12)
VALID INTERNAL CONTROL?: YES

## 2021-04-14 PROCEDURE — G0463 HOSPITAL OUTPT CLINIC VISIT: HCPCS | Performed by: PHARMACIST

## 2021-04-14 PROCEDURE — 85610 PROTHROMBIN TIME: CPT | Performed by: PHARMACIST

## 2021-04-14 PROCEDURE — 99212 OFFICE O/P EST SF 10 MIN: CPT | Performed by: PHARMACIST

## 2021-04-14 NOTE — PATIENT INSTRUCTIONS
Today your INR was 2.3. Your goal INR is  2.0-3.0 . You have a 1 mg , 2.5 mg and 3 mg tablet of Coumadin (warfarin). Take Coumadin (warfarin) as follows:    Continue warfarin 2.5 mg daily. Come back in 5 week(s) for your next finger stick/INR blood test.        Avoid any over the counter items containing aspirin or ibuprofen, and avoid great swings in general diet. Avoid alcohol consumption. Please notify the INR nurse if you are started on any new medication including over the counter or herbal supplements. Also, please notify your INR nurse if any of your other prescription or over the counter medications have been discontinued. Call [facility] at [phone] if you have any signs of abnormal bleeding/blood clot.  ------------------------------------------------------------------------------------------------------------------  Taking Warfarin Safely: Care Instructions    Your Care Instructions  Warfarin is a medicine that you take to prevent blood clots. It is often called a blood thinner. Doctors give warfarin (such as Coumadin) to reduce the risk of blood clots. You may be at risk for blood clots if you have atrial fibrillation or deep vein thrombosis. Some other health problems may also put you at risk. Warfarin slows the amount of time it takes for your blood to clot. It can cause bleeding problems. Even if you've been taking warfarin for a while, it's important to know how to take it safely. Foods and other medicines can affect the way warfarin works. Some can make warfarin work too well. This can cause bleeding problems. And some can make it work poorly, so that it does not prevent blood clots very well. You will need regular blood tests to check how long it takes for your blood to form a clot. This test is called a PT or prothrombin time test. The result of the test is called an INR level.  Depending on the test results, your doctor or anticoagulation clinic may adjust your dose of warfarin. Follow-up care is a key part of your treatment and safety. Be sure to make and go to all appointments, and call your doctor if you are having problems. It's also a good idea to know your test results and keep a list of the medicines you take. How can you care for yourself at home? Take warfarin safely  · Take your warfarin at the same time each day. · If you miss a dose of warfarin, don't take an extra dose to make up for it. Your doctor can tell you exactly what to do so you don't take too much or too little. · Wear medical alert jewelry that lets others know that you take warfarin. You can buy this at most drugstores. · Don't take warfarin if you are pregnant or planning to get pregnant. Talk to your doctor about how you can prevent getting pregnant while you are taking it. · Don't change your dose or stop taking warfarin unless your doctor tells you to. Effects of medicines and food on warfarin  · Don't start or stop taking any medicines, vitamins, or natural remedies unless you first talk to your doctor. Many medicines can affect how warfarin works. These include aspirin and other pain relievers, over-the-counter medicines, multivitamins, dietary supplements, and herbal products. · Tell all of your doctors and pharmacists that you take warfarin. Some prescription medicines can affect how warfarin works. · Keep the amount of vitamin K in your diet about the same from day to day. Do not suddenly eat a lot more or a lot less food that is rich in vitamin K than you usually do. Vitamin K affects how warfarin works and how your blood clots. Talk with your doctor before making big changes in your diet. Vitamin K is in many foods, such as:  ¨ Leafy greens, such as kale, cabbage, spinach, Swiss chard, and lettuce. ¨ Canola and soybean oils. ¨ Green vegetables, such as asparagus, broccoli, and [place] sprouts.   ¨ Vegetable drinks, green tea leaves, and some dietary supplement drinks. · Avoid cranberry juice and other cranberry products. They can increase the effects of warfarin. · Limit your use of alcohol. Avoid bleeding by preventing falls and injuries  · Wear slippers or shoes with nonskid soles. · Remove throw rugs and clutter. · Rearrange furniture and electrical cords to keep them out of walking paths. · Keep stairways, porches, and outside walkways well lit. Use night-lights in hallways and bathrooms. · Be extra careful when you work with sharp tools or knives. When should you call for help? Call 911 anytime you think you may need emergency care. For example, call if:  · You have a sudden, severe headache that is different from past headaches. Call your doctor now or seek immediate medical care if:  · You have any abnormal bleeding, such as:  ¨ Nosebleeds. ¨ Vaginal bleeding that is different (heavier, more frequent, at a different time of the month) than what you are used to. ¨ Bloody or black stools, or rectal bleeding. ¨ Bloody or pink urine. Watch closely for changes in your health, and be sure to contact your doctor if you have any problems. Where can you learn more? Go to http://www.gray.com/. Enter S366 in the search box to learn more about \"Taking Warfarin Safely: Care Instructions. \"  Current as of: January 27, 2016  Content Version: 11.1  © 5796-1459 [employer]. Care instructions adapted under license by [employer] (which disclaims liability or warranty for this information). If you have questions about a medical condition or this instruction, always ask your healthcare professional. [place] [address] any warranty or liability for your use of this information.

## 2021-04-14 NOTE — PROGRESS NOTES
Pharmacy Progress Note - Anticoagulation Management S/O:  Mr. Charles Walsh  is a 80 y.o. male seen today for anticoagulation management for the diagnosis of Atrial Fibrillation and Aortic Valve Replacement  ([employer] -), s/p pacemaker. Interim History: 
- 3/15/2021 Patient received first dose of the Pfizer Covid vaccine - 3/26/2021 Follow up visit with cardiology · Warfarin start date: ~ · INR Goal:  2.0-3.0 · Current warfarin regimen: 2.5 mg daily · Warfarin tablet strength:   1 mg, 2.5 mg, 5 mg · Duration of therapy: indefinite Today's pertinent positives includes: 
{Anticoagulation Findin} · Adherence: · Able to recall regimen? {YES/NO/NA:29953} · Miss/extra dose? {YES/NO/NA:19603} · Need refill? {YES/NO/NA:33841} Upcoming procedure(s):  {YES/NO/NA:22929} Past Medical History:  
Diagnosis Date  Acute on chronic systolic heart failure, NYHA class 4 ([facility] [address] 2019  
 admit 2019 7 days, EF 10% got Primacor, BIV implant  Anger TAKES PAROXETINE TO CONTROL ANGER ISSUES  
 CAD (coronary artery disease)  CABG , mukul celis  fixed inferior defect  [facility] 2019 [id number]/6927, had ablation of flutter- persistent  Foot drop, left WEARS METAL BRACE  
 Gout  Hard of hearing  High cholesterol  Hypertension  Prostate enlargement  Thyroid disease  Unspecified sleep apnea DOESN'T USE CPAP; \"IT MADE HIM SICK-COLDS, SINUS\", PER WIFE No Known Allergies Current Outpatient Medications Medication Sig  carvediloL (COREG) 6.25 mg tablet Take 1 Tab by mouth two (2) times daily (with meals).  atorvastatin (LIPITOR) 40 mg tablet Take 1 Tab by mouth daily. Please schedule follow up with Dr. Evita Urban.  
Zulema Roper Entresto 24-26 mg tablet T1T PO EVERY 12 HOURS (Patient taking differently: Take 1 Tab by mouth two (2) times a day.)  PARoxetine (PAXIL) 20 mg tablet TAKE 1 TABLET EVERY DAY (Patient taking differently: Take 20 mg by mouth daily.)  levothyroxine (SYNTHROID) 150 mcg tablet TAKE 1 TABLET EVERY MORNING FOR THYROID (Patient taking differently: Take 150 mcg by mouth Daily (before breakfast). )  allopurinoL (ZYLOPRIM) 300 mg tablet T1T QD TO PREVENT GOUT (Patient taking differently: Take 300 mg by mouth daily. For gout)  oxybutynin chloride XL (DITROPAN XL) 10 mg CR tablet Take 10 mg by mouth daily.  spironolactone (ALDACTONE) 25 mg tablet Take 25 mg by mouth daily.  omega 3-dha-epa-fish oil (FISH OIL) 100-160-1,000 mg cap Take 1 Cap by mouth daily.  psyllium (METAMUCIL) 0.52 gram capsule Take 1 Cap by mouth daily.  warfarin (COUMADIN) 2.5 mg tablet 1 tab daily- NEEDS INR CHECK (Patient taking differently: Take 2.5 mg by mouth daily. Note, he also has 1 mg and 5 mg tablet strengths)  b complex vitamins (B COMPLEX 1) tablet Take 1 Tab by mouth daily.  CYANOCOBALAMIN, VITAMIN B-12, PO Take 1 Tab by mouth daily. Sciatica  aspirin delayed-release 81 mg tablet Take 81 mg by mouth daily.  therapeutic multivitamin (THERAGRAN) tablet Take 1 Tab by mouth daily.  ascorbic acid, vitamin C, (VITAMIN C) 500 mg tablet Take 1,000 mg by mouth daily.  B.infantis-B.ani-B.long-B.bifi (PROBIOTIC 4X) 10-15 mg TbEC Take 1 Tab by mouth daily.  COQ10, UBIQUINOL, PO Take 1 Tab by mouth daily.  finasteride (PROSCAR) 5 mg tablet Take 5 mg by mouth daily.  tamsulosin (FLOMAX) 0.4 mg capsule Take 0.4 mg by mouth nightly.  FERROUS GLUCONATE PO Take 325 mg by mouth daily.  furosemide (LASIX) 20 mg tablet Take 1 Tab by mouth daily. (Patient taking differently: Take 20 mg by mouth daily. Per patient take 1 pill base on swelling) No current facility-administered medications for this visit. Wt Readings from Last 3 Encounters:  
01/06/21 179 lb (81.2 kg)  
11/16/20 177 lb (80.3 kg)  
11/12/20 177 lb (80.3 kg) BP Readings from Last 3 Encounters:  
03/10/21 136/86  
02/10/21 133/84  
01/20/21 134/84 Pulse Readings from Last 3 Encounters:  
03/10/21 71  
02/10/21 90  
01/20/21 80 Lab Results Component Value Date/Time WBC 5.7 08/09/2020 08:14 PM  
 HGB 13.2 08/09/2020 08:14 PM  
 HCT 39.3 08/09/2020 08:14 PM  
 PLATELET 483 (L) 69/66/1424 08:14 PM  
 MCV 98.0 08/09/2020 08:14 PM  
 
Lab Results Component Value Date/Time Sodium 144 11/16/2020 10:49 AM  
 Potassium 3.9 11/16/2020 10:49 AM  
 Chloride 108 (H) 11/16/2020 10:49 AM  
 CO2 20 11/16/2020 10:49 AM  
 Anion gap 5 08/09/2020 09:15 PM  
 Glucose 106 (H) 11/16/2020 10:49 AM  
 BUN 18 11/16/2020 10:49 AM  
 Creatinine 1.26 11/16/2020 10:49 AM  
 BUN/Creatinine ratio 14 11/16/2020 10:49 AM  
 GFR est AA 60 11/16/2020 10:49 AM  
 GFR est non-AA 52 (L) 11/16/2020 10:49 AM  
 Calcium 8.9 11/16/2020 10:49 AM  
 Bilirubin, total 1.1 (H) 08/09/2020 09:15 PM  
 Alk. phosphatase 102 08/09/2020 09:15 PM  
 Protein, total 6.4 08/09/2020 09:15 PM  
 Albumin 3.2 (L) 08/09/2020 09:15 PM  
 Globulin 3.2 08/09/2020 09:15 PM  
 A-G Ratio 1.0 (L) 08/09/2020 09:15 PM  
 ALT (SGPT) 23 08/09/2020 09:15 PM  
 
CrCl cannot be calculated (Unknown ideal weight.). INR History:   (normal INR range 0.8-1.2) Date   INR   PT   Dose/Comments 04/14/21 ***  *** 2.5 mg daily 03/10/21          2.1                   25.5     2.5 mg daily 02/10/21          2.3                   27.9     2.5 mg daily 01/20/21          2.7                   32. 3     5 mg x 2, then 2.5 mg daily 01/06/21          1.6                   19.5     5 mg x 1, then 2.5 mg daily 12/22/20          1.8                   21. 9     2.5 mg daily 12/03/20          2.0                   28.0     2.5 mg daily; (+) hematuria; missed doses 11/16/20          2.2                   23.2     2.5 mg daily 10/07/20          1.5                   18.6     2.5 mg daily; (+) increased vitamin K intake 09/09/20          2.7                   32.4     2.5 mg daily 08/26/20          2.4                   28. 7     5 mg x 1, then 2.5 mg daily.  
08/18/20          1.6                   19.5     ED visit; (+) vitamin K; 2.5 mg daily 08/09/20          11.9                 101. 8    
07/28/20          2.5                   25.8    
 
A/P:    
 
Anticoagulation: 
Considering Mr. Siddhartha Dutton past history, todays findings, and per Anticoagulation Collaborative Practice Agreement/Protocol: 1. POC INR (***) is {therapeutic/subtherapeutic/supratherapeutic:42805} for INR goal today. 2.  {Continue or Change:87345} warfarin Check: {Plan-TT:43546} at the next visit. Give: {Plan-TT:53041} at the next visit. Patient was instructed to schedule an appointment in {#:23119} week(s) prior to leaving the clinic. Medication reconciliation was completed during the visit. There are no discontinued medications. A full discussion of the nature of anticoagulants has been carried out. A full discussion of the need for frequent and regular monitoring, precise dosage adjustment and compliance was stressed. Side effects of potential bleeding were discussed and Mr. Hamida Zambrano was instructed to call [phone] if there are any signs of abnormal bleeding. Mr. Hamida Zambrano was instructed to avoid any OTC items containing aspirin or ibuprofen and prior to starting any new OTC products to consult with his physician or pharmacist to ensure no drug interactions are present. Mr. Hamida Zambrano was instructed to avoid any major changes in his general diet and to avoid alcohol consumption. Mr. Hamida Zambrano was provided information in the AVS that includes topics on understanding coumadin therapy, drug interaction considerations, vitamin K and coumadin use, interactions with foods and supplements containing vitamin K, and the use of herbal products.  
 
Mr. Hamida Zambrano verbalized his understanding of all instructions and will call the office with any questions, concerns, or signs of abnormal bleeding or blood clot. Notifications of recommendations will be sent to Dr. Jase Liriano MD for review. Thank you, Kiarra Cooper, PharmD PGY1 Resident [facility]

## 2021-04-15 ENCOUNTER — TELEPHONE (OUTPATIENT)
Dept: CARDIOLOGY CLINIC | Age: 86
End: 2021-04-15

## 2021-04-15 NOTE — TELEPHONE ENCOUNTER
Received alert for AT/AF burden up to 26%. During last remote 03/26/2021 burden 3.6%. Ventricular rate appears to be mainly controlled. CRT still 91%. Anticoagulated with warfarin. On carvedilol for rate control. Last seen by Dr. Mariano Glasgow in 11/2020. S/p ablation of AFL, also with [place] biventricular ICD. Given patient's age, controlled ventricular rate, & CRT still >90%, continue to monitor remotely.     Future Appointments   Date Time Provider Lexie Moreno   4/24/2021 10:30 AM Lexie Bauer BS AMB   5/13/2021 11:30 AM Pedro Berg MD [facility] BS AMB   5/19/2021  3:00 PM Sumaya Cardoza PHARMD [facility] BS AMB   6/28/2021  8:45 AM REMOTE1, BUSTER STAHL BS AMB   9/13/2021  1:00 PM REMOTE1, BUSTER STAHL BS AMB   12/2/2021  3:00 PM PACEMAKER3, BUSTER STAHL BS AMB   12/2/2021  3:20 PM MD MARIBETH Sol BS AMB

## 2021-05-13 ENCOUNTER — OFFICE VISIT (OUTPATIENT)
Dept: INTERNAL MEDICINE CLINIC | Age: 86
End: 2021-05-13
Payer: MEDICARE

## 2021-05-13 VITALS
RESPIRATION RATE: 16 BRPM | DIASTOLIC BLOOD PRESSURE: 85 MMHG | WEIGHT: 178 LBS | HEIGHT: 67 IN | SYSTOLIC BLOOD PRESSURE: 128 MMHG | BODY MASS INDEX: 27.94 KG/M2 | OXYGEN SATURATION: 100 % | HEART RATE: 99 BPM | TEMPERATURE: 97.9 F

## 2021-05-13 DIAGNOSIS — I48.20 CHRONIC A-FIB (HCC): ICD-10-CM

## 2021-05-13 DIAGNOSIS — Z95.2 S/P AVR: ICD-10-CM

## 2021-05-13 DIAGNOSIS — E78.00 PURE HYPERCHOLESTEROLEMIA: ICD-10-CM

## 2021-05-13 DIAGNOSIS — I25.10 CORONARY ARTERY DISEASE INVOLVING NATIVE CORONARY ARTERY OF NATIVE HEART WITHOUT ANGINA PECTORIS: ICD-10-CM

## 2021-05-13 DIAGNOSIS — N50.89 SCROTUM SWELLING: ICD-10-CM

## 2021-05-13 DIAGNOSIS — F32.A DEPRESSION, UNSPECIFIED DEPRESSION TYPE: ICD-10-CM

## 2021-05-13 DIAGNOSIS — E03.9 ACQUIRED HYPOTHYROIDISM: ICD-10-CM

## 2021-05-13 DIAGNOSIS — I50.22 CHRONIC SYSTOLIC CONGESTIVE HEART FAILURE (HCC): Primary | ICD-10-CM

## 2021-05-13 DIAGNOSIS — R19.09 RIGHT GROIN MASS: ICD-10-CM

## 2021-05-13 LAB
ALBUMIN SERPL-MCNC: 3.9 G/DL (ref 3.5–5)
ALBUMIN/GLOB SERPL: 1.4 {RATIO} (ref 1.1–2.2)
ALP SERPL-CCNC: 122 U/L (ref 45–117)
ALT SERPL-CCNC: 41 U/L (ref 12–78)
ANION GAP SERPL CALC-SCNC: 6 MMOL/L (ref 5–15)
AST SERPL-CCNC: 45 U/L (ref 15–37)
BILIRUB SERPL-MCNC: 0.7 MG/DL (ref 0.2–1)
BUN SERPL-MCNC: 19 MG/DL (ref 6–20)
BUN/CREAT SERPL: 18 (ref 12–20)
CALCIUM SERPL-MCNC: 8.7 MG/DL (ref 8.5–10.1)
CHLORIDE SERPL-SCNC: 111 MMOL/L (ref 97–108)
CHOLEST SERPL-MCNC: 150 MG/DL
CO2 SERPL-SCNC: 26 MMOL/L (ref 21–32)
COMMENT, HOLDF: NORMAL
CREAT SERPL-MCNC: 1.05 MG/DL (ref 0.7–1.3)
GLOBULIN SER CALC-MCNC: 2.7 G/DL (ref 2–4)
GLUCOSE SERPL-MCNC: 95 MG/DL (ref 65–100)
HDLC SERPL-MCNC: 52 MG/DL
HDLC SERPL: 2.9 {RATIO} (ref 0–5)
LDLC SERPL CALC-MCNC: 76 MG/DL (ref 0–100)
POTASSIUM SERPL-SCNC: 3.8 MMOL/L (ref 3.5–5.1)
PROT SERPL-MCNC: 6.6 G/DL (ref 6.4–8.2)
SAMPLES BEING HELD,HOLD: NORMAL
SODIUM SERPL-SCNC: 143 MMOL/L (ref 136–145)
TRIGL SERPL-MCNC: 110 MG/DL (ref ?–150)
TSH SERPL DL<=0.05 MIU/L-ACNC: 59.1 UIU/ML (ref 0.36–3.74)
VLDLC SERPL CALC-MCNC: 22 MG/DL

## 2021-05-13 PROCEDURE — 1101F PT FALLS ASSESS-DOCD LE1/YR: CPT | Performed by: INTERNAL MEDICINE

## 2021-05-13 PROCEDURE — G9717 DOC PT DX DEP/BP F/U NT REQ: HCPCS | Performed by: INTERNAL MEDICINE

## 2021-05-13 PROCEDURE — G8417 CALC BMI ABV UP PARAM F/U: HCPCS | Performed by: INTERNAL MEDICINE

## 2021-05-13 PROCEDURE — G8427 DOCREV CUR MEDS BY ELIG CLIN: HCPCS | Performed by: INTERNAL MEDICINE

## 2021-05-13 PROCEDURE — 99213 OFFICE O/P EST LOW 20 MIN: CPT | Performed by: INTERNAL MEDICINE

## 2021-05-13 PROCEDURE — G8536 NO DOC ELDER MAL SCRN: HCPCS | Performed by: INTERNAL MEDICINE

## 2021-05-13 PROCEDURE — G0463 HOSPITAL OUTPT CLINIC VISIT: HCPCS | Performed by: INTERNAL MEDICINE

## 2021-05-13 NOTE — PROGRESS NOTES
HISTORY OF PRESENT ILLNESS  Tonio Martin is a 80 y.o. male. HPI     Fu HTN HLD s/p mech AVR CHF s/p Biv -ICD ef 15 % CAD s/p CABG , Alutter s/p ablation, MDD hypothyroid  EP MD Dr Greta Chapa and CARD Dr Cong Vieira  INR 2.4  On 4/14  Last tsh 13 LDL 80  CHF symptoms-mild LENNON , no leg edema, pnd orthopnea    Hx depression-not depressed per pt  On paxil primarily to control anger    C/o right groin swellling x 1 year, no pain but getting worse  Increased swelling with straining or voiding per pt  Last OV    Due for INR--last month 1.5  No cp with activity   No increased sob  Only c/o b/l knee and hip pain  Very [place]  Denies feeling depressed    Patient Active Problem List    Diagnosis Date Noted    Hx of cardiac pacemaker 12/06/2019     Priority: 1 - One    Depression, major, recurrent, mild ([address] 03/10/2020    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 12/06/2019    Chronic systolic congestive heart failure ([address] 04/07/2019    LENNON (dyspnea on exertion) 04/07/2019    Coronary artery disease involving native coronary artery of native heart with unstable angina pectoris ([address] 04/07/2019    S/P CABG (coronary artery bypass graft) 04/07/2019    Severe aortic stenosis 04/07/2019    Hypertension, essential 04/07/2019    Dyslipidemia 04/07/2019    PVC (premature ventricular contraction) 04/07/2019    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  180 Tab 3    atorvastatin (LIPITOR) 40 mg tablet Take 1 Tab by mouth daily. Please schedule follow up with Dr. Lynn Bhagat. 90 Tab 0    Entresto 24-26 mg tablet T1T PO EVERY 12 HOURS (Patient taking differently: Take 1 Tab by mouth two (2) times a day.) 180 Tab 2    PARoxetine (PAXIL) 20 mg tablet TAKE 1 TABLET EVERY DAY (Patient taking differently: Take 20 mg by mouth daily.) 30 Tab 5    levothyroxine (SYNTHROID) 150 mcg tablet TAKE 1 TABLET EVERY MORNING FOR THYROID (Patient taking differently: Take 150 mcg by mouth Daily (before breakfast). ) 30 Tab 5    allopurinoL (ZYLOPRIM) 300 mg tablet T1T QD TO PREVENT GOUT (Patient taking differently: Take 300 mg by mouth daily. For gout) 30 Tab 11    oxybutynin chloride XL (DITROPAN XL) 10 mg CR tablet Take 10 mg by mouth daily.  spironolactone (ALDACTONE) 25 mg tablet Take 25 mg by mouth daily.  omega 3-dha-epa-fish oil (FISH OIL) 100-160-1,000 mg cap Take 1 Cap by mouth daily.  psyllium (METAMUCIL) 0.52 gram capsule Take 1 Cap by mouth daily.  warfarin (COUMADIN) 2.5 mg tablet 1 tab daily- NEEDS INR CHECK (Patient taking differently: Take 2.5 mg by mouth daily. Note, he also has 1 mg and 5 mg tablet strengths) 90 Tab 3    b complex vitamins (B COMPLEX 1) tablet Take 1 Tab by mouth daily.  CYANOCOBALAMIN, VITAMIN B-12, PO Take 1 Tab by mouth daily. Sciatica      aspirin delayed-release 81 mg tablet Take 81 mg by mouth daily.  therapeutic multivitamin (THERAGRAN) tablet Take 1 Tab by mouth daily.  ascorbic acid, vitamin C, (VITAMIN C) 500 mg tablet Take 1,000 mg by mouth daily.  B.infantis-B.ani-B.long-B.bifi (PROBIOTIC 4X) 10-15 mg TbEC Take 1 Tab by mouth daily.  COQ10, UBIQUINOL, PO Take 1 Tab by mouth daily.  finasteride (PROSCAR) 5 mg tablet Take 5 mg by mouth daily.  tamsulosin (FLOMAX) 0.4 mg capsule Take 0.4 mg by mouth nightly.  FERROUS GLUCONATE PO Take 325 mg by mouth daily.       furosemide (LASIX) 20 mg tablet Take 1 Tab by mouth daily. (Patient taking differently: Take 20 mg by mouth daily. Per patient take 1 pill base on swelling) 90 Tab 1     No Known Allergies   Lab Results   Component Value Date/Time    WBC 5.7 08/09/2020 08:14 PM    HGB 13.2 08/09/2020 08:14 PM    HCT 39.3 08/09/2020 08:14 PM    PLATELET 281 (L) 49/80/2716 08:14 PM    MCV 98.0 08/09/2020 08:14 PM     Lab Results   Component Value Date/Time    Hemoglobin A1c 5.9 10/08/2013 10:41 AM    Glucose 106 (H) 11/16/2020 10:49 AM    Glucose (POC) 119 (H) 10/24/2013 06:28 AM    LDL, calculated 83 03/10/2020 11:17 AM    Creatinine (POC) 1.6 (H) 06/18/2018 01:48 PM    Creatinine 1.26 11/16/2020 10:49 AM      Lab Results   Component Value Date/Time    Cholesterol, total 160 03/10/2020 11:17 AM    HDL Cholesterol 45 03/10/2020 11:17 AM    LDL, calculated 83 03/10/2020 11:17 AM    Triglyceride 160 (H) 03/10/2020 11:17 AM     Lab Results   Component Value Date/Time    GFR est non-AA 52 (L) 11/16/2020 10:49 AM    GFRNA, POC 41 (L) 06/18/2018 01:48 PM    GFR est AA 60 11/16/2020 10:49 AM    GFRAA, POC 50 (L) 06/18/2018 01:48 PM    Creatinine 1.26 11/16/2020 10:49 AM    Creatinine (POC) 1.6 (H) 06/18/2018 01:48 PM    BUN 18 11/16/2020 10:49 AM    Sodium 144 11/16/2020 10:49 AM    Potassium 3.9 11/16/2020 10:49 AM    Chloride 108 (H) 11/16/2020 10:49 AM    CO2 20 11/16/2020 10:49 AM    Magnesium 2.0 03/01/2019 09:10 AM        ROS    Physical Exam  Vitals signs and nursing note reviewed. Constitutional:       General: He is not in acute distress. Appearance: He is well-developed. Comments: Appears stated age. Very [place]   HENT:      Head: Normocephalic. Cardiovascular:      Rate and Rhythm: Normal rate and regular rhythm. Heart sounds: Normal heart sounds. Pulmonary:      Effort: Pulmonary effort is normal.      Breath sounds: Normal breath sounds. Abdominal:      Palpations: Abdomen is soft. Genitourinary:      Neurological:      Mental Status: He is alert. ASSESSMENT and PLAN  Diagnoses and all orders for this visit:    1. Chronic systolic congestive heart failure (HCC)  -     METABOLIC PANEL, COMPREHENSIVE; Future   compensated  2. Acquired hypothyroidism  -     TSH 3RD GENERATION; Future    3. Chronic a-fib (HCC)   On warfarin managed in INR clinic   Fu CARD and EP MD  4. Depression, unspecified depression type   controlled  5. Coronary artery disease involving native coronary artery of native heart without angina pectoris  -     METABOLIC PANEL, COMPREHENSIVE; Future   Fu CARD and EP  6. Pure hypercholesterolemia  -     LIPID PANEL; Future  -     METABOLIC PANEL, COMPREHENSIVE; Future    7. S/P AVR   On warfarin  8. Right groin mass  -     REFERRAL TO GENERAL SURGERY   Hernia vs hydrocele  9. Scrotum swelling  -     REFERRAL TO GENERAL SURGERY      Follow-up and Dispositions    · Return in about 6 months (around 11/13/2021) for CPE.

## 2021-05-13 NOTE — PATIENT INSTRUCTIONS
Office Policies    Phone calls/patient messages:            Please allow up to 24 hours for someone in the office to contact you about your call or message. Be mindful your provider may be out of the office or your message may require further review. We encourage you to use [employer] for your messages as this is a faster, more efficient way to communicate with our office                         Medication Refills:            Prescription medications require 48-72 business hours to process. We encourage you to use [employer] for your refills. For controlled medications: Please allow 72 business hours to process. Certain medications may require you to  a written prescription at our office. NO narcotic/controlled medications will be prescribed after 4pm Monday through Friday or on weekends              Form/Paperwork Completion:            Please note a $25 fee may incur for all paperwork for completed by our providers. We ask that you allow 7-10 business days. Pre-payment is due prior to picking up/faxing the completed form. You may also download your forms to [employer] to have your doctor print off.

## 2021-05-14 DIAGNOSIS — E03.9 ACQUIRED HYPOTHYROIDISM: Primary | ICD-10-CM

## 2021-05-14 RX ORDER — LEVOTHYROXINE SODIUM 150 UG/1
TABLET ORAL
Qty: 30 TAB | Refills: 11 | Status: SHIPPED | OUTPATIENT
Start: 2021-05-14 | End: 2022-06-13

## 2021-05-14 NOTE — PROGRESS NOTES
D/w pts wife--pt is not taking his levothyroxine but taking rest of medicines. Forgets to take this pill on empty stomach in mornings. Stressed med adhernce , can take LT4 with rest of medicines. Repeat TSH in 6-8 weeks, mailed lab slip.

## 2021-05-19 ENCOUNTER — ANTI-COAG VISIT (OUTPATIENT)
Dept: INTERNAL MEDICINE CLINIC | Age: 86
End: 2021-05-19
Payer: MEDICARE

## 2021-05-19 VITALS — TEMPERATURE: 96.8 F

## 2021-05-19 DIAGNOSIS — Z95.2 S/P AVR (AORTIC VALVE REPLACEMENT): Primary | ICD-10-CM

## 2021-05-19 DIAGNOSIS — I48.11 LONGSTANDING PERSISTENT ATRIAL FIBRILLATION (HCC): ICD-10-CM

## 2021-05-19 DIAGNOSIS — Z95.2 H/O MECHANICAL AORTIC VALVE REPLACEMENT: ICD-10-CM

## 2021-05-19 DIAGNOSIS — I48.20 CHRONIC A-FIB (HCC): ICD-10-CM

## 2021-05-19 DIAGNOSIS — I48.3 TYPICAL ATRIAL FLUTTER (HCC): ICD-10-CM

## 2021-05-19 LAB
INR BLD: 2.3 (ref 1–1.5)
PT POC: 27.8 SECONDS (ref 9.1–12)
VALID INTERNAL CONTROL?: YES

## 2021-05-19 PROCEDURE — 85610 PROTHROMBIN TIME: CPT | Performed by: PHARMACIST

## 2021-05-19 NOTE — PROGRESS NOTES
Pharmacy Progress Note - Anticoagulation Management    S/O: Mr. Puneet Casey  is a 86 y. o. male, referred by Andrew Owens MD, was seen today for anticoagulation management for the diagnosis of Atrial Fibrillation and Aortic Valve Replacement  ([facility] -1996), s/p pacemaker.  Pt is very hard of hearing.     Interim history:  Saw Dr Andre Catherine for f/u on 5/13.      · Warfarin start date: ~1996  · INR Goal:  2.0-3.0    · Current warfarin regimen:   2.5 mg daily                  · Warfarin tablet strength:   1 mg, 2.5 mg, 5 mg  -- wife states she does not want to split tablets in half ; generally takes 2.5 mg tab. Able to recognize tablet strength  · Duration of therapy: indefinite  · Takes warfarin in the evening    Today's pertinent positives includes:  No significant changes since last visit    Results for orders placed or performed in visit on 05/19/21   AMB POC PT/INR   Result Value Ref Range    VALID INTERNAL CONTROL POC Yes     Prothrombin time (POC) 27.8 (A) 9.1 - 12.0 seconds    INR POC 2.3 (A) 1.0 - 1.5     · Adherence:   · Able to recall regimen? YES  · Miss/extra dose? NO  · Need refill? NO    Upcoming procedure(s):  N/A  Has referral to gen surgery for hernia evaluation.      Past Medical History:   Diagnosis Date    Acute on chronic systolic heart failure, NYHA class 4 ([facility] [address] 03/01/2019    admit March 2019 7 days, EF 10% got Primacor, BIV implant    Anger     TAKES PAROXETINE TO CONTROL ANGER ISSUES    CAD (coronary artery disease) 1996    CABG 1996, mukul celis 2015 fixed inferior defect    Chronic atrial fibrillation ([facility] [address] 03/2019    RVR3/2019, had ablation of flutter- persistent    Foot drop, left     WEARS METAL BRACE    Gout     Hard of hearing     High cholesterol     Hypertension     Prostate enlargement     Thyroid disease     Unspecified sleep apnea     DOESN'T USE CPAP; \"IT MADE HIM SICK-COLDS, SINUS\", PER WIFE     No Known Allergies  Current Outpatient Medications   Medication Sig  levothyroxine (SYNTHROID) 150 mcg tablet TAKE 1 TABLET EVERY MORNING FOR THYROID    carvediloL (COREG) 6.25 mg tablet Take 1 Tab by mouth two (2) times daily (with meals).  atorvastatin (LIPITOR) 40 mg tablet Take 1 Tab by mouth daily. Please schedule follow up with Dr. Rukhsana Collins.   [facility] Entresto 24-26 mg tablet T1T PO EVERY 12 HOURS (Patient taking differently: Take 1 Tab by mouth two (2) times a day.)    PARoxetine (PAXIL) 20 mg tablet TAKE 1 TABLET EVERY DAY (Patient taking differently: Take 20 mg by mouth daily.)    allopurinoL (ZYLOPRIM) 300 mg tablet T1T QD TO PREVENT GOUT (Patient taking differently: Take 300 mg by mouth daily. For gout)    oxybutynin chloride XL (DITROPAN XL) 10 mg CR tablet Take 10 mg by mouth daily.  spironolactone (ALDACTONE) 25 mg tablet Take 25 mg by mouth daily.  omega 3-dha-epa-fish oil (FISH OIL) 100-160-1,000 mg cap Take 1 Cap by mouth daily.  psyllium (METAMUCIL) 0.52 gram capsule Take 1 Cap by mouth daily.  warfarin (COUMADIN) 2.5 mg tablet 1 tab daily- NEEDS INR CHECK (Patient taking differently: Take 2.5 mg by mouth daily. Note, he also has 1 mg and 5 mg tablet strengths)    b complex vitamins (B COMPLEX 1) tablet Take 1 Tab by mouth daily.  CYANOCOBALAMIN, VITAMIN B-12, PO Take 1 Tab by mouth daily. Sciatica    aspirin delayed-release 81 mg tablet Take 81 mg by mouth daily.  therapeutic multivitamin (THERAGRAN) tablet Take 1 Tab by mouth daily.  ascorbic acid, vitamin C, (VITAMIN C) 500 mg tablet Take 1,000 mg by mouth daily.  B.infantis-B.ani-B.long-B.bifi (PROBIOTIC 4X) 10-15 mg TbEC Take 1 Tab by mouth daily.  COQ10, UBIQUINOL, PO Take 1 Tab by mouth daily.  finasteride (PROSCAR) 5 mg tablet Take 5 mg by mouth daily.  tamsulosin (FLOMAX) 0.4 mg capsule Take 0.4 mg by mouth nightly.  FERROUS GLUCONATE PO Take 325 mg by mouth daily.  furosemide (LASIX) 20 mg tablet Take 1 Tab by mouth daily.  (Patient taking differently: Take 20 mg by mouth daily. Per patient take 1 pill base on swelling)     No current facility-administered medications for this visit. Wt Readings from Last 3 Encounters:   05/13/21 178 lb (80.7 kg)   04/14/21 175 lb 12.8 oz (79.7 kg)   01/06/21 179 lb (81.2 kg)     BP Readings from Last 3 Encounters:   05/13/21 128/85   03/10/21 136/86   02/10/21 133/84     Pulse Readings from Last 3 Encounters:   05/13/21 99   03/10/21 71   02/10/21 90     Lab Results   Component Value Date/Time    WBC 5.7 08/09/2020 08:14 PM    HGB 13.2 08/09/2020 08:14 PM    HCT 39.3 08/09/2020 08:14 PM    PLATELET 947 (L) 05/90/9991 08:14 PM    MCV 98.0 08/09/2020 08:14 PM     Lab Results   Component Value Date/Time    Sodium 143 05/13/2021 12:25 PM    Potassium 3.8 05/13/2021 12:25 PM    Chloride 111 (H) 05/13/2021 12:25 PM    CO2 26 05/13/2021 12:25 PM    Anion gap 6 05/13/2021 12:25 PM    Glucose 95 05/13/2021 12:25 PM    BUN 19 05/13/2021 12:25 PM    Creatinine 1.05 05/13/2021 12:25 PM    BUN/Creatinine ratio 18 05/13/2021 12:25 PM    GFR est AA >60 05/13/2021 12:25 PM    GFR est non-AA >60 05/13/2021 12:25 PM    Calcium 8.7 05/13/2021 12:25 PM    Bilirubin, total 0.7 05/13/2021 12:25 PM    Alk. phosphatase 122 (H) 05/13/2021 12:25 PM    Protein, total 6.6 05/13/2021 12:25 PM    Albumin 3.9 05/13/2021 12:25 PM    Globulin 2.7 05/13/2021 12:25 PM    A-G Ratio 1.4 05/13/2021 12:25 PM    ALT (SGPT) 41 05/13/2021 12:25 PM     CrCl cannot be calculated (Unknown ideal weight.). INR History:   (normal INR range 0.8-1.2)     Date   INR   PT   Dose/Comments  05/19/21 2.3  27.8 2.5 mg daily  04/14/21          2.3                   27.4     2.5 mg daily  03/10/21          2.1                   25.5     2.5 mg daily  02/10/21          2.3                   27.9     2.5 mg daily  01/20/21          2.7                   32. 3     5 mg x 2, then 2.5 mg daily  01/06/21          1.6                   19.5     5 mg x 1, then 2.5 mg daily 12/22/20          1.8                   21. 9     2.5 mg daily  12/03/20          2.0                   28.0     2.5 mg daily; (+) hematuria; missed doses  11/16/20          2.2                   23.2     2.5 mg daily  10/07/20          1.5                   18.6     2.5 mg daily; (+) increased vitamin K intake  09/09/20          2.7                   32.4     2.5 mg daily  08/26/20          2.4                   28. 7     5 mg x 1, then 2.5 mg daily.   08/18/20          1.6                   19.5     ED visit; (+) vitamin K; 2.5 mg daily  08/09/20          11.9                 101. 8       A/P:       Anticoagulation:  Considering Mr. Darnell Meigs past history, todays findings, and per Anticoagulation Collaborative Practice Agreement/Protocol:    1. POC INR (2.3) remains therapeutic for INR goal today. 2. Continue warfarin 2.5 mg daily. Patient was instructed to schedule an appointment in 6 week(s) prior to leaving the clinic. Medication reconciliation was completed during the visit. There are no discontinued medications. A full discussion of the nature of anticoagulants has been carried out. A full discussion of the need for frequent and regular monitoring, precise dosage adjustment and compliance was stressed. Side effects of potential bleeding were discussed and Mr. Oly Davidson was instructed to call [phone] if there are any signs of abnormal bleeding. Mr. Oly Davidson was instructed to avoid any OTC items containing aspirin or ibuprofen and prior to starting any new OTC products to consult with his physician or pharmacist to ensure no drug interactions are present. Mr. Oly Davidson was instructed to avoid any major changes in his general diet and to avoid alcohol consumption.     Mr. Oly Davidson was provided information in the AVS that includes topics on understanding coumadin therapy, drug interaction considerations, vitamin K and coumadin use, interactions with foods and supplements containing vitamin K, and the use of herbal products. Mr. Carley Hinton verbalized his understanding of all instructions and will call the office with any questions, concerns, or signs of abnormal bleeding or blood clot. Notifications of recommendations will be sent to Dr. Smitha Nuno MD for review. Thank you,  Blanca Eastman, PharmD, BCACP, [address] [place] [address] in place:  Yes   Recommendation Provided To: Patient/Caregiver: 1 via In person   Intervention Detail: Lab(s) Ordered   Total # of Interventions Accepted: 1   Time Spent (min): 15

## 2021-05-19 NOTE — PATIENT INSTRUCTIONS
Today your INR was 2.3. Your goal INR is  2.0-3.0 . You have a  1 mg, 2.5 mg, and  5 mg tablet of Coumadin (warfarin). Take Coumadin (warfarin) as follows:    Continue warfarin 2.5 mg daily. Come back in 6 week(s) for your next finger stick/INR blood test.        Avoid any over the counter items containing aspirin or ibuprofen, and avoid great swings in general diet. Avoid alcohol consumption. Please notify the INR nurse if you are started on any new medication including over the counter or herbal supplements. Also, please notify your INR nurse if any of your other prescription or over the counter medications have been discontinued. Call [facility] at [phone] if you have any signs of abnormal bleeding/blood clot.  ------------------------------------------------------------------------------------------------------------------  Taking Warfarin Safely: Care Instructions    Your Care Instructions  Warfarin is a medicine that you take to prevent blood clots. It is often called a blood thinner. Doctors give warfarin (such as Coumadin) to reduce the risk of blood clots. You may be at risk for blood clots if you have atrial fibrillation or deep vein thrombosis. Some other health problems may also put you at risk. Warfarin slows the amount of time it takes for your blood to clot. It can cause bleeding problems. Even if you've been taking warfarin for a while, it's important to know how to take it safely. Foods and other medicines can affect the way warfarin works. Some can make warfarin work too well. This can cause bleeding problems. And some can make it work poorly, so that it does not prevent blood clots very well. You will need regular blood tests to check how long it takes for your blood to form a clot. This test is called a PT or prothrombin time test. The result of the test is called an INR level.  Depending on the test results, your doctor or anticoagulation clinic may adjust your dose of warfarin. Follow-up care is a key part of your treatment and safety. Be sure to make and go to all appointments, and call your doctor if you are having problems. It's also a good idea to know your test results and keep a list of the medicines you take. How can you care for yourself at home? Take warfarin safely  · Take your warfarin at the same time each day. · If you miss a dose of warfarin, don't take an extra dose to make up for it. Your doctor can tell you exactly what to do so you don't take too much or too little. · Wear medical alert jewelry that lets others know that you take warfarin. You can buy this at most drugstores. · Don't take warfarin if you are pregnant or planning to get pregnant. Talk to your doctor about how you can prevent getting pregnant while you are taking it. · Don't change your dose or stop taking warfarin unless your doctor tells you to. Effects of medicines and food on warfarin  · Don't start or stop taking any medicines, vitamins, or natural remedies unless you first talk to your doctor. Many medicines can affect how warfarin works. These include aspirin and other pain relievers, over-the-counter medicines, multivitamins, dietary supplements, and herbal products. · Tell all of your doctors and pharmacists that you take warfarin. Some prescription medicines can affect how warfarin works. · Keep the amount of vitamin K in your diet about the same from day to day. Do not suddenly eat a lot more or a lot less food that is rich in vitamin K than you usually do. Vitamin K affects how warfarin works and how your blood clots. Talk with your doctor before making big changes in your diet. Vitamin K is in many foods, such as:  ¨ Leafy greens, such as kale, cabbage, spinach, Swiss chard, and lettuce. ¨ Canola and soybean oils. ¨ Green vegetables, such as asparagus, broccoli, and [place] sprouts.   ¨ Vegetable drinks, green tea leaves, and some dietary supplement drinks. · Avoid cranberry juice and other cranberry products. They can increase the effects of warfarin. · Limit your use of alcohol. Avoid bleeding by preventing falls and injuries  · Wear slippers or shoes with nonskid soles. · Remove throw rugs and clutter. · Rearrange furniture and electrical cords to keep them out of walking paths. · Keep stairways, porches, and outside walkways well lit. Use night-lights in hallways and bathrooms. · Be extra careful when you work with sharp tools or knives. When should you call for help? Call 911 anytime you think you may need emergency care. For example, call if:  · You have a sudden, severe headache that is different from past headaches. Call your doctor now or seek immediate medical care if:  · You have any abnormal bleeding, such as:  ¨ Nosebleeds. ¨ Vaginal bleeding that is different (heavier, more frequent, at a different time of the month) than what you are used to. ¨ Bloody or black stools, or rectal bleeding. ¨ Bloody or pink urine. Watch closely for changes in your health, and be sure to contact your doctor if you have any problems. Where can you learn more? Go to http://www.gray.com/. Enter W883 in the search box to learn more about \"Taking Warfarin Safely: Care Instructions. \"  Current as of: January 27, 2016  Content Version: 11.1  © 6148-6679 [employer]. Care instructions adapted under license by [employer] (which disclaims liability or warranty for this information). If you have questions about a medical condition or this instruction, always ask your healthcare professional. [place] [address] any warranty or liability for your use of this information.

## 2021-05-31 DIAGNOSIS — I48.20 CHRONIC ATRIAL FIBRILLATION (HCC): ICD-10-CM

## 2021-05-31 RX ORDER — WARFARIN 2.5 MG/1
TABLET ORAL
Qty: 90 TABLET | Refills: 2 | Status: SHIPPED | OUTPATIENT
Start: 2021-05-31 | End: 2022-03-18

## 2021-06-07 ENCOUNTER — OFFICE VISIT (OUTPATIENT)
Dept: SURGERY | Age: 86
End: 2021-06-07
Payer: MEDICARE

## 2021-06-07 VITALS
HEART RATE: 78 BPM | HEIGHT: 68 IN | DIASTOLIC BLOOD PRESSURE: 63 MMHG | TEMPERATURE: 96.9 F | BODY MASS INDEX: 26.34 KG/M2 | SYSTOLIC BLOOD PRESSURE: 104 MMHG | OXYGEN SATURATION: 91 % | WEIGHT: 173.8 LBS

## 2021-06-07 DIAGNOSIS — Z79.01 ANTICOAGULATED: ICD-10-CM

## 2021-06-07 DIAGNOSIS — K40.90 RIGHT INGUINAL HERNIA: Primary | ICD-10-CM

## 2021-06-07 PROCEDURE — G8417 CALC BMI ABV UP PARAM F/U: HCPCS | Performed by: SURGERY

## 2021-06-07 PROCEDURE — G8536 NO DOC ELDER MAL SCRN: HCPCS | Performed by: SURGERY

## 2021-06-07 PROCEDURE — G9717 DOC PT DX DEP/BP F/U NT REQ: HCPCS | Performed by: SURGERY

## 2021-06-07 PROCEDURE — G8427 DOCREV CUR MEDS BY ELIG CLIN: HCPCS | Performed by: SURGERY

## 2021-06-07 PROCEDURE — 1101F PT FALLS ASSESS-DOCD LE1/YR: CPT | Performed by: SURGERY

## 2021-06-07 PROCEDURE — 99205 OFFICE O/P NEW HI 60 MIN: CPT | Performed by: SURGERY

## 2021-06-07 NOTE — PROGRESS NOTES
Identified pt with two pt identifiers(name and ). Reviewed record in preparation for visit and have obtained necessary documentation. All patient medications has been reviewed. Chief Complaint   Patient presents with    Mass     Seen at the request of Dr Marquita Renee for eval of Right Groin mass       Health Maintenance Due   Topic    DTaP/Tdap/Td series (1 - Tdap)    Shingrix Vaccine Age 50> (1 of 2)    COVID-19 Vaccine (2 - Pfizer 2-dose series)       Vitals:    21 1342   Weight: 78.8 kg (173 lb 12.8 oz)   Height: 5' 8\" (1.727 m)   PainSc:   0 - No pain       4. Have you been to the ER, urgent care clinic since your last visit? Hospitalized since your last visit? No    5. Have you seen or consulted any other health care providers outside of the [address] [place] [address] since your last visit? Include any pap smears or colon screening. No      Patient is accompanied by spouse I have received verbal consent from David Desai to discuss any/all medical information while they are present in the room.

## 2021-06-07 NOTE — Clinical Note
6/26/2021    Patient: Michelet Coronado   YOB: 1935   Date of Visit: 6/7/2021     Aleksey Wood MD  [address] [place] [address]  Via In [employer]    Dear Aleksey Wood MD,      Thank you for referring Mr. Michelet Coronado to  [facility] for evaluation. My notes for this consultation are attached. If you have questions, please do not hesitate to call me. I look forward to following your patient along with you.       Sincerely,    Jose Ring MD

## 2021-06-26 NOTE — PROGRESS NOTES
To: Tory Nelson MD, Mayank Buckner MD    From: Franky Gonzales MD    Thank you for sending Tye Bhagat to see us. Please note that this dictation was completed with [employer], the computer voice recognition software. Quite often unanticipated grammatical, syntax, homophones, and other interpretive errors are inadvertently transcribed by the software. Please disregard these errors. Please excuse any errors that have escaped final proofreading. Encounter Date: 6/7/2021  History and Physical    Assessment:   Right inguinal hernia involving intestine and extending into the right hemiscrotum. He is on Coumadin for atrial fibrillation and valve replacement. This is a mechanical valve. I am unsure whether it is mitral or aortic but based on exam it appears to be aortic. His cardiologist is Dr. Saw Sotelo and we will check with them. Body mass index is 26.43 kg/m². Comorbid anticoagulation, heart disease, chronic kidney disease, hypertension, sleep apnea. Only surgeries were the valve replacement and a pacemaker. No abdominal operations. Plan:   Would do an open repair with mesh if we can stop his warfarin. He will likely need to be bridged with Lovenox. Mesh discussed -- patient consents to its use and acknowledges its risks. Discussed possibility of incarceration, strangulation, increase in size of the hernia over time, and the risk of emergency surgery in the face of strangulation. Discussed techniques for reducing the hernia should it not reduce spontaneously. Knows to call immediately for incarceration. Also discussed alternatives to and risks and benefits of surgery. Risks include recurrence, bleeding, hematoma, infection, difficulty voiding, chronic nerve pain, and the risks of general anesthetic. The patient expressed understanding of the risks and all questions were answered to the patient's satisfaction.      HPI:   Tye Bhagat is a 80 y.o. male who is seen in consultation at the request of Sukhdev Apple MD for large right inguinal hernia. He says there is not really much pain but it is getting much larger. He says he still can push it back in and he has to hold it when he is having a bowel movement or before he is going to sneeze or cough. He is not having any nausea or vomiting. He eats well. He does have issues with an enlarged prostate. He has also had a valve replaced. He is on Coumadin. It is [employer] mechanical heart valve, possibly aortic.     Past Medical History:   Diagnosis Date    Acute on chronic systolic heart failure, NYHA class 4 ([facility] 03/01/2019    admit March 2019 7 days, EF 10% got Primacor, BIV implant    Anger     TAKES PAROXETINE TO CONTROL ANGER ISSUES    Arthritis     Burning with urination     CAD (coronary artery disease) 1996    CABG 1996, mukul celis 2015 fixed inferior defect    Cancer [facility])     prostate    [facility]2019    RVR3/2019, had ablation of flutter- persistent    Foot drop, left     WEARS METAL BRACE    Gout     Hard of hearing     High cholesterol     Hypertension     Long term current use of anticoagulant therapy     Prostate enlargement     Sleep apnea     Thyroid disease     Unspecified sleep apnea     DOESN'T USE CPAP; \"IT MADE HIM SICK-COLDS, SINUS\", PER WIFE     Past Surgical History:   Procedure Laterality Date    HX ORTHOPAEDIC  2001    ORIF COMPOUND FRACTURE LEFT ANKLE    HX PACEMAKER      HX RETINAL DETACHMENT REPAIR  1980s    LEFT EYE    [place] CARDIAC SURG PROCEDURE UNLIST      ANGIOPLASTY WITH CORONARY STENT    [place] CARDIAC SURG PROCEDURE UNLIST  1996    AORTIC VALVE REPLACEMENT    [place] COMPRE ELECTROPHYSIOL XM W/LEFT ATRIAL PACNG/REC N/A 3/4/2019    Lt Atrial Pace & Record During Ep Study performed by Hank Mckoy MD at [place] [address], [address] CATH LAB    [place] EPHYS EVAL PACG CVDFB PRGRMG/REPRGRMG PARAMETERS N/A 3/4/2019    Eval Icd Generator & Leads W Testing At Implant performed by Gabbie Martinez MD at [place] [address], [facility] [address] CATH LAB    NM EPHYS EVAL PACG CVDFB PRGRMG/REPRGRMG PARAMETERS N/A 12/6/2019    Eval Icd Generator & Leads W Testing At Implant performed by Gabbie Martinez MD at [place] [address], [facility] [address] CATH LAB    NM EPHYS EVAL W/ABLATION [address] N/A 3/4/2019    Ablation A-Flutter performed by Gabbie Martinez MD at [place] [address], [facility] [address] CATH LAB    NM INSJ ELTRD CAR KILO SYS TM INSJ DFB/PM PLS GEN N/A 12/6/2019    Lv Lead Placement performed by Gabbie Martinez MD at [place] [address], [facility] [address] CATH LAB    NM INSJ/RPLCMT PERM DFB W/TRNSVNS LDS 1/DUAL CHMBR N/A 3/4/2019    INSERT ICD BIV MULTI performed by Gabbie Martinez MD at [place] [address], [facility] [address] CATH LAB    NM INTRACARDIAC ELECTROPHYSIOLOGIC 3D MAPPING N/A 3/4/2019    Ep 3d Mapping performed by Gabbie Martinez MD at [place] [address], [facility] [address] CATH LAB    NM RMVL1/DUAL CHMBR IMPLTBL DFB ELTRD TRANSVNS XTRJ N/A 12/6/2019    Laser Lead Extraction performed by Gabbie Martinez MD at [place] [address], [facility] [address] CATH LAB      Family History   Problem Relation Age of Onset    Stroke Mother         ANEURYSM    Stroke Father       Social History     Tobacco Use    Smoking status: Never Smoker    Smokeless tobacco: Never Used   Substance Use Topics    Alcohol use: Yes     Alcohol/week: 2.5 standard drinks     Types: 3 Standard drinks or equivalent per week     Comment: 4 times per month , 1 drink      Current Outpatient Medications   Medication Sig    warfarin (COUMADIN) 2.5 mg tablet TAKE 1 TABLET EVERY DAY - NEEDS INR CHECK    levothyroxine (SYNTHROID) 150 mcg tablet TAKE 1 TABLET EVERY MORNING FOR THYROID    carvediloL (COREG) 6.25 mg tablet Take 1 Tab by mouth two (2) times daily (with meals).  atorvastatin (LIPITOR) 40 mg tablet Take 1 Tab by mouth daily.  Please schedule follow up with Dr. Saw Sotelo.   [facility] Entresto 24-26 mg tablet T1T PO EVERY 12 HOURS (Patient taking differently: Take 1 Tab by mouth two (2) times a day.)    PARoxetine (PAXIL) 20 mg tablet TAKE 1 TABLET EVERY DAY (Patient taking differently: Take 20 mg by mouth daily.)    allopurinoL (ZYLOPRIM) 300 mg tablet T1T QD TO PREVENT GOUT (Patient taking differently: Take 300 mg by mouth daily. For gout)    oxybutynin chloride XL (DITROPAN XL) 10 mg CR tablet Take 10 mg by mouth daily.  spironolactone (ALDACTONE) 25 mg tablet Take 25 mg by mouth daily.  omega 3-dha-epa-fish oil (FISH OIL) 100-160-1,000 mg cap Take 1 Cap by mouth daily.  psyllium (METAMUCIL) 0.52 gram capsule Take 1 Cap by mouth daily.  b complex vitamins (B COMPLEX 1) tablet Take 1 Tab by mouth daily.  CYANOCOBALAMIN, VITAMIN B-12, PO Take 1 Tab by mouth daily. Sciatica    aspirin delayed-release 81 mg tablet Take 81 mg by mouth daily.  therapeutic multivitamin (THERAGRAN) tablet Take 1 Tab by mouth daily.  ascorbic acid, vitamin C, (VITAMIN C) 500 mg tablet Take 1,000 mg by mouth daily.  B.infantis-B.ani-B.long-B.bifi (PROBIOTIC 4X) 10-15 mg TbEC Take 1 Tab by mouth daily.  COQ10, UBIQUINOL, PO Take 1 Tab by mouth daily.  finasteride (PROSCAR) 5 mg tablet Take 5 mg by mouth daily.  tamsulosin (FLOMAX) 0.4 mg capsule Take 0.4 mg by mouth nightly.  furosemide (LASIX) 20 mg tablet Take 1 Tab by mouth daily. (Patient taking differently: Take 20 mg by mouth daily. Per patient take 1 pill base on swelling)    FERROUS GLUCONATE PO Take 325 mg by mouth daily. (Patient not taking: Reported on 6/7/2021)     No current facility-administered medications for this visit. Allergies:  No Known Allergies     Review of Systems:  10 systems reviewed. See scanned sheet in \"Media\" section. See HPI for pertinent positives and negatives.       Objective:     Visit Vitals  /63 (BP 1 Location: Right arm, BP Patient Position: Sitting)   Pulse 78   Temp 96.9 °F (36.1 °C) (Oral)   Ht 5' 8\" (1.727 m)   Wt 78.8 kg (173 lb 12.8 oz)   SpO2 91%   BMI 26.43 kg/m²       Physical Exam:  General appearance  Alert, cooperative, no distress, appears stated age HEENT Anicteric   Neck Supple   Back   No CVA tenderness   Lungs   Clear to auscultation bilaterally   Heart  Regular rate and rhythm. Click heard best over the right upper sternum. Abdomen   Soft. Bowel sounds normal.  Large right inguinal hernia containing intestine on ultrasound.   Reducible   Extremities no cyanosis or edema   Pulses 2+ right radial   Skin Skin color, texture, turgor normal.   Lymph nodes Inguinal nodes normal.   Neurologic Without overt sensory or motor deficit     Signed By: Modesto Hodgson MD     June 26, 2021

## 2021-06-26 NOTE — PROGRESS NOTES
Pharmacy Progress Note - Anticoagulation Management    S/O: Mr. Puneet Casey  is a 86 y. o. male, referred by Robson Small MD, was seen today for anticoagulation management for the diagnosis of Atrial Fibrillation and Aortic Valve Replacement  ([facility] -1996), s/p pacemaker.  Pt is very hard of hearing.     Interim history:  Hernia surgery pending. Waiting on clearance    · Warfarin start date: ~1996  · INR Goal:  2.0-3.0    · Current warfarin regimen:   2.5 mg daily                  · Warfarin tablet strength:   1 mg, 2.5 mg, 5 mg  -- wife states she does not want to split tablets in half ; generally takes 2.5 mg tab. Able to recognize tablet strength  · Duration of therapy: indefinite  · Takes warfarin in the evening    Today's pertinent positives includes:  No significant changes since last visit    May have missed a dose two weeks ago. Ran out of warfarin 2.5 mg    Results for orders placed or performed in visit on 06/30/21   AMB POC PT/INR   Result Value Ref Range    VALID INTERNAL CONTROL POC Yes     Prothrombin time (POC) 20.1 (A) 9.1 - 12.0 seconds    INR POC 1.7 (A) 1.0 - 1.5     Adherence:   · Able to recall regimen? YES  · Miss/extra dose? N/A  · Need refill?  NO    Upcoming procedure(s):  N/A  See above    Past Medical History:   Diagnosis Date    Acute on chronic systolic heart failure, NYHA class 4 ([facility] [address] 03/01/2019    admit March 2019 7 days, EF 10% got Primacor, BIV implant    Anger     TAKES PAROXETINE TO CONTROL ANGER ISSUES    Arthritis     Burning with urination     CAD (coronary artery disease) 1996    CABG 1996, mukul celis 2015 fixed inferior defect    Cancer [facility])     prostate    Chronic atrial fibrillation ([facility] 03/2019    RVR3/2019, had ablation of flutter- persistent    Foot drop, left     WEARS METAL BRACE    Gout     Hard of hearing     High cholesterol     Hypertension     Long term current use of anticoagulant therapy     Prostate enlargement     Sleep apnea     Thyroid disease     Unspecified sleep apnea     DOESN'T USE CPAP; \"IT MADE HIM SICK-COLDS, SINUS\", PER WIFE     No Known Allergies  Current Outpatient Medications   Medication Sig    warfarin (COUMADIN) 2.5 mg tablet TAKE 1 TABLET EVERY DAY - NEEDS INR CHECK    levothyroxine (SYNTHROID) 150 mcg tablet TAKE 1 TABLET EVERY MORNING FOR THYROID    carvediloL (COREG) 6.25 mg tablet Take 1 Tab by mouth two (2) times daily (with meals).  atorvastatin (LIPITOR) 40 mg tablet Take 1 Tab by mouth daily. Please schedule follow up with Dr. Claudia Glass.   [facility] Entresto 24-26 mg tablet T1T PO EVERY 12 HOURS (Patient taking differently: Take 1 Tab by mouth two (2) times a day.)    PARoxetine (PAXIL) 20 mg tablet TAKE 1 TABLET EVERY DAY (Patient taking differently: Take 20 mg by mouth daily.)    allopurinoL (ZYLOPRIM) 300 mg tablet T1T QD TO PREVENT GOUT (Patient taking differently: Take 300 mg by mouth daily. For gout)    oxybutynin chloride XL (DITROPAN XL) 10 mg CR tablet Take 10 mg by mouth daily.  spironolactone (ALDACTONE) 25 mg tablet Take 25 mg by mouth daily.  omega 3-dha-epa-fish oil (FISH OIL) 100-160-1,000 mg cap Take 1 Cap by mouth daily.  psyllium (METAMUCIL) 0.52 gram capsule Take 1 Cap by mouth daily.  b complex vitamins (B COMPLEX 1) tablet Take 1 Tab by mouth daily.  CYANOCOBALAMIN, VITAMIN B-12, PO Take 1 Tab by mouth daily. Sciatica    aspirin delayed-release 81 mg tablet Take 81 mg by mouth daily.  therapeutic multivitamin (THERAGRAN) tablet Take 1 Tab by mouth daily.  ascorbic acid, vitamin C, (VITAMIN C) 500 mg tablet Take 1,000 mg by mouth daily.  B.infantis-B.ani-B.long-B.bifi (PROBIOTIC 4X) 10-15 mg TbEC Take 1 Tab by mouth daily.  COQ10, UBIQUINOL, PO Take 1 Tab by mouth daily.  finasteride (PROSCAR) 5 mg tablet Take 5 mg by mouth daily.  tamsulosin (FLOMAX) 0.4 mg capsule Take 0.4 mg by mouth nightly.  FERROUS GLUCONATE PO Take 325 mg by mouth daily.  (Patient not taking: Reported on 6/7/2021)    furosemide (LASIX) 20 mg tablet Take 1 Tab by mouth daily. (Patient taking differently: Take 20 mg by mouth daily. Per patient take 1 pill base on swelling)     No current facility-administered medications for this visit. Wt Readings from Last 3 Encounters:   06/30/21 169 lb (76.7 kg)   06/07/21 173 lb 12.8 oz (78.8 kg)   05/13/21 178 lb (80.7 kg)     BP Readings from Last 3 Encounters:   06/30/21 101/69   06/07/21 104/63   05/13/21 128/85     Pulse Readings from Last 3 Encounters:   06/30/21 74   06/07/21 78   05/13/21 99     Lab Results   Component Value Date/Time    WBC 5.7 08/09/2020 08:14 PM    HGB 13.2 08/09/2020 08:14 PM    HCT 39.3 08/09/2020 08:14 PM    PLATELET 681 (L) 88/51/4732 08:14 PM    MCV 98.0 08/09/2020 08:14 PM     Lab Results   Component Value Date/Time    Sodium 143 05/13/2021 12:25 PM    Potassium 3.8 05/13/2021 12:25 PM    Chloride 111 (H) 05/13/2021 12:25 PM    CO2 26 05/13/2021 12:25 PM    Anion gap 6 05/13/2021 12:25 PM    Glucose 95 05/13/2021 12:25 PM    BUN 19 05/13/2021 12:25 PM    Creatinine 1.05 05/13/2021 12:25 PM    BUN/Creatinine ratio 18 05/13/2021 12:25 PM    GFR est AA >60 05/13/2021 12:25 PM    GFR est non-AA >60 05/13/2021 12:25 PM    Calcium 8.7 05/13/2021 12:25 PM    Bilirubin, total 0.7 05/13/2021 12:25 PM    Alk. phosphatase 122 (H) 05/13/2021 12:25 PM    Protein, total 6.6 05/13/2021 12:25 PM    Albumin 3.9 05/13/2021 12:25 PM    Globulin 2.7 05/13/2021 12:25 PM    A-G Ratio 1.4 05/13/2021 12:25 PM    ALT (SGPT) 41 05/13/2021 12:25 PM     Estimated Creatinine Clearance: 48.9 mL/min (by C-G formula based on SCr of 1.05 mg/dL).       INR History:   (normal INR range 0.8-1.2)     Date   INR   PT   Dose/Comments  06/30/21 1.7  20.1 2.5 mg daily; (+) missed dose  05/19/21          2.3                   27.8     2.5 mg daily  04/14/21          2.3                   27.4     2.5 mg daily  03/10/21          2.1                   25.5     2.5 mg daily  02/10/21          2.3                   27.9     2.5 mg daily  01/20/21          2.7                   32. 3     5 mg x 2, then 2.5 mg daily  01/06/21          1.6                   19.5     5 mg x 1, then 2.5 mg daily   12/22/20          1.8                   21. 9     2.5 mg daily  12/03/20          2.0                   28.0     2.5 mg daily; (+) hematuria; missed doses  11/16/20          2.2                   23.2     2.5 mg daily  10/07/20          1.5                   18.6     2.5 mg daily; (+) increased vitamin K intake  09/09/20          2.7                   32.4     2.5 mg daily  08/26/20          2.4                   28. 7     5 mg x 1, then 2.5 mg daily.   08/18/20          1.6                   19.5     ED visit; (+) vitamin K; 2.5 mg daily  08/09/20          11.9                 101. 8       A/P:       Anticoagulation:  Considering Mr. Bang Renteria past history, todays findings, and per Anticoagulation Collaborative Practice Agreement/Protocol:    1. POC INR (1.7) is subtherapeutic for INR goal today. 2. Take warfarin 5 mg tonight. Then Continue warfarin 2.5 mg daily ROW    Patient was instructed to schedule an appointment in 2 week(s) prior to leaving the clinic. Medication reconciliation was completed during the visit. There are no discontinued medications. A full discussion of the nature of anticoagulants has been carried out. A full discussion of the need for frequent and regular monitoring, precise dosage adjustment and compliance was stressed. Side effects of potential bleeding were discussed and Mr. Kelli Cavanaugh was instructed to call [phone] if there are any signs of abnormal bleeding. Mr. Kelli Cavanaugh was instructed to avoid any OTC items containing aspirin or ibuprofen and prior to starting any new OTC products to consult with his physician or pharmacist to ensure no drug interactions are present.   Mr. Kelli Cavanaugh was instructed to avoid any major changes in his general diet and to avoid alcohol consumption. Mr. Oni Johns was provided information in the AVS that includes topics on understanding coumadin therapy, drug interaction considerations, vitamin K and coumadin use, interactions with foods and supplements containing vitamin K, and the use of herbal products. Mr. Oni Johns verbalized his understanding of all instructions and will call the office with any questions, concerns, or signs of abnormal bleeding or blood clot. Notifications of recommendations will be sent to Dr. Anamaria Keys MD for review. Thank you,  Blanca Thomas, PharmD, BCACP, [facility] [address] in place:  Yes   Recommendation Provided To: Patient/Caregiver: 1 via In person   Intervention Detail: Adherence Monitorin and Lab(s) Ordered   Total # of Interventions Accepted: 2   Time Spent (min): 20

## 2021-06-26 NOTE — PATIENT INSTRUCTIONS
Today your INR was 1.7. Your goal INR is  2.0-3.0 . You have a 1 mg, 2.5 mg , 5 mg tablet of Coumadin (warfarin). Take Coumadin (warfarin) as follows: Take 5 mg tonight. Then continue warfarin 2.5 mg daily. Come back in  2 week(s) for your next finger stick/INR blood test.        Avoid any over the counter items containing aspirin or ibuprofen, and avoid great swings in general diet. Avoid alcohol consumption. Please notify the INR nurse if you are started on any new medication including over the counter or herbal supplements. Also, please notify your INR nurse if any of your other prescription or over the counter medications have been discontinued. Call [facility] at [phone] if you have any signs of abnormal bleeding/blood clot.  ------------------------------------------------------------------------------------------------------------------  Taking Warfarin Safely: Care Instructions    Your Care Instructions  Warfarin is a medicine that you take to prevent blood clots. It is often called a blood thinner. Doctors give warfarin (such as Coumadin) to reduce the risk of blood clots. You may be at risk for blood clots if you have atrial fibrillation or deep vein thrombosis. Some other health problems may also put you at risk. Warfarin slows the amount of time it takes for your blood to clot. It can cause bleeding problems. Even if you've been taking warfarin for a while, it's important to know how to take it safely. Foods and other medicines can affect the way warfarin works. Some can make warfarin work too well. This can cause bleeding problems. And some can make it work poorly, so that it does not prevent blood clots very well. You will need regular blood tests to check how long it takes for your blood to form a clot. This test is called a PT or prothrombin time test. The result of the test is called an INR level.  Depending on the test results, your doctor or anticoagulation clinic may adjust your dose of warfarin. Follow-up care is a key part of your treatment and safety. Be sure to make and go to all appointments, and call your doctor if you are having problems. It's also a good idea to know your test results and keep a list of the medicines you take. How can you care for yourself at home? Take warfarin safely  · Take your warfarin at the same time each day. · If you miss a dose of warfarin, don't take an extra dose to make up for it. Your doctor can tell you exactly what to do so you don't take too much or too little. · Wear medical alert jewelry that lets others know that you take warfarin. You can buy this at most drugstores. · Don't take warfarin if you are pregnant or planning to get pregnant. Talk to your doctor about how you can prevent getting pregnant while you are taking it. · Don't change your dose or stop taking warfarin unless your doctor tells you to. Effects of medicines and food on warfarin  · Don't start or stop taking any medicines, vitamins, or natural remedies unless you first talk to your doctor. Many medicines can affect how warfarin works. These include aspirin and other pain relievers, over-the-counter medicines, multivitamins, dietary supplements, and herbal products. · Tell all of your doctors and pharmacists that you take warfarin. Some prescription medicines can affect how warfarin works. · Keep the amount of vitamin K in your diet about the same from day to day. Do not suddenly eat a lot more or a lot less food that is rich in vitamin K than you usually do. Vitamin K affects how warfarin works and how your blood clots. Talk with your doctor before making big changes in your diet. Vitamin K is in many foods, such as:  ¨ Leafy greens, such as kale, cabbage, spinach, Swiss chard, and lettuce. ¨ Canola and soybean oils. ¨ Green vegetables, such as asparagus, broccoli, and [place] sprouts.   ¨ Vegetable drinks, green tea leaves, and some dietary supplement drinks. · Avoid cranberry juice and other cranberry products. They can increase the effects of warfarin. · Limit your use of alcohol. Avoid bleeding by preventing falls and injuries  · Wear slippers or shoes with nonskid soles. · Remove throw rugs and clutter. · Rearrange furniture and electrical cords to keep them out of walking paths. · Keep stairways, porches, and outside walkways well lit. Use night-lights in hallways and bathrooms. · Be extra careful when you work with sharp tools or knives. When should you call for help? Call 911 anytime you think you may need emergency care. For example, call if:  · You have a sudden, severe headache that is different from past headaches. Call your doctor now or seek immediate medical care if:  · You have any abnormal bleeding, such as:  ¨ Nosebleeds. ¨ Vaginal bleeding that is different (heavier, more frequent, at a different time of the month) than what you are used to. ¨ Bloody or black stools, or rectal bleeding. ¨ Bloody or pink urine. Watch closely for changes in your health, and be sure to contact your doctor if you have any problems. Where can you learn more? Go to http://www.gray.com/. Enter Y090 in the search box to learn more about \"Taking Warfarin Safely: Care Instructions. \"  Current as of: January 27, 2016  Content Version: 11.1  © 8772-1719 [employer]. Care instructions adapted under license by [employer] (which disclaims liability or warranty for this information). If you have questions about a medical condition or this instruction, always ask your healthcare professional. [place] [address] any warranty or liability for your use of this information.

## 2021-06-28 ENCOUNTER — OFFICE VISIT (OUTPATIENT)
Dept: CARDIOLOGY CLINIC | Age: 86
End: 2021-06-28
Payer: MEDICARE

## 2021-06-28 DIAGNOSIS — Z95.810 PRESENCE OF BIVENTRICULAR AUTOMATIC CARDIOVERTER/DEFIBRILLATOR (AICD): Primary | ICD-10-CM

## 2021-06-28 PROCEDURE — 93296 REM INTERROG EVL PM/IDS: CPT | Performed by: INTERNAL MEDICINE

## 2021-06-28 PROCEDURE — 93295 DEV INTERROG REMOTE 1/2/MLT: CPT | Performed by: INTERNAL MEDICINE

## 2021-06-28 NOTE — LETTER
6/28/2021 9:18 AM    Mr. Puneet Kilgore [id number]              After careful review of your remote check of your implated device on [id number]. I have concluded that your device is working properly. Your next:                Automatic remote check ( from home) is scheduled for  10-06-21                             If you have any questions, please call [address] at [phone].      Additional Comments: ________________________________________________    __________________________________________________________________    __________________________________________________________________              SinceNoemi oquendo MD [facility] [place]

## 2021-06-28 NOTE — LETTER
2021 9:18 AM    Mr. Tasneem Baltazar [id number]        Dear Patient,    This letter is to inform you that as of  [facility] will no longer be sending out confirmation letters for remote transmissions through the [employer]. All letters in the future will be posted in an electronic medical records format therefore we highly encourage enrollment in [employer] patient's portal. Once enrolled you will have access to any letters we send as well as appointment information that can be found in your medical record. Our EP team would contact you via phone if there are significant abnormal findings so you can discuss with Dr. Sujey Flaherty further in office. Missed transmission letters will also be digitized in [employer] but we will continue to send those out through the mail as well. How to register for [employer]:    - Visit [employer]  - Click Create an Account  - Provide your name, address, and   - You will then be asked a short series of questions to verify your identity. This helps to ensure that no one else can access your information.   - If you have any further questions, please contact our office at [phone]. If you choose not to enroll in [employer] or do not have internet access, please kindly let device clinic know and we will accommodate your preference. We are grateful for your understanding and looking forward to this new, improved and more efficient way of communication.            Warm Regards,  [facility] Staff

## 2021-06-30 ENCOUNTER — ANTI-COAG VISIT (OUTPATIENT)
Dept: INTERNAL MEDICINE CLINIC | Age: 86
End: 2021-06-30
Payer: MEDICARE

## 2021-06-30 VITALS
HEIGHT: 68 IN | BODY MASS INDEX: 25.61 KG/M2 | SYSTOLIC BLOOD PRESSURE: 101 MMHG | HEART RATE: 74 BPM | WEIGHT: 169 LBS | DIASTOLIC BLOOD PRESSURE: 69 MMHG

## 2021-06-30 DIAGNOSIS — Z95.2 H/O MECHANICAL AORTIC VALVE REPLACEMENT: ICD-10-CM

## 2021-06-30 DIAGNOSIS — Z95.2 S/P AVR: Primary | ICD-10-CM

## 2021-06-30 DIAGNOSIS — I48.20 CHRONIC A-FIB (HCC): ICD-10-CM

## 2021-06-30 DIAGNOSIS — I48.3 TYPICAL ATRIAL FLUTTER (HCC): ICD-10-CM

## 2021-06-30 LAB
INR BLD: 1.7 (ref 1–1.5)
PT POC: 20.1 SECONDS (ref 9.1–12)
VALID INTERNAL CONTROL?: YES

## 2021-06-30 PROCEDURE — 85610 PROTHROMBIN TIME: CPT | Performed by: PHARMACIST

## 2021-07-01 NOTE — PROGRESS NOTES
Edwardo Buckner thanks for the note he does have a mechanical aortic valve and with his low ejection fraction age would need to be bridged with Lovenox. I have not seen him in a while somebody get him to come into the office just look him over and maybe even get an echo that he comes in just to see without valve stands and then we can talk about bridging with Lovenox. [place] can you arrange for an echo and office visit with Mr. Monet Samson.

## 2021-07-08 ENCOUNTER — ANCILLARY PROCEDURE (OUTPATIENT)
Dept: CARDIOLOGY CLINIC | Age: 86
End: 2021-07-08
Payer: MEDICARE

## 2021-07-08 ENCOUNTER — OFFICE VISIT (OUTPATIENT)
Dept: CARDIOLOGY CLINIC | Age: 86
End: 2021-07-08

## 2021-07-08 VITALS
DIASTOLIC BLOOD PRESSURE: 70 MMHG | BODY MASS INDEX: 25.46 KG/M2 | SYSTOLIC BLOOD PRESSURE: 110 MMHG | WEIGHT: 168 LBS | HEIGHT: 68 IN

## 2021-07-08 VITALS
BODY MASS INDEX: 25.46 KG/M2 | OXYGEN SATURATION: 98 % | HEIGHT: 68 IN | HEART RATE: 71 BPM | WEIGHT: 168 LBS | SYSTOLIC BLOOD PRESSURE: 110 MMHG | DIASTOLIC BLOOD PRESSURE: 70 MMHG | RESPIRATION RATE: 16 BRPM

## 2021-07-08 DIAGNOSIS — E03.9 HYPOTHYROIDISM, UNSPECIFIED TYPE: ICD-10-CM

## 2021-07-08 DIAGNOSIS — I50.22 CHRONIC SYSTOLIC CONGESTIVE HEART FAILURE (HCC): Primary | ICD-10-CM

## 2021-07-08 DIAGNOSIS — I10 HYPERTENSION, ESSENTIAL: ICD-10-CM

## 2021-07-08 DIAGNOSIS — Z01.810 PRE-OPERATIVE CARDIOVASCULAR EXAMINATION: ICD-10-CM

## 2021-07-08 DIAGNOSIS — I35.0 SEVERE AORTIC STENOSIS: ICD-10-CM

## 2021-07-08 DIAGNOSIS — I48.20 CHRONIC ATRIAL FIBRILLATION (HCC): ICD-10-CM

## 2021-07-08 DIAGNOSIS — I25.118 CORONARY ARTERY DISEASE OF NATIVE ARTERY OF NATIVE HEART WITH STABLE ANGINA PECTORIS (HCC): ICD-10-CM

## 2021-07-08 DIAGNOSIS — Z01.818 PREOPERATIVE CLEARANCE: ICD-10-CM

## 2021-07-08 DIAGNOSIS — I42.9 CARDIOMYOPATHY, UNSPECIFIED TYPE (HCC): ICD-10-CM

## 2021-07-08 DIAGNOSIS — E78.5 DYSLIPIDEMIA: ICD-10-CM

## 2021-07-08 DIAGNOSIS — Z95.2 S/P AVR (AORTIC VALVE REPLACEMENT): ICD-10-CM

## 2021-07-08 PROCEDURE — 93005 ELECTROCARDIOGRAM TRACING: CPT | Performed by: SPECIALIST

## 2021-07-08 PROCEDURE — 93306 TTE W/DOPPLER COMPLETE: CPT | Performed by: SPECIALIST

## 2021-07-08 PROCEDURE — G0463 HOSPITAL OUTPT CLINIC VISIT: HCPCS | Performed by: SPECIALIST

## 2021-07-08 PROCEDURE — 99214 OFFICE O/P EST MOD 30 MIN: CPT | Performed by: SPECIALIST

## 2021-07-08 PROCEDURE — 93010 ELECTROCARDIOGRAM REPORT: CPT | Performed by: SPECIALIST

## 2021-07-08 NOTE — PROGRESS NOTES
Kaden Correa     1935       Jose Maria Coombs MD, [facility] [place]  Date of Visit-7/8/2021   PCP is Sin Arambula MD   [facility] [place]  [facility]  HPI:  Kaden Correa is a 80 y.o. male   Preop consult  for Dr. Berenice Chung for a R inguinal hernia. Starting with a hospitalization in early 2019 he was seen frequently that year but once we got to the pandemic , he just had one virtual visit lbut he has been doing fairly well and saw EP in November 2020. He is now to undergo right inguinal hernia surgery repair and we are asked see him preoperatively which is good to reestablish care. Echo is done today and he has a similar EF around 40-45%. Final measurements show EF at 42% with mild LVH there is abnormal LV strain the mechanical aortic valve prosthesis is functioning well with a gradient of 10 there is mild MR and TR there is no pulmonary hypertension. The aorta is mildly dilated at 4.3 cm. · CAD status post CABG 1996; Occluded RCA and vein graft to RCA prior Lexiscan 2015 inferior infarction EF 38%  · Mechanical AVR for severe AS 1996  · Ischemic cardiomyopathy, echo October 2018 EF 20% LVH mild to moderate MR  · Atrial flutter, ablation of atrial flutter March 4, 2019 upgrade to BiV pacer for EF 10% TSH was 45  · CHF systolic hospitalization March 2019 Primacor MEMO with normal function of AVR BiV AICD atrial flutter ablation, echo April 2019 EF improved to 40% post pacemaker  · LV lead dislodged reimplant December 2019  · Hypertension   · dyslipidemia  · PVCs    Overall the pt states he is doing well. He will occasionally have mild chest pain in the AM before he wakes up, but is otherwise asymptomatic. He reports that hernia is not too bothersome, but his wife states that his hernia is very bothersome to him. His wife also notes that she has noticed he has lost weight. Pt mentions a dry cough, but reports that this has been chronic.   Note his TSH was high in May and Dr. Desire Cleaning contacted his wife indicating that the patient was often forgetting his thyroid medication. Denies edema, syncope or shortness of breath at rest, has no tachycardia, palpitations or sense of arrhythmia. EKG: Electronic ventricular pacemaker  Pacemaker ECG    Assessment/Plan:     1. Chronic systolic congestive heart failure (HCC)  EF is similar to previous. This is an ischemic cardiomyopathy and his EF came up quite a bit after the BiV pacemaker implant and atrial flutter ablation in March 2019. We seem to be staying at that same level of ejection fraction and functional class. NYHA 2. Continue Entresto 24/26 and Coreg 6.25 mg twice daily. Consider increase Entresto at next visit. Continue spironolactone 25 mg daily potassium is followed  Continue Lasix 20 mg daily as needed  Ischemic cardiomyopathy. 2. CAD native -stable  CABG 1996 Fixed RCA native and graft disease. No angina. Continue high potency statin. 3. Severe aortic stenosis  Has mechanical prosthesis. On warfarin   the mean gradient is 10 mm Hg on today's echo which is good function considering the age of this valve. Appears to be functioning adequately. 4. Preop CV exam  Acceptable risk. Will need Lovenox bridging because of mechanical valve with low EF. Since Dr Desire Cleaning office handling INR thus far, will have them handle but happy to prescribe if that is helpful instead. I have no objection to the planned  surgery. Patient seems to be at a low risk for emani-operative severe adverse cardiac events. 5. Chronic afib  Prior flutter ablation March 2019 with pacemaker AICD  Fu Dr Manuela Fontenot clinic, notes reviewed     6. HTN  Stable. At goal , meds and possible side effects reviewed and patient denies  Key CAD CHF Meds             warfarin (COUMADIN) 2.5 mg tablet (Taking) TAKE 1 TABLET EVERY DAY - NEEDS INR CHECK    carvediloL (COREG) 6.25 mg tablet (Taking) Take 1 Tab by mouth two (2) times daily (with meals). atorvastatin (LIPITOR) 40 mg tablet (Taking) Take 1 Tab by mouth daily. Please schedule follow up with Dr. Hector Ochoa.    Johana Alina 24-26 mg tablet (Taking) T1T PO EVERY 12 HOURS    spironolactone (ALDACTONE) 25 mg tablet (Taking) Take 25 mg by mouth daily. omega 3-dha-epa-fish oil (FISH OIL) 100-160-1,000 mg cap (Taking) Take 1 Cap by mouth daily. aspirin delayed-release 81 mg tablet (Taking) Take 81 mg by mouth daily. furosemide (LASIX) 20 mg tablet (Taking) Take 1 Tab by mouth daily. 07/08/21 110/70   07/08/21 110/70   06/30/21 101/69   06/07/21 104/63   05/13/21 128/85        7. XOL  Lipids on high potency statin as appropriate for secondary prevention. At goal , denies excess muscle aches or new liver issues  Key Antihyperlipidemia Meds             atorvastatin (LIPITOR) 40 mg tablet (Taking) Take 1 Tab by mouth daily. Please schedule follow up with Dr. Hector Ochoa.    omega 3-dha-epa-fish oil (FISH OIL) 100-160-1,000 mg cap (Taking) Take 1 Cap by mouth daily. Lab Results   Component Value Date/Time    LDL, calculated 76 05/13/2021 12:25 PM      8. TSH high, fu labs with PCP   Want to normalize to avoid change in LV fx    Follow up in 6 months. Patient Instructions   We will need to take Lovenox for two days prior to your surgery. Please contact your PCP's Coumadin clinic for this. We will see you back in 6 months.       Future Appointments   Date Time Provider Lexie Moreno   8/25/2021  3:00 PM Holland Montes De [facility] BS AMB   10/6/2021  1:00 PM REMOTE1, BUSTER STAHL BS AMB   12/2/2021  3:00 PM [email or web address], BUSTER STAHL BS AMB   12/2/2021  3:20 PM MD MARIBETH Agrawal BS AMB   1/6/2022 11:20 AM MD MARIBETH Russell BS AMB          Addendum: 11/7/2021 9:03 AM   Renal note   Cr 1.2 Abou Assi, had peak to 2.3   Added to PCT  Patient Care Team:  Mae Serrano MD as PCP - General (Internal Medicine)  Mae Serrano MD as PCP - [address] [place] [address] [facility]  Taz Pryor MD (Cardiology)  Natasha Avina MD (Cardiology)  Joaquina Edgar MD as Surgeon (General Surgery)  Ashley Gutierrez MD as Consulting Provider (Nephrology)      Impression:   1. Chronic systolic congestive heart failure ([address]    2. Coronary artery disease of native artery of native heart with stable angina pectoris ([address]    3. Severe aortic stenosis    4. Pre-operative cardiovascular examination    5. Chronic atrial fibrillation (HCC)    6. Hypertension, essential    7. Dyslipidemia    8. Hypothyroidism, unspecified type       Cardiac History:   CAD s/p CABG 1996 with mechanical AVR for severe AS,     known occlusion to the RCA and vein graft to the RCA. [place] nuclear 6/24/15 showed inferior infarction with a small amount of emani infarct ischemia. There was inferior septal kinesis with an EF of 38%. Carotids 1/7/13 showed minimal disease. Monitor holter 4/11/13 showed 21 beats of Vtach and 4 beats of Vtach with at rate of 140, 14% of the beats were ventricular. Echo October 2018 showed EF showed EF of 20, moderate LVH, with dilated atrial, mechanical AVR seems to be functioning well, mild to moderate MR.     ROS-except as noted above. . A complete cardiac and respiratory are reviewed and negative except as above ; Resp-denies wheezing  or productive cough,.  Const- No unusual weight loss or fever; Neuro-no recent seizure or CVA ; GI- No BRBPR, abdom pain, bloating ; - no  hematuria   see supplement sheet, initialed and to be scanned by staff  Past Medical History:   Diagnosis Date    Acute on chronic systolic heart failure, NYHA class 4 ([address] 03/01/2019    admit March 2019 7 days, EF 10% got Primacor, BIV implant    Anger     TAKES PAROXETINE TO CONTROL ANGER ISSUES    Arthritis     Burning with urination     CAD (coronary artery disease) 1996    CABG 1996, mukul celis 2015 fixed inferior defect    Cancer [facility])     prostate    Chronic atrial fibrillation ([facility] 03/2019    RVR3/2019, had ablation of flutter- persistent    Foot drop, left     WEARS METAL BRACE    Gout     Hard of hearing     High cholesterol     Hypertension     Long term current use of anticoagulant therapy     Prostate enlargement     Sleep apnea     Thyroid disease     Unspecified sleep apnea     DOESN'T USE CPAP; \"IT MADE HIM SICK-COLDS, SINUS\", PER WIFE      Social Hx= reports that he has never smoked. He has never used smokeless tobacco. He reports current alcohol use of about 2.5 standard drinks of alcohol per week. He reports that he does not use drugs. Exam and Labs:  /70   Pulse 71   Resp 16   Ht 5' 8\" (1.727 m)   Wt 168 lb (76.2 kg)   SpO2 98%   BMI 25.54 kg/m² Constitutional:  NAD, comfortable  Head: NC,AT. Eyes: No scleral icterus. Neck:  Neck supple. No JVD present. Throat: moist mucous membranes. Chest: Effort normal & normal respiratory excursion . Neurological: alert, conversant and oriented . Skin: Skin is not cold. No obvious systemic rash noted. Not diaphoretic. No erythema. Psychiatric:  Grossly normal mood and affect. Behavior appears normal. Extremities:  no clubbing or cyanosis. Abdomen: non distended    Lungs:breath sounds normal. No stridor. distress, wheezes or  Rales. Heart: 2/6 murmur with a mechanical second heart sound, normal rate, regular rhythm, normal S1, no rubs, clicks or gallops , PMI non displaced. Edema: Edema is none.   Lab Results   Component Value Date/Time    Cholesterol, total 150 05/13/2021 12:25 PM    HDL Cholesterol 52 05/13/2021 12:25 PM    LDL, calculated 76 05/13/2021 12:25 PM    Triglyceride 110 05/13/2021 12:25 PM    CHOL/HDL Ratio 2.9 05/13/2021 12:25 PM     Lab Results   Component Value Date/Time    Sodium 143 05/13/2021 12:25 PM    Potassium 3.8 05/13/2021 12:25 PM    Chloride 111 (H) 05/13/2021 12:25 PM    CO2 26 05/13/2021 12:25 PM    Anion gap 6 05/13/2021 12:25 PM    Glucose 95 05/13/2021 12:25 PM    BUN 19 05/13/2021 12:25 PM    Creatinine 1.05 05/13/2021 12:25 PM    BUN/Creatinine ratio 18 05/13/2021 12:25 PM    GFR est AA >60 05/13/2021 12:25 PM    GFR est non-AA >60 05/13/2021 12:25 PM    Calcium 8.7 05/13/2021 12:25 PM      Wt Readings from Last 3 Encounters:   07/08/21 168 lb (76.2 kg)   07/08/21 168 lb (76.2 kg)   06/30/21 169 lb (76.7 kg)      BP Readings from Last 3 Encounters:   07/08/21 110/70   07/08/21 110/70   06/30/21 101/69      Current Outpatient Medications   Medication Sig    warfarin (COUMADIN) 2.5 mg tablet TAKE 1 TABLET EVERY DAY - NEEDS INR CHECK    levothyroxine (SYNTHROID) 150 mcg tablet TAKE 1 TABLET EVERY MORNING FOR THYROID    carvediloL (COREG) 6.25 mg tablet Take 1 Tab by mouth two (2) times daily (with meals).  atorvastatin (LIPITOR) 40 mg tablet Take 1 Tab by mouth daily. Please schedule follow up with Dr. Arthur Shannon.   [employer] Entresto 24-26 mg tablet T1T PO EVERY 12 HOURS (Patient taking differently: Take 1 Tab by mouth two (2) times a day.)    PARoxetine (PAXIL) 20 mg tablet TAKE 1 TABLET EVERY DAY (Patient taking differently: Take 20 mg by mouth daily.)    allopurinoL (ZYLOPRIM) 300 mg tablet T1T QD TO PREVENT GOUT (Patient taking differently: Take 300 mg by mouth daily. For gout)    oxybutynin chloride XL (DITROPAN XL) 10 mg CR tablet Take 10 mg by mouth daily.  spironolactone (ALDACTONE) 25 mg tablet Take 25 mg by mouth daily.  omega 3-dha-epa-fish oil (FISH OIL) 100-160-1,000 mg cap Take 1 Cap by mouth daily.  psyllium (METAMUCIL) 0.52 gram capsule Take 1 Cap by mouth daily.  b complex vitamins (B COMPLEX 1) tablet Take 1 Tab by mouth daily.  CYANOCOBALAMIN, VITAMIN B-12, PO Take 1 Tab by mouth daily. Sciatica    aspirin delayed-release 81 mg tablet Take 81 mg by mouth daily.  therapeutic multivitamin (THERAGRAN) tablet Take 1 Tab by mouth daily.  ascorbic acid, vitamin C, (VITAMIN C) 500 mg tablet Take 1,000 mg by mouth daily.     B.infantis-B.ani-B.long-B.bifi (PROBIOTIC 4X) 10-15 mg TbEC Take 1 Tab by mouth daily.  COQ10, UBIQUINOL, PO Take 1 Tab by mouth daily.  finasteride (PROSCAR) 5 mg tablet Take 5 mg by mouth daily.  tamsulosin (FLOMAX) 0.4 mg capsule Take 0.4 mg by mouth nightly.  FERROUS GLUCONATE PO Take 325 mg by mouth daily.  furosemide (LASIX) 20 mg tablet Take 1 Tab by mouth daily. (Patient taking differently: Take 20 mg by mouth daily. Per patient take 1 pill base on swelling)     No current facility-administered medications for this visit. Impression see above.       Written by Wero Frazier, as dictated by Dharmesh Bernardo MD.

## 2021-07-08 NOTE — PATIENT INSTRUCTIONS
We will need to take Lovenox for two days prior to your surgery. Please contact your PCP's Coumadin clinic for this. We will see you back in 6 months.

## 2021-07-10 NOTE — PROGRESS NOTES
Pharmacy Progress Note - Anticoagulation Management    S/O: Mr. Puneet Casey  is a 86 y. o. male, referred by Yohana Perez MD, was seen today for anticoagulation management for the diagnosis of Atrial Fibrillation and Aortic Valve Replacement  ([facility] -1996), s/p pacemaker.  Pt is very hard of hearing.     Interim history:  Saw Cardio (Dr Migue Nixon) for hernia surgery clearance 7/8/21. Completed echo . EF 40-45%. D/t mechanical heart valve and low EF - recommends for patient to be bridged w/ LMWH prior to surgery . · Warfarin start date: ~1996  · INR Goal:  2.0-3.0    · Current warfarin regimen:   5 mg x 1, then 2.5 mg daily                  · Warfarin tablet strength:   1 mg, 2.5 mg, 5 mg  -- wife states she does not want to split tablets in half ; generally takes 2.5 mg tab. Able to recognize tablet strength  · Duration of therapy: indefinite  · Takes warfarin in the evening    Today's pertinent positives includes:  No significant changes since last visit    Results for orders placed or performed in visit on 07/13/21   AMB POC PT/INR   Result Value Ref Range    VALID INTERNAL CONTROL POC Yes     Prothrombin time (POC) 28.5 (A) 9.1 - 12.0 seconds    INR POC 2.4 (A) 1.0 - 1.5     · Adherence:   · Able to recall regimen? YES  · Miss/extra dose? NO  · Need refill?  NO    Upcoming procedure(s):  N/A  Hernia surgery - pending appt  Per cardiology - will need LMWH bridging 2 days prior to surgery     Past Medical History:   Diagnosis Date    Acute on chronic systolic heart failure, NYHA class 4 ([facility] 03/01/2019    admit March 2019 7 days, EF 10% got Primacor, BIV implant    Anger     TAKES PAROXETINE TO CONTROL ANGER ISSUES    Arthritis     Burning with urination     CAD (coronary artery disease) 1996    CABG 1996, mukul celis 2015 fixed inferior defect    Cancer [facility])     prostate    Chronic atrial fibrillation ([facility] 03/2019    RVR3/2019, had ablation of flutter- persistent    Foot drop, left WEARS METAL BRACE    Gout     Hard of hearing     High cholesterol     Hypertension     Long term current use of anticoagulant therapy     Prostate enlargement     Sleep apnea     Thyroid disease     Unspecified sleep apnea     DOESN'T USE CPAP; \"IT MADE HIM SICK-COLDS, SINUS\", PER WIFE     No Known Allergies  Current Outpatient Medications   Medication Sig    warfarin (COUMADIN) 2.5 mg tablet TAKE 1 TABLET EVERY DAY - NEEDS INR CHECK    levothyroxine (SYNTHROID) 150 mcg tablet TAKE 1 TABLET EVERY MORNING FOR THYROID    carvediloL (COREG) 6.25 mg tablet Take 1 Tab by mouth two (2) times daily (with meals).  atorvastatin (LIPITOR) 40 mg tablet Take 1 Tab by mouth daily. Please schedule follow up with Dr. Molly Shay.   [facility] Entresto 24-26 mg tablet T1T PO EVERY 12 HOURS (Patient taking differently: Take 1 Tab by mouth two (2) times a day.)    PARoxetine (PAXIL) 20 mg tablet TAKE 1 TABLET EVERY DAY (Patient taking differently: Take 20 mg by mouth daily.)    allopurinoL (ZYLOPRIM) 300 mg tablet T1T QD TO PREVENT GOUT (Patient taking differently: Take 300 mg by mouth daily. For gout)    oxybutynin chloride XL (DITROPAN XL) 10 mg CR tablet Take 10 mg by mouth daily.  spironolactone (ALDACTONE) 25 mg tablet Take 25 mg by mouth daily.  omega 3-dha-epa-fish oil (FISH OIL) 100-160-1,000 mg cap Take 1 Cap by mouth daily.  psyllium (METAMUCIL) 0.52 gram capsule Take 1 Cap by mouth daily.  b complex vitamins (B COMPLEX 1) tablet Take 1 Tab by mouth daily.  CYANOCOBALAMIN, VITAMIN B-12, PO Take 1 Tab by mouth daily. Sciatica    aspirin delayed-release 81 mg tablet Take 81 mg by mouth daily.  therapeutic multivitamin (THERAGRAN) tablet Take 1 Tab by mouth daily.  ascorbic acid, vitamin C, (VITAMIN C) 500 mg tablet Take 1,000 mg by mouth daily.  B.infantis-B.ani-B.long-B.bifi (PROBIOTIC 4X) 10-15 mg TbEC Take 1 Tab by mouth daily.  COQ10, UBIQUINOL, PO Take 1 Tab by mouth daily.     finasteride (PROSCAR) 5 mg tablet Take 5 mg by mouth daily.  tamsulosin (FLOMAX) 0.4 mg capsule Take 0.4 mg by mouth nightly.  FERROUS GLUCONATE PO Take 325 mg by mouth daily.  furosemide (LASIX) 20 mg tablet Take 1 Tab by mouth daily. (Patient taking differently: Take 20 mg by mouth daily. Per patient take 1 pill base on swelling)     No current facility-administered medications for this visit. Wt Readings from Last 3 Encounters:   07/13/21 168 lb (76.2 kg)   07/08/21 168 lb (76.2 kg)   07/08/21 168 lb (76.2 kg)     BP Readings from Last 3 Encounters:   07/13/21 124/72   07/08/21 110/70   07/08/21 110/70     Pulse Readings from Last 3 Encounters:   07/13/21 72   07/08/21 71   06/30/21 74     Lab Results   Component Value Date/Time    WBC 5.7 08/09/2020 08:14 PM    HGB 13.2 08/09/2020 08:14 PM    HCT 39.3 08/09/2020 08:14 PM    PLATELET 077 (L) 28/55/2027 08:14 PM    MCV 98.0 08/09/2020 08:14 PM     Lab Results   Component Value Date/Time    Sodium 143 05/13/2021 12:25 PM    Potassium 3.8 05/13/2021 12:25 PM    Chloride 111 (H) 05/13/2021 12:25 PM    CO2 26 05/13/2021 12:25 PM    Anion gap 6 05/13/2021 12:25 PM    Glucose 95 05/13/2021 12:25 PM    BUN 19 05/13/2021 12:25 PM    Creatinine 1.05 05/13/2021 12:25 PM    BUN/Creatinine ratio 18 05/13/2021 12:25 PM    GFR est AA >60 05/13/2021 12:25 PM    GFR est non-AA >60 05/13/2021 12:25 PM    Calcium 8.7 05/13/2021 12:25 PM    Bilirubin, total 0.7 05/13/2021 12:25 PM    Alk. phosphatase 122 (H) 05/13/2021 12:25 PM    Protein, total 6.6 05/13/2021 12:25 PM    Albumin 3.9 05/13/2021 12:25 PM    Globulin 2.7 05/13/2021 12:25 PM    A-G Ratio 1.4 05/13/2021 12:25 PM    ALT (SGPT) 41 05/13/2021 12:25 PM     Estimated Creatinine Clearance: 48.9 mL/min (by C-G formula based on SCr of 1.05 mg/dL).       INR History:   (normal INR range 0.8-1.2)     Date   INR   PT   Dose/Comments  07/13/21 2.4  28.5 5 mg x 1, then 2.5 mg daily   06/30/21          1.7 20.1     2.5 mg daily; (+) missed dose  05/19/21          2.3                   27.8     2.5 mg daily  04/14/21          2.3                   27.4     2.5 mg daily  03/10/21          2.1                   25.5     2.5 mg daily  02/10/21          2.3                   27.9     2.5 mg daily  01/20/21          2.7                   32. 3     5 mg x 2, then 2.5 mg daily  01/06/21          1.6                   19.5     5 mg x 1, then 2.5 mg daily   12/22/20          1.8                   21. 9     2.5 mg daily  12/03/20          2.0                   28.0     2.5 mg daily; (+) hematuria; missed doses  11/16/20          2.2                   23.2     2.5 mg daily  10/07/20          1.5                   18.6     2.5 mg daily; (+) increased vitamin K intake  09/09/20          2.7                   32.4     2.5 mg daily  08/26/20          2.4                   28. 7     5 mg x 1, then 2.5 mg daily.     A/P:       Anticoagulation:  Considering Mr. Duong Warren past history, todays findings, and per Anticoagulation Collaborative Practice Agreement/Protocol:    1. Fingerstick POC INR (2.4) is therapeutic for INR goal today. 2.  Continue warfarin 2.5 mg daily    Recommend for patient to contact once surgery is scheduled to discuss perioperative planning. Recommend for patient to check with home pharmacy on completing his 2nd COVID 19 vaccine. Patient was instructed to schedule an appointment in 6 week(s) prior to leaving the clinic. Medication reconciliation was completed during the visit. There are no discontinued medications. A full discussion of the nature of anticoagulants has been carried out. A full discussion of the need for frequent and regular monitoring, precise dosage adjustment and compliance was stressed. Side effects of potential bleeding were discussed and Mr. Helder Acevedo was instructed to call [phone] if there are any signs of abnormal bleeding.   Mr. Helder Acevedo was instructed to avoid any OTC items containing aspirin or ibuprofen and prior to starting any new OTC products to consult with his physician or pharmacist to ensure no drug interactions are present. Mr. Too Alvarez was instructed to avoid any major changes in his general diet and to avoid alcohol consumption. Mr. Too Alvarez was provided information in the AVS that includes topics on understanding coumadin therapy, drug interaction considerations, vitamin K and coumadin use, interactions with foods and supplements containing vitamin K, and the use of herbal products. Mr. Too Alvarez verbalized his understanding of all instructions and will call the office with any questions, concerns, or signs of abnormal bleeding or blood clot. Notifications of recommendations will be sent to Dr. Sofy Glasgow MD for review. Thank you,  Blanca Marroquin, PharmD, BCACP, [address] in place:  Yes   Recommendation Provided To: Patient/Caregiver: 1 via In person   Intervention Detail: Adherence Monitorin, Lab(s) Ordered and Vaccine Recommended/Administered   Total # of Interventions Accepted: 3   Time Spent (min): 20

## 2021-07-13 ENCOUNTER — ANTI-COAG VISIT (OUTPATIENT)
Dept: INTERNAL MEDICINE CLINIC | Age: 86
End: 2021-07-13
Payer: MEDICARE

## 2021-07-13 VITALS
HEIGHT: 68 IN | WEIGHT: 168 LBS | BODY MASS INDEX: 25.46 KG/M2 | SYSTOLIC BLOOD PRESSURE: 124 MMHG | DIASTOLIC BLOOD PRESSURE: 72 MMHG | HEART RATE: 72 BPM

## 2021-07-13 DIAGNOSIS — I48.3 TYPICAL ATRIAL FLUTTER (HCC): ICD-10-CM

## 2021-07-13 DIAGNOSIS — I48.20 CHRONIC ATRIAL FIBRILLATION (HCC): ICD-10-CM

## 2021-07-13 DIAGNOSIS — Z95.2 H/O MECHANICAL AORTIC VALVE REPLACEMENT: ICD-10-CM

## 2021-07-13 DIAGNOSIS — Z95.2 S/P AVR (AORTIC VALVE REPLACEMENT): Primary | ICD-10-CM

## 2021-07-13 LAB
INR BLD: 2.4 (ref 1–1.5)
PT POC: 28.5 SECONDS (ref 9.1–12)
VALID INTERNAL CONTROL?: YES

## 2021-07-13 PROCEDURE — G0463 HOSPITAL OUTPT CLINIC VISIT: HCPCS | Performed by: PHARMACIST

## 2021-07-13 PROCEDURE — 85610 PROTHROMBIN TIME: CPT | Performed by: PHARMACIST

## 2021-07-13 RX ORDER — ALLOPURINOL 300 MG/1
TABLET ORAL
Qty: 30 TABLET | Refills: 11 | Status: SHIPPED | OUTPATIENT
Start: 2021-07-13 | End: 2022-07-15

## 2021-07-13 RX ORDER — PAROXETINE HYDROCHLORIDE 20 MG/1
TABLET, FILM COATED ORAL
Qty: 30 TABLET | Refills: 5 | Status: SHIPPED | OUTPATIENT
Start: 2021-07-13 | End: 2022-06-13

## 2021-07-13 NOTE — PATIENT INSTRUCTIONS
Today your INR was 2.4. Your goal INR is  2.0-3.0 . You have a 1 mg, 2.5 mg, and 5  mg tablet of Coumadin (warfarin). Take Coumadin (warfarin) as follows:    Continue warfarin 2.5 mg daily. - We will need to wait until your surgery is scheduled, to discuss more about your lovenox (blood thinner injection) and when to start/stop this medication and warfarin. Come back in 6 week(s) for your next finger stick/INR blood test.      Check with your pharmacy to see if they have the [facility] 19 vaccine. You need to complete the 2nd dose. Your first dose was on 3/15/21. Avoid any over the counter items containing aspirin or ibuprofen, and avoid great swings in general diet. Avoid alcohol consumption. Please notify the INR nurse if you are started on any new medication including over the counter or herbal supplements. Also, please notify your INR nurse if any of your other prescription or over the counter medications have been discontinued. Call [facility] at [phone] if you have any signs of abnormal bleeding/blood clot.  ------------------------------------------------------------------------------------------------------------------  Taking Warfarin Safely: Care Instructions    Your Care Instructions  Warfarin is a medicine that you take to prevent blood clots. It is often called a blood thinner. Doctors give warfarin (such as Coumadin) to reduce the risk of blood clots. You may be at risk for blood clots if you have atrial fibrillation or deep vein thrombosis. Some other health problems may also put you at risk. Warfarin slows the amount of time it takes for your blood to clot. It can cause bleeding problems. Even if you've been taking warfarin for a while, it's important to know how to take it safely. Foods and other medicines can affect the way warfarin works. Some can make warfarin work too well. This can cause bleeding problems.  And some can make it work poorly, so that it does not prevent blood clots very well. You will need regular blood tests to check how long it takes for your blood to form a clot. This test is called a PT or prothrombin time test. The result of the test is called an INR level. Depending on the test results, your doctor or anticoagulation clinic may adjust your dose of warfarin. Follow-up care is a key part of your treatment and safety. Be sure to make and go to all appointments, and call your doctor if you are having problems. It's also a good idea to know your test results and keep a list of the medicines you take. How can you care for yourself at home? Take warfarin safely  · Take your warfarin at the same time each day. · If you miss a dose of warfarin, don't take an extra dose to make up for it. Your doctor can tell you exactly what to do so you don't take too much or too little. · Wear medical alert jewelry that lets others know that you take warfarin. You can buy this at most drugstores. · Don't take warfarin if you are pregnant or planning to get pregnant. Talk to your doctor about how you can prevent getting pregnant while you are taking it. · Don't change your dose or stop taking warfarin unless your doctor tells you to. Effects of medicines and food on warfarin  · Don't start or stop taking any medicines, vitamins, or natural remedies unless you first talk to your doctor. Many medicines can affect how warfarin works. These include aspirin and other pain relievers, over-the-counter medicines, multivitamins, dietary supplements, and herbal products. · Tell all of your doctors and pharmacists that you take warfarin. Some prescription medicines can affect how warfarin works. · Keep the amount of vitamin K in your diet about the same from day to day. Do not suddenly eat a lot more or a lot less food that is rich in vitamin K than you usually do. Vitamin K affects how warfarin works and how your blood clots.  Talk with your doctor before making big changes in your diet. Vitamin K is in many foods, such as:  ¨ Leafy greens, such as kale, cabbage, spinach, Swiss chard, and lettuce. ¨ Canola and soybean oils. ¨ Green vegetables, such as asparagus, broccoli, and [place] sprouts. ¨ Vegetable drinks, green tea leaves, and some dietary supplement drinks. · Avoid cranberry juice and other cranberry products. They can increase the effects of warfarin. · Limit your use of alcohol. Avoid bleeding by preventing falls and injuries  · Wear slippers or shoes with nonskid soles. · Remove throw rugs and clutter. · Rearrange furniture and electrical cords to keep them out of walking paths. · Keep stairways, porches, and outside walkways well lit. Use night-lights in hallways and bathrooms. · Be extra careful when you work with sharp tools or knives. When should you call for help? Call 911 anytime you think you may need emergency care. For example, call if:  · You have a sudden, severe headache that is different from past headaches. Call your doctor now or seek immediate medical care if:  · You have any abnormal bleeding, such as:  ¨ Nosebleeds. ¨ Vaginal bleeding that is different (heavier, more frequent, at a different time of the month) than what you are used to. ¨ Bloody or black stools, or rectal bleeding. ¨ Bloody or pink urine. Watch closely for changes in your health, and be sure to contact your doctor if you have any problems. Where can you learn more? Go to http://www.gray.com/. Enter Z558 in the search box to learn more about \"Taking Warfarin Safely: Care Instructions. \"  Current as of: January 27, 2016  Content Version: 11.1  © 3294-8578 [employer]. Care instructions adapted under license by [employer] (which disclaims liability or warranty for this information).  If you have questions about a medical condition or this instruction, always ask your healthcare professional. [employer] disclaims any warranty or liability for your use of this information.

## 2021-07-19 LAB
ECHO AO ASC DIAM: 4.28 CM
ECHO AO ROOT DIAM: 3.61 CM
ECHO AV AREA PEAK VELOCITY: 2.45 CM2
ECHO AV AREA VTI: 2.34 CM2
ECHO AV AREA/BSA PEAK VELOCITY: 1.3 CM2/M2
ECHO AV AREA/BSA VTI: 1.2 CM2/M2
ECHO AV MEAN GRADIENT: 10.07 MMHG
ECHO AV PEAK GRADIENT: 18.17 MMHG
ECHO AV PEAK VELOCITY: 213.11 CM/S
ECHO AV VTI: 33.72 CM
ECHO LA AREA 4C: 20.69 CM2
ECHO LA MAJOR AXIS: 4.74 CM
ECHO LA MINOR AXIS: 2.49 CM
ECHO LA VOL 2C: 69.33 ML (ref 18–58)
ECHO LA VOL 4C: 57.36 ML (ref 18–58)
ECHO LA VOL BP: 66.84 ML (ref 18–58)
ECHO LA VOL/BSA BIPLANE: 35.18 ML/M2 (ref 16–28)
ECHO LA VOLUME INDEX A2C: 36.49 ML/M2 (ref 16–28)
ECHO LA VOLUME INDEX A4C: 30.19 ML/M2 (ref 16–28)
ECHO LV E' LATERAL VELOCITY: 6.84 CM/S
ECHO LV E' SEPTAL VELOCITY: 4.97 CM/S
ECHO LV EDV A2C: 109.36 ML
ECHO LV EDV A4C: 123.94 ML
ECHO LV EDV BP: 120.55 ML (ref 67–155)
ECHO LV EDV INDEX A4C: 65.2 ML/M2
ECHO LV EDV INDEX BP: 63.4 ML/M2
ECHO LV EDV NDEX A2C: 57.6 ML/M2
ECHO LV EJECTION FRACTION 3D: 37.4 PERCENT
ECHO LV EJECTION FRACTION A2C: 40 PERCENT
ECHO LV EJECTION FRACTION A4C: 41 PERCENT
ECHO LV EJECTION FRACTION BIPLANE: 41.6 PERCENT (ref 55–100)
ECHO LV ESV A2C: 66.12 ML
ECHO LV ESV A4C: 73.43 ML
ECHO LV ESV BP: 70.46 ML (ref 22–58)
ECHO LV ESV INDEX A2C: 34.8 ML/M2
ECHO LV ESV INDEX A4C: 38.6 ML/M2
ECHO LV ESV INDEX BP: 37.1 ML/M2
ECHO LV GLOBAL LONGITUDINAL STRAIN (GLS): -11.7 PERCENT
ECHO LV INTERNAL DIMENSION DIASTOLIC: 5.6 CM (ref 4.2–5.9)
ECHO LV INTERNAL DIMENSION SYSTOLIC: 4.16 CM
ECHO LV IVSD: 1.16 CM (ref 0.6–1)
ECHO LV MASS 2D: 269.4 G (ref 88–224)
ECHO LV MASS INDEX 2D: 141.8 G/M2 (ref 49–115)
ECHO LV POSTERIOR WALL DIASTOLIC: 1.17 CM (ref 0.6–1)
ECHO LVOT DIAM: 2.66 CM
ECHO LVOT PEAK GRADIENT: 3.53 MMHG
ECHO LVOT PEAK VELOCITY: 93.89 CM/S
ECHO LVOT SV: 78.9 ML
ECHO LVOT VTI: 14.21 CM
ECHO MV A VELOCITY: 78 CM/S
ECHO MV AREA PHT: 4.19 CM2
ECHO MV E DECELERATION TIME (DT): 181.2 MS
ECHO MV E VELOCITY: 68.63 CM/S
ECHO MV E/A RATIO: 0.88
ECHO MV E/E' LATERAL: 10.03
ECHO MV E/E' RATIO (AVERAGED): 11.92
ECHO MV E/E' SEPTAL: 13.81
ECHO MV PRESSURE HALF TIME (PHT): 52.55 MS
ECHO RA AREA 4C: 19.1 CM2
ECHO RV INTERNAL DIMENSION: 3.93 CM
ECHO RV TAPSE: 2.02 CM (ref 1.5–2)
ECHO TV REGURGITANT MAX VELOCITY: 196.34 CM/S
ECHO TV REGURGITANT PEAK GRADIENT: 15.42 MMHG
GLOBAL LONGITUDINAL STRAIN 2 CHAMBER: -11.4 PERCENT
GLOBAL LONGITUDINAL STRAIN 4 CHAMBER: -10 PERCENT
GLOBAL LONGITUDINAL STRAIN LONG AXIS: -13.7 PERCENT
LA VOL DISK BP: 63.29 ML (ref 18–58)

## 2021-07-21 ENCOUNTER — TELEPHONE (OUTPATIENT)
Dept: SURGERY | Age: 86
End: 2021-07-21

## 2021-07-21 NOTE — TELEPHONE ENCOUNTER
Spoke with patient wife (HIPPA) informed her will speak with provider & get surgery scheduled. Express understanding.

## 2021-07-21 NOTE — TELEPHONE ENCOUNTER
Pt's wife is inquiring about when surgery is scheduled for. States  came to appt by himself and probably did not stop to see if he needed to schedule any further appts. Please call.  [phone]

## 2021-08-25 ENCOUNTER — OFFICE VISIT (OUTPATIENT)
Dept: INTERNAL MEDICINE CLINIC | Age: 86
End: 2021-08-25
Payer: MEDICARE

## 2021-08-25 VITALS — BODY MASS INDEX: 25.16 KG/M2 | WEIGHT: 166 LBS | HEIGHT: 68 IN

## 2021-08-25 DIAGNOSIS — Z95.2 S/P AVR (AORTIC VALVE REPLACEMENT): Primary | ICD-10-CM

## 2021-08-25 DIAGNOSIS — I48.20 CHRONIC ATRIAL FIBRILLATION (HCC): ICD-10-CM

## 2021-08-25 LAB
INR BLD: 1.7 (ref 1–1.5)
PT POC: 20.1 SECONDS (ref 9.1–12)
VALID INTERNAL CONTROL?: YES

## 2021-08-25 PROCEDURE — 85610 PROTHROMBIN TIME: CPT | Performed by: PHARMACIST

## 2021-08-25 NOTE — PROGRESS NOTES
Pharmacy Progress Note - Anticoagulation Management    S/O: Mr. Puneet Casey  is a 86 y. o. male, referred by Amanda Sandoval MD, was seen today for anticoagulation management for the diagnosis of Atrial Fibrillation and Aortic Valve Replacement  ([facility] -1996), s/p pacemaker.  Pt is very hard of hearing.      · Warfarin start date: ~1996  · INR Goal:  2.0-3.0    · Current warfarin regimen:   2.5 mg daily                  · Warfarin tablet strength:   1 mg, 2.5 mg, 5 mg  -- wife states she does not want to split tablets in half ; generally takes 2.5 mg tab. Able to recognize tablet strength  · Duration of therapy: indefinite  · Takes warfarin in the evening    Today's pertinent positives includes:  No significant changes since last visit    May have had more salads in the past week  Denies CP/SOB/LE & UE pain/HA  [facility] did not have COVID vaccine. Results for orders placed or performed in visit on 08/25/21   AMB POC PT/INR   Result Value Ref Range    VALID INTERNAL CONTROL POC Yes     Prothrombin time (POC) 20.1 (A) 9.1 - 12.0 seconds    INR POC 1.7 (A) 1.0 - 1.5       · Adherence:   · Able to recall regimen? YES  · Miss/extra dose? NO  · Need refill? NO    Upcoming procedure(s):  YES   Reports hernia surgery appt remains pending.  Hoping for a time in Sept.       Past Medical History:   Diagnosis Date    Acute on chronic systolic heart failure, NYHA class 4 ([facility] [address] 03/01/2019    admit March 2019 7 days, EF 10% got Primacor, BIV implant    Anger     TAKES PAROXETINE TO CONTROL ANGER ISSUES    Arthritis     Burning with urination     CAD (coronary artery disease) 1996    CABG 1996, mukul celis 2015 fixed inferior defect    Cancer [facility])     prostate    Chronic atrial fibrillation ([facility] [address] 03/2019    RVR3/2019, had ablation of flutter- persistent    Foot drop, left     WEARS METAL BRACE    Gout     Hard of hearing     High cholesterol     Hypertension     Long term current use of anticoagulant therapy     Prostate enlargement     Sleep apnea     Thyroid disease     Unspecified sleep apnea     DOESN'T USE CPAP; \"IT MADE HIM SICK-COLDS, SINUS\", PER WIFE     No Known Allergies  Current Outpatient Medications   Medication Sig    allopurinoL (ZYLOPRIM) 300 mg tablet TAKE 1 TABLET EVERY DAY TO PREVENT GOUT    PARoxetine (PAXIL) 20 mg tablet TAKE 1 TABLET EVERY DAY    warfarin (COUMADIN) 2.5 mg tablet TAKE 1 TABLET EVERY DAY - NEEDS INR CHECK    levothyroxine (SYNTHROID) 150 mcg tablet TAKE 1 TABLET EVERY MORNING FOR THYROID    carvediloL (COREG) 6.25 mg tablet Take 1 Tab by mouth two (2) times daily (with meals).  atorvastatin (LIPITOR) 40 mg tablet Take 1 Tab by mouth daily. Please schedule follow up with Dr. Parag Bermudez.   [facility] Entresto 24-26 mg tablet T1T PO EVERY 12 HOURS (Patient taking differently: Take 1 Tab by mouth two (2) times a day.)    oxybutynin chloride XL (DITROPAN XL) 10 mg CR tablet Take 10 mg by mouth daily.  spironolactone (ALDACTONE) 25 mg tablet Take 25 mg by mouth daily.  omega 3-dha-epa-fish oil (FISH OIL) 100-160-1,000 mg cap Take 1 Cap by mouth daily.  psyllium (METAMUCIL) 0.52 gram capsule Take 1 Cap by mouth daily.  b complex vitamins (B COMPLEX 1) tablet Take 1 Tab by mouth daily.  CYANOCOBALAMIN, VITAMIN B-12, PO Take 1 Tab by mouth daily. Sciatica    aspirin delayed-release 81 mg tablet Take 81 mg by mouth daily.  therapeutic multivitamin (THERAGRAN) tablet Take 1 Tab by mouth daily.  ascorbic acid, vitamin C, (VITAMIN C) 500 mg tablet Take 1,000 mg by mouth daily.  B.infantis-B.ani-B.long-B.bifi (PROBIOTIC 4X) 10-15 mg TbEC Take 1 Tab by mouth daily.  COQ10, UBIQUINOL, PO Take 1 Tab by mouth daily.  finasteride (PROSCAR) 5 mg tablet Take 5 mg by mouth daily.  tamsulosin (FLOMAX) 0.4 mg capsule Take 0.4 mg by mouth nightly.  FERROUS GLUCONATE PO Take 325 mg by mouth daily.     furosemide (LASIX) 20 mg tablet Take 1 Tab by mouth daily. (Patient taking differently: Take 20 mg by mouth daily. Per patient take 1 pill base on swelling)     No current facility-administered medications for this visit. Wt Readings from Last 3 Encounters:   08/25/21 166 lb (75.3 kg)   07/13/21 168 lb (76.2 kg)   07/08/21 168 lb (76.2 kg)     BP Readings from Last 3 Encounters:   07/13/21 124/72   07/08/21 110/70   07/08/21 110/70     Pulse Readings from Last 3 Encounters:   07/13/21 72   07/08/21 71   06/30/21 74     Lab Results   Component Value Date/Time    WBC 5.7 08/09/2020 08:14 PM    HGB 13.2 08/09/2020 08:14 PM    HCT 39.3 08/09/2020 08:14 PM    PLATELET 159 (L) 96/94/9467 08:14 PM    MCV 98.0 08/09/2020 08:14 PM     Lab Results   Component Value Date/Time    Sodium 143 05/13/2021 12:25 PM    Potassium 3.8 05/13/2021 12:25 PM    Chloride 111 (H) 05/13/2021 12:25 PM    CO2 26 05/13/2021 12:25 PM    Anion gap 6 05/13/2021 12:25 PM    Glucose 95 05/13/2021 12:25 PM    BUN 19 05/13/2021 12:25 PM    Creatinine 1.05 05/13/2021 12:25 PM    BUN/Creatinine ratio 18 05/13/2021 12:25 PM    GFR est AA >60 05/13/2021 12:25 PM    GFR est non-AA >60 05/13/2021 12:25 PM    Calcium 8.7 05/13/2021 12:25 PM    Bilirubin, total 0.7 05/13/2021 12:25 PM    Alk. phosphatase 122 (H) 05/13/2021 12:25 PM    Protein, total 6.6 05/13/2021 12:25 PM    Albumin 3.9 05/13/2021 12:25 PM    Globulin 2.7 05/13/2021 12:25 PM    A-G Ratio 1.4 05/13/2021 12:25 PM    ALT (SGPT) 41 05/13/2021 12:25 PM     Estimated Creatinine Clearance: 48.9 mL/min (by C-G formula based on SCr of 1.05 mg/dL).       INR History:   (normal INR range 0.8-1.2)     Date   INR   PT   Dose/Comments  08/25/21 1.7  20.1 2.5 mg daily  07/13/21          2.4                   28.5     5 mg x 1, then 2.5 mg daily   06/30/21          1.7                   20.1     2.5 mg daily; (+) missed dose  05/19/21          2.3                   27.8     2.5 mg daily  04/14/21          2.3                   27.4     2.5 mg daily  03/10/21          2.1                   25.5     2.5 mg daily  02/10/21          2.3                   27.9     2.5 mg daily  01/20/21          2.7                   32. 3     5 mg x 2, then 2.5 mg daily  01/06/21          1.6                   19.5     5 mg x 1, then 2.5 mg daily   12/22/20          1.8                   21. 9     2.5 mg daily  12/03/20          2.0                   28.0     2.5 mg daily; (+) hematuria; missed doses  11/16/20          2.2                   23.2     2.5 mg daily    A/P:       Anticoagulation:  Considering Mr. Casey's past history, todays findings, and per Anticoagulation Collaborative Practice Agreement/Protocol:    1. Fingerstick POC INR (1.7) is Subtherapeutic for INR goal today. 2. Take 5 mg tonight . Then Continue warfarin 2.5 mg daily    Patient was instructed to schedule an appointment in 4 week(s) prior to leaving the clinic. Medication reconciliation was completed during the visit. There are no discontinued medications. A full discussion of the nature of anticoagulants has been carried out. A full discussion of the need for frequent and regular monitoring, precise dosage adjustment and compliance was stressed. Side effects of potential bleeding were discussed and Mr. Jonas Buenrostro was instructed to call [phone] if there are any signs of abnormal bleeding. Mr. Jonas Buenrostro was instructed to avoid any OTC items containing aspirin or ibuprofen and prior to starting any new OTC products to consult with his physician or pharmacist to ensure no drug interactions are present. Mr. Jonas Buenrostro was instructed to avoid any major changes in his general diet and to avoid alcohol consumption. Mr. Jonas Buenrostro was provided information in the AVS that includes topics on understanding coumadin therapy, drug interaction considerations, vitamin K and coumadin use, interactions with foods and supplements containing vitamin K, and the use of herbal products.     Mr. Jonas Buenrostro verbalized his understanding of all instructions and will call the office with any questions, concerns, or signs of abnormal bleeding or blood clot. Notifications of recommendations will be sent to Dr. Maegan Wright MD for review. Thank you,  Blanca Hernandez, PharmD, BCACP, [address] [place] [address] in place:  Yes   Recommendation Provided To: Patient/Caregiver: 1 via In person   Intervention Detail: Dose Adjustment: 1, reason: Therapy Optimization, Lab(s) Ordered, Scheduled Appointment and Vaccine Recommended/Administered   Time Spent (min): 20

## 2021-08-25 NOTE — PATIENT INSTRUCTIONS
Today your INR was 1.7. Your goal INR is  2.0-3.0 . You have a 1 mg , 2.5 mg, and 5 mg tablet of Coumadin (warfarin). Take Coumadin (warfarin) as follows: Take 5 mg tonight. Then continue warfarin 2.5 mg daily.    - Let me know when your hernia surgery date is established. We will need to develop a plan for your blood thinner.   - Check with [facility] or [facility] about your 2nd COVID 19 vaccination. Come back in 4  week(s) for your next finger stick/INR blood test.        Avoid any over the counter items containing aspirin or ibuprofen, and avoid great swings in general diet. Avoid alcohol consumption. Please notify the INR nurse if you are started on any new medication including over the counter or herbal supplements. Also, please notify your INR nurse if any of your other prescription or over the counter medications have been discontinued. Call [facility] at [phone] if you have any signs of abnormal bleeding/blood clot.  ------------------------------------------------------------------------------------------------------------------  Taking Warfarin Safely: Care Instructions    Your Care Instructions  Warfarin is a medicine that you take to prevent blood clots. It is often called a blood thinner. Doctors give warfarin (such as Coumadin) to reduce the risk of blood clots. You may be at risk for blood clots if you have atrial fibrillation or deep vein thrombosis. Some other health problems may also put you at risk. Warfarin slows the amount of time it takes for your blood to clot. It can cause bleeding problems. Even if you've been taking warfarin for a while, it's important to know how to take it safely. Foods and other medicines can affect the way warfarin works. Some can make warfarin work too well. This can cause bleeding problems. And some can make it work poorly, so that it does not prevent blood clots very well.   You will need regular blood tests to check how long it takes for your blood to form a clot. This test is called a PT or prothrombin time test. The result of the test is called an INR level. Depending on the test results, your doctor or anticoagulation clinic may adjust your dose of warfarin. Follow-up care is a key part of your treatment and safety. Be sure to make and go to all appointments, and call your doctor if you are having problems. It's also a good idea to know your test results and keep a list of the medicines you take. How can you care for yourself at home? Take warfarin safely  · Take your warfarin at the same time each day. · If you miss a dose of warfarin, don't take an extra dose to make up for it. Your doctor can tell you exactly what to do so you don't take too much or too little. · Wear medical alert jewelry that lets others know that you take warfarin. You can buy this at most drugstores. · Don't take warfarin if you are pregnant or planning to get pregnant. Talk to your doctor about how you can prevent getting pregnant while you are taking it. · Don't change your dose or stop taking warfarin unless your doctor tells you to. Effects of medicines and food on warfarin  · Don't start or stop taking any medicines, vitamins, or natural remedies unless you first talk to your doctor. Many medicines can affect how warfarin works. These include aspirin and other pain relievers, over-the-counter medicines, multivitamins, dietary supplements, and herbal products. · Tell all of your doctors and pharmacists that you take warfarin. Some prescription medicines can affect how warfarin works. · Keep the amount of vitamin K in your diet about the same from day to day. Do not suddenly eat a lot more or a lot less food that is rich in vitamin K than you usually do. Vitamin K affects how warfarin works and how your blood clots. Talk with your doctor before making big changes in your diet.  Vitamin K is in many foods, such as:  ¨ Leafy greens, such as kale, cabbage, spinach, Swiss chard, and lettuce. ¨ Canola and soybean oils. ¨ Green vegetables, such as asparagus, broccoli, and [place] sprouts. ¨ Vegetable drinks, green tea leaves, and some dietary supplement drinks. · Avoid cranberry juice and other cranberry products. They can increase the effects of warfarin. · Limit your use of alcohol. Avoid bleeding by preventing falls and injuries  · Wear slippers or shoes with nonskid soles. · Remove throw rugs and clutter. · Rearrange furniture and electrical cords to keep them out of walking paths. · Keep stairways, porches, and outside walkways well lit. Use night-lights in hallways and bathrooms. · Be extra careful when you work with sharp tools or knives. When should you call for help? Call 911 anytime you think you may need emergency care. For example, call if:  · You have a sudden, severe headache that is different from past headaches. Call your doctor now or seek immediate medical care if:  · You have any abnormal bleeding, such as:  ¨ Nosebleeds. ¨ Vaginal bleeding that is different (heavier, more frequent, at a different time of the month) than what you are used to. ¨ Bloody or black stools, or rectal bleeding. ¨ Bloody or pink urine. Watch closely for changes in your health, and be sure to contact your doctor if you have any problems. Where can you learn more? Go to http://claus-teodora.info/. Enter L797 in the search box to learn more about \"Taking Warfarin Safely: Care Instructions. \"  Current as of: January 27, 2016  Content Version: 11.1  © 2408-2557 [email or web address]. Care instructions adapted under license by [employer] (which disclaims liability or warranty for this information). If you have questions about a medical condition or this instruction, always ask your healthcare professional. [address] any warranty or liability for your use of this information.

## 2021-09-11 DIAGNOSIS — E78.5 DYSLIPIDEMIA: ICD-10-CM

## 2021-09-11 DIAGNOSIS — I50.22 CHRONIC SYSTOLIC CONGESTIVE HEART FAILURE (HCC): ICD-10-CM

## 2021-09-13 RX ORDER — SACUBITRIL AND VALSARTAN 24; 26 MG/1; MG/1
1 TABLET, FILM COATED ORAL EVERY 12 HOURS
Qty: 180 TABLET | Refills: 3 | Status: SHIPPED | OUTPATIENT
Start: 2021-09-13

## 2021-09-13 RX ORDER — ATORVASTATIN CALCIUM 40 MG/1
40 TABLET, FILM COATED ORAL
Qty: 90 TABLET | Refills: 3 | Status: SHIPPED | OUTPATIENT
Start: 2021-09-13

## 2021-09-13 NOTE — TELEPHONE ENCOUNTER
Per VO by MD.     Future Appointments   Date Time Provider Lexie Cooli   9/21/2021  2:30 PM Migue Cash [facility] BS AMB   10/6/2021  1:00 PM REMOTE1, BUSTER STAHL BS AMB   12/2/2021  3:00 PM [email or web address], BUSTER STAHL BS AMB   12/2/2021  3:20 PM MD MARIBETH Leavitt BS AMB   1/6/2022 11:20 AM MD MARIBETH Blackwell BS AMB

## 2021-09-14 ENCOUNTER — TELEPHONE (OUTPATIENT)
Dept: CARDIOLOGY CLINIC | Age: 86
End: 2021-09-14

## 2021-09-14 DIAGNOSIS — I48.20 CHRONIC ATRIAL FIBRILLATION (HCC): ICD-10-CM

## 2021-09-14 DIAGNOSIS — I50.22 CHRONIC SYSTOLIC CONGESTIVE HEART FAILURE (HCC): ICD-10-CM

## 2021-09-14 RX ORDER — CARVEDILOL 12.5 MG/1
12.5 TABLET ORAL 2 TIMES DAILY WITH MEALS
Qty: 180 TABLET | Refills: 1 | Status: SHIPPED | OUTPATIENT
Start: 2021-09-14

## 2021-09-14 NOTE — TELEPHONE ENCOUNTER
Received alert that since 06/28/2021, CRT is down to 89% & AT/AF burden up to 93%. On 08/19/2021, reported that CRT down to 90% & AT/AF burden up to 67%. Some RVR. LVEF in 07/2021 was 40-45%. BP appears to allow for increase in carvedilol. Increase to 12.5 mg po bid, monitor BP if possible. Will continue to monitor CRT % & AT/AF burden.

## 2021-09-14 NOTE — TELEPHONE ENCOUNTER
Attempted to reach patient by telephone. Unable to leave message. [employer] message sent.

## 2021-09-23 ENCOUNTER — TELEPHONE (OUTPATIENT)
Dept: CARDIOLOGY CLINIC | Age: 86
End: 2021-09-23

## 2021-09-23 NOTE — TELEPHONE ENCOUNTER
Spoke to patient's wife. Name noted on permission to release information. Informed of device readings and need to increase coreg dose. Verbalized understanding. To check BP/HR 2-3 times/week after dose increase. To call office in 2 weeks with readings. Medication refill previously sent. Confirmed follow up.     Future Appointments   Date Time Provider Lexie Moreno   10/6/2021  1:00 PM Christelle STAHL BS AMB   12/2/2021  3:00 PM BUSTER SOTO BS AMB   12/2/2021  3:20 PM MD MARIBETH Key BS AMB   1/6/2022 11:20 AM Jose Maria Quarles MD CAVREY BS AMB

## 2021-09-23 NOTE — TELEPHONE ENCOUNTER
Patient wife Rosi Soni returning phone call. States her answering machine is full, but will be listening out for the call.       Phone: [phone]

## 2021-10-21 ENCOUNTER — TELEPHONE (OUTPATIENT)
Dept: INTERNAL MEDICINE CLINIC | Age: 86
End: 2021-10-21

## 2021-10-21 NOTE — TELEPHONE ENCOUNTER
Pharmacy Progress Note - Telephone Encounter    S/O: Mr. Amy Lew 80 y.o. 's wife contacted office/me via an inbound telephone call to discuss his INR follow up appt today. Verified patients identifiers (name & ) per HIPAA policy. Patient assisted w/ this call as well. - No bm in the past 4 days. Has not tried anything to help relieve constipation.  - Reports hematuria started several days ago. Worst in the morning. Denies dysuria. - Denies hematochezia, melena, and other s/sx of bleeding/bruises  - Denies any missed/extra doses of warfarin. No other new medications     - Still has not heard back from Dr. Clem Weeks (Gen Surg) regarding his hernia surgery.   - Reports he completed COVID 19 vaccination series. Will bring in card. A/P:  - Pt missed his INR follow up on last month. - Scheduled 10/25/21 for INR check. Keep appt for 11/3/21.   - Recommend for patient to contact Dr Clem Weeks regarding hernia surgery  - Recommend trial of docusate 100 mg to see if it will help w/ constipation. Stay hydrated. - Patient and wife endorse understanding to the provided information. All questions answered at this time. Thank you,  Blanca Sepulveda, PharmD, BCACP, [address] in place:  Yes   Recommendation Provided To: Patient/Caregiver: 3 via Telephone   Intervention Detail: Adherence Monitorin, New Rx: 1, reason: Needs Additional Therapy and Scheduled Appointment   Gap Closed?:    Intervention Accepted By: Patient/Caregiver: 3   Time Spent (min): 15

## 2021-10-22 ENCOUNTER — DOCUMENTATION ONLY (OUTPATIENT)
Dept: INTERNAL MEDICINE CLINIC | Age: 86
End: 2021-10-22

## 2021-10-22 NOTE — PROGRESS NOTES
Left message for patient with instructions for appointment w/ [facility], Dr. Jaqueline Eubanks on 10/25/21 @ 11:00.

## 2021-10-26 ENCOUNTER — TRANSCRIBE ORDER (OUTPATIENT)
Dept: SCHEDULING | Age: 86
End: 2021-10-26

## 2021-10-26 DIAGNOSIS — R31.0 GROSS HEMATURIA: Primary | ICD-10-CM

## 2021-10-27 ENCOUNTER — TRANSCRIBE ORDER (OUTPATIENT)
Dept: SCHEDULING | Age: 86
End: 2021-10-27

## 2021-10-27 DIAGNOSIS — R31.0 GROSS HEMATURIA: Primary | ICD-10-CM

## 2021-10-30 NOTE — PROGRESS NOTES
Pharmacy Progress Note - Anticoagulation Management    S/O: Mr. Puneet Casey  is a 86 y. o. male, referred by Jodie Garcia MD, was seen today for anticoagulation management for the diagnosis of Atrial Fibrillation and Aortic Valve Replacement  ([facility] -1996), s/p pacemaker.  Pt is very hard of hearing.      · Warfarin start date: ~1996  · INR Goal:  2.0-3.0    · Current warfarin regimen:   5 mg x 1, then 2.5 mg daily                  · Warfarin tablet strength:   1 mg, 2.5 mg, 5 mg  -- wife states she does not want to split tablets in half ; generally takes 2.5 mg tab. Able to recognize tablet strength  · Duration of therapy: indefinite  · Takes warfarin in the evening    Today's pertinent positives includes:  No significant changes since last visit     No bleeding/bruises    Results for orders placed or performed in visit on 11/03/21   AMB POC PT/INR   Result Value Ref Range    VALID INTERNAL CONTROL POC Yes     Prothrombin time (POC) 45.1 (A) 9.1 - 12 seconds    INR POC 3.8 (A) 1 - 1.5     · Adherence:   · Able to recall regimen? YES   · Miss/extra dose? YES -- may have taken 1 extra dose  · Need refill?  NO    Upcoming procedure(s):  N/A      Past Medical History:   Diagnosis Date    Acute on chronic systolic heart failure, NYHA class 4 ([facility] [address] 03/01/2019    admit March 2019 7 days, EF 10% got Primacor, BIV implant    Anger     TAKES PAROXETINE TO CONTROL ANGER ISSUES    Arthritis     Burning with urination     CAD (coronary artery disease) 1996    CABG 1996, mukul celis 2015 fixed inferior defect    Cancer [facility])     prostate    Chronic atrial fibrillation ([facility] [address] 03/2019    RVR3/2019, had ablation of flutter- persistent    Foot drop, left     WEARS METAL BRACE    Gout     Hard of hearing     High cholesterol     Hypertension     Long term current use of anticoagulant therapy     Prostate enlargement     Sleep apnea     Thyroid disease     Unspecified sleep apnea     DOESN'T USE CPAP; \"IT MADE HIM SICK-COLDS, SINUS\", PER WIFE     No Known Allergies  Current Outpatient Medications   Medication Sig    carvediloL (COREG) 12.5 mg tablet Take 1 Tablet by mouth two (2) times daily (with meals).  atorvastatin (LIPITOR) 40 mg tablet Take 1 Tablet by mouth nightly.  sacubitriL-valsartan (Entresto) 24-26 mg tablet Take 1 Tablet by mouth every twelve (12) hours.  allopurinoL (ZYLOPRIM) 300 mg tablet TAKE 1 TABLET EVERY DAY TO PREVENT GOUT    PARoxetine (PAXIL) 20 mg tablet TAKE 1 TABLET EVERY DAY    warfarin (COUMADIN) 2.5 mg tablet TAKE 1 TABLET EVERY DAY - NEEDS INR CHECK    levothyroxine (SYNTHROID) 150 mcg tablet TAKE 1 TABLET EVERY MORNING FOR THYROID    oxybutynin chloride XL (DITROPAN XL) 10 mg CR tablet Take 10 mg by mouth daily.  spironolactone (ALDACTONE) 25 mg tablet Take 25 mg by mouth daily.  omega 3-dha-epa-fish oil (FISH OIL) 100-160-1,000 mg cap Take 1 Cap by mouth daily.  psyllium (METAMUCIL) 0.52 gram capsule Take 1 Cap by mouth daily.  b complex vitamins (B COMPLEX 1) tablet Take 1 Tab by mouth daily.  CYANOCOBALAMIN, VITAMIN B-12, PO Take 1 Tab by mouth daily. Sciatica    aspirin delayed-release 81 mg tablet Take 81 mg by mouth daily.  therapeutic multivitamin (THERAGRAN) tablet Take 1 Tab by mouth daily.  ascorbic acid, vitamin C, (VITAMIN C) 500 mg tablet Take 1,000 mg by mouth daily.  B.infantis-B.ani-B.long-B.bifi (PROBIOTIC 4X) 10-15 mg TbEC Take 1 Tab by mouth daily.  COQ10, UBIQUINOL, PO Take 1 Tab by mouth daily.  finasteride (PROSCAR) 5 mg tablet Take 5 mg by mouth daily.  tamsulosin (FLOMAX) 0.4 mg capsule Take 0.4 mg by mouth nightly.  FERROUS GLUCONATE PO Take 325 mg by mouth daily.  furosemide (LASIX) 20 mg tablet Take 1 Tab by mouth daily. (Patient taking differently: Take 20 mg by mouth daily. Per patient take 1 pill base on swelling)     No current facility-administered medications for this visit.      Wt Readings from Last 3 Encounters:   11/03/21 171 lb (77.6 kg)   10/25/21 166 lb (75.3 kg)   08/25/21 166 lb (75.3 kg)     BP Readings from Last 3 Encounters:   11/03/21 132/82   07/13/21 124/72   07/08/21 110/70     Pulse Readings from Last 3 Encounters:   11/03/21 70   07/13/21 72   07/08/21 71     Lab Results   Component Value Date/Time    WBC 5.7 08/09/2020 08:14 PM    HGB 13.2 08/09/2020 08:14 PM    HCT 39.3 08/09/2020 08:14 PM    PLATELET 374 (L) 46/94/5507 08:14 PM    MCV 98.0 08/09/2020 08:14 PM     Lab Results   Component Value Date/Time    Sodium 143 05/13/2021 12:25 PM    Potassium 3.8 05/13/2021 12:25 PM    Chloride 111 (H) 05/13/2021 12:25 PM    CO2 26 05/13/2021 12:25 PM    Anion gap 6 05/13/2021 12:25 PM    Glucose 95 05/13/2021 12:25 PM    BUN 19 05/13/2021 12:25 PM    Creatinine 1.05 05/13/2021 12:25 PM    BUN/Creatinine ratio 18 05/13/2021 12:25 PM    GFR est AA >60 05/13/2021 12:25 PM    GFR est non-AA >60 05/13/2021 12:25 PM    Calcium 8.7 05/13/2021 12:25 PM    Bilirubin, total 0.7 05/13/2021 12:25 PM    Alk. phosphatase 122 (H) 05/13/2021 12:25 PM    Protein, total 6.6 05/13/2021 12:25 PM    Albumin 3.9 05/13/2021 12:25 PM    Globulin 2.7 05/13/2021 12:25 PM    A-G Ratio 1.4 05/13/2021 12:25 PM    ALT (SGPT) 41 05/13/2021 12:25 PM     Estimated Creatinine Clearance: 48.9 mL/min (by C-G formula based on SCr of 1.05 mg/dL). INR History:   (normal INR range 0.8-1.2)     Date   INR   PT   Dose/Comments  11/03/21 3.8  45.1 5 mg x 1, then 2.5 mg daily ; (+) extra dose  10/25/21          1.9                   22.4     5 mg x 1, then 2.5 mg daily ; (+) hematuria  08/25/21          1.7                   20.1     2.5 mg daily  07/13/21          2.4                   28. 5     5 mg x 1, then 2.5 mg daily   06/30/21          1.7                   20.1     2.5 mg daily; (+) missed dose  05/19/21          2.3                   27.8     2.5 mg daily  04/14/21          2.3                   27.4     2.5 mg daily  03/10/21          2.1                   25.5     2.5 mg daily  02/10/21          2.3                   27.9     2.5 mg daily  01/20/21          2.7                   32. 3     5 mg x 2, then 2.5 mg daily  01/06/21          1.6                   19.5     5 mg x 1, then 2.5 mg daily   12/22/20          1.8                   21. 9     2.5 mg daily  12/03/20          2.0                   28.0     2.5 mg daily; (+) hematuria; missed doses  11/16/20          2.2                   23.2     2.5 mg daily      A/P:       Anticoagulation:  Considering Mr. Casey's past history, todays findings, and per Anticoagulation Collaborative Practice Agreement/Protocol:    1. Fingerstick POC INR (3.8) is supratherapeutic for INR goal today secondary to potential extra dose  2. Hold warfarin tonight. Then Continue warfarin 2.5 mg daily    Patient was instructed to schedule an appointment in 2 week(s) prior to leaving the clinic. Medication reconciliation was completed during the visit. There are no discontinued medications. A full discussion of the nature of anticoagulants has been carried out. A full discussion of the need for frequent and regular monitoring, precise dosage adjustment and compliance was stressed. Side effects of potential bleeding were discussed and Mr. Jaimie Castillo was instructed to call [phone] if there are any signs of abnormal bleeding. Mr. Jaimie Castillo was instructed to avoid any OTC items containing aspirin or ibuprofen and prior to starting any new OTC products to consult with his physician or pharmacist to ensure no drug interactions are present. Mr. Jaimie Castillo was instructed to avoid any major changes in his general diet and to avoid alcohol consumption.     Mr. Jaimie Castillo was provided information in the AVS that includes topics on understanding coumadin therapy, drug interaction considerations, vitamin K and coumadin use, interactions with foods and supplements containing vitamin K, and the use of herbal products. Mr. Jackie Rosario verbalized his understanding of all instructions and will call the office with any questions, concerns, or signs of abnormal bleeding or blood clot. Notifications of recommendations will be sent to Dr. Nallely Crooks MD for review. Thank you,  Blanca Brown, PharmD, BCACP, 3 e Kiran Parson in place:  Yes   Recommendation Provided To: Patient/Caregiver: 4 via In person   Intervention Detail: Adherence Monitorin, Dose Adjustment: 1, reason: Therapy De-escalation, Lab(s) Ordered and Scheduled Appointment   Gap Closed?:    Intervention Accepted By: Patient/Caregiver: 4   Time Spent (min): 20

## 2021-10-30 NOTE — PATIENT INSTRUCTIONS
Today your INR was 3.8. Your goal INR is  2.0-3.0 . You have a 1 mg, 2.5 mg , and 5 mg tablet of Coumadin (warfarin). Take Coumadin (warfarin) as follows:  Skip tonight's warfarin dose. Then continue warfarin 2.5 mg daily. Come back in   2  week(s) for your next finger stick/INR blood test.        Avoid any over the counter items containing aspirin or ibuprofen, and avoid great swings in general diet. Avoid alcohol consumption. Please notify the INR nurse if you are started on any new medication including over the counter or herbal supplements. Also, please notify your INR nurse if any of your other prescription or over the counter medications have been discontinued. Call [facility] at [phone] if you have any signs of abnormal bleeding/blood clot.  ------------------------------------------------------------------------------------------------------------------  Taking Warfarin Safely: Care Instructions    Your Care Instructions  Warfarin is a medicine that you take to prevent blood clots. It is often called a blood thinner. Doctors give warfarin (such as Coumadin) to reduce the risk of blood clots. You may be at risk for blood clots if you have atrial fibrillation or deep vein thrombosis. Some other health problems may also put you at risk. Warfarin slows the amount of time it takes for your blood to clot. It can cause bleeding problems. Even if you've been taking warfarin for a while, it's important to know how to take it safely. Foods and other medicines can affect the way warfarin works. Some can make warfarin work too well. This can cause bleeding problems. And some can make it work poorly, so that it does not prevent blood clots very well. You will need regular blood tests to check how long it takes for your blood to form a clot. This test is called a PT or prothrombin time test. The result of the test is called an INR level.  Depending on the test results, your doctor or anticoagulation clinic may adjust your dose of warfarin. Follow-up care is a key part of your treatment and safety. Be sure to make and go to all appointments, and call your doctor if you are having problems. It's also a good idea to know your test results and keep a list of the medicines you take. How can you care for yourself at home? Take warfarin safely  · Take your warfarin at the same time each day. · If you miss a dose of warfarin, don't take an extra dose to make up for it. Your doctor can tell you exactly what to do so you don't take too much or too little. · Wear medical alert jewelry that lets others know that you take warfarin. You can buy this at most drugstores. · Don't take warfarin if you are pregnant or planning to get pregnant. Talk to your doctor about how you can prevent getting pregnant while you are taking it. · Don't change your dose or stop taking warfarin unless your doctor tells you to. Effects of medicines and food on warfarin  · Don't start or stop taking any medicines, vitamins, or natural remedies unless you first talk to your doctor. Many medicines can affect how warfarin works. These include aspirin and other pain relievers, over-the-counter medicines, multivitamins, dietary supplements, and herbal products. · Tell all of your doctors and pharmacists that you take warfarin. Some prescription medicines can affect how warfarin works. · Keep the amount of vitamin K in your diet about the same from day to day. Do not suddenly eat a lot more or a lot less food that is rich in vitamin K than you usually do. Vitamin K affects how warfarin works and how your blood clots. Talk with your doctor before making big changes in your diet. Vitamin K is in many foods, such as:  ¨ Leafy greens, such as kale, cabbage, spinach, Swiss chard, and lettuce. ¨ Canola and soybean oils. ¨ Green vegetables, such as asparagus, broccoli, and [place] sprouts.   ¨ Vegetable drinks, green tea leaves, and some dietary supplement drinks. · Avoid cranberry juice and other cranberry products. They can increase the effects of warfarin. · Limit your use of alcohol. Avoid bleeding by preventing falls and injuries  · Wear slippers or shoes with nonskid soles. · Remove throw rugs and clutter. · Rearrange furniture and electrical cords to keep them out of walking paths. · Keep stairways, porches, and outside walkways well lit. Use night-lights in hallways and bathrooms. · Be extra careful when you work with sharp tools or knives. When should you call for help? Call 911 anytime you think you may need emergency care. For example, call if:  · You have a sudden, severe headache that is different from past headaches. Call your doctor now or seek immediate medical care if:  · You have any abnormal bleeding, such as:  ¨ Nosebleeds. ¨ Vaginal bleeding that is different (heavier, more frequent, at a different time of the month) than what you are used to. ¨ Bloody or black stools, or rectal bleeding. ¨ Bloody or pink urine. Watch closely for changes in your health, and be sure to contact your doctor if you have any problems. Where can you learn more? Go to http://www.gray.com/. Enter A014 in the search box to learn more about \"Taking Warfarin Safely: Care Instructions. \"  Current as of: January 27, 2016  Content Version: 11.1  © 0167-3610 [employer]. Care instructions adapted under license by [employer] (which disclaims liability or warranty for this information). If you have questions about a medical condition or this instruction, always ask your healthcare professional. [place] [address] any warranty or liability for your use of this information.

## 2021-11-03 ENCOUNTER — ANTI-COAG VISIT (OUTPATIENT)
Dept: INTERNAL MEDICINE CLINIC | Age: 86
End: 2021-11-03
Payer: MEDICARE

## 2021-11-03 VITALS
WEIGHT: 171 LBS | DIASTOLIC BLOOD PRESSURE: 82 MMHG | HEART RATE: 70 BPM | HEIGHT: 68 IN | BODY MASS INDEX: 25.91 KG/M2 | SYSTOLIC BLOOD PRESSURE: 132 MMHG

## 2021-11-03 DIAGNOSIS — I48.20 CHRONIC ATRIAL FIBRILLATION (HCC): ICD-10-CM

## 2021-11-03 DIAGNOSIS — Z95.2 H/O MECHANICAL AORTIC VALVE REPLACEMENT: ICD-10-CM

## 2021-11-03 DIAGNOSIS — I48.3 TYPICAL ATRIAL FLUTTER (HCC): ICD-10-CM

## 2021-11-03 DIAGNOSIS — Z95.2 S/P AVR (AORTIC VALVE REPLACEMENT): Primary | ICD-10-CM

## 2021-11-03 LAB
INR BLD: 3.8 (ref 1–1.5)
PT POC: 45.1 SECONDS (ref 9.1–12)
VALID INTERNAL CONTROL?: YES

## 2021-11-03 PROCEDURE — 85610 PROTHROMBIN TIME: CPT | Performed by: PHARMACIST

## 2021-11-04 ENCOUNTER — HOSPITAL ENCOUNTER (OUTPATIENT)
Dept: CT IMAGING | Age: 86
Discharge: HOME | End: 2021-11-04
Attending: UROLOGY
Payer: MEDICARE

## 2021-11-04 DIAGNOSIS — R31.0 GROSS HEMATURIA: ICD-10-CM

## 2021-11-04 LAB — CREAT BLD-MCNC: 1.7 MG/DL (ref 0.6–1.3)

## 2021-11-04 PROCEDURE — 74178 CT ABD&PLV WO CNTR FLWD CNTR: CPT

## 2021-11-04 PROCEDURE — 74011000636 HC RX REV CODE- 636: Performed by: UROLOGY

## 2021-11-04 PROCEDURE — 82565 ASSAY OF CREATININE: CPT

## 2021-11-04 RX ADMIN — IOPAMIDOL 100 ML: 755 INJECTION, SOLUTION INTRAVENOUS at 11:05

## 2021-12-12 PROCEDURE — 93295 DEV INTERROG REMOTE 1/2/MLT: CPT | Performed by: INTERNAL MEDICINE

## 2021-12-14 ENCOUNTER — OFFICE VISIT (OUTPATIENT)
Dept: CARDIOLOGY CLINIC | Age: 86
End: 2021-12-14
Payer: MEDICARE

## 2021-12-14 DIAGNOSIS — Z95.810 AUTOMATIC IMPLANTABLE CARDIAC DEFIBRILLATOR IN SITU: Primary | ICD-10-CM

## 2021-12-27 ENCOUNTER — DOCUMENTATION ONLY (OUTPATIENT)
Dept: CARDIOLOGY CLINIC | Age: 86
End: 2021-12-27

## 2021-12-27 NOTE — PROGRESS NOTES
BIV pacing was 87% due to AF with occasional RVR  Will check in march again before discussing ablation   Future Appointments   Date Time Provider Lexie Moreno   3/16/2022  1:45 PM REMOTE1, [facility]ER [facility]B

## 2022-03-15 ENCOUNTER — TELEPHONE (OUTPATIENT)
Dept: CARDIOLOGY CLINIC | Age: 87
End: 2022-03-15

## 2022-03-15 NOTE — TELEPHONE ENCOUNTER
Verified patient with two types of identifiers. Spoke with wife Rosa Bermudez, listed on PHI release regarding need to schedule follow up appt. Last office visit was missed. Scheduled both patients on same day per their request.  Ms. Mary Iraheta verbalized understanding and will call with any other questions.         Future Appointments   Date Time Provider Lexie Moreno   3/16/2022  1:45 PM REMOTE1, [facility]   7/28/2022  1:20 PM PACEMAKER3, [facility]   7/28/2022  1:40 PM MD MARIBETH Mckeon AMB

## 2022-03-16 ENCOUNTER — OFFICE VISIT (OUTPATIENT)
Dept: CARDIOLOGY CLINIC | Age: 87
End: 2022-03-16
Payer: MEDICARE

## 2022-03-16 DIAGNOSIS — Z95.810 AUTOMATIC IMPLANTABLE CARDIAC DEFIBRILLATOR IN SITU: Primary | ICD-10-CM

## 2022-03-16 NOTE — LETTER
3/16/2022 12:16 PM    Mr. Anne-Marie Yadav [phone]        This letter confirms that we have received your scheduled remote check of your implanted     device on 3-16-22  . Our EP team will contact you via phone if there are significant abnormal    findings. Your next in-clinic device check is scheduled for 7-28-22 at 1:20 PM .                   If you have any questions, please call [address] [place] [address] at [phone].                Sincerely,    Luevenia Epley, MD [facility]

## 2022-03-17 ENCOUNTER — DOCUMENTATION ONLY (OUTPATIENT)
Dept: INTERNAL MEDICINE CLINIC | Age: 87
End: 2022-03-17

## 2022-03-17 ENCOUNTER — TELEPHONE (OUTPATIENT)
Dept: INTERNAL MEDICINE CLINIC | Age: 87
End: 2022-03-17

## 2022-03-17 NOTE — PROGRESS NOTES
Pharmacy Progress Note     Mr. Yung Fragoso 80 y.o. is overdue for INR follow up. Last visit was in November with multiple no shows. Thank you for the consult,  Blanca Hall, PharmD, BCACP, [address] [place] [address] in place:  Yes   Time Spent (min): 5

## 2022-03-17 NOTE — TELEPHONE ENCOUNTER
Tried reaching patient to schedule INR appt with [facility]. Patients number is out of service.  Sending letter

## 2022-03-18 DIAGNOSIS — I48.20 CHRONIC ATRIAL FIBRILLATION (HCC): ICD-10-CM

## 2022-03-18 PROBLEM — F33.0 DEPRESSION, MAJOR, RECURRENT, MILD (HCC): Status: ACTIVE | Noted: 2020-03-10

## 2022-03-18 PROBLEM — Z95.810 BIVENTRICULAR ICD (IMPLANTABLE CARDIOVERTER-DEFIBRILLATOR) IN PLACE: Status: ACTIVE | Noted: 2019-03-04

## 2022-03-18 PROCEDURE — 93295 DEV INTERROG REMOTE 1/2/MLT: CPT | Performed by: INTERNAL MEDICINE

## 2022-03-18 RX ORDER — WARFARIN 2.5 MG/1
TABLET ORAL
Qty: 90 TABLET | Refills: 1 | Status: SHIPPED | OUTPATIENT
Start: 2022-03-18 | End: 2022-08-18

## 2022-03-19 PROBLEM — E78.5 DYSLIPIDEMIA: Status: ACTIVE | Noted: 2019-04-07

## 2022-03-19 PROBLEM — I49.3 PVC (PREMATURE VENTRICULAR CONTRACTION): Status: ACTIVE | Noted: 2019-04-07

## 2022-03-19 PROBLEM — I48.91 A-FIB (HCC): Status: ACTIVE | Noted: 2019-03-01

## 2022-03-19 PROBLEM — I35.0 SEVERE AORTIC STENOSIS: Status: ACTIVE | Noted: 2019-04-07

## 2022-03-19 PROBLEM — I50.22 CHRONIC SYSTOLIC CONGESTIVE HEART FAILURE (HCC): Status: ACTIVE | Noted: 2019-04-07

## 2022-03-19 PROBLEM — Z95.0 HX OF CARDIAC PACEMAKER: Status: ACTIVE | Noted: 2019-12-06

## 2022-03-19 PROBLEM — Z95.1 S/P CABG (CORONARY ARTERY BYPASS GRAFT): Status: ACTIVE | Noted: 2019-04-07

## 2022-03-19 PROBLEM — I25.110 CORONARY ARTERY DISEASE INVOLVING NATIVE CORONARY ARTERY OF NATIVE HEART WITH UNSTABLE ANGINA PECTORIS (HCC): Status: ACTIVE | Noted: 2019-04-07

## 2022-03-19 PROBLEM — Z98.890 STATUS POST CATHETER ABLATION OF ATRIAL FLUTTER: Status: ACTIVE | Noted: 2019-03-04

## 2022-03-19 PROBLEM — I10 HYPERTENSION, ESSENTIAL: Status: ACTIVE | Noted: 2019-04-07

## 2022-03-19 PROBLEM — D64.9 ANEMIA: Status: ACTIVE | Noted: 2018-10-09

## 2022-03-19 PROBLEM — T82.898A BLOCKAGE OF CORONARY ARTERY BYPASS VEIN GRAFT (HCC): Status: ACTIVE | Noted: 2019-04-07

## 2022-03-19 PROBLEM — R06.09 DOE (DYSPNEA ON EXERTION): Status: ACTIVE | Noted: 2019-04-07

## 2022-03-20 PROBLEM — E03.9 HYPOTHYROIDISM: Status: ACTIVE | Noted: 2018-10-09

## 2022-03-20 PROBLEM — T82.120A DISPLACEMENT OF ELECTRODE LEAD OF CARDIAC PACEMAKER: Status: ACTIVE | Noted: 2019-12-06

## 2022-03-20 PROBLEM — R79.1 SUPRATHERAPEUTIC INR: Status: ACTIVE | Noted: 2018-10-09

## 2022-03-20 PROBLEM — Z95.2 H/O MECHANICAL AORTIC VALVE REPLACEMENT: Status: ACTIVE | Noted: 2018-10-09

## 2022-03-20 PROBLEM — I48.3 TYPICAL ATRIAL FLUTTER (HCC): Status: ACTIVE | Noted: 2019-03-04

## 2022-03-25 NOTE — TELEPHONE ENCOUNTER
Letter sent on 3/17 to schedule an INR appt with [facility]

## 2022-04-24 NOTE — PATIENT INSTRUCTIONS
Today your INR was 1.8. Your goal INR is  2.0-3.0 . You have a  1 mg, 2.5 mg, and 5 mg tablet of Coumadin (warfarin). Take Coumadin (warfarin) as follows: Take 5 mg tonight. Then continue warfarin 2.5 mg daily. Come back in 2  week(s) for your next finger stick/INR blood test.      Avoid any over the counter items containing aspirin or ibuprofen, and avoid great swings in general diet. Avoid alcohol consumption. Please notify the INR nurse if you are started on any new medication including over the counter or herbal supplements. Also, please notify your INR nurse if any of your other prescription or over the counter medications have been discontinued. Call [facility] at [phone] if you have any signs of abnormal bleeding/blood clot.  ------------------------------------------------------------------------------------------------------------------  Taking Warfarin Safely: Care Instructions    Your Care Instructions  Warfarin is a medicine that you take to prevent blood clots. It is often called a blood thinner. Doctors give warfarin (such as Coumadin) to reduce the risk of blood clots. You may be at risk for blood clots if you have atrial fibrillation or deep vein thrombosis. Some other health problems may also put you at risk. Warfarin slows the amount of time it takes for your blood to clot. It can cause bleeding problems. Even if you've been taking warfarin for a while, it's important to know how to take it safely. Foods and other medicines can affect the way warfarin works. Some can make warfarin work too well. This can cause bleeding problems. And some can make it work poorly, so that it does not prevent blood clots very well. You will need regular blood tests to check how long it takes for your blood to form a clot. This test is called a PT or prothrombin time test. The result of the test is called an INR level.  Depending on the test results, your doctor or anticoagulation clinic may adjust your dose of warfarin. Follow-up care is a key part of your treatment and safety. Be sure to make and go to all appointments, and call your doctor if you are having problems. It's also a good idea to know your test results and keep a list of the medicines you take. How can you care for yourself at home? Take warfarin safely  · Take your warfarin at the same time each day. · If you miss a dose of warfarin, don't take an extra dose to make up for it. Your doctor can tell you exactly what to do so you don't take too much or too little. · Wear medical alert jewelry that lets others know that you take warfarin. You can buy this at most drugstores. · Don't take warfarin if you are pregnant or planning to get pregnant. Talk to your doctor about how you can prevent getting pregnant while you are taking it. · Don't change your dose or stop taking warfarin unless your doctor tells you to. Effects of medicines and food on warfarin  · Don't start or stop taking any medicines, vitamins, or natural remedies unless you first talk to your doctor. Many medicines can affect how warfarin works. These include aspirin and other pain relievers, over-the-counter medicines, multivitamins, dietary supplements, and herbal products. · Tell all of your doctors and pharmacists that you take warfarin. Some prescription medicines can affect how warfarin works. · Keep the amount of vitamin K in your diet about the same from day to day. Do not suddenly eat a lot more or a lot less food that is rich in vitamin K than you usually do. Vitamin K affects how warfarin works and how your blood clots. Talk with your doctor before making big changes in your diet. Vitamin K is in many foods, such as:  ¨ Leafy greens, such as kale, cabbage, spinach, Swiss chard, and lettuce. ¨ Canola and soybean oils. ¨ Green vegetables, such as asparagus, broccoli, and [place] sprouts.   ¨ Vegetable drinks, green tea leaves, and some dietary supplement drinks. · Avoid cranberry juice and other cranberry products. They can increase the effects of warfarin. · Limit your use of alcohol. Avoid bleeding by preventing falls and injuries  · Wear slippers or shoes with nonskid soles. · Remove throw rugs and clutter. · Rearrange furniture and electrical cords to keep them out of walking paths. · Keep stairways, porches, and outside walkways well lit. Use night-lights in hallways and bathrooms. · Be extra careful when you work with sharp tools or knives. When should you call for help? Call 911 anytime you think you may need emergency care. For example, call if:  · You have a sudden, severe headache that is different from past headaches. Call your doctor now or seek immediate medical care if:  · You have any abnormal bleeding, such as:  ¨ Nosebleeds. ¨ Vaginal bleeding that is different (heavier, more frequent, at a different time of the month) than what you are used to. ¨ Bloody or black stools, or rectal bleeding. ¨ Bloody or pink urine. Watch closely for changes in your health, and be sure to contact your doctor if you have any problems. Where can you learn more? Go to http://www.gray.com/. Enter C328 in the search box to learn more about \"Taking Warfarin Safely: Care Instructions. \"  Current as of: January 27, 2016  Content Version: 11.1  © 2540-7561 [employer], [employer]. Care instructions adapted under license by [employer] (which disclaims liability or warranty for this information). If you have questions about a medical condition or this instruction, always ask your healthcare professional. [place] [address] any warranty or liability for your use of this information.

## 2022-04-24 NOTE — PROGRESS NOTES
Pharmacy Progress Note - Anticoagulation Management    S/O: Mr. Puneet Casey  is a 87 y. o. male, referred by Tima Mooney MD, was seen today for anticoagulation management for the diagnosis of Atrial Fibrillation and Aortic Valve Replacement  ([facility] -1996), s/p pacemaker.  Pt is very hard of hearing.     Last seen in clinic November 2021    Interim history:  Reports he saw Dr Luke Vaughn ([facility]) earlier today. Had labs completed. Reports \"things have been busy\"      · Warfarin start date: ~1996  · INR Goal:  2.0-3.0    · Current warfarin regimen:   2.5 mg daily                  · Warfarin tablet strength:   1 mg, 2.5 mg, 5 mg  -- wife states she does not want to split tablets in half ; generally takes 2.5 mg tab. Able to recognize tablet strength  · Duration of therapy: indefinite  · Takes warfarin in the evening    Today's pertinent positives includes:  No significant changes since last visit     Denies CP/SOB/LE & UE Pain/HA  Denies ED/hospitalization/med changes     Results for orders placed or performed in visit on 04/25/22   AMB POC PT/INR   Result Value Ref Range    VALID INTERNAL CONTROL POC Yes     Prothrombin time (POC) 21.7 (A) 9.1 - 12 seconds    INR POC 1.8 (A) 1 - 1.5     · Adherence:   · Able to recall regimen? YES  · Miss/extra dose? NO  · Need refill?  NO    Upcoming procedure(s):  NO    Past Medical History:   Diagnosis Date    Acute on chronic systolic heart failure, NYHA class 4 ([facility] [address] 03/01/2019    admit March 2019 7 days, EF 10% got Primacor, BIV implant    Anger     TAKES PAROXETINE TO CONTROL ANGER ISSUES    Arthritis     Burning with urination     CAD (coronary artery disease) 1996    CABG 1996, mukul celis 2015 fixed inferior defect    Cancer [facility])     prostate    Chronic atrial fibrillation ([facility] [address] 03/2019    RVR3/2019, had ablation of flutter- persistent    Foot drop, left     WEARS METAL BRACE    Gout     Hard of hearing     High cholesterol     Hypertension     Long term current use of anticoagulant therapy     Prostate enlargement     Sleep apnea     Thyroid disease     Unspecified sleep apnea     DOESN'T USE CPAP; \"IT MADE HIM SICK-COLDS, SINUS\", PER WIFE     No Known Allergies  Current Outpatient Medications   Medication Sig    warfarin (COUMADIN) 2.5 mg tablet TAKE 1 TABLET EVERY DAY - NEEDS INR CHECK    carvediloL (COREG) 12.5 mg tablet Take 1 Tablet by mouth two (2) times daily (with meals).  atorvastatin (LIPITOR) 40 mg tablet Take 1 Tablet by mouth nightly.  sacubitriL-valsartan (Entresto) 24-26 mg tablet Take 1 Tablet by mouth every twelve (12) hours.  allopurinoL (ZYLOPRIM) 300 mg tablet TAKE 1 TABLET EVERY DAY TO PREVENT GOUT    PARoxetine (PAXIL) 20 mg tablet TAKE 1 TABLET EVERY DAY    levothyroxine (SYNTHROID) 150 mcg tablet TAKE 1 TABLET EVERY MORNING FOR THYROID    oxybutynin chloride XL (DITROPAN XL) 10 mg CR tablet Take 10 mg by mouth daily.  spironolactone (ALDACTONE) 25 mg tablet Take 25 mg by mouth daily.  omega 3-dha-epa-fish oil (FISH OIL) 100-160-1,000 mg cap Take 1 Cap by mouth daily.  psyllium (METAMUCIL) 0.52 gram capsule Take 1 Cap by mouth daily.  b complex vitamins (B COMPLEX 1) tablet Take 1 Tab by mouth daily.  CYANOCOBALAMIN, VITAMIN B-12, PO Take 1 Tab by mouth daily. Sciatica    aspirin delayed-release 81 mg tablet Take 81 mg by mouth daily.  therapeutic multivitamin (THERAGRAN) tablet Take 1 Tab by mouth daily.  ascorbic acid, vitamin C, (VITAMIN C) 500 mg tablet Take 1,000 mg by mouth daily.  B.infantis-B.ani-B.long-B.bifi (PROBIOTIC 4X) 10-15 mg TbEC Take 1 Tab by mouth daily.  COQ10, UBIQUINOL, PO Take 1 Tab by mouth daily.  finasteride (PROSCAR) 5 mg tablet Take 5 mg by mouth daily.  tamsulosin (FLOMAX) 0.4 mg capsule Take 0.4 mg by mouth nightly.  FERROUS GLUCONATE PO Take 325 mg by mouth daily.  furosemide (LASIX) 20 mg tablet Take 1 Tab by mouth daily.  (Patient taking differently: Take 20 mg by mouth daily. Per patient take 1 pill base on swelling)     No current facility-administered medications for this visit. Wt Readings from Last 3 Encounters:   04/25/22 171 lb 9.6 oz (77.8 kg)   11/03/21 171 lb (77.6 kg)   10/25/21 166 lb (75.3 kg)     BP Readings from Last 3 Encounters:   04/25/22 138/84   11/03/21 132/82   07/13/21 124/72     Pulse Readings from Last 3 Encounters:   04/25/22 82   11/03/21 70   07/13/21 72     Lab Results   Component Value Date/Time    WBC 5.7 08/09/2020 08:14 PM    HGB 13.2 08/09/2020 08:14 PM    HCT 39.3 08/09/2020 08:14 PM    PLATELET 950 (L) 20/21/7618 08:14 PM    MCV 98.0 08/09/2020 08:14 PM     Lab Results   Component Value Date/Time    Sodium 143 05/13/2021 12:25 PM    Potassium 3.8 05/13/2021 12:25 PM    Chloride 111 (H) 05/13/2021 12:25 PM    CO2 26 05/13/2021 12:25 PM    Anion gap 6 05/13/2021 12:25 PM    Glucose 95 05/13/2021 12:25 PM    BUN 19 05/13/2021 12:25 PM    Creatinine 1.05 05/13/2021 12:25 PM    BUN/Creatinine ratio 18 05/13/2021 12:25 PM    GFR est AA >60 05/13/2021 12:25 PM    GFR est non-AA >60 05/13/2021 12:25 PM    Calcium 8.7 05/13/2021 12:25 PM    Bilirubin, total 0.7 05/13/2021 12:25 PM    Alk. phosphatase 122 (H) 05/13/2021 12:25 PM    Protein, total 6.6 05/13/2021 12:25 PM    Albumin 3.9 05/13/2021 12:25 PM    Globulin 2.7 05/13/2021 12:25 PM    A-G Ratio 1.4 05/13/2021 12:25 PM    ALT (SGPT) 41 05/13/2021 12:25 PM     CrCl cannot be calculated (Patient's most recent lab result is older than the maximum 180 days allowed.).       INR History:   (normal INR range 0.8-1.2)     Date   INR   PT   Dose/Comments  04/25/22 1.8  21.7 Hold x 1, then 2.5 mg daily  Lapse in INR clinic  11/03/21          3.8                   45.1     5 mg x 1, then 2.5 mg daily ; (+) extra dose  10/25/21          1.9                   22.4     5 mg x 1, then 2.5 mg daily ; (+) hematuria  08/25/21          1.7                   20.1     2.5 mg daily  07/13/21          2.4                   28. 5     5 mg x 1, then 2.5 mg daily   06/30/21          1.7                   20.1     2.5 mg daily; (+) missed dose  05/19/21          2.3                   27.8     2.5 mg daily  04/14/21          2.3                   27.4     2.5 mg daily  03/10/21          2.1                   25.5     2.5 mg daily  02/10/21          2.3                   27.9     2.5 mg daily  01/20/21          2.7                   32. 3     5 mg x 2, then 2.5 mg daily  01/06/21          1.6                   19.5     5 mg x 1, then 2.5 mg daily     A/P:       Anticoagulation:  Considering Mr. Demarco Jackson past history, todays findings, and per Anticoagulation Collaborative Practice Agreement/Protocol:    1. Fingerstick POC INR (1.8) is subtherapeutic for INR goal today. 2. Take 5 mg tonight. Then Continue warfarin 2.5 mg daily. 3. Will request for lab report. Due for PCP follow up    Patient was instructed to schedule an appointment in 2 week(s) prior to leaving the clinic. Medication reconciliation was completed during the visit. There are no discontinued medications. A full discussion of the nature of anticoagulants has been carried out. A full discussion of the need for frequent and regular monitoring, precise dosage adjustment and compliance was stressed. Side effects of potential bleeding were discussed and Mr. Florecnio William was instructed to call [phone] if there are any signs of abnormal bleeding. Mr. Florencio William was instructed to avoid any OTC items containing aspirin or ibuprofen and prior to starting any new OTC products to consult with his physician or pharmacist to ensure no drug interactions are present. Mr. Florencio William was instructed to avoid any major changes in his general diet and to avoid alcohol consumption.     Mr. Florencio William was provided information in the AVS that includes topics on understanding coumadin therapy, drug interaction considerations, vitamin K and coumadin use, interactions with foods and supplements containing vitamin K, and the use of herbal products. Mr. Claude Bunkers verbalized his understanding of all instructions and will call the office with any questions, concerns, or signs of abnormal bleeding or blood clot. Notifications of recommendations will be sent to Dr. Rufus Granado MD for review. Thank you,  Blanca Santizo, PharmD, BCACP, [address] [facility] in place:  Yes   Recommendation Provided To: Patient/Caregiver: 3 via In person   Intervention Detail: Dose Adjustment: 1, reason: Therapy Optimization, Lab(s) Ordered and Scheduled Appointment   Intervention Accepted By: Patient/Caregiver: 3   Time Spent (min): 20

## 2022-04-25 ENCOUNTER — ANTI-COAG VISIT (OUTPATIENT)
Dept: INTERNAL MEDICINE CLINIC | Age: 87
End: 2022-04-25
Payer: MEDICARE

## 2022-04-25 VITALS
WEIGHT: 171.6 LBS | DIASTOLIC BLOOD PRESSURE: 84 MMHG | BODY MASS INDEX: 26.01 KG/M2 | SYSTOLIC BLOOD PRESSURE: 138 MMHG | HEIGHT: 68 IN | HEART RATE: 82 BPM

## 2022-04-25 DIAGNOSIS — I48.3 TYPICAL ATRIAL FLUTTER (HCC): ICD-10-CM

## 2022-04-25 DIAGNOSIS — I48.20 CHRONIC ATRIAL FIBRILLATION (HCC): Primary | ICD-10-CM

## 2022-04-25 DIAGNOSIS — Z95.2 S/P AVR (AORTIC VALVE REPLACEMENT): ICD-10-CM

## 2022-04-25 DIAGNOSIS — Z95.2 H/O MECHANICAL AORTIC VALVE REPLACEMENT: ICD-10-CM

## 2022-04-25 LAB
INR BLD: 1.8 (ref 1–1.5)
PT POC: 21.7 SECONDS (ref 9.1–12)
VALID INTERNAL CONTROL?: YES

## 2022-04-25 PROCEDURE — 85610 PROTHROMBIN TIME: CPT | Performed by: PHARMACIST

## 2022-04-25 PROCEDURE — G0463 HOSPITAL OUTPT CLINIC VISIT: HCPCS | Performed by: PHARMACIST

## 2022-05-09 ENCOUNTER — TELEPHONE (OUTPATIENT)
Dept: INTERNAL MEDICINE CLINIC | Age: 87
End: 2022-05-09

## 2022-05-09 DIAGNOSIS — I48.20 CHRONIC ATRIAL FIBRILLATION (HCC): ICD-10-CM

## 2022-05-09 DIAGNOSIS — Z95.2 S/P AVR (AORTIC VALVE REPLACEMENT): Primary | ICD-10-CM

## 2022-05-09 NOTE — PROGRESS NOTES
Pharmacy Progress Note     Entry for Mr. Patrice Bookerded 80 y.o. 's lab. Last INR was subtherapeutic. Patient rescheduled in office f/u for 5/23/22. Ordering labs to be completed later this week at patient's convenience. Orders Placed This Encounter    PROTHROMBIN TIME + INR     Standing Status:   Future     Standing Expiration Date:   5/9/2023    CBC W/O DIFF     Standing Status:   Future     Standing Expiration Date:   7/8/3458    METABOLIC PANEL, COMPREHENSIVE     Standing Status:   Future     Standing Expiration Date:   5/9/2023           Thank you for the consult,  Blanca Carter, PharmD, BCACP, [address] in place:  Yes   Recommendation Provided To: Patient/Caregiver: 1 via Telephone   Intervention Detail: Lab(s) Ordered   Gap Closed?:    Intervention Accepted By: Patient/Caregiver: 1   Time Spent (min): 5

## 2022-05-09 NOTE — TELEPHONE ENCOUNTER
LM for pt to return call. Delphine Spain requests pt come in this week to have labwork done prior to 5/23 appointment.

## 2022-05-19 NOTE — PATIENT INSTRUCTIONS
Today your INR was 1.4. Your goal INR is  2.0-3.0 . You have a 1 mg , 2.5 mg , 5 mg tablet of Coumadin (warfarin). Take Coumadin (warfarin) as follows: Take 5 mg tonight and Wednesday instead of 2.5 mg. Then continue warfarin 2.5 mg daily. Come back in 1  week(s) for your next finger stick/INR blood test.        Avoid any over the counter items containing aspirin or ibuprofen, and avoid great swings in general diet. Avoid alcohol consumption. Please notify the INR nurse if you are started on any new medication including over the counter or herbal supplements. Also, please notify your INR nurse if any of your other prescription or over the counter medications have been discontinued. Call [facility] at [phone] if you have any signs of abnormal bleeding/blood clot.  ------------------------------------------------------------------------------------------------------------------  Taking Warfarin Safely: Care Instructions    Your Care Instructions  Warfarin is a medicine that you take to prevent blood clots. It is often called a blood thinner. Doctors give warfarin (such as Coumadin) to reduce the risk of blood clots. You may be at risk for blood clots if you have atrial fibrillation or deep vein thrombosis. Some other health problems may also put you at risk. Warfarin slows the amount of time it takes for your blood to clot. It can cause bleeding problems. Even if you've been taking warfarin for a while, it's important to know how to take it safely. Foods and other medicines can affect the way warfarin works. Some can make warfarin work too well. This can cause bleeding problems. And some can make it work poorly, so that it does not prevent blood clots very well. You will need regular blood tests to check how long it takes for your blood to form a clot. This test is called a PT or prothrombin time test. The result of the test is called an INR level.  Depending on the test results, your doctor or anticoagulation clinic may adjust your dose of warfarin. Follow-up care is a key part of your treatment and safety. Be sure to make and go to all appointments, and call your doctor if you are having problems. It's also a good idea to know your test results and keep a list of the medicines you take. How can you care for yourself at home? Take warfarin safely  · Take your warfarin at the same time each day. · If you miss a dose of warfarin, don't take an extra dose to make up for it. Your doctor can tell you exactly what to do so you don't take too much or too little. · Wear medical alert jewelry that lets others know that you take warfarin. You can buy this at most drugstores. · Don't take warfarin if you are pregnant or planning to get pregnant. Talk to your doctor about how you can prevent getting pregnant while you are taking it. · Don't change your dose or stop taking warfarin unless your doctor tells you to. Effects of medicines and food on warfarin  · Don't start or stop taking any medicines, vitamins, or natural remedies unless you first talk to your doctor. Many medicines can affect how warfarin works. These include aspirin and other pain relievers, over-the-counter medicines, multivitamins, dietary supplements, and herbal products. · Tell all of your doctors and pharmacists that you take warfarin. Some prescription medicines can affect how warfarin works. · Keep the amount of vitamin K in your diet about the same from day to day. Do not suddenly eat a lot more or a lot less food that is rich in vitamin K than you usually do. Vitamin K affects how warfarin works and how your blood clots. Talk with your doctor before making big changes in your diet. Vitamin K is in many foods, such as:  ¨ Leafy greens, such as kale, cabbage, spinach, Swiss chard, and lettuce. ¨ Canola and soybean oils. ¨ Green vegetables, such as asparagus, broccoli, and Laura sprouts.   ¨ Vegetable drinks, green tea leaves, and some dietary supplement drinks. · Avoid cranberry juice and other cranberry products. They can increase the effects of warfarin. · Limit your use of alcohol. Avoid bleeding by preventing falls and injuries  · Wear slippers or shoes with nonskid soles. · Remove throw rugs and clutter. · Rearrange furniture and electrical cords to keep them out of walking paths. · Keep stairways, porches, and outside walkways well lit. Use night-lights in hallways and bathrooms. · Be extra careful when you work with sharp tools or knives. When should you call for help? Call 911 anytime you think you may need emergency care. For example, call if:  · You have a sudden, severe headache that is different from past headaches. Call your doctor now or seek immediate medical care if:  · You have any abnormal bleeding, such as:  ¨ Nosebleeds. ¨ Vaginal bleeding that is different (heavier, more frequent, at a different time of the month) than what you are used to. ¨ Bloody or black stools, or rectal bleeding. ¨ Bloody or pink urine. Watch closely for changes in your health, and be sure to contact your doctor if you have any problems. Where can you learn more? Go to http://www.gray.com/. Enter K516 in the search box to learn more about \"Taking Warfarin Safely: Care Instructions. \"  Current as of: January 27, 2016  Content Version: 11.1  © 5859-1576 [employer]. Care instructions adapted under license by [employer] (which disclaims liability or warranty for this information). If you have questions about a medical condition or this instruction, always ask your healthcare professional. [place] [address] any warranty or liability for your use of this information.

## 2022-05-19 NOTE — PROGRESS NOTES
Pharmacy Progress Note - Anticoagulation Management    S/O: Mr. Puneet Casey  is a 87 y. o. male, referred by Denny Morales MD, was seen today for anticoagulation management for the diagnosis of Atrial Fibrillation and Aortic Valve Replacement  ([facility] -1996), s/p pacemaker.  Pt is very hard of hearing.     · Warfarin start date: ~1996  · INR Goal:  2.0-3.0    · Current warfarin regimen:   5 mg x 1, then 2.5 mg daily                  · Warfarin tablet strength:   1 mg, 2.5 mg, 5 mg  -- wife states she does not want to split tablets in half ; generally takes 2.5 mg tab. Able to recognize tablet strength  · Duration of therapy: indefinite  · Takes warfarin in the evening    Today's pertinent positives includes:  No significant changes since last visit    Results for orders placed or performed in visit on 05/23/22   AMB POC PT/INR   Result Value Ref Range    VALID INTERNAL CONTROL POC Yes     Prothrombin time (POC) 16.7 (A) 9.1 - 12 seconds    INR POC 1.4 1 - 1.5     · Adherence:   · Able to recall regimen? YES  · Miss/extra dose? NO  · Need refill?  NO    Upcoming procedure(s):  NO      Past Medical History:   Diagnosis Date    Acute on chronic systolic heart failure, NYHA class 4 ([facility] [address] 03/01/2019    admit March 2019 7 days, EF 10% got Primacor, BIV implant    Anger     TAKES PAROXETINE TO CONTROL ANGER ISSUES    Arthritis     Burning with urination     CAD (coronary artery disease) 1996    CABG 1996, fu vimal 2015 fixed inferior defect    Cancer [facility])     prostate    Chronic atrial fibrillation ([facility] [address] 03/2019    RVR3/2019, had ablation of flutter- persistent    Foot drop, left     WEARS METAL BRACE    Gout     Hard of hearing     High cholesterol     Hypertension     Long term current use of anticoagulant therapy     Prostate enlargement     Sleep apnea     Thyroid disease     Unspecified sleep apnea     DOESN'T USE CPAP; \"IT MADE HIM SICK-COLDS, SINUS\", PER WIFE     No Known Allergies  Current Outpatient Medications   Medication Sig    warfarin (COUMADIN) 2.5 mg tablet TAKE 1 TABLET EVERY DAY - NEEDS INR CHECK    carvediloL (COREG) 12.5 mg tablet Take 1 Tablet by mouth two (2) times daily (with meals).  atorvastatin (LIPITOR) 40 mg tablet Take 1 Tablet by mouth nightly.  sacubitriL-valsartan (Entresto) 24-26 mg tablet Take 1 Tablet by mouth every twelve (12) hours.  allopurinoL (ZYLOPRIM) 300 mg tablet TAKE 1 TABLET EVERY DAY TO PREVENT GOUT    PARoxetine (PAXIL) 20 mg tablet TAKE 1 TABLET EVERY DAY    levothyroxine (SYNTHROID) 150 mcg tablet TAKE 1 TABLET EVERY MORNING FOR THYROID    oxybutynin chloride XL (DITROPAN XL) 10 mg CR tablet Take 10 mg by mouth daily.  spironolactone (ALDACTONE) 25 mg tablet Take 25 mg by mouth daily.  omega 3-dha-epa-fish oil (FISH OIL) 100-160-1,000 mg cap Take 1 Cap by mouth daily.  psyllium (METAMUCIL) 0.52 gram capsule Take 1 Cap by mouth daily.  b complex vitamins (B COMPLEX 1) tablet Take 1 Tab by mouth daily.  CYANOCOBALAMIN, VITAMIN B-12, PO Take 1 Tab by mouth daily. Sciatica    aspirin delayed-release 81 mg tablet Take 81 mg by mouth daily.  therapeutic multivitamin (THERAGRAN) tablet Take 1 Tab by mouth daily.  ascorbic acid, vitamin C, (VITAMIN C) 500 mg tablet Take 1,000 mg by mouth daily.  B.infantis-B.ani-B.long-B.bifi (PROBIOTIC 4X) 10-15 mg TbEC Take 1 Tab by mouth daily.  COQ10, UBIQUINOL, PO Take 1 Tab by mouth daily.  finasteride (PROSCAR) 5 mg tablet Take 5 mg by mouth daily.  tamsulosin (FLOMAX) 0.4 mg capsule Take 0.4 mg by mouth nightly.  FERROUS GLUCONATE PO Take 325 mg by mouth daily.  furosemide (LASIX) 20 mg tablet Take 1 Tab by mouth daily. (Patient taking differently: Take 20 mg by mouth daily. Per patient take 1 pill base on swelling)     No current facility-administered medications for this visit.      Wt Readings from Last 3 Encounters:   05/23/22 170 lb 6.4 oz (77.3 kg) 04/25/22 171 lb 9.6 oz (77.8 kg)   11/03/21 171 lb (77.6 kg)     BP Readings from Last 3 Encounters:   04/25/22 138/84   11/03/21 132/82   07/13/21 124/72     Pulse Readings from Last 3 Encounters:   04/25/22 82   11/03/21 70   07/13/21 72     Lab Results   Component Value Date/Time    WBC 5.7 08/09/2020 08:14 PM    HGB 13.2 08/09/2020 08:14 PM    HCT 39.3 08/09/2020 08:14 PM    PLATELET 573 (L) 32/47/4357 08:14 PM    MCV 98.0 08/09/2020 08:14 PM     Lab Results   Component Value Date/Time    Sodium 143 05/13/2021 12:25 PM    Potassium 3.8 05/13/2021 12:25 PM    Chloride 111 (H) 05/13/2021 12:25 PM    CO2 26 05/13/2021 12:25 PM    Anion gap 6 05/13/2021 12:25 PM    Glucose 95 05/13/2021 12:25 PM    BUN 19 05/13/2021 12:25 PM    Creatinine 1.05 05/13/2021 12:25 PM    BUN/Creatinine ratio 18 05/13/2021 12:25 PM    GFR est AA >60 05/13/2021 12:25 PM    GFR est non-AA >60 05/13/2021 12:25 PM    Calcium 8.7 05/13/2021 12:25 PM    Bilirubin, total 0.7 05/13/2021 12:25 PM    Alk. phosphatase 122 (H) 05/13/2021 12:25 PM    Protein, total 6.6 05/13/2021 12:25 PM    Albumin 3.9 05/13/2021 12:25 PM    Globulin 2.7 05/13/2021 12:25 PM    A-G Ratio 1.4 05/13/2021 12:25 PM    ALT (SGPT) 41 05/13/2021 12:25 PM     CrCl cannot be calculated (Patient's most recent lab result is older than the maximum 180 days allowed.). INR History:   (normal INR range 0.8-1.2)     Date   INR   PT   Dose/Comments  05/23/22 1.4  16.7 5 mg x 1, then 2.5 mg daily  04/25/22          1.8                   21.7     Hold x 1, then 2.5 mg daily  Lapse in INR clinic  11/03/21          3.8                   45.1     5 mg x 1, then 2.5 mg daily ; (+) extra dose  10/25/21          1.9                   22.4     5 mg x 1, then 2.5 mg daily ; (+) hematuria  08/25/21          1.7                   20.1     2.5 mg daily  07/13/21          2.4                   28. 5     5 mg x 1, then 2.5 mg daily   06/30/21          1.7                   20.1     2.5 mg daily; (+) missed dose  05/19/21          2.3                   27.8     2.5 mg daily  04/14/21          2.3                   27.4     2.5 mg daily  03/10/21          2.1                   25.5     2.5 mg daily  02/10/21          2.3                   27.9     2.5 mg daily  01/20/21          2.7                   32. 3     5 mg x 2, then 2.5 mg daily  01/06/21          1.6                   19.5     5 mg x 1, then 2.5 mg daily     A/P:       Anticoagulation:  Considering Mr. Landon Caldera past history, todays findings, and per Anticoagulation Collaborative Practice Agreement/Protocol:    1. Fingerstick POC INR (1.4) is subtherapeutic for INR goal today. 2. Take 5 mg and Wednesday instead of 2.5 mg. Then Continue warfarin 2.5 mg daily    Patient was instructed to schedule an appointment in 1 week(s) prior to leaving the clinic. Medication reconciliation was completed during the visit. There are no discontinued medications. A full discussion of the nature of anticoagulants has been carried out. A full discussion of the need for frequent and regular monitoring, precise dosage adjustment and compliance was stressed. Side effects of potential bleeding were discussed and Mr. Jose De Jesus Gonzalez was instructed to call [phone] if there are any signs of abnormal bleeding. Mr. Jose De Jesus Gonzalez was instructed to avoid any OTC items containing aspirin or ibuprofen and prior to starting any new OTC products to consult with his physician or pharmacist to ensure no drug interactions are present. Mr. Jose De Jesus Gonzalez was instructed to avoid any major changes in his general diet and to avoid alcohol consumption. Mr. Jose De Jesus Gonzalez was provided information in the AVS that includes topics on understanding coumadin therapy, drug interaction considerations, vitamin K and coumadin use, interactions with foods and supplements containing vitamin K, and the use of herbal products.     Mr. Jose De Jesus Gonzalez verbalized his understanding of all instructions and will call the office with any questions, concerns, or signs of abnormal bleeding or blood clot. Notifications of recommendations will be sent to Dr. Jose Cabrera MD for review. Thank you,  Blanca Conley, PharmD, [facility], [address] [place] [address] in place:  Yes   Recommendation Provided To: Patient/Caregiver: 3 via In person   Intervention Detail: Dose Adjustment: 1, reason: Therapy Optimization, Lab(s) Ordered and Scheduled Appointment   Intervention Accepted By: Patient/Caregiver: 3   Time Spent (min): 20

## 2022-05-23 ENCOUNTER — ANTI-COAG VISIT (OUTPATIENT)
Dept: INTERNAL MEDICINE CLINIC | Age: 87
End: 2022-05-23
Payer: MEDICARE

## 2022-05-23 VITALS — HEIGHT: 68 IN | WEIGHT: 170.4 LBS | BODY MASS INDEX: 25.82 KG/M2

## 2022-05-23 DIAGNOSIS — I48.20 CHRONIC ATRIAL FIBRILLATION (HCC): ICD-10-CM

## 2022-05-23 DIAGNOSIS — Z95.2 S/P AVR (AORTIC VALVE REPLACEMENT): Primary | ICD-10-CM

## 2022-05-23 LAB
INR BLD: 1.4 (ref 1–1.5)
PT POC: 16.7 SECONDS (ref 9.1–12)
VALID INTERNAL CONTROL?: YES

## 2022-05-23 PROCEDURE — 85610 PROTHROMBIN TIME: CPT | Performed by: PHARMACIST

## 2022-05-23 PROCEDURE — G0463 HOSPITAL OUTPT CLINIC VISIT: HCPCS | Performed by: PHARMACIST

## 2022-05-28 NOTE — PATIENT INSTRUCTIONS
Today your INR was 2.3. Your goal INR is  2.0-3.0 . You have a  1 mg, 2.5 mg and 5 mg tablet of Coumadin (warfarin). Take Coumadin (warfarin) as follows:    Continue warfarin 2.5 mg daily. Come back in 2  week(s) for your next finger stick/INR blood test.      Avoid any over the counter items containing aspirin or ibuprofen, and avoid great swings in general diet. Avoid alcohol consumption. Please notify the INR nurse if you are started on any new medication including over the counter or herbal supplements. Also, please notify your INR nurse if any of your other prescription or over the counter medications have been discontinued. Call [facility] at [phone] if you have any signs of abnormal bleeding/blood clot.  ------------------------------------------------------------------------------------------------------------------  Taking Warfarin Safely: Care Instructions    Your Care Instructions  Warfarin is a medicine that you take to prevent blood clots. It is often called a blood thinner. Doctors give warfarin (such as Coumadin) to reduce the risk of blood clots. You may be at risk for blood clots if you have atrial fibrillation or deep vein thrombosis. Some other health problems may also put you at risk. Warfarin slows the amount of time it takes for your blood to clot. It can cause bleeding problems. Even if you've been taking warfarin for a while, it's important to know how to take it safely. Foods and other medicines can affect the way warfarin works. Some can make warfarin work too well. This can cause bleeding problems. And some can make it work poorly, so that it does not prevent blood clots very well. You will need regular blood tests to check how long it takes for your blood to form a clot. This test is called a PT or prothrombin time test. The result of the test is called an INR level.  Depending on the test results, your doctor or anticoagulation clinic may adjust your dose of warfarin. Follow-up care is a key part of your treatment and safety. Be sure to make and go to all appointments, and call your doctor if you are having problems. It's also a good idea to know your test results and keep a list of the medicines you take. How can you care for yourself at home? Take warfarin safely  · Take your warfarin at the same time each day. · If you miss a dose of warfarin, don't take an extra dose to make up for it. Your doctor can tell you exactly what to do so you don't take too much or too little. · Wear medical alert jewelry that lets others know that you take warfarin. You can buy this at most drugstores. · Don't take warfarin if you are pregnant or planning to get pregnant. Talk to your doctor about how you can prevent getting pregnant while you are taking it. · Don't change your dose or stop taking warfarin unless your doctor tells you to. Effects of medicines and food on warfarin  · Don't start or stop taking any medicines, vitamins, or natural remedies unless you first talk to your doctor. Many medicines can affect how warfarin works. These include aspirin and other pain relievers, over-the-counter medicines, multivitamins, dietary supplements, and herbal products. · Tell all of your doctors and pharmacists that you take warfarin. Some prescription medicines can affect how warfarin works. · Keep the amount of vitamin K in your diet about the same from day to day. Do not suddenly eat a lot more or a lot less food that is rich in vitamin K than you usually do. Vitamin K affects how warfarin works and how your blood clots. Talk with your doctor before making big changes in your diet. Vitamin K is in many foods, such as:  ¨ Leafy greens, such as kale, cabbage, spinach, Swiss chard, and lettuce. ¨ Canola and soybean oils. ¨ Green vegetables, such as asparagus, broccoli, and [place] sprouts.   ¨ Vegetable drinks, green tea leaves, and some dietary supplement drinks. · Avoid cranberry juice and other cranberry products. They can increase the effects of warfarin. · Limit your use of alcohol. Avoid bleeding by preventing falls and injuries  · Wear slippers or shoes with nonskid soles. · Remove throw rugs and clutter. · Rearrange furniture and electrical cords to keep them out of walking paths. · Keep stairways, porches, and outside walkways well lit. Use night-lights in hallways and bathrooms. · Be extra careful when you work with sharp tools or knives. When should you call for help? Call 911 anytime you think you may need emergency care. For example, call if:  · You have a sudden, severe headache that is different from past headaches. Call your doctor now or seek immediate medical care if:  · You have any abnormal bleeding, such as:  ¨ Nosebleeds. ¨ Vaginal bleeding that is different (heavier, more frequent, at a different time of the month) than what you are used to. ¨ Bloody or black stools, or rectal bleeding. ¨ Bloody or pink urine. Watch closely for changes in your health, and be sure to contact your doctor if you have any problems. Where can you learn more? Go to http://www.gray.com/. Enter I941 in the search box to learn more about \"Taking Warfarin Safely: Care Instructions. \"  Current as of: January 27, 2016  Content Version: 11.1  © 0415-8708 [employer]. Care instructions adapted under license by [employer] (which disclaims liability or warranty for this information). If you have questions about a medical condition or this instruction, always ask your healthcare professional. [place] [address] any warranty or liability for your use of this information.

## 2022-05-28 NOTE — PROGRESS NOTES
Pharmacy Progress Note - Anticoagulation Management    S/O: Mr. Puneet Casey  is [id number]. o. male, referred by Nola Matt MD, was seen today for anticoagulation management for the diagnosis of Atrial Fibrillation and Aortic Valve Replacement  ([facility] -1996), s/p pacemaker.  Pt is very hard of hearing.      · Warfarin start date: ~1996  · INR Goal:  2.0-3.0    · Current warfarin regimen:   5 mg x 2, then 2.5 mg daily                  · Warfarin tablet strength:   1 mg, 2.5 mg, 5 mg  -- wife states she does not want to split tablets in half ; generally takes 2.5 mg tab. Able to recognize tablet strength  · Duration of therapy: indefinite  · Takes warfarin in the evening    Today's pertinent positives includes:  No significant changes since last visit    Results for orders placed or performed in visit on 06/01/22   AMB POC PT/INR   Result Value Ref Range    VALID INTERNAL CONTROL POC Yes     Prothrombin time (POC) 27.2 (A) 9.1 - 12 seconds    INR POC 2.3 (A) 1 - 1.5     · Adherence:   · Able to recall regimen? YES  · Miss/extra dose? NO  · Need refill?  NO    Upcoming procedure(s):  NO    Past Medical History:   Diagnosis Date    Acute on chronic systolic heart failure, NYHA class 4 ([facility] [address] 03/01/2019    admit March 2019 7 days, EF 10% got Primacor, BIV implant    Anger     TAKES PAROXETINE TO CONTROL ANGER ISSUES    Arthritis     Burning with urination     CAD (coronary artery disease) 1996    CABG 1996, mukul celis 2015 fixed inferior defect    Cancer [facility])     prostate    Chronic atrial fibrillation ([facility] [address] 03/2019    RVR3/2019, had ablation of flutter- persistent    Foot drop, left     WEARS METAL BRACE    Gout     Hard of hearing     High cholesterol     Hypertension     Long term current use of anticoagulant therapy     Prostate enlargement     Sleep apnea     Thyroid disease     Unspecified sleep apnea     DOESN'T USE CPAP; \"IT MADE HIM SICK-COLDS, SINUS\", PER WIFE     No Known Allergies  Current Outpatient Medications   Medication Sig    warfarin (COUMADIN) 2.5 mg tablet TAKE 1 TABLET EVERY DAY - NEEDS INR CHECK    carvediloL (COREG) 12.5 mg tablet Take 1 Tablet by mouth two (2) times daily (with meals).  atorvastatin (LIPITOR) 40 mg tablet Take 1 Tablet by mouth nightly.  sacubitriL-valsartan (Entresto) 24-26 mg tablet Take 1 Tablet by mouth every twelve (12) hours.  allopurinoL (ZYLOPRIM) 300 mg tablet TAKE 1 TABLET EVERY DAY TO PREVENT GOUT    PARoxetine (PAXIL) 20 mg tablet TAKE 1 TABLET EVERY DAY    levothyroxine (SYNTHROID) 150 mcg tablet TAKE 1 TABLET EVERY MORNING FOR THYROID    oxybutynin chloride XL (DITROPAN XL) 10 mg CR tablet Take 10 mg by mouth daily.  spironolactone (ALDACTONE) 25 mg tablet Take 25 mg by mouth daily.  omega 3-dha-epa-fish oil (FISH OIL) 100-160-1,000 mg cap Take 1 Cap by mouth daily.  psyllium (METAMUCIL) 0.52 gram capsule Take 1 Cap by mouth daily.  b complex vitamins (B COMPLEX 1) tablet Take 1 Tab by mouth daily.  CYANOCOBALAMIN, VITAMIN B-12, PO Take 1 Tab by mouth daily. Sciatica    aspirin delayed-release 81 mg tablet Take 81 mg by mouth daily.  therapeutic multivitamin (THERAGRAN) tablet Take 1 Tab by mouth daily.  ascorbic acid, vitamin C, (VITAMIN C) 500 mg tablet Take 1,000 mg by mouth daily.  B.infantis-B.ani-B.long-B.bifi (PROBIOTIC 4X) 10-15 mg TbEC Take 1 Tab by mouth daily.  COQ10, UBIQUINOL, PO Take 1 Tab by mouth daily.  finasteride (PROSCAR) 5 mg tablet Take 5 mg by mouth daily.  tamsulosin (FLOMAX) 0.4 mg capsule Take 0.4 mg by mouth nightly.  FERROUS GLUCONATE PO Take 325 mg by mouth daily.  furosemide (LASIX) 20 mg tablet Take 1 Tab by mouth daily. (Patient taking differently: Take 20 mg by mouth daily. Per patient take 1 pill base on swelling)     No current facility-administered medications for this visit.      Wt Readings from Last 3 Encounters:   05/23/22 170 lb 6.4 oz (77.3 kg) 04/25/22 171 lb 9.6 oz (77.8 kg)   11/03/21 171 lb (77.6 kg)     BP Readings from Last 3 Encounters:   06/01/22 112/64   04/25/22 138/84   11/03/21 132/82     Pulse Readings from Last 3 Encounters:   06/01/22 72   04/25/22 82   11/03/21 70     Lab Results   Component Value Date/Time    WBC 5.7 08/09/2020 08:14 PM    HGB 13.2 08/09/2020 08:14 PM    HCT 39.3 08/09/2020 08:14 PM    PLATELET 296 (L) 91/16/3911 08:14 PM    MCV 98.0 08/09/2020 08:14 PM     Lab Results   Component Value Date/Time    Sodium 143 05/13/2021 12:25 PM    Potassium 3.8 05/13/2021 12:25 PM    Chloride 111 (H) 05/13/2021 12:25 PM    CO2 26 05/13/2021 12:25 PM    Anion gap 6 05/13/2021 12:25 PM    Glucose 95 05/13/2021 12:25 PM    BUN 19 05/13/2021 12:25 PM    Creatinine 1.05 05/13/2021 12:25 PM    BUN/Creatinine ratio 18 05/13/2021 12:25 PM    GFR est AA >60 05/13/2021 12:25 PM    GFR est non-AA >60 05/13/2021 12:25 PM    Calcium 8.7 05/13/2021 12:25 PM    Bilirubin, total 0.7 05/13/2021 12:25 PM    Alk. phosphatase 122 (H) 05/13/2021 12:25 PM    Protein, total 6.6 05/13/2021 12:25 PM    Albumin 3.9 05/13/2021 12:25 PM    Globulin 2.7 05/13/2021 12:25 PM    A-G Ratio 1.4 05/13/2021 12:25 PM    ALT (SGPT) 41 05/13/2021 12:25 PM     CrCl cannot be calculated (Patient's most recent lab result is older than the maximum 180 days allowed.). INR History:   (normal INR range 0.8-1.2)     Date   INR   PT   Dose/Comments  06/01/22 2.3  27.2 5 mg x 2, then 2.5 mg daily  05/23/22          1.4                   16.7     5 mg x 1, then 2.5 mg daily  04/25/22          1.8                   21. 7     Hold x 1, then 2.5 mg daily  Lapse in INR clinic  11/03/21          3.8                   45.1     5 mg x 1, then 2.5 mg daily ; (+) extra dose  10/25/21          1.9                   22.4     5 mg x 1, then 2.5 mg daily ; (+) hematuria  08/25/21          1.7                   20.1     2.5 mg daily  07/13/21          2.4                   28. 5     5 mg x 1, then 2.5 mg daily   06/30/21          1.7                   20.1     2.5 mg daily; (+) missed dose  05/19/21          2.3                   27.8     2.5 mg daily  04/14/21          2.3                   27.4     2.5 mg daily  03/10/21          2.1                   25.5     2.5 mg daily  02/10/21          2.3                   27.9     2.5 mg daily  01/20/21          2.7                   32. 3     5 mg x 2, then 2.5 mg daily  01/06/21          1.6                   19.5     5 mg x 1, then 2.5 mg daily    A/P:       Anticoagulation:  Considering Mr. Cathi Roy past history, todays findings, and per Anticoagulation Collaborative Practice Agreement/Protocol:    1. Fingerstick POC INR (2.3) is therapeutic for INR goal today. 2.  Continue warfarin 2.5 mg daily     Patient was instructed to schedule an appointment in 3 week(s) prior to leaving the clinic. Medication reconciliation was completed during the visit. There are no discontinued medications. A full discussion of the nature of anticoagulants has been carried out. A full discussion of the need for frequent and regular monitoring, precise dosage adjustment and compliance was stressed. Side effects of potential bleeding were discussed and Mr. Romero Mcdermott was instructed to call [phone] if there are any signs of abnormal bleeding. Mr. Romero Mcdermott was instructed to avoid any OTC items containing aspirin or ibuprofen and prior to starting any new OTC products to consult with his physician or pharmacist to ensure no drug interactions are present. Mr. Romero Mcdermott was instructed to avoid any major changes in his general diet and to avoid alcohol consumption. Mr. Romero Mcdermott was provided information in the AVS that includes topics on understanding coumadin therapy, drug interaction considerations, vitamin K and coumadin use, interactions with foods and supplements containing vitamin K, and the use of herbal products.     Mr. Romero Mcdermott verbalized his understanding of all instructions and will call the office with any questions, concerns, or signs of abnormal bleeding or blood clot. Notifications of recommendations will be sent to Dr. Jessica Johnson MD for review. Thank you,  Blanca Singer, PharmD, [facility], [address] [place] [address] in place:  Yes   Recommendation Provided To: Patient/Caregiver: 2 via In person   Intervention Detail: Lab(s) Ordered and Scheduled Appointment   Intervention Accepted By: Patient/Caregiver: 2   Time Spent (min): 20

## 2022-06-01 ENCOUNTER — ANTI-COAG VISIT (OUTPATIENT)
Dept: INTERNAL MEDICINE CLINIC | Age: 87
End: 2022-06-01
Payer: MEDICARE

## 2022-06-01 VITALS — SYSTOLIC BLOOD PRESSURE: 112 MMHG | HEART RATE: 72 BPM | DIASTOLIC BLOOD PRESSURE: 64 MMHG

## 2022-06-01 DIAGNOSIS — Z95.2 H/O MECHANICAL AORTIC VALVE REPLACEMENT: ICD-10-CM

## 2022-06-01 DIAGNOSIS — I48.3 TYPICAL ATRIAL FLUTTER (HCC): ICD-10-CM

## 2022-06-01 DIAGNOSIS — Z95.2 S/P AVR (AORTIC VALVE REPLACEMENT): Primary | ICD-10-CM

## 2022-06-01 DIAGNOSIS — I48.20 CHRONIC ATRIAL FIBRILLATION (HCC): ICD-10-CM

## 2022-06-01 LAB
INR BLD: 2.3 (ref 1–1.5)
PT POC: 27.2 SECONDS (ref 9.1–12)
VALID INTERNAL CONTROL?: YES

## 2022-06-01 PROCEDURE — G0463 HOSPITAL OUTPT CLINIC VISIT: HCPCS | Performed by: PHARMACIST

## 2022-06-01 PROCEDURE — 85610 PROTHROMBIN TIME: CPT | Performed by: PHARMACIST

## 2022-06-13 RX ORDER — LEVOTHYROXINE SODIUM 150 UG/1
TABLET ORAL
Qty: 30 TABLET | Refills: 10 | Status: SHIPPED | OUTPATIENT
Start: 2022-06-13

## 2022-06-13 RX ORDER — PAROXETINE HYDROCHLORIDE 20 MG/1
TABLET, FILM COATED ORAL
Qty: 30 TABLET | Refills: 4 | Status: SHIPPED | OUTPATIENT
Start: 2022-06-13

## 2022-06-19 NOTE — PATIENT INSTRUCTIONS
Today your INR was 2.0. Your goal INR is  2.0-3.0 . You have a 1 mg , 2.5 mg, and 5 mg tablet of Coumadin (warfarin). Take Coumadin (warfarin) as follows: Take 5 mg tonight. Then continue warfarin 2.5 mg daily. Come back in 4  week(s) for your next finger stick/INR blood test.      Avoid any over the counter items containing aspirin or ibuprofen, and avoid great swings in general diet. Avoid alcohol consumption. Please notify the INR nurse if you are started on any new medication including over the counter or herbal supplements. Also, please notify your INR nurse if any of your other prescription or over the counter medications have been discontinued. Call [facility] at [phone] if you have any signs of abnormal bleeding/blood clot.  ------------------------------------------------------------------------------------------------------------------  Taking Warfarin Safely: Care Instructions    Your Care Instructions  Warfarin is a medicine that you take to prevent blood clots. It is often called a blood thinner. Doctors give warfarin (such as Coumadin) to reduce the risk of blood clots. You may be at risk for blood clots if you have atrial fibrillation or deep vein thrombosis. Some other health problems may also put you at risk. Warfarin slows the amount of time it takes for your blood to clot. It can cause bleeding problems. Even if you've been taking warfarin for a while, it's important to know how to take it safely. Foods and other medicines can affect the way warfarin works. Some can make warfarin work too well. This can cause bleeding problems. And some can make it work poorly, so that it does not prevent blood clots very well. You will need regular blood tests to check how long it takes for your blood to form a clot. This test is called a PT or prothrombin time test. The result of the test is called an INR level.  Depending on the test results, your doctor or anticoagulation clinic may adjust your dose of warfarin. Follow-up care is a key part of your treatment and safety. Be sure to make and go to all appointments, and call your doctor if you are having problems. It's also a good idea to know your test results and keep a list of the medicines you take. How can you care for yourself at home? Take warfarin safely  · Take your warfarin at the same time each day. · If you miss a dose of warfarin, don't take an extra dose to make up for it. Your doctor can tell you exactly what to do so you don't take too much or too little. · Wear medical alert jewelry that lets others know that you take warfarin. You can buy this at most drugstores. · Don't take warfarin if you are pregnant or planning to get pregnant. Talk to your doctor about how you can prevent getting pregnant while you are taking it. · Don't change your dose or stop taking warfarin unless your doctor tells you to. Effects of medicines and food on warfarin  · Don't start or stop taking any medicines, vitamins, or natural remedies unless you first talk to your doctor. Many medicines can affect how warfarin works. These include aspirin and other pain relievers, over-the-counter medicines, multivitamins, dietary supplements, and herbal products. · Tell all of your doctors and pharmacists that you take warfarin. Some prescription medicines can affect how warfarin works. · Keep the amount of vitamin K in your diet about the same from day to day. Do not suddenly eat a lot more or a lot less food that is rich in vitamin K than you usually do. Vitamin K affects how warfarin works and how your blood clots. Talk with your doctor before making big changes in your diet. Vitamin K is in many foods, such as:  ¨ Leafy greens, such as kale, cabbage, spinach, Swiss chard, and lettuce. ¨ Canola and soybean oils. ¨ Green vegetables, such as asparagus, broccoli, and [place] sprouts.   ¨ Vegetable drinks, green tea leaves, and some dietary supplement drinks. · Avoid cranberry juice and other cranberry products. They can increase the effects of warfarin. · Limit your use of alcohol. Avoid bleeding by preventing falls and injuries  · Wear slippers or shoes with nonskid soles. · Remove throw rugs and clutter. · Rearrange furniture and electrical cords to keep them out of walking paths. · Keep stairways, porches, and outside walkways well lit. Use night-lights in hallways and bathrooms. · Be extra careful when you work with sharp tools or knives. When should you call for help? Call 911 anytime you think you may need emergency care. For example, call if:  · You have a sudden, severe headache that is different from past headaches. Call your doctor now or seek immediate medical care if:  · You have any abnormal bleeding, such as:  ¨ Nosebleeds. ¨ Vaginal bleeding that is different (heavier, more frequent, at a different time of the month) than what you are used to. ¨ Bloody or black stools, or rectal bleeding. ¨ Bloody or pink urine. Watch closely for changes in your health, and be sure to contact your doctor if you have any problems. Where can you learn more? Go to http://claus-teodora.info/. Enter H564 in the search box to learn more about \"Taking Warfarin Safely: Care Instructions. \"  Current as of: January 27, 2016  Content Version: 11.1  © 1410-6627 [employer]. Care instructions adapted under license by [employer] (which disclaims liability or warranty for this information). If you have questions about a medical condition or this instruction, always ask your healthcare professional. [place] [address] any warranty or liability for your use of this information.

## 2022-06-19 NOTE — PROGRESS NOTES
Pharmacy Progress Note - Anticoagulation Management    S/O: Mr. Puneet Casey  is [id number]. o. male, referred by Parveen Engel MD, was seen today for anticoagulation management for the diagnosis of Atrial Fibrillation and Aortic Valve Replacement  ([facility] -1996), s/p pacemaker.  Pt is very hard of hearing.      · Warfarin start date: ~1996  · INR Goal:  2.0-3.0    · Current warfarin regimen:   2.5 mg daily                  · Warfarin tablet strength:   1 mg, 2.5 mg, 5 mg  -- wife states she does not want to split tablets in half ; generally takes 2.5 mg tab. Able to recognize tablet strength  · Duration of therapy: indefinite  · Takes warfarin in the evening    Today's pertinent positives includes:  No significant changes since last visit    Results for orders placed or performed in visit on 06/21/22   AMB POC PT/INR   Result Value Ref Range    VALID INTERNAL CONTROL POC Yes     Prothrombin time (POC) 23.6 (A) 9.1 - 12 seconds    INR POC 2.0 (A) 1 - 1.5     · Adherence:   · Able to recall regimen? YES  · Miss/extra dose? YES - may have missed 1 dose   · Need refill?  NO    Upcoming procedure(s):  NO    Past Medical History:   Diagnosis Date    Acute on chronic systolic heart failure, NYHA class 4 ([facility] [address] 03/01/2019    admit March 2019 7 days, EF 10% got Primacor, BIV implant    Anger     TAKES PAROXETINE TO CONTROL ANGER ISSUES    Arthritis     Burning with urination     CAD (coronary artery disease) 1996    CABG 1996, mukul celis 2015 fixed inferior defect    Cancer [facility])     prostate    Chronic atrial fibrillation ([facility] [address] 03/2019    RVR3/2019, had ablation of flutter- persistent    Foot drop, left     WEARS METAL BRACE    Gout     Hard of hearing     High cholesterol     Hypertension     Long term current use of anticoagulant therapy     Prostate enlargement     Sleep apnea     Thyroid disease     Unspecified sleep apnea     DOESN'T USE CPAP; \"IT MADE HIM SICK-COLDS, SINUS\", PER WIFE     No Known Allergies  Current Outpatient Medications   Medication Sig    levothyroxine (SYNTHROID) 150 mcg tablet TAKE 1 TABLET EVERY MORNING FOR THYROID    PARoxetine (PAXIL) 20 mg tablet TAKE 1 TABLET EVERY DAY    warfarin (COUMADIN) 2.5 mg tablet TAKE 1 TABLET EVERY DAY - NEEDS INR CHECK    carvediloL (COREG) 12.5 mg tablet Take 1 Tablet by mouth two (2) times daily (with meals).  atorvastatin (LIPITOR) 40 mg tablet Take 1 Tablet by mouth nightly.  sacubitriL-valsartan (Entresto) 24-26 mg tablet Take 1 Tablet by mouth every twelve (12) hours.  allopurinoL (ZYLOPRIM) 300 mg tablet TAKE 1 TABLET EVERY DAY TO PREVENT GOUT    oxybutynin chloride XL (DITROPAN XL) 10 mg CR tablet Take 10 mg by mouth daily.  spironolactone (ALDACTONE) 25 mg tablet Take 25 mg by mouth daily.  omega 3-dha-epa-fish oil (FISH OIL) 100-160-1,000 mg cap Take 1 Cap by mouth daily.  psyllium (METAMUCIL) 0.52 gram capsule Take 1 Cap by mouth daily.  b complex vitamins (B COMPLEX 1) tablet Take 1 Tab by mouth daily.  CYANOCOBALAMIN, VITAMIN B-12, PO Take 1 Tab by mouth daily. Sciatica    aspirin delayed-release 81 mg tablet Take 81 mg by mouth daily.  therapeutic multivitamin (THERAGRAN) tablet Take 1 Tab by mouth daily.  ascorbic acid, vitamin C, (VITAMIN C) 500 mg tablet Take 1,000 mg by mouth daily.  B.infantis-B.ani-B.long-B.bifi (PROBIOTIC 4X) 10-15 mg TbEC Take 1 Tab by mouth daily.  COQ10, UBIQUINOL, PO Take 1 Tab by mouth daily.  finasteride (PROSCAR) 5 mg tablet Take 5 mg by mouth daily.  tamsulosin (FLOMAX) 0.4 mg capsule Take 0.4 mg by mouth nightly.  FERROUS GLUCONATE PO Take 325 mg by mouth daily.  furosemide (LASIX) 20 mg tablet Take 1 Tab by mouth daily. (Patient taking differently: Take 20 mg by mouth daily. Per patient take 1 pill base on swelling)     No current facility-administered medications for this visit.      Wt Readings from Last 3 Encounters:   05/23/22 170 lb 6.4 oz (77.3 kg) 04/25/22 171 lb 9.6 oz (77.8 kg)   11/03/21 171 lb (77.6 kg)     BP Readings from Last 3 Encounters:   06/21/22 110/67   06/01/22 112/64   04/25/22 138/84     Pulse Readings from Last 3 Encounters:   06/21/22 70   06/01/22 72   04/25/22 82     Lab Results   Component Value Date/Time    WBC 5.7 08/09/2020 08:14 PM    HGB 13.2 08/09/2020 08:14 PM    HCT 39.3 08/09/2020 08:14 PM    PLATELET 909 (L) 55/11/5725 08:14 PM    MCV 98.0 08/09/2020 08:14 PM     Lab Results   Component Value Date/Time    Sodium 143 05/13/2021 12:25 PM    Potassium 3.8 05/13/2021 12:25 PM    Chloride 111 (H) 05/13/2021 12:25 PM    CO2 26 05/13/2021 12:25 PM    Anion gap 6 05/13/2021 12:25 PM    Glucose 95 05/13/2021 12:25 PM    BUN 19 05/13/2021 12:25 PM    Creatinine 1.05 05/13/2021 12:25 PM    BUN/Creatinine ratio 18 05/13/2021 12:25 PM    GFR est AA >60 05/13/2021 12:25 PM    GFR est non-AA >60 05/13/2021 12:25 PM    Calcium 8.7 05/13/2021 12:25 PM    Bilirubin, total 0.7 05/13/2021 12:25 PM    Alk. phosphatase 122 (H) 05/13/2021 12:25 PM    Protein, total 6.6 05/13/2021 12:25 PM    Albumin 3.9 05/13/2021 12:25 PM    Globulin 2.7 05/13/2021 12:25 PM    A-G Ratio 1.4 05/13/2021 12:25 PM    ALT (SGPT) 41 05/13/2021 12:25 PM     CrCl cannot be calculated (Patient's most recent lab result is older than the maximum 180 days allowed.). INR History:   (normal INR range 0.8-1.2)     Date   INR   PT   Dose/Comments  06/21/22 2.0  23.6 2.5 mg daily ; (+) missed dose   06/01/22          2.3                   27.2     5 mg x 2, then 2.5 mg daily  05/23/22          1.4                   16.7     5 mg x 1, then 2.5 mg daily  04/25/22          1.8                   21. 7     Hold x 1, then 2.5 mg daily  Lapse in INR clinic  11/03/21          3.8                   45.1     5 mg x 1, then 2.5 mg daily ; (+) extra dose  10/25/21          1.9                   22.4     5 mg x 1, then 2.5 mg daily ; (+) hematuria  08/25/21          1.7                   20.1     2.5 mg daily  07/13/21          2.4                   28. 5     5 mg x 1, then 2.5 mg daily   06/30/21          1.7                   20.1     2.5 mg daily; (+) missed dose  05/19/21          2.3                   27.8     2.5 mg daily  04/14/21          2.3                   27.4     2.5 mg daily  03/10/21          2.1                   25.5     2.5 mg daily  02/10/21          2.3                   27.9     2.5 mg daily  01/20/21          2.7                   32. 3     5 mg x 2, then 2.5 mg daily    A/P:       Anticoagulation:  Considering Mr. Katt Longo past history, todays findings, and per Anticoagulation Collaborative Practice Agreement/Protocol:    1. Fingerstick POC INR (2.0) is Therapeutic for INR goal today. 2. Take 5 mg tonight. Then Continue warfarin 2.5 mg daily. Patient was instructed to schedule an appointment in 4 week(s) prior to leaving the clinic. Medication reconciliation was completed during the visit. There are no discontinued medications. A full discussion of the nature of anticoagulants has been carried out. A full discussion of the need for frequent and regular monitoring, precise dosage adjustment and compliance was stressed. Side effects of potential bleeding were discussed and Mr. Apoorva Larry was instructed to call [phone] if there are any signs of abnormal bleeding. Mr. Apoorva Larry was instructed to avoid any OTC items containing aspirin or ibuprofen and prior to starting any new OTC products to consult with his physician or pharmacist to ensure no drug interactions are present. Mr. Apoorva Larry was instructed to avoid any major changes in his general diet and to avoid alcohol consumption.     Mr. Apoorva Larry was provided information in the AVS that includes topics on understanding coumadin therapy, drug interaction considerations, vitamin K and coumadin use, interactions with foods and supplements containing vitamin K, and the use of herbal products. Mr. Duckworth Hand verbalized his understanding of all instructions and will call the office with any questions, concerns, or signs of abnormal bleeding or blood clot. Notifications of recommendations will be sent to Dr. Eduardo Brady MD for review. Thank you,  [facility], PharmD, BCACP, [address] [place] [address] in place:  Yes   Recommendation Provided To: Patient/Caregiver: 3 via In person   Intervention Detail: Dose Adjustment: 1, reason: Therapy Optimization, Lab(s) Ordered and Scheduled Appointment   Intervention Accepted By: Patient/Caregiver: 3   Time Spent (min): 20

## 2022-06-21 ENCOUNTER — ANTI-COAG VISIT (OUTPATIENT)
Dept: INTERNAL MEDICINE CLINIC | Age: 87
End: 2022-06-21
Payer: MEDICARE

## 2022-06-21 VITALS — HEART RATE: 70 BPM | DIASTOLIC BLOOD PRESSURE: 67 MMHG | SYSTOLIC BLOOD PRESSURE: 110 MMHG

## 2022-06-21 DIAGNOSIS — Z95.2 S/P AVR (AORTIC VALVE REPLACEMENT): Primary | ICD-10-CM

## 2022-06-21 DIAGNOSIS — I48.20 CHRONIC ATRIAL FIBRILLATION (HCC): ICD-10-CM

## 2022-06-21 LAB
INR BLD: 2 (ref 1–1.5)
PT POC: 23.6 SECONDS (ref 9.1–12)
VALID INTERNAL CONTROL?: YES

## 2022-06-21 PROCEDURE — G0463 HOSPITAL OUTPT CLINIC VISIT: HCPCS | Performed by: PHARMACIST

## 2022-06-21 PROCEDURE — 85610 PROTHROMBIN TIME: CPT | Performed by: PHARMACIST

## 2022-07-15 RX ORDER — ALLOPURINOL 300 MG/1
TABLET ORAL
Qty: 30 TABLET | Refills: 10 | Status: SHIPPED | OUTPATIENT
Start: 2022-07-15

## 2022-07-16 NOTE — PATIENT INSTRUCTIONS
Today your INR was 2.4. Your goal INR is  2.0-3.0 . You have a 1 mg, 2.5 mg, and 5 mg tablet of Coumadin (warfarin). Take Coumadin (warfarin) as follows:    Continue warfarin 2.5 mg daily    Come back in 4  week(s) for your next finger stick/INR blood test.      Avoid any over the counter items containing aspirin or ibuprofen, and avoid great swings in general diet. Avoid alcohol consumption. Please notify the INR nurse if you are started on any new medication including over the counter or herbal supplements. Also, please notify your INR nurse if any of your other prescription or over the counter medications have been discontinued. Call [facility] at [phone] if you have any signs of abnormal bleeding/blood clot.  ------------------------------------------------------------------------------------------------------------------  Taking Warfarin Safely: Care Instructions    Your Care Instructions  Warfarin is a medicine that you take to prevent blood clots. It is often called a blood thinner. Doctors give warfarin (such as Coumadin) to reduce the risk of blood clots. You may be at risk for blood clots if you have atrial fibrillation or deep vein thrombosis. Some other health problems may also put you at risk. Warfarin slows the amount of time it takes for your blood to clot. It can cause bleeding problems. Even if you've been taking warfarin for a while, it's important to know how to take it safely. Foods and other medicines can affect the way warfarin works. Some can make warfarin work too well. This can cause bleeding problems. And some can make it work poorly, so that it does not prevent blood clots very well. You will need regular blood tests to check how long it takes for your blood to form a clot. This test is called a PT or prothrombin time test. The result of the test is called an INR level.  Depending on the test results, your doctor or anticoagulation clinic may adjust your dose of warfarin. Follow-up care is a key part of your treatment and safety. Be sure to make and go to all appointments, and call your doctor if you are having problems. It's also a good idea to know your test results and keep a list of the medicines you take. How can you care for yourself at home? Take warfarin safely  · Take your warfarin at the same time each day. · If you miss a dose of warfarin, don't take an extra dose to make up for it. Your doctor can tell you exactly what to do so you don't take too much or too little. · Wear medical alert jewelry that lets others know that you take warfarin. You can buy this at most drugstores. · Don't take warfarin if you are pregnant or planning to get pregnant. Talk to your doctor about how you can prevent getting pregnant while you are taking it. · Don't change your dose or stop taking warfarin unless your doctor tells you to. Effects of medicines and food on warfarin  · Don't start or stop taking any medicines, vitamins, or natural remedies unless you first talk to your doctor. Many medicines can affect how warfarin works. These include aspirin and other pain relievers, over-the-counter medicines, multivitamins, dietary supplements, and herbal products. · Tell all of your doctors and pharmacists that you take warfarin. Some prescription medicines can affect how warfarin works. · Keep the amount of vitamin K in your diet about the same from day to day. Do not suddenly eat a lot more or a lot less food that is rich in vitamin K than you usually do. Vitamin K affects how warfarin works and how your blood clots. Talk with your doctor before making big changes in your diet. Vitamin K is in many foods, such as:  ¨ Leafy greens, such as kale, cabbage, spinach, Swiss chard, and lettuce. ¨ Canola and soybean oils. ¨ Green vegetables, such as asparagus, broccoli, and Claude sprouts.   ¨ Vegetable drinks, green tea leaves, and some dietary supplement drinks. · Avoid cranberry juice and other cranberry products. They can increase the effects of warfarin. · Limit your use of alcohol. Avoid bleeding by preventing falls and injuries  · Wear slippers or shoes with nonskid soles. · Remove throw rugs and clutter. · Rearrange furniture and electrical cords to keep them out of walking paths. · Keep stairways, porches, and outside walkways well lit. Use night-lights in hallways and bathrooms. · Be extra careful when you work with sharp tools or knives. When should you call for help? Call 911 anytime you think you may need emergency care. For example, call if:  · You have a sudden, severe headache that is different from past headaches. Call your doctor now or seek immediate medical care if:  · You have any abnormal bleeding, such as:  ¨ Nosebleeds. ¨ Vaginal bleeding that is different (heavier, more frequent, at a different time of the month) than what you are used to. ¨ Bloody or black stools, or rectal bleeding. ¨ Bloody or pink urine. Watch closely for changes in your health, and be sure to contact your doctor if you have any problems. Where can you learn more? Go to http://www.gray.com/. Enter H259 in the search box to learn more about \"Taking Warfarin Safely: Care Instructions. \"  Current as of: January 27, 2016  Content Version: 11.1  © 1322-2150 "[employer]. Care instructions adapted under license by [employer] (which disclaims liability or warranty for this information). If you have questions about a medical condition or this instruction, always ask your healthcare professional. [place] [address] any warranty or liability for your use of this information.

## 2022-07-16 NOTE — PROGRESS NOTES
Pharmacy Progress Note - Anticoagulation Management    S/O: Mr. Puneet Casey  is [id number]. o. male, referred by Dayle Dandy, MD, was seen today for anticoagulation management for the diagnosis of Atrial Fibrillation and Aortic Valve Replacement  ([facility] -1996), s/p pacemaker.  Pt is very hard of hearing.      · Warfarin start date: ~1996  · INR Goal:  2.0-3.0    · Current warfarin regimen:   5 mg x 1, then 2.5 mg daily                  · Warfarin tablet strength:   1 mg, 2.5 mg, 5 mg  -- wife states she does not want to split tablets in half ; generally takes 2.5 mg tab. Able to recognize tablet strength  · Duration of therapy: indefinite  · Takes warfarin in the evening     Today's pertinent positives includes:  No significant changes since last visit     Has cardiology f/u next week    Results for orders placed or performed in visit on 07/19/22   AMB POC PT/INR   Result Value Ref Range    VALID INTERNAL CONTROL POC Yes     Prothrombin time (POC) 28.5 (A) 12 seconds    INR POC 2.4 (A) 1 - 1.5     · Adherence:   · Able to recall regimen? YES  · Miss/extra dose? NO  · Need refill?  NO    Upcoming procedure(s):  NO    Past Medical History:   Diagnosis Date    Acute on chronic systolic heart failure, NYHA class 4 ([facility] [address] 03/01/2019    admit March 2019 7 days, EF 10% got Primacor, BIV implant    Anger     TAKES PAROXETINE TO CONTROL ANGER ISSUES    Arthritis     Burning with urination     CAD (coronary artery disease) 1996    CABG 1996, mukul celis 2015 fixed inferior defect    Cancer [facility])     prostate    Chronic atrial fibrillation ([facility] [address] 03/2019    RVR3/2019, had ablation of flutter- persistent    Foot drop, left     WEARS METAL BRACE    Gout     Hard of hearing     High cholesterol     Hypertension     Long term current use of anticoagulant therapy     Prostate enlargement     Sleep apnea     Thyroid disease     Unspecified sleep apnea     DOESN'T USE CPAP; \"IT MADE HIM SICK-COLDS, SINUS\", PER WIFE     No Known Allergies  Current Outpatient Medications   Medication Sig    allopurinoL (ZYLOPRIM) 300 mg tablet TAKE 1 TABLET EVERY DAY TO PREVENT GOUT    levothyroxine (SYNTHROID) 150 mcg tablet TAKE 1 TABLET EVERY MORNING FOR THYROID    PARoxetine (PAXIL) 20 mg tablet TAKE 1 TABLET EVERY DAY    warfarin (COUMADIN) 2.5 mg tablet TAKE 1 TABLET EVERY DAY - NEEDS INR CHECK    carvediloL (COREG) 12.5 mg tablet Take 1 Tablet by mouth two (2) times daily (with meals).  atorvastatin (LIPITOR) 40 mg tablet Take 1 Tablet by mouth nightly.  sacubitriL-valsartan (Entresto) 24-26 mg tablet Take 1 Tablet by mouth every twelve (12) hours.  oxybutynin chloride XL (DITROPAN XL) 10 mg CR tablet Take 10 mg by mouth daily.  spironolactone (ALDACTONE) 25 mg tablet Take 25 mg by mouth daily.  omega 3-dha-epa-fish oil (FISH OIL) 100-160-1,000 mg cap Take 1 Cap by mouth daily.  psyllium (METAMUCIL) 0.52 gram capsule Take 1 Cap by mouth daily.  b complex vitamins (B COMPLEX 1) tablet Take 1 Tab by mouth daily.  CYANOCOBALAMIN, VITAMIN B-12, PO Take 1 Tab by mouth daily. Sciatica    aspirin delayed-release 81 mg tablet Take 81 mg by mouth daily.  therapeutic multivitamin (THERAGRAN) tablet Take 1 Tab by mouth daily.  ascorbic acid, vitamin C, (VITAMIN C) 500 mg tablet Take 1,000 mg by mouth daily.  B.infantis-B.ani-B.long-B.bifi (PROBIOTIC 4X) 10-15 mg TbEC Take 1 Tab by mouth daily.  COQ10, UBIQUINOL, PO Take 1 Tab by mouth daily.  finasteride (PROSCAR) 5 mg tablet Take 5 mg by mouth daily.  tamsulosin (FLOMAX) 0.4 mg capsule Take 0.4 mg by mouth nightly.  FERROUS GLUCONATE PO Take 325 mg by mouth daily.  furosemide (LASIX) 20 mg tablet Take 1 Tab by mouth daily. (Patient taking differently: Take 20 mg by mouth daily. Per patient take 1 pill base on swelling)     No current facility-administered medications for this visit.      Wt Readings from Last 3 Encounters:   07/19/22 171 lb (77.6 kg)   05/23/22 170 lb 6.4 oz (77.3 kg)   04/25/22 171 lb 9.6 oz (77.8 kg)     BP Readings from Last 3 Encounters:   07/19/22 123/81   06/21/22 110/67   06/01/22 112/64     Pulse Readings from Last 3 Encounters:   07/19/22 71   06/21/22 70   06/01/22 72     Lab Results   Component Value Date/Time    WBC 5.7 08/09/2020 08:14 PM    HGB 13.2 08/09/2020 08:14 PM    HCT 39.3 08/09/2020 08:14 PM    PLATELET 584 (L) 98/00/3729 08:14 PM    MCV 98.0 08/09/2020 08:14 PM     Lab Results   Component Value Date/Time    Sodium 143 05/13/2021 12:25 PM    Potassium 3.8 05/13/2021 12:25 PM    Chloride 111 (H) 05/13/2021 12:25 PM    CO2 26 05/13/2021 12:25 PM    Anion gap 6 05/13/2021 12:25 PM    Glucose 95 05/13/2021 12:25 PM    BUN 19 05/13/2021 12:25 PM    Creatinine 1.05 05/13/2021 12:25 PM    BUN/Creatinine ratio 18 05/13/2021 12:25 PM    GFR est AA >60 05/13/2021 12:25 PM    GFR est non-AA >60 05/13/2021 12:25 PM    Calcium 8.7 05/13/2021 12:25 PM    Bilirubin, total 0.7 05/13/2021 12:25 PM    Alk. phosphatase 122 (H) 05/13/2021 12:25 PM    Protein, total 6.6 05/13/2021 12:25 PM    Albumin 3.9 05/13/2021 12:25 PM    Globulin 2.7 05/13/2021 12:25 PM    A-G Ratio 1.4 05/13/2021 12:25 PM    ALT (SGPT) 41 05/13/2021 12:25 PM     CrCl cannot be calculated (Patient's most recent lab result is older than the maximum 180 days allowed.). INR History:   (normal INR range 0.8-1.2)     Date   INR   PT   Dose/Comments  07/19/22 2.4  28.5 5 mg x 1, then 2.5 mg daily   06/21/22          2.0                   23.6     2.5 mg daily ; (+) missed dose   06/01/22          2.3                   27.2     5 mg x 2, then 2.5 mg daily  05/23/22          1.4                   16.7     5 mg x 1, then 2.5 mg daily  04/25/22          1.8                   21. 7     Hold x 1, then 2.5 mg daily  Lapse in INR clinic  11/03/21          3.8                   45.1     5 mg x 1, then 2.5 mg daily ; (+) extra dose  10/25/21          1.9                   22.4     5 mg x 1, then 2.5 mg daily ; (+) hematuria  08/25/21          1.7                   20.1     2.5 mg daily  07/13/21          2.4                   28. 5     5 mg x 1, then 2.5 mg daily   06/30/21          1.7                   20.1     2.5 mg daily; (+) missed dose  05/19/21          2.3                   27.8     2.5 mg daily    A/P:       Anticoagulation:  Considering Mr. Kylah Finn past history, todays findings, and per Anticoagulation Collaborative Practice Agreement/Protocol:    1. Fingerstick POC INR (2.4) is Therapeutic for INR goal today. 2.  Continue warfarin 2.5 mg daily    Patient was instructed to schedule an appointment in 5 week(s) prior to leaving the clinic. Medication reconciliation was completed during the visit. There are no discontinued medications. A full discussion of the nature of anticoagulants has been carried out. A full discussion of the need for frequent and regular monitoring, precise dosage adjustment and compliance was stressed. Side effects of potential bleeding were discussed and Mr. Brandee Newman was instructed to call [phone] if there are any signs of abnormal bleeding. Mr. Brandee Newman was instructed to avoid any OTC items containing aspirin or ibuprofen and prior to starting any new OTC products to consult with his physician or pharmacist to ensure no drug interactions are present. Mr. Brandee Newman was instructed to avoid any major changes in his general diet and to avoid alcohol consumption. Mr. Brandee Newman was provided information in the AVS that includes topics on understanding coumadin therapy, drug interaction considerations, vitamin K and coumadin use, interactions with foods and supplements containing vitamin K, and the use of herbal products. Mr. Brandee Newman verbalized his understanding of all instructions and will call the office with any questions, concerns, or signs of abnormal bleeding or blood clot.   Notifications of recommendations will be sent to Dr. Abiola Fisher MD for review. Thank you,  Blanca Araujo, PharmD, [facility], [address] [place] [address] in place:  Yes   Recommendation Provided To: Patient/Caregiver: 2 via In person   Intervention Detail: Lab(s) Ordered and Scheduled Appointment   Intervention Accepted By: Patient/Caregiver: 2   Time Spent (min): 20

## 2022-07-19 ENCOUNTER — ANTI-COAG VISIT (OUTPATIENT)
Dept: INTERNAL MEDICINE CLINIC | Age: 87
End: 2022-07-19
Payer: MEDICARE

## 2022-07-19 VITALS
BODY MASS INDEX: 25.91 KG/M2 | DIASTOLIC BLOOD PRESSURE: 81 MMHG | HEART RATE: 71 BPM | WEIGHT: 171 LBS | SYSTOLIC BLOOD PRESSURE: 123 MMHG | HEIGHT: 68 IN

## 2022-07-19 DIAGNOSIS — Z95.2 S/P AVR (AORTIC VALVE REPLACEMENT): Primary | ICD-10-CM

## 2022-07-19 DIAGNOSIS — I48.20 CHRONIC ATRIAL FIBRILLATION (HCC): ICD-10-CM

## 2022-07-19 LAB
INR BLD: 2.4 (ref 1–1.5)
PT POC: 28.5 SECONDS (ref ?–12)
VALID INTERNAL CONTROL?: YES

## 2022-07-19 PROCEDURE — G0463 HOSPITAL OUTPT CLINIC VISIT: HCPCS | Performed by: PHARMACIST

## 2022-07-19 PROCEDURE — 85610 PROTHROMBIN TIME: CPT | Performed by: PHARMACIST

## 2022-07-28 ENCOUNTER — OFFICE VISIT (OUTPATIENT)
Dept: CARDIOLOGY CLINIC | Age: 87
End: 2022-07-28
Payer: MEDICARE

## 2022-07-28 ENCOUNTER — CLINICAL SUPPORT (OUTPATIENT)
Dept: CARDIOLOGY CLINIC | Age: 87
End: 2022-07-28
Payer: MEDICARE

## 2022-07-28 VITALS
OXYGEN SATURATION: 98 % | HEART RATE: 91 BPM | RESPIRATION RATE: 16 BRPM | WEIGHT: 167 LBS | BODY MASS INDEX: 25.31 KG/M2 | DIASTOLIC BLOOD PRESSURE: 70 MMHG | HEIGHT: 68 IN | SYSTOLIC BLOOD PRESSURE: 108 MMHG

## 2022-07-28 DIAGNOSIS — I25.10 CORONARY ARTERY DISEASE INVOLVING NATIVE CORONARY ARTERY OF NATIVE HEART WITHOUT ANGINA PECTORIS: ICD-10-CM

## 2022-07-28 DIAGNOSIS — I50.22 CHRONIC SYSTOLIC CONGESTIVE HEART FAILURE (HCC): ICD-10-CM

## 2022-07-28 DIAGNOSIS — I48.20 CHRONIC ATRIAL FIBRILLATION (HCC): ICD-10-CM

## 2022-07-28 DIAGNOSIS — Z95.810 BIVENTRICULAR ICD (IMPLANTABLE CARDIOVERTER-DEFIBRILLATOR) IN PLACE: Primary | ICD-10-CM

## 2022-07-28 DIAGNOSIS — Z95.810 BIVENTRICULAR IMPLANTABLE CARDIOVERTER-DEFIBRILLATOR IN SITU: Primary | ICD-10-CM

## 2022-07-28 DIAGNOSIS — Z95.2 S/P AVR (AORTIC VALVE REPLACEMENT): ICD-10-CM

## 2022-07-28 DIAGNOSIS — I25.118 CORONARY ARTERY DISEASE OF NATIVE ARTERY OF NATIVE HEART WITH STABLE ANGINA PECTORIS (HCC): ICD-10-CM

## 2022-07-28 PROCEDURE — 1101F PT FALLS ASSESS-DOCD LE1/YR: CPT | Performed by: INTERNAL MEDICINE

## 2022-07-28 PROCEDURE — 1123F ACP DISCUSS/DSCN MKR DOCD: CPT | Performed by: INTERNAL MEDICINE

## 2022-07-28 PROCEDURE — 93289 INTERROG DEVICE EVAL HEART: CPT | Performed by: INTERNAL MEDICINE

## 2022-07-28 PROCEDURE — G0463 HOSPITAL OUTPT CLINIC VISIT: HCPCS | Performed by: INTERNAL MEDICINE

## 2022-07-28 PROCEDURE — G9717 DOC PT DX DEP/BP F/U NT REQ: HCPCS | Performed by: INTERNAL MEDICINE

## 2022-07-28 PROCEDURE — 93284 PRGRMG EVAL IMPLANTABLE DFB: CPT | Performed by: INTERNAL MEDICINE

## 2022-07-28 PROCEDURE — 99214 OFFICE O/P EST MOD 30 MIN: CPT | Performed by: INTERNAL MEDICINE

## 2022-07-28 PROCEDURE — G8536 NO DOC ELDER MAL SCRN: HCPCS | Performed by: INTERNAL MEDICINE

## 2022-07-28 PROCEDURE — G8427 DOCREV CUR MEDS BY ELIG CLIN: HCPCS | Performed by: INTERNAL MEDICINE

## 2022-07-28 PROCEDURE — G8417 CALC BMI ABV UP PARAM F/U: HCPCS | Performed by: INTERNAL MEDICINE

## 2022-07-28 NOTE — PROGRESS NOTES
Cardiac Electrophysiology Office Note     Subjective:      Sadaf Laguna is a 80 y.o. patient who presents for follow up, is s/p [employer] biventricular ICD implant (DOI 03/04/2019, LV lead revision 12/09/2019). is seen for follow up of biventricular pacemaker ICD  He had LV lead dislodged and reimplanted 12/2019  Dr Henry Solitario is his cardiologist  He had AFL with RVR and was ablated     He was admitted on 03/01/2019 for acute on chronic systolic heart failure, NYHA III-IV. He was seen in clinic on 02/28/2019, & direct admit was not available, so he presented to the ED for admission. Echo on 02/28/2019 showed LVEF 10% (20% in 2016). MEMO 03/01/2019, LVEF <15%         Anticoagulated with warfarin, no bleeding issues. he checks his INR with PCP near his home     Echo (02/28/2019): LVEF <15%, mod LV dilation, mod concentric LVH. RV global systolic function mod reduced. Mod MAC, mod MR. Prosthetic mechanical aortic valve, mild to mod AS. Mod TR, mod pulmonary hypertension. Mild NM. Ascending aorta mod dilated. Mod elevated CVP. MEMO (03/01/2019): LVEF <15%, mechanical prosthetic aortic valve. 04/15/19   ECHO ADULT COMPLETE 04/15/2019 4/15/2019    Narrative · Left ventricular moderately decreased systolic function. Calculated left   ventricular ejection fraction is 40%. Left ventricular mild concentric   hypertrophy. · Left atrial cavity size is moderately dilated. · Right ventricular cavity size is mildly dilated. Pacer/ICD present. · Right atrial cavity size is mildly dilated. · Aortic valve is mechanical prosthetic. Mild aortic valve regurgitation   is present. · Mild mitral annular calcification. Mild to moderate mitral valve   regurgitation. · Mild to moderate tricuspid valve regurgitation is present. Pulmonary   hypertension is present. Signed by: Leann Fernandez MD          Previous:  Echo (10/10/2018): LVEF 20%, severe diffuse hypokinesis, mod concentric LVH.   LA mod to severely dilated, RA mildly dilated. Mild to mod MR. Mechanical prosthetic aortic valve, mean grad 19 mmHg. Mild TR. CABG & mechanical AVR 1996. Known occlusion to RCA & SVG-RCA. Lexiscan stress test (06/24/2015): LVEF 38%, inferior septal kinesis. Inferior infarction with small amount of emani infarct ischemia. Holter (04/2013): 21 beat VT & NSVT (4 beats) rate 140.  14% total beats ventricular. Previous ARB-HCTZ, discontinued due to renal dysfunction. Followed by nephrology. 04/15/19   ECHO ADULT COMPLETE 04/15/2019 4/15/2019    Narrative · Left ventricular moderately decreased systolic function. Calculated left   ventricular ejection fraction is 40%. Left ventricular mild concentric   hypertrophy. · Left atrial cavity size is moderately dilated. · Right ventricular cavity size is mildly dilated. Pacer/ICD present. · Right atrial cavity size is mildly dilated. · Aortic valve is mechanical prosthetic. Mild aortic valve regurgitation   is present. · Mild mitral annular calcification. Mild to moderate mitral valve   regurgitation. · Mild to moderate tricuspid valve regurgitation is present. Pulmonary   hypertension is present.         Signed by: Nataliia Ochoa MD       Problem List  Date Reviewed: 7/19/2022            Codes Class Noted    Hx of cardiac pacemaker ICD-10-CM: Z95.0  ICD-9-CM: V12.50, V45.01  12/6/2019        Depression, major, recurrent, mild ([facility] [address] ICD-10-CM: F33.0  ICD-9-CM: 296.31  3/10/2020        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 ICD-10-CM: T82.120A  ICD-9-CM: 996.01  12/6/2019    Overview Signed 12/6/2019  6:24 PM by Alex Marques MD     12/6/2019 LV lead of BIV ICD dislodgement so it was extracted and replaced with a new LV lead             Chronic systolic congestive heart failure ([facility] [address] ICD-10-CM: I50.22  ICD-9-CM: 428.22, 428.0  4/7/2019        LENNON (dyspnea on exertion) ICD-10-CM: R06.00  ICD-9-CM: 786.09  4/7/2019        Coronary artery disease involving native coronary artery of native heart with unstable angina pectoris (HCC) ICD-10-CM: I25.110  ICD-9-CM: 414.01, 411.1  4/7/2019        S/P CABG (coronary artery bypass graft) ICD-10-CM: Z95.1  ICD-9-CM: V45.81  4/7/2019        Severe aortic stenosis ICD-10-CM: I35.0  ICD-9-CM: 424.1  4/7/2019        Hypertension, essential ICD-10-CM: I10  ICD-9-CM: 401.9  4/7/2019        Dyslipidemia ICD-10-CM: E78.5  ICD-9-CM: 272.4  4/7/2019        PVC (premature ventricular contraction) ICD-10-CM: I49.3  ICD-9-CM: 427.69  4/7/2019        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 ([facility] [address] ICD-10-CM: U28.920Z  ICD-9-CM: 996.72  4/7/2019        Typical atrial flutter (HCC) ICD-10-CM: I48.3  ICD-9-CM: 427.32  3/4/2019        Status post catheter ablation of atrial flutter ICD-10-CM: Z98.890  ICD-9-CM: V45.89  3/4/2019    Overview Signed 3/4/2019  5:55 PM by Joycelyn Wilson MD     3/4/2019 to NSR and bidrectional line of block             Biventricular ICD (implantable cardioverter-defibrillator) in place ICD-10-CM: Z95.810  ICD-9-CM: V45.02  3/4/2019    Overview Addendum 12/6/2019  4:25 PM by Joycelyn Wilson MD     3/4/2019 [employer] BIV ICD  LV lead 12/6/2019             A-fib ([facility] [address] ICD-10-CM: I48.91  ICD-9-CM: 427.31  3/1/2019        Anemia ICD-10-CM: D64.9  ICD-9-CM: 285.9  10/9/2018        Hypothyroidism ICD-10-CM: E03.9  ICD-9-CM: 244.9  10/9/2018        Supratherapeutic INR ICD-10-CM: R79.1  ICD-9-CM: 790.92  10/9/2018        H/O mechanical aortic valve replacement ICD-10-CM: Z95.2  ICD-9-CM: V43.3  10/9/2018           Current Outpatient Medications on File Prior to Visit   Medication Sig Dispense Refill    allopurinoL (ZYLOPRIM) 300 mg tablet TAKE 1 TABLET EVERY DAY TO PREVENT GOUT 30 Tablet 10    levothyroxine (SYNTHROID) 150 mcg tablet TAKE 1 TABLET EVERY MORNING FOR THYROID 30 Tablet 10    PARoxetine (PAXIL) 20 mg tablet TAKE 1 TABLET EVERY DAY 30 Tablet 4    warfarin (COUMADIN) 2.5 mg tablet TAKE 1 TABLET EVERY DAY - NEEDS INR CHECK 90 Tablet 1    carvediloL (COREG) 12.5 mg tablet Take 1 Tablet by mouth two (2) times daily (with meals). 180 Tablet 1    atorvastatin (LIPITOR) 40 mg tablet Take 1 Tablet by mouth nightly. 90 Tablet 3    sacubitriL-valsartan (Entresto) 24-26 mg tablet Take 1 Tablet by mouth every twelve (12) hours. 180 Tablet 3    oxybutynin chloride XL (DITROPAN XL) 10 mg CR tablet Take 10 mg by mouth daily. omega 3-dha-epa-fish oil 100-160-1,000 mg cap Take 1 Cap by mouth daily. psyllium 0.52 gram capsule Take 1 Cap by mouth daily. b complex vitamins tablet Take 1 Tab by mouth daily. CYANOCOBALAMIN, VITAMIN B-12, PO Take 1 Tab by mouth daily. Sciatica      aspirin delayed-release 81 mg tablet Take 81 mg by mouth daily. therapeutic multivitamin (THERAGRAN) tablet Take 1 Tab by mouth daily. ascorbic acid, vitamin C, (VITAMIN C) 500 mg tablet Take 1,000 mg by mouth daily. B.animalis,bifid,infantis,long 10-15 mg TbEC Take 1 Tab by mouth daily. COQ10, UBIQUINOL, PO Take 1 Tab by mouth daily. finasteride (PROSCAR) 5 mg tablet Take 5 mg by mouth daily. tamsulosin (FLOMAX) 0.4 mg capsule Take 0.4 mg by mouth nightly. FERROUS GLUCONATE PO Take 325 mg by mouth daily. furosemide (LASIX) 20 mg tablet Take 1 Tab by mouth daily. (Patient taking differently: Take 20 mg by mouth in the morning. Per patient take 1 pill base on swelling) 90 Tab 1    spironolactone (ALDACTONE) 25 mg tablet Take 25 mg by mouth daily. (Patient not taking: Reported on 7/28/2022)       No current facility-administered medications on file prior to visit.        No Known Allergies  Past Medical History:   Diagnosis Date    Acute on chronic systolic heart failure, NYHA class 4 ([facility] [address] 03/01/2019    admit March 2019 7 days, EF 10% got Primacor, BIV implant    Anger     TAKES PAROXETINE TO CONTROL ANGER ISSUES    Arthritis     Burning with urination     CAD (coronary artery disease) 1996    CABG 1996, fu nuke 2015 fixed inferior defect    Cancer [facility])     prostate    Chronic atrial fibrillation ([address] 03/2019    RVR3/2019, had ablation of flutter- persistent    Foot drop, left     WEARS METAL BRACE    Gout     Hard of hearing     High cholesterol     Hypertension     Long term current use of anticoagulant therapy     Prostate enlargement     Sleep apnea     Thyroid disease     Unspecified sleep apnea     DOESN'T USE CPAP; \"IT MADE HIM SICK-COLDS, SINUS\", PER WIFE     Past Surgical History:   Procedure Laterality Date    HX ORTHOPAEDIC  2001    ORIF COMPOUND FRACTURE LEFT ANKLE    HX PACEMAKER      HX RETINAL DETACHMENT REPAIR  1980s    LEFT EYE    VT CARDIAC SURG PROCEDURE UNLIST      ANGIOPLASTY WITH CORONARY STENT    VT CARDIAC SURG PROCEDURE UNLIST  1996    AORTIC VALVE REPLACEMENT    VT COMPRE ELECTROPHYSIOL XM W/LEFT ATRIAL PACNG/REC N/A 3/4/2019    Lt Atrial Pace & Record During Ep Study performed by Ava Kohler MD at [place] [address], [facility]/s Dr CATH LAB    VT COMPRE EP EVAL ABLTJ 3D MAPG TX SVT N/A 3/4/2019    Ablation A-Flutter performed by Ava Kohler MD at [place] [address], [facility] Dr CATH LAB    VT EPHYS EVAL PACG CVDFB PRGRMG/REPRGRMG PARAMETERS N/A 3/4/2019    Eval Icd Generator & Leads W Testing At Implant performed by Ava Kohler MD at [place] [address], [facility] Dr CATH LAB    VT EPHYS EVAL PACG CVDFB PRGRMG/REPRGRMG PARAMETERS N/A 12/6/2019    Eval Icd Generator & Leads W Testing At Implant performed by Ava Kohler MD at [place] [address], [facility]/[address] Dr CATH LAB    VT INSJ ELTRD CAR KILO SYS TM INSJ DFB/PM PLS GEN N/A 12/6/2019    Lv Lead Placement performed by Ava Kohler MD at [place] [address], [facility]/[address] Dr CATH LAB    VT INSJ/RPLCMT PERM DFB W/TRNSVNS LDS 1/DUAL CHMBR N/A 3/4/2019    INSERT ICD BIV MULTI performed by Ava Kohler MD at [place] [address], [facility]/[address] Dr CATH LAB    VT INTRACARDIAC ELECTROPHYSIOLOGIC 3D MAPPING N/A 3/4/2019    Ep 3d Mapping performed by Ava Kohler MD at [place] [address], [facility] [address] [facility] LAB    VT RMVL1/DUAL CHMBR IMPLTBL DFB ELTRD TRANSVNS XTRJ N/A 12/6/2019 Laser Lead Extraction performed by Albino Zamarripa MD at [address], [facility] [address] [facility] LAB     Family History   Problem Relation Age of Onset    Stroke Mother         ANEURYSM    Stroke Father      Social History     Tobacco Use    Smoking status: Never    Smokeless tobacco: Never   Substance Use Topics    Alcohol use: Yes     Alcohol/week: 2.5 standard drinks     Types: 3 Standard drinks or equivalent per week     Comment: 4 times per month , 1 drink        Review of Systems:   Constitutional: Negative for fever, chills, weight loss, malaise/fatigue. HEENT: Negative for nosebleeds, vision changes. Respiratory: Negative for cough, hemoptysis  Cardiovascular: Negative for chest pain, palpitations, orthopnea, claudication, leg swelling, syncope, and PND. Gastrointestinal: Negative for nausea, vomiting, diarrhea, blood in stool and melena. Genitourinary: Negative for dysuria, and hematuria. Musculoskeletal: Negative for myalgias, arthralgia. Skin: Negative for rash. Heme: Does not bleed or bruise easily. Neurological: Negative for speech change and focal weakness     Objective:     Visit Vitals  /70 (BP 1 Location: Left upper arm, BP Patient Position: Sitting, BP Cuff Size: Adult)   Pulse 91   Resp 16   Ht 5' 8\" (1.727 m)   Wt 167 lb (75.8 kg)   SpO2 98%   BMI 25.39 kg/m²       Physical Exam:   Constitutional: well-developed and well-nourished. No respiratory distress. Head: Normocephalic and atraumatic. Eyes: Pupils are equal, round  ENT: hearing normal  Neck: supple. No JVD present. Cardiovascular: Normal rate, regular rhythm. Exam reveals no gallop and no friction rub. No murmur heard. Normal valve click  Pulmonary/Chest: Effort normal and breath sounds normal. No wheezes. Abdominal: Soft, no tenderness. Musculoskeletal: no edema. Neurological: alert,oriented. Skin: Skin is warm and dry  Psychiatric: normal mood and affect.  Behavior is normal. Judgment and thought content normal. Assessment/Plan:     {No Diagnosis Found}    LV lead pacing 1.25 V @ 0. 6 ms  92% CRT  84% RA pacing  Batter 5.5 years  AF burden 1.2 %, 15 hrs longest  Slow NSVT    Device works well  He is feeling well and on GDMT   No dizziness  BP is optimal  Coumadin and INR is checked in PCP's office  Wife was present in office follow up with him today     Future Appointments   Date Time Provider Lexie Moreno   8/23/2022  2:00 PM Marilynn Miller [facility] BS AMB   10/26/2022  4:30 PM REMOTE1, BUSTER STAHL BS AMB   1/30/2023 11:45 AM REMOTE1, BUSTER STAHL BS AMB   5/3/2023  4:30 PM REMOTE1, BUSTER STAHL BS AMB   8/22/2023  9:20 AM PACEMAKER3, BUSTER STAHL BS AMB   8/22/2023  9:40 AM MD MARIBETH Mccoy BS AMB       Thank you for involving me in this patient's care and please call with further concerns or questions. Yang Owens M.D.   Electrophysiology/Cardiology  [facility] [place]  [address] [place] [address]                             [phone]

## 2022-07-28 NOTE — PROGRESS NOTES
Chargeable CRT-D device check. Normal device check w/ 89% BIVP. 62% AF burden known. See scanned report in  for details.

## 2022-07-28 NOTE — PATIENT INSTRUCTIONS
Have labs obtained today. Our office will call you with results.     Follow up with Dr. Randal Deras in 1 year with device check (same day)

## 2022-07-29 ENCOUNTER — TELEPHONE (OUTPATIENT)
Dept: CARDIOLOGY CLINIC | Age: 87
End: 2022-07-29

## 2022-07-29 LAB
ANION GAP SERPL CALC-SCNC: 5 MMOL/L (ref 5–15)
BUN SERPL-MCNC: 20 MG/DL (ref 6–20)
BUN/CREAT SERPL: 18 (ref 12–20)
CALCIUM SERPL-MCNC: 8.8 MG/DL (ref 8.5–10.1)
CHLORIDE SERPL-SCNC: 112 MMOL/L (ref 97–108)
CO2 SERPL-SCNC: 26 MMOL/L (ref 21–32)
CREAT SERPL-MCNC: 1.13 MG/DL (ref 0.7–1.3)
GLUCOSE SERPL-MCNC: 107 MG/DL (ref 65–100)
POTASSIUM SERPL-SCNC: 4 MMOL/L (ref 3.5–5.1)
SODIUM SERPL-SCNC: 143 MMOL/L (ref 136–145)

## 2022-07-29 NOTE — PROGRESS NOTES
K WNL. OK to continue Entresto.       Future Appointments  8/23/2022  2:00 PM    Kailey Wilson, MONSTER   [facility]                BS AMB  10/26/2022 4:30 PM    REMOTE1, BUSTER STAHL              BS AMB  1/30/2023  11:45 AM   REMOTE1, BUSTER STAHL              BS AMB  5/3/2023   4:30 PM    REMOTE1, BUSTER STAHL              BS AMB  8/22/2023  9:20 AM    PACEMAKER3, BUSTER STAHL              BS AMB  8/22/2023  9:40 AM    MD MARIBETH Sams              BS AMB

## 2022-07-29 NOTE — TELEPHONE ENCOUNTER
----- Message from Marianela Vale NP sent at 7/29/2022  2:28 PM EDT -----  K WNL. OK to continue Entresto. Future Appointments  8/23/2022  2:00 PM    Patricia Comer, PHARMWESLEY   [facility]                BS AMB  10/26/2022 4:30 PM    REMOTE1, BUSTER STAHL              BS AMB  1/30/2023  11:45 AM   REMOTE1, BUSTER ALBA AMB  5/3/2023   4:30 PM    REMOTE1, BUSTER ALBA AMB  8/22/2023  9:20 AM    PACEMAKER3, BUSTER ALBA AMB  8/22/2023  9:40 AM    MD MARIBETH Napier              BS AMB      Called patient, patient unavailable. Spoke with patient's wife Dequan Humphreys. Patient ID verified using two patient identifiers. Informed her of above information. Dequan Humphreys will inform the patient of this information.

## 2022-07-31 PROBLEM — N18.30 CHRONIC RENAL DISEASE, STAGE III (HCC): Status: ACTIVE | Noted: 2022-07-31

## 2022-07-31 NOTE — PROGRESS NOTES
Cardiac Electrophysiology Office Note     Subjective:      Domenic Herrera is a 80 y.o. patient who is seen for follow up of biventricular pacemaker ICD  He had LV lead dislodged and reimplanted 12/2019  He has been missing appt the last 2 years  He had AFL with RVR and was ablated     He was admitted on 03/01/2019 for acute on chronic systolic heart failure, NYHA III-IV. He was seen in clinic on 02/28/2019, & direct admit was not available, so he presented to the ED for admission. Echo on 02/28/2019 showed LVEF 10% (20% in 2016). MEMO 03/01/2019, LVEF <15%   [employer] biventricular ICD implant on 03/04/2019. Anticoagulated with warfarin, no bleeding issues. he checks his INR with PCP near his home     Echo (02/28/2019): LVEF <15%, mod LV dilation, mod concentric LVH. RV global systolic function mod reduced. Mod MAC, mod MR. Prosthetic mechanical aortic valve, mild to mod AS. Mod TR, mod pulmonary hypertension. Mild IN. Ascending aorta mod dilated. Mod elevated CVP. MEMO (03/01/2019): LVEF <15%, mechanical prosthetic aortic valve. ECHO ADULT COMPLETE 07/19/2021 7/19/2021    Interpretation Summary  · LV: Mildly reduced LV systolic function with mild global hypokinesia and an LV EF measured 42% by biplane method. Upper normal cavity size. Mild concentric hypertrophy. Abnormal left ventricular strain. Global longitudinal strain is -11.7%. · AV: There is a mechanical prosthetic aortic valve present. It exhibits normal function. Aortic valve mean gradient is 10.1 mmHg. Aortic valve area is 1.3 cm2.  · MV: Mild mitral valve regurgitation is present. · TV: Mild tricuspid valve regurgitation is present. Right Ventricular Arterial Pressure (RVSP) is 20 mmHg. Pulmonary hypertension not suggested by Doppler findings. · LA: Mildly dilated left atrium. Left Atrium volume index is 35 mL/m2. · AO: Mild ascending aorta dilatation. Ascending aorta diameter = 4.3 cm.   · Results were discussed with the patient at an associated office visit. Signed by: Alfredo Leon MD on 7/19/2021 12:13 AM         Previous:  Echo (10/10/2018): LVEF 20%, severe diffuse hypokinesis, mod concentric LVH. LA mod to severely dilated, RA mildly dilated. Mild to mod MR. Mechanical prosthetic aortic valve, mean grad 19 mmHg. Mild TR. CABG & mechanical AVR 1996. Known occlusion to RCA & SVG-RCA. Lexiscan stress test (06/24/2015): LVEF 38%, inferior septal kinesis. Inferior infarction with small amount of emani infarct ischemia. Holter (04/2013): 21 beat VT & NSVT (4 beats) rate 140.  14% total beats ventricular. Previous ARB-HCTZ, discontinued due to renal dysfunction. Followed by nephrology.          Problem List  Date Reviewed: 7/31/2022            Codes Class Noted    Hx of cardiac pacemaker ICD-10-CM: Z95.0  ICD-9-CM: V12.50, V45.01  12/6/2019        Chronic renal disease, stage III ICD-10-CM: N18.30  ICD-9-CM: 585.3  7/31/2022        Depression, major, recurrent, mild ([address] ICD-10-CM: F33.0  ICD-9-CM: 296.31  3/10/2020        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 ICD-10-CM: T82.120A  ICD-9-CM: 996.01  12/6/2019    Overview Signed 12/6/2019  6:24 PM by Mary Milton MD     12/6/2019 LV lead of BIV ICD dislodgement so it was extracted and replaced with a new LV lead             Chronic systolic congestive heart failure ([address] ICD-10-CM: I50.22  ICD-9-CM: 428.22, 428.0  4/7/2019        LENNON (dyspnea on exertion) ICD-10-CM: R06.00  ICD-9-CM: 786.09  4/7/2019        Coronary artery disease involving native coronary artery of native heart with unstable angina pectoris (HCC) ICD-10-CM: I25.110  ICD-9-CM: 414.01, 411.1  4/7/2019        S/P CABG (coronary artery bypass graft) ICD-10-CM: Z95.1  ICD-9-CM: V45.81  4/7/2019        Severe aortic stenosis ICD-10-CM: I35.0  ICD-9-CM: 424.1  4/7/2019        Hypertension, essential ICD-10-CM: I10  ICD-9-CM: 401.9  4/7/2019        Dyslipidemia ICD-10-CM: E78.5  ICD-9-CM: 272.4  4/7/2019        PVC (premature ventricular contraction) ICD-10-CM: I49.3  ICD-9-CM: 427.69  4/7/2019        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 (HCC) ICD-10-CM: R77.068V  ICD-9-CM: 996.72  4/7/2019        Typical atrial flutter (HCC) ICD-10-CM: I48.3  ICD-9-CM: 427.32  3/4/2019        Status post catheter ablation of atrial flutter ICD-10-CM: Z98.890  ICD-9-CM: V45.89  3/4/2019    Overview Signed 3/4/2019  5:55 PM by Susan Dawson MD     3/4/2019 to NSR and bidrectional line of block             Biventricular ICD (implantable cardioverter-defibrillator) in place ICD-10-CM: Z95.810  ICD-9-CM: V45.02  3/4/2019    Overview Addendum 12/6/2019  4:25 PM by Susan Dawson MD     3/4/2019 [employer] BIV ICD  LV lead 12/6/2019             A-fib ([address] ICD-10-CM: I48.91  ICD-9-CM: 427.31  3/1/2019        Anemia ICD-10-CM: D64.9  ICD-9-CM: 285.9  10/9/2018        Hypothyroidism ICD-10-CM: E03.9  ICD-9-CM: 244.9  10/9/2018        Supratherapeutic INR ICD-10-CM: R79.1  ICD-9-CM: 790.92  10/9/2018        H/O mechanical aortic valve replacement ICD-10-CM: Z95.2  ICD-9-CM: V43.3  10/9/2018           Current Outpatient Medications on File Prior to Visit   Medication Sig Dispense Refill    allopurinoL (ZYLOPRIM) 300 mg tablet TAKE 1 TABLET EVERY DAY TO PREVENT GOUT 30 Tablet 10    levothyroxine (SYNTHROID) 150 mcg tablet TAKE 1 TABLET EVERY MORNING FOR THYROID 30 Tablet 10    PARoxetine (PAXIL) 20 mg tablet TAKE 1 TABLET EVERY DAY 30 Tablet 4    warfarin (COUMADIN) 2.5 mg tablet TAKE 1 TABLET EVERY DAY - NEEDS INR CHECK 90 Tablet 1    carvediloL (COREG) 12.5 mg tablet Take 1 Tablet by mouth two (2) times daily (with meals). 180 Tablet 1    atorvastatin (LIPITOR) 40 mg tablet Take 1 Tablet by mouth nightly. 90 Tablet 3    sacubitriL-valsartan (Entresto) 24-26 mg tablet Take 1 Tablet by mouth every twelve (12) hours.  180 Tablet 3    oxybutynin chloride XL (DITROPAN XL) 10 mg CR tablet Take 10 mg by mouth daily.      omega 3-dha-epa-fish oil 100-160-1,000 mg cap Take 1 Cap by mouth daily. psyllium 0.52 gram capsule Take 1 Cap by mouth daily. b complex vitamins tablet Take 1 Tab by mouth daily. CYANOCOBALAMIN, VITAMIN B-12, PO Take 1 Tab by mouth daily. Sciatica      aspirin delayed-release 81 mg tablet Take 81 mg by mouth daily. therapeutic multivitamin (THERAGRAN) tablet Take 1 Tab by mouth daily. ascorbic acid, vitamin C, (VITAMIN C) 500 mg tablet Take 1,000 mg by mouth daily. B.animalis,bifid,infantis,long 10-15 mg TbEC Take 1 Tab by mouth daily. COQ10, UBIQUINOL, PO Take 1 Tab by mouth daily. finasteride (PROSCAR) 5 mg tablet Take 5 mg by mouth daily. tamsulosin (FLOMAX) 0.4 mg capsule Take 0.4 mg by mouth nightly. FERROUS GLUCONATE PO Take 325 mg by mouth daily. furosemide (LASIX) 20 mg tablet Take 1 Tab by mouth daily. (Patient taking differently: Take 20 mg by mouth in the morning. Per patient take 1 pill base on swelling) 90 Tab 1    spironolactone (ALDACTONE) 25 mg tablet Take 25 mg by mouth daily. (Patient not taking: Reported on 7/28/2022)       No current facility-administered medications on file prior to visit.        No Known Allergies  Past Medical History:   Diagnosis Date    Acute on chronic systolic heart failure, NYHA class 4 ([facility] [address] 03/01/2019    admit March 2019 7 days, EF 10% got Primacor, BIV implant    Anger     TAKES PAROXETINE TO CONTROL ANGER ISSUES    Arthritis     Burning with urination     CAD (coronary artery disease) 1996    CABG 1996, mukul celis 2015 fixed inferior defect    Cancer [facility])     prostate    Chronic atrial fibrillation ([facility] [address] 03/2019    RVR3/2019, had ablation of flutter- persistent    Foot drop, left     WEARS METAL BRACE    Gout     Hard of hearing     High cholesterol     Hypertension     Long term current use of anticoagulant therapy     Prostate enlargement     Sleep apnea     Thyroid disease     Unspecified sleep apnea DOESN'T USE CPAP; \"IT MADE HIM SICK-COLDS, SINUS\", PER WIFE     Past Surgical History:   Procedure Laterality Date    HX ORTHOPAEDIC  2001    ORIF COMPOUND FRACTURE LEFT ANKLE    HX PACEMAKER      HX RETINAL DETACHMENT REPAIR  1980s    LEFT EYE    MO CARDIAC SURG PROCEDURE UNLIST      ANGIOPLASTY WITH CORONARY STENT    MO CARDIAC SURG PROCEDURE UNLIST  1996    AORTIC VALVE REPLACEMENT    MO COMPRE ELECTROPHYSIOL XM W/LEFT ATRIAL PACNG/REC N/A 3/4/2019    Lt Atrial Pace & Record During Ep Study performed by Hermila Koch MD at [place] [address], [facility] CATH LAB    MO COMPRE EP EVAL ABLTJ 3D MAPG TX SVT N/A 3/4/2019    Ablation A-Flutter performed by Hermila Koch MD at [place] [address], [facility] CATH LAB    MO EPHYS EVAL PACG CVDFB PRGRMG/REPRGRMG PARAMETERS N/A 3/4/2019    Eval Icd Generator & Leads W Testing At Implant performed by Hermila Koch MD at [place] [address], [facility] CATH LAB    MO EPHYS EVAL PACG CVDFB PRGRMG/REPRGRMG PARAMETERS N/A 12/6/2019    Eval Icd Generator & Leads W Testing At Implant performed by Hermila Koch MD at [place] [address], [facility] CATH LAB    MO INSJ ELTRD CAR KILO SYS TM INSJ DFB/PM PLS GEN N/A 12/6/2019    Lv Lead Placement performed by Hermila Koch MD at [place] [address], [facility] CATH LAB    MO INSJ/RPLCMT PERM DFB W/TRNSVNS LDS 1/DUAL CHMBR N/A 3/4/2019    INSERT ICD BIV MULTI performed by Hermila Koch MD at [place] [address], [facility] CATH LAB    MO INTRACARDIAC ELECTROPHYSIOLOGIC 3D MAPPING N/A 3/4/2019    Ep 3d Mapping performed by Hermila Koch MD at [place] [address], [facility] CATH LAB    MO RMVL1/DUAL CHMBR IMPLTBL DFB ELTRD TRANSVNS XTRJ N/A 12/6/2019    Laser Lead Extraction performed by Hermila Koch MD at [place] [address], [facility] CATH LAB     Family History   Problem Relation Age of Onset    Stroke Mother         ANEURYSM    Stroke Father      Social History     Tobacco Use    Smoking status: Never    Smokeless tobacco: Never   Substance Use Topics    Alcohol use:  Yes     Alcohol/week: 2.5 standard drinks     Types: 3 Standard drinks or equivalent per week     Comment: 4 times per month , 1 drink        Review of Systems:   Constitutional: Negative for fever, chills, weight loss, malaise/fatigue. HEENT: Negative for nosebleeds, vision changes. Respiratory: Negative for cough, hemoptysis  Cardiovascular: Negative for chest pain, palpitations, orthopnea, claudication, leg swelling, syncope, and PND. Gastrointestinal: Negative for nausea, vomiting, diarrhea, blood in stool and melena. Genitourinary: Negative for dysuria, and hematuria. Musculoskeletal: Negative for myalgias, arthralgia. Skin: Negative for rash. Heme: Does not bleed or bruise easily. Neurological: Negative for speech change and focal weakness     Objective:     Visit Vitals  /70 (BP 1 Location: Left upper arm, BP Patient Position: Sitting, BP Cuff Size: Adult)   Pulse 91   Resp 16   Ht 5' 8\" (1.727 m)   Wt 167 lb (75.8 kg)   SpO2 98%   BMI 25.39 kg/m²       Physical Exam:   Constitutional: well-developed and well-nourished. No respiratory distress. Head: Normocephalic and atraumatic. Eyes: Pupils are equal, round  ENT: hearing normal  Neck: supple. No JVD present. Cardiovascular: Normal rate, regular rhythm. Exam reveals no gallop and no friction rub. No murmur heard. Normal valve click  Pulmonary/Chest: Effort normal and breath sounds normal. No wheezes. Abdominal: Soft, no tenderness. Musculoskeletal: no edema. Neurological: alert,oriented. Skin: Skin is warm and dry healed BIV ICD pocket  Psychiatric: normal mood and affect. Behavior is normal. Judgment and thought content normal.         Assessment/Plan:       ICD-10-CM ICD-9-CM    1. Biventricular ICD (implantable cardioverter-defibrillator) in place  Z95.810 V45.02       2. Coronary artery disease involving native coronary artery of native heart without angina pectoris  K67.81 591.01 METABOLIC PANEL, BASIC      CANCELED: METABOLIC PANEL, BASIC      3.  Chronic systolic congestive heart failure (HCC)  I50.22 428.22      428.0       4. Chronic atrial fibrillation (HCC)  I48.20 427.31       5. S/P AVR (aortic valve replacement)  Z95.2 V43.3         BIV ICD:   LV lead pacing 1.25 V @ 0. 6 ms  92% CRT  84% RA pacing  Battery 5.5 years  AF burden 1.2 %, 15 hrs longest  Slow NSVT    Device works well with chronic systolic CHF. LVEF has improved with biventricular pacing  He is feeling well and on GDMT     No dizziness  BP is optimal    Atrial fibrillation:   Occasional RVR. If RVR and persistently low biventricular pacing, will consider ablation  Coumadin and INR is checked in PCP's office  Wife was present in office follow up with him today     Future Appointments   Date Time Provider Lexie Moreno   8/23/2022  2:00 PM Melinda Swift [facility] BS AMB   10/26/2022  4:30 PM REMOTE1, BUSTER STAHL BS AMB   1/30/2023 11:45 AM REMOTE1, BUSTER STAHL BS AMB   5/3/2023  4:30 PM REMOTE1, BUSTER STAHL BS AMB   8/22/2023  9:20 AM PACEMAKER3, BUSTER STAHL BS AMB   8/22/2023  9:40 AM MD MARIBETH Parish BS AMB       Thank you for involving me in this patient's care and please call with further concerns or questions. Thomas Ugalde M.D.   Electrophysiology/Cardiology  [facility] [place]  [address] [place] [address]                             [phone]

## 2022-08-18 DIAGNOSIS — I48.20 CHRONIC ATRIAL FIBRILLATION (HCC): ICD-10-CM

## 2022-08-18 RX ORDER — WARFARIN 2.5 MG/1
TABLET ORAL
Qty: 90 TABLET | Refills: 0 | Status: SHIPPED | OUTPATIENT
Start: 2022-08-18

## 2022-08-20 NOTE — PROGRESS NOTES
Pharmacy Progress Note - Anticoagulation Management    S/O: Mr. Pedro Desai  is a 80 y.o. male, referred by Dr. Jimena Carlos MD, was seen today for anticoagulation management for the diagnosis of Atrial Fibrillation and Aortic Valve Replacement  ([id number] [facility]), s/p pacemaker. Pt is very hard of hearing. Patient was seen today together with Colonel Barrett PharmD Candidate 2023. I have read and agree with Kat's documentation. Interim history:  Saw Dr Kellie Vargas (cardio) in July. Warfarin start date: ~1996  INR Goal:  2.0-3.0    Current warfarin regimen:   5 mg x 1, then 2.5 mg daily                  Warfarin tablet strength:   1 mg, 2.5 mg, 5 mg  -- wife states she does not want to split tablets in half ; generally takes 2.5 mg tab. Able to recognize tablet strength  Duration of therapy: indefinite  Takes warfarin in the evening    Today's pertinent positives includes:  No significant changes since last visit    Denies s/sx of bleeding/bruises  Wife has fallen twice in the past week. Increased stressors at home. Results for orders placed or performed in visit on 08/23/22   AMB POC PT/INR   Result Value Ref Range    VALID INTERNAL CONTROL POC Yes     Prothrombin time (POC) 45.5 (A) 9.1 - 12 seconds    INR POC 3.8 (A) 1 - 1.5     Adherence:   Able to recall regimen? YES  Miss/extra dose? NO  Need refill?  NO    Upcoming procedure(s):  NO      Past Medical History:   Diagnosis Date    Acute on chronic systolic heart failure, NYHA class 4 ([facility] [address] 03/01/2019    admit March 2019 7 days, EF 10% got Primacor, BIV implant    Anger     TAKES PAROXETINE TO CONTROL ANGER ISSUES    Arthritis     Burning with urination     CAD (coronary artery disease) 1996    CABG 1996, fu vimal 2015 fixed inferior defect    Cancer [facility])     prostate    Chronic atrial fibrillation ([facility] [address] 03/2019    RVR3/2019, had ablation of flutter- persistent    Foot drop, left     WEARS METAL BRACE    Gout     Hard of hearing     High cholesterol     Hypertension     Long term current use of anticoagulant therapy     Prostate enlargement     Sleep apnea     Thyroid disease     Unspecified sleep apnea     DOESN'T USE CPAP; \"IT MADE HIM SICK-COLDS, SINUS\", PER WIFE     No Known Allergies  Current Outpatient Medications   Medication Sig    warfarin (COUMADIN) 2.5 mg tablet TAKE 1 TABLET EVERY DAY - NEEDS INR CHECK    allopurinoL (ZYLOPRIM) 300 mg tablet TAKE 1 TABLET EVERY DAY TO PREVENT GOUT    levothyroxine (SYNTHROID) 150 mcg tablet TAKE 1 TABLET EVERY MORNING FOR THYROID    PARoxetine (PAXIL) 20 mg tablet TAKE 1 TABLET EVERY DAY    carvediloL (COREG) 12.5 mg tablet Take 1 Tablet by mouth two (2) times daily (with meals). atorvastatin (LIPITOR) 40 mg tablet Take 1 Tablet by mouth nightly. sacubitriL-valsartan (Entresto) 24-26 mg tablet Take 1 Tablet by mouth every twelve (12) hours. oxybutynin chloride XL (DITROPAN XL) 10 mg CR tablet Take 10 mg by mouth daily. spironolactone (ALDACTONE) 25 mg tablet Take 25 mg by mouth daily. (Patient not taking: Reported on 7/28/2022)    omega 3-dha-epa-fish oil 100-160-1,000 mg cap Take 1 Cap by mouth daily. psyllium 0.52 gram capsule Take 1 Cap by mouth daily. b complex vitamins tablet Take 1 Tab by mouth daily. CYANOCOBALAMIN, VITAMIN B-12, PO Take 1 Tab by mouth daily. Sciatica    aspirin delayed-release 81 mg tablet Take 81 mg by mouth daily. therapeutic multivitamin (THERAGRAN) tablet Take 1 Tab by mouth daily. ascorbic acid, vitamin C, (VITAMIN C) 500 mg tablet Take 1,000 mg by mouth daily. B.animalis,bifid,infantis,long 10-15 mg TbEC Take 1 Tab by mouth daily. COQ10, UBIQUINOL, PO Take 1 Tab by mouth daily. finasteride (PROSCAR) 5 mg tablet Take 5 mg by mouth daily. tamsulosin (FLOMAX) 0.4 mg capsule Take 0.4 mg by mouth nightly. FERROUS GLUCONATE PO Take 325 mg by mouth daily. furosemide (LASIX) 20 mg tablet Take 1 Tab by mouth daily.  (Patient taking differently: Take 20 mg by mouth in the morning. Per patient take 1 pill base on swelling)     No current facility-administered medications for this visit. Wt Readings from Last 3 Encounters:   07/28/22 167 lb (75.8 kg)   07/19/22 171 lb (77.6 kg)   05/23/22 170 lb 6.4 oz (77.3 kg)     BP Readings from Last 3 Encounters:   08/23/22 122/85   07/28/22 108/70   07/19/22 123/81     Pulse Readings from Last 3 Encounters:   08/23/22 (!) 167   07/28/22 91   07/19/22 71     Lab Results   Component Value Date/Time    WBC 5.7 08/09/2020 08:14 PM    HGB 13.2 08/09/2020 08:14 PM    HCT 39.3 08/09/2020 08:14 PM    PLATELET 738 (L) 76/69/8264 08:14 PM    MCV 98.0 08/09/2020 08:14 PM     Lab Results   Component Value Date/Time    Sodium 143 07/28/2022 02:49 PM    Potassium 4.0 07/28/2022 02:49 PM    Chloride 112 (H) 07/28/2022 02:49 PM    CO2 26 07/28/2022 02:49 PM    Anion gap 5 07/28/2022 02:49 PM    Glucose 107 (H) 07/28/2022 02:49 PM    BUN 20 07/28/2022 02:49 PM    Creatinine 1.13 07/28/2022 02:49 PM    BUN/Creatinine ratio 18 07/28/2022 02:49 PM    GFR est AA >60 07/28/2022 02:49 PM    GFR est non-AA >60 07/28/2022 02:49 PM    Calcium 8.8 07/28/2022 02:49 PM    Bilirubin, total 0.7 05/13/2021 12:25 PM    Alk. phosphatase 122 (H) 05/13/2021 12:25 PM    Protein, total 6.6 05/13/2021 12:25 PM    Albumin 3.9 05/13/2021 12:25 PM    Globulin 2.7 05/13/2021 12:25 PM    A-G Ratio 1.4 05/13/2021 12:25 PM    ALT (SGPT) 41 05/13/2021 12:25 PM     CrCl cannot be calculated (Unknown ideal weight.).     INR History:   (normal INR range 0.8-1.2)     Date   INR   PT   Dose/Comments  08/23/22 3.8  45.5 2.5 mg daily   07/19/22          2.4                   28.5     5 mg x 1, then 2.5 mg daily   06/21/22          2.0                   23.6     2.5 mg daily ; (+) missed dose   06/01/22          2.3                   27.2     5 mg x 2, then 2.5 mg daily  05/23/22          1.4                   16.7     5 mg x 1, then 2.5 mg daily  04/25/22 1.8                   21.7     Hold x 1, then 2.5 mg daily  Lapse in INR clinic  11/03/21          3.8                   45.1     5 mg x 1, then 2.5 mg daily ; (+) extra dose  10/25/21          1.9                   22.4     5 mg x 1, then 2.5 mg daily ; (+) hematuria  08/25/21          1.7                   20.1     2.5 mg daily    A/P:       Anticoagulation:  Considering Mr. Serge Barillas past history, todays findings, and per Anticoagulation Collaborative Practice Agreement/Protocol:    Fingerstick POC INR (3.8) is supratherapeutic for INR goal today. Hold warfarin tonight. Then Continue warfarin 2.5 mg daily    Patient was instructed to schedule an appointment in 3-4 week(s) prior to leaving the clinic. Medication reconciliation was completed during the visit. There are no discontinued medications. A full discussion of the nature of anticoagulants has been carried out. A full discussion of the need for frequent and regular monitoring, precise dosage adjustment and compliance was stressed. Side effects of potential bleeding were discussed and Mr. Jeffery Solano was instructed to call [phone] if there are any signs of abnormal bleeding. Mr. Jeffery Solano was instructed to avoid any OTC items containing aspirin or ibuprofen and prior to starting any new OTC products to consult with his physician or pharmacist to ensure no drug interactions are present. Mr. Jeffery Solano was instructed to avoid any major changes in his general diet and to avoid alcohol consumption. Mr. Jeffery Solano was provided information in the AVS that includes topics on understanding coumadin therapy, drug interaction considerations, vitamin K and coumadin use, interactions with foods and supplements containing vitamin K, and the use of herbal products. Mr. Jeffery Solano verbalized his understanding of all instructions and will call the office with any questions, concerns, or signs of abnormal bleeding or blood clot.   Notifications of recommendations will be sent to Dr. Yeny Mcgowan MD for review.     Thank you,  Blanca Pagan Che, PharmD, BCACP, [address] [facility] in place: Yes  Recommendation Provided To: Patient/Caregiver: 3 via In person  Intervention Detail: Dose Adjustment: 1, reason: Therapy De-escalation, Lab(s) Ordered, and Scheduled Appointment  Intervention Accepted By: Patient/Caregiver: 3  Time Spent (min): 20

## 2022-08-20 NOTE — PATIENT INSTRUCTIONS
Today your INR was 3.8. Your goal INR is  2.0-3.0 . You have a 1 mg, 2.5 mg, and 5 mg tablet of Coumadin (warfarin). Take Coumadin (warfarin) as follows:    Hold warfarin tonight. Then continue warfarin 2.5 mg daily    Come back in 3 week(s) for your next finger stick/INR blood test.      Avoid any over the counter items containing aspirin or ibuprofen, and avoid great swings in general diet. Avoid alcohol consumption. Please notify the INR nurse if you are started on any new medication including over the counter or herbal supplements. Also, please notify your INR nurse if any of your other prescription or over the counter medications have been discontinued. Call [facility] at [phone] if you have any signs of abnormal bleeding/blood clot.  ------------------------------------------------------------------------------------------------------------------  Taking Warfarin Safely: Care Instructions    Your Care Instructions  Warfarin is a medicine that you take to prevent blood clots. It is often called a blood thinner. Doctors give warfarin (such as Coumadin) to reduce the risk of blood clots. You may be at risk for blood clots if you have atrial fibrillation or deep vein thrombosis. Some other health problems may also put you at risk. Warfarin slows the amount of time it takes for your blood to clot. It can cause bleeding problems. Even if you've been taking warfarin for a while, it's important to know how to take it safely. Foods and other medicines can affect the way warfarin works. Some can make warfarin work too well. This can cause bleeding problems. And some can make it work poorly, so that it does not prevent blood clots very well. You will need regular blood tests to check how long it takes for your blood to form a clot. This test is called a PT or prothrombin time test. The result of the test is called an INR level.  Depending on the test results, your doctor or anticoagulation clinic may adjust your dose of warfarin. Follow-up care is a key part of your treatment and safety. Be sure to make and go to all appointments, and call your doctor if you are having problems. It's also a good idea to know your test results and keep a list of the medicines you take. How can you care for yourself at home? Take warfarin safely  Take your warfarin at the same time each day. If you miss a dose of warfarin, don't take an extra dose to make up for it. Your doctor can tell you exactly what to do so you don't take too much or too little. Wear medical alert jewelry that lets others know that you take warfarin. You can buy this at most drugstores. Don't take warfarin if you are pregnant or planning to get pregnant. Talk to your doctor about how you can prevent getting pregnant while you are taking it. Don't change your dose or stop taking warfarin unless your doctor tells you to. Effects of medicines and food on warfarin  Don't start or stop taking any medicines, vitamins, or natural remedies unless you first talk to your doctor. Many medicines can affect how warfarin works. These include aspirin and other pain relievers, over-the-counter medicines, multivitamins, dietary supplements, and herbal products. Tell all of your doctors and pharmacists that you take warfarin. Some prescription medicines can affect how warfarin works. Keep the amount of vitamin K in your diet about the same from day to day. Do not suddenly eat a lot more or a lot less food that is rich in vitamin K than you usually do. Vitamin K affects how warfarin works and how your blood clots. Talk with your doctor before making big changes in your diet. Vitamin K is in many foods, such as:  Leafy greens, such as kale, cabbage, spinach, Swiss chard, and lettuce. Canola and soybean oils. Green vegetables, such as asparagus, broccoli, and [place] sprouts.   Vegetable drinks, green tea leaves, and some dietary supplement drinks. Avoid cranberry juice and other cranberry products. They can increase the effects of warfarin. Limit your use of alcohol. Avoid bleeding by preventing falls and injuries  Wear slippers or shoes with nonskid soles. Remove throw rugs and clutter. Rearrange furniture and electrical cords to keep them out of walking paths. Keep stairways, porches, and outside walkways well lit. Use night-lights in hallways and bathrooms. Be extra careful when you work with sharp tools or knives. When should you call for help? Call 911 anytime you think you may need emergency care. For example, call if:  You have a sudden, severe headache that is different from past headaches. Call your doctor now or seek immediate medical care if:  You have any abnormal bleeding, such as:  Nosebleeds. Vaginal bleeding that is different (heavier, more frequent, at a different time of the month) than what you are used to. Bloody or black stools, or rectal bleeding. Bloody or pink urine. Watch closely for changes in your health, and be sure to contact your doctor if you have any problems. Where can you learn more? Go to http://www.gray.com/. Enter D048 in the search box to learn more about \"Taking Warfarin Safely: Care Instructions. \"  Current as of: January 27, 2016  Content Version: 11.1  © 2822-0392 [employer], [email or web address]. Care instructions adapted under license by [employer] (which disclaims liability or warranty for this information). If you have questions about a medical condition or this instruction, always ask your healthcare professional. [place] [address] any warranty or liability for your use of this information.

## 2022-08-23 ENCOUNTER — ANTI-COAG VISIT (OUTPATIENT)
Dept: INTERNAL MEDICINE CLINIC | Age: 87
End: 2022-08-23
Payer: MEDICARE

## 2022-08-23 VITALS
OXYGEN SATURATION: 100 % | BODY MASS INDEX: 25.39 KG/M2 | HEART RATE: 167 BPM | DIASTOLIC BLOOD PRESSURE: 73 MMHG | SYSTOLIC BLOOD PRESSURE: 113 MMHG | HEIGHT: 68 IN

## 2022-08-23 DIAGNOSIS — Z95.2 S/P AVR (AORTIC VALVE REPLACEMENT): ICD-10-CM

## 2022-08-23 DIAGNOSIS — I48.20 CHRONIC ATRIAL FIBRILLATION (HCC): Primary | ICD-10-CM

## 2022-08-23 DIAGNOSIS — Z95.2 H/O MECHANICAL AORTIC VALVE REPLACEMENT: ICD-10-CM

## 2022-08-23 LAB
INR BLD: 3.8 (ref 1–1.5)
PT POC: 45.5 SECONDS (ref 9.1–12)
VALID INTERNAL CONTROL?: YES

## 2022-08-23 PROCEDURE — G0463 HOSPITAL OUTPT CLINIC VISIT: HCPCS | Performed by: PHARMACIST

## 2022-08-23 PROCEDURE — 85610 PROTHROMBIN TIME: CPT | Performed by: PHARMACIST

## 2022-08-23 NOTE — PROGRESS NOTES
Pharmacy Progress Note - Anticoagulation Management    S/O:  Mr. Pedro Desai  is a 80 y.o. male, referred by Dr. Jimena Carlos MD, was seen today for anticoagulation management for the diagnosis of Atrial Fibrillation and Aortic Valve Replacement  ([id number] [facility]), s/p pacemaker. Pt is very hard of hearing. Warfarin start date: ~1996  INR Goal:  2.0-3.0    Current warfarin regimen:  2.5 mg daily                  Warfarin tablet strength:   1 mg, 2.5 mg, 5 mg  -- wife states she does not want to split tablets in half ; generally takes 2.5 mg tab. Able to recognize tablet strength  Duration of therapy: indefinite  Takes warfarin in the evening    Today's pertinent positives includes:  Change in diet/appetite  - Still working on eating less veggies; ~4 servings per week. - Has a hernia, he plans to make an appointment to have it assessed. Results for orders placed or performed in visit on 08/23/22   AMB POC PT/INR   Result Value Ref Range    VALID INTERNAL CONTROL POC Yes     Prothrombin time (POC)      INR POC 3.8 (A) 1 - 1.5     Adherence:   Able to recall regimen? YES  Miss/extra dose? NO  Need refill?  NO    Upcoming procedure(s):  NO      Past Medical History:   Diagnosis Date    Acute on chronic systolic heart failure, NYHA class 4 ([facility] [address] 03/01/2019    admit March 2019 7 days, EF 10% got Primacor, BIV implant    Anger     TAKES PAROXETINE TO CONTROL ANGER ISSUES    Arthritis     Burning with urination     CAD (coronary artery disease) 1996    CABG 1996, mukul celis 2015 fixed inferior defect    Cancer [facility])     prostate    Chronic atrial fibrillation ([facility] [address] 03/2019    RVR3/2019, had ablation of flutter- persistent    Foot drop, left     WEARS METAL BRACE    Gout     Hard of hearing     High cholesterol     Hypertension     Long term current use of anticoagulant therapy     Prostate enlargement     Sleep apnea     Thyroid disease     Unspecified sleep apnea     DOESN'T USE CPAP; \"IT MADE HIM SICK-COLDS, SINUS\", PER WIFE     No Known Allergies  Current Outpatient Medications   Medication Sig    warfarin (COUMADIN) 2.5 mg tablet TAKE 1 TABLET EVERY DAY - NEEDS INR CHECK    allopurinoL (ZYLOPRIM) 300 mg tablet TAKE 1 TABLET EVERY DAY TO PREVENT GOUT    levothyroxine (SYNTHROID) 150 mcg tablet TAKE 1 TABLET EVERY MORNING FOR THYROID    PARoxetine (PAXIL) 20 mg tablet TAKE 1 TABLET EVERY DAY    carvediloL (COREG) 12.5 mg tablet Take 1 Tablet by mouth two (2) times daily (with meals). atorvastatin (LIPITOR) 40 mg tablet Take 1 Tablet by mouth nightly. sacubitriL-valsartan (Entresto) 24-26 mg tablet Take 1 Tablet by mouth every twelve (12) hours. oxybutynin chloride XL (DITROPAN XL) 10 mg CR tablet Take 10 mg by mouth daily. spironolactone (ALDACTONE) 25 mg tablet Take 25 mg by mouth daily. (Patient not taking: Reported on 7/28/2022)    omega 3-dha-epa-fish oil 100-160-1,000 mg cap Take 1 Cap by mouth daily. psyllium 0.52 gram capsule Take 1 Cap by mouth daily. b complex vitamins tablet Take 1 Tab by mouth daily. CYANOCOBALAMIN, VITAMIN B-12, PO Take 1 Tab by mouth daily. Sciatica    aspirin delayed-release 81 mg tablet Take 81 mg by mouth daily. therapeutic multivitamin (THERAGRAN) tablet Take 1 Tab by mouth daily. ascorbic acid, vitamin C, (VITAMIN C) 500 mg tablet Take 1,000 mg by mouth daily. B.animalis,bifid,infantis,long 10-15 mg TbEC Take 1 Tab by mouth daily. COQ10, UBIQUINOL, PO Take 1 Tab by mouth daily. finasteride (PROSCAR) 5 mg tablet Take 5 mg by mouth daily. tamsulosin (FLOMAX) 0.4 mg capsule Take 0.4 mg by mouth nightly. FERROUS GLUCONATE PO Take 325 mg by mouth daily. furosemide (LASIX) 20 mg tablet Take 1 Tab by mouth daily. (Patient taking differently: Take 20 mg by mouth in the morning. Per patient take 1 pill base on swelling)     No current facility-administered medications for this visit.      Wt Readings from Last 3 Encounters:   07/28/22 167 lb (75.8 kg)   07/19/22 171 lb (77.6 kg)   05/23/22 170 lb 6.4 oz (77.3 kg)     BP Readings from Last 3 Encounters:   08/23/22 113/73   07/28/22 108/70   07/19/22 123/81     Pulse Readings from Last 3 Encounters:   08/23/22 (!) 167   07/28/22 91   07/19/22 71     Lab Results   Component Value Date/Time    WBC 5.7 08/09/2020 08:14 PM    HGB 13.2 08/09/2020 08:14 PM    HCT 39.3 08/09/2020 08:14 PM    PLATELET 757 (L) 46/97/2965 08:14 PM    MCV 98.0 08/09/2020 08:14 PM     Lab Results   Component Value Date/Time    Sodium 143 07/28/2022 02:49 PM    Potassium 4.0 07/28/2022 02:49 PM    Chloride 112 (H) 07/28/2022 02:49 PM    CO2 26 07/28/2022 02:49 PM    Anion gap 5 07/28/2022 02:49 PM    Glucose 107 (H) 07/28/2022 02:49 PM    BUN 20 07/28/2022 02:49 PM    Creatinine 1.13 07/28/2022 02:49 PM    BUN/Creatinine ratio 18 07/28/2022 02:49 PM    GFR est AA >60 07/28/2022 02:49 PM    GFR est non-AA >60 07/28/2022 02:49 PM    Calcium 8.8 07/28/2022 02:49 PM    Bilirubin, total 0.7 05/13/2021 12:25 PM    Alk. phosphatase 122 (H) 05/13/2021 12:25 PM    Protein, total 6.6 05/13/2021 12:25 PM    Albumin 3.9 05/13/2021 12:25 PM    Globulin 2.7 05/13/2021 12:25 PM    A-G Ratio 1.4 05/13/2021 12:25 PM    ALT (SGPT) 41 05/13/2021 12:25 PM     CrCl cannot be calculated (Unknown ideal weight.).       INR History:   (normal INR range 0.8-1.2)     Date   INR   PT   Dose/Comments  08/23/22 3.8    2.5 mg daily   07/19/22          2.4                   28.5     5 mg x 1, then 2.5 mg daily   06/21/22          2.0                   23.6     2.5 mg daily ; (+) missed dose   06/01/22          2.3                   27.2     5 mg x 2, then 2.5 mg daily  05/23/22          1.4                   16.7     5 mg x 1, then 2.5 mg daily  04/25/22          1.8                   21.7     Hold x 1, then 2.5 mg daily  Lapse in INR clinic  11/03/21          3.8                   45.1     5 mg x 1, then 2.5 mg daily ; (+) extra dose  10/25/21          1.9 22.4     5 mg x 1, then 2.5 mg daily ; (+) hematuria  08/25/21          1.7                   20.1     2.5 mg daily  07/13/21          2.4                   28.5     5 mg x 1, then 2.5 mg daily   06/30/21          1.7                   20.1     2.5 mg daily; (+) missed dose  05/19/21          2.3                   27.8     2.5 mg daily    A/P:       Anticoagulation:  Considering Mr. Casey's past history, todays findings, and per Anticoagulation Collaborative Practice Agreement/Protocol:    Fingerstick POC INR (3.8) is supratherapeutic for INR goal today. Hold warfarin today, then Continue warfarin 2.5 mg daily     Check: Vitals and Weight at the next visit. Patient was instructed to schedule an appointment in 4 week(s) prior to leaving the clinic. Medication reconciliation was completed during the visit. There are no discontinued medications. A full discussion of the nature of anticoagulants has been carried out. A full discussion of the need for frequent and regular monitoring, precise dosage adjustment and compliance was stressed. Side effects of potential bleeding were discussed and Mr. Joya Rangel was instructed to call [phone] if there are any signs of abnormal bleeding. Mr. Joya Rangel was instructed to avoid any OTC items containing aspirin or ibuprofen and prior to starting any new OTC products to consult with his physician or pharmacist to ensure no drug interactions are present. Mr. Joya Rangel was instructed to avoid any major changes in his general diet and to avoid alcohol consumption. Mr. Joya Rangel was provided information in the AVS that includes topics on understanding coumadin therapy, drug interaction considerations, vitamin K and coumadin use, interactions with foods and supplements containing vitamin K, and the use of herbal products.     Mr. Joya Rangel verbalized his understanding of all instructions and will call the office with any questions, concerns, or signs of abnormal bleeding or blood clot. Notifications of recommendations will be sent to Dr. Ritesh Lombardi MD for review.     Thank you,  Ethan Salazar [phone]

## 2022-09-10 NOTE — PROGRESS NOTES
Pharmacy Progress Note - Anticoagulation Management    S/O: Mr. Sarah Lazaro  is a 80 y.o. male, referred by Dr. Abelardo Wolf MD, was seen today for anticoagulation management for the diagnosis of Atrial Fibrillation and Aortic Valve Replacement  ([id number] [facility]), s/p pacemaker. Pt is very hard of hearing. Warfarin start date: ~1996  INR Goal:  2.0-3.0    Current warfarin regimen:  Hold x 1, then 2.5 mg daily                  Warfarin tablet strength:   1 mg, 2.5 mg, 5 mg  -- wife states she does not want to split tablets in half ; generally takes 2.5 mg tab. Able to recognize tablet strength  Duration of therapy: indefinite  Takes warfarin in the evening     Today's pertinent positives includes:  No significant changes since last visit    Results for orders placed or performed in visit on 09/13/22   AMB POC PT/INR   Result Value Ref Range    VALID INTERNAL CONTROL POC Yes     Prothrombin time (POC) 26.8 (A) 9.1 - 12 seconds    INR POC 2.3 (A) 1 - 1.5     Adherence:   Able to recall regimen? YES  Miss/extra dose? NO  Need refill?  NO    Upcoming procedure(s):  NO    Past Medical History:   Diagnosis Date    Acute on chronic systolic heart failure, NYHA class 4 ([facility] [address] 03/01/2019    admit March 2019 7 days, EF 10% got Primacor, BIV implant    Anger     TAKES PAROXETINE TO CONTROL ANGER ISSUES    Arthritis     Burning with urination     CAD (coronary artery disease) 1996    CABG 1996, mukul celis 2015 fixed inferior defect    Cancer [facility])     prostate    Chronic atrial fibrillation ([facility] [address] 03/2019    RVR3/2019, had ablation of flutter- persistent    Foot drop, left     WEARS METAL BRACE    Gout     Hard of hearing     High cholesterol     Hypertension     Long term current use of anticoagulant therapy     Prostate enlargement     Sleep apnea     Thyroid disease     Unspecified sleep apnea     DOESN'T USE CPAP; \"IT MADE HIM SICK-COLDS, SINUS\", PER WIFE     No Known Allergies  Current Outpatient Medications Medication Sig    warfarin (COUMADIN) 2.5 mg tablet TAKE 1 TABLET EVERY DAY - NEEDS INR CHECK    allopurinoL (ZYLOPRIM) 300 mg tablet TAKE 1 TABLET EVERY DAY TO PREVENT GOUT    levothyroxine (SYNTHROID) 150 mcg tablet TAKE 1 TABLET EVERY MORNING FOR THYROID    PARoxetine (PAXIL) 20 mg tablet TAKE 1 TABLET EVERY DAY    carvediloL (COREG) 12.5 mg tablet Take 1 Tablet by mouth two (2) times daily (with meals). atorvastatin (LIPITOR) 40 mg tablet Take 1 Tablet by mouth nightly. sacubitriL-valsartan (Entresto) 24-26 mg tablet Take 1 Tablet by mouth every twelve (12) hours. oxybutynin chloride XL (DITROPAN XL) 10 mg CR tablet Take 10 mg by mouth daily. spironolactone (ALDACTONE) 25 mg tablet Take 25 mg by mouth daily. (Patient not taking: Reported on 7/28/2022)    omega 3-dha-epa-fish oil 100-160-1,000 mg cap Take 1 Cap by mouth daily. psyllium 0.52 gram capsule Take 1 Cap by mouth daily. b complex vitamins tablet Take 1 Tab by mouth daily. CYANOCOBALAMIN, VITAMIN B-12, PO Take 1 Tab by mouth daily. Sciatica    aspirin delayed-release 81 mg tablet Take 81 mg by mouth daily. therapeutic multivitamin (THERAGRAN) tablet Take 1 Tab by mouth daily. ascorbic acid, vitamin C, (VITAMIN C) 500 mg tablet Take 1,000 mg by mouth daily. B.animalis,bifid,infantis,long 10-15 mg TbEC Take 1 Tab by mouth daily. COQ10, UBIQUINOL, PO Take 1 Tab by mouth daily. finasteride (PROSCAR) 5 mg tablet Take 5 mg by mouth daily. tamsulosin (FLOMAX) 0.4 mg capsule Take 0.4 mg by mouth nightly. FERROUS GLUCONATE PO Take 325 mg by mouth daily. furosemide (LASIX) 20 mg tablet Take 1 Tab by mouth daily. (Patient taking differently: Take 20 mg by mouth in the morning. Per patient take 1 pill base on swelling)     No current facility-administered medications for this visit.      Wt Readings from Last 3 Encounters:   09/13/22 167 lb (75.8 kg)   07/28/22 167 lb (75.8 kg)   07/19/22 171 lb (77.6 kg)     BP Readings from Last 3 Encounters:   08/23/22 113/73   07/28/22 108/70   07/19/22 123/81     Pulse Readings from Last 3 Encounters:   08/23/22 (!) 167   07/28/22 91   07/19/22 71     Lab Results   Component Value Date/Time    WBC 5.7 08/09/2020 08:14 PM    HGB 13.2 08/09/2020 08:14 PM    HCT 39.3 08/09/2020 08:14 PM    PLATELET 804 (L) 26/35/6470 08:14 PM    MCV 98.0 08/09/2020 08:14 PM     Lab Results   Component Value Date/Time    Sodium 143 07/28/2022 02:49 PM    Potassium 4.0 07/28/2022 02:49 PM    Chloride 112 (H) 07/28/2022 02:49 PM    CO2 26 07/28/2022 02:49 PM    Anion gap 5 07/28/2022 02:49 PM    Glucose 107 (H) 07/28/2022 02:49 PM    BUN 20 07/28/2022 02:49 PM    Creatinine 1.13 07/28/2022 02:49 PM    BUN/Creatinine ratio 18 07/28/2022 02:49 PM    GFR est AA >60 07/28/2022 02:49 PM    GFR est non-AA >60 07/28/2022 02:49 PM    Calcium 8.8 07/28/2022 02:49 PM    Bilirubin, total 0.7 05/13/2021 12:25 PM    Alk. phosphatase 122 (H) 05/13/2021 12:25 PM    Protein, total 6.6 05/13/2021 12:25 PM    Albumin 3.9 05/13/2021 12:25 PM    Globulin 2.7 05/13/2021 12:25 PM    A-G Ratio 1.4 05/13/2021 12:25 PM    ALT (SGPT) 41 05/13/2021 12:25 PM     Estimated Creatinine Clearance: 44.6 mL/min (by C-G formula based on SCr of 1.13 mg/dL).     INR History:   (normal INR range 0.8-1.2)     Date   INR   PT   Dose/Comments  09/13/22 2.3   26.8 Hold x 1, the 2.5 mg daily  08/23/22          3.8                   45.5     2.5 mg daily   07/19/22          2.4                   28.5     5 mg x 1, then 2.5 mg daily   06/21/22          2.0                   23.6     2.5 mg daily ; (+) missed dose   06/01/22          2.3                   27.2     5 mg x 2, then 2.5 mg daily  05/23/22          1.4                   16.7     5 mg x 1, then 2.5 mg daily  04/25/22          1.8                   21.7     Hold x 1, then 2.5 mg daily  Lapse in INR clinic  11/03/21          3.8                   45.1     5 mg x 1, then 2.5 mg daily ; (+) extra dose  10/25/21          1.9                   22.4     5 mg x 1, then 2.5 mg daily ; (+) hematuria      A/P:       Anticoagulation:  Considering Mr. Devang Bal past history, todays findings, and per Anticoagulation Collaborative Practice Agreement/Protocol:    Fingerstick POC INR (2.3) is Therapeutic for INR goal today. Continue warfarin 2.5 mg daily. Patient was instructed to schedule an appointment in 4 week(s) prior to leaving the clinic. Medication reconciliation was completed during the visit. There are no discontinued medications. A full discussion of the nature of anticoagulants has been carried out. A full discussion of the need for frequent and regular monitoring, precise dosage adjustment and compliance was stressed. Side effects of potential bleeding were discussed and Mr. Dinesh Lovelace was instructed to call [phone] if there are any signs of abnormal bleeding. Mr. Dinesh Lovelace was instructed to avoid any OTC items containing aspirin or ibuprofen and prior to starting any new OTC products to consult with his physician or pharmacist to ensure no drug interactions are present. Mr. Dinesh Lovelace was instructed to avoid any major changes in his general diet and to avoid alcohol consumption. Mr. Dinesh Lovelace was provided information in the AVS that includes topics on understanding coumadin therapy, drug interaction considerations, vitamin K and coumadin use, interactions with foods and supplements containing vitamin K, and the use of herbal products. Mr. Dinesh Lovelace verbalized his understanding of all instructions and will call the office with any questions, concerns, or signs of abnormal bleeding or blood clot. Notifications of recommendations will be sent to Dr. Klaus Fong MD for review.     Thank you,  Blanca Acevedo, PharmD, BCACP, [address] [facility] in place: Yes  Recommendation Provided To: Patient/Caregiver: 2 via In person  Intervention Detail: Lab(s) Ordered and Scheduled Appointment  Intervention Accepted By: Patient/Caregiver: 2  Time Spent (min): 20

## 2022-09-10 NOTE — PATIENT INSTRUCTIONS
Today your INR was 2.3. Your goal INR is  2.0-3.0 . You have a 1 mg, 2.5 mg, and 5 mg tablet of Coumadin (warfarin). Take Coumadin (warfarin) as follows:    Continue warfarin 2.5 mg daily. Come back in  4 week(s) for your next finger stick/INR blood test.      Avoid any over the counter items containing aspirin or ibuprofen, and avoid great swings in general diet. Avoid alcohol consumption. Please notify the INR nurse if you are started on any new medication including over the counter or herbal supplements. Also, please notify your INR nurse if any of your other prescription or over the counter medications have been discontinued. Call [facility] at [phone] if you have any signs of abnormal bleeding/blood clot.  ------------------------------------------------------------------------------------------------------------------  Taking Warfarin Safely: Care Instructions    Your Care Instructions  Warfarin is a medicine that you take to prevent blood clots. It is often called a blood thinner. Doctors give warfarin (such as Coumadin) to reduce the risk of blood clots. You may be at risk for blood clots if you have atrial fibrillation or deep vein thrombosis. Some other health problems may also put you at risk. Warfarin slows the amount of time it takes for your blood to clot. It can cause bleeding problems. Even if you've been taking warfarin for a while, it's important to know how to take it safely. Foods and other medicines can affect the way warfarin works. Some can make warfarin work too well. This can cause bleeding problems. And some can make it work poorly, so that it does not prevent blood clots very well. You will need regular blood tests to check how long it takes for your blood to form a clot. This test is called a PT or prothrombin time test. The result of the test is called an INR level.  Depending on the test results, your doctor or anticoagulation clinic may adjust your dose of warfarin. Follow-up care is a key part of your treatment and safety. Be sure to make and go to all appointments, and call your doctor if you are having problems. It's also a good idea to know your test results and keep a list of the medicines you take. How can you care for yourself at home? Take warfarin safely  Take your warfarin at the same time each day. If you miss a dose of warfarin, don't take an extra dose to make up for it. Your doctor can tell you exactly what to do so you don't take too much or too little. Wear medical alert jewelry that lets others know that you take warfarin. You can buy this at most drugstores. Don't take warfarin if you are pregnant or planning to get pregnant. Talk to your doctor about how you can prevent getting pregnant while you are taking it. Don't change your dose or stop taking warfarin unless your doctor tells you to. Effects of medicines and food on warfarin  Don't start or stop taking any medicines, vitamins, or natural remedies unless you first talk to your doctor. Many medicines can affect how warfarin works. These include aspirin and other pain relievers, over-the-counter medicines, multivitamins, dietary supplements, and herbal products. Tell all of your doctors and pharmacists that you take warfarin. Some prescription medicines can affect how warfarin works. Keep the amount of vitamin K in your diet about the same from day to day. Do not suddenly eat a lot more or a lot less food that is rich in vitamin K than you usually do. Vitamin K affects how warfarin works and how your blood clots. Talk with your doctor before making big changes in your diet. Vitamin K is in many foods, such as:  Leafy greens, such as kale, cabbage, spinach, Swiss chard, and lettuce. Canola and soybean oils. Green vegetables, such as asparagus, broccoli, and [place] sprouts. Vegetable drinks, green tea leaves, and some dietary supplement drinks.   Avoid cranberry juice and other cranberry products. They can increase the effects of warfarin. Limit your use of alcohol. Avoid bleeding by preventing falls and injuries  Wear slippers or shoes with nonskid soles. Remove throw rugs and clutter. Rearrange furniture and electrical cords to keep them out of walking paths. Keep stairways, porches, and outside walkways well lit. Use night-lights in hallways and bathrooms. Be extra careful when you work with sharp tools or knives. When should you call for help? Call 911 anytime you think you may need emergency care. For example, call if:  You have a sudden, severe headache that is different from past headaches. Call your doctor now or seek immediate medical care if:  You have any abnormal bleeding, such as:  Nosebleeds. Vaginal bleeding that is different (heavier, more frequent, at a different time of the month) than what you are used to. Bloody or black stools, or rectal bleeding. Bloody or pink urine. Watch closely for changes in your health, and be sure to contact your doctor if you have any problems. Where can you learn more? Go to http://www.gray.com/. Enter X289 in the search box to learn more about \"Taking Warfarin Safely: Care Instructions. \"  Current as of: January 27, 2016  Content Version: 11.1  © 4529-7573 [employer]. Care instructions adapted under license by [employer] (which disclaims liability or warranty for this information). If you have questions about a medical condition or this instruction, always ask your healthcare professional. [place] [address] any warranty or liability for your use of this information.

## 2022-09-13 ENCOUNTER — ANTI-COAG VISIT (OUTPATIENT)
Dept: INTERNAL MEDICINE CLINIC | Age: 87
End: 2022-09-13
Payer: MEDICARE

## 2022-09-13 VITALS — HEIGHT: 68 IN | BODY MASS INDEX: 25.31 KG/M2 | WEIGHT: 167 LBS

## 2022-09-13 DIAGNOSIS — Z95.2 S/P AVR (AORTIC VALVE REPLACEMENT): ICD-10-CM

## 2022-09-13 DIAGNOSIS — I48.20 CHRONIC ATRIAL FIBRILLATION (HCC): Primary | ICD-10-CM

## 2022-09-13 DIAGNOSIS — Z95.2 H/O MECHANICAL AORTIC VALVE REPLACEMENT: ICD-10-CM

## 2022-09-13 LAB
INR BLD: 2.3 (ref 1–1.5)
PT POC: 26.8 SECONDS (ref 9.1–12)
VALID INTERNAL CONTROL?: YES

## 2022-09-13 PROCEDURE — 36416 COLLJ CAPILLARY BLOOD SPEC: CPT | Performed by: PHARMACIST

## 2022-09-13 PROCEDURE — 85610 PROTHROMBIN TIME: CPT | Performed by: PHARMACIST

## 2022-09-13 PROCEDURE — G0463 HOSPITAL OUTPT CLINIC VISIT: HCPCS | Performed by: PHARMACIST

## 2022-09-26 ENCOUNTER — OFFICE VISIT (OUTPATIENT)
Dept: SURGERY | Age: 87
End: 2022-09-26
Payer: MEDICARE

## 2022-09-26 VITALS
SYSTOLIC BLOOD PRESSURE: 133 MMHG | BODY MASS INDEX: 25.48 KG/M2 | DIASTOLIC BLOOD PRESSURE: 64 MMHG | TEMPERATURE: 97.7 F | RESPIRATION RATE: 20 BRPM | HEART RATE: 70 BPM | OXYGEN SATURATION: 96 % | WEIGHT: 168.1 LBS | HEIGHT: 68 IN

## 2022-09-26 DIAGNOSIS — K40.90 RIGHT INGUINAL HERNIA: Primary | ICD-10-CM

## 2022-09-26 PROCEDURE — 1101F PT FALLS ASSESS-DOCD LE1/YR: CPT | Performed by: SURGERY

## 2022-09-26 PROCEDURE — G8536 NO DOC ELDER MAL SCRN: HCPCS | Performed by: SURGERY

## 2022-09-26 PROCEDURE — 1123F ACP DISCUSS/DSCN MKR DOCD: CPT | Performed by: SURGERY

## 2022-09-26 PROCEDURE — G8417 CALC BMI ABV UP PARAM F/U: HCPCS | Performed by: SURGERY

## 2022-09-26 PROCEDURE — G8427 DOCREV CUR MEDS BY ELIG CLIN: HCPCS | Performed by: SURGERY

## 2022-09-26 PROCEDURE — G9717 DOC PT DX DEP/BP F/U NT REQ: HCPCS | Performed by: SURGERY

## 2022-09-26 PROCEDURE — 99214 OFFICE O/P EST MOD 30 MIN: CPT | Performed by: SURGERY

## 2022-09-26 NOTE — PROGRESS NOTES
To: MD Melinda Burkett MD    From: Apoorva Centeno MD    Thank you both. Vipal - Can you let me know if you prefer BID mg/kg or once daily 1.5mg/kg on the Lovenox and how may days? Thanks so much. Encounter Date: 9/26/2022    Subjective:      Johann Arcos is a 80 y.o. male presents to discuss his RIH. I saw him back in June 2021 for the same and he subsequently went to see Dr. Giovanni Tejeda for cardiac risk stratification. He is on Coumadin for mechanical aortic valve. His ejection fraction is approximately 40 to 45%. At that time he was deemed an acceptable risk for surgery especially given the fact that the hernia involved small intestine down into the right hemiscrotum. Mr. Linnea Morgan, however, did not end up scheduling. He tells me now that he has been having more bothersome symptoms. He says it hurts when he coughs and he would like to get it repaired. He does note some dyspnea on exertion but denies chest pain. Objective:     Visit Vitals  /64 (BP 1 Location: Right upper arm, BP Patient Position: Sitting, BP Cuff Size: Adult long)   Pulse 70   Temp 97.7 °F (36.5 °C)   Resp 20   Ht 5' 8\" (1.727 m)   Wt 76.2 kg (168 lb 1.6 oz)   SpO2 96%   BMI 25.56 kg/m²       General:  alert, cooperative, no distress, appears stated age   Chest: CTAB, RRR   Abdomen: soft, bowel sounds active, non-tender    Large right inguinal hernia extending into the right hemiscrotum. Soft and nontender. Assessment:     Large right inguinal hernia extending into the right hemiscrotum. On imaging this contains small bowel. 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 Plan:     I do not imagine much is changed in terms of his risk stratification. I have recommended that we do an open right inguinal hernia repair with mesh. This will reduce his operative risk somewhat and his anesthetic risks as well.     I can prescribe the bridging Lovenox but would defer to Dr. Tonna Siemens on the dosing.     Juan Akers MD

## 2022-09-26 NOTE — Clinical Note
9/26/2022    Patient: Floyd Correa   YOB: 1935   Date of Visit: 9/26/2022     Liz John MD  [address]  [address] [facility] [address]  [facility] [address]    Dear Liz John MD,      Thank you for referring Mr. Floyd Correa to [address] [place] [address] for evaluation. My notes for this consultation are attached. If you have questions, please do not hesitate to call me. I look forward to following your patient along with you.       Sincerely,    Taylor Arshad MD

## 2022-09-26 NOTE — PROGRESS NOTES
Identified pt with two pt identifiers(name and ). Reviewed record in preparation for visit and have obtained necessary documentation. All patient medications has been reviewed. Chief Complaint   Patient presents with    Inguinal Hernia     RE EVAL RIGHT INGUINAL HERNIA , LAST SEEN 2021       Health Maintenance Due   Topic    DTaP/Tdap/Td series (1 - Tdap)    Shingrix Vaccine Age 50> (1 of 2)    Medicare Yearly Exam     COVID-19 Vaccine (3 - Booster for [employer] series)    Lipid Screen     Flu Vaccine (1)       Vitals:    22 1538   BP: 133/64   Pulse: 70   Resp: 20   Temp: 97.7 °F (36.5 °C)   SpO2: 96%   Weight: 76.2 kg (168 lb 1.6 oz)   Height: 5' 8\" (1.727 m)   PainSc:   0 - No pain       4. Have you been to the ER, urgent care clinic since your last visit? Hospitalized since your last visit? No    5. Have you seen or consulted any other health care providers outside of the [address] [place] [address] since your last visit? Include any pap smears or colon screening. No      Patient is accompanied by self I have received verbal consent from Lenore Carballo to discuss any/all medical information while they are present in the room.

## 2022-10-07 ENCOUNTER — HOSPITAL ENCOUNTER (OUTPATIENT)
Dept: PREADMISSION TESTING | Age: 87
Discharge: HOME | End: 2022-10-07
Attending: SURGERY
Payer: MEDICARE

## 2022-10-07 ENCOUNTER — TELEPHONE (OUTPATIENT)
Dept: SURGERY | Age: 87
End: 2022-10-07

## 2022-10-07 VITALS
WEIGHT: 169.31 LBS | OXYGEN SATURATION: 100 % | HEIGHT: 63 IN | TEMPERATURE: 97.8 F | DIASTOLIC BLOOD PRESSURE: 59 MMHG | RESPIRATION RATE: 18 BRPM | BODY MASS INDEX: 30 KG/M2 | SYSTOLIC BLOOD PRESSURE: 131 MMHG | HEART RATE: 66 BPM

## 2022-10-07 LAB
ANION GAP SERPL CALC-SCNC: 7 MMOL/L (ref 5–15)
BUN SERPL-MCNC: 23 MG/DL (ref 6–20)
BUN/CREAT SERPL: 18 (ref 12–20)
CALCIUM SERPL-MCNC: 8.8 MG/DL (ref 8.5–10.1)
CHLORIDE SERPL-SCNC: 109 MMOL/L (ref 97–108)
CO2 SERPL-SCNC: 25 MMOL/L (ref 21–32)
CREAT SERPL-MCNC: 1.3 MG/DL (ref 0.7–1.3)
ERYTHROCYTE [DISTWIDTH] IN BLOOD BY AUTOMATED COUNT: 13.1 % (ref 11.5–14.5)
GLUCOSE SERPL-MCNC: 112 MG/DL (ref 65–100)
HCT VFR BLD AUTO: 42.2 % (ref 36.6–50.3)
HGB BLD-MCNC: 14.1 G/DL (ref 12.1–17)
MCH RBC QN AUTO: 32.6 PG (ref 26–34)
MCHC RBC AUTO-ENTMCNC: 33.4 G/DL (ref 30–36.5)
MCV RBC AUTO: 97.5 FL (ref 80–99)
NRBC # BLD: 0 K/UL (ref 0–0.01)
NRBC BLD-RTO: 0 PER 100 WBC
PLATELET # BLD AUTO: 159 K/UL (ref 150–400)
PMV BLD AUTO: 10 FL (ref 8.9–12.9)
POTASSIUM SERPL-SCNC: 4 MMOL/L (ref 3.5–5.1)
RBC # BLD AUTO: 4.33 M/UL (ref 4.1–5.7)
SODIUM SERPL-SCNC: 141 MMOL/L (ref 136–145)
WBC # BLD AUTO: 5.7 K/UL (ref 4.1–11.1)

## 2022-10-07 PROCEDURE — 36415 COLL VENOUS BLD VENIPUNCTURE: CPT

## 2022-10-07 PROCEDURE — 80048 BASIC METABOLIC PNL TOTAL CA: CPT

## 2022-10-07 PROCEDURE — 85027 COMPLETE CBC AUTOMATED: CPT

## 2022-10-07 PROCEDURE — 93005 ELECTROCARDIOGRAM TRACING: CPT

## 2022-10-07 RX ORDER — VITAMIN E 1000 UNIT
1000 CAPSULE ORAL DAILY
COMMUNITY

## 2022-10-07 RX ORDER — ENOXAPARIN SODIUM 100 MG/ML
80 INJECTION SUBCUTANEOUS EVERY 12 HOURS
Qty: 16 EACH | Refills: 0 | Status: SHIPPED | OUTPATIENT
Start: 2022-10-07 | End: 2022-10-25 | Stop reason: SDUPTHER

## 2022-10-07 NOTE — PERIOP NOTES
Spoke with Blanche Navarro in Dr. Tolbert's office regarding ASA instructions. Patient is to stop ASA 5 days prior to surgery. Patient instructed in PAT visit today.

## 2022-10-07 NOTE — PERIOP NOTES
[facility]  Preoperative Instructions        Surgery Date 10/12/22          Time of Arrival to be called at [phone]    1. On the day of your surgery, please report to the Surgical Services Registration Desk and sign in at your designated time. The Surgery Center is located to the right of the Emergency Room. 2. You must have someone with you to drive you home. You should not drive a car for 24 hours following surgery. Please make arrangements for a friend or family member to stay with you for the first 24 hours after your surgery. 3. Do not have anything to eat or drink (including water, gum, mints, coffee, juice) after midnight. ?This may not apply to medications prescribed by your physician. ?(Please note below the special instructions with medications to take the morning of your procedure.)    4. We recommend you do not drink any alcoholic beverages for 24 hours before and after your surgery. 5. Contact your surgeons office for instructions on the following medications: non-steroidal anti-inflammatory drugs (i.e. Advil, Aleve), vitamins, and supplements. (Some surgeons will want you to stop these medications prior to surgery and others may allow you to take them)  **If you are currently taking Plavix, Coumadin, Aspirin and/or other blood-thinning agents, contact your surgeon for instructions. ** Your surgeon will partner with the physician prescribing these medications to determine if it is safe to stop or if you need to continue taking. Please do not stop taking these medications without instructions from your surgeon    6. Wear comfortable clothes. Wear glasses instead of contacts. Do not bring any money or jewelry. Please bring picture ID, insurance card, and any prearranged co-payment or hospital payment. Do not wear make-up, particularly mascara the morning of your surgery. Do not wear nail polish, particularly if you are having foot /hand surgery.   Wear your hair loose or down, no ponytails, buns, zach pins or clips. All body piercings must be removed. Please shower with antibacterial soap for three consecutive days before and on the morning of surgery, but do not apply any lotions, powders or deodorants after the shower on the day of surgery. Please use a fresh towels after each shower. Please sleep in clean clothes and change bed linens the night before surgery. Please do not shave for 48 hours prior to surgery. Shaving of the face is acceptable. 7. You should understand that if you do not follow these instructions your surgery may be cancelled. If your physical condition changes (I.e. fever, cold or flu) please contact your surgeon as soon as possible. 8. It is important that you be on time. If a situation occurs where you may be late, please call [phone] (OR Holding Area). 9. If you have any questions and or problems, please call [phone] (Pre-admission Testing). 10. Your surgery time may be subject to change. You will receive a phone call the evening prior if your time changes. 11.  If having outpatient surgery, you must have someone to drive you here, stay with you during the duration of your stay, and to drive you home at time of discharge. 12. Special Instructions: Stop taking coumadin and aspirin 10/7. Begin lovenox 10/8. Do not take lovenox day of surgery. TAKE ALL MEDICATIONS DAY OF SURGERY EXCEPT: Lovenox, vitamin c, vitamin b12, vitamin b complex, fish oil (omega 3), coq10, aspirin, metamucil. May take other medications with a sip of water. I understand a pre-operative phone call will be made to verify my surgery time. In the event that I am not available, I give permission for a message to be left on my answering service and/or with another person?   yes         ___________________      __________   _________    (Signature of Patient)             (Witness)                (Date and Time)

## 2022-10-07 NOTE — TELEPHONE ENCOUNTER
Spoke with Anabel in PAT & patient wife informing them the patient is to take last dose of coumadin Friday 10/7/22 and start lovenox injections Saturday 10/8/22 also hold aspirin 5 days.

## 2022-10-07 NOTE — PERIOP NOTES
Patient states that he has not gotten instructions yet on stopping coumadin and lovenox bridging. Called Dr Caitie Pearson office and informed them that patient is having surgery on 10/12 with Dr Ivy Ji and needs instructions for coumadin and lovenox today prior to his surgery. Patient will come over to see Dr Evelyn Ch after his appointment.  to inform Jose Brown about this. Called Dr Caitie Pearson office and spoke to Paul Boucher and informed her we got Coumadin and Lovenox instructions from Dr Tolbert's office and that patient will not need to come over for this.

## 2022-10-08 LAB
ATRIAL RATE: 74 BPM
CALCULATED R AXIS, ECG10: -10 DEGREES
CALCULATED T AXIS, ECG11: 36 DEGREES
DIAGNOSIS, 93000: NORMAL
Q-T INTERVAL, ECG07: 454 MS
QRS DURATION, ECG06: 144 MS
QTC CALCULATION (BEZET), ECG08: 493 MS
VENTRICULAR RATE, ECG03: 71 BPM

## 2022-10-10 ENCOUNTER — TELEPHONE (OUTPATIENT)
Dept: INTERNAL MEDICINE CLINIC | Age: 87
End: 2022-10-10

## 2022-10-10 ENCOUNTER — TELEPHONE (OUTPATIENT)
Dept: SURGERY | Age: 87
End: 2022-10-10

## 2022-10-10 NOTE — TELEPHONE ENCOUNTER
Pharmacy Progress Note - Telephone Encounter    S/O: Mr. Jonah Power 80 y.o. male, referred by Dr. Clayton Gotti MD, was contacted via an outbound telephone call today. Verified patients identifiers (name & ) per HIPAA policy.     - Patient has hernia surgery on 10/12/22  - Has been off warfarin since 10/7/22. Giving [facility] twice daily. A/P:  - Appreciate patient's update. Will cancel INR appt. - Will need INR follow up post op  - Patient endorses understanding to the provided information. All questions answered at this time. There are no discontinued medications. No orders of the defined types were placed in this encounter.         Thank you,  Blanca Vazquez, PharmD, BCACP, [address] [facility] in place: Yes  Time Spent (min): 10

## 2022-10-10 NOTE — TELEPHONE ENCOUNTER
Wife called and is wanting to know what time patient is having surgery on 10/12, please call    [phone]

## 2022-10-10 NOTE — TELEPHONE ENCOUNTER
----- Message from Paty Cabrera sent at 10/10/2022  3:52 PM EDT -----  Subject: Message to Provider    QUESTIONS  Information for Provider? Spoke with patient' wife Jerel Hodgson, she was trying   to inform office that her  will not be in on 10/12/2022 for his   INR. He is having surgery on that day. No one from office answered when we   called.  Please return her call, thank you.  ---------------------------------------------------------------------------  --------------  [id number]  [phone]; OK to leave message on voicemail  ---------------------------------------------------------------------------  --------------  SCRIPT ANSWERS  undefined

## 2022-10-12 ENCOUNTER — ANESTHESIA EVENT (OUTPATIENT)
Dept: SURGERY | Age: 87
End: 2022-10-12
Payer: MEDICARE

## 2022-10-12 ENCOUNTER — HOSPITAL ENCOUNTER (OUTPATIENT)
Age: 87
Setting detail: OBSERVATION
Discharge: HOME | End: 2022-10-13
Attending: SURGERY | Admitting: SURGERY
Payer: MEDICARE

## 2022-10-12 ENCOUNTER — ANESTHESIA (OUTPATIENT)
Dept: SURGERY | Age: 87
End: 2022-10-12
Payer: MEDICARE

## 2022-10-12 DIAGNOSIS — K40.90 RIGHT INGUINAL HERNIA: Primary | ICD-10-CM

## 2022-10-12 LAB — INR BLD: 1.3 (ref 0.9–1.2)

## 2022-10-12 PROCEDURE — 77030031139 HC SUT VCRL2 J&J -A: Performed by: SURGERY

## 2022-10-12 PROCEDURE — 77030026438 HC STYL ET INTUB CARD -A: Performed by: ANESTHESIOLOGY

## 2022-10-12 PROCEDURE — 49505 PRP I/HERN INIT REDUC >5 YR: CPT | Performed by: SURGERY

## 2022-10-12 PROCEDURE — 74011250637 HC RX REV CODE- 250/637: Performed by: SURGERY

## 2022-10-12 PROCEDURE — 74011250636 HC RX REV CODE- 250/636: Performed by: ANESTHESIOLOGY

## 2022-10-12 PROCEDURE — 74011000250 HC RX REV CODE- 250: Performed by: SURGERY

## 2022-10-12 PROCEDURE — 2709999900 HC NON-CHARGEABLE SUPPLY: Performed by: SURGERY

## 2022-10-12 PROCEDURE — 77030003028 HC SUT VCRL J&J -A: Performed by: SURGERY

## 2022-10-12 PROCEDURE — 77030019908 HC STETH ESOPH SIMS -A: Performed by: ANESTHESIOLOGY

## 2022-10-12 PROCEDURE — 74011000250 HC RX REV CODE- 250: Performed by: ANESTHESIOLOGY

## 2022-10-12 PROCEDURE — 76060000033 HC ANESTHESIA 1 TO 1.5 HR: Performed by: SURGERY

## 2022-10-12 PROCEDURE — 76010000149 HC OR TIME 1 TO 1.5 HR: Performed by: SURGERY

## 2022-10-12 PROCEDURE — 77030002933 HC SUT MCRYL J&J -A: Performed by: SURGERY

## 2022-10-12 PROCEDURE — 77030008684 HC TU ET CUF COVD -B: Performed by: ANESTHESIOLOGY

## 2022-10-12 PROCEDURE — 88302 TISSUE EXAM BY PATHOLOGIST: CPT

## 2022-10-12 PROCEDURE — 76210000002 HC OR PH I REC 3 TO 3.5 HR: Performed by: SURGERY

## 2022-10-12 PROCEDURE — 74011250636 HC RX REV CODE- 250/636

## 2022-10-12 PROCEDURE — 77030040503 HC DRN WND PENRS MDII -A: Performed by: SURGERY

## 2022-10-12 PROCEDURE — 77030013079 HC BLNKT BAIR HGGR 3M -A: Performed by: ANESTHESIOLOGY

## 2022-10-12 PROCEDURE — 85610 PROTHROMBIN TIME: CPT

## 2022-10-12 PROCEDURE — 77030010507 HC ADH SKN DERMBND J&J -B: Performed by: SURGERY

## 2022-10-12 PROCEDURE — 77030018673: Performed by: SURGERY

## 2022-10-12 PROCEDURE — C1781 MESH (IMPLANTABLE): HCPCS | Performed by: SURGERY

## 2022-10-12 PROCEDURE — G0378 HOSPITAL OBSERVATION PER HR: HCPCS

## 2022-10-12 PROCEDURE — 77030018547 HC SUT ETHBND1 J&J -B: Performed by: SURGERY

## 2022-10-12 PROCEDURE — 74011250636 HC RX REV CODE- 250/636: Performed by: SURGERY

## 2022-10-12 DEVICE — MESH HERN W3XL4.75IN L PLA PRECUT FLAP STYL PARTIALLY ABSRB: Type: IMPLANTABLE DEVICE | Site: INGUINAL | Status: FUNCTIONAL

## 2022-10-12 RX ORDER — LIDOCAINE HYDROCHLORIDE 10 MG/ML
0.1 INJECTION, SOLUTION EPIDURAL; INFILTRATION; INTRACAUDAL; PERINEURAL AS NEEDED
Status: DISCONTINUED | OUTPATIENT
Start: 2022-10-12 | End: 2022-10-12 | Stop reason: HOSPADM

## 2022-10-12 RX ORDER — PAROXETINE HYDROCHLORIDE 20 MG/1
20 TABLET, FILM COATED ORAL DAILY
Status: DISCONTINUED | OUTPATIENT
Start: 2022-10-13 | End: 2022-10-13 | Stop reason: HOSPADM

## 2022-10-12 RX ORDER — SODIUM CHLORIDE, SODIUM LACTATE, POTASSIUM CHLORIDE, CALCIUM CHLORIDE 600; 310; 30; 20 MG/100ML; MG/100ML; MG/100ML; MG/100ML
25 INJECTION, SOLUTION INTRAVENOUS CONTINUOUS
Status: DISCONTINUED | OUTPATIENT
Start: 2022-10-12 | End: 2022-10-12 | Stop reason: HOSPADM

## 2022-10-12 RX ORDER — FENTANYL CITRATE 50 UG/ML
25 INJECTION, SOLUTION INTRAMUSCULAR; INTRAVENOUS
Status: DISCONTINUED | OUTPATIENT
Start: 2022-10-12 | End: 2022-10-12 | Stop reason: HOSPADM

## 2022-10-12 RX ORDER — ALLOPURINOL 300 MG/1
300 TABLET ORAL DAILY
Status: DISCONTINUED | OUTPATIENT
Start: 2022-10-13 | End: 2022-10-13 | Stop reason: HOSPADM

## 2022-10-12 RX ORDER — BUPIVACAINE HYDROCHLORIDE AND EPINEPHRINE 5; 5 MG/ML; UG/ML
INJECTION, SOLUTION PERINEURAL AS NEEDED
Status: DISCONTINUED | OUTPATIENT
Start: 2022-10-12 | End: 2022-10-12 | Stop reason: HOSPADM

## 2022-10-12 RX ORDER — SODIUM CHLORIDE 0.9 % (FLUSH) 0.9 %
5-40 SYRINGE (ML) INJECTION EVERY 8 HOURS
Status: DISCONTINUED | OUTPATIENT
Start: 2022-10-12 | End: 2022-10-12 | Stop reason: HOSPADM

## 2022-10-12 RX ORDER — FENTANYL CITRATE 50 UG/ML
INJECTION, SOLUTION INTRAMUSCULAR; INTRAVENOUS AS NEEDED
Status: DISCONTINUED | OUTPATIENT
Start: 2022-10-12 | End: 2022-10-12 | Stop reason: HOSPADM

## 2022-10-12 RX ORDER — LEVOTHYROXINE SODIUM 150 UG/1
150 TABLET ORAL
Status: DISCONTINUED | OUTPATIENT
Start: 2022-10-13 | End: 2022-10-13 | Stop reason: HOSPADM

## 2022-10-12 RX ORDER — PHENYLEPHRINE HCL IN 0.9% NACL 0.4MG/10ML
SYRINGE (ML) INTRAVENOUS AS NEEDED
Status: DISCONTINUED | OUTPATIENT
Start: 2022-10-12 | End: 2022-10-12 | Stop reason: HOSPADM

## 2022-10-12 RX ORDER — DIPHENHYDRAMINE HYDROCHLORIDE 50 MG/ML
12.5 INJECTION, SOLUTION INTRAMUSCULAR; INTRAVENOUS AS NEEDED
Status: DISCONTINUED | OUTPATIENT
Start: 2022-10-12 | End: 2022-10-12 | Stop reason: HOSPADM

## 2022-10-12 RX ORDER — SODIUM CHLORIDE 0.9 % (FLUSH) 0.9 %
5-40 SYRINGE (ML) INJECTION AS NEEDED
Status: DISCONTINUED | OUTPATIENT
Start: 2022-10-12 | End: 2022-10-12 | Stop reason: HOSPADM

## 2022-10-12 RX ORDER — CARVEDILOL 12.5 MG/1
12.5 TABLET ORAL 2 TIMES DAILY WITH MEALS
Status: DISCONTINUED | OUTPATIENT
Start: 2022-10-12 | End: 2022-10-13 | Stop reason: HOSPADM

## 2022-10-12 RX ORDER — ONDANSETRON 2 MG/ML
INJECTION INTRAMUSCULAR; INTRAVENOUS AS NEEDED
Status: DISCONTINUED | OUTPATIENT
Start: 2022-10-12 | End: 2022-10-12 | Stop reason: HOSPADM

## 2022-10-12 RX ORDER — WARFARIN 2.5 MG/1
2.5 TABLET ORAL DAILY
Status: DISCONTINUED | OUTPATIENT
Start: 2022-10-12 | End: 2022-10-13 | Stop reason: HOSPADM

## 2022-10-12 RX ORDER — MORPHINE SULFATE 2 MG/ML
2 INJECTION, SOLUTION INTRAMUSCULAR; INTRAVENOUS
Status: DISCONTINUED | OUTPATIENT
Start: 2022-10-12 | End: 2022-10-12 | Stop reason: HOSPADM

## 2022-10-12 RX ORDER — FENTANYL CITRATE 50 UG/ML
INJECTION, SOLUTION INTRAMUSCULAR; INTRAVENOUS
Status: COMPLETED
Start: 2022-10-12 | End: 2022-10-12

## 2022-10-12 RX ORDER — TAMSULOSIN HYDROCHLORIDE 0.4 MG/1
0.4 CAPSULE ORAL
Status: DISCONTINUED | OUTPATIENT
Start: 2022-10-12 | End: 2022-10-13 | Stop reason: HOSPADM

## 2022-10-12 RX ORDER — SUCCINYLCHOLINE CHLORIDE 20 MG/ML
INJECTION INTRAMUSCULAR; INTRAVENOUS AS NEEDED
Status: DISCONTINUED | OUTPATIENT
Start: 2022-10-12 | End: 2022-10-12 | Stop reason: HOSPADM

## 2022-10-12 RX ORDER — ONDANSETRON 2 MG/ML
4 INJECTION INTRAMUSCULAR; INTRAVENOUS AS NEEDED
Status: DISCONTINUED | OUTPATIENT
Start: 2022-10-12 | End: 2022-10-12 | Stop reason: HOSPADM

## 2022-10-12 RX ORDER — HYDROMORPHONE HYDROCHLORIDE 1 MG/ML
INJECTION, SOLUTION INTRAMUSCULAR; INTRAVENOUS; SUBCUTANEOUS
Status: COMPLETED
Start: 2022-10-12 | End: 2022-10-12

## 2022-10-12 RX ORDER — LIDOCAINE HYDROCHLORIDE 20 MG/ML
INJECTION, SOLUTION EPIDURAL; INFILTRATION; INTRACAUDAL; PERINEURAL AS NEEDED
Status: DISCONTINUED | OUTPATIENT
Start: 2022-10-12 | End: 2022-10-12 | Stop reason: HOSPADM

## 2022-10-12 RX ORDER — FINASTERIDE 5 MG/1
5 TABLET, FILM COATED ORAL DAILY
Status: DISCONTINUED | OUTPATIENT
Start: 2022-10-13 | End: 2022-10-13 | Stop reason: HOSPADM

## 2022-10-12 RX ORDER — OXYBUTYNIN CHLORIDE 5 MG/1
10 TABLET, EXTENDED RELEASE ORAL DAILY
Status: DISCONTINUED | OUTPATIENT
Start: 2022-10-13 | End: 2022-10-13 | Stop reason: HOSPADM

## 2022-10-12 RX ORDER — PROPOFOL 10 MG/ML
INJECTION, EMULSION INTRAVENOUS AS NEEDED
Status: DISCONTINUED | OUTPATIENT
Start: 2022-10-12 | End: 2022-10-12 | Stop reason: HOSPADM

## 2022-10-12 RX ORDER — BALSAM PERU/CASTOR OIL
OINTMENT (GRAM) TOPICAL 2 TIMES DAILY
Status: DISCONTINUED | OUTPATIENT
Start: 2022-10-12 | End: 2022-10-13 | Stop reason: HOSPADM

## 2022-10-12 RX ORDER — HYDROMORPHONE HYDROCHLORIDE 1 MG/ML
.2-.5 INJECTION, SOLUTION INTRAMUSCULAR; INTRAVENOUS; SUBCUTANEOUS
Status: DISCONTINUED | OUTPATIENT
Start: 2022-10-12 | End: 2022-10-12 | Stop reason: HOSPADM

## 2022-10-12 RX ADMIN — TAMSULOSIN HYDROCHLORIDE 0.4 MG: 0.4 CAPSULE ORAL at 20:41

## 2022-10-12 RX ADMIN — FENTANYL CITRATE 25 MCG: 50 INJECTION, SOLUTION INTRAMUSCULAR; INTRAVENOUS at 11:16

## 2022-10-12 RX ADMIN — CASTOR OIL AND BALSAM, PERU: 788; 87 OINTMENT TOPICAL at 20:43

## 2022-10-12 RX ADMIN — Medication 80 MCG: at 11:49

## 2022-10-12 RX ADMIN — SUCCINYLCHOLINE CHLORIDE 180 MG: 20 INJECTION, SOLUTION INTRAMUSCULAR; INTRAVENOUS at 11:00

## 2022-10-12 RX ADMIN — PROPOFOL 150 MG: 10 INJECTION, EMULSION INTRAVENOUS at 10:59

## 2022-10-12 RX ADMIN — WARFARIN SODIUM 2.5 MG: 2.5 TABLET ORAL at 18:00

## 2022-10-12 RX ADMIN — FENTANYL CITRATE 25 MCG: 50 INJECTION, SOLUTION INTRAMUSCULAR; INTRAVENOUS at 13:15

## 2022-10-12 RX ADMIN — LIDOCAINE HYDROCHLORIDE 80 MG: 20 INJECTION, SOLUTION EPIDURAL; INFILTRATION; INTRACAUDAL; PERINEURAL at 10:59

## 2022-10-12 RX ADMIN — FENTANYL CITRATE 25 MCG: 50 INJECTION, SOLUTION INTRAMUSCULAR; INTRAVENOUS at 10:59

## 2022-10-12 RX ADMIN — HYDROMORPHONE HYDROCHLORIDE 0.5 MG: 1 INJECTION, SOLUTION INTRAMUSCULAR; INTRAVENOUS; SUBCUTANEOUS at 13:15

## 2022-10-12 RX ADMIN — Medication 3 AMPULE: at 10:33

## 2022-10-12 RX ADMIN — CARVEDILOL 12.5 MG: 12.5 TABLET, FILM COATED ORAL at 18:00

## 2022-10-12 RX ADMIN — ONDANSETRON HYDROCHLORIDE 4 MG: 2 INJECTION, SOLUTION INTRAMUSCULAR; INTRAVENOUS at 11:40

## 2022-10-12 RX ADMIN — Medication 80 MCG: at 11:21

## 2022-10-12 RX ADMIN — WATER 2 G: 1 INJECTION INTRAMUSCULAR; INTRAVENOUS; SUBCUTANEOUS at 11:09

## 2022-10-12 RX ADMIN — Medication 30 MCG/MIN: at 11:40

## 2022-10-12 RX ADMIN — FENTANYL CITRATE 25 MCG: 50 INJECTION, SOLUTION INTRAMUSCULAR; INTRAVENOUS at 13:10

## 2022-10-12 RX ADMIN — HYDROMORPHONE HYDROCHLORIDE 0.5 MG: 1 INJECTION, SOLUTION INTRAMUSCULAR; INTRAVENOUS; SUBCUTANEOUS at 13:00

## 2022-10-12 RX ADMIN — FENTANYL CITRATE 25 MCG: 50 INJECTION, SOLUTION INTRAMUSCULAR; INTRAVENOUS at 13:05

## 2022-10-12 RX ADMIN — FENTANYL CITRATE 25 MCG: 50 INJECTION, SOLUTION INTRAMUSCULAR; INTRAVENOUS at 13:00

## 2022-10-12 RX ADMIN — SACUBITRIL AND VALSARTAN 1 TABLET: 24; 26 TABLET, FILM COATED ORAL at 19:02

## 2022-10-12 RX ADMIN — SODIUM CHLORIDE, POTASSIUM CHLORIDE, SODIUM LACTATE AND CALCIUM CHLORIDE 25 ML/HR: 600; 310; 30; 20 INJECTION, SOLUTION INTRAVENOUS at 10:33

## 2022-10-12 NOTE — ANESTHESIA POSTPROCEDURE EVALUATION
Procedure(s):  OPEN RIGHT INGUINAL HERNIA WITH MESH. general    Anesthesia Post Evaluation        Patient location during evaluation: PACU  Note status: Adequate. Level of consciousness: responsive to verbal stimuli and sleepy but conscious  Pain management: satisfactory to patient  Airway patency: patent  Anesthetic complications: no  Cardiovascular status: acceptable  Respiratory status: acceptable  Hydration status: acceptable  Comments: +Post-Anesthesia Evaluation and Assessment    Patient: Sanjuana Murphy MRN: [id number]  SSN: [id number]   YOB: 1935  Age: 80 y.o. Sex: male      Cardiovascular Function/Vital Signs    BP (!) 116/55   Pulse 70   Temp 36.4 °C (97.6 °F)   Resp 14   Ht 5' 2.5\" (1.588 m)   Wt 76.5 kg (168 lb 10.4 oz)   SpO2 96%   BMI 30.36 kg/m²     Patient is status post Procedure(s):  OPEN RIGHT INGUINAL HERNIA WITH MESH. Nausea/Vomiting: Controlled. Postoperative hydration reviewed and adequate. Pain:  Pain Scale 1: Numeric (0 - 10) (10/12/22 1325)  Pain Intensity 1: 3 (10/12/22 1325)   Managed. Neurological Status:   Neuro (WDL): Exceptions to WDL (10/12/22 1215)   At baseline. Mental Status and Level of Consciousness: Arousable. Pulmonary Status:   O2 Device: Nasal cannula (10/12/22 1215)   Adequate oxygenation and airway patent. Complications related to anesthesia: None    Post-anesthesia assessment completed. No concerns. Signed By: Kim De Paz MD    10/12/2022  Post anesthesia nausea and vomiting:  controlled      INITIAL Post-op Vital signs:   Vitals Value Taken Time   /73 10/12/22 1330   Temp 36.4 °C (97.6 °F) 10/12/22 1215   Pulse 65 10/12/22 1333   Resp 19 10/12/22 1333   SpO2 98 % 10/12/22 1333   Vitals shown include unvalidated device data.

## 2022-10-12 NOTE — H&P
H&P    Assessment:   RIGHT INGUINAL HERNIA    Body mass index is 30.36 kg/m². Plan:   OPEN RIGHT INGUINAL HERNIA WITH MESH (Right) Discussed the risk of surgery including bleeding, infection, injury to adjacent structures, and the risks of general anesthetic. The patient understands the risks; any and all questions were answered to the patient's satisfaction. The patient was counseled at length about the risks of massimo Covid-19 during their perioperative period and any recovery window from their procedure. The patient was made aware that massimo Covid-19  may worsen their prognosis for recovering from their procedure and lend to a higher morbidity and/or mortality risk. All material risks, benefits, and reasonable alternatives including postponing the procedure were discussed. The patient wishes to proceed with the procedure at this time. Subjective:      Ruben Arcos is a 80 y.o. male who presents for above procedure. Objective:   Blood pressure 131/76, pulse 72, temperature 98 °F (36.7 °C), resp. rate 16, height 5' 2.5\" (1.588 m), weight 76.5 kg (168 lb 10.4 oz), SpO2 98 %.   Temp (24hrs), Av °F (36.7 °C), Min:98 °F (36.7 °C), Max:98 °F (36.7 °C)      Physical Exam:  PHYSICAL EXAM:    Chest:   [x]   CTA bialterally, no wheezing/rhonchi/rales/crackles    []   wheezing     []   rhonchi     []   crackles     []   use of accessory muscles    Heart:  [x]   regular rate and rhythm     [x]   No murmurs/rubs/gallops    []   irregular rhythm     []   Murmur     []   Rubs     []   Gallops    rih     Past Medical History:   Diagnosis Date    Acute on chronic systolic heart failure, NYHA class 4 ([address] 2019    admit 2019 7 days, EF 10% got Primacor, BIV implant    Anger     TAKES PAROXETINE TO CONTROL ANGER ISSUES    Arthritis     Burning with urination     CAD (coronary artery disease)     CABG , mukul celis  fixed inferior defect    Cancer [facility])     prostate    Chronic atrial fibrillation ([address] 03/2019    RVR3/2019, had ablation of flutter- persistent    Chronic kidney disease     Chronic pain     bilat knees    Coagulation disorder ([address]     Foot drop, left     WEARS METAL BRACE    Gout     Hard of hearing     Heart failure ([address]     High cholesterol     Hypertension     Long term current use of anticoagulant therapy     Prostate enlargement     Sleep apnea     Thyroid disease     Unspecified sleep apnea     DOESN'T USE CPAP; \"IT MADE HIM SICK-COLDS, SINUS\", PER WIFE     Past Surgical History:   Procedure Laterality Date    HX CATARACT REMOVAL Bilateral     HX ORTHOPAEDIC  2001    ORIF COMPOUND FRACTURE LEFT ANKLE    HX PACEMAKER  2019    HX RETINAL DETACHMENT REPAIR  1980s    LEFT EYE    KS CARDIAC SURG PROCEDURE UNLIST      ANGIOPLASTY WITH CORONARY STENT    KS CARDIAC SURG PROCEDURE UNLIST  1996    AORTIC VALVE REPLACEMENT    KS COMPRE ELECTROPHYSIOL XM W/LEFT ATRIAL PACNG/REC N/A 03/04/2019    Lt Atrial Pace & Record During Ep Study performed by Naima Henry MD at [place] [address], [facility] [address] CATH LAB    [address] [facility]T N/A 03/04/2019    Ablation A-Flutter performed by Naima Henry MD at [place] [address], [facility] [address] CATH LAB    KS EPHYS EVAL PACG CVDFB PRGRMG/REPRGRMG PARAMETERS N/A 03/04/2019    Eval Icd Generator & Leads W Testing At Implant performed by Naima Henry MD at [place] [address], [facility] CATH LAB    KS EPHYS EVAL PACG CVDFB PRGRMG/REPRGRMG PARAMETERS N/A 12/06/2019    Eval Icd Generator & Leads W Testing At Implant performed by Naima Henry MD at [place] [address], [facility] CATH LAB    KS SHIMA ELTRD CAR KILO SYS TM INSJ DFB/PM PLS GEN N/A 12/06/2019    Lv Lead Placement performed by Naima Henry MD at [place] [address], [facility] CATH LAB    KS INSCANDIS/RPLCMT PERM DFB W/TRNSVNS LDS 1/DUAL CHMBR N/A 03/04/2019    INSERT ICD BIV MULTI performed by Naima Henry MD at [place] [address], [facility] CATH LAB    KS INTRACARDIAC ELECTROPHYSIOLOGIC 3D MAPPING N/A 03/04/2019    Ep 3d Mapping performed by Naima Henry MD at [place] [address], [facility] [address] [facility] UT RMVL1/DUAL CHMBR IMPLTBL DFB ELTRD TRANSVNS XTRJ N/A 12/06/2019    Laser Lead Extraction performed by Marlena Goodpasture, MD at [address], [facility] [address] [facility]      Family History   Problem Relation Age of Onset    Stroke Mother         ANEURYSM    Diabetes Mother     Heart Attack Father      Social History     Socioeconomic History    Marital status:    Tobacco Use    Smoking status: Never    Smokeless tobacco: Never   Vaping Use    Vaping Use: Never used   Substance and Sexual Activity    Alcohol use: Yes     Alcohol/week: 3.0 standard drinks     Types: 3 Cans of beer per week    Drug use: No    Sexual activity: Not Currently      Prior to Admission medications    Medication Sig Start Date End Date Taking? Authorizing Provider   enoxaparin (LOVENOX) 80 mg/0.8 mL injection 80 mg by SubCUTAneous route every twelve (12) hours. Begin day after last coumadin dose. No coumadin or Lovenox day of surgery. 10/7/22  Yes Jossie Allison MD   psyllium husk (METAMUCIL PO) Take  by mouth daily. Yes Provider, Historical   allopurinoL (ZYLOPRIM) 300 mg tablet TAKE 1 TABLET EVERY DAY TO PREVENT GOUT 7/15/22  Yes Deangelo Jones MD   levothyroxine (SYNTHROID) 150 mcg tablet TAKE 1 TABLET EVERY MORNING FOR THYROID 6/13/22  Yes Deangelo Jones MD   PARoxetine (PAXIL) 20 mg tablet TAKE 1 TABLET EVERY DAY 6/13/22  Yes Deangelo Jones MD   carvediloL (COREG) 12.5 mg tablet Take 1 Tablet by mouth two (2) times daily (with meals). 9/14/21  Yes Blanka SYED NP   atorvastatin (LIPITOR) 40 mg tablet Take 1 Tablet by mouth nightly. 9/13/21  Yes Martinez Longo MD   sacubitriL-valsartan (Entresto) 24-26 mg tablet Take 1 Tablet by mouth every twelve (12) hours. Patient taking differently: Take 1 Tablet by mouth two (2) times a day. 9/13/21  Yes Martinez Longo MD   oxybutynin chloride XL (DITROPAN XL) 10 mg CR tablet Take 10 mg by mouth daily. Yes Provider, Historical   finasteride (PROSCAR) 5 mg tablet Take 5 mg by mouth daily. 2/8/19  Yes Provider, Historical   tamsulosin (FLOMAX) 0.4 mg capsule Take 0.4 mg by mouth nightly. 11/29/18  Yes Provider, Historical   furosemide (LASIX) 20 mg tablet Take 1 Tab by mouth daily. Patient taking differently: Take 20 mg by mouth daily. Per patient take 1 pill base on swelling 2/28/19  Yes Renate Kussmaul, MD   ascorbic acid, vitamin C, (VITAMIN C) 1,000 mg tablet Take 1,000 mg by mouth daily. Provider, Historical   multivit-min/FA/lycopen/lutein (CENTRUM SILVER MEN PO) Take 1 Tablet by mouth daily. Provider, Historical   warfarin (COUMADIN) 2.5 mg tablet TAKE 1 TABLET EVERY DAY - NEEDS INR CHECK 8/18/22   Gio Mcghee MD   omega 2-fxd-siq-fish oil 100-160-1,000 mg cap Take 1 Cap by mouth daily. Provider, Historical   b complex vitamins tablet Take 1 Tab by mouth daily. Provider, Historical   CYANOCOBALAMIN, VITAMIN B-12, PO Take 1 Tab by mouth daily. Sciatica    Provider, Historical   aspirin delayed-release 81 mg tablet Take 81 mg by mouth daily. Provider, Historical   B.animalis,bifid,infantis,long 10-15 mg TbEC Take 1 Tab by mouth daily. Provider, Historical   COQ10, UBIQUINOL, PO Take 1 Tab by mouth daily. Provider, Historical     ALLERGIES:  No Known Allergies    Review of Systems:    See prior scanned list in media section \"history and physical\" from 9/26/22 office visit. No changes.                 Signed By: John Machado MD     October 12, 2022

## 2022-10-12 NOTE — PERIOP NOTES
46 - PT DENIES FEVER, COLD, COUGH, SOB, N/V, DIARRHEA. Christofer Macias PRE-OP TCHING DONE - PT VERBALIZES UNDERSTANDING. STRETCHER IN LOWEST POSITION, CB IN PLACE AND S RUP X2.  DR. Aldo Kocher AWARE PT HAD 1/2 CUP COFFEE WITH CREAMER AT 0700 THIS MORNING. OK TO PROCEED WITH SURGERY. DR. Yuli Davila AWARE INR 1.3.

## 2022-10-12 NOTE — PROGRESS NOTES
End of Shift Note    Bedside shift change report given to [address] (oncoming nurse) by Silverio Buenrostro RN (offgoing nurse). Report included the following information SBAR, Kardex, OR Summary, Procedure Summary, Intake/Output, MAR, and Accordion    Shift worked:  7a-7p     Shift summary and any significant changes:     Pt admit at approx 16:05 from PACU. RLQ abd incision CDI with dermabond, pt noted to have the start of shearing between bilat buttocks and boggy heels bilat, venelex ordered. Concerns for physician to address:  Pt states he needs to mow the lawn when he gets home, please reiterate that he will need to take it easy and not strain post-op. Zone phone for oncoming shift:   [phone]       Activity:  Activity Level: Up with Assistance  Number times ambulated in hallways past shift: 0  Number of times OOB to chair past shift: 0    Cardiac:   Cardiac Monitoring: Yes      Cardiac Rhythm: AV Paced    Access:  Current line(s): PIV     Genitourinary:   Urinary status: due to void    Respiratory:   O2 Device: Nasal cannula  Chronic home O2 use?: NO  Incentive spirometer at bedside: YES       GI:  Last Bowel Movement Date: 10/11/22  Current diet:  No diet orders on file  Passing flatus: YES  Tolerating current diet: YES       Pain Management:   Patient states pain is manageable on current regimen: YES    Skin:  Jose Score: 20  Interventions: increase time out of bed    Patient Safety:  Fall Score:  Total Score: 4  Interventions: gripper socks  High Fall Risk: Yes    Length of Stay:  Expected LOS: - - -  Actual LOS: 0      Silverio Buenrostro RN

## 2022-10-12 NOTE — OP NOTES
DATE OF PROCEDURE:  10/12/2022     PREOPERATIVE DIAGNOSIS: Symptomatic right inguinal scrotal hernia. POSTOPERATIVE DIAGNOSIS: Reducible right indirect inguinal hernia. OPERATIVE PROCEDURE: Open repair of right inguinal hernia with mesh (CPT 52210)    SURGEON: Lonzell Kayser, MD     ANESTHESIA: General endotracheal.     ESTIMATED BLOOD LOSS: Minimal.     IMPLANTS:    Implant Name Type Inv. Item Serial No.  Lot No. LRB No. Used Action   MESH ISATU [id number]. 75IN L GUY PRECUT FLAP STYL PARTIALLY ABSRB - SNA  MESH ISATU [id number]. 75IN L GUY PRECUT FLAP STYL PARTIALLY ABSRB NA [employer]  SURGICAL_WD [id number] Right 1 Implanted       SPECIMENS:    ID Type Source Tests Collected by Time Destination   1 : Hernia sac Preservative Hernia Sac  Olivier Jimenes MD 10/12/2022 1136 Pathology        INDICATIONS:  Symptomatic inguinal hernia. FINDINGS: Huge right indirect inguinal hernia extending into the scrotum. Contained small bowel but this was reducible. DESCRIPTION OF PROCEDURE:  After obtaining informed consent, the patient was taken to the operating room and placed supine on the operating table. An operative time out was performed, and general endotracheal anesthesia was induced. Preoperative antibiotics were administered and the abdomen was prepped and draped in the usual sterile fashion. An [facility] was employed. An incision was made over the inguinal ligament with a blade and taken down to the subcutaneous tissues using electrocautery. The inferior superficial epigastric vessels were divided between 3-0 Vicryl ties. The external oblique was identified and cleared of its areolar attachments. The external oblique was incised along its fibers at the level of the external ring. This was then elevated with Metzenbaum scissors and divided through the external ring. The cord structures were surrounded at the pubic tubercle with a 1/4-inch Penrose drain.  The cremasterics were then  using blunt dissection and the cord structures were inspected. The hernia sac was noted and grasped. It was dissected out of the scrotum and proximally to the internal ring. The remaining cord structures were identified and isolated. There was a large amount of preperitoneal adipose that was dissected off of the hernia sac and excised. 3-0 Vicryl ties were used to do this. The hernia sac was then opened at the distal end with scissors and under direct vision was high ligated with a 2-0 Vicryl suture. The redundant sac was excised, and the peritoneum retracted back into the abdominal cavity. The Progrip mesh was then soaked in saline and made ready for implantation. It was set in place with 2-cm medial overlap of the pubic tubercle. The flap was then reapproximated around the cord. The operative field was then copiously irrigated with saline, and the external oblique aponeurosis was closed with a running 3-0 Vicryl suture. Again, the field was irrigated with bacitracin and saline, and Jp's fascia was closed with a running 3-0 Vicryl. The deep dermal tissues were reapproximated with buried interrupted 3-0 Vicryl sutures, and the skin was then closed with a running subcuticular 4-0 Monocryl. The incision was dressed with Dermabond, and the patient was recovered from general anesthesia and taken to the recovery area in satisfactory condition. All instrument, sponge, and needle counts were reported as correct.

## 2022-10-12 NOTE — PERIOP NOTES
Handoff Report from Operating Room to PACU    Report received from Dr Josse Nieves and  regarding Heather Hogue. Surgeon(s):  Delfin Buckner MD  And Procedure(s) (LRB):  OPEN RIGHT INGUINAL HERNIA WITH MESH (Right)  confirmed   with allergies and dressings discussed. Anesthesia type, drugs, patient history, complications, estimated blood loss, vital signs, intake and output, and last pain medication, lines, and temperature were reviewed. TRANSFER - OUT REPORT:    Verbal report given to Grabiel Samuels (name) on Heather Hogue  being transferred to Surg Tele (unit) for routine progression of care       Report consisted of patients Situation, Background, Assessment and   Recommendations(SBAR). Information from the following report(s) SBAR, Kardex, Intake/Output, MAR, and Accordion was reviewed with the receiving nurse. Lines:   Peripheral IV 10/12/22 Posterior;Right Forearm (Active)   Site Assessment Clean, dry, & intact 10/12/22 1026   Phlebitis Assessment 0 10/12/22 1026   Infiltration Assessment 0 10/12/22 1026   Dressing Status Clean, dry, & intact 10/12/22 1026   Dressing Type Transparent;Tape 10/12/22 1026   Hub Color/Line Status Infusing;Pink 10/12/22 1026        Opportunity for questions and clarification was provided. Tried multiple times to call wife's home and cell phone - no option to leave voicemail on home phone and cell phone voice mailbox is full.  Sent pt up with all clothing/shoes/eyeglasses/hearing-aids    Patient transported with:   Monitor  O2 @ 2 liters  Registered Nurse s/p CABG/AVR on 2/24 with Dr. Cruz  Continue inotropic support, wean pressor support as tolerated, maintain MAP >65  ASA for acute graft patency  Statin for anti inflammatory properties  Encourage IS hourly while awake   Ambulate with PT assist later today

## 2022-10-12 NOTE — ANESTHESIA PREPROCEDURE EVALUATION
Relevant Problems   RESPIRATORY SYSTEM   (+) LENNON (dyspnea on exertion)      NEUROLOGY   (+) Depression, major, recurrent, mild (HCC)      CARDIOVASCULAR   (+) A-fib (HCC)   (+) Chronic systolic congestive heart failure (HCC)   (+) Coronary artery disease involving native coronary artery of native heart with unstable angina pectoris (HCC)   (+) Hypertension, essential   (+) PVC (premature ventricular contraction)   (+) S/P CABG (coronary artery bypass graft)   (+) Severe aortic stenosis   (+) Typical atrial flutter (HCC)      RENAL FAILURE   (+) Chronic renal disease, stage III      ENDOCRINE   (+) Hypothyroidism      HEMATOLOGY   (+) Anemia       Anesthetic History   No history of anesthetic complications            Review of Systems / Medical History  Patient summary reviewed, nursing notes reviewed and pertinent labs reviewed    Pulmonary        Sleep apnea: No treatment           Neuro/Psych         Psychiatric history     Cardiovascular    Hypertension      CHF  Dysrhythmias : atrial flutter and atrial fibrillation  Pacemaker (ICD), CAD and CABG    Exercise tolerance: <4 METS  Comments: AVR    Echo 2021: LVEF 42%   GI/Hepatic/Renal  Within defined limits              Endo/Other      Hypothyroidism  Obesity and arthritis     Other Findings                   Anesthetic Plan    ASA: 3  Anesthesia type: general          Induction: Intravenous

## 2022-10-13 VITALS
TEMPERATURE: 98 F | BODY MASS INDEX: 29.88 KG/M2 | HEART RATE: 70 BPM | OXYGEN SATURATION: 98 % | HEIGHT: 63 IN | RESPIRATION RATE: 16 BRPM | DIASTOLIC BLOOD PRESSURE: 56 MMHG | WEIGHT: 168.65 LBS | SYSTOLIC BLOOD PRESSURE: 119 MMHG

## 2022-10-13 LAB
INR PPP: 1.1 (ref 0.9–1.1)
PROTHROMBIN TIME: 11.5 SEC (ref 9–11.1)

## 2022-10-13 PROCEDURE — 74011250637 HC RX REV CODE- 250/637: Performed by: SURGERY

## 2022-10-13 PROCEDURE — G0378 HOSPITAL OBSERVATION PER HR: HCPCS

## 2022-10-13 PROCEDURE — 36415 COLL VENOUS BLD VENIPUNCTURE: CPT

## 2022-10-13 PROCEDURE — 85610 PROTHROMBIN TIME: CPT

## 2022-10-13 RX ORDER — HYDROCODONE BITARTRATE AND ACETAMINOPHEN 5; 325 MG/1; MG/1
1 TABLET ORAL
Qty: 10 TABLET | Refills: 0 | Status: SHIPPED | OUTPATIENT
Start: 2022-10-13 | End: 2022-10-16

## 2022-10-13 RX ADMIN — PAROXETINE HYDROCHLORIDE 20 MG: 20 TABLET, FILM COATED ORAL at 08:03

## 2022-10-13 RX ADMIN — ALLOPURINOL 300 MG: 300 TABLET ORAL at 08:03

## 2022-10-13 RX ADMIN — LEVOTHYROXINE SODIUM 150 MCG: 0.15 TABLET ORAL at 08:03

## 2022-10-13 RX ADMIN — OXYBUTYNIN CHLORIDE 10 MG: 5 TABLET, EXTENDED RELEASE ORAL at 08:03

## 2022-10-13 RX ADMIN — WARFARIN SODIUM 2.5 MG: 2.5 TABLET ORAL at 08:03

## 2022-10-13 RX ADMIN — FINASTERIDE 5 MG: 5 TABLET, FILM COATED ORAL at 08:03

## 2022-10-13 RX ADMIN — CARVEDILOL 12.5 MG: 12.5 TABLET, FILM COATED ORAL at 08:03

## 2022-10-13 RX ADMIN — SACUBITRIL AND VALSARTAN 1 TABLET: 24; 26 TABLET, FILM COATED ORAL at 08:03

## 2022-10-13 RX ADMIN — CASTOR OIL AND BALSAM, PERU: 788; 87 OINTMENT TOPICAL at 08:05

## 2022-10-13 NOTE — PROGRESS NOTES
Problem: Falls - Risk of  Goal: *Absence of Falls  Description: Document Cherylle Cockayne Fall Risk and appropriate interventions in the flowsheet.   Outcome: Progressing Towards Goal  Note: Fall Risk Interventions:  Mobility Interventions: Utilize walker, cane, or other assistive device         Medication Interventions: Patient to call before getting OOB, Teach patient to arise slowly, Evaluate medications/consider consulting pharmacy    Elimination Interventions: Call light in reach, Urinal in reach    History of Falls Interventions: Door open when patient unattended         Problem: Pain  Goal: *Control of Pain  Outcome: Progressing Towards Goal  Note:   Assess pain characteristics (eg: Intensity scale; onset; location; quality; severity; duration; frequency;   radiation)  Assess pain management - barriers (eg: Past pain   experiences)  Identify pain expectations (eg: Patient's pain goal; somatic experiences; behavioral changes;   affect)  Identify pain medication concerns (eg: Cultural considerations; addiction   concerns)  Support system identification (eg: Caregiver; community resource; family; friends; [employer]; support   group)  Monitor for change in patient condition (eg: Vital signs changes; changes in level of consciousness; nausea; behavioral   changes)  Medication side-effect   assessment  Pain-relief response reassessment (eg: Frequency based on route of administration;   effectiveness

## 2022-10-13 NOTE — PROGRESS NOTES
Discharge instructions reviewed with daughter and wife (caregivers) d/t pt being [place]. Wife and daughter verbalized understanding of discharge instructions, copy given. IV removed without incident. No c/o pain or distress. No c/o n/v/d. Wife and daughter have all of patient's belongings. Patient discharged via wheelchair with his wife and daughter without incident.

## 2022-10-13 NOTE — PROGRESS NOTES
Attempted to schedule hospital follow up PCP appointment. Sent a message to PCP office to find patient an appointment. Awaiting callback from PCP office.  Nena Ly

## 2022-10-13 NOTE — PROGRESS NOTES
[address] follow-up PCP transitional care appointment has been scheduled with Dr. Alden Cruz on 10/24/22 at 1400. This is the first available appt due to limited provider availability. PCP office does not offer alternate provider option for hospital follow up. Pending patient discharge. Sixto Flores, Care Management Assistant     Delivered MOON to patient. See scanned documents.  Nena Ly

## 2022-10-13 NOTE — PROGRESS NOTES
Pt is cleared for d/c from a CM standpoint. CM spoke with pt's spouse by phone to discuss d/c.  Pt's spouse and daughter, Angela Lester, will be transporting pt home today around noon. CM made pt's follow up appt with Dr. Ben León. Care Management Interventions  Mode of Transport at Discharge: Other (see comment) (spouse and daughter)  Transition of Care Consult (CM Consult):  Other (home with family)  Discharge Durable Medical Equipment: No  Physical Therapy Consult: No  Occupational Therapy Consult: No  Speech Therapy Consult: No  Support Systems: Spouse/Significant Other, Child(alfonso)  Confirm Follow Up Transport: Family  The Patient and/or Patient Representative was Provided with a Choice of Provider and Agrees with the Discharge Plan?: Yes  Freedom of Choice List was Provided with Basic Dialogue that Supports the Patient's Individualized Plan of Care/Goals, Treatment Preferences and Shares the Quality Data Associated with the Providers?: Yes  Discharge Location  Patient Expects to be Discharged to[de-identified] Home with family assistance    Clayton Montez, MSW  Care Management, [address] [facility]

## 2022-10-13 NOTE — PROGRESS NOTES
Problem: Falls - Risk of  Goal: *Absence of Falls  Description: Document Kimmy Howe Fall Risk and appropriate interventions in the flowsheet.   Outcome: Progressing Towards Goal  Note: Fall Risk Interventions:  Mobility Interventions: Bed/chair exit alarm         Medication Interventions: Bed/chair exit alarm    Elimination Interventions: Bed/chair exit alarm    History of Falls Interventions: Bed/chair exit alarm

## 2022-10-13 NOTE — DISCHARGE INSTRUCTIONS
Discharge Instructions: Hernia -- Dr. Paul Del Valle    Call on next business day to arrange appointment for follow up in 3 weeks -- [phone]. Activity:  Walk regularly. No lifting more than 10 pounds for 6 weeks. Light aerobic activity is okay when you feel up to it. You may resume driving in three days unless still requiring narcotics for pain. Return to work in 1-2 weeks but no heavy lifting for 6 weeks. Apply ice to areas of tenderness for 20 minutes at a time. Keep a cloth between the skin and the bag of ice. Diet:  You may resume normal diet. Wound Care:  Glue dressing will fall off on its own. If you experience a lot of drainage, develop redness around the wound, or a fever over 101 F occurs please call the office. There will be significant swelling under the incision(s) that will develop over the next 3-4 days. Ice will help reduce this. Male patients may experience swelling and bruising of the scrotum -- this is normal.  However, if the scrotum becomes tense and painful you should call. Wear a jock strap/athletic supporter for the next week to reduce scrotal swelling. If you can't urinate within 8 hours, call. Intentionally urinate every 2 hours today, even if you don't feel like you have to go. Wear your abdominal binder at all times for 3 weeks. May remove to shower. May wash binder. Wear a cotton T-shirt under the binder for comfort. You may shower. No baths or swimming for 3 weeks. Medications:  See attached \"Medication Reconciliation. \"    Resume home medications as indicated on the Medical Reconciliation form. Do not use blood thinners (such as Aspirin, Coumadin, or Plavix) until 3 days after surgery. Pain medications:  Non steroidal antiinflammatories (ibuprofen -- Advil or Motrin) seem to work best for post surgical pain. Try these first.      PUT ICE ON YOUR INCISIONS 20 MINUTES EVERY HOUR WHILE THEY REMAIN SORE.   PLACE A CLOTH BETWEEN YOUR SKIN THE THE ICE BAG. A narcotic prescription will also be given for breakthrough pain. Tylenol can be used in place of the narcotic. Colace or Miralax should be used twice daily to prevent constipation while on narcotics. If you are still having trouble having a BM after 1-2 days, try milk of magnesia. If this does not work within 24 hours, try a bottle of magnesium citrate. If this does not work, call us. Narcotics and anesthesia sometimes cause nausea and vomiting. If persistent please call the office. Do not hesitate to call with questions or concerns.     Cary Conway MD  Tel [phone]  Fax [phone]

## 2022-10-13 NOTE — PROGRESS NOTES
Surgery NP Progress Note    Dana Leigh  [id number]  male  80 y.o.  1935    s/p OPEN RIGHT INGUINAL HERNIA WITH MESH on 10/12/2022   Pt seen with Dr. Roz Sanchez      Assessment:   Active Problems:    Right inguinal hernia (10/12/2022)        Expected post-op progress. Plan/Recommendations/Medical Decision Making:     - Mobilize with nursing and OOB to chair for meals  - Continue diet  - Pain management- Continue current pain control methods.   - VTE Prophylaxis: Lovenox  - Discharge to home today     Subjective:     Patient doing well. Tolerating diet. Pain well controlled. Objective:     Blood pressure (!) 119/56, pulse 70, temperature 98 °F (36.7 °C), resp. rate 16, height 5' 2.5\" (1.588 m), weight 76.5 kg (168 lb 10.4 oz), SpO2 98 %. Temp (24hrs), Av.8 °F (36.6 °C), Min:97.3 °F (36.3 °C), Max:98.4 °F (36.9 °C)      Pt resting in bed. NAD   Incisions CDI. SCDs for mechanical DVT proph while in bed     Body mass index is 30.36 kg/m². Reference: BMI greater than 30 is classified as obesity and greater than 40 is classified as morbid obesity.      Last 3 Recorded Weights in this Encounter    10/12/22 0955   Weight: 76.5 kg (168 lb 10.4 oz)         Aaliyah Andrews, CRISTELA   MSN, APRN, FNP-C, CWOCN-AP, RNFA    10/13/22

## 2022-10-14 ENCOUNTER — TELEPHONE (OUTPATIENT)
Dept: SURGERY | Age: 87
End: 2022-10-14

## 2022-10-14 ENCOUNTER — TELEPHONE (OUTPATIENT)
Dept: INTERNAL MEDICINE CLINIC | Age: 87
End: 2022-10-14

## 2022-10-14 ENCOUNTER — PATIENT OUTREACH (OUTPATIENT)
Dept: CASE MANAGEMENT | Age: 87
End: 2022-10-14

## 2022-10-14 NOTE — PROGRESS NOTES
Care Transitions Initial Call    Call within 2 business days of discharge: Yes     Patient: Tim Arguello Patient : 1935 MRN: [id number]    Last Discharge [address]       Date Complaint Diagnosis Description Type Department Provider    10/12/22  Right inguinal hernia Admission (Discharged) Colette Abad MD            Was this an external facility discharge? No Discharge Facility: [facility]    Challenges to be reviewed by the provider   Additional needs identified to be addressed with provider: yes  medications- Patient has call into office regarding clarifaction of Lovenox and resuming coumadian         Method of communication with provider : chart routing      Advance Care Planning:   Does patient have an Advance Directive: yes, reviewed and current. Inpatient Readmission Risk score: No data recorded  Was this a readmission? no       Patients top risk factors for readmission: medication management   Interventions to address risk factors: Scheduled appointment with PCP-10/24 NATE Poe and Scheduled appointment with Specialist- Dr. Arsalan Montaño Transition Nurse (CTN) contacted the family by telephone to perform post hospital discharge assessment. Verified name and  with family as identifiers. Provided introduction to self, and explanation of the CTN role. CTN reviewed discharge instructions, medical action plan and red flags with family who verbalized understanding. Were discharge instructions available to patient? yes. Reviewed appropriate site of care based on symptoms and resources available to patient including: PCP and Specialist. Family given an opportunity to ask questions and does not have any further questions or concerns at this time. The family agrees to contact the PCP office for questions related to their healthcare.        Referral to Pharm D needed: no     Home Health/Outpatient orders at discharge: [address]: na  Date of initial visit: na    Durable Medical Equipment ordered at discharge: None  [address]: na  Durable Medical Equipment received: na    Was patient discharged with a pulse oximeter? no    Discussed follow-up appointments. If no appointment was previously scheduled, appointment scheduling offered: yes. Is follow up appointment scheduled within 7 days of discharge? yes. [facility] follow up appointment(s):   Future Appointments   Date Time Provider Lexie Moreno   10/24/2022  2:00 PM Ana Maria Douglas MD [facility] BS AMB   10/26/2022  4:30 PM REMOTE1, BUSTER STAHL BS AMB   11/4/2022  8:20 AM Gladys Lara MD [facility] BS AMB   1/30/2023 11:45 AM REMOTE1, BUSTER STAHL BS AMB   5/3/2023  4:30 PM REMOTE1, BUSTER STAHL BS AMB   8/22/2023  9:20 AM PACEMAKER3, BUSTER STAHL BS AMB   8/22/2023  9:40 AM MD MARIBETH Marquez BS AMB     Non-[facility] follow up appointment(s): na    Plan for follow-up call in 5-7 days based on severity of symptoms and risk factors. Plan for next call: follow up appointment-11/4 surgeon 10/24 PCP and medication management-Patient should have resume blood thinner (coumidan)  CTN provided contact information for future needs. Goals Addressed                   This Visit's Progress     Attends follow-up appointments as directed.         10/14/22- remind pt about upcoming appts PCP 10/24/22, Surgeon 11/4/22-CDT       Knowledge and adherence of prescribed medication (ie. action, side effects, missed dose, etc.).        10/14/22-patient will have discontinued Lovenox and resume coumadin as prescribed-CDT

## 2022-10-14 NOTE — TELEPHONE ENCOUNTER
----- Message from Shayy Davis sent at 10/14/2022  2:01 PM EDT -----  Subject: Appointment Request    Reason for Call: Established Patient Appointment needed: Routine (Patient   Request) No Script    QUESTIONS    Reason for appointment request? No appointments available during search     Additional Information for Provider? PT wife, Foster Kasper, called in stating   that post op paperwork from hernia surgery stated to follow up with PCP,   Arden Mckay within 2 days. Foster Kasper says that PT is doing well after   surgery and is not sure if he needs to be seen but wants PCP opinion. Foster Kasper does have some questions regarding PT medication his [place] and   [place]. Please reach out to Foster Kasper to discuss and schedule. PT wants   to speak with the PCP.   ---------------------------------------------------------------------------  --------------  Laureano KOCH  [phone];  Do not leave any message, patient will call back for answer  ---------------------------------------------------------------------------  --------------  SCRIPT ANSWERS  JULI Screen: Willard Holden

## 2022-10-14 NOTE — TELEPHONE ENCOUNTER
Nurse from hospital called to follow up with patient after surgery. Patient's wife needs more detail on discharge instructions on lavonex and coumadin. Please return wife's call today.

## 2022-10-14 NOTE — TELEPHONE ENCOUNTER
Spoke with patient wife informed her will call cardiology office re lovenox and coumadin. Express understanding.

## 2022-10-14 NOTE — TELEPHONE ENCOUNTER
Contacted patient's wife, Sanjuana Hansen.  Per Dr. Mikey Herrera, If doing well just needs fu with Gen Sx

## 2022-10-21 ENCOUNTER — PATIENT OUTREACH (OUTPATIENT)
Dept: CASE MANAGEMENT | Age: 87
End: 2022-10-21

## 2022-10-23 NOTE — PROGRESS NOTES
HISTORY OF PRESENT ILLNESS  Shalom Vences is a 80 y.o. male. HPI  Hx HTN HLD s/p mech AVR CHF s/p Biv -ICD ef 15 % CAD s/p CABG , Alutter s/p ablation, MDD hypothyroid-here with wife   EP MD Dr Sivakumar Vargas and CARD Dr Diaz More  Last TSH was 59--pt was nonadherent with levothyroxine and did not get repeat labs as recommended. He is not taking his thyroid medication per wife and pt  Here for BELEN-s/p [facility] 10/12-Dr Tolbert-here with wife  He noted some swelling in right groin a few days after surgery-has hard lump but states it is not any larger. he restarted the warfarin 2.5 mg every day and lovenox 3d after surgery as instructed. No longer taking the lovenox.  Has not had the INR repeated  No wound drainage -had open repair of the hernia  C/o some redness in buttocks and needs refill of steroid crm from hand psoriasis managed by DERM  Bi f/c cough or increased SOB      On warfarin--lovenox and warfarin held x 3d after surgery then resumed around 10/15  Needs office INR  Was taking warfarin 2.5 mg every day prior to surgery  Has fu CARD 10/26    Patient Active Problem List    Diagnosis Date Noted    Hx of cardiac pacemaker 12/06/2019    Right inguinal hernia 10/12/2022    Chronic renal disease, stage III 07/31/2022    Depression, major, recurrent, mild ([address] 03/10/2020    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 12/06/2019    Chronic systolic congestive heart failure ([address] 04/07/2019    LENNON (dyspnea on exertion) 04/07/2019    Coronary artery disease involving native coronary artery of native heart with unstable angina pectoris ([address] 04/07/2019    S/P CABG (coronary artery bypass graft) 04/07/2019    Severe aortic stenosis 04/07/2019    Hypertension, essential 04/07/2019    Dyslipidemia 04/07/2019    PVC (premature ventricular contraction) 04/07/2019    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 ([address] 04/07/2019    Typical atrial flutter ([address] 03/04/2019    Status post catheter ablation of atrial flutter 03/04/2019    Biventricular ICD (implantable cardioverter-defibrillator) in place 03/04/2019    A-fib ([facility] 03/01/2019    Anemia 10/09/2018    Hypothyroidism 10/09/2018    Supratherapeutic INR 10/09/2018    H/O mechanical aortic valve replacement 10/09/2018     Current Outpatient Medications   Medication Sig Dispense Refill    enoxaparin (LOVENOX) 80 mg/0.8 mL injection 80 mg by SubCUTAneous route every twelve (12) hours. Begin day after last coumadin dose. No coumadin or Lovenox day of surgery. 16 Each 0    ascorbic acid, vitamin C, (VITAMIN C) 1,000 mg tablet Take 1,000 mg by mouth daily. psyllium husk (METAMUCIL PO) Take  by mouth daily. multivit-min/FA/lycopen/lutein (CENTRUM SILVER MEN PO) Take 1 Tablet by mouth daily. warfarin (COUMADIN) 2.5 mg tablet TAKE 1 TABLET EVERY DAY - NEEDS INR CHECK 90 Tablet 0    allopurinoL (ZYLOPRIM) 300 mg tablet TAKE 1 TABLET EVERY DAY TO PREVENT GOUT 30 Tablet 10    levothyroxine (SYNTHROID) 150 mcg tablet TAKE 1 TABLET EVERY MORNING FOR THYROID 30 Tablet 10    PARoxetine (PAXIL) 20 mg tablet TAKE 1 TABLET EVERY DAY 30 Tablet 4    carvediloL (COREG) 12.5 mg tablet Take 1 Tablet by mouth two (2) times daily (with meals). 180 Tablet 1    atorvastatin (LIPITOR) 40 mg tablet Take 1 Tablet by mouth nightly. 90 Tablet 3    sacubitriL-valsartan (Entresto) 24-26 mg tablet Take 1 Tablet by mouth every twelve (12) hours. (Patient taking differently: Take 1 Tablet by mouth two (2) times a day.) 180 Tablet 3    oxybutynin chloride XL (DITROPAN XL) 10 mg CR tablet Take 10 mg by mouth daily. omega 3-dha-epa-fish oil 100-160-1,000 mg cap Take 1 Cap by mouth daily. b complex vitamins tablet Take 1 Tab by mouth daily. CYANOCOBALAMIN, VITAMIN B-12, PO Take 1 Tab by mouth daily. Sciatica      aspirin delayed-release 81 mg tablet Take 81 mg by mouth daily. B.animalis,bifid,infantis,long 10-15 mg TbEC Take 1 Tab by mouth daily.       COQ10, UBIQUINOL, PO Take 1 Tab by mouth daily. finasteride (PROSCAR) 5 mg tablet Take 5 mg by mouth daily. tamsulosin (FLOMAX) 0.4 mg capsule Take 0.4 mg by mouth nightly. furosemide (LASIX) 20 mg tablet Take 1 Tab by mouth daily. (Patient taking differently: Take 20 mg by mouth daily. Per patient take 1 pill base on swelling) 90 Tab 1     No Known Allergies   Lab Results   Component Value Date/Time    WBC 5.7 10/07/2022 01:25 PM    HGB 14.1 10/07/2022 01:25 PM    HCT 42.2 10/07/2022 01:25 PM    PLATELET 794 72/85/7443 01:25 PM    MCV 97.5 10/07/2022 01:25 PM     Lab Results   Component Value Date/Time    Hemoglobin A1c 5.9 10/08/2013 10:41 AM    Glucose 112 (H) 10/07/2022 01:25 PM    Glucose (POC) 119 (H) 10/24/2013 06:28 AM    LDL, calculated 76 05/13/2021 12:25 PM    Creatinine (POC) 1.70 (H) 11/04/2021 11:00 AM    Creatinine 1.30 10/07/2022 01:25 PM      Lab Results   Component Value Date/Time    Cholesterol, total 150 05/13/2021 12:25 PM    HDL Cholesterol 52 05/13/2021 12:25 PM    LDL, calculated 76 05/13/2021 12:25 PM    Triglyceride 110 05/13/2021 12:25 PM    CHOL/HDL Ratio 2.9 05/13/2021 12:25 PM     Lab Results   Component Value Date/Time    GFR est non-AA >60 07/28/2022 02:49 PM    GFRNA, POC 38 (L) 11/04/2021 11:00 AM    GFR est AA >60 07/28/2022 02:49 PM    GFRAA, POC 47 (L) 11/04/2021 11:00 AM    Creatinine 1.30 10/07/2022 01:25 PM    Creatinine (POC) 1.70 (H) 11/04/2021 11:00 AM    BUN 23 (H) 10/07/2022 01:25 PM    Sodium 141 10/07/2022 01:25 PM    Potassium 4.0 10/07/2022 01:25 PM    Chloride 109 (H) 10/07/2022 01:25 PM    CO2 25 10/07/2022 01:25 PM    Magnesium 2.0 03/01/2019 09:10 AM        ROS    Physical Exam  Vitals and nursing note reviewed. Constitutional:       General: He is not in acute distress. Appearance: Normal appearance. He is well-developed. Comments: Appears stated age. [place]   HENT:      Head: Normocephalic. Cardiovascular:      Rate and Rhythm: Normal rate and regular rhythm. Heart sounds: Normal heart sounds. Pulmonary:      Effort: Pulmonary effort is normal.      Breath sounds: Normal breath sounds. Abdominal:      Palpations: Abdomen is soft. Skin:         Neurological:      Mental Status: He is alert. ASSESSMENT and PLAN    ICD-10-CM ICD-9-CM    1. S/P AVR (aortic valve replacement)  Z95.2 V43.3 PROTHROMBIN TIME + INR  Could not get amb INR today-go to lab  On warfarin 2.5 mg qd      2. Hematoma  T14. 8XXA 924.9 Hematoma vs seroma  Needs check with Gen Sx  Warm compress for now      3. S/P hernia repair  Z98.890 V45.89 Forward to Dr John León    Z87.19        4. Depression, major, recurrent, mild (HCC)  F33.0 296.31       5. Coronary artery disease of native artery of native heart with stable angina pectoris ([facility]  I25.118 414.01 Fu CARD and EP MDs     413.9       6. Stage 3 chronic kidney disease, unspecified whether stage 3a or 3b CKD (HCC)  N18.30 585.3       7. Acquired hypothyroidism  E03.9 244.9 TSH 3RD GENERATION  Nonadherent-stressed importance of taking the levothyroxine      8. Hyperlipidemia, unspecified hyperlipidemia type  E78.5 272.4 LIPID PANEL  Stressed med adherence      9. Psoriasis  L40.9 696.1 clobetasoL (TEMOVATE) 0.05 % topical cream      10.  Needs flu shot  Z23 V04.81 INFLUENZA, FLUAD, (AGE 65 Y+), IM, PF, 0.5 ML

## 2022-10-24 ENCOUNTER — OFFICE VISIT (OUTPATIENT)
Dept: INTERNAL MEDICINE CLINIC | Age: 87
End: 2022-10-24
Payer: MEDICARE

## 2022-10-24 VITALS
HEIGHT: 63 IN | SYSTOLIC BLOOD PRESSURE: 98 MMHG | HEART RATE: 82 BPM | DIASTOLIC BLOOD PRESSURE: 64 MMHG | RESPIRATION RATE: 18 BRPM | TEMPERATURE: 97.2 F | WEIGHT: 168 LBS | OXYGEN SATURATION: 99 % | BODY MASS INDEX: 29.77 KG/M2

## 2022-10-24 DIAGNOSIS — Z87.19 S/P HERNIA REPAIR: ICD-10-CM

## 2022-10-24 DIAGNOSIS — Z95.2 S/P AVR (AORTIC VALVE REPLACEMENT): Primary | ICD-10-CM

## 2022-10-24 DIAGNOSIS — T14.8XXA HEMATOMA: ICD-10-CM

## 2022-10-24 DIAGNOSIS — E78.5 HYPERLIPIDEMIA, UNSPECIFIED HYPERLIPIDEMIA TYPE: ICD-10-CM

## 2022-10-24 DIAGNOSIS — E03.9 ACQUIRED HYPOTHYROIDISM: ICD-10-CM

## 2022-10-24 DIAGNOSIS — I25.118 CORONARY ARTERY DISEASE OF NATIVE ARTERY OF NATIVE HEART WITH STABLE ANGINA PECTORIS (HCC): ICD-10-CM

## 2022-10-24 DIAGNOSIS — Z98.890 S/P HERNIA REPAIR: ICD-10-CM

## 2022-10-24 DIAGNOSIS — L40.9 PSORIASIS: ICD-10-CM

## 2022-10-24 DIAGNOSIS — Z23 NEEDS FLU SHOT: ICD-10-CM

## 2022-10-24 DIAGNOSIS — F33.0 DEPRESSION, MAJOR, RECURRENT, MILD (HCC): ICD-10-CM

## 2022-10-24 DIAGNOSIS — N18.30 STAGE 3 CHRONIC KIDNEY DISEASE, UNSPECIFIED WHETHER STAGE 3A OR 3B CKD (HCC): ICD-10-CM

## 2022-10-24 PROCEDURE — 99495 TRANSJ CARE MGMT MOD F2F 14D: CPT | Performed by: INTERNAL MEDICINE

## 2022-10-24 PROCEDURE — 90694 VACC AIIV4 NO PRSRV 0.5ML IM: CPT | Performed by: INTERNAL MEDICINE

## 2022-10-24 PROCEDURE — G8427 DOCREV CUR MEDS BY ELIG CLIN: HCPCS | Performed by: INTERNAL MEDICINE

## 2022-10-24 RX ORDER — CLOBETASOL PROPIONATE 0.5 MG/G
CREAM TOPICAL 2 TIMES DAILY
Qty: 30 G | Refills: 1 | Status: SHIPPED | OUTPATIENT
Start: 2022-10-24

## 2022-10-24 RX ORDER — NYSTATIN 100000 U/G
CREAM TOPICAL 2 TIMES DAILY
Qty: 15 G | Refills: 1 | Status: SHIPPED | OUTPATIENT
Start: 2022-10-24

## 2022-10-24 NOTE — Clinical Note
Jessy Friend, this pt appears to have a golf ball or smaller hematoma right groin . He has fu with you next month. Seems well otherwise.

## 2022-10-24 NOTE — PROGRESS NOTES
1. \"Have you been to the ER, urgent care clinic since your last visit? Hospitalized since your last visit? \" No    2. \"Have you seen or consulted any other health care providers outside of the [address] [place] [address] since your last visit? \" No     3. For patients aged 39-70: Has the patient had a colonoscopy / FIT/ Cologuard?  NA - based on age      I

## 2022-10-24 NOTE — PROGRESS NOTES
Care Transitions Follow Up Call    Challenges to be reviewed by the provider   Additional needs identified to be addressed with provider: no             Method of communication with provider : chart routing    Care Transition Nurse (CTN) contacted the patient by telephone to follow up after admission on 10/12/22. Verified name and  with family as identifiers. Addressed changes since last contact: medications- Lovenox completed  Follow up appointment completed? PCP appt scheduled for today. Was follow up appointment scheduled within 7 days of discharge? no.     Advance Care Planning:   Does patient have an Advance Directive:  yes; reviewed and current     CTN reviewed discharge instructions, medical action plan and red flags with family and discussed any barriers to care and/or understanding of plan of care after discharge. Discussed appropriate site of care based on symptoms and resources available to patient including: PCP and Specialist. The family agrees to contact the PCP office for questions related to their healthcare. [facility] follow up appointment(s):   Future Appointments   Date Time Provider Lexie Moreno   10/24/2022  2:00 PM Zachary Menon MD [facility] BS AMB   10/26/2022  4:30 PM REMOTE1, BUSTER STAHL BS AMB   2022 11:20 AM Azeb Le MD [facility] BS AMB   2023 11:45 AM REMOTE1, BUSTER STAHL BS AMB   5/3/2023  4:30 PM REMOTE1, BUSTER STAHL BS AMB   2023  9:20 AM PACEMAKER3, BUSTER STAHL BS AMB   2023  9:40 AM MD MARIBETH Capps BS AMB     Non-[facility] follow up appointment(s): NA    CTN provided contact information for future needs. Plan for follow-up call in 10-14 days based on severity of symptoms and risk factors. Plan for next call: follow up appointment-Patient would have attended PCP and Surgeon fu       Goals Addressed                   This Visit's Progress     Attends follow-up appointments as directed.         10/14/22- remind pt about upcoming appts PCP 10/24/22, Surgeon 11/4/22-CDT    10/24/22- Pt should have seen PCP and surgeon by next call, reminders given today-CDT       Knowledge and adherence of prescribed medication (ie. action, side effects, missed dose, etc.).        10/14/22-patient will have discontinued Lovenox and resume coumadin as prescribed-CDT    10/24/22-Patient has completed Lovenox and has resumed Coumandin as prescribed.  -CDT

## 2022-10-24 NOTE — PROGRESS NOTES
HISTORY OF PRESENT ILLNESS  80 y.o. male. Hx HTN HLD s/p mech AVR CHF s/p Biv -ICD ef 15 % CAD s/p CABG , Alutter s/p ablation, MDD hypothyroid  EP MD Dr Dana Marie and NORMA Peng  Last TSH was 59--pt was nonadherent with levothyroxine and did not get repeat labs as recommended. Here for BELEN-s/p [facility] 10/12-Dr Efren Saini, On warfarin--lovenox and warfarin held x 3d after surgery then resumed around 10/15  Needs office INR, Was taking warfarin 2.5 mg every day prior to surgery  Has fu CARD 10/26  He and wife report that he has a low back wound noted after surgery, upon inspection he has mile redness to upper buttocks midline area, no open sores, no signs of infection    ROS  Constitutional:       General: He is not in acute distress. Appearance: He is well-developed. Comments: Appears stated age. Very [place]   HENT:      Head: Normocephalic. Cardiovascular:      Rate and Rhythm: Normal rate and regular rhythm. Heart sounds: Normal heart sounds. Pulmonary:      Effort: Pulmonary effort is normal.      Breath sounds: Normal breath sounds. Abdominal:      Palpations: Abdomen is soft. Genitourinary:  right lower quad incision from hernia repair, well approximated, no signs of infection, below incision - palpate hematoma, he is unsure if it is getting better or worse  Upper buttocks:  mild redness, no open sores, no signs of infection  Neurological:      Mental Status: He is alert. ASSESSMENT and PLAN  Diagnoses and all orders for this visit:    CPE  Chronic systolic congestive heart failure (HCC)   CMP  Hypothyroidism   TSH  Chronic a-fib (HCC)   On warfarin - INR, f/up with CARD and EP MD  CAD   CMP, /fup with CARD and ALLY AVILA   Lipid panel, CMP  Right groin hematoma   F/up with Gen Surgery post hernia repair. Return in about 6 months, around 10/24/37914 for CPE.

## 2022-10-25 ENCOUNTER — CLINICAL SUPPORT (OUTPATIENT)
Dept: INTERNAL MEDICINE CLINIC | Age: 87
End: 2022-10-25
Payer: MEDICARE

## 2022-10-25 VITALS
WEIGHT: 168.8 LBS | RESPIRATION RATE: 16 BRPM | HEART RATE: 77 BPM | DIASTOLIC BLOOD PRESSURE: 70 MMHG | TEMPERATURE: 97.2 F | SYSTOLIC BLOOD PRESSURE: 138 MMHG | BODY MASS INDEX: 30.38 KG/M2 | OXYGEN SATURATION: 99 %

## 2022-10-25 DIAGNOSIS — I48.91 ATRIAL FIBRILLATION, UNSPECIFIED TYPE (HCC): Primary | ICD-10-CM

## 2022-10-25 LAB
CHOLEST SERPL-MCNC: 224 MG/DL
HDLC SERPL-MCNC: 47 MG/DL
HDLC SERPL: 4.8 {RATIO} (ref 0–5)
INR BLD: 1.5 (ref 1–1.5)
LDLC SERPL CALC-MCNC: 157.4 MG/DL (ref 0–100)
PT POC: 18.3 SECONDS (ref 9.1–12)
TRIGL SERPL-MCNC: 98 MG/DL (ref ?–150)
TSH SERPL DL<=0.05 MIU/L-ACNC: 17 UIU/ML (ref 0.36–3.74)
VALID INTERNAL CONTROL?: YES
VLDLC SERPL CALC-MCNC: 19.6 MG/DL

## 2022-10-25 PROCEDURE — 85610 PROTHROMBIN TIME: CPT | Performed by: INTERNAL MEDICINE

## 2022-10-25 PROCEDURE — 99211 OFF/OP EST MAY X REQ PHY/QHP: CPT | Performed by: INTERNAL MEDICINE

## 2022-10-25 RX ORDER — ENOXAPARIN SODIUM 100 MG/ML
80 INJECTION SUBCUTANEOUS EVERY 12 HOURS
Qty: 16 EACH | Refills: 0 | Status: SHIPPED | OUTPATIENT
Start: 2022-10-25 | End: 2022-11-28 | Stop reason: ALTCHOICE

## 2022-10-25 NOTE — PROGRESS NOTES
Inr subtherapeutic- take coumadin 5 mg today then 2.5 mg every day starting tomorrow. Restart lovenox 80 mg sc q12.  Repeat INR in 3 days in office

## 2022-10-25 NOTE — PROGRESS NOTES
Mr. Stan Arora is here today for anticoagulation monitoring for the diagnosis of Atrial Fibrillation. His INR goal is 2.0 -3.0 and his current Coumadin dose is 2.5 mg. Today's findings include an INR of 1.5 (normal INR range 2.0-3.0) and pt 18.3  . Considering Mr. Breen Comes past history, todays findings, and per the coumadin policy/protocol, Mr. Radha Cheng was instructed to take Coumadin as follows,  as directed by Dr. Usha Leung. He was also instructed to schedule an appointment in 3 weeks prior to leaving for an INR check. A full discussion of the nature of anticoagulants has been carried out. A full discussion of the need for frequent and regular monitoring, precise dosage adjustment and compliance was stressed. Side effects of potential bleeding were discussed and Mr. Radha Cheng was instructed to call [phone] if there are any signs of abnormal bleeding. Mr. Radha Cheng was instructed to avoid any OTC items containing aspirin or ibuprofen and prior to starting any new OTC products to consult with his physician or pharmacist to ensure no drug interactions are present. Mr. Radha Cheng was instructed to avoid any major changes in his general diet and to avoid alcohol consumption. .    Mr. Radha Cheng was provided a literature booklet, \"Treatment with Warfarin (Coumadin)\", that includes topics on understanding coumadin therapy, drug interaction considerations, vitamin K and coumadin use, interactions with foods and supplements containing vitamin K, and the use of herbal products. Mr. Radha Cheng verbalized his understanding of all instructions and will call the office with any questions, concerns, or signs of abnormal bleeding or blood clot.

## 2022-10-28 ENCOUNTER — CLINICAL SUPPORT (OUTPATIENT)
Dept: INTERNAL MEDICINE CLINIC | Age: 87
End: 2022-10-28
Payer: MEDICARE

## 2022-10-28 VITALS
HEIGHT: 63 IN | HEART RATE: 68 BPM | OXYGEN SATURATION: 100 % | DIASTOLIC BLOOD PRESSURE: 80 MMHG | BODY MASS INDEX: 29.41 KG/M2 | WEIGHT: 166 LBS | SYSTOLIC BLOOD PRESSURE: 130 MMHG | RESPIRATION RATE: 16 BRPM | TEMPERATURE: 97.3 F

## 2022-10-28 DIAGNOSIS — I48.91 ATRIAL FIBRILLATION, UNSPECIFIED TYPE (HCC): Primary | ICD-10-CM

## 2022-10-28 LAB
INR BLD: 2.8 (ref 1–1.5)
PT POC: 33.1 SECONDS (ref 9.1–12)
VALID INTERNAL CONTROL?: YES

## 2022-10-28 PROCEDURE — 85610 PROTHROMBIN TIME: CPT | Performed by: INTERNAL MEDICINE

## 2022-10-28 PROCEDURE — 99211 OFF/OP EST MAY X REQ PHY/QHP: CPT | Performed by: INTERNAL MEDICINE

## 2022-10-28 NOTE — PROGRESS NOTES
Mr. Baljit Menendez is here today for anticoagulation monitoring for the diagnosis of Atrial Fibrillation. His INR goal is 2.0-3.0 and his current Coumadin dose is 5 mg x 1 day than back 2.5 mg daily. Today's findings include an INR of 2.8 (normal INR range 2.0-3.0) and PT =33.1. Considering Mr. Quique Garcia past history, todays findings, and per the coumadin policy/protocol, Mr. Farheen Coyne was instructed to take Coumadin as follows,  2.5 mg. He was also instructed to schedule an appointment in 1 weeks prior to leaving for an INR check. A full discussion of the nature of anticoagulants has been carried out. A full discussion of the need for frequent and regular monitoring, precise dosage adjustment and compliance was stressed. Side effects of potential bleeding were discussed and Mr. Farheen Coyne was instructed to call [phone] if there are any signs of abnormal bleeding. Mr. Farheen Coyne was instructed to avoid any OTC items containing aspirin or ibuprofen and prior to starting any new OTC products to consult with his physician or pharmacist to ensure no drug interactions are present. Mr. Farheen Coyne was instructed to avoid any major changes in his general diet and to avoid alcohol consumption. .    Mr. Farheen Coyne was provided a literature booklet, \"Treatment with Warfarin (Coumadin)\", that includes topics on understanding coumadin therapy, drug interaction considerations, vitamin K and coumadin use, interactions with foods and supplements containing vitamin K, and the use of herbal products. Mr. Farheen Coyne verbalized his understanding of all instructions and will call the office with any questions, concerns, or signs of abnormal bleeding or blood clot.

## 2022-10-28 NOTE — PROGRESS NOTES
Tell pt tsh is high--low thyroid --needs to take his levothyroxine daily  Also lipids high--stress adherence with lipitor

## 2022-11-04 ENCOUNTER — CLINICAL SUPPORT (OUTPATIENT)
Dept: INTERNAL MEDICINE CLINIC | Age: 87
End: 2022-11-04
Payer: MEDICARE

## 2022-11-04 VITALS
DIASTOLIC BLOOD PRESSURE: 60 MMHG | HEART RATE: 83 BPM | TEMPERATURE: 97.5 F | SYSTOLIC BLOOD PRESSURE: 110 MMHG | OXYGEN SATURATION: 99 %

## 2022-11-04 DIAGNOSIS — I48.91 ATRIAL FIBRILLATION, UNSPECIFIED TYPE (HCC): Primary | ICD-10-CM

## 2022-11-04 LAB
INR BLD: 2.6 (ref 1–1.5)
PT POC: 30.7 SECONDS (ref 9.1–12)
VALID INTERNAL CONTROL?: YES

## 2022-11-04 PROCEDURE — 85610 PROTHROMBIN TIME: CPT | Performed by: INTERNAL MEDICINE

## 2022-11-04 PROCEDURE — 99211 OFF/OP EST MAY X REQ PHY/QHP: CPT | Performed by: INTERNAL MEDICINE

## 2022-11-04 NOTE — PROGRESS NOTES
Mr. Pavan Fletcher is here today for anticoagulation monitoring for the diagnosis of Atrial Fibrillation. His INR goal is 2.0-3.0 and his current Coumadin dose is 2.5 mg daily. Today's findings include an INR of 2.6 (normal INR range 2.0-3.0) and PT = 30.7. Considering Mr. Alan Zapata past history, todays findings, and per the coumadin policy/protocol, Mr. Tereza Camejo was instructed to take Coumadin as follows,  2.5 mg 1 daily. He was also instructed to schedule an appointment in 4 weeks prior to leaving for an INR check. A full discussion of the nature of anticoagulants has been carried out. A full discussion of the need for frequent and regular monitoring, precise dosage adjustment and compliance was stressed. Side effects of potential bleeding were discussed and Mr. Tereza Camejo was instructed to call [phone] if there are any signs of abnormal bleeding. Mr. Tereza Camejo was instructed to avoid any OTC items containing aspirin or ibuprofen and prior to starting any new OTC products to consult with his physician or pharmacist to ensure no drug interactions are present. Mr. Tereza Camejo was instructed to avoid any major changes in his general diet and to avoid alcohol consumption. .    Mr. Tereza Camejo was provided a literature booklet, \"Treatment with Warfarin (Coumadin)\", that includes topics on understanding coumadin therapy, drug interaction considerations, vitamin K and coumadin use, interactions with foods and supplements containing vitamin K, and the use of herbal products. Mr. Tereza Camejo verbalized his understanding of all instructions and will call the office with any questions, concerns, or signs of abnormal bleeding or blood clot.

## 2022-11-08 ENCOUNTER — OFFICE VISIT (OUTPATIENT)
Dept: SURGERY | Age: 87
End: 2022-11-08
Payer: MEDICARE

## 2022-11-08 VITALS
SYSTOLIC BLOOD PRESSURE: 135 MMHG | BODY MASS INDEX: 29.55 KG/M2 | OXYGEN SATURATION: 97 % | HEIGHT: 63 IN | HEART RATE: 77 BPM | TEMPERATURE: 98.3 F | WEIGHT: 166.8 LBS | RESPIRATION RATE: 20 BRPM | DIASTOLIC BLOOD PRESSURE: 70 MMHG

## 2022-11-08 DIAGNOSIS — Z09 POSTOPERATIVE EXAMINATION: Primary | ICD-10-CM

## 2022-11-08 PROCEDURE — 99024 POSTOP FOLLOW-UP VISIT: CPT | Performed by: SURGERY

## 2022-11-08 NOTE — PROGRESS NOTES
Status post open right inguinal hernia repair with mesh on 10/12/2022. No complaints. No pain. He feels good. On exam, there is a small seroma where the very large right inguinal hernia was. No concerns. The incision is well-healed and the repair is intact. Assessment and plan: Appropriate recovery. Typical small seroma for hernia the size. This will reabsorb in time. I explained this to him. Reminded not to do any heavy lifting until 6 weeks from surgery. Told to call for any concerns. Thank you.

## 2022-11-08 NOTE — PROGRESS NOTES
Identified pt with two pt identifiers (name and ). Reviewed chart in preparation for visit and have obtained necessary documentation. Suyapa Simon is a 80 y.o. male  Chief Complaint   Patient presents with    Surgical Follow-up     S/P 10/12/22 right ing hernia repair     Visit Vitals  /70 (BP 1 Location: Left upper arm, BP Patient Position: Sitting, BP Cuff Size: Small adult)   Pulse 77   Temp 98.3 °F (36.8 °C) (Temporal)   Resp 20   Ht 5' 2.5\" (1.588 m)   Wt 75.7 kg (166 lb 12.8 oz)   SpO2 97%   BMI 30.02 kg/m²       1. Have you been to the ER, urgent care clinic since your last visit? Hospitalized since your last visit? No    2. Have you seen or consulted any other health care providers outside of the [address] [place] [address] since your last visit? Include any pap smears or colon screening.  No

## 2022-11-08 NOTE — Clinical Note
11/8/2022    Patient: Ruben Arcos   YOB: 1935   Date of Visit: 11/8/2022     Jamel Leggett MD  [address]  [address] [facility] [address]  [facility] [address]    Dear Jamel Leggett MD,      Thank you for referring Mr. Ruben Arcos to [address] [place], [place] [address]  for evaluation. My notes for this consultation are attached. If you have questions, please do not hesitate to call me. I look forward to following your patient along with you.       Sincerely,    Tonio Vale MD

## 2022-11-12 DIAGNOSIS — I48.20 CHRONIC ATRIAL FIBRILLATION (HCC): ICD-10-CM

## 2022-11-13 ENCOUNTER — PATIENT MESSAGE (OUTPATIENT)
Dept: INTERNAL MEDICINE CLINIC | Age: 87
End: 2022-11-13

## 2022-11-13 RX ORDER — WARFARIN 2.5 MG/1
TABLET ORAL
Qty: 90 TABLET | Refills: 0 | Status: SHIPPED | OUTPATIENT
Start: 2022-11-13

## 2022-11-14 ENCOUNTER — DOCUMENTATION ONLY (OUTPATIENT)
Dept: INTERNAL MEDICINE CLINIC | Age: 87
End: 2022-11-14

## 2022-11-14 DIAGNOSIS — U07.1 COVID-19: Primary | ICD-10-CM

## 2022-11-14 RX ORDER — NIRMATRELVIR AND RITONAVIR 150-100 MG
2 KIT ORAL EVERY 12 HOURS
Qty: 1 BOX | Refills: 0 | Status: SHIPPED | OUTPATIENT
Start: 2022-11-14 | End: 2022-11-16

## 2022-11-14 RX ORDER — NIRMATRELVIR AND RITONAVIR 150-100 MG
2 KIT ORAL EVERY 12 HOURS
Qty: 1 BOX | Refills: 0 | Status: SHIPPED | OUTPATIENT
Start: 2022-11-14 | End: 2022-11-14 | Stop reason: SDUPTHER

## 2022-11-14 NOTE — PROGRESS NOTES
Returned call from pts daughter-pt tested positive for covid 11/1. Mainly with headaches. They request paxlovid. Escribed but recommended to hold flomax and repeat INR in 1 week.  Verbalized understanding and agree,nt

## 2022-11-16 ENCOUNTER — TELEPHONE (OUTPATIENT)
Dept: INTERNAL MEDICINE CLINIC | Age: 87
End: 2022-11-16

## 2022-11-16 DIAGNOSIS — U07.1 COVID-19: ICD-10-CM

## 2022-11-16 RX ORDER — NIRMATRELVIR AND RITONAVIR 150-100 MG
1 KIT ORAL EVERY 12 HOURS
Qty: 1 BOX | Refills: 0
Start: 2022-11-16 | End: 2022-11-21

## 2022-11-16 NOTE — TELEPHONE ENCOUNTER
Patient's Wife, Queta Garcia, states she needs a call back in reference to Patient being Positive for COVID & has been Prescribed Paxlovid. Queta Garcia states patient has a History of Kidney Function issues so she did not give the medication to the patient. Please call to discuss & advise. Queta Garcia also needs to know about appt on 11/28/22 & keeping the appt if Still having symptoms.

## 2022-11-16 NOTE — TELEPHONE ENCOUNTER
Pharmacy Progress Note - Telephone Encounter    S/O: Mr. Dana Leigh 80 y.o.'s wife, Bharti Griffin was contacted via an outbound telephone call to discuss previous call today. Verified patients identifiers (name & ) per HIPAA policy.     - Tested positive for COVID. Has not started Paxlovid. - eGFR 53 (Oct 2022)   - Reports to feeling better    Estimated Creatinine Clearance: 36.1 mL/min (by C-G formula based on SCr of 1.3 mg/dL). A/P:  - Discuss renal dosing for Paxlovid, based on current GFR.  150/100 mg BID x 5 days.   - Keep scheduled INR appt for 22 at [facility] 26 endorses understanding to the provided information. All questions answered at this time. Medications Discontinued During This Encounter   Medication Reason    nirmatrelvir-ritonavir (Paxlovid, EUA,) 150 mg-100 mg      Orders Placed This Encounter    nirmatrelvir-ritonavir (Paxlovid, EUA,) 150 mg-100 mg     Sig: Take 1 Tablet by mouth every twelve (12) hours for 5 days. Dispense:  1 Box     Refill:  0     Tested positive 2022--bun/cr 23/1.3     Order Specific Question:   Does this patient qualify for COVID-19 antiviral treatment based on criteria for treatment?      Answer:   Yes       Thank you,  Blanca Zamarripa, PharmD, BCACP, [address] [place] [address] in place: Yes  Recommendation Provided To: Patient/Caregiver: 2 via Telephone  Intervention Detail: Discontinued Rx: 1, reason: Therapy De-escalation and Dose Adjustment: 1, reason: Renal Adjustment and Therapy De-escalation  Intervention Accepted By: Patient/Caregiver: 2  Time Spent (min): 10

## 2022-11-26 NOTE — PATIENT INSTRUCTIONS
Today your INR was 5.1      Your goal INR is  2.0-3.0 . You have a 1 mg, 2.5 mg, 5 mg tablet of Coumadin (warfarin). Take Coumadin (warfarin) as follows:    Hold warfarin for two days (11/28-11/29/22). Take 1.25 mg (one-half tablet) on Wednesday (11/30/22). Then continue warfarin 2.5 mg daily. Come back in 1 week(s) for your next finger stick/INR blood test.        Avoid any over the counter items containing aspirin or ibuprofen, and avoid great swings in general diet. Avoid alcohol consumption. Please notify the INR nurse if you are started on any new medication including over the counter or herbal supplements. Also, please notify your INR nurse if any of your other prescription or over the counter medications have been discontinued. Call [facility] at [phone] if you have any signs of abnormal bleeding/blood clot.  ------------------------------------------------------------------------------------------------------------------  Taking Warfarin Safely: Care Instructions    Your Care Instructions  Warfarin is a medicine that you take to prevent blood clots. It is often called a blood thinner. Doctors give warfarin (such as Coumadin) to reduce the risk of blood clots. You may be at risk for blood clots if you have atrial fibrillation or deep vein thrombosis. Some other health problems may also put you at risk. Warfarin slows the amount of time it takes for your blood to clot. It can cause bleeding problems. Even if you've been taking warfarin for a while, it's important to know how to take it safely. Foods and other medicines can affect the way warfarin works. Some can make warfarin work too well. This can cause bleeding problems. And some can make it work poorly, so that it does not prevent blood clots very well. You will need regular blood tests to check how long it takes for your blood to form a clot.  This test is called a PT or prothrombin time test. The result of the test is called an INR level. Depending on the test results, your doctor or anticoagulation clinic may adjust your dose of warfarin. Follow-up care is a key part of your treatment and safety. Be sure to make and go to all appointments, and call your doctor if you are having problems. It's also a good idea to know your test results and keep a list of the medicines you take. How can you care for yourself at home? Take warfarin safely  Take your warfarin at the same time each day. If you miss a dose of warfarin, don't take an extra dose to make up for it. Your doctor can tell you exactly what to do so you don't take too much or too little. Wear medical alert jewelry that lets others know that you take warfarin. You can buy this at most drugstores. Don't take warfarin if you are pregnant or planning to get pregnant. Talk to your doctor about how you can prevent getting pregnant while you are taking it. Don't change your dose or stop taking warfarin unless your doctor tells you to. Effects of medicines and food on warfarin  Don't start or stop taking any medicines, vitamins, or natural remedies unless you first talk to your doctor. Many medicines can affect how warfarin works. These include aspirin and other pain relievers, over-the-counter medicines, multivitamins, dietary supplements, and herbal products. Tell all of your doctors and pharmacists that you take warfarin. Some prescription medicines can affect how warfarin works. Keep the amount of vitamin K in your diet about the same from day to day. Do not suddenly eat a lot more or a lot less food that is rich in vitamin K than you usually do. Vitamin K affects how warfarin works and how your blood clots. Talk with your doctor before making big changes in your diet. Vitamin K is in many foods, such as:  Leafy greens, such as kale, cabbage, spinach, Swiss chard, and lettuce. Canola and soybean oils.   Green vegetables, such as asparagus, broccoli, and [address] sprouts. Vegetable drinks, green tea leaves, and some dietary supplement drinks. Avoid cranberry juice and other cranberry products. They can increase the effects of warfarin. Limit your use of alcohol. Avoid bleeding by preventing falls and injuries  Wear slippers or shoes with nonskid soles. Remove throw rugs and clutter. Rearrange furniture and electrical cords to keep them out of walking paths. Keep stairways, porches, and outside walkways well lit. Use night-lights in hallways and bathrooms. Be extra careful when you work with sharp tools or knives. When should you call for help? Call 911 anytime you think you may need emergency care. For example, call if:  You have a sudden, severe headache that is different from past headaches. Call your doctor now or seek immediate medical care if:  You have any abnormal bleeding, such as:  Nosebleeds. Vaginal bleeding that is different (heavier, more frequent, at a different time of the month) than what you are used to. Bloody or black stools, or rectal bleeding. Bloody or pink urine. Watch closely for changes in your health, and be sure to contact your doctor if you have any problems. Where can you learn more? Go to http://www.gray.com/. Enter B646 in the search box to learn more about \"Taking Warfarin Safely: Care Instructions. \"  Current as of: January 27, 2016  Content Version: 11.1  © 4463-9620 [employer]. Care instructions adapted under license by [employer] (which disclaims liability or warranty for this information). If you have questions about a medical condition or this instruction, always ask your healthcare professional. [place] [address] any warranty or liability for your use of this information.

## 2022-11-26 NOTE — PROGRESS NOTES
Pharmacy Progress Note - Anticoagulation Management    S/O: Mr. Dana Leigh  is a 80 y.o. male, referred by Dr. Ana Maria Douglas MD, was seen today for anticoagulation management for the diagnosis of Atrial Fibrillation and Aortic Valve Replacement  ([id number] [facility]), s/p pacemaker. Pt is very hard of hearing. Patient was seen today together with Marzette Rubinstein PharmD. I have read and agree with Gloria's documentation. Interim history:  Saw Dr Roz Sanchez - 11/8/22. S/p R inguinal hernia repair 10/12/22   Warfarin held x 3 day and resumed on 10/15/22. Completed LMWH bridge. Saw Dr Zuri Mckoy 10/24/22. TSH was 17. Patient reports to taking levothyroxine 150 mcg daily      Warfarin start date: ~1996  INR Goal:  2.0-3.0    Current warfarin regimen:   2.5 mg daily                  Warfarin tablet strength:   1 mg, 2.5 mg, 5 mg  -- wife states she does not want to split tablets in half ; generally takes 2.5 mg tab. Able to recognize tablet strength  Duration of therapy: indefinite  Takes warfarin in the evening    Today's pertinent positives includes:  Change in medical status and Hospitalization / ED visits    S/p COVID infection. Never started Paxlovid - was concerned about DDI and effects on renal function   No longer on LMWH  Denies s/sx of bleeding/bruises. Results for orders placed or performed in visit on 11/28/22   AMB POC PT/INR   Result Value Ref Range    VALID INTERNAL CONTROL POC Yes     Prothrombin time (POC) 60.6 (A) 9.1 - 12 seconds    INR POC 5.1 (A) 1 - 1.5     Adherence:   Able to recall regimen? YES  Miss/extra dose? NO  Need refill?  NO    Upcoming procedure(s):  NO    Past Medical History:   Diagnosis Date    Acute on chronic systolic heart failure, NYHA class 4 ([facility] [address] 03/01/2019    admit March 2019 7 days, EF 10% got Primacor, BIV implant    Anger     TAKES PAROXETINE TO CONTROL ANGER ISSUES    Arthritis     Burning with urination     CAD (coronary artery disease) 1996    CABG 1996, mukul celis 2015 fixed inferior defect    Cancer [facility])     prostate    Chronic atrial fibrillation ([facility] [address] 03/2019    RVR3/2019, had ablation of flutter- persistent    Chronic kidney disease     Chronic pain     bilat knees    Coagulation disorder (HCC)     Foot drop, left     WEARS METAL BRACE    Gout     Hard of hearing     Heart failure (HCC)     High cholesterol     Hypertension     Long term current use of anticoagulant therapy     Prostate enlargement     Sleep apnea     Thyroid disease     Unspecified sleep apnea     DOESN'T USE CPAP; \"IT MADE HIM SICK-COLDS, SINUS\", PER WIFE     No Known Allergies  Current Outpatient Medications   Medication Sig    warfarin (COUMADIN) 2.5 mg tablet TAKE 1 TABLET EVERY DAY - NEEDS INR CHECK    enoxaparin (LOVENOX) 80 mg/0.8 mL injection 80 mg by SubCUTAneous route every twelve (12) hours. 80 mg twice daily until INR > 2.0 (Patient not taking: Reported on 11/8/2022)    clobetasoL (TEMOVATE) 0.05 % topical cream Apply  to affected area two (2) times a day. (Patient not taking: Reported on 11/8/2022)    nystatin (MYCOSTATIN) topical cream Apply  to affected area two (2) times a day. ascorbic acid, vitamin C, (VITAMIN C) 1,000 mg tablet Take 1,000 mg by mouth daily. psyllium husk (METAMUCIL PO) Take  by mouth daily. multivit-min/FA/lycopen/lutein (CENTRUM SILVER MEN PO) Take 1 Tablet by mouth daily. allopurinoL (ZYLOPRIM) 300 mg tablet TAKE 1 TABLET EVERY DAY TO PREVENT GOUT    levothyroxine (SYNTHROID) 150 mcg tablet TAKE 1 TABLET EVERY MORNING FOR THYROID    PARoxetine (PAXIL) 20 mg tablet TAKE 1 TABLET EVERY DAY    carvediloL (COREG) 12.5 mg tablet Take 1 Tablet by mouth two (2) times daily (with meals). atorvastatin (LIPITOR) 40 mg tablet Take 1 Tablet by mouth nightly. sacubitriL-valsartan (Entresto) 24-26 mg tablet Take 1 Tablet by mouth every twelve (12) hours.  (Patient taking differently: Take 1 Tablet by mouth two (2) times a day.)    oxybutynin chloride XL (DITROPAN XL) 10 mg CR tablet Take 10 mg by mouth daily. omega 3-dha-epa-fish oil 100-160-1,000 mg cap Take 1 Cap by mouth daily. b complex vitamins tablet Take 1 Tab by mouth daily. CYANOCOBALAMIN, VITAMIN B-12, PO Take 1 Tab by mouth daily. Sciatica    aspirin delayed-release 81 mg tablet Take 81 mg by mouth daily. B.animalis,bifid,infantis,long 10-15 mg TbEC Take 1 Tab by mouth daily. COQ10, UBIQUINOL, PO Take 1 Tab by mouth daily. finasteride (PROSCAR) 5 mg tablet Take 5 mg by mouth daily. tamsulosin (FLOMAX) 0.4 mg capsule Take 0.4 mg by mouth nightly. furosemide (LASIX) 20 mg tablet Take 1 Tab by mouth daily. (Patient not taking: Reported on 11/8/2022)     No current facility-administered medications for this visit. Wt Readings from Last 3 Encounters:   11/08/22 166 lb 12.8 oz (75.7 kg)   10/28/22 166 lb (75.3 kg)   10/25/22 168 lb 12.8 oz (76.6 kg)     BP Readings from Last 3 Encounters:   11/28/22 100/65   11/08/22 135/70   11/04/22 110/60     Pulse Readings from Last 3 Encounters:   11/28/22 76   11/08/22 77   11/04/22 83     Lab Results   Component Value Date/Time    WBC 5.7 10/07/2022 01:25 PM    HGB 14.1 10/07/2022 01:25 PM    HCT 42.2 10/07/2022 01:25 PM    PLATELET 223 14/08/1354 01:25 PM    MCV 97.5 10/07/2022 01:25 PM     Lab Results   Component Value Date/Time    Sodium 141 10/07/2022 01:25 PM    Potassium 4.0 10/07/2022 01:25 PM    Chloride 109 (H) 10/07/2022 01:25 PM    CO2 25 10/07/2022 01:25 PM    Anion gap 7 10/07/2022 01:25 PM    Glucose 112 (H) 10/07/2022 01:25 PM    BUN 23 (H) 10/07/2022 01:25 PM    Creatinine 1.30 10/07/2022 01:25 PM    BUN/Creatinine ratio 18 10/07/2022 01:25 PM    GFR est AA >60 07/28/2022 02:49 PM    GFR est non-AA >60 07/28/2022 02:49 PM    Calcium 8.8 10/07/2022 01:25 PM    Bilirubin, total 0.7 05/13/2021 12:25 PM    Alk.  phosphatase 122 (H) 05/13/2021 12:25 PM    Protein, total 6.6 05/13/2021 12:25 PM    Albumin 3.9 05/13/2021 12:25 PM    Globulin 2.7 05/13/2021 12:25 PM    A-G Ratio 1.4 05/13/2021 12:25 PM    ALT (SGPT) 41 05/13/2021 12:25 PM     Estimated Creatinine Clearance: 35.4 mL/min (by C-G formula based on SCr of 1.3 mg/dL). INR History:   (normal INR range 0.8-1.2)     Date   INR   PT   Dose/Comments  11/28/22 5.1  60.6 2.5 mg daily; (+) COVID 19  11/04/22 2.6   30.7  2.5 mg daily   10/28/22 2.8   33.1  2.5 mg daily; (+) LMWH; surgery   09/13/22          2.3                    26.8    Hold x 1, the 2.5 mg daily  08/23/22          3.8                   45.5     2.5 mg daily   07/19/22          2.4                   28.5     5 mg x 1, then 2.5 mg daily   06/21/22          2.0                   23.6     2.5 mg daily ; (+) missed dose   06/01/22          2.3                   27.2     5 mg x 2, then 2.5 mg daily  05/23/22          1.4                   16.7     5 mg x 1, then 2.5 mg daily  04/25/22          1.8                   21.7     Hold x 1, then 2.5 mg daily  Lapse in INR clinic  11/03/21          3.8                   45.1     5 mg x 1, then 2.5 mg daily ; (+) extra dose      A/P:       Anticoagulation:  Considering Mr. Fior Felipe past history, todays findings, and per Anticoagulation Collaborative Practice Agreement/Protocol:    Fingerstick POC INR (5.1) is supratherapeutic for INR goal today secondary to recent COVID infection  Hold warfarin x 2 days. Take 1.25 mg on Wednesday instead of 2.5 mg. Then Continue warfarin 2.5 mg daily. Patient was instructed to schedule an appointment in 1 week(s) prior to leaving the clinic. Medication reconciliation was completed during the visit. Medications Discontinued During This Encounter   Medication Reason    enoxaparin (LOVENOX) 80 mg/0.8 mL injection Therapy Completed     Orders Placed This Encounter    COLLECTION CAPILLARY BLOOD SPECIMEN    AMB POC PT/INR     A full discussion of the nature of anticoagulants has been carried out.   A full discussion of the need for frequent and regular monitoring, precise dosage adjustment and compliance was stressed. Side effects of potential bleeding were discussed and Mr. Denisa Owen was instructed to call [phone] if there are any signs of abnormal bleeding. Mr. Denisa Owen was instructed to avoid any OTC items containing aspirin or ibuprofen and prior to starting any new OTC products to consult with his physician or pharmacist to ensure no drug interactions are present. Mr. Denisa Owen was instructed to avoid any major changes in his general diet and to avoid alcohol consumption. Mr. Denisa Owen was provided information in the AVS that includes topics on understanding coumadin therapy, drug interaction considerations, vitamin K and coumadin use, interactions with foods and supplements containing vitamin K, and the use of herbal products. Mr. Denisa Owen verbalized his understanding of all instructions and will call the office with any questions, concerns, or signs of abnormal bleeding or blood clot. Notifications of recommendations will be sent to Dr. Debbie Arnold MD for review.     Thank you,  Blanca Gaspar, PharmD, BCACP, [address] [place] [address] in place: Yes  Recommendation Provided To: Patient/Caregiver: 5 via In person  Intervention Detail: Adherence Monitorin, Discontinued Rx: 1, reason: Therapy Complete, Dose Adjustment: 1, reason: Therapy De-escalation, Lab(s) Ordered, and Scheduled Appointment  Intervention Accepted By: Patient/Caregiver: 5  Time Spent (min):  40

## 2022-11-28 ENCOUNTER — ANTI-COAG VISIT (OUTPATIENT)
Dept: INTERNAL MEDICINE CLINIC | Age: 87
End: 2022-11-28
Payer: MEDICARE

## 2022-11-28 VITALS
BODY MASS INDEX: 28.35 KG/M2 | HEART RATE: 76 BPM | WEIGHT: 160 LBS | SYSTOLIC BLOOD PRESSURE: 100 MMHG | OXYGEN SATURATION: 100 % | HEIGHT: 63 IN | DIASTOLIC BLOOD PRESSURE: 65 MMHG

## 2022-11-28 DIAGNOSIS — I48.20 CHRONIC A-FIB (HCC): ICD-10-CM

## 2022-11-28 DIAGNOSIS — Z95.2 S/P AVR: Primary | ICD-10-CM

## 2022-11-28 LAB
INR BLD: 5.1 (ref 1–1.5)
PT POC: 60.6 SECONDS (ref 9.1–12)
VALID INTERNAL CONTROL?: YES

## 2022-11-28 PROCEDURE — 85610 PROTHROMBIN TIME: CPT | Performed by: PHARMACIST

## 2022-11-28 PROCEDURE — G0463 HOSPITAL OUTPT CLINIC VISIT: HCPCS | Performed by: PHARMACIST

## 2022-11-28 NOTE — PROGRESS NOTES
Pharmacy Progress Note - Anticoagulation Management    S/O:  Mr. Jazmine Subramanian  is a 80 y.o. male seen today for anticoagulation management for the diagnosis of Atrial Fibrillation and Aortic Valve Replacement  [facility] [employer] -1996), s/p pacemaker. Interim History:  10/7/22 last pre-op warfarin and aspirin doses  10/8/22 enoxaparin LMWH BID subcutaneous injections  10/12/22 open right inguinal hernia repair  10/15/22 restarted warfarin with lovenox bridge  Lovenox bridge completed    Warfarin start date: 1996  INR Goal:  2.0-3.0    Current warfarin regimen: 2.5 mg daily                  Warfarin tablet strength:   2.5 mg   Duration of therapy: indefinite    Today's pertinent positives includes:  No significant changes since last visit  COVID+ 11/14/22 - discussed taking Paxlovid with Alon Kirk, PharmD, but ultimately decided against taking it due to kidney function. Denies alcohol or changes in diet  Denies taking extra doses  Denies blood in stool, blood in urine, bleeding gums, increased bruising or bleeding. Adherence:   Able to recall regimen? YES  Miss/extra dose? NO  Need refill?  NO    Upcoming procedure(s):  NO    Results for orders placed or performed in visit on 11/28/22   AMB POC PT/INR   Result Value Ref Range    VALID INTERNAL CONTROL POC Yes     Prothrombin time (POC) 60.6 (A) 9.1 - 12 seconds    INR POC 5.1 (A) 1 - 1.5       Past Medical History:   Diagnosis Date    Acute on chronic systolic heart failure, NYHA class 4 ([facility] [address] 03/01/2019    admit March 2019 7 days, EF 10% got Primacor, BIV implant    Anger     TAKES PAROXETINE TO CONTROL ANGER ISSUES    Arthritis     Burning with urination     CAD (coronary artery disease) 1996    CABG 1996, mukul celis 2015 fixed inferior defect    Cancer [facility])     prostate    Chronic atrial fibrillation ([facility] [address] 03/2019    RVR3/2019, had ablation of flutter- persistent    Chronic kidney disease     Chronic pain     bilat knees    Coagulation disorder ([facility] [address]     Foot drop, left     WEARS METAL BRACE    Gout     Hard of hearing     Heart failure (HCC)     High cholesterol     Hypertension     Long term current use of anticoagulant therapy     Prostate enlargement     Sleep apnea     Thyroid disease     Unspecified sleep apnea     DOESN'T USE CPAP; \"IT MADE HIM SICK-COLDS, SINUS\", PER WIFE     No Known Allergies  Current Outpatient Medications   Medication Sig    warfarin (COUMADIN) 2.5 mg tablet TAKE 1 TABLET EVERY DAY - NEEDS INR CHECK    clobetasoL (TEMOVATE) 0.05 % topical cream Apply  to affected area two (2) times a day. (Patient not taking: Reported on 11/8/2022)    nystatin (MYCOSTATIN) topical cream Apply  to affected area two (2) times a day. ascorbic acid, vitamin C, (VITAMIN C) 1,000 mg tablet Take 1,000 mg by mouth daily. psyllium husk (METAMUCIL PO) Take  by mouth daily. multivit-min/FA/lycopen/lutein (CENTRUM SILVER MEN PO) Take 1 Tablet by mouth daily. allopurinoL (ZYLOPRIM) 300 mg tablet TAKE 1 TABLET EVERY DAY TO PREVENT GOUT    levothyroxine (SYNTHROID) 150 mcg tablet TAKE 1 TABLET EVERY MORNING FOR THYROID    PARoxetine (PAXIL) 20 mg tablet TAKE 1 TABLET EVERY DAY    carvediloL (COREG) 12.5 mg tablet Take 1 Tablet by mouth two (2) times daily (with meals). atorvastatin (LIPITOR) 40 mg tablet Take 1 Tablet by mouth nightly. sacubitriL-valsartan (Entresto) 24-26 mg tablet Take 1 Tablet by mouth every twelve (12) hours. (Patient taking differently: Take 1 Tablet by mouth two (2) times a day.)    oxybutynin chloride XL (DITROPAN XL) 10 mg CR tablet Take 10 mg by mouth daily. omega 3-dha-epa-fish oil 100-160-1,000 mg cap Take 1 Cap by mouth daily. b complex vitamins tablet Take 1 Tab by mouth daily. CYANOCOBALAMIN, VITAMIN B-12, PO Take 1 Tab by mouth daily. Sciatica    aspirin delayed-release 81 mg tablet Take 81 mg by mouth daily. B.animalis,bifid,infantis,long 10-15 mg TbEC Take 1 Tab by mouth daily.     COQ10, UBIQUINOL, PO Take 1 Tab by mouth daily. finasteride (PROSCAR) 5 mg tablet Take 5 mg by mouth daily. tamsulosin (FLOMAX) 0.4 mg capsule Take 0.4 mg by mouth nightly. furosemide (LASIX) 20 mg tablet Take 1 Tab by mouth daily. (Patient not taking: Reported on 11/8/2022)     No current facility-administered medications for this visit. Wt Readings from Last 3 Encounters:   11/28/22 160 lb (72.6 kg)   11/08/22 166 lb 12.8 oz (75.7 kg)   10/28/22 166 lb (75.3 kg)     BP Readings from Last 3 Encounters:   11/28/22 100/65   11/08/22 135/70   11/04/22 110/60     Pulse Readings from Last 3 Encounters:   11/28/22 76   11/08/22 77   11/04/22 83     Lab Results   Component Value Date/Time    WBC 5.7 10/07/2022 01:25 PM    HGB 14.1 10/07/2022 01:25 PM    HCT 42.2 10/07/2022 01:25 PM    PLATELET 512 05/30/5028 01:25 PM    MCV 97.5 10/07/2022 01:25 PM     Lab Results   Component Value Date/Time    Sodium 141 10/07/2022 01:25 PM    Potassium 4.0 10/07/2022 01:25 PM    Chloride 109 (H) 10/07/2022 01:25 PM    CO2 25 10/07/2022 01:25 PM    Anion gap 7 10/07/2022 01:25 PM    Glucose 112 (H) 10/07/2022 01:25 PM    BUN 23 (H) 10/07/2022 01:25 PM    Creatinine 1.30 10/07/2022 01:25 PM    BUN/Creatinine ratio 18 10/07/2022 01:25 PM    GFR est AA >60 07/28/2022 02:49 PM    GFR est non-AA >60 07/28/2022 02:49 PM    Calcium 8.8 10/07/2022 01:25 PM    Bilirubin, total 0.7 05/13/2021 12:25 PM    Alk. phosphatase 122 (H) 05/13/2021 12:25 PM    Protein, total 6.6 05/13/2021 12:25 PM    Albumin 3.9 05/13/2021 12:25 PM    Globulin 2.7 05/13/2021 12:25 PM    A-G Ratio 1.4 05/13/2021 12:25 PM    ALT (SGPT) 41 05/13/2021 12:25 PM     Estimated Creatinine Clearance: 35.4 mL/min (by C-G formula based on SCr of 1.3 mg/dL).       INR History:   (normal INR range 0.8-1.2)     Date   INR   PT   Dose/Comments  11/28/22 5.1   60.6 2.5 mg daily, COVID(+) 11/14 11/04/22          2.6                   30.7    2.5 mg daily   10/28/22          2.8                   33.1 2.5 mg daily; (+) LMWH; surgery   09/13/22          2.3                   26.8    Hold x 1, the 2.5 mg daily  08/23/22          3.8                   45.5     2.5 mg daily   07/19/22          2.4                   28.5     5 mg x 1, then 2.5 mg daily   06/21/22          2.0                   23.6     2.5 mg daily ; (+) missed dose   06/01/22          2.3                   27.2     5 mg x 2, then 2.5 mg daily  05/23/22          1.4                   16.7     5 mg x 1, then 2.5 mg daily  04/25/22          1.8                   21.7     Hold x 1, then 2.5 mg daily  Lapse in INR clinic  11/03/21          3.8                   45.1     5 mg x 1, then 2.5 mg daily ; (+) extra dose    A/P:       Anticoagulation:  Considering Mr. Richardson Watts past history, todays findings, and per Anticoagulation Collaborative Practice Agreement/Protocol:    Fingerstick POC INR (5.1) is supratherapeutic for INR goal today. Hold warfarin today and tomorrow. Resume Wednesday at 1.25 mg (half tablet), 2.5 mg daily ROW. Patient was instructed to schedule an appointment in 1 week prior to leaving the clinic. Medication reconciliation was completed during the visit. Medications Discontinued During This Encounter   Medication Reason    enoxaparin (LOVENOX) 80 mg/0.8 mL injection Therapy Completed       A full discussion of the nature of anticoagulants has been carried out. A full discussion of the need for frequent and regular monitoring, precise dosage adjustment and compliance was stressed. Side effects of potential bleeding were discussed and Mr. Rick Borja was instructed to call [phone] if there are any signs of abnormal bleeding. Mr. Rick Borja was instructed to avoid any OTC items containing aspirin or ibuprofen and prior to starting any new OTC products to consult with his physician or pharmacist to ensure no drug interactions are present.   Mr. Rick Borja was instructed to avoid any major changes in his general diet and to avoid alcohol consumption. Mr. Estrella Jeff was provided information in the AVS that includes topics on understanding coumadin therapy, drug interaction considerations, vitamin K and coumadin use, interactions with foods and supplements containing vitamin K, and the use of herbal products. Mr. Estrella Jeff verbalized his understanding of all instructions and will call the office with any questions, concerns, or signs of abnormal bleeding or blood clot. Notifications of recommendations will be sent to Dr. Denisa Iglesias MD for review.     Thank you,  Jaylon Lofton, SAMD

## 2022-12-03 NOTE — PATIENT INSTRUCTIONS
Today your INR was 2.5. Your goal INR is  2.0-3.0 . You have a 1 mg, 2.5 mg , 5 mg tablet of Coumadin (warfarin). Take Coumadin (warfarin) as follows:    Continue warfarin 2.5 mg daily. Come back in  2 week(s) for your next finger stick/INR blood test.      Avoid any over the counter items containing aspirin or ibuprofen, and avoid great swings in general diet. Avoid alcohol consumption. Please notify the INR nurse if you are started on any new medication including over the counter or herbal supplements. Also, please notify your INR nurse if any of your other prescription or over the counter medications have been discontinued. Call [facility] at [phone] if you have any signs of abnormal bleeding/blood clot.  ------------------------------------------------------------------------------------------------------------------  Taking Warfarin Safely: Care Instructions    Your Care Instructions  Warfarin is a medicine that you take to prevent blood clots. It is often called a blood thinner. Doctors give warfarin (such as Coumadin) to reduce the risk of blood clots. You may be at risk for blood clots if you have atrial fibrillation or deep vein thrombosis. Some other health problems may also put you at risk. Warfarin slows the amount of time it takes for your blood to clot. It can cause bleeding problems. Even if you've been taking warfarin for a while, it's important to know how to take it safely. Foods and other medicines can affect the way warfarin works. Some can make warfarin work too well. This can cause bleeding problems. And some can make it work poorly, so that it does not prevent blood clots very well. You will need regular blood tests to check how long it takes for your blood to form a clot. This test is called a PT or prothrombin time test. The result of the test is called an INR level.  Depending on the test results, your doctor or anticoagulation clinic may adjust your dose of warfarin. Follow-up care is a key part of your treatment and safety. Be sure to make and go to all appointments, and call your doctor if you are having problems. It's also a good idea to know your test results and keep a list of the medicines you take. How can you care for yourself at home? Take warfarin safely  Take your warfarin at the same time each day. If you miss a dose of warfarin, don't take an extra dose to make up for it. Your doctor can tell you exactly what to do so you don't take too much or too little. Wear medical alert jewelry that lets others know that you take warfarin. You can buy this at most drugstores. Don't take warfarin if you are pregnant or planning to get pregnant. Talk to your doctor about how you can prevent getting pregnant while you are taking it. Don't change your dose or stop taking warfarin unless your doctor tells you to. Effects of medicines and food on warfarin  Don't start or stop taking any medicines, vitamins, or natural remedies unless you first talk to your doctor. Many medicines can affect how warfarin works. These include aspirin and other pain relievers, over-the-counter medicines, multivitamins, dietary supplements, and herbal products. Tell all of your doctors and pharmacists that you take warfarin. Some prescription medicines can affect how warfarin works. Keep the amount of vitamin K in your diet about the same from day to day. Do not suddenly eat a lot more or a lot less food that is rich in vitamin K than you usually do. Vitamin K affects how warfarin works and how your blood clots. Talk with your doctor before making big changes in your diet. Vitamin K is in many foods, such as:  Leafy greens, such as kale, cabbage, spinach, Swiss chard, and lettuce. Canola and soybean oils. Green vegetables, such as asparagus, broccoli, and [place] sprouts. Vegetable drinks, green tea leaves, and some dietary supplement drinks.   Avoid cranberry juice and other cranberry products. They can increase the effects of warfarin. Limit your use of alcohol. Avoid bleeding by preventing falls and injuries  Wear slippers or shoes with nonskid soles. Remove throw rugs and clutter. Rearrange furniture and electrical cords to keep them out of walking paths. Keep stairways, porches, and outside walkways well lit. Use night-lights in hallways and bathrooms. Be extra careful when you work with sharp tools or knives. When should you call for help? Call 911 anytime you think you may need emergency care. For example, call if:  You have a sudden, severe headache that is different from past headaches. Call your doctor now or seek immediate medical care if:  You have any abnormal bleeding, such as:  Nosebleeds. Vaginal bleeding that is different (heavier, more frequent, at a different time of the month) than what you are used to. Bloody or black stools, or rectal bleeding. Bloody or pink urine. Watch closely for changes in your health, and be sure to contact your doctor if you have any problems. Where can you learn more? Go to http://www.gray.com/. Enter G116 in the search box to learn more about \"Taking Warfarin Safely: Care Instructions. \"  Current as of: January 27, 2016  Content Version: 11.1  © 6187-9507 [employer]. Care instructions adapted under license by [employer] (which disclaims liability or warranty for this information). If you have questions about a medical condition or this instruction, always ask your healthcare professional. [place] [address] any warranty or liability for your use of this information.

## 2022-12-03 NOTE — PROGRESS NOTES
Pharmacy Progress Note - Anticoagulation Management    S/O: Mr. Baljit Menendez  is a 80 y.o. male, referred by Dr. Crystal Sotelo MD, was seen today for anticoagulation management for the diagnosis of Atrial Fibrillation and Aortic Valve Replacement  ([id number] [facility]), s/p pacemaker. Pt is very hard of hearing. Warfarin start date: ~1996  INR Goal:  2.0-3.0    Current warfarin regimen:   Hold x 2, 1.25 mg x 1, then 2.5 mg daily  Warfarin tablet strength:   1 mg, 2.5 mg, 5 mg  -- wife states she does not want to split tablets in half ; generally takes 2.5 mg tab. Able to recognize tablet strength  Duration of therapy: indefinite  Takes warfarin in the evening    Today's pertinent positives includes:  No significant changes since last visit    Denies s/sx of bleeding/bruises. Results for orders placed or performed in visit on 12/05/22   AMB POC PT/INR   Result Value Ref Range    VALID INTERNAL CONTROL POC Yes     Prothrombin time (POC) 29.9 (A) 9.1 - 12 seconds    INR POC 2.5 (A) 1 - 1.5     Adherence:   Able to recall regimen? YES  Miss/extra dose? NO  Need refill?  NO    Upcoming procedure(s):  NO    Past Medical History:   Diagnosis Date    Acute on chronic systolic heart failure, NYHA class 4 ([facility] [address] 03/01/2019    admit March 2019 7 days, EF 10% got Primacor, BIV implant    Anger     TAKES PAROXETINE TO CONTROL ANGER ISSUES    Arthritis     Burning with urination     CAD (coronary artery disease) 1996    CABG 1996, mukul celis 2015 fixed inferior defect    Cancer [facility])     prostate    Chronic atrial fibrillation ([facility] [address] 03/2019    RVR3/2019, had ablation of flutter- persistent    Chronic kidney disease     Chronic pain     bilat knees    Coagulation disorder (HCC)     Foot drop, left     WEARS METAL BRACE    Gout     Hard of hearing     Heart failure (HCC)     High cholesterol     Hypertension     Long term current use of anticoagulant therapy     Prostate enlargement     Sleep apnea     Thyroid disease Unspecified sleep apnea     DOESN'T USE CPAP; \"IT MADE HIM SICK-COLDS, SINUS\", PER WIFE     No Known Allergies  Current Outpatient Medications   Medication Sig    warfarin (COUMADIN) 2.5 mg tablet TAKE 1 TABLET EVERY DAY - NEEDS INR CHECK    clobetasoL (TEMOVATE) 0.05 % topical cream Apply  to affected area two (2) times a day. (Patient not taking: Reported on 11/8/2022)    nystatin (MYCOSTATIN) topical cream Apply  to affected area two (2) times a day. ascorbic acid, vitamin C, (VITAMIN C) 1,000 mg tablet Take 1,000 mg by mouth daily. psyllium husk (METAMUCIL PO) Take  by mouth daily. multivit-min/FA/lycopen/lutein (CENTRUM SILVER MEN PO) Take 1 Tablet by mouth daily. allopurinoL (ZYLOPRIM) 300 mg tablet TAKE 1 TABLET EVERY DAY TO PREVENT GOUT    levothyroxine (SYNTHROID) 150 mcg tablet TAKE 1 TABLET EVERY MORNING FOR THYROID    PARoxetine (PAXIL) 20 mg tablet TAKE 1 TABLET EVERY DAY    carvediloL (COREG) 12.5 mg tablet Take 1 Tablet by mouth two (2) times daily (with meals). atorvastatin (LIPITOR) 40 mg tablet Take 1 Tablet by mouth nightly. sacubitriL-valsartan (Entresto) 24-26 mg tablet Take 1 Tablet by mouth every twelve (12) hours. (Patient taking differently: Take 1 Tablet by mouth two (2) times a day.)    oxybutynin chloride XL (DITROPAN XL) 10 mg CR tablet Take 10 mg by mouth daily. omega 3-dha-epa-fish oil 100-160-1,000 mg cap Take 1 Cap by mouth daily. b complex vitamins tablet Take 1 Tab by mouth daily. CYANOCOBALAMIN, VITAMIN B-12, PO Take 1 Tab by mouth daily. Sciatica    aspirin delayed-release 81 mg tablet Take 81 mg by mouth daily. B.animalis,bifid,infantis,long 10-15 mg TbEC Take 1 Tab by mouth daily. COQ10, UBIQUINOL, PO Take 1 Tab by mouth daily. finasteride (PROSCAR) 5 mg tablet Take 5 mg by mouth daily. tamsulosin (FLOMAX) 0.4 mg capsule Take 0.4 mg by mouth nightly. furosemide (LASIX) 20 mg tablet Take 1 Tab by mouth daily.  (Patient not taking: Reported on 11/8/2022)     No current facility-administered medications for this visit. Wt Readings from Last 3 Encounters:   11/28/22 160 lb (72.6 kg)   11/08/22 166 lb 12.8 oz (75.7 kg)   10/28/22 166 lb (75.3 kg)     BP Readings from Last 3 Encounters:   12/05/22 137/82   11/28/22 100/65   11/08/22 135/70     Pulse Readings from Last 3 Encounters:   12/05/22 89   11/28/22 76   11/08/22 77     Lab Results   Component Value Date/Time    WBC 5.7 10/07/2022 01:25 PM    HGB 14.1 10/07/2022 01:25 PM    HCT 42.2 10/07/2022 01:25 PM    PLATELET 178 36/45/5522 01:25 PM    MCV 97.5 10/07/2022 01:25 PM     Lab Results   Component Value Date/Time    Sodium 141 10/07/2022 01:25 PM    Potassium 4.0 10/07/2022 01:25 PM    Chloride 109 (H) 10/07/2022 01:25 PM    CO2 25 10/07/2022 01:25 PM    Anion gap 7 10/07/2022 01:25 PM    Glucose 112 (H) 10/07/2022 01:25 PM    BUN 23 (H) 10/07/2022 01:25 PM    Creatinine 1.30 10/07/2022 01:25 PM    BUN/Creatinine ratio 18 10/07/2022 01:25 PM    GFR est AA >60 07/28/2022 02:49 PM    GFR est non-AA >60 07/28/2022 02:49 PM    Calcium 8.8 10/07/2022 01:25 PM    Bilirubin, total 0.7 05/13/2021 12:25 PM    Alk. phosphatase 122 (H) 05/13/2021 12:25 PM    Protein, total 6.6 05/13/2021 12:25 PM    Albumin 3.9 05/13/2021 12:25 PM    Globulin 2.7 05/13/2021 12:25 PM    A-G Ratio 1.4 05/13/2021 12:25 PM    ALT (SGPT) 41 05/13/2021 12:25 PM     Estimated Creatinine Clearance: 35.4 mL/min (by C-G formula based on SCr of 1.3 mg/dL).       INR History:   (normal INR range 0.8-1.2)     Date   INR   PT   Dose/Comments  12/05/22 2.5   29.9 Hold x 2, 1.25 mg x 1, then 2.5 mg daily  11/28/22          5.1                   60.6     2.5 mg daily; (+) COVID 19  11/04/22          2.6                    30.7     2.5 mg daily   10/28/22          2.8                    33.1     2.5 mg daily; (+) LMWH; surgery   09/13/22          2.3                    26.8    Hold x 1, the 2.5 mg daily  08/23/22          3.8 45.5     2.5 mg daily   07/19/22          2.4                   28.5     5 mg x 1, then 2.5 mg daily   06/21/22          2.0                   23.6     2.5 mg daily ; (+) missed dose   06/01/22          2.3                   27.2     5 mg x 2, then 2.5 mg daily  05/23/22          1.4                   16.7     5 mg x 1, then 2.5 mg daily  04/25/22          1.8                   21.7     Hold x 1, then 2.5 mg daily  Lapse in INR clinic  11/03/21          3.8                   45.1     5 mg x 1, then 2.5 mg daily ; (+) extra dose      A/P:       Anticoagulation:  Considering Mr. Carolina Horn past history, todays findings, and per Anticoagulation Collaborative Practice Agreement/Protocol:    Fingerstick POC INR (2.5) is Therapeutic for INR goal today. Continue warfarin 2.5 mg daily. Patient was instructed to schedule an appointment in 2 week(s) prior to leaving the clinic. Medication reconciliation was completed during the visit. There are no discontinued medications. A full discussion of the nature of anticoagulants has been carried out. A full discussion of the need for frequent and regular monitoring, precise dosage adjustment and compliance was stressed. Side effects of potential bleeding were discussed and Mr. Amado Morgan was instructed to call [phone] if there are any signs of abnormal bleeding. Mr. Amado Morgan was instructed to avoid any OTC items containing aspirin or ibuprofen and prior to starting any new OTC products to consult with his physician or pharmacist to ensure no drug interactions are present. Mr. Amado Morgan was instructed to avoid any major changes in his general diet and to avoid alcohol consumption. Mr. Amado Morgan was provided information in the AVS that includes topics on understanding coumadin therapy, drug interaction considerations, vitamin K and coumadin use, interactions with foods and supplements containing vitamin K, and the use of herbal products.     Mr. Amado Morgan verbalized his understanding of all instructions and will call the office with any questions, concerns, or signs of abnormal bleeding or blood clot. Notifications of recommendations will be sent to Dr. Gianluca Ruelas MD for review.     Thank you,  Blanca Quijano, PharmD, BCACP, [address] [place] [address] in place: Yes  Recommendation Provided To: Patient/Caregiver: 2 via In person  Intervention Detail: Lab(s) Ordered and Scheduled Appointment  Intervention Accepted By: Patient/Caregiver: 2  Time Spent (min): 20

## 2022-12-05 ENCOUNTER — ANTI-COAG VISIT (OUTPATIENT)
Dept: INTERNAL MEDICINE CLINIC | Age: 87
End: 2022-12-05
Payer: MEDICARE

## 2022-12-05 VITALS — SYSTOLIC BLOOD PRESSURE: 137 MMHG | OXYGEN SATURATION: 96 % | HEART RATE: 89 BPM | DIASTOLIC BLOOD PRESSURE: 82 MMHG

## 2022-12-05 DIAGNOSIS — I48.20 CHRONIC A-FIB (HCC): ICD-10-CM

## 2022-12-05 DIAGNOSIS — Z95.2 S/P AVR: Primary | ICD-10-CM

## 2022-12-05 LAB
INR BLD: 2.5 (ref 1–1.5)
PT POC: 29.9 SECONDS (ref 9.1–12)
VALID INTERNAL CONTROL?: YES

## 2022-12-05 PROCEDURE — 85610 PROTHROMBIN TIME: CPT | Performed by: PHARMACIST

## 2022-12-05 PROCEDURE — G0463 HOSPITAL OUTPT CLINIC VISIT: HCPCS | Performed by: PHARMACIST

## 2022-12-18 DIAGNOSIS — I48.20 CHRONIC ATRIAL FIBRILLATION (HCC): ICD-10-CM

## 2022-12-18 RX ORDER — WARFARIN 2.5 MG/1
TABLET ORAL
Qty: 90 TABLET | Refills: 3 | Status: SHIPPED | OUTPATIENT
Start: 2022-12-18

## 2022-12-18 RX ORDER — ENOXAPARIN SODIUM 100 MG/ML
INJECTION SUBCUTANEOUS
Qty: 16 ML | Refills: 0 | Status: SHIPPED | OUTPATIENT
Start: 2022-12-18 | End: 2022-12-18 | Stop reason: ALTCHOICE

## 2022-12-19 ENCOUNTER — ANTI-COAG VISIT (OUTPATIENT)
Dept: INTERNAL MEDICINE CLINIC | Age: 87
End: 2022-12-19
Payer: MEDICARE

## 2022-12-19 VITALS — WEIGHT: 160 LBS | HEIGHT: 63 IN | BODY MASS INDEX: 28.35 KG/M2

## 2022-12-19 DIAGNOSIS — Z95.2 S/P AVR: Primary | ICD-10-CM

## 2022-12-19 DIAGNOSIS — I48.20 CHRONIC ATRIAL FIBRILLATION (HCC): ICD-10-CM

## 2022-12-19 LAB
INR BLD: 2.1 (ref 1–1.5)
PT POC: 24.5 SECONDS (ref 9.1–12)
VALID INTERNAL CONTROL?: YES

## 2022-12-19 PROCEDURE — G0463 HOSPITAL OUTPT CLINIC VISIT: HCPCS | Performed by: PHARMACIST

## 2022-12-19 PROCEDURE — 85610 PROTHROMBIN TIME: CPT | Performed by: PHARMACIST

## 2022-12-19 NOTE — TELEPHONE ENCOUNTER
Pharmacy Progress Note - Medication Access    Contacted [id number] [facility] regarding Mr. Nahun Marroquin 80 y.o. 's refill request.     Received refill request for LMWH 80 mg and warfarin 2.5 mg. Patient denied any upcoming procedure at the last INR follow up visit. Discuss patient should only receive warfarin refill. There's no need for LMWH rx at this time. All questions answered at this time.        Thank you for the consult,  Blanca Teixeira, PharmD, BCACP, [address] [place] [address]    Program: Medical Group  CPA in place: Yes  Recommendation Provided To: Pharmacy: 1  Intervention Detail: Adherence Monitorin  Intervention Accepted By: Pharmacy: 1  Time Spent (min): 10

## 2022-12-19 NOTE — PROGRESS NOTES
Pharmacy Progress Note - Anticoagulation Management    S/O: Mr. Gómez Rosado  is a 80 y.o. male, referred by Dr. Beverly Huagn MD, was seen today for anticoagulation management for the diagnosis of Atrial Fibrillation and Aortic Valve Replacement  ([id number] [facility]), s/p pacemaker. Pt is very hard of hearing. Warfarin start date: ~1996  INR Goal:  2.0-3.0    Current warfarin regimen:    2.5 mg daily  Warfarin tablet strength:   1 mg, 2.5 mg, 5 mg  -- wife states she does not want to split tablets in half ; generally takes 2.5 mg tab. Able to recognize tablet strength  Duration of therapy: indefinite  Takes warfarin in the evening    Today's pertinent positives includes:  No significant changes since last visit    [facility] refill request was made in error. Confirmed no upcoming procedure planned     Results for orders placed or performed in visit on 12/19/22   AMB POC PT/INR   Result Value Ref Range    VALID INTERNAL CONTROL POC Yes     Prothrombin time (POC) 24.5 (A) 9.1 - 12 seconds    INR POC 2.1 (A) 1 - 1.5     Adherence:   Able to recall regimen? YES  Miss/extra dose? NO  Need refill?  NO    Upcoming procedure(s):  NO    Past Medical History:   Diagnosis Date    Acute on chronic systolic heart failure, NYHA class 4 ([facility] [address] 03/01/2019    admit March 2019 7 days, EF 10% got Primacor, BIV implant    Anger     TAKES PAROXETINE TO CONTROL ANGER ISSUES    Arthritis     Burning with urination     CAD (coronary artery disease) 1996    CABG 1996, mukul celis 2015 fixed inferior defect    Cancer [facility])     prostate    Chronic atrial fibrillation ([facility] [address] 03/2019    RVR3/2019, had ablation of flutter- persistent    Chronic kidney disease     Chronic pain     bilat knees    Coagulation disorder (HCC)     Foot drop, left     WEARS METAL BRACE    Gout     Hard of hearing     Heart failure (HCC)     High cholesterol     Hypertension     Long term current use of anticoagulant therapy     Prostate enlargement     Sleep apnea Thyroid disease     Unspecified sleep apnea     DOESN'T USE CPAP; \"IT MADE HIM SICK-COLDS, SINUS\", PER WIFE     No Known Allergies  Current Outpatient Medications   Medication Sig    warfarin (COUMADIN) 2.5 mg tablet TAKE 1 TABLET EVERY DAY - NEEDS INR CHECK    clobetasoL (TEMOVATE) 0.05 % topical cream Apply  to affected area two (2) times a day. (Patient not taking: Reported on 11/8/2022)    nystatin (MYCOSTATIN) topical cream Apply  to affected area two (2) times a day. ascorbic acid, vitamin C, (VITAMIN C) 1,000 mg tablet Take 1,000 mg by mouth daily. psyllium husk (METAMUCIL PO) Take  by mouth daily. multivit-min/FA/lycopen/lutein (CENTRUM SILVER MEN PO) Take 1 Tablet by mouth daily. allopurinoL (ZYLOPRIM) 300 mg tablet TAKE 1 TABLET EVERY DAY TO PREVENT GOUT    levothyroxine (SYNTHROID) 150 mcg tablet TAKE 1 TABLET EVERY MORNING FOR THYROID    PARoxetine (PAXIL) 20 mg tablet TAKE 1 TABLET EVERY DAY    carvediloL (COREG) 12.5 mg tablet Take 1 Tablet by mouth two (2) times daily (with meals). atorvastatin (LIPITOR) 40 mg tablet Take 1 Tablet by mouth nightly. sacubitriL-valsartan (Entresto) 24-26 mg tablet Take 1 Tablet by mouth every twelve (12) hours. (Patient taking differently: Take 1 Tablet by mouth two (2) times a day.)    oxybutynin chloride XL (DITROPAN XL) 10 mg CR tablet Take 10 mg by mouth daily. omega 3-dha-epa-fish oil 100-160-1,000 mg cap Take 1 Cap by mouth daily. b complex vitamins tablet Take 1 Tab by mouth daily. CYANOCOBALAMIN, VITAMIN B-12, PO Take 1 Tab by mouth daily. Sciatica    aspirin delayed-release 81 mg tablet Take 81 mg by mouth daily. B.animalis,bifid,infantis,long 10-15 mg TbEC Take 1 Tab by mouth daily. COQ10, UBIQUINOL, PO Take 1 Tab by mouth daily. finasteride (PROSCAR) 5 mg tablet Take 5 mg by mouth daily. tamsulosin (FLOMAX) 0.4 mg capsule Take 0.4 mg by mouth nightly. furosemide (LASIX) 20 mg tablet Take 1 Tab by mouth daily.  (Patient not taking: Reported on 11/8/2022)     No current facility-administered medications for this visit. Wt Readings from Last 3 Encounters:   12/19/22 160 lb (72.6 kg)   11/28/22 160 lb (72.6 kg)   11/08/22 166 lb 12.8 oz (75.7 kg)     BP Readings from Last 3 Encounters:   12/05/22 137/82   11/28/22 100/65   11/08/22 135/70     Pulse Readings from Last 3 Encounters:   12/05/22 89   11/28/22 76   11/08/22 77     Lab Results   Component Value Date/Time    WBC 5.7 10/07/2022 01:25 PM    HGB 14.1 10/07/2022 01:25 PM    HCT 42.2 10/07/2022 01:25 PM    PLATELET 408 38/06/6397 01:25 PM    MCV 97.5 10/07/2022 01:25 PM     Lab Results   Component Value Date/Time    Sodium 141 10/07/2022 01:25 PM    Potassium 4.0 10/07/2022 01:25 PM    Chloride 109 (H) 10/07/2022 01:25 PM    CO2 25 10/07/2022 01:25 PM    Anion gap 7 10/07/2022 01:25 PM    Glucose 112 (H) 10/07/2022 01:25 PM    BUN 23 (H) 10/07/2022 01:25 PM    Creatinine 1.30 10/07/2022 01:25 PM    BUN/Creatinine ratio 18 10/07/2022 01:25 PM    GFR est AA >60 07/28/2022 02:49 PM    GFR est non-AA >60 07/28/2022 02:49 PM    Calcium 8.8 10/07/2022 01:25 PM    Bilirubin, total 0.7 05/13/2021 12:25 PM    Alk. phosphatase 122 (H) 05/13/2021 12:25 PM    Protein, total 6.6 05/13/2021 12:25 PM    Albumin 3.9 05/13/2021 12:25 PM    Globulin 2.7 05/13/2021 12:25 PM    A-G Ratio 1.4 05/13/2021 12:25 PM    ALT (SGPT) 41 05/13/2021 12:25 PM     Estimated Creatinine Clearance: 35.4 mL/min (by C-G formula based on SCr of 1.3 mg/dL).     INR History:   (normal INR range 0.8-1.2)     Date   INR   PT   Dose/Comments  12/19/22  2.1  24.5 2.5 mg daily  12/05/22          2.5                    29.9    Hold x 2, 1.25 mg x 1, then 2.5 mg daily  11/28/22          5.1                   60.6     2.5 mg daily; (+) COVID 19  11/04/22          2.6                    30.7     2.5 mg daily   10/28/22          2.8                    33.1     2.5 mg daily; (+) LMWH; surgery   09/13/22          2.3 26.8    Hold x 1, the 2.5 mg daily  08/23/22          3.8                   45.5     2.5 mg daily   07/19/22          2.4                   28.5     5 mg x 1, then 2.5 mg daily   06/21/22          2.0                   23.6     2.5 mg daily ; (+) missed dose   06/01/22          2.3                   27.2     5 mg x 2, then 2.5 mg daily  05/23/22          1.4                   16.7     5 mg x 1, then 2.5 mg daily  04/25/22          1.8                   21.7     Hold x 1, then 2.5 mg daily  Lapse in INR clinic      A/P:       Anticoagulation:  Considering Mr. Marylee Copas past history, todays findings, and per Anticoagulation Collaborative Practice Agreement/Protocol:    Fingerstick POC INR (2.1) is Therapeutic for INR goal today. Continue warfarin 2.5 mg daily       Patient was instructed to schedule an appointment in 4 week(s) prior to leaving the clinic. Medication reconciliation was completed during the visit. There are no discontinued medications. A full discussion of the nature of anticoagulants has been carried out. A full discussion of the need for frequent and regular monitoring, precise dosage adjustment and compliance was stressed. Side effects of potential bleeding were discussed and Mr. Nereyda Dasilva was instructed to call [phone] if there are any signs of abnormal bleeding. Mr. Nereyda Dasilva was instructed to avoid any OTC items containing aspirin or ibuprofen and prior to starting any new OTC products to consult with his physician or pharmacist to ensure no drug interactions are present. Mr. Nereyda Dasilva was instructed to avoid any major changes in his general diet and to avoid alcohol consumption. Mr. Nereyda Dasilva was provided information in the AVS that includes topics on understanding coumadin therapy, drug interaction considerations, vitamin K and coumadin use, interactions with foods and supplements containing vitamin K, and the use of herbal products.     Mr. Nereyda Dasilva verbalized his understanding of all instructions and will call the office with any questions, concerns, or signs of abnormal bleeding or blood clot. Notifications of recommendations will be sent to Dr. Gio Mcghee MD for review.     Thank you,  Blanca Ojeda, PharmD, BCACP, [facility]    Program: Medical Group  CPA in place: Yes  Recommendation Provided To: Patient/Caregiver: 3 via In person  Intervention Detail: Adherence Monitorin, Lab(s) Ordered, and Scheduled Appointment  Intervention Accepted By: Patient/Caregiver: 3  Time Spent (min):  25

## 2022-12-19 NOTE — PATIENT INSTRUCTIONS
Today your INR was 2.1. Your goal INR is  2.0-3.0 . You have a  1 mg, 2.5 mg and 5 mg tablet of Coumadin (warfarin). Take Coumadin (warfarin) as follows: You do not need to take enoxaparin (Lovenox) injection with your current warfarin. Continue warfarin 2.5 mg daily. Come back in 4 week(s) for your next finger stick/INR blood test.        Avoid any over the counter items containing aspirin or ibuprofen, and avoid great swings in general diet. Avoid alcohol consumption. Please notify the INR nurse if you are started on any new medication including over the counter or herbal supplements. Also, please notify your INR nurse if any of your other prescription or over the counter medications have been discontinued. Call [facility] at [phone] if you have any signs of abnormal bleeding/blood clot.  ------------------------------------------------------------------------------------------------------------------  Taking Warfarin Safely: Care Instructions    Your Care Instructions  Warfarin is a medicine that you take to prevent blood clots. It is often called a blood thinner. Doctors give warfarin (such as Coumadin) to reduce the risk of blood clots. You may be at risk for blood clots if you have atrial fibrillation or deep vein thrombosis. Some other health problems may also put you at risk. Warfarin slows the amount of time it takes for your blood to clot. It can cause bleeding problems. Even if you've been taking warfarin for a while, it's important to know how to take it safely. Foods and other medicines can affect the way warfarin works. Some can make warfarin work too well. This can cause bleeding problems. And some can make it work poorly, so that it does not prevent blood clots very well. You will need regular blood tests to check how long it takes for your blood to form a clot.  This test is called a PT or prothrombin time test. The result of the test is called an INR level. Depending on the test results, your doctor or anticoagulation clinic may adjust your dose of warfarin. Follow-up care is a key part of your treatment and safety. Be sure to make and go to all appointments, and call your doctor if you are having problems. It's also a good idea to know your test results and keep a list of the medicines you take. How can you care for yourself at home? Take warfarin safely  Take your warfarin at the same time each day. If you miss a dose of warfarin, don't take an extra dose to make up for it. Your doctor can tell you exactly what to do so you don't take too much or too little. Wear medical alert jewelry that lets others know that you take warfarin. You can buy this at most drugstores. Don't take warfarin if you are pregnant or planning to get pregnant. Talk to your doctor about how you can prevent getting pregnant while you are taking it. Don't change your dose or stop taking warfarin unless your doctor tells you to. Effects of medicines and food on warfarin  Don't start or stop taking any medicines, vitamins, or natural remedies unless you first talk to your doctor. Many medicines can affect how warfarin works. These include aspirin and other pain relievers, over-the-counter medicines, multivitamins, dietary supplements, and herbal products. Tell all of your doctors and pharmacists that you take warfarin. Some prescription medicines can affect how warfarin works. Keep the amount of vitamin K in your diet about the same from day to day. Do not suddenly eat a lot more or a lot less food that is rich in vitamin K than you usually do. Vitamin K affects how warfarin works and how your blood clots. Talk with your doctor before making big changes in your diet. Vitamin K is in many foods, such as:  Leafy greens, such as kale, cabbage, spinach, Swiss chard, and lettuce. Canola and soybean oils.   Green vegetables, such as asparagus, broccoli, and [address] sprouts. Vegetable drinks, green tea leaves, and some dietary supplement drinks. Avoid cranberry juice and other cranberry products. They can increase the effects of warfarin. Limit your use of alcohol. Avoid bleeding by preventing falls and injuries  Wear slippers or shoes with nonskid soles. Remove throw rugs and clutter. Rearrange furniture and electrical cords to keep them out of walking paths. Keep stairways, porches, and outside walkways well lit. Use night-lights in hallways and bathrooms. Be extra careful when you work with sharp tools or knives. When should you call for help? Call 911 anytime you think you may need emergency care. For example, call if:  You have a sudden, severe headache that is different from past headaches. Call your doctor now or seek immediate medical care if:  You have any abnormal bleeding, such as:  Nosebleeds. Vaginal bleeding that is different (heavier, more frequent, at a different time of the month) than what you are used to. Bloody or black stools, or rectal bleeding. Bloody or pink urine. Watch closely for changes in your health, and be sure to contact your doctor if you have any problems. Where can you learn more? Go to http://www.gray.com/. Enter G892 in the search box to learn more about \"Taking Warfarin Safely: Care Instructions. \"  Current as of: January 27, 2016  Content Version: 11.1  © 2100-7287 [employer], [employer]. Care instructions adapted under license by [employer] (which disclaims liability or warranty for this information). If you have questions about a medical condition or this instruction, always ask your healthcare professional. [place] [address] any warranty or liability for your use of this information.

## 2023-01-13 NOTE — PROGRESS NOTES
Pharmacy Progress Note - Anticoagulation Management    S/O: Mr. Carson Mason  is a 80 y.o. male, referred by Dr. Evelyn Nolen MD, was seen today for anticoagulation management for the diagnosis of Atrial Fibrillation and Aortic Valve Replacement  ([id number] [facility]), s/p pacemaker. Pt is very hard of hearing. Warfarin start date: ~1996  INR Goal:  2.0-3.0    Current warfarin regimen:    2.5 mg daily  Warfarin tablet strength:   1 mg, 2.5 mg, 5 mg  -- wife states she does not want to split tablets in half ; generally takes 2.5 mg tab. Able to recognize tablet strength  Duration of therapy: indefinite  Takes warfarin in the evening    Today's pertinent positives includes:  No significant changes since last visit    Results for orders placed or performed in visit on 01/16/23   AMB POC PT/INR   Result Value Ref Range    VALID INTERNAL CONTROL POC Yes     Prothrombin time (POC) 23.2 (A) 9.1 - 12 seconds    INR POC 2.0 (A) 1 - 1.5     Adherence:   Able to recall regimen? YES  Miss/extra dose? NO  Need refill?  NO    Upcoming procedure(s):  NO      Past Medical History:   Diagnosis Date    Acute on chronic systolic heart failure, NYHA class 4 ([facility] [address] 03/01/2019    admit March 2019 7 days, EF 10% got Primacor, BIV implant    Anger     TAKES PAROXETINE TO CONTROL ANGER ISSUES    Arthritis     Burning with urination     CAD (coronary artery disease) 1996    CABG 1996, mukul celis 2015 fixed inferior defect    Cancer [facility])     prostate    Chronic atrial fibrillation ([facility] [address] 03/2019    RVR3/2019, had ablation of flutter- persistent    Chronic kidney disease     Chronic pain     bilat knees    Coagulation disorder (HCC)     Foot drop, left     WEARS METAL BRACE    Gout     Hard of hearing     Heart failure (HCC)     High cholesterol     Hypertension     Long term current use of anticoagulant therapy     Prostate enlargement     Sleep apnea     Thyroid disease     Unspecified sleep apnea     DOESN'T USE CPAP; \"IT MADE HIM SICK-COLDS, SINUS\", PER WIFE     No Known Allergies  Current Outpatient Medications   Medication Sig    warfarin (COUMADIN) 2.5 mg tablet TAKE 1 TABLET EVERY DAY - NEEDS INR CHECK    clobetasoL (TEMOVATE) 0.05 % topical cream Apply  to affected area two (2) times a day. (Patient not taking: Reported on 11/8/2022)    nystatin (MYCOSTATIN) topical cream Apply  to affected area two (2) times a day. ascorbic acid, vitamin C, (VITAMIN C) 1,000 mg tablet Take 1,000 mg by mouth daily. psyllium husk (METAMUCIL PO) Take  by mouth daily. multivit-min/FA/lycopen/lutein (CENTRUM SILVER MEN PO) Take 1 Tablet by mouth daily. allopurinoL (ZYLOPRIM) 300 mg tablet TAKE 1 TABLET EVERY DAY TO PREVENT GOUT    levothyroxine (SYNTHROID) 150 mcg tablet TAKE 1 TABLET EVERY MORNING FOR THYROID    PARoxetine (PAXIL) 20 mg tablet TAKE 1 TABLET EVERY DAY    carvediloL (COREG) 12.5 mg tablet Take 1 Tablet by mouth two (2) times daily (with meals). atorvastatin (LIPITOR) 40 mg tablet Take 1 Tablet by mouth nightly. sacubitriL-valsartan (Entresto) 24-26 mg tablet Take 1 Tablet by mouth every twelve (12) hours. (Patient taking differently: Take 1 Tablet by mouth two (2) times a day.)    oxybutynin chloride XL (DITROPAN XL) 10 mg CR tablet Take 10 mg by mouth daily. omega 3-dha-epa-fish oil 100-160-1,000 mg cap Take 1 Cap by mouth daily. b complex vitamins tablet Take 1 Tab by mouth daily. CYANOCOBALAMIN, VITAMIN B-12, PO Take 1 Tab by mouth daily. Sciatica    aspirin delayed-release 81 mg tablet Take 81 mg by mouth daily. B.animalis,bifid,infantis,long 10-15 mg TbEC Take 1 Tab by mouth daily. COQ10, UBIQUINOL, PO Take 1 Tab by mouth daily. finasteride (PROSCAR) 5 mg tablet Take 5 mg by mouth daily. tamsulosin (FLOMAX) 0.4 mg capsule Take 0.4 mg by mouth nightly. furosemide (LASIX) 20 mg tablet Take 1 Tab by mouth daily.  (Patient not taking: Reported on 11/8/2022)     No current facility-administered medications for this visit. Wt Readings from Last 3 Encounters:   01/16/23 160 lb (72.6 kg)   12/19/22 160 lb (72.6 kg)   11/28/22 160 lb (72.6 kg)     BP Readings from Last 3 Encounters:   01/16/23 138/85   12/05/22 137/82   11/28/22 100/65     Pulse Readings from Last 3 Encounters:   01/16/23 65   12/05/22 89   11/28/22 76     Lab Results   Component Value Date/Time    WBC 5.7 10/07/2022 01:25 PM    HGB 14.1 10/07/2022 01:25 PM    HCT 42.2 10/07/2022 01:25 PM    PLATELET 855 23/79/1895 01:25 PM    MCV 97.5 10/07/2022 01:25 PM     Lab Results   Component Value Date/Time    Sodium 141 10/07/2022 01:25 PM    Potassium 4.0 10/07/2022 01:25 PM    Chloride 109 (H) 10/07/2022 01:25 PM    CO2 25 10/07/2022 01:25 PM    Anion gap 7 10/07/2022 01:25 PM    Glucose 112 (H) 10/07/2022 01:25 PM    BUN 23 (H) 10/07/2022 01:25 PM    Creatinine 1.30 10/07/2022 01:25 PM    BUN/Creatinine ratio 18 10/07/2022 01:25 PM    GFR est AA >60 07/28/2022 02:49 PM    GFR est non-AA >60 07/28/2022 02:49 PM    Calcium 8.8 10/07/2022 01:25 PM    Bilirubin, total 0.7 05/13/2021 12:25 PM    Alk. phosphatase 122 (H) 05/13/2021 12:25 PM    Protein, total 6.6 05/13/2021 12:25 PM    Albumin 3.9 05/13/2021 12:25 PM    Globulin 2.7 05/13/2021 12:25 PM    A-G Ratio 1.4 05/13/2021 12:25 PM    ALT (SGPT) 41 05/13/2021 12:25 PM     Estimated Creatinine Clearance: 35.4 mL/min (by C-G formula based on SCr of 1.3 mg/dL).       INR History:   (normal INR range 0.8-1.2)     Date   INR   PT   Dose/Comments  01/16/23 2.0  23.2 2.5 mg daily  12/19/22           2.1                  24.5     2.5 mg daily  12/05/22          2.5                    29.9    Hold x 2, 1.25 mg x 1, then 2.5 mg daily  11/28/22          5.1                   60.6     2.5 mg daily; (+) COVID 19  11/04/22          2.6                    30.7     2.5 mg daily   10/28/22          2.8                    33.1     2.5 mg daily; (+) LMWH; surgery   09/13/22          2.3                    26.8 Hold x 1, the 2.5 mg daily  08/23/22          3.8                   45.5     2.5 mg daily   07/19/22          2.4                   28.5     5 mg x 1, then 2.5 mg daily   06/21/22          2.0                   23.6     2.5 mg daily ; (+) missed dose   06/01/22          2.3                   27.2     5 mg x 2, then 2.5 mg daily  05/23/22          1.4                   16.7     5 mg x 1, then 2.5 mg daily  04/25/22          1.8                   21.7     Hold x 1, then 2.5 mg daily  Lapse in INR clinic       A/P:       Anticoagulation:  Considering Mr. Christina Heart past history, todays findings, and per Anticoagulation Collaborative Practice Agreement/Protocol:    Fingerstick POC INR (2.0) is Therapeutic for INR goal today. Take 5 mg tonight. Then Continue warfarin 2.5 mg daily. Patient was instructed to schedule an appointment in 4 week(s) prior to leaving the clinic. Medication reconciliation was completed during the visit. There are no discontinued medications. A full discussion of the nature of anticoagulants has been carried out. A full discussion of the need for frequent and regular monitoring, precise dosage adjustment and compliance was stressed. Side effects of potential bleeding were discussed and Mr. Jovanny Calderon was instructed to call [phone] if there are any signs of abnormal bleeding. Mr. oJvanny Calderon was instructed to avoid any OTC items containing aspirin or ibuprofen and prior to starting any new OTC products to consult with his physician or pharmacist to ensure no drug interactions are present. Mr. Jovanny Calderon was instructed to avoid any major changes in his general diet and to avoid alcohol consumption. Mr. Jovanny Calderon was provided information in the AVS that includes topics on understanding coumadin therapy, drug interaction considerations, vitamin K and coumadin use, interactions with foods and supplements containing vitamin K, and the use of herbal products.     Mr. Jovanny Calderon verbalized his understanding of all instructions and will call the office with any questions, concerns, or signs of abnormal bleeding or blood clot. Notifications of recommendations will be sent to Dr. Mishel Xiao MD for review.     Thank you,  Blanca Paredes, PharmD, BCACP, [address] [place] [address]    Program: Medical Group  CPA in place: Yes  Recommendation Provided To: Patient/Caregiver: 3 via In person  Intervention Detail: Dose Adjustment: 1, reason: Therapy Optimization, Lab(s) Ordered, and Scheduled Appointment  Intervention Accepted By: Patient/Caregiver: 3  Time Spent (min): 20

## 2023-01-13 NOTE — PATIENT INSTRUCTIONS
Today your INR was 2.0. Your goal INR is  2.0-3.0 . You have a 1 mg, 2.5 mg , 5 mg mg tablet of Coumadin (warfarin). Take Coumadin (warfarin) as follows: Take 5 mg tonight. Then continue warfarin 2.5 mg daily. Come back in 4  week(s) for your next finger stick/INR blood test.      Avoid any over the counter items containing aspirin or ibuprofen, and avoid great swings in general diet. Avoid alcohol consumption. Please notify the INR nurse if you are started on any new medication including over the counter or herbal supplements. Also, please notify your INR nurse if any of your other prescription or over the counter medications have been discontinued. Call [facility] at [phone] if you have any signs of abnormal bleeding/blood clot.  ------------------------------------------------------------------------------------------------------------------  Taking Warfarin Safely: Care Instructions    Your Care Instructions  Warfarin is a medicine that you take to prevent blood clots. It is often called a blood thinner. Doctors give warfarin (such as Coumadin) to reduce the risk of blood clots. You may be at risk for blood clots if you have atrial fibrillation or deep vein thrombosis. Some other health problems may also put you at risk. Warfarin slows the amount of time it takes for your blood to clot. It can cause bleeding problems. Even if you've been taking warfarin for a while, it's important to know how to take it safely. Foods and other medicines can affect the way warfarin works. Some can make warfarin work too well. This can cause bleeding problems. And some can make it work poorly, so that it does not prevent blood clots very well. You will need regular blood tests to check how long it takes for your blood to form a clot. This test is called a PT or prothrombin time test. The result of the test is called an INR level.  Depending on the test results, your doctor or anticoagulation clinic may adjust your dose of warfarin. Follow-up care is a key part of your treatment and safety. Be sure to make and go to all appointments, and call your doctor if you are having problems. It's also a good idea to know your test results and keep a list of the medicines you take. How can you care for yourself at home? Take warfarin safely  Take your warfarin at the same time each day. If you miss a dose of warfarin, don't take an extra dose to make up for it. Your doctor can tell you exactly what to do so you don't take too much or too little. Wear medical alert jewelry that lets others know that you take warfarin. You can buy this at most drugstores. Don't take warfarin if you are pregnant or planning to get pregnant. Talk to your doctor about how you can prevent getting pregnant while you are taking it. Don't change your dose or stop taking warfarin unless your doctor tells you to. Effects of medicines and food on warfarin  Don't start or stop taking any medicines, vitamins, or natural remedies unless you first talk to your doctor. Many medicines can affect how warfarin works. These include aspirin and other pain relievers, over-the-counter medicines, multivitamins, dietary supplements, and herbal products. Tell all of your doctors and pharmacists that you take warfarin. Some prescription medicines can affect how warfarin works. Keep the amount of vitamin K in your diet about the same from day to day. Do not suddenly eat a lot more or a lot less food that is rich in vitamin K than you usually do. Vitamin K affects how warfarin works and how your blood clots. Talk with your doctor before making big changes in your diet. Vitamin K is in many foods, such as:  Leafy greens, such as kale, cabbage, spinach, Swiss chard, and lettuce. Canola and soybean oils. Green vegetables, such as asparagus, broccoli, and [place] sprouts.   Vegetable drinks, green tea leaves, and some dietary supplement drinks. Avoid cranberry juice and other cranberry products. They can increase the effects of warfarin. Limit your use of alcohol. Avoid bleeding by preventing falls and injuries  Wear slippers or shoes with nonskid soles. Remove throw rugs and clutter. Rearrange furniture and electrical cords to keep them out of walking paths. Keep stairways, porches, and outside walkways well lit. Use night-lights in hallways and bathrooms. Be extra careful when you work with sharp tools or knives. When should you call for help? Call 911 anytime you think you may need emergency care. For example, call if:  You have a sudden, severe headache that is different from past headaches. Call your doctor now or seek immediate medical care if:  You have any abnormal bleeding, such as:  Nosebleeds. Vaginal bleeding that is different (heavier, more frequent, at a different time of the month) than what you are used to. Bloody or black stools, or rectal bleeding. Bloody or pink urine. Watch closely for changes in your health, and be sure to contact your doctor if you have any problems. Where can you learn more? Go to http://www.gray.com/. Enter M319 in the search box to learn more about \"Taking Warfarin Safely: Care Instructions. \"  Current as of: January 27, 2016  Content Version: 11.1  © 9913-2355 [employer]. Care instructions adapted under license by [employer] (which disclaims liability or warranty for this information). If you have questions about a medical condition or this instruction, always ask your healthcare professional. [place] [address] any warranty or liability for your use of this information.

## 2023-01-16 ENCOUNTER — ANTI-COAG VISIT (OUTPATIENT)
Dept: INTERNAL MEDICINE CLINIC | Age: 88
End: 2023-01-16
Payer: MEDICARE

## 2023-01-16 VITALS
WEIGHT: 160 LBS | HEIGHT: 63 IN | SYSTOLIC BLOOD PRESSURE: 138 MMHG | DIASTOLIC BLOOD PRESSURE: 85 MMHG | BODY MASS INDEX: 28.35 KG/M2 | HEART RATE: 65 BPM

## 2023-01-16 DIAGNOSIS — Z95.2 S/P AVR: Primary | ICD-10-CM

## 2023-01-16 DIAGNOSIS — I48.20 CHRONIC ATRIAL FIBRILLATION (HCC): ICD-10-CM

## 2023-01-16 LAB
INR BLD: 2 (ref 1–1.5)
PT POC: 23.2 SECONDS (ref 9.1–12)
VALID INTERNAL CONTROL?: YES

## 2023-01-16 PROCEDURE — G0463 HOSPITAL OUTPT CLINIC VISIT: HCPCS | Performed by: PHARMACIST

## 2023-01-16 PROCEDURE — 36416 COLLJ CAPILLARY BLOOD SPEC: CPT | Performed by: PHARMACIST

## 2023-01-16 PROCEDURE — 85610 PROTHROMBIN TIME: CPT | Performed by: PHARMACIST

## 2023-02-11 NOTE — PATIENT INSTRUCTIONS
Today your INR was 3.6. Your goal INR is  2.0-3.0. You have a 2.5 mg tablet of Coumadin (warfarin). Take Coumadin (warfarin) as follows:     Hold warfarin tonight and tomorrow. Then continue warfarin 2.5 mg daily. Come back in 2 week(s) for your next finger stick/INR blood test.        Avoid any over the counter items containing aspirin or ibuprofen, and avoid great swings in general diet. Avoid alcohol consumption. Please notify the INR nurse if you are started on any new medication including over the counter or herbal supplements. Also, please notify your INR nurse if any of your other prescription or over the counter medications have been discontinued. Call [facility] at [phone] if you have any signs of abnormal bleeding/blood clot.  ------------------------------------------------------------------------------------------------------------------  Taking Warfarin Safely: Care Instructions    Your Care Instructions  Warfarin is a medicine that you take to prevent blood clots. It is often called a blood thinner. Doctors give warfarin (such as Coumadin) to reduce the risk of blood clots. You may be at risk for blood clots if you have atrial fibrillation or deep vein thrombosis. Some other health problems may also put you at risk. Warfarin slows the amount of time it takes for your blood to clot. It can cause bleeding problems. Even if you've been taking warfarin for a while, it's important to know how to take it safely. Foods and other medicines can affect the way warfarin works. Some can make warfarin work too well. This can cause bleeding problems. And some can make it work poorly, so that it does not prevent blood clots very well. You will need regular blood tests to check how long it takes for your blood to form a clot. This test is called a PT or prothrombin time test. The result of the test is called an INR level.  Depending on the test results, your doctor or anticoagulation clinic may adjust your dose of warfarin. Follow-up care is a key part of your treatment and safety. Be sure to make and go to all appointments, and call your doctor if you are having problems. It's also a good idea to know your test results and keep a list of the medicines you take. How can you care for yourself at home? Take warfarin safely  Take your warfarin at the same time each day. If you miss a dose of warfarin, don't take an extra dose to make up for it. Your doctor can tell you exactly what to do so you don't take too much or too little. Wear medical alert jewelry that lets others know that you take warfarin. You can buy this at most drugstores. Don't take warfarin if you are pregnant or planning to get pregnant. Talk to your doctor about how you can prevent getting pregnant while you are taking it. Don't change your dose or stop taking warfarin unless your doctor tells you to. Effects of medicines and food on warfarin  Don't start or stop taking any medicines, vitamins, or natural remedies unless you first talk to your doctor. Many medicines can affect how warfarin works. These include aspirin and other pain relievers, over-the-counter medicines, multivitamins, dietary supplements, and herbal products. Tell all of your doctors and pharmacists that you take warfarin. Some prescription medicines can affect how warfarin works. Keep the amount of vitamin K in your diet about the same from day to day. Do not suddenly eat a lot more or a lot less food that is rich in vitamin K than you usually do. Vitamin K affects how warfarin works and how your blood clots. Talk with your doctor before making big changes in your diet. Vitamin K is in many foods, such as:  Leafy greens, such as kale, cabbage, spinach, Swiss chard, and lettuce. Canola and soybean oils. Green vegetables, such as asparagus, broccoli, and [place] sprouts.   Vegetable drinks, green tea leaves, and some dietary supplement drinks. Avoid cranberry juice and other cranberry products. They can increase the effects of warfarin. Limit your use of alcohol. Avoid bleeding by preventing falls and injuries  Wear slippers or shoes with nonskid soles. Remove throw rugs and clutter. Rearrange furniture and electrical cords to keep them out of walking paths. Keep stairways, porches, and outside walkways well lit. Use night-lights in hallways and bathrooms. Be extra careful when you work with sharp tools or knives. When should you call for help? Call 911 anytime you think you may need emergency care. For example, call if:  You have a sudden, severe headache that is different from past headaches. Call your doctor now or seek immediate medical care if:  You have any abnormal bleeding, such as:  Nosebleeds. Vaginal bleeding that is different (heavier, more frequent, at a different time of the month) than what you are used to. Bloody or black stools, or rectal bleeding. Bloody or pink urine. Watch closely for changes in your health, and be sure to contact your doctor if you have any problems. Where can you learn more? Go to http://www.gray.com/. Enter D981 in the search box to learn more about \"Taking Warfarin Safely: Care Instructions. \"  Current as of: January 27, 2016  Content Version: 11.1  © 1699-8052 [employer]. Care instructions adapted under license by [employer] (which disclaims liability or warranty for this information). If you have questions about a medical condition or this instruction, always ask your healthcare professional. [place] [address] any warranty or liability for your use of this information.

## 2023-02-11 NOTE — PROGRESS NOTES
Pharmacy Progress Note - Anticoagulation Management    S/O: Mr. Lloyd Robles  is a 80 y.o. male, referred by Dr. Tao Brown MD, was seen today for anticoagulation management for the diagnosis of Atrial Fibrillation and Aortic Valve Replacement  ([id number] [facility]), s/p pacemaker. Pt is very hard of hearing. Patient was seen today together with Brittaney Velázquez PharmD. I have read and agree with Indra's documentation. Warfarin start date: ~1996  INR Goal:  2.0-3.0    Current warfarin regimen:    5 mg x 1, then 2.5 mg daily  Warfarin tablet strength:   1 mg, 2.5 mg, 5 mg  -- wife states she does not want to split tablets in half ; generally takes 2.5 mg tab.  Able to recognize tablet strength  Duration of therapy: indefinite  Takes warfarin in the evening    Today's pertinent positives includes:  Change in diet/appetite    Denies s/sx of bleeding/bruises    Results for orders placed or performed in visit on 02/13/23   AMB POC PT/INR   Result Value Ref Range    VALID INTERNAL CONTROL POC Yes     Prothrombin time (POC) 43.0 (A) 9.1 - 12 seconds    INR POC 3.6 (A) 1 - 1.5     Past Medical History:   Diagnosis Date    Acute on chronic systolic heart failure, NYHA class 4 ([facility] [address] 03/01/2019    admit March 2019 7 days, EF 10% got Primacor, BIV implant    Anger     TAKES PAROXETINE TO CONTROL ANGER ISSUES    Arthritis     Burning with urination     CAD (coronary artery disease) 1996    CABG 1996, mukul celis 2015 fixed inferior defect    Cancer [facility])     prostate    Chronic atrial fibrillation ([facility] [address] 03/2019    RVR3/2019, had ablation of flutter- persistent    Chronic kidney disease     Chronic pain     bilat knees    Coagulation disorder (HCC)     Foot drop, left     WEARS METAL BRACE    Gout     Hard of hearing     Heart failure (HCC)     High cholesterol     Hypertension     Long term current use of anticoagulant therapy     Prostate enlargement     Sleep apnea     Thyroid disease     Unspecified sleep apnea DOESN'T USE CPAP; \"IT MADE HIM SICK-COLDS, SINUS\", PER WIFE     No Known Allergies  Current Outpatient Medications   Medication Sig    warfarin (COUMADIN) 2.5 mg tablet TAKE 1 TABLET EVERY DAY - NEEDS INR CHECK    clobetasoL (TEMOVATE) 0.05 % topical cream Apply  to affected area two (2) times a day. (Patient not taking: Reported on 11/8/2022)    nystatin (MYCOSTATIN) topical cream Apply  to affected area two (2) times a day. ascorbic acid, vitamin C, (VITAMIN C) 1,000 mg tablet Take 1,000 mg by mouth daily. psyllium husk (METAMUCIL PO) Take  by mouth daily. multivit-min/FA/lycopen/lutein (CENTRUM SILVER MEN PO) Take 1 Tablet by mouth daily. allopurinoL (ZYLOPRIM) 300 mg tablet TAKE 1 TABLET EVERY DAY TO PREVENT GOUT    levothyroxine (SYNTHROID) 150 mcg tablet TAKE 1 TABLET EVERY MORNING FOR THYROID    PARoxetine (PAXIL) 20 mg tablet TAKE 1 TABLET EVERY DAY    carvediloL (COREG) 12.5 mg tablet Take 1 Tablet by mouth two (2) times daily (with meals). atorvastatin (LIPITOR) 40 mg tablet Take 1 Tablet by mouth nightly. sacubitriL-valsartan (Entresto) 24-26 mg tablet Take 1 Tablet by mouth every twelve (12) hours. (Patient taking differently: Take 1 Tablet by mouth two (2) times a day.)    oxybutynin chloride XL (DITROPAN XL) 10 mg CR tablet Take 10 mg by mouth daily. omega 3-dha-epa-fish oil 100-160-1,000 mg cap Take 1 Cap by mouth daily. b complex vitamins tablet Take 1 Tab by mouth daily. CYANOCOBALAMIN, VITAMIN B-12, PO Take 1 Tab by mouth daily. Sciatica    aspirin delayed-release 81 mg tablet Take 81 mg by mouth daily. B.animalis,bifid,infantis,long 10-15 mg TbEC Take 1 Tab by mouth daily. COQ10, UBIQUINOL, PO Take 1 Tab by mouth daily. finasteride (PROSCAR) 5 mg tablet Take 5 mg by mouth daily. tamsulosin (FLOMAX) 0.4 mg capsule Take 0.4 mg by mouth nightly. furosemide (LASIX) 20 mg tablet Take 1 Tab by mouth daily.  (Patient not taking: Reported on 11/8/2022)     No current facility-administered medications for this visit. Wt Readings from Last 3 Encounters:   02/13/23 156 lb (70.8 kg)   01/16/23 160 lb (72.6 kg)   12/19/22 160 lb (72.6 kg)     BP Readings from Last 3 Encounters:   02/13/23 132/82   01/16/23 138/85   12/05/22 137/82     Pulse Readings from Last 3 Encounters:   02/13/23 79   01/16/23 65   12/05/22 89     Lab Results   Component Value Date/Time    WBC 5.7 10/07/2022 01:25 PM    HGB 14.1 10/07/2022 01:25 PM    HCT 42.2 10/07/2022 01:25 PM    PLATELET 000 79/04/8232 01:25 PM    MCV 97.5 10/07/2022 01:25 PM     Lab Results   Component Value Date/Time    Sodium 141 10/07/2022 01:25 PM    Potassium 4.0 10/07/2022 01:25 PM    Chloride 109 (H) 10/07/2022 01:25 PM    CO2 25 10/07/2022 01:25 PM    Anion gap 7 10/07/2022 01:25 PM    Glucose 112 (H) 10/07/2022 01:25 PM    BUN 23 (H) 10/07/2022 01:25 PM    Creatinine 1.30 10/07/2022 01:25 PM    BUN/Creatinine ratio 18 10/07/2022 01:25 PM    GFR est AA >60 07/28/2022 02:49 PM    GFR est non-AA >60 07/28/2022 02:49 PM    Calcium 8.8 10/07/2022 01:25 PM    Bilirubin, total 0.7 05/13/2021 12:25 PM    Alk. phosphatase 122 (H) 05/13/2021 12:25 PM    Protein, total 6.6 05/13/2021 12:25 PM    Albumin 3.9 05/13/2021 12:25 PM    Globulin 2.7 05/13/2021 12:25 PM    A-G Ratio 1.4 05/13/2021 12:25 PM    ALT (SGPT) 41 05/13/2021 12:25 PM     Estimated Creatinine Clearance: 35 mL/min (by C-G formula based on SCr of 1.3 mg/dL).       INR History:   (normal INR range 0.8-1.2)     Date   INR   PT   Dose/Comments  02/13/23 3.6  43.0 5 mg x 1, then 2.5 mg daily (+) dietary change   01/16/23          2.0                   23.2    2.5 mg daily  12/19/22          2.1                  24.5     2.5 mg daily  12/05/22          2.5                    29.9    Hold x 2, 1.25 mg x 1, then 2.5 mg daily  11/28/22          5.1                   60.6     2.5 mg daily; (+) COVID 19  11/04/22          2.6                    30.7     2.5 mg daily   10/28/22 2.8                    33.1     2.5 mg daily; (+) LMWH; surgery   09/13/22          2.3                    26.8    Hold x 1, the 2.5 mg daily  08/23/22          3.8                   45.5     2.5 mg daily   07/19/22          2.4                   28.5     5 mg x 1, then 2.5 mg daily   06/21/22          2.0                   23.6     2.5 mg daily ; (+) missed dose   06/01/22          2.3                   27.2     5 mg x 2, then 2.5 mg daily  05/23/22          1.4                   16.7     5 mg x 1, then 2.5 mg daily  04/25/22          1.8                   21.7     Hold x 1, then 2.5 mg daily  Lapse in INR clinic      A/P:       Anticoagulation:  Considering Mr. Blas Bolivar past history, todays findings, and per Anticoagulation Collaborative Practice Agreement/Protocol:    Fingerstick POC INR (3.6) is Supratherapeutic for INR goal today. Hold warfarin x 2 days. Then Continue warfarin 2.5 mg daily. Check: Weight at the next visit. Patient was instructed to schedule an appointment in 2 week(s) prior to leaving the clinic. Medication reconciliation was completed during the visit. There are no discontinued medications. A full discussion of the nature of anticoagulants has been carried out. A full discussion of the need for frequent and regular monitoring, precise dosage adjustment and compliance was stressed. Side effects of potential bleeding were discussed and Mr. Ellen Newell was instructed to call [phone] if there are any signs of abnormal bleeding. Mr. Ellen Newell was instructed to avoid any OTC items containing aspirin or ibuprofen and prior to starting any new OTC products to consult with his physician or pharmacist to ensure no drug interactions are present. Mr. Ellen Newell was instructed to avoid any major changes in his general diet and to avoid alcohol consumption.     Mr. Ellen Newell was provided information in the AVS that includes topics on understanding coumadin therapy, drug interaction considerations, vitamin K and coumadin use, interactions with foods and supplements containing vitamin K, and the use of herbal products. Mr. Jen Hart verbalized his understanding of all instructions and will call the office with any questions, concerns, or signs of abnormal bleeding or blood clot. Notifications of recommendations will be sent to Dr. Indra Muniz MD for review.     Thank you,  Blanca Cole, PharmD, BCACP, [address] [place] [address]    Program: Medical Group  CPA in place: Yes  Recommendation Provided To: Patient/Caregiver: 3 via In person  Intervention Detail: Dose Adjustment: 1, reason: Therapy De-escalation, Lab(s) Ordered, and Scheduled Appointment  Intervention Accepted By: Patient/Caregiver: 3  Time Spent (min): 30

## 2023-02-13 ENCOUNTER — ANTI-COAG VISIT (OUTPATIENT)
Dept: INTERNAL MEDICINE CLINIC | Age: 88
End: 2023-02-13
Payer: MEDICARE

## 2023-02-13 VITALS
BODY MASS INDEX: 27.64 KG/M2 | SYSTOLIC BLOOD PRESSURE: 132 MMHG | DIASTOLIC BLOOD PRESSURE: 82 MMHG | WEIGHT: 156 LBS | HEART RATE: 79 BPM | HEIGHT: 63 IN

## 2023-02-13 DIAGNOSIS — I48.20 CHRONIC ATRIAL FIBRILLATION (HCC): ICD-10-CM

## 2023-02-13 DIAGNOSIS — Z95.2 S/P AVR: Primary | ICD-10-CM

## 2023-02-13 LAB
INR BLD: 3.6 (ref 1–1.5)
PT POC: 43 SECONDS (ref 9.1–12)
VALID INTERNAL CONTROL?: YES

## 2023-02-13 PROCEDURE — G0463 HOSPITAL OUTPT CLINIC VISIT: HCPCS | Performed by: PHARMACIST

## 2023-02-13 PROCEDURE — 85610 PROTHROMBIN TIME: CPT | Performed by: PHARMACIST

## 2023-02-13 PROCEDURE — 36416 COLLJ CAPILLARY BLOOD SPEC: CPT | Performed by: PHARMACIST

## 2023-02-13 NOTE — PROGRESS NOTES
Pharmacy Progress Note - Anticoagulation Management    S/O: Mr. Bella Ho  is a 80 y.o. male, referred by Dr. Indra Muniz MD, was seen today for anticoagulation management for the diagnosis of Atrial Fibrillation and Aortic Valve Replacement  ([id number] [facility]), s/p pacemaker. Pt is very hard of hearing. Warfarin start date: ~1996  INR Goal:  2.0-3.0    Current warfarin regimen:    5 mg x 1, then 2.5 mg daily  Warfarin tablet strength:   1 mg, 2.5 mg, 5 mg  -- wife states she does not want to split tablets in half ; generally takes 2.5 mg tab. Able to recognize tablet strength  Duration of therapy: indefinite  Takes warfarin in the evening    Today's pertinent positives includes:  Change in diet/appetite    -Patient has been avoiding vegetables     Recent Results (from the past 4 hour(s))   AMB POC PT/INR    Collection Time: 02/13/23  1:45 PM   Result Value Ref Range    VALID INTERNAL CONTROL POC Yes     Prothrombin time (POC) 43.0 (A) 9.1 - 12 seconds    INR POC 3.6 (A) 1 - 1.5       Adherence:   Able to recall regimen? YES  Miss/extra dose? NO  Need refill?  NO    Upcoming procedure(s):  NO      Past Medical History:   Diagnosis Date    Acute on chronic systolic heart failure, NYHA class 4 ([facility] [address] 03/01/2019    admit March 2019 7 days, EF 10% got Primacor, BIV implant    Anger     TAKES PAROXETINE TO CONTROL ANGER ISSUES    Arthritis     Burning with urination     CAD (coronary artery disease) 1996    CABG 1996, mukul celis 2015 fixed inferior defect    Cancer [facility])     prostate    Chronic atrial fibrillation ([facility] [address] 03/2019    RVR3/2019, had ablation of flutter- persistent    Chronic kidney disease     Chronic pain     bilat knees    Coagulation disorder (HCC)     Foot drop, left     WEARS METAL BRACE    Gout     Hard of hearing     Heart failure (HCC)     High cholesterol     Hypertension     Long term current use of anticoagulant therapy     Prostate enlargement     Sleep apnea     Thyroid disease Unspecified sleep apnea     DOESN'T USE CPAP; \"IT MADE HIM SICK-COLDS, SINUS\", PER WIFE     No Known Allergies  Current Outpatient Medications   Medication Sig    warfarin (COUMADIN) 2.5 mg tablet TAKE 1 TABLET EVERY DAY - NEEDS INR CHECK    clobetasoL (TEMOVATE) 0.05 % topical cream Apply  to affected area two (2) times a day. (Patient not taking: Reported on 11/8/2022)    nystatin (MYCOSTATIN) topical cream Apply  to affected area two (2) times a day. ascorbic acid, vitamin C, (VITAMIN C) 1,000 mg tablet Take 1,000 mg by mouth daily. psyllium husk (METAMUCIL PO) Take  by mouth daily. multivit-min/FA/lycopen/lutein (CENTRUM SILVER MEN PO) Take 1 Tablet by mouth daily. allopurinoL (ZYLOPRIM) 300 mg tablet TAKE 1 TABLET EVERY DAY TO PREVENT GOUT    levothyroxine (SYNTHROID) 150 mcg tablet TAKE 1 TABLET EVERY MORNING FOR THYROID    PARoxetine (PAXIL) 20 mg tablet TAKE 1 TABLET EVERY DAY    carvediloL (COREG) 12.5 mg tablet Take 1 Tablet by mouth two (2) times daily (with meals). atorvastatin (LIPITOR) 40 mg tablet Take 1 Tablet by mouth nightly. sacubitriL-valsartan (Entresto) 24-26 mg tablet Take 1 Tablet by mouth every twelve (12) hours. (Patient taking differently: Take 1 Tablet by mouth two (2) times a day.)    oxybutynin chloride XL (DITROPAN XL) 10 mg CR tablet Take 10 mg by mouth daily. omega 3-dha-epa-fish oil 100-160-1,000 mg cap Take 1 Cap by mouth daily. b complex vitamins tablet Take 1 Tab by mouth daily. CYANOCOBALAMIN, VITAMIN B-12, PO Take 1 Tab by mouth daily. Sciatica    aspirin delayed-release 81 mg tablet Take 81 mg by mouth daily. B.animalis,bifid,infantis,long 10-15 mg TbEC Take 1 Tab by mouth daily. COQ10, UBIQUINOL, PO Take 1 Tab by mouth daily. finasteride (PROSCAR) 5 mg tablet Take 5 mg by mouth daily. tamsulosin (FLOMAX) 0.4 mg capsule Take 0.4 mg by mouth nightly. furosemide (LASIX) 20 mg tablet Take 1 Tab by mouth daily.  (Patient not taking: Reported on 11/8/2022)     No current facility-administered medications for this visit. Wt Readings from Last 3 Encounters:   02/13/23 156 lb (70.8 kg)   01/16/23 160 lb (72.6 kg)   12/19/22 160 lb (72.6 kg)     BP Readings from Last 3 Encounters:   02/13/23 132/82   01/16/23 138/85   12/05/22 137/82     Pulse Readings from Last 3 Encounters:   02/13/23 79   01/16/23 65   12/05/22 89     Lab Results   Component Value Date/Time    WBC 5.7 10/07/2022 01:25 PM    HGB 14.1 10/07/2022 01:25 PM    HCT 42.2 10/07/2022 01:25 PM    PLATELET 484 93/16/5527 01:25 PM    MCV 97.5 10/07/2022 01:25 PM     Lab Results   Component Value Date/Time    Sodium 141 10/07/2022 01:25 PM    Potassium 4.0 10/07/2022 01:25 PM    Chloride 109 (H) 10/07/2022 01:25 PM    CO2 25 10/07/2022 01:25 PM    Anion gap 7 10/07/2022 01:25 PM    Glucose 112 (H) 10/07/2022 01:25 PM    BUN 23 (H) 10/07/2022 01:25 PM    Creatinine 1.30 10/07/2022 01:25 PM    BUN/Creatinine ratio 18 10/07/2022 01:25 PM    GFR est AA >60 07/28/2022 02:49 PM    GFR est non-AA >60 07/28/2022 02:49 PM    Calcium 8.8 10/07/2022 01:25 PM    Bilirubin, total 0.7 05/13/2021 12:25 PM    Alk. phosphatase 122 (H) 05/13/2021 12:25 PM    Protein, total 6.6 05/13/2021 12:25 PM    Albumin 3.9 05/13/2021 12:25 PM    Globulin 2.7 05/13/2021 12:25 PM    A-G Ratio 1.4 05/13/2021 12:25 PM    ALT (SGPT) 41 05/13/2021 12:25 PM     Estimated Creatinine Clearance: 35 mL/min (by C-G formula based on SCr of 1.3 mg/dL).       INR History:   (normal INR range 0.8-1.2)     Date                INR                  PT        Dose/Comments  02/13/23 3.6   43.0  5 mg x 1, then 2.5 mg daily  01/16/23          2.0                   23.2     2.5 mg daily  12/19/22          2.1                   24.5     2.5 mg daily  12/05/22          2.5                    29.9    Hold x 2, 1.25 mg x 1, then 2.5 mg daily  11/28/22          5.1                   60.6     2.5 mg daily; (+) COVID 19  11/04/22          2.6 30.7     2.5 mg daily   10/28/22          2.8                    33.1     2.5 mg daily; (+) LMWH; surgery   09/13/22          2.3                    26.8    Hold x 1, the 2.5 mg daily  08/23/22          3.8                   45.5     2.5 mg daily   07/19/22          2.4                   28.5     5 mg x 1, then 2.5 mg daily   06/21/22          2.0                   23.6     2.5 mg daily ; (+) missed dose   06/01/22          2.3                   27.2     5 mg x 2, then 2.5 mg daily  05/23/22          1.4                   16.7     5 mg x 1, then 2.5 mg daily  04/25/22          1.8                   21.7     Hold x 1, then 2.5 mg daily  Lapse in INR clinic    A/P:       Anticoagulation:  Considering Mr. John Goff past history, todays findings, and per Anticoagulation Collaborative Practice Agreement/Protocol:    Fingerstick POC INR (3.6) is supratherapeutic for INR goal today. Recent weight loss and change in diet most likely contributed to increase in INR. Hold warfarin tonight and tomorrow, then continue warfarin 2.5 mg daily. Check: Vitals and Weight at the next visit. Patient was instructed to schedule an appointment in 2 week(s) prior to leaving the clinic. Medication reconciliation was completed during the visit. There are no discontinued medications. A full discussion of the nature of anticoagulants has been carried out. A full discussion of the need for frequent and regular monitoring, precise dosage adjustment and compliance was stressed. Side effects of potential bleeding were discussed and Mr. Lashon Sosa was instructed to call [phone] if there are any signs of abnormal bleeding. Mr. Lashon Sosa was instructed to avoid any OTC items containing aspirin or ibuprofen and prior to starting any new OTC products to consult with his physician or pharmacist to ensure no drug interactions are present.   Mr. Lashon Sosa was instructed to avoid any major changes in his general diet and to avoid alcohol consumption. Mr. Arianna Gabriel was provided information in the AVS that includes topics on understanding coumadin therapy, drug interaction considerations, vitamin K and coumadin use, interactions with foods and supplements containing vitamin K, and the use of herbal products. Mr. Arianna Gabriel verbalized his understanding of all instructions and will call the office with any questions, concerns, or signs of abnormal bleeding or blood clot. Notifications of recommendations will be sent to Dr. Sylvia Mcgowan MD for review.     Thank you,  Bessie Baitsta, PHARMD

## 2023-02-15 ENCOUNTER — OFFICE VISIT (OUTPATIENT)
Dept: CARDIOLOGY CLINIC | Age: 88
End: 2023-02-15

## 2023-02-15 DIAGNOSIS — Z95.810 PRESENCE OF CARDIOVERTER DEFIBRILLATOR: Primary | ICD-10-CM

## 2023-02-18 DIAGNOSIS — E78.5 DYSLIPIDEMIA: ICD-10-CM

## 2023-02-23 RX ORDER — ATORVASTATIN CALCIUM 40 MG/1
TABLET, FILM COATED ORAL
Qty: 90 TABLET | Refills: 3 | Status: SHIPPED | OUTPATIENT
Start: 2023-02-23

## 2023-02-23 NOTE — TELEPHONE ENCOUNTER
Per VO by MD.    Future Appointments   Date Time Provider Lexie Moreno   2/28/2023  1:45 PM Ed Watts [facility] BS AMB   4/24/2023  3:00 PM Felton Hudson MD [facility] BS AMB   5/3/2023  4:30 PM REMOTE1, BUSTER STAHL BS AMB   8/22/2023  9:20 AM [email or web address], BUSTER STAHL BS AMB   8/22/2023  9:40 AM MD MARIBETH Keyes BS AMB

## 2023-02-26 NOTE — PATIENT INSTRUCTIONS
Today your INR was 2.7. Your goal INR is  2.0-3.0 . You have a 1 mg, 2.5 mg, 5 mg mg tablet of Coumadin (warfarin). Take Coumadin (warfarin) as follows:    Continue warfarin 2.5 mg daily. Come back in 4  week(s) for your next finger stick/INR blood test.      Avoid any over the counter items containing aspirin or ibuprofen, and avoid great swings in general diet. Avoid alcohol consumption. Please notify the INR nurse if you are started on any new medication including over the counter or herbal supplements. Also, please notify your INR nurse if any of your other prescription or over the counter medications have been discontinued. Call [facility] at [phone] if you have any signs of abnormal bleeding/blood clot.  ------------------------------------------------------------------------------------------------------------------  Taking Warfarin Safely: Care Instructions    Your Care Instructions  Warfarin is a medicine that you take to prevent blood clots. It is often called a blood thinner. Doctors give warfarin (such as Coumadin) to reduce the risk of blood clots. You may be at risk for blood clots if you have atrial fibrillation or deep vein thrombosis. Some other health problems may also put you at risk. Warfarin slows the amount of time it takes for your blood to clot. It can cause bleeding problems. Even if you've been taking warfarin for a while, it's important to know how to take it safely. Foods and other medicines can affect the way warfarin works. Some can make warfarin work too well. This can cause bleeding problems. And some can make it work poorly, so that it does not prevent blood clots very well. You will need regular blood tests to check how long it takes for your blood to form a clot. This test is called a PT or prothrombin time test. The result of the test is called an INR level.  Depending on the test results, your doctor or anticoagulation clinic may adjust your dose of warfarin. Follow-up care is a key part of your treatment and safety. Be sure to make and go to all appointments, and call your doctor if you are having problems. It's also a good idea to know your test results and keep a list of the medicines you take. How can you care for yourself at home? Take warfarin safely  Take your warfarin at the same time each day. If you miss a dose of warfarin, don't take an extra dose to make up for it. Your doctor can tell you exactly what to do so you don't take too much or too little. Wear medical alert jewelry that lets others know that you take warfarin. You can buy this at most drugstores. Don't take warfarin if you are pregnant or planning to get pregnant. Talk to your doctor about how you can prevent getting pregnant while you are taking it. Don't change your dose or stop taking warfarin unless your doctor tells you to. Effects of medicines and food on warfarin  Don't start or stop taking any medicines, vitamins, or natural remedies unless you first talk to your doctor. Many medicines can affect how warfarin works. These include aspirin and other pain relievers, over-the-counter medicines, multivitamins, dietary supplements, and herbal products. Tell all of your doctors and pharmacists that you take warfarin. Some prescription medicines can affect how warfarin works. Keep the amount of vitamin K in your diet about the same from day to day. Do not suddenly eat a lot more or a lot less food that is rich in vitamin K than you usually do. Vitamin K affects how warfarin works and how your blood clots. Talk with your doctor before making big changes in your diet. Vitamin K is in many foods, such as:  Leafy greens, such as kale, cabbage, spinach, Swiss chard, and lettuce. Canola and soybean oils. Green vegetables, such as asparagus, broccoli, and [place] sprouts. Vegetable drinks, green tea leaves, and some dietary supplement drinks.   Avoid cranberry juice and other cranberry products. They can increase the effects of warfarin. Limit your use of alcohol. Avoid bleeding by preventing falls and injuries  Wear slippers or shoes with nonskid soles. Remove throw rugs and clutter. Rearrange furniture and electrical cords to keep them out of walking paths. Keep stairways, porches, and outside walkways well lit. Use night-lights in hallways and bathrooms. Be extra careful when you work with sharp tools or knives. When should you call for help? Call 911 anytime you think you may need emergency care. For example, call if:  You have a sudden, severe headache that is different from past headaches. Call your doctor now or seek immediate medical care if:  You have any abnormal bleeding, such as:  Nosebleeds. Vaginal bleeding that is different (heavier, more frequent, at a different time of the month) than what you are used to. Bloody or black stools, or rectal bleeding. Bloody or pink urine. Watch closely for changes in your health, and be sure to contact your doctor if you have any problems. Where can you learn more? Go to http://claus-teodora.info/. Enter Z931 in the search box to learn more about \"Taking Warfarin Safely: Care Instructions. \"  Current as of: January 27, 2016  Content Version: 11.1  © 8493-6628 [employer]. Care instructions adapted under license by [employer] (which disclaims liability or warranty for this information). If you have questions about a medical condition or this instruction, always ask your healthcare professional. [place] [address] any warranty or liability for your use of this information.

## 2023-02-26 NOTE — PROGRESS NOTES
Pharmacy Progress Note - Anticoagulation Management    S/O: Mr. Lenny Fountain  is a 80 y.o. male, referred by Dr. Mishel Xiao MD, was seen today for anticoagulation management for the diagnosis of Atrial Fibrillation and Aortic Valve Replacement  ([id number] [facility]), s/p pacemaker. Pt is very hard of hearing. Patient was seen today together with Meenu Rodriguez PharmD. I have read and agree with Indra's documentation. Warfarin start date: ~1996  INR Goal:  2.0-3.0    Current warfarin regimen:    Hold x 2, then 2.5 mg daily  Warfarin tablet strength:   1 mg, 2.5 mg, 5 mg  -- wife states she does not want to split tablets in half ; generally takes 2.5 mg tab. Able to recognize tablet strength  Duration of therapy: indefinite  Takes warfarin in the evening    Today's pertinent positives includes:  Had a beer last night with his meal.  Reports traces of blood in stool four days ago following constipation/straining. Denies other s/sx of bleeding/bruises. No hematuria. Results for orders placed or performed in visit on 02/28/23   AMB POC PT/INR   Result Value Ref Range    VALID INTERNAL CONTROL POC Yes     Prothrombin time (POC) 32.5 seconds    INR POC 2.7      Adherence:   Able to recall regimen? YES  Miss/extra dose? NO  Need refill?  NO    Upcoming procedure(s):  NO    Past Medical History:   Diagnosis Date    Acute on chronic systolic heart failure, NYHA class 4 ([facility] [address] 03/01/2019    admit March 2019 7 days, EF 10% got Primacor, BIV implant    Anger     TAKES PAROXETINE TO CONTROL ANGER ISSUES    Arthritis     Burning with urination     CAD (coronary artery disease) 1996    CABG 1996, mukul celis 2015 fixed inferior defect    Cancer [facility])     prostate    Chronic atrial fibrillation ([facility] [address] 03/2019    RVR3/2019, had ablation of flutter- persistent    Chronic kidney disease     Chronic pain     bilat knees    Coagulation disorder (HCC)     Foot drop, left     WEARS METAL BRACE    Gout     Hard of hearing Heart failure (HCC)     High cholesterol     Hypertension     Long term current use of anticoagulant therapy     Prostate enlargement     Sleep apnea     Thyroid disease     Unspecified sleep apnea     DOESN'T USE CPAP; \"IT MADE HIM SICK-COLDS, SINUS\", PER WIFE     No Known Allergies  Current Outpatient Medications   Medication Sig    atorvastatin (LIPITOR) 40 mg tablet TAKE 1 TABLET BY MOUTH NIGHTLY TO LOWER CHOLESTEROL    warfarin (COUMADIN) 2.5 mg tablet TAKE 1 TABLET EVERY DAY - NEEDS INR CHECK    clobetasoL (TEMOVATE) 0.05 % topical cream Apply  to affected area two (2) times a day. nystatin (MYCOSTATIN) topical cream Apply  to affected area two (2) times a day. ascorbic acid, vitamin C, (VITAMIN C) 1,000 mg tablet Take 1,000 mg by mouth daily. psyllium husk (METAMUCIL PO) Take  by mouth daily. multivit-min/FA/lycopen/lutein (CENTRUM SILVER MEN PO) Take 1 Tablet by mouth daily. allopurinoL (ZYLOPRIM) 300 mg tablet TAKE 1 TABLET EVERY DAY TO PREVENT GOUT    levothyroxine (SYNTHROID) 150 mcg tablet TAKE 1 TABLET EVERY MORNING FOR THYROID    PARoxetine (PAXIL) 20 mg tablet TAKE 1 TABLET EVERY DAY    carvediloL (COREG) 12.5 mg tablet Take 1 Tablet by mouth two (2) times daily (with meals). sacubitriL-valsartan (Entresto) 24-26 mg tablet Take 1 Tablet by mouth every twelve (12) hours. (Patient taking differently: Take 1 Tablet by mouth two (2) times a day.)    oxybutynin chloride XL (DITROPAN XL) 10 mg CR tablet Take 10 mg by mouth daily. omega 3-dha-epa-fish oil 100-160-1,000 mg cap Take 1 Cap by mouth daily. b complex vitamins tablet Take 1 Tab by mouth daily. CYANOCOBALAMIN, VITAMIN B-12, PO Take 1 Tab by mouth daily. Sciatica    aspirin delayed-release 81 mg tablet Take 81 mg by mouth daily. B.animalis,bifid,infantis,long 10-15 mg TbEC Take 1 Tab by mouth daily. COQ10, UBIQUINOL, PO Take 1 Tab by mouth daily. finasteride (PROSCAR) 5 mg tablet Take 5 mg by mouth daily. tamsulosin (FLOMAX) 0.4 mg capsule Take 0.4 mg by mouth nightly. furosemide (LASIX) 20 mg tablet Take 1 Tab by mouth daily. No current facility-administered medications for this visit. Wt Readings from Last 3 Encounters:   02/28/23 165 lb 9.6 oz (75.1 kg)   02/13/23 156 lb (70.8 kg)   01/16/23 160 lb (72.6 kg)     BP Readings from Last 3 Encounters:   02/28/23 125/80   02/13/23 132/82   01/16/23 138/85     Pulse Readings from Last 3 Encounters:   02/28/23 70   02/13/23 79   01/16/23 65     Lab Results   Component Value Date/Time    WBC 5.7 10/07/2022 01:25 PM    HGB 14.1 10/07/2022 01:25 PM    HCT 42.2 10/07/2022 01:25 PM    PLATELET 889 91/33/0799 01:25 PM    MCV 97.5 10/07/2022 01:25 PM     Lab Results   Component Value Date/Time    Sodium 141 10/07/2022 01:25 PM    Potassium 4.0 10/07/2022 01:25 PM    Chloride 109 (H) 10/07/2022 01:25 PM    CO2 25 10/07/2022 01:25 PM    Anion gap 7 10/07/2022 01:25 PM    Glucose 112 (H) 10/07/2022 01:25 PM    BUN 23 (H) 10/07/2022 01:25 PM    Creatinine 1.30 10/07/2022 01:25 PM    BUN/Creatinine ratio 18 10/07/2022 01:25 PM    GFR est AA >60 07/28/2022 02:49 PM    GFR est non-AA >60 07/28/2022 02:49 PM    Calcium 8.8 10/07/2022 01:25 PM    Bilirubin, total 0.7 05/13/2021 12:25 PM    Alk. phosphatase 122 (H) 05/13/2021 12:25 PM    Protein, total 6.6 05/13/2021 12:25 PM    Albumin 3.9 05/13/2021 12:25 PM    Globulin 2.7 05/13/2021 12:25 PM    A-G Ratio 1.4 05/13/2021 12:25 PM    ALT (SGPT) 41 05/13/2021 12:25 PM     Estimated Creatinine Clearance: 36 mL/min (by C-G formula based on SCr of 1.3 mg/dL).       INR History:   (normal INR range 0.8-1.2)     Date   INR   PT   Dose/Comments  02/28/23 2.7  32.5 Hold x 2, then 2.5 mg daily  02/13/23          3.6                   43.0     5 mg x 1, then 2.5 mg daily (+) dietary change   01/16/23          2.0                   23.2    2.5 mg daily  12/19/22          2.1                  24.5     2.5 mg daily  12/05/22          2.5 29.9    Hold x 2, 1.25 mg x 1, then 2.5 mg daily  11/28/22          5.1                   60.6     2.5 mg daily; (+) COVID 19  11/04/22          2.6                    30.7     2.5 mg daily   10/28/22          2.8                    33.1     2.5 mg daily; (+) LMWH; surgery   09/13/22          2.3                    26.8    Hold x 1, the 2.5 mg daily  08/23/22          3.8                   45.5     2.5 mg daily   07/19/22          2.4                   28.5     5 mg x 1, then 2.5 mg daily   06/21/22          2.0                   23.6     2.5 mg daily ; (+) missed dose   06/01/22          2.3                   27.2     5 mg x 2, then 2.5 mg daily  05/23/22          1.4                   16.7     5 mg x 1, then 2.5 mg daily  04/25/22          1.8                   21.7     Hold x 1, then 2.5 mg daily  Lapse in INR clinic    A/P:       Anticoagulation:  Considering Mr. Yannick Hoff past history, todays findings, and per Anticoagulation Collaborative Practice Agreement/Protocol:    Fingerstick POC INR (2.7) is Therapeutic for INR goal today. Continue warfarin 2.5 mg daily. Patient was instructed to schedule an appointment in 4 week(s) prior to leaving the clinic. Medication reconciliation was completed during the visit. There are no discontinued medications. A full discussion of the nature of anticoagulants has been carried out. A full discussion of the need for frequent and regular monitoring, precise dosage adjustment and compliance was stressed. Side effects of potential bleeding were discussed and Mr. Amber Sam was instructed to call [phone] if there are any signs of abnormal bleeding. Mr. Amber Sam was instructed to avoid any OTC items containing aspirin or ibuprofen and prior to starting any new OTC products to consult with his physician or pharmacist to ensure no drug interactions are present.   Mr. Amber Sam was instructed to avoid any major changes in his general diet and to avoid alcohol consumption. Mr. Kiki Priest was provided information in the AVS that includes topics on understanding coumadin therapy, drug interaction considerations, vitamin K and coumadin use, interactions with foods and supplements containing vitamin K, and the use of herbal products. Mr. Kiki Priest verbalized his understanding of all instructions and will call the office with any questions, concerns, or signs of abnormal bleeding or blood clot. Notifications of recommendations will be sent to Dr. Evelyn Nolen MD for review.     Thank you,  Blanca Gaviria PharmD, BCACP, [address] [place] [address]    Program: Medical Group  CPA in place: Yes  Recommendation Provided To: Patient/Caregiver: 2 via In person  Intervention Detail: Lab(s) Ordered and Scheduled Appointment  Intervention Accepted By: Patient/Caregiver: 2  Time Spent (min): 20

## 2023-02-28 ENCOUNTER — ANTI-COAG VISIT (OUTPATIENT)
Dept: INTERNAL MEDICINE CLINIC | Age: 88
End: 2023-02-28
Payer: MEDICARE

## 2023-02-28 VITALS
BODY MASS INDEX: 29.81 KG/M2 | DIASTOLIC BLOOD PRESSURE: 80 MMHG | WEIGHT: 165.6 LBS | SYSTOLIC BLOOD PRESSURE: 125 MMHG | HEART RATE: 70 BPM

## 2023-02-28 DIAGNOSIS — I48.20 CHRONIC ATRIAL FIBRILLATION (HCC): ICD-10-CM

## 2023-02-28 DIAGNOSIS — Z95.2 S/P AVR: Primary | ICD-10-CM

## 2023-02-28 LAB
INR BLD: 2.7
PT POC: 32.5 SECONDS
VALID INTERNAL CONTROL?: YES

## 2023-02-28 PROCEDURE — 85610 PROTHROMBIN TIME: CPT | Performed by: PHARMACIST

## 2023-02-28 PROCEDURE — G0463 HOSPITAL OUTPT CLINIC VISIT: HCPCS | Performed by: PHARMACIST

## 2023-02-28 PROCEDURE — 36416 COLLJ CAPILLARY BLOOD SPEC: CPT | Performed by: PHARMACIST

## 2023-02-28 NOTE — PROGRESS NOTES
Pharmacy Progress Note - Anticoagulation Management    S/O: Mr. Rocky Costello  is a 80 y.o. male, referred by Dr. Matthew Carrion MD, was seen today for anticoagulation management for the diagnosis of Atrial Fibrillation and Aortic Valve Replacement  ([id number] [facility]), s/p pacemaker. Pt is very hard of hearing. Warfarin start date: ~1996  INR Goal:  2.0-3.0    Current warfarin regimen:    Hold x 2 days, then 2.5 mg daily  Warfarin tablet strength:   1 mg, 2.5 mg, 5 mg; generally takes 2.5 mg tab. Duration of therapy: indefinite  Takes warfarin in the evening    Today's pertinent positives includes:  Blood in stools - One instance 4 days ago. - Patient states this was due to straining and has had no other issues since   - No other reported bleeding    Results for orders placed or performed in visit on 02/28/23   AMB POC PT/INR   Result Value Ref Range    VALID INTERNAL CONTROL POC Yes     Prothrombin time (POC) 32.5 seconds    INR POC 2.7      Adherence:   Able to recall regimen? NO  Miss/extra dose? NO  Need refill?  NO    Upcoming visit(s)/procedure(s):  YES  - Seeing the ophthalmologist for glasses later today 2/28/23    Past Medical History:   Diagnosis Date    Acute on chronic systolic heart failure, NYHA class 4 ([facility] [address] 03/01/2019    admit March 2019 7 days, EF 10% got Primacor, BIV implant    Anger     TAKES PAROXETINE TO CONTROL ANGER ISSUES    Arthritis     Burning with urination     CAD (coronary artery disease) 1996    CABG 1996, mukul celis 2015 fixed inferior defect    Cancer [facility])     prostate    Chronic atrial fibrillation ([facility] [address] 03/2019    RVR3/2019, had ablation of flutter- persistent    Chronic kidney disease     Chronic pain     bilat knees    Coagulation disorder (HCC)     Foot drop, left     WEARS METAL BRACE    Gout     Hard of hearing     Heart failure (HCC)     High cholesterol     Hypertension     Long term current use of anticoagulant therapy     Prostate enlargement     Sleep apnea Thyroid disease     Unspecified sleep apnea     DOESN'T USE CPAP; \"IT MADE HIM SICK-COLDS, SINUS\", PER WIFE     No Known Allergies  Current Outpatient Medications   Medication Sig    atorvastatin (LIPITOR) 40 mg tablet TAKE 1 TABLET BY MOUTH NIGHTLY TO LOWER CHOLESTEROL    warfarin (COUMADIN) 2.5 mg tablet TAKE 1 TABLET EVERY DAY - NEEDS INR CHECK    clobetasoL (TEMOVATE) 0.05 % topical cream Apply  to affected area two (2) times a day. nystatin (MYCOSTATIN) topical cream Apply  to affected area two (2) times a day. ascorbic acid, vitamin C, (VITAMIN C) 1,000 mg tablet Take 1,000 mg by mouth daily. psyllium husk (METAMUCIL PO) Take  by mouth daily. multivit-min/FA/lycopen/lutein (CENTRUM SILVER MEN PO) Take 1 Tablet by mouth daily. allopurinoL (ZYLOPRIM) 300 mg tablet TAKE 1 TABLET EVERY DAY TO PREVENT GOUT    levothyroxine (SYNTHROID) 150 mcg tablet TAKE 1 TABLET EVERY MORNING FOR THYROID    PARoxetine (PAXIL) 20 mg tablet TAKE 1 TABLET EVERY DAY    carvediloL (COREG) 12.5 mg tablet Take 1 Tablet by mouth two (2) times daily (with meals). sacubitriL-valsartan (Entresto) 24-26 mg tablet Take 1 Tablet by mouth every twelve (12) hours. (Patient taking differently: Take 1 Tablet by mouth two (2) times a day.)    oxybutynin chloride XL (DITROPAN XL) 10 mg CR tablet Take 10 mg by mouth daily. omega 3-dha-epa-fish oil 100-160-1,000 mg cap Take 1 Cap by mouth daily. b complex vitamins tablet Take 1 Tab by mouth daily. CYANOCOBALAMIN, VITAMIN B-12, PO Take 1 Tab by mouth daily. Sciatica    aspirin delayed-release 81 mg tablet Take 81 mg by mouth daily. B.animalis,bifid,infantis,long 10-15 mg TbEC Take 1 Tab by mouth daily. COQ10, UBIQUINOL, PO Take 1 Tab by mouth daily. finasteride (PROSCAR) 5 mg tablet Take 5 mg by mouth daily. tamsulosin (FLOMAX) 0.4 mg capsule Take 0.4 mg by mouth nightly. furosemide (LASIX) 20 mg tablet Take 1 Tab by mouth daily.      No current facility-administered medications for this visit. Wt Readings from Last 3 Encounters:   02/28/23 165 lb 9.6 oz (75.1 kg)   02/13/23 156 lb (70.8 kg)   01/16/23 160 lb (72.6 kg)     BP Readings from Last 3 Encounters:   02/28/23 125/80   02/13/23 132/82   01/16/23 138/85     Pulse Readings from Last 3 Encounters:   02/28/23 70   02/13/23 79   01/16/23 65     Lab Results   Component Value Date/Time    WBC 5.7 10/07/2022 01:25 PM    HGB 14.1 10/07/2022 01:25 PM    HCT 42.2 10/07/2022 01:25 PM    PLATELET 891 56/28/1611 01:25 PM    MCV 97.5 10/07/2022 01:25 PM     Lab Results   Component Value Date/Time    Sodium 141 10/07/2022 01:25 PM    Potassium 4.0 10/07/2022 01:25 PM    Chloride 109 (H) 10/07/2022 01:25 PM    CO2 25 10/07/2022 01:25 PM    Anion gap 7 10/07/2022 01:25 PM    Glucose 112 (H) 10/07/2022 01:25 PM    BUN 23 (H) 10/07/2022 01:25 PM    Creatinine 1.30 10/07/2022 01:25 PM    BUN/Creatinine ratio 18 10/07/2022 01:25 PM    GFR est AA >60 07/28/2022 02:49 PM    GFR est non-AA >60 07/28/2022 02:49 PM    Calcium 8.8 10/07/2022 01:25 PM    Bilirubin, total 0.7 05/13/2021 12:25 PM    Alk. phosphatase 122 (H) 05/13/2021 12:25 PM    Protein, total 6.6 05/13/2021 12:25 PM    Albumin 3.9 05/13/2021 12:25 PM    Globulin 2.7 05/13/2021 12:25 PM    A-G Ratio 1.4 05/13/2021 12:25 PM    ALT (SGPT) 41 05/13/2021 12:25 PM     Estimated Creatinine Clearance: 36 mL/min (by C-G formula based on SCr of 1.3 mg/dL).       INR History:   (normal INR range 0.8-1.2)     Date   INR   PT   Dose/Comments  02/28/23          2.7                    32.5     Hold x 2, then 2.5 mg daily  02/13/23          3.6                    43.0     5 mg x 1, then 2.5 mg daily  01/16/23          2.0                   23.2     2.5 mg daily  12/19/22          2.1                       24.5     2.5 mg daily  12/05/22          2.5                    29.9    Hold x 2, 1.25 mg x 1, then 2.5 mg daily  11/28/22          5.1                   60.6     2.5 mg daily; (+) COVID 19  11/04/22          2.6                    30.7     2.5 mg daily   10/28/22          2.8                    33.1     2.5 mg daily; (+) LMWH; surgery   09/13/22          2.3                    26.8    Hold x 1, the 2.5 mg daily  08/23/22          3.8                   45.5     2.5 mg daily   07/19/22          2.4                   28.5     5 mg x 1, then 2.5 mg daily   06/21/22          2.0                   23.6     2.5 mg daily ; (+) missed dose   06/01/22          2.3                   27.2     5 mg x 2, then 2.5 mg daily  05/23/22          1.4                   16.7     5 mg x 1, then 2.5 mg daily  04/25/22          1.8                   21.7     Hold x 1, then 2.5 mg daily  Lapse in INR clinic    A/P:       Anticoagulation:  Considering Mr. Linnea Key past history, todays findings, and per Anticoagulation Collaborative Practice Agreement/Protocol:    Fingerstick POC INR (2.7) is therapeutic for INR goal today. Continue warfarin 2.5 mg daily    Check: Vitals and Weight at the next visit. Patient was instructed to schedule an appointment in 4 week(s) prior to leaving the clinic. Medication reconciliation was completed during the visit. There are no discontinued medications. A full discussion of the nature of anticoagulants has been carried out. A full discussion of the need for frequent and regular monitoring, precise dosage adjustment and compliance was stressed. Side effects of potential bleeding were discussed and Mr. Patria Sarah was instructed to call [phone] if there are any signs of abnormal bleeding. Mr. Patria Sarah was instructed to avoid any OTC items containing aspirin or ibuprofen and prior to starting any new OTC products to consult with his physician or pharmacist to ensure no drug interactions are present. Mr. Patria Sarah was instructed to avoid any major changes in his general diet and to avoid alcohol consumption.     Mr. Patria Sarah was provided information in the AVS that includes topics on understanding coumadin therapy, drug interaction considerations, vitamin K and coumadin use, interactions with foods and supplements containing vitamin K, and the use of herbal products. . Terrell Milligan verbalized his understanding of all instructions and will call the office with any questions, concerns, or signs of abnormal bleeding or blood clot. Notifications of recommendations will be sent to Dr. Tommy Leyva MD for review.     Thank you,  SAM CerdaD

## 2023-03-07 NOTE — PROGRESS NOTES
C/ [employer]OTE   Scheduled remote transmission. Device functioning appropriately as programmed. See scanned documents.  Chargeable visit

## 2023-03-26 NOTE — PROGRESS NOTES
Pharmacy Progress Note - Anticoagulation Management    S/O: Mr. Liat Joseph  is a 80 y.o. male, referred by Dr. Francheska Camarnea MD, was seen today for anticoagulation management for the diagnosis of Atrial Fibrillation and Aortic Valve Replacement  ([id number] [facility]), s/p pacemaker. Pt is very hard of hearing. Warfarin start date: ~1996  INR Goal:  2.0-3.0    Current warfarin regimen:     2.5 mg daily  Warfarin tablet strength:   1 mg, 2.5 mg, 5 mg  -- wife states she does not want to split tablets in half ; generally takes 2.5 mg tab. Able to recognize tablet strength  Duration of therapy: indefinite  Takes warfarin in the evening    Today's pertinent positives includes:  No significant changes since last visit    Results for orders placed or performed in visit on 03/28/23   AMB POC PT/INR   Result Value Ref Range    VALID INTERNAL CONTROL POC Yes     Prothrombin time (POC) 25.9 (A) 9.1 - 12 seconds    INR POC 2.2 (A) 1 - 1.5     Adherence:   Able to recall regimen? YES  Miss/extra dose? NO  Need refill?  NO    Upcoming procedure(s):  NO      Past Medical History:   Diagnosis Date    Acute on chronic systolic heart failure, NYHA class 4 ([facility] [address] 03/01/2019    admit March 2019 7 days, EF 10% got Primacor, BIV implant    Anger     TAKES PAROXETINE TO CONTROL ANGER ISSUES    Arthritis     Burning with urination     CAD (coronary artery disease) 1996    CABG 1996, mukul celis 2015 fixed inferior defect    Cancer [facility])     prostate    Chronic atrial fibrillation ([facility] [address] 03/2019    RVR3/2019, had ablation of flutter- persistent    Chronic kidney disease     Chronic pain     bilat knees    Coagulation disorder (HCC)     Foot drop, left     WEARS METAL BRACE    Gout     Hard of hearing     Heart failure (HCC)     High cholesterol     Hypertension     Long term current use of anticoagulant therapy     Prostate enlargement     Sleep apnea     Thyroid disease     Unspecified sleep apnea     DOESN'T USE CPAP; \"IT MADE HIM SICK-COLDS, SINUS\", PER WIFE     No Known Allergies  Current Outpatient Medications   Medication Sig    atorvastatin (LIPITOR) 40 mg tablet TAKE 1 TABLET BY MOUTH NIGHTLY TO LOWER CHOLESTEROL    warfarin (COUMADIN) 2.5 mg tablet TAKE 1 TABLET EVERY DAY - NEEDS INR CHECK    clobetasoL (TEMOVATE) 0.05 % topical cream Apply  to affected area two (2) times a day. nystatin (MYCOSTATIN) topical cream Apply  to affected area two (2) times a day. ascorbic acid, vitamin C, (VITAMIN C) 1,000 mg tablet Take 1,000 mg by mouth daily. psyllium husk (METAMUCIL PO) Take  by mouth daily. multivit-min/FA/lycopen/lutein (CENTRUM SILVER MEN PO) Take 1 Tablet by mouth daily. allopurinoL (ZYLOPRIM) 300 mg tablet TAKE 1 TABLET EVERY DAY TO PREVENT GOUT    levothyroxine (SYNTHROID) 150 mcg tablet TAKE 1 TABLET EVERY MORNING FOR THYROID    PARoxetine (PAXIL) 20 mg tablet TAKE 1 TABLET EVERY DAY    carvediloL (COREG) 12.5 mg tablet Take 1 Tablet by mouth two (2) times daily (with meals). sacubitriL-valsartan (Entresto) 24-26 mg tablet Take 1 Tablet by mouth every twelve (12) hours. (Patient taking differently: Take 1 Tablet by mouth two (2) times a day.)    oxybutynin chloride XL (DITROPAN XL) 10 mg CR tablet Take 10 mg by mouth daily. omega 3-dha-epa-fish oil 100-160-1,000 mg cap Take 1 Cap by mouth daily. b complex vitamins tablet Take 1 Tab by mouth daily. CYANOCOBALAMIN, VITAMIN B-12, PO Take 1 Tab by mouth daily. Sciatica    aspirin delayed-release 81 mg tablet Take 81 mg by mouth daily. B.animalis,bifid,infantis,long 10-15 mg TbEC Take 1 Tab by mouth daily. COQ10, UBIQUINOL, PO Take 1 Tab by mouth daily. finasteride (PROSCAR) 5 mg tablet Take 5 mg by mouth daily. tamsulosin (FLOMAX) 0.4 mg capsule Take 0.4 mg by mouth nightly. furosemide (LASIX) 20 mg tablet Take 1 Tab by mouth daily. No current facility-administered medications for this visit.      Wt Readings from Last 3 Encounters: 03/28/23 165 lb (74.8 kg)   02/28/23 165 lb 9.6 oz (75.1 kg)   02/13/23 156 lb (70.8 kg)     BP Readings from Last 3 Encounters:   03/28/23 128/84   02/28/23 125/80   02/13/23 132/82     Pulse Readings from Last 3 Encounters:   03/28/23 80   02/28/23 70   02/13/23 79     Lab Results   Component Value Date/Time    WBC 5.7 10/07/2022 01:25 PM    HGB 14.1 10/07/2022 01:25 PM    HCT 42.2 10/07/2022 01:25 PM    PLATELET 474 39/53/1368 01:25 PM    MCV 97.5 10/07/2022 01:25 PM     Lab Results   Component Value Date/Time    Sodium 141 10/07/2022 01:25 PM    Potassium 4.0 10/07/2022 01:25 PM    Chloride 109 (H) 10/07/2022 01:25 PM    CO2 25 10/07/2022 01:25 PM    Anion gap 7 10/07/2022 01:25 PM    Glucose 112 (H) 10/07/2022 01:25 PM    BUN 23 (H) 10/07/2022 01:25 PM    Creatinine 1.30 10/07/2022 01:25 PM    BUN/Creatinine ratio 18 10/07/2022 01:25 PM    GFR est AA >60 07/28/2022 02:49 PM    GFR est non-AA >60 07/28/2022 02:49 PM    Calcium 8.8 10/07/2022 01:25 PM    Bilirubin, total 0.7 05/13/2021 12:25 PM    Alk. phosphatase 122 (H) 05/13/2021 12:25 PM    Protein, total 6.6 05/13/2021 12:25 PM    Albumin 3.9 05/13/2021 12:25 PM    Globulin 2.7 05/13/2021 12:25 PM    A-G Ratio 1.4 05/13/2021 12:25 PM    ALT (SGPT) 41 05/13/2021 12:25 PM     Estimated Creatinine Clearance: 35.2 mL/min (by C-G formula based on SCr of 1.3 mg/dL).       INR History:   (normal INR range 0.8-1.2)     Date   INR   PT   Dose/Comments  03/28/23 2.2  25.9 2.5 mg daily   02/28/23          2.7                   32.5     Hold x 2, then 2.5 mg daily  02/13/23          3.6                   43.0     5 mg x 1, then 2.5 mg daily (+) dietary change   01/16/23          2.0                   23.2    2.5 mg daily  12/19/22          2.1                  24.5     2.5 mg daily  12/05/22          2.5                    29.9    Hold x 2, 1.25 mg x 1, then 2.5 mg daily  11/28/22          5.1                   60.6     2.5 mg daily; (+) COVID 19  11/04/22          2.6 30.7     2.5 mg daily   10/28/22          2.8                    33.1     2.5 mg daily; (+) LMWH; surgery   09/13/22          2.3                    26.8    Hold x 1, the 2.5 mg daily  08/23/22          3.8                   45.5     2.5 mg daily   07/19/22          2.4                   28.5     5 mg x 1, then 2.5 mg daily   06/21/22          2.0                   23.6     2.5 mg daily ; (+) missed dose   06/01/22          2.3                   27.2     5 mg x 2, then 2.5 mg daily  05/23/22          1.4                   16.7     5 mg x 1, then 2.5 mg daily  04/25/22          1.8                   21.7     Hold x 1, then 2.5 mg daily    A/P:       Anticoagulation:  Considering Mr. Janae Loredo past history, todays findings, and per Anticoagulation Collaborative Practice Agreement/Protocol:    Fingerstick POC INR (2.2) is Therapeutic for INR goal today. Continue warfarin 2.5 mg daily. Patient was instructed to schedule an appointment in 4 week(s) prior to leaving the clinic. Same day as upcoming PCP appt. Medication reconciliation was completed during the visit. There are no discontinued medications. A full discussion of the nature of anticoagulants has been carried out. A full discussion of the need for frequent and regular monitoring, precise dosage adjustment and compliance was stressed. Side effects of potential bleeding were discussed and Mr. Carlos Pierre was instructed to call [phone] if there are any signs of abnormal bleeding. Mr. Carlos Pierre was instructed to avoid any OTC items containing aspirin or ibuprofen and prior to starting any new OTC products to consult with his physician or pharmacist to ensure no drug interactions are present. Mr. Carlos Pierre was instructed to avoid any major changes in his general diet and to avoid alcohol consumption.     Mr. Carlos Pierre was provided information in the AVS that includes topics on understanding coumadin therapy, drug interaction considerations, vitamin K and coumadin use, interactions with foods and supplements containing vitamin K, and the use of herbal products. Mr. Triston Blanchard verbalized his understanding of all instructions and will call the office with any questions, concerns, or signs of abnormal bleeding or blood clot. Notifications of recommendations will be sent to Dr. Suhas Marmolejo MD for review.     Thank you,  Blanca Almonte, PharmD, BCACP, [address] [place] [address]    Program: Medical Group  CPA in place: Yes  Recommendation Provided To: Patient/Caregiver: 2 via In person  Intervention Detail: Lab(s) Ordered and Scheduled Appointment  Intervention Accepted By: Patient/Caregiver: 2  Time Spent (min): 15

## 2023-03-26 NOTE — PATIENT INSTRUCTIONS
Today your INR was 2.2. Your goal INR is  2.0-3.0 . You have a  2.5 mg tablet of Coumadin (warfarin). Take Coumadin (warfarin) as follows:    Continue warfarin 2.5 mg daily. Come back in  4week(s) for your next finger stick/INR blood test.   Same day as your upcoming appointment with Dr Hu Gonzales     Avoid any over the counter items containing aspirin or ibuprofen, and avoid great swings in general diet. Avoid alcohol consumption. Please notify the INR nurse if you are started on any new medication including over the counter or herbal supplements. Also, please notify your INR nurse if any of your other prescription or over the counter medications have been discontinued. Call [facility] at [phone] if you have any signs of abnormal bleeding/blood clot.  ------------------------------------------------------------------------------------------------------------------  Taking Warfarin Safely: Care Instructions    Your Care Instructions  Warfarin is a medicine that you take to prevent blood clots. It is often called a blood thinner. Doctors give warfarin (such as Coumadin) to reduce the risk of blood clots. You may be at risk for blood clots if you have atrial fibrillation or deep vein thrombosis. Some other health problems may also put you at risk. Warfarin slows the amount of time it takes for your blood to clot. It can cause bleeding problems. Even if you've been taking warfarin for a while, it's important to know how to take it safely. Foods and other medicines can affect the way warfarin works. Some can make warfarin work too well. This can cause bleeding problems. And some can make it work poorly, so that it does not prevent blood clots very well. You will need regular blood tests to check how long it takes for your blood to form a clot. This test is called a PT or prothrombin time test. The result of the test is called an INR level.  Depending on the test results, your doctor or anticoagulation clinic may adjust your dose of warfarin. Follow-up care is a key part of your treatment and safety. Be sure to make and go to all appointments, and call your doctor if you are having problems. It's also a good idea to know your test results and keep a list of the medicines you take. How can you care for yourself at home? Take warfarin safely  Take your warfarin at the same time each day. If you miss a dose of warfarin, don't take an extra dose to make up for it. Your doctor can tell you exactly what to do so you don't take too much or too little. Wear medical alert jewelry that lets others know that you take warfarin. You can buy this at most drugstores. Don't take warfarin if you are pregnant or planning to get pregnant. Talk to your doctor about how you can prevent getting pregnant while you are taking it. Don't change your dose or stop taking warfarin unless your doctor tells you to. Effects of medicines and food on warfarin  Don't start or stop taking any medicines, vitamins, or natural remedies unless you first talk to your doctor. Many medicines can affect how warfarin works. These include aspirin and other pain relievers, over-the-counter medicines, multivitamins, dietary supplements, and herbal products. Tell all of your doctors and pharmacists that you take warfarin. Some prescription medicines can affect how warfarin works. Keep the amount of vitamin K in your diet about the same from day to day. Do not suddenly eat a lot more or a lot less food that is rich in vitamin K than you usually do. Vitamin K affects how warfarin works and how your blood clots. Talk with your doctor before making big changes in your diet. Vitamin K is in many foods, such as:  Leafy greens, such as kale, cabbage, spinach, Swiss chard, and lettuce. Canola and soybean oils. Green vegetables, such as asparagus, broccoli, and [place] sprouts.   Vegetable drinks, green tea leaves, and some dietary supplement drinks. Avoid cranberry juice and other cranberry products. They can increase the effects of warfarin. Limit your use of alcohol. Avoid bleeding by preventing falls and injuries  Wear slippers or shoes with nonskid soles. Remove throw rugs and clutter. Rearrange furniture and electrical cords to keep them out of walking paths. Keep stairways, porches, and outside walkways well lit. Use night-lights in hallways and bathrooms. Be extra careful when you work with sharp tools or knives. When should you call for help? Call 911 anytime you think you may need emergency care. For example, call if:  You have a sudden, severe headache that is different from past headaches. Call your doctor now or seek immediate medical care if:  You have any abnormal bleeding, such as:  Nosebleeds. Vaginal bleeding that is different (heavier, more frequent, at a different time of the month) than what you are used to. Bloody or black stools, or rectal bleeding. Bloody or pink urine. Watch closely for changes in your health, and be sure to contact your doctor if you have any problems. Where can you learn more? Go to http://www.gray.com/. Enter Z175 in the search box to learn more about \"Taking Warfarin Safely: Care Instructions. \"  Current as of: January 27, 2016  Content Version: 11.1  © 7414-8847 [employer]. Care instructions adapted under license by [employer] (which disclaims liability or warranty for this information). If you have questions about a medical condition or this instruction, always ask your healthcare professional. [place] [address] any warranty or liability for your use of this information.

## 2023-03-28 ENCOUNTER — ANTI-COAG VISIT (OUTPATIENT)
Dept: INTERNAL MEDICINE CLINIC | Age: 88
End: 2023-03-28
Payer: MEDICARE

## 2023-03-28 VITALS
DIASTOLIC BLOOD PRESSURE: 84 MMHG | OXYGEN SATURATION: 99 % | SYSTOLIC BLOOD PRESSURE: 128 MMHG | WEIGHT: 165 LBS | BODY MASS INDEX: 29.23 KG/M2 | HEIGHT: 63 IN | HEART RATE: 80 BPM

## 2023-03-28 DIAGNOSIS — Z95.2 S/P AVR: ICD-10-CM

## 2023-03-28 DIAGNOSIS — I48.20 CHRONIC ATRIAL FIBRILLATION (HCC): Primary | ICD-10-CM

## 2023-03-28 LAB
INR BLD: 2.2 (ref 1–1.5)
PT POC: 25.9 SECONDS (ref 9.1–12)
VALID INTERNAL CONTROL?: YES

## 2023-03-28 PROCEDURE — G0463 HOSPITAL OUTPT CLINIC VISIT: HCPCS | Performed by: PHARMACIST

## 2023-03-28 PROCEDURE — 36416 COLLJ CAPILLARY BLOOD SPEC: CPT | Performed by: PHARMACIST

## 2023-03-28 PROCEDURE — 85610 PROTHROMBIN TIME: CPT | Performed by: PHARMACIST

## 2023-04-24 ENCOUNTER — OFFICE VISIT (OUTPATIENT)
Dept: INTERNAL MEDICINE CLINIC | Age: 88
End: 2023-04-24
Payer: MEDICARE

## 2023-04-24 ENCOUNTER — ANTI-COAG VISIT (OUTPATIENT)
Dept: INTERNAL MEDICINE CLINIC | Age: 88
End: 2023-04-24
Payer: MEDICARE

## 2023-04-24 ENCOUNTER — OFFICE VISIT (OUTPATIENT)
Dept: CARDIOLOGY CLINIC | Age: 88
End: 2023-04-24

## 2023-04-24 VITALS
SYSTOLIC BLOOD PRESSURE: 140 MMHG | WEIGHT: 167 LBS | HEIGHT: 63 IN | DIASTOLIC BLOOD PRESSURE: 80 MMHG | BODY MASS INDEX: 29.59 KG/M2 | HEART RATE: 70 BPM

## 2023-04-24 VITALS
DIASTOLIC BLOOD PRESSURE: 70 MMHG | HEIGHT: 63 IN | SYSTOLIC BLOOD PRESSURE: 124 MMHG | BODY MASS INDEX: 29.7 KG/M2 | WEIGHT: 167.6 LBS | TEMPERATURE: 97.3 F | HEART RATE: 70 BPM | OXYGEN SATURATION: 98 % | RESPIRATION RATE: 16 BRPM

## 2023-04-24 DIAGNOSIS — I48.20 CHRONIC ATRIAL FIBRILLATION (HCC): Primary | ICD-10-CM

## 2023-04-24 DIAGNOSIS — N18.30 STAGE 3 CHRONIC KIDNEY DISEASE, UNSPECIFIED WHETHER STAGE 3A OR 3B CKD (HCC): ICD-10-CM

## 2023-04-24 DIAGNOSIS — I48.20 CHRONIC ATRIAL FIBRILLATION (HCC): ICD-10-CM

## 2023-04-24 DIAGNOSIS — Z95.2 S/P MVR (MITRAL VALVE REPLACEMENT): ICD-10-CM

## 2023-04-24 DIAGNOSIS — F33.0 DEPRESSION, MAJOR, RECURRENT, MILD (HCC): ICD-10-CM

## 2023-04-24 DIAGNOSIS — I25.118 CORONARY ARTERY DISEASE OF NATIVE ARTERY OF NATIVE HEART WITH STABLE ANGINA PECTORIS (HCC): ICD-10-CM

## 2023-04-24 DIAGNOSIS — E78.5 HYPERLIPIDEMIA, UNSPECIFIED HYPERLIPIDEMIA TYPE: ICD-10-CM

## 2023-04-24 DIAGNOSIS — I50.22 CHRONIC SYSTOLIC CONGESTIVE HEART FAILURE (HCC): ICD-10-CM

## 2023-04-24 DIAGNOSIS — E03.9 ACQUIRED HYPOTHYROIDISM: ICD-10-CM

## 2023-04-24 DIAGNOSIS — F32.9 MAJOR DEPRESSIVE DISORDER, REMISSION STATUS UNSPECIFIED, UNSPECIFIED WHETHER RECURRENT: Primary | ICD-10-CM

## 2023-04-24 DIAGNOSIS — Z95.2 S/P AVR: ICD-10-CM

## 2023-04-24 DIAGNOSIS — I10 HYPERTENSION, UNSPECIFIED TYPE: ICD-10-CM

## 2023-04-24 DIAGNOSIS — Z95.810 PRESENCE OF CARDIOVERTER DEFIBRILLATOR: Primary | ICD-10-CM

## 2023-04-24 LAB
INR BLD: 2.1 (ref 1–1.5)
PT POC: 24.9 SECONDS (ref 9.1–12)
VALID INTERNAL CONTROL?: YES

## 2023-04-24 PROCEDURE — G9717 DOC PT DX DEP/BP F/U NT REQ: HCPCS | Performed by: INTERNAL MEDICINE

## 2023-04-24 PROCEDURE — G8536 NO DOC ELDER MAL SCRN: HCPCS | Performed by: INTERNAL MEDICINE

## 2023-04-24 PROCEDURE — 85610 PROTHROMBIN TIME: CPT | Performed by: PHARMACIST

## 2023-04-24 PROCEDURE — 36416 COLLJ CAPILLARY BLOOD SPEC: CPT | Performed by: PHARMACIST

## 2023-04-24 PROCEDURE — 1101F PT FALLS ASSESS-DOCD LE1/YR: CPT | Performed by: INTERNAL MEDICINE

## 2023-04-24 PROCEDURE — G0463 HOSPITAL OUTPT CLINIC VISIT: HCPCS | Performed by: PHARMACIST

## 2023-04-24 PROCEDURE — G8417 CALC BMI ABV UP PARAM F/U: HCPCS | Performed by: INTERNAL MEDICINE

## 2023-04-24 PROCEDURE — G8427 DOCREV CUR MEDS BY ELIG CLIN: HCPCS | Performed by: INTERNAL MEDICINE

## 2023-04-24 PROCEDURE — G0439 PPPS, SUBSEQ VISIT: HCPCS | Performed by: INTERNAL MEDICINE

## 2023-04-24 NOTE — PROGRESS NOTES
1. Have you been to the ER, urgent care clinic since your last visit? Hospitalized since your last visit? No    2. Have you seen or consulted any other health care providers outside of the [address] [place] [address] since your last visit? Include any pap smears or colon screening.

## 2023-04-24 NOTE — PATIENT INSTRUCTIONS
Medicare Wellness Visit, Male    The best way to live healthy is to have a lifestyle where you eat a well-balanced diet, exercise regularly, limit alcohol use, and quit all forms of tobacco/nicotine, if applicable. Regular preventive services are another way to keep healthy. Preventive services (vaccines, screening tests, monitoring & exams) can help personalize your care plan, which helps you manage your own care. Screening tests can find health problems at the earliest stages, when they are easiest to treat. [facility] follows the current, evidence-based guidelines published by the [facility] ([facility]) when recommending preventive services for our patients. Because we follow these guidelines, sometimes recommendations change over time as research supports it. (For example, a prostate screening blood test is no longer routinely recommended for men with no symptoms). Of course, you and your doctor may decide to screen more often for some diseases, based on your risk and co-morbidities (chronic disease you are already diagnosed with). Preventive services for you include:  - Medicare offers their members a free annual wellness visit, which is time for you and your primary care provider to discuss and plan for your preventive service needs.  Take advantage of this benefit every year!    -Over the age of 72 should receive the recommended pneumonia vaccines.   -All adults should have a flu vaccine yearly.  -All adults should receive a tetanus vaccine every 10 years.  -Over the age of 48 should receive the shingles vaccines.    -All adults should be screened once for Hepatitis C.  -All adults age 38-68 who are overweight should have a diabetes screening test once every three years.   -Other screening tests & preventive services for persons with diabetes include: an eye exam to screen for diabetic retinopathy, a kidney function test, a foot exam, and stricter control over your cholesterol.   -Cardiovascular screening for adults with routine risk involves an electrocardiogram (ECG) at intervals determined by the provider.     -Colorectal cancer screening should be done for adults age 43-69 with no increased risk factors for colorectal cancer. There are a number of acceptable methods of screening for this type of cancer. Each test has its own benefits and drawbacks. Discuss with your provider what is most appropriate for you during your annual wellness visit. The different tests include: colonoscopy (considered the best screening method), a fecal occult blood test, a fecal DNA test, and sigmoidoscopy.    -Lung cancer screening is recommended annually with a low dose CT scan for adults between age 54 and 68, who have smoked at least 30 pack years (equivalent of 1 pack per day for 30 days), and who is a current smoker or quit less than 15 years ago. -An Abdominal Aortic Aneurysm (AAA) Screening is recommended for men age 73-68 who has ever smoked in their lifetime.      Here is a list of your current Health Maintenance items (your personalized list of preventive services) with a due date:  Health Maintenance Due   Topic Date Due    DTaP/Tdap/Td  (1 - Tdap) Never done    Shingles Vaccine (1 of 2) Never done    COVID-19 Vaccine (3 - Booster for [employer] series) 10/22/2021

## 2023-04-25 LAB
ANION GAP SERPL CALC-SCNC: 4 MMOL/L (ref 5–15)
BUN SERPL-MCNC: 16 MG/DL (ref 6–20)
BUN/CREAT SERPL: 13 (ref 12–20)
CALCIUM SERPL-MCNC: 9 MG/DL (ref 8.5–10.1)
CHLORIDE SERPL-SCNC: 112 MMOL/L (ref 97–108)
CHOLEST SERPL-MCNC: 136 MG/DL
CO2 SERPL-SCNC: 25 MMOL/L (ref 21–32)
CREAT SERPL-MCNC: 1.19 MG/DL (ref 0.7–1.3)
GLUCOSE SERPL-MCNC: 105 MG/DL (ref 65–100)
HDLC SERPL-MCNC: 52 MG/DL
HDLC SERPL: 2.6 (ref 0–5)
LDLC SERPL CALC-MCNC: 69.6 MG/DL (ref 0–100)
POTASSIUM SERPL-SCNC: 4.3 MMOL/L (ref 3.5–5.1)
SODIUM SERPL-SCNC: 141 MMOL/L (ref 136–145)
TRIGL SERPL-MCNC: 72 MG/DL (ref ?–150)
TSH SERPL DL<=0.05 MIU/L-ACNC: 1.75 UIU/ML (ref 0.36–3.74)
VLDLC SERPL CALC-MCNC: 14.4 MG/DL

## 2023-05-22 ENCOUNTER — ANTI-COAG VISIT (OUTPATIENT)
Age: 88
End: 2023-05-22
Payer: MEDICARE

## 2023-05-22 VITALS
HEIGHT: 63 IN | HEART RATE: 70 BPM | DIASTOLIC BLOOD PRESSURE: 79 MMHG | WEIGHT: 163 LBS | OXYGEN SATURATION: 100 % | SYSTOLIC BLOOD PRESSURE: 130 MMHG | BODY MASS INDEX: 28.88 KG/M2

## 2023-05-22 DIAGNOSIS — I48.20 CHRONIC ATRIAL FIBRILLATION, UNSPECIFIED (HCC): Primary | ICD-10-CM

## 2023-05-22 LAB
POC INR: 2.5
PROTHROMBIN TIME, POC: 29.5

## 2023-05-22 PROCEDURE — 99211 OFF/OP EST MAY X REQ PHY/QHP: CPT | Performed by: PHARMACIST

## 2023-05-22 PROCEDURE — PBSHW AMB POC PT/INR: Performed by: INTERNAL MEDICINE

## 2023-05-22 PROCEDURE — 85610 PROTHROMBIN TIME: CPT | Performed by: PHARMACIST

## 2023-05-22 PROCEDURE — PBSHW PBB SHADOW CHARGE: Performed by: INTERNAL MEDICINE

## 2023-06-28 ENCOUNTER — ANTI-COAG VISIT (OUTPATIENT)
Age: 88
End: 2023-06-28
Payer: MEDICARE

## 2023-06-28 VITALS — WEIGHT: 168 LBS | HEIGHT: 63 IN | BODY MASS INDEX: 29.77 KG/M2

## 2023-06-28 DIAGNOSIS — I48.20 CHRONIC ATRIAL FIBRILLATION, UNSPECIFIED (HCC): Primary | ICD-10-CM

## 2023-06-28 LAB
POC INR: 2.1
PROTHROMBIN TIME, POC: 25.3

## 2023-06-28 PROCEDURE — PBSHW PBB SHADOW CHARGE: Performed by: PHARMACIST

## 2023-06-28 PROCEDURE — 85610 PROTHROMBIN TIME: CPT | Performed by: PHARMACIST

## 2023-06-28 PROCEDURE — PBSHW AMB POC PT/INR: Performed by: PHARMACIST

## 2023-06-28 PROCEDURE — 99211 OFF/OP EST MAY X REQ PHY/QHP: CPT | Performed by: PHARMACIST

## 2023-06-30 ENCOUNTER — TELEPHONE (OUTPATIENT)
Age: 88
End: 2023-06-30

## 2023-07-14 DIAGNOSIS — I50.22 CHRONIC SYSTOLIC (CONGESTIVE) HEART FAILURE (HCC): Primary | ICD-10-CM

## 2023-07-14 RX ORDER — LEVOTHYROXINE SODIUM 0.15 MG/1
TABLET ORAL
Qty: 30 TABLET | Refills: 9 | Status: SHIPPED | OUTPATIENT
Start: 2023-07-14

## 2023-07-14 RX ORDER — CARVEDILOL 6.25 MG/1
TABLET ORAL
Qty: 180 TABLET | Refills: 2 | OUTPATIENT
Start: 2023-07-14

## 2023-07-14 NOTE — TELEPHONE ENCOUNTER
Spoke to patient's wife, Zhane Cronin (on [employer]), two patient identifiers verified. Coreg refill requested but the dose was 6.25 mg and the dose we have on file is 12.5 mg. Spoke to verify correct dosage. She said she would call back and let us know what the dose is and file another appointment at that time as well.

## 2023-07-17 ENCOUNTER — TELEPHONE (OUTPATIENT)
Age: 88
End: 2023-07-17

## 2023-07-17 RX ORDER — SACUBITRIL AND VALSARTAN 24; 26 MG/1; MG/1
TABLET, FILM COATED ORAL
Qty: 180 TABLET | Refills: 0 | Status: SHIPPED | OUTPATIENT
Start: 2023-07-17

## 2023-07-17 RX ORDER — CARVEDILOL 12.5 MG/1
12.5 TABLET ORAL 2 TIMES DAILY WITH MEALS
Qty: 180 TABLET | Refills: 0 | Status: SHIPPED | OUTPATIENT
Start: 2023-07-17

## 2023-07-17 NOTE — TELEPHONE ENCOUNTER
Attempted to reach patient by telephone. A message was left for return call. Sent per VO by MD. Please scheduled Chantal Lay Clark for OV with NP when they return call. He is overdue for follow up.

## 2023-07-17 NOTE — TELEPHONE ENCOUNTER
Message left for patient to call back and schedule follow up with Dr Theodora Griffith in the next 3 months.

## 2023-08-08 NOTE — PATIENT INSTRUCTIONS
Today your INR was 2.1. Your goal INR is  2.0-3.0 . You have a 1 mg, 2.5 mg, and 5 mg tablet of Coumadin (warfarin). Take Coumadin (warfarin) as follows:    Continue warfarin 2.5 mg daily    Come back in 5  week(s) for your next finger stick/INR blood test.        Avoid any over the counter items containing aspirin or ibuprofen, and avoid great swings in general diet. Avoid alcohol consumption. Please notify the INR nurse if you are started on any new medication including over the counter or herbal supplements. Also, please notify your INR nurse if any of your other prescription or over the counter medications have been discontinued. Call [facility] at [phone] if you have any signs of abnormal bleeding/blood clot.  ------------------------------------------------------------------------------------------------------------------  Taking Warfarin Safely: Care Instructions    Your Care Instructions  Warfarin is a medicine that you take to prevent blood clots. It is often called a blood thinner. Doctors give warfarin (such as Coumadin) to reduce the risk of blood clots. You may be at risk for blood clots if you have atrial fibrillation or deep vein thrombosis. Some other health problems may also put you at risk. Warfarin slows the amount of time it takes for your blood to clot. It can cause bleeding problems. Even if you've been taking warfarin for a while, it's important to know how to take it safely. Foods and other medicines can affect the way warfarin works. Some can make warfarin work too well. This can cause bleeding problems. And some can make it work poorly, so that it does not prevent blood clots very well. You will need regular blood tests to check how long it takes for your blood to form a clot. This test is called a PT or prothrombin time test. The result of the test is called an INR level.  Depending on the test results, your doctor or anticoagulation clinic may adjust

## 2023-08-08 NOTE — PROGRESS NOTES
Pharmacy Progress Note - Anticoagulation Management    S/O: Mr. Olamide Figueroa  is a 80 y.o. male, referred by Dr. Cassidy Prajapati MD, was seen today for anticoagulation management for the diagnosis of Atrial Fibrillation and Aortic Valve Replacement  ([id number] [facility]), s/p pacemaker. Pt is very hard of hearing. Warfarin start date: ~1996  INR Goal:  2.0-3.0    Current warfarin regimen:     2.5 mg daily  Warfarin tablet strength:   1 mg, 2.5 mg, 5 mg  -- wife states she does not want to split tablets in half ; generally takes 2.5 mg tab. Able to recognize tablet strength  Duration of therapy: indefinite     Today's pertinent positives includes:  No significant changes since last visit    Reports he has a new cardiologist - does not remember the provider's name    Results for orders placed or performed in visit on 08/09/23   AMB POC PT/INR   Result Value Ref Range    Prothrombin time, POC 25.8     POC INR 2.1        Adherence:   Able to recall regimen? YES  Miss/extra dose? NO  Need refill?  NO    Upcoming procedure(s):  NO      Past Medical History:   Diagnosis Date    Acute on chronic systolic heart failure, NYHA class 4 ([address]) 03/01/2019    admit March 2019 7 days, EF 10% got Primacor, BIV implant    Anger     TAKES PAROXETINE TO CONTROL ANGER ISSUES    Arthritis     Burning with urination     CAD (coronary artery disease) 1996    CABG 1996, kendra roque 2015 fixed inferior defect    Cancer [facility])     prostate    Chronic atrial fibrillation ([address]) 03/2019    RVR3/2019, had ablation of flutter- persistent    Chronic kidney disease     Chronic pain     bilat knees    Coagulation disorder (HCC)     Foot drop, left     WEARS METAL BRACE    Gout     Hard of hearing     Heart failure (HCC)     High cholesterol     Hypertension     Long term current use of anticoagulant therapy     Prostate enlargement     Sleep apnea     Thyroid disease     Unspecified sleep apnea     DOESN'T USE CPAP; \"IT MADE HIM SICK-COLDS, SINUS\",

## 2023-08-09 ENCOUNTER — ANTI-COAG VISIT (OUTPATIENT)
Age: 88
End: 2023-08-09
Payer: MEDICARE

## 2023-08-09 VITALS
HEIGHT: 63 IN | BODY MASS INDEX: 28.81 KG/M2 | DIASTOLIC BLOOD PRESSURE: 81 MMHG | SYSTOLIC BLOOD PRESSURE: 125 MMHG | OXYGEN SATURATION: 97 % | HEART RATE: 73 BPM | WEIGHT: 162.6 LBS

## 2023-08-09 DIAGNOSIS — Z95.2 PRESENCE OF PROSTHETIC HEART VALVE: ICD-10-CM

## 2023-08-09 DIAGNOSIS — I48.20 CHRONIC ATRIAL FIBRILLATION, UNSPECIFIED (HCC): Primary | ICD-10-CM

## 2023-08-09 LAB
POC INR: 2.1
PROTHROMBIN TIME, POC: 25.8

## 2023-08-09 PROCEDURE — 85610 PROTHROMBIN TIME: CPT | Performed by: PHARMACIST

## 2023-08-09 PROCEDURE — 99211 OFF/OP EST MAY X REQ PHY/QHP: CPT | Performed by: PHARMACIST

## 2023-08-09 PROCEDURE — PBSHW AMB POC PT/INR: Performed by: PHARMACIST

## 2023-08-09 PROCEDURE — PBSHW PBB SHADOW CHARGE: Performed by: PHARMACIST

## 2023-09-10 NOTE — PROGRESS NOTES
2.7 05/13/2021 12:25 PM    ALT 41 05/13/2021 12:25 PM     Estimated Creatinine Clearance: 38 mL/min (based on SCr of 1.19 mg/dL). INR History:   (normal INR range 0.8-1.2)     Date   INR   PT   Dose/Comments  09/13/23 2.2  26.8 2.5 mg daily  08/09/23          2.1                   25.8     2.5 mg daily  06/28/23          2.1                   25.3      2.5 mg daily  05/22/23          2.5                   29.5     2.5 mg daily  04/24/23          2.1                   24.9     2.5 mg daily  03/28/23          2.2                   25.9     2.5 mg daily   02/28/23          2.7                   32.5     Hold x 2, then 2.5 mg daily  02/13/23          3.6                   43.0     5 mg x 1, then 2.5 mg daily (+) dietary change   01/16/23          2.0                   23.2    2.5 mg daily  12/19/22          2.1                  24.5     2.5 mg daily  12/05/22          2.5                    29.9    Hold x 2, 1.25 mg x 1, then 2.5 mg daily  11/28/22          5.1                   60.6     2.5 mg daily; (+) COVID 19  11/04/22          2.6                    30.7     2.5 mg daily   10/28/22          2.8                    33.1     2.5 mg daily; (+) LMWH; surgery   09/13/22          2.3                    26.8    Hold x 1, the 2.5 mg daily    A/P:       Anticoagulation:  Considering Mr. hCun's past history, todays findings, and per Anticoagulation Collaborative Practice Agreement/Protocol:    Fingerstick POC INR (2.2) remains Therapeutic for INR goal today. Continue warfarin 2.5 mg daily    Patient was instructed to schedule an appointment in 6 week(s) prior to leaving the clinic. Medication reconciliation was completed during the visit. There are no discontinued medications. Orders Placed This Encounter    COLLECTION CAPILLARY BLOOD SPECIMEN    AMB POC PT/INR        A full discussion of the nature of anticoagulants has been carried out.   A full discussion of the need for frequent and regular monitoring,

## 2023-09-10 NOTE — PATIENT INSTRUCTIONS
Today your INR was 2.2. Your goal INR is  2.0-3.0 . You have a 1 mg, 2.5 mg and 5 mg tablet of Coumadin (warfarin). Take Coumadin (warfarin) as follows:    Continue warfarin 2.5 mg daily    Come back in 6  week(s) for your next finger stick/INR blood test.        Avoid any over the counter items containing aspirin or ibuprofen, and avoid great swings in general diet. Avoid alcohol consumption. Please notify the INR nurse if you are started on any new medication including over the counter or herbal supplements. Also, please notify your INR nurse if any of your other prescription or over the counter medications have been discontinued. Call [facility] at [phone] if you have any signs of abnormal bleeding/blood clot.  ------------------------------------------------------------------------------------------------------------------  Taking Warfarin Safely: Care Instructions    Your Care Instructions  Warfarin is a medicine that you take to prevent blood clots. It is often called a blood thinner. Doctors give warfarin (such as Coumadin) to reduce the risk of blood clots. You may be at risk for blood clots if you have atrial fibrillation or deep vein thrombosis. Some other health problems may also put you at risk. Warfarin slows the amount of time it takes for your blood to clot. It can cause bleeding problems. Even if you've been taking warfarin for a while, it's important to know how to take it safely. Foods and other medicines can affect the way warfarin works. Some can make warfarin work too well. This can cause bleeding problems. And some can make it work poorly, so that it does not prevent blood clots very well. You will need regular blood tests to check how long it takes for your blood to form a clot. This test is called a PT or prothrombin time test. The result of the test is called an INR level.  Depending on the test results, your doctor or anticoagulation clinic may adjust

## 2023-09-13 ENCOUNTER — ANTI-COAG VISIT (OUTPATIENT)
Age: 88
End: 2023-09-13
Payer: MEDICARE

## 2023-09-13 VITALS
DIASTOLIC BLOOD PRESSURE: 79 MMHG | BODY MASS INDEX: 28.7 KG/M2 | HEART RATE: 71 BPM | OXYGEN SATURATION: 97 % | HEIGHT: 63 IN | WEIGHT: 162 LBS | SYSTOLIC BLOOD PRESSURE: 117 MMHG

## 2023-09-13 DIAGNOSIS — Z95.2 PRESENCE OF PROSTHETIC HEART VALVE: ICD-10-CM

## 2023-09-13 DIAGNOSIS — I48.20 CHRONIC ATRIAL FIBRILLATION, UNSPECIFIED (HCC): Primary | ICD-10-CM

## 2023-09-13 LAB
POC INR: 2.2
PROTHROMBIN TIME, POC: 26.8

## 2023-09-13 PROCEDURE — 99211 OFF/OP EST MAY X REQ PHY/QHP: CPT | Performed by: PHARMACIST

## 2023-09-13 PROCEDURE — 85610 PROTHROMBIN TIME: CPT | Performed by: PHARMACIST

## 2023-09-13 PROCEDURE — PBSHW AMB POC PT/INR: Performed by: PHARMACIST

## 2023-09-13 PROCEDURE — PBSHW PBB SHADOW CHARGE: Performed by: PHARMACIST

## 2023-09-28 RX ORDER — PAROXETINE HYDROCHLORIDE 20 MG/1
TABLET, FILM COATED ORAL
Qty: 30 TABLET | Refills: 5 | Status: SHIPPED | OUTPATIENT
Start: 2023-09-28

## 2023-10-18 NOTE — PROGRESS NOTES
tablet TAKE 1 TABLET BY MOUTH EVERY TWELVE (12) HOURS.    carvedilol (COREG) 12.5 MG tablet Take 1 tablet by mouth 2 times daily (with meals)    levothyroxine (SYNTHROID) 150 MCG tablet TAKE 1 TABLET EVERY MORNING FOR THYROID    allopurinol (ZYLOPRIM) 300 MG tablet TAKE 1 TABLET EVERY DAY TO PREVENT GOUT    ascorbic acid (VITAMIN C) 1000 MG tablet Take by mouth daily    aspirin 81 MG EC tablet Take by mouth daily    atorvastatin (LIPITOR) 40 MG tablet TAKE 1 TABLET BY MOUTH NIGHTLY TO LOWER CHOLESTEROL    clobetasol (TEMOVATE) 0.05 % cream Apply topically 2 times daily    finasteride (PROSCAR) 5 MG tablet Take by mouth daily    furosemide (LASIX) 20 MG tablet Take by mouth daily    nystatin (MYCOSTATIN) 943850 UNIT/GM cream Apply topically 2 times daily    oxybutynin (DITROPAN-XL) 10 MG extended release tablet Take by mouth daily    tamsulosin (FLOMAX) 0.4 MG capsule Take by mouth    warfarin (COUMADIN) 2.5 MG tablet TAKE 1 TABLET EVERY DAY - NEEDS INR CHECK     No current facility-administered medications for this visit.      Wt Readings from Last 3 Encounters:   10/24/23 74 kg (163 lb 3.2 oz)   09/13/23 73.5 kg (162 lb)   08/09/23 73.8 kg (162 lb 9.6 oz)     BP Readings from Last 3 Encounters:   10/24/23 130/80   09/13/23 117/79   08/09/23 125/81     Pulse Readings from Last 3 Encounters:   09/13/23 71   08/09/23 73   05/22/23 70     Lab Results   Component Value Date/Time    WBC 5.7 10/07/2022 01:25 PM    HGB 14.1 10/07/2022 01:25 PM    HCT 42.2 10/07/2022 01:25 PM     10/07/2022 01:25 PM    MCV 97.5 10/07/2022 01:25 PM     Lab Results   Component Value Date/Time     04/24/2023 04:02 PM    K 4.3 04/24/2023 04:02 PM     04/24/2023 04:02 PM    CO2 25 04/24/2023 04:02 PM    BUN 16 04/24/2023 04:02 PM    GFRAA >60 07/28/2022 02:49 PM    GLOB 2.7 05/13/2021 12:25 PM    ALT 41 05/13/2021 12:25 PM     CrCl cannot be calculated (Patient's most recent lab result is older than the maximum 180 days

## 2023-10-24 ENCOUNTER — ANTI-COAG VISIT (OUTPATIENT)
Age: 88
End: 2023-10-24
Payer: MEDICARE

## 2023-10-24 ENCOUNTER — OFFICE VISIT (OUTPATIENT)
Age: 88
End: 2023-10-24
Payer: MEDICARE

## 2023-10-24 VITALS
TEMPERATURE: 96.9 F | SYSTOLIC BLOOD PRESSURE: 130 MMHG | DIASTOLIC BLOOD PRESSURE: 80 MMHG | HEIGHT: 63 IN | BODY MASS INDEX: 28.92 KG/M2 | RESPIRATION RATE: 18 BRPM | WEIGHT: 163.2 LBS

## 2023-10-24 DIAGNOSIS — E78.5 HYPERLIPIDEMIA, UNSPECIFIED HYPERLIPIDEMIA TYPE: ICD-10-CM

## 2023-10-24 DIAGNOSIS — E03.9 HYPOTHYROIDISM, UNSPECIFIED TYPE: ICD-10-CM

## 2023-10-24 DIAGNOSIS — I10 HYPERTENSION, UNSPECIFIED TYPE: ICD-10-CM

## 2023-10-24 DIAGNOSIS — Z23 ENCOUNTER FOR IMMUNIZATION: ICD-10-CM

## 2023-10-24 DIAGNOSIS — Z95.2 PRESENCE OF PROSTHETIC HEART VALVE: ICD-10-CM

## 2023-10-24 DIAGNOSIS — I25.10 CORONARY ARTERY DISEASE INVOLVING NATIVE CORONARY ARTERY OF NATIVE HEART WITHOUT ANGINA PECTORIS: ICD-10-CM

## 2023-10-24 DIAGNOSIS — F32.9 MAJOR DEPRESSIVE DISORDER WITH CURRENT ACTIVE EPISODE, UNSPECIFIED DEPRESSION EPISODE SEVERITY, UNSPECIFIED WHETHER RECURRENT: ICD-10-CM

## 2023-10-24 DIAGNOSIS — I48.20 CHRONIC ATRIAL FIBRILLATION, UNSPECIFIED (HCC): Primary | ICD-10-CM

## 2023-10-24 DIAGNOSIS — Z95.2 S/P AVR: ICD-10-CM

## 2023-10-24 DIAGNOSIS — I50.9 CONGESTIVE HEART FAILURE, UNSPECIFIED HF CHRONICITY, UNSPECIFIED HEART FAILURE TYPE (HCC): Primary | ICD-10-CM

## 2023-10-24 LAB
POC INR: 3
PROTHROMBIN TIME, POC: 36.4

## 2023-10-24 PROCEDURE — 99213 OFFICE O/P EST LOW 20 MIN: CPT | Performed by: INTERNAL MEDICINE

## 2023-10-24 PROCEDURE — 90677 PCV20 VACCINE IM: CPT | Performed by: INTERNAL MEDICINE

## 2023-10-24 PROCEDURE — 85610 PROTHROMBIN TIME: CPT | Performed by: INTERNAL MEDICINE

## 2023-10-24 PROCEDURE — PBSHW PBB SHADOW CHARGE: Performed by: PHARMACIST

## 2023-10-24 PROCEDURE — 90694 VACC AIIV4 NO PRSRV 0.5ML IM: CPT | Performed by: INTERNAL MEDICINE

## 2023-10-24 PROCEDURE — 99211 OFF/OP EST MAY X REQ PHY/QHP: CPT | Performed by: PHARMACIST

## 2023-10-24 NOTE — PROGRESS NOTES
1. \"Have you been to the ER, urgent care clinic since your last visit? Hospitalized since your last visit? \" No
42.2 10/07/2022 01:25 PM    MCV 97.5 10/07/2022 01:25 PM    MCH 32.6 10/07/2022 01:25 PM    MCHC 33.4 10/07/2022 01:25 PM    RDW 13.1 10/07/2022 01:25 PM     10/07/2022 01:25 PM    MPV 10.0 10/07/2022 01:25 PM      Lipids   Lab Results   Component Value Date/Time    CHOL 136 04/24/2023 04:02 PM    TRIG 72 04/24/2023 04:02 PM    HDL 52 04/24/2023 04:02 PM    LDLCALC 69.6 04/24/2023 04:02 PM    LABVLDL 14.4 04/24/2023 04:02 PM    CHOLHDLRATIO 2.6 04/24/2023 04:02 PM     Hemoglobin A1C: No results found for: \"LABA1C\", \"[id number]\"     Review of Systems     Physical Exam  Constitutional:       Appearance: Normal appearance. HENT:      Head: Normocephalic and atraumatic. Right Ear: Tympanic membrane normal.      Left Ear: Tympanic membrane normal.      Nose: Nose normal.      Mouth/Throat:      Mouth: Mucous membranes are moist.   Eyes:      Pupils: Pupils are equal, round, and reactive to light. Cardiovascular:      Rate and Rhythm: Normal rate and regular rhythm. Pulses: Normal pulses. Heart sounds: Normal heart sounds. Pulmonary:      Effort: Pulmonary effort is normal.   Abdominal:      General: Abdomen is flat. Musculoskeletal:         General: Normal range of motion. Cervical back: Normal range of motion and neck supple. Skin:     General: Skin is warm. Neurological:      General: No focal deficit present. Mental Status: He is alert. Psychiatric:         Mood and Affect: Mood normal.         Thought Content: Thought content normal.         Judgment: Judgment normal.        ASSESSMENT and Antolin Adam was seen today for follow-up.     Diagnoses and all orders for this visit:    Congestive heart failure, unspecified HF chronicity, unspecified heart failure type ([address])   Compensated   Active physically   Fu CAR  Coronary artery disease involving native coronary artery of native heart without angina pectoris   No angina   Fu CARD  Hypertension, unspecified type  -     CBC;

## 2023-10-25 LAB
ALBUMIN SERPL-MCNC: 4.1 G/DL (ref 3.5–5)
ALBUMIN/GLOB SERPL: 1.4 (ref 1.1–2.2)
ALP SERPL-CCNC: 116 U/L (ref 45–117)
ALT SERPL-CCNC: 22 U/L (ref 12–78)
ANION GAP SERPL CALC-SCNC: 8 MMOL/L (ref 5–15)
AST SERPL-CCNC: 19 U/L (ref 15–37)
BILIRUB SERPL-MCNC: 0.9 MG/DL (ref 0.2–1)
BUN SERPL-MCNC: 24 MG/DL (ref 6–20)
BUN/CREAT SERPL: 18 (ref 12–20)
CALCIUM SERPL-MCNC: 8.7 MG/DL (ref 8.5–10.1)
CHLORIDE SERPL-SCNC: 108 MMOL/L (ref 97–108)
CO2 SERPL-SCNC: 25 MMOL/L (ref 21–32)
CREAT SERPL-MCNC: 1.35 MG/DL (ref 0.7–1.3)
ERYTHROCYTE [DISTWIDTH] IN BLOOD BY AUTOMATED COUNT: 13.2 % (ref 11.5–14.5)
GLOBULIN SER CALC-MCNC: 2.9 G/DL (ref 2–4)
GLUCOSE SERPL-MCNC: 121 MG/DL (ref 65–100)
HCT VFR BLD AUTO: 46.7 % (ref 36.6–50.3)
HGB BLD-MCNC: 14.7 G/DL (ref 12.1–17)
MCH RBC QN AUTO: 31.8 PG (ref 26–34)
MCHC RBC AUTO-ENTMCNC: 31.5 G/DL (ref 30–36.5)
MCV RBC AUTO: 101.1 FL (ref 80–99)
NRBC # BLD: 0 K/UL (ref 0–0.01)
NRBC BLD-RTO: 0 PER 100 WBC
PLATELET # BLD AUTO: 192 K/UL (ref 150–400)
PMV BLD AUTO: 11.1 FL (ref 8.9–12.9)
POTASSIUM SERPL-SCNC: 3.9 MMOL/L (ref 3.5–5.1)
PROT SERPL-MCNC: 7 G/DL (ref 6.4–8.2)
RBC # BLD AUTO: 4.62 M/UL (ref 4.1–5.7)
SODIUM SERPL-SCNC: 141 MMOL/L (ref 136–145)
WBC # BLD AUTO: 7.2 K/UL (ref 4.1–11.1)

## 2023-12-03 NOTE — PROGRESS NOTES
BILITOT 0.9 10/24/2023    ALKPHOS 116 10/24/2023    AST 19 10/24/2023    ALT 22 10/24/2023    LABGLOM 50 (L) 10/24/2023    GFRAA >60 07/28/2022    AGRATIO 1.4 05/13/2021    GLOB 2.9 10/24/2023     Estimated Creatinine Clearance: 34 mL/min (A) (based on SCr of 1.35 mg/dL (H)). INR History:   (normal INR range 0.8-1.2)     Date   INR   PT   Dose/Comments  12/05/23 2.5  30.0 2.5 mg daily   10/24/23          3.0                               2.5 mg daily   09/13/23          2.2                   26.8     2.5 mg daily  08/09/23          2.1                   25.8     2.5 mg daily  06/28/23          2.1                   25.3      2.5 mg daily  05/22/23          2.5                   29.5     2.5 mg daily  04/24/23          2.1                   24.9     2.5 mg daily  03/28/23          2.2                   25.9     2.5 mg daily   02/28/23          2.7                   32.5     Hold x 2, then 2.5 mg daily  02/13/23          3.6                   43.0     5 mg x 1, then 2.5 mg daily (+) dietary change   01/16/23          2.0                   23.2    2.5 mg daily  12/19/22          2.1                  24.5     2.5 mg daily  12/05/22          2.5                    29.9    Hold x 2, 1.25 mg x 1, then 2.5 mg daily  11/28/22          5.1                   60.6     2.5 mg daily; (+) COVID 19  11/04/22          2.6                    30.7     2.5 mg daily     A/P:       Anticoagulation:  Considering Mr. Chun's past history, todays findings, and per Anticoagulation Collaborative Practice Agreement/Protocol:    Fingerstick POC INR (2.5) is Therapeutic for INR goal today. Continue warfarin 2.5 mg daily. Patient was instructed to schedule an appointment in 6 week(s) prior to leaving the clinic. Medication reconciliation was completed during the visit. There are no discontinued medications. No orders of the defined types were placed in this encounter.        A full discussion of the nature of anticoagulants has

## 2023-12-03 NOTE — PATIENT INSTRUCTIONS
Today your INR was 2.5. Your goal INR is  2.0-3.0 . You have a 2.5 mg tablet of Coumadin (warfarin). Take Coumadin (warfarin) as follows:    Continue warfarin 2.5 mg daily    Come back in 6  week(s) for your next finger stick/INR blood test.        Avoid any over the counter items containing aspirin or ibuprofen, and avoid great swings in general diet. Avoid alcohol consumption. Please notify the INR nurse if you are started on any new medication including over the counter or herbal supplements. Also, please notify your INR nurse if any of your other prescription or over the counter medications have been discontinued. Call [facility] at [phone] if you have any signs of abnormal bleeding/blood clot.  ------------------------------------------------------------------------------------------------------------------  Taking Warfarin Safely: Care Instructions    Your Care Instructions  Warfarin is a medicine that you take to prevent blood clots. It is often called a blood thinner. Doctors give warfarin (such as Coumadin) to reduce the risk of blood clots. You may be at risk for blood clots if you have atrial fibrillation or deep vein thrombosis. Some other health problems may also put you at risk. Warfarin slows the amount of time it takes for your blood to clot. It can cause bleeding problems. Even if you've been taking warfarin for a while, it's important to know how to take it safely. Foods and other medicines can affect the way warfarin works. Some can make warfarin work too well. This can cause bleeding problems. And some can make it work poorly, so that it does not prevent blood clots very well. You will need regular blood tests to check how long it takes for your blood to form a clot. This test is called a PT or prothrombin time test. The result of the test is called an INR level.  Depending on the test results, your doctor or anticoagulation clinic may adjust your dose of

## 2023-12-05 ENCOUNTER — ANTI-COAG VISIT (OUTPATIENT)
Age: 88
End: 2023-12-05
Payer: MEDICARE

## 2023-12-05 VITALS — HEIGHT: 63 IN | BODY MASS INDEX: 28.88 KG/M2 | WEIGHT: 163 LBS

## 2023-12-05 DIAGNOSIS — Z95.2 PRESENCE OF PROSTHETIC HEART VALVE: ICD-10-CM

## 2023-12-05 DIAGNOSIS — I48.20 CHRONIC ATRIAL FIBRILLATION, UNSPECIFIED (HCC): Primary | ICD-10-CM

## 2023-12-05 LAB
POC INR: 2.5
PROTHROMBIN TIME, POC: 30

## 2023-12-05 PROCEDURE — 85610 PROTHROMBIN TIME: CPT | Performed by: PHARMACIST

## 2023-12-05 PROCEDURE — PBSHW AMB POC PT/INR: Performed by: PHARMACIST

## 2023-12-05 PROCEDURE — 99211 OFF/OP EST MAY X REQ PHY/QHP: CPT | Performed by: PHARMACIST

## 2023-12-05 PROCEDURE — PBSHW PBB SHADOW CHARGE: Performed by: PHARMACIST

## 2023-12-15 DIAGNOSIS — I50.22 CHRONIC SYSTOLIC (CONGESTIVE) HEART FAILURE (HCC): ICD-10-CM

## 2023-12-15 RX ORDER — CARVEDILOL 12.5 MG/1
TABLET ORAL
Qty: 180 TABLET | Refills: 0 | OUTPATIENT
Start: 2023-12-15

## 2023-12-15 RX ORDER — ALLOPURINOL 300 MG/1
TABLET ORAL
Qty: 30 TABLET | Refills: 9 | Status: SHIPPED | OUTPATIENT
Start: 2023-12-15

## 2023-12-15 RX ORDER — SACUBITRIL AND VALSARTAN 24; 26 MG/1; MG/1
TABLET, FILM COATED ORAL
Qty: 180 TABLET | Refills: 0 | OUTPATIENT
Start: 2023-12-15

## 2024-01-13 NOTE — PATIENT INSTRUCTIONS
Today your INR was 2.5.      Your goal INR is  2.0-3.0 .    You have a  1 mg, 2.5 mg , and 5 mg tablet of Coumadin (warfarin).   Take Coumadin (warfarin) as follows:    Continue warfarin 2.5 mg daily    Come back in   week(s) for your next finger stick/INR blood test.        Avoid any over the counter items containing aspirin or ibuprofen, and avoid great swings in general diet.  Avoid alcohol consumption.  Please notify the INR nurse if you are started on any new medication including over the counter or herbal supplements. Also, please notify your INR nurse if any of your other prescription or over the counter medications have been discontinued.     Call [facility] at [phone] if you have any signs of abnormal bleeding/blood clot.  ------------------------------------------------------------------------------------------------------------------  Taking Warfarin Safely: Care Instructions    Your Care Instructions  Warfarin is a medicine that you take to prevent blood clots. It is often called a blood thinner. Doctors give warfarin (such as Coumadin) to reduce the risk of blood clots. You may be at risk for blood clots if you have atrial fibrillation or deep vein thrombosis. Some other health problems may also put you at risk.  Warfarin slows the amount of time it takes for your blood to clot. It can cause bleeding problems. Even if you've been taking warfarin for a while, it's important to know how to take it safely.  Foods and other medicines can affect the way warfarin works. Some can make warfarin work too well. This can cause bleeding problems. And some can make it work poorly, so that it does not prevent blood clots very well.  You will need regular blood tests to check how long it takes for your blood to form a clot. This test is called a PT or prothrombin time test. The result of the test is called an INR level. Depending on the test results, your doctor or anticoagulation clinic may adjust

## 2024-01-13 NOTE — PROGRESS NOTES
Pharmacy Progress Note - Anticoagulation Management    S/O: Mr. Corby Chun  is a 88 y.o. male, referred by Ryan Villarreal MD, was seen today for anticoagulation management for the diagnosis of Atrial Fibrillation and Aortic Valve Replacement  ([facility] -1996), s/p pacemaker.  Pt is very hard of hearing.      Warfarin start date: ~1996  INR Goal:  2.0-3.0    Current warfarin regimen:     2.5 mg daily  Warfarin tablet strength:   1 mg, 2.5 mg, 5 mg  -- wife states she does not want to split tablets in half ; generally takes 2.5 mg tab. Able to recognize tablet strength  Duration of therapy: indefinite    Today's pertinent positives includes:  No significant changes since last visit    Results for orders placed or performed in visit on 01/16/24   AMB POC PT/INR   Result Value Ref Range    Prothrombin time, POC 30.4     POC INR 2.5      Adherence:   Able to recall regimen? YES  Miss/extra dose? NO  Need refill? NO    Upcoming procedure(s):  NO    Past Medical History:   Diagnosis Date    Acute on chronic systolic heart failure, NYHA class 4 (HCC) 03/01/2019    admit March 2019 7 days, EF 10% got Primacor, BIV implant    Anger     TAKES PAROXETINE TO CONTROL ANGER ISSUES    Arthritis     Burning with urination     CAD (coronary artery disease) 1996    CABG 1996, kendra roque 2015 fixed inferior defect    Cancer (HCC)     prostate    Chronic atrial fibrillation (HCC) 03/2019    RVR3/2019, had ablation of flutter- persistent    Chronic kidney disease     Chronic pain     bilat knees    Coagulation disorder (HCC)     Foot drop, left     WEARS METAL BRACE    Gout     Hard of hearing     Heart failure (HCC)     High cholesterol     Hypertension     Long term current use of anticoagulant therapy     Prostate enlargement     Sleep apnea     Thyroid disease     Unspecified sleep apnea     DOESN'T USE CPAP; \"IT MADE HIM SICK-COLDS, SINUS\", PER WIFE     No Known Allergies  Current Outpatient Medications   Medication Sig

## 2024-01-16 ENCOUNTER — ANTI-COAG VISIT (OUTPATIENT)
Age: 89
End: 2024-01-16
Payer: MEDICARE

## 2024-01-16 VITALS
HEART RATE: 83 BPM | SYSTOLIC BLOOD PRESSURE: 141 MMHG | DIASTOLIC BLOOD PRESSURE: 85 MMHG | BODY MASS INDEX: 28.53 KG/M2 | WEIGHT: 161 LBS | HEIGHT: 63 IN | OXYGEN SATURATION: 95 %

## 2024-01-16 DIAGNOSIS — I48.20 CHRONIC ATRIAL FIBRILLATION, UNSPECIFIED (HCC): ICD-10-CM

## 2024-01-16 DIAGNOSIS — Z95.2 PRESENCE OF PROSTHETIC HEART VALVE: Primary | ICD-10-CM

## 2024-01-16 LAB
POC INR: 2.5
PROTHROMBIN TIME, POC: 30.4

## 2024-01-16 PROCEDURE — PBSHW PBB SHADOW CHARGE: Performed by: PHARMACIST

## 2024-01-16 PROCEDURE — 85610 PROTHROMBIN TIME: CPT | Performed by: PHARMACIST

## 2024-01-16 PROCEDURE — PBSHW AMB POC PT/INR: Performed by: PHARMACIST

## 2024-01-16 PROCEDURE — 99211 OFF/OP EST MAY X REQ PHY/QHP: CPT | Performed by: PHARMACIST

## 2024-02-23 RX ORDER — WARFARIN SODIUM 2.5 MG/1
TABLET ORAL
Qty: 90 TABLET | Refills: 2 | Status: SHIPPED | OUTPATIENT
Start: 2024-02-23

## 2024-02-26 NOTE — PROGRESS NOTES
BLOOD SPECIMEN    AMB POC PT/INR     A full discussion of the nature of anticoagulants has been carried out.  A full discussion of the need for frequent and regular monitoring, precise dosage adjustment and compliance was stressed.  Side effects of potential bleeding were discussed and Mr. Chun was instructed to call [phone] if there are any signs of abnormal bleeding.  Mr. Chun was instructed to avoid any OTC items containing aspirin or ibuprofen and prior to starting any new OTC products to consult with his physician or pharmacist to ensure no drug interactions are present.  Mr. Chun was instructed to avoid any major changes in his general diet and to avoid alcohol consumption.    Mr. Cuhn was provided information in the AVS that includes topics on understanding coumadin therapy, drug interaction considerations, vitamin K and coumadin use, interactions with foods and supplements containing vitamin K, and the use of herbal products.    Mr. Chun verbalized his understanding of all instructions and will call the office with any questions, concerns, or signs of abnormal bleeding or blood clot.  Notifications of recommendations will be sent to Ryan Villarreal MD for review.    Thank you,  Marisel Richardson, PharmD, BCACP, [facility]          For Pharmacy Admin Tracking Only    Program: Medical Group  CPA in place:  Yes  Recommendation Provided To: Patient/Caregiver: 2 via In person  Intervention Detail: Lab(s) Ordered and Scheduled Appointment  Intervention Accepted By: Patient/Caregiver: 2  Gap Closed?: Yes   Time Spent (min): 20

## 2024-02-26 NOTE — PATIENT INSTRUCTIONS
Today your INR was 2.1.      Your goal INR is  2.0-3.0 .    You have a 1 mg, 2.5 mg and 5 mg tablet of Coumadin (warfarin).   Take Coumadin (warfarin) as follows:    Continue warfarin 2.5 mg daily.     Come back in  7 week(s) for your next finger stick/INR blood test.    Avoid any over the counter items containing aspirin or ibuprofen, and avoid great swings in general diet.  Avoid alcohol consumption.  Please notify the INR nurse if you are started on any new medication including over the counter or herbal supplements. Also, please notify your INR nurse if any of your other prescription or over the counter medications have been discontinued.     Call [facility] at [phone] if you have any signs of abnormal bleeding/blood clot.  ------------------------------------------------------------------------------------------------------------------  Taking Warfarin Safely: Care Instructions    Your Care Instructions  Warfarin is a medicine that you take to prevent blood clots. It is often called a blood thinner. Doctors give warfarin (such as Coumadin) to reduce the risk of blood clots. You may be at risk for blood clots if you have atrial fibrillation or deep vein thrombosis. Some other health problems may also put you at risk.  Warfarin slows the amount of time it takes for your blood to clot. It can cause bleeding problems. Even if you've been taking warfarin for a while, it's important to know how to take it safely.  Foods and other medicines can affect the way warfarin works. Some can make warfarin work too well. This can cause bleeding problems. And some can make it work poorly, so that it does not prevent blood clots very well.  You will need regular blood tests to check how long it takes for your blood to form a clot. This test is called a PT or prothrombin time test. The result of the test is called an INR level. Depending on the test results, your doctor or anticoagulation clinic may adjust your

## 2024-02-27 ENCOUNTER — ANTI-COAG VISIT (OUTPATIENT)
Age: 89
End: 2024-02-27
Payer: MEDICARE

## 2024-02-27 VITALS
BODY MASS INDEX: 29.06 KG/M2 | HEART RATE: 73 BPM | HEIGHT: 63 IN | SYSTOLIC BLOOD PRESSURE: 139 MMHG | WEIGHT: 164 LBS | DIASTOLIC BLOOD PRESSURE: 85 MMHG | OXYGEN SATURATION: 95 %

## 2024-02-27 DIAGNOSIS — I48.20 CHRONIC ATRIAL FIBRILLATION, UNSPECIFIED (HCC): Primary | ICD-10-CM

## 2024-02-27 DIAGNOSIS — Z95.2 S/P AVR: ICD-10-CM

## 2024-02-27 LAB
POC INR: 2.1
PROTHROMBIN TIME, POC: 26.2

## 2024-02-27 PROCEDURE — PBSHW AMB POC PT/INR: Performed by: PHARMACIST

## 2024-02-27 PROCEDURE — PBSHW PBB SHADOW CHARGE: Performed by: PHARMACIST

## 2024-02-27 PROCEDURE — 85610 PROTHROMBIN TIME: CPT | Performed by: PHARMACIST

## 2024-02-27 PROCEDURE — 99211 OFF/OP EST MAY X REQ PHY/QHP: CPT | Performed by: PHARMACIST

## 2024-04-13 NOTE — PATIENT INSTRUCTIONS
Today your INR was 2.1.      Your goal INR is  2.0-3.0 .    You have a 1 mg, 2.5 mg, and 5 mg tablet of Coumadin (warfarin).   Take Coumadin (warfarin) as follows:    Take two tablets (5 mg) tonight. Then continue warfarin 2.5 mg daily    Come back in  4 week(s) for your next finger stick/INR blood test.        Avoid any over the counter items containing aspirin or ibuprofen, and avoid great swings in general diet.  Avoid alcohol consumption.  Please notify the INR nurse if you are started on any new medication including over the counter or herbal supplements. Also, please notify your INR nurse if any of your other prescription or over the counter medications have been discontinued.     Call [facility] at [phone] if you have any signs of abnormal bleeding/blood clot.  ------------------------------------------------------------------------------------------------------------------  Taking Warfarin Safely: Care Instructions    Your Care Instructions  Warfarin is a medicine that you take to prevent blood clots. It is often called a blood thinner. Doctors give warfarin (such as Coumadin) to reduce the risk of blood clots. You may be at risk for blood clots if you have atrial fibrillation or deep vein thrombosis. Some other health problems may also put you at risk.  Warfarin slows the amount of time it takes for your blood to clot. It can cause bleeding problems. Even if you've been taking warfarin for a while, it's important to know how to take it safely.  Foods and other medicines can affect the way warfarin works. Some can make warfarin work too well. This can cause bleeding problems. And some can make it work poorly, so that it does not prevent blood clots very well.  You will need regular blood tests to check how long it takes for your blood to form a clot. This test is called a PT or prothrombin time test. The result of the test is called an INR level. Depending on the test results, your doctor

## 2024-04-13 NOTE — PROGRESS NOTES
Pharmacy Progress Note - Anticoagulation Management    S/O: Mr. Corby Chun  is a 89 y.o. male, referred by Ryan Villarreal MD, was seen today for anticoagulation management for the diagnosis of Atrial Fibrillation and Aortic Valve Replacement  ([facility] -1996), s/p pacemaker.  Pt is very hard of hearing.      Warfarin start date: ~1996  INR Goal:  2.0-3.0    Current warfarin regimen:     2.5 mg daily  Warfarin tablet strength:   1 mg, 2.5 mg, 5 mg  -- wife states she does not want to split tablets in half ; generally takes 2.5 mg tab. Able to recognize tablet strength  Duration of therapy: indefinite    Today's pertinent positives includes:  No significant changes since last visit    Results for orders placed or performed in visit on 04/16/24   AMB POC PT/INR   Result Value Ref Range    Prothrombin time, POC 26.0     POC INR 2.1      Adherence:   Able to recall regimen? YES  Miss/extra dose? YES -- possible missed dose last visit.   Need refill? NO    Upcoming procedure(s):  NO      Past Medical History:   Diagnosis Date    Acute on chronic systolic heart failure, NYHA class 4 (HCC) 03/01/2019    admit March 2019 7 days, EF 10% got Primacor, BIV implant    Anger     TAKES PAROXETINE TO CONTROL ANGER ISSUES    Arthritis     Burning with urination     CAD (coronary artery disease) 1996    CABG 1996, kendra roque 2015 fixed inferior defect    Cancer (HCC)     prostate    Chronic atrial fibrillation (HCC) 03/2019    RVR3/2019, had ablation of flutter- persistent    Chronic kidney disease     Chronic pain     bilat knees    Coagulation disorder (HCC)     Foot drop, left     WEARS METAL BRACE    Gout     Hard of hearing     Heart failure (HCC)     High cholesterol     Hypertension     Long term current use of anticoagulant therapy     Prostate enlargement     Sleep apnea     Thyroid disease     Unspecified sleep apnea     DOESN'T USE CPAP; \"IT MADE HIM SICK-COLDS, SINUS\", PER WIFE     No Known Allergies  Current

## 2024-04-16 ENCOUNTER — ANTI-COAG VISIT (OUTPATIENT)
Age: 89
End: 2024-04-16
Payer: MEDICARE

## 2024-04-16 VITALS — HEIGHT: 63 IN | WEIGHT: 163 LBS | BODY MASS INDEX: 28.88 KG/M2

## 2024-04-16 DIAGNOSIS — Z95.2 S/P AVR: ICD-10-CM

## 2024-04-16 DIAGNOSIS — I48.20 CHRONIC ATRIAL FIBRILLATION, UNSPECIFIED (HCC): Primary | ICD-10-CM

## 2024-04-16 DIAGNOSIS — Z95.2 PRESENCE OF PROSTHETIC HEART VALVE: ICD-10-CM

## 2024-04-16 LAB
POC INR: 2.1
PROTHROMBIN TIME, POC: 26

## 2024-04-16 PROCEDURE — 99211 OFF/OP EST MAY X REQ PHY/QHP: CPT | Performed by: PHARMACIST

## 2024-04-16 PROCEDURE — PBSHW AMB POC PT/INR: Performed by: PHARMACIST

## 2024-04-16 PROCEDURE — PBSHW PBB SHADOW CHARGE: Performed by: PHARMACIST

## 2024-04-16 PROCEDURE — 85610 PROTHROMBIN TIME: CPT | Performed by: PHARMACIST

## 2024-04-26 ENCOUNTER — TELEPHONE (OUTPATIENT)
Age: 89
End: 2024-04-26

## 2024-04-26 ENCOUNTER — OFFICE VISIT (OUTPATIENT)
Age: 89
End: 2024-04-26
Payer: MEDICARE

## 2024-04-26 VITALS
SYSTOLIC BLOOD PRESSURE: 128 MMHG | DIASTOLIC BLOOD PRESSURE: 80 MMHG | HEIGHT: 63 IN | HEART RATE: 76 BPM | TEMPERATURE: 96.5 F | OXYGEN SATURATION: 99 % | BODY MASS INDEX: 28.77 KG/M2 | WEIGHT: 162.4 LBS | RESPIRATION RATE: 18 BRPM

## 2024-04-26 DIAGNOSIS — E78.5 HYPERLIPIDEMIA, UNSPECIFIED HYPERLIPIDEMIA TYPE: ICD-10-CM

## 2024-04-26 DIAGNOSIS — I10 HYPERTENSION, UNSPECIFIED TYPE: Primary | ICD-10-CM

## 2024-04-26 DIAGNOSIS — I25.118 ATHEROSCLEROTIC HEART DISEASE OF NATIVE CORONARY ARTERY WITH OTHER FORMS OF ANGINA PECTORIS (HCC): ICD-10-CM

## 2024-04-26 DIAGNOSIS — Z00.00 MEDICARE ANNUAL WELLNESS VISIT, SUBSEQUENT: ICD-10-CM

## 2024-04-26 DIAGNOSIS — N18.30 STAGE 3 CHRONIC KIDNEY DISEASE, UNSPECIFIED WHETHER STAGE 3A OR 3B CKD (HCC): ICD-10-CM

## 2024-04-26 DIAGNOSIS — I50.9 CONGESTIVE HEART FAILURE, UNSPECIFIED HF CHRONICITY, UNSPECIFIED HEART FAILURE TYPE (HCC): ICD-10-CM

## 2024-04-26 DIAGNOSIS — F33.0 MAJOR DEPRESSIVE DISORDER, RECURRENT, MILD (HCC): ICD-10-CM

## 2024-04-26 DIAGNOSIS — I48.20 CHRONIC ATRIAL FIBRILLATION, UNSPECIFIED (HCC): ICD-10-CM

## 2024-04-26 DIAGNOSIS — R31.0 GROSS HEMATURIA: ICD-10-CM

## 2024-04-26 LAB
ANION GAP SERPL CALC-SCNC: 9 MMOL/L (ref 5–15)
APPEARANCE UR: ABNORMAL
BACTERIA URNS QL MICRO: NEGATIVE /HPF
BILIRUB UR QL: NEGATIVE
BUN SERPL-MCNC: 29 MG/DL (ref 6–20)
BUN/CREAT SERPL: 20 (ref 12–20)
CALCIUM SERPL-MCNC: 9.1 MG/DL (ref 8.5–10.1)
CAOX CRY URNS QL MICRO: ABNORMAL
CHLORIDE SERPL-SCNC: 110 MMOL/L (ref 97–108)
CHOLEST SERPL-MCNC: 145 MG/DL
CO2 SERPL-SCNC: 25 MMOL/L (ref 21–32)
COLOR UR: ABNORMAL
CREAT SERPL-MCNC: 1.43 MG/DL (ref 0.7–1.3)
EPITH CASTS URNS QL MICRO: ABNORMAL /LPF
ERYTHROCYTE [DISTWIDTH] IN BLOOD BY AUTOMATED COUNT: 14.3 % (ref 11.5–14.5)
GLUCOSE SERPL-MCNC: 113 MG/DL (ref 65–100)
GLUCOSE UR STRIP.AUTO-MCNC: NEGATIVE MG/DL
HCT VFR BLD AUTO: 41.2 % (ref 36.6–50.3)
HDLC SERPL-MCNC: 56 MG/DL
HDLC SERPL: 2.6 (ref 0–5)
HGB BLD-MCNC: 13.6 G/DL (ref 12.1–17)
HGB UR QL STRIP: ABNORMAL
KETONES UR QL STRIP.AUTO: NEGATIVE MG/DL
LDLC SERPL CALC-MCNC: 74.6 MG/DL (ref 0–100)
LEUKOCYTE ESTERASE UR QL STRIP.AUTO: NEGATIVE
MCH RBC QN AUTO: 32.5 PG (ref 26–34)
MCHC RBC AUTO-ENTMCNC: 33 G/DL (ref 30–36.5)
MCV RBC AUTO: 98.3 FL (ref 80–99)
NITRITE UR QL STRIP.AUTO: NEGATIVE
NRBC # BLD: 0 K/UL (ref 0–0.01)
NRBC BLD-RTO: 0 PER 100 WBC
PH UR STRIP: 6 (ref 5–8)
PLATELET # BLD AUTO: 168 K/UL (ref 150–400)
PMV BLD AUTO: 10.7 FL (ref 8.9–12.9)
POTASSIUM SERPL-SCNC: 4.2 MMOL/L (ref 3.5–5.1)
PROT UR STRIP-MCNC: 100 MG/DL
RBC # BLD AUTO: 4.19 M/UL (ref 4.1–5.7)
RBC #/AREA URNS HPF: ABNORMAL /HPF (ref 0–5)
SODIUM SERPL-SCNC: 144 MMOL/L (ref 136–145)
SP GR UR REFRACTOMETRY: 1.02 (ref 1–1.03)
TRIGL SERPL-MCNC: 72 MG/DL
TSH SERPL DL<=0.05 MIU/L-ACNC: 91.3 UIU/ML (ref 0.36–3.74)
URINE CULTURE IF INDICATED: ABNORMAL
UROBILINOGEN UR QL STRIP.AUTO: 1 EU/DL (ref 0.2–1)
VLDLC SERPL CALC-MCNC: 14.4 MG/DL
WBC # BLD AUTO: 8 K/UL (ref 4.1–11.1)
WBC URNS QL MICRO: ABNORMAL /HPF (ref 0–4)

## 2024-04-26 PROCEDURE — G8427 DOCREV CUR MEDS BY ELIG CLIN: HCPCS | Performed by: INTERNAL MEDICINE

## 2024-04-26 PROCEDURE — G0439 PPPS, SUBSEQ VISIT: HCPCS | Performed by: INTERNAL MEDICINE

## 2024-04-26 PROCEDURE — 1036F TOBACCO NON-USER: CPT | Performed by: INTERNAL MEDICINE

## 2024-04-26 PROCEDURE — G8417 CALC BMI ABV UP PARAM F/U: HCPCS | Performed by: INTERNAL MEDICINE

## 2024-04-26 PROCEDURE — 1123F ACP DISCUSS/DSCN MKR DOCD: CPT | Performed by: INTERNAL MEDICINE

## 2024-04-26 PROCEDURE — 99214 OFFICE O/P EST MOD 30 MIN: CPT | Performed by: INTERNAL MEDICINE

## 2024-04-26 SDOH — ECONOMIC STABILITY: HOUSING INSECURITY
IN THE LAST 12 MONTHS, WAS THERE A TIME WHEN YOU DID NOT HAVE A STEADY PLACE TO SLEEP OR SLEPT IN A SHELTER (INCLUDING NOW)?: NO

## 2024-04-26 SDOH — ECONOMIC STABILITY: INCOME INSECURITY: HOW HARD IS IT FOR YOU TO PAY FOR THE VERY BASICS LIKE FOOD, HOUSING, MEDICAL CARE, AND HEATING?: NOT HARD AT ALL

## 2024-04-26 SDOH — ECONOMIC STABILITY: FOOD INSECURITY: WITHIN THE PAST 12 MONTHS, THE FOOD YOU BOUGHT JUST DIDN'T LAST AND YOU DIDN'T HAVE MONEY TO GET MORE.: NEVER TRUE

## 2024-04-26 SDOH — ECONOMIC STABILITY: FOOD INSECURITY: WITHIN THE PAST 12 MONTHS, YOU WORRIED THAT YOUR FOOD WOULD RUN OUT BEFORE YOU GOT MONEY TO BUY MORE.: NEVER TRUE

## 2024-04-26 ASSESSMENT — PATIENT HEALTH QUESTIONNAIRE - PHQ9
7. TROUBLE CONCENTRATING ON THINGS, SUCH AS READING THE NEWSPAPER OR WATCHING TELEVISION: NOT AT ALL
1. LITTLE INTEREST OR PLEASURE IN DOING THINGS: NOT AT ALL
9. THOUGHTS THAT YOU WOULD BE BETTER OFF DEAD, OR OF HURTING YOURSELF: NOT AT ALL
3. TROUBLE FALLING OR STAYING ASLEEP: NOT AT ALL
2. FEELING DOWN, DEPRESSED OR HOPELESS: NOT AT ALL
8. MOVING OR SPEAKING SO SLOWLY THAT OTHER PEOPLE COULD HAVE NOTICED. OR THE OPPOSITE, BEING SO FIGETY OR RESTLESS THAT YOU HAVE BEEN MOVING AROUND A LOT MORE THAN USUAL: NOT AT ALL
6. FEELING BAD ABOUT YOURSELF - OR THAT YOU ARE A FAILURE OR HAVE LET YOURSELF OR YOUR FAMILY DOWN: NOT AT ALL
4. FEELING TIRED OR HAVING LITTLE ENERGY: SEVERAL DAYS
SUM OF ALL RESPONSES TO PHQ QUESTIONS 1-9: 1
10. IF YOU CHECKED OFF ANY PROBLEMS, HOW DIFFICULT HAVE THESE PROBLEMS MADE IT FOR YOU TO DO YOUR WORK, TAKE CARE OF THINGS AT HOME, OR GET ALONG WITH OTHER PEOPLE: NOT DIFFICULT AT ALL
5. POOR APPETITE OR OVEREATING: NOT AT ALL
SUM OF ALL RESPONSES TO PHQ9 QUESTIONS 1 & 2: 0
SUM OF ALL RESPONSES TO PHQ QUESTIONS 1-9: 1

## 2024-04-26 ASSESSMENT — LIFESTYLE VARIABLES
HOW OFTEN DO YOU HAVE A DRINK CONTAINING ALCOHOL: MONTHLY OR LESS
HOW MANY STANDARD DRINKS CONTAINING ALCOHOL DO YOU HAVE ON A TYPICAL DAY: 1 OR 2

## 2024-04-26 NOTE — PROGRESS NOTES
\"Have you been to the ER, urgent care clinic since your last visit?  Hospitalized since your last visit?\"    NO    “Have you seen or consulted any other health care providers outside of [facility] since your last visit?”    NO            Click Here for Release of Records Request    
    Current Outpatient Medications   Medication Sig Dispense Refill    warfarin (COUMADIN) 2.5 MG tablet TAKE 1 TABLET EVERY DAY - NEEDS INR CHECK 90 tablet 2    allopurinol (ZYLOPRIM) 300 MG tablet TAKE 1 TABLET BY MOUTH EVERY DAY TO PREVENT GOUT 30 tablet 9    PARoxetine (PAXIL) 20 MG tablet TAKE 1 TABLET EVERY DAY 30 tablet 5    sacubitril-valsartan (ENTRESTO) 24-26 MG per tablet TAKE 1 TABLET BY MOUTH EVERY TWELVE (12) HOURS. 180 tablet 0    carvedilol (COREG) 12.5 MG tablet Take 1 tablet by mouth 2 times daily (with meals) 180 tablet 0    levothyroxine (SYNTHROID) 150 MCG tablet TAKE 1 TABLET EVERY MORNING FOR THYROID 30 tablet 9    ascorbic acid (VITAMIN C) 1000 MG tablet Take by mouth daily      aspirin 81 MG EC tablet Take by mouth daily      atorvastatin (LIPITOR) 40 MG tablet TAKE 1 TABLET BY MOUTH NIGHTLY TO LOWER CHOLESTEROL      clobetasol (TEMOVATE) 0.05 % cream Apply topically 2 times daily      finasteride (PROSCAR) 5 MG tablet Take by mouth daily      furosemide (LASIX) 20 MG tablet Take by mouth daily      nystatin (MYCOSTATIN) 385853 UNIT/GM cream Apply topically 2 times daily      oxybutynin (DITROPAN-XL) 10 MG extended release tablet Take by mouth daily      tamsulosin (FLOMAX) 0.4 MG capsule Take by mouth       No current facility-administered medications for this visit.     No Known Allergies  Social History     Tobacco Use    Smoking status: Never    Smokeless tobacco: Never   Substance Use Topics    Alcohol use: Yes     Alcohol/week: 3.0 standard drinks of alcohol        BMP:   Lab Results   Component Value Date/Time     10/24/2023 02:34 PM    K 3.9 10/24/2023 02:34 PM     10/24/2023 02:34 PM    CO2 25 10/24/2023 02:34 PM    BUN 24 10/24/2023 02:34 PM    CREATININE 1.35 10/24/2023 02:34 PM    GLUCOSE 121 10/24/2023 02:34 PM    CALCIUM 8.7 10/24/2023 02:34 PM      CBC:   Lab Results   Component Value Date/Time    WBC 7.2 10/24/2023 02:34 PM    RBC 4.62 10/24/2023 02:34 PM    HGB

## 2024-04-26 NOTE — TELEPHONE ENCOUNTER
Attempted to reach patient spouse, number temporally disconnected. unable to leave message.     Attempted to reach patient child, Emily. Left message on vm to return call.    Pt seen in office today with Dr. Nguyen. Pt is very hard of hearing and unable to fully understand needs of visit.     Two referrals has been placed by Dr. Nguyen for pt to schedule. Please see referral in chart..  Pt needs to call and schedule appt for:       [facility]  [address]  [place] [address] [phone]      Walt Angeles, Cardioogy  [address]  [facility] [id number] [phone]

## 2024-05-07 DIAGNOSIS — I50.22 CHRONIC SYSTOLIC (CONGESTIVE) HEART FAILURE (HCC): ICD-10-CM

## 2024-05-08 ENCOUNTER — PHARMACY VISIT (OUTPATIENT)
Age: 89
End: 2024-05-08
Payer: MEDICARE

## 2024-05-08 VITALS
HEART RATE: 69 BPM | SYSTOLIC BLOOD PRESSURE: 150 MMHG | OXYGEN SATURATION: 94 % | DIASTOLIC BLOOD PRESSURE: 82 MMHG | WEIGHT: 163 LBS | BODY MASS INDEX: 28.88 KG/M2 | HEIGHT: 63 IN

## 2024-05-08 DIAGNOSIS — Z95.2 S/P AVR: ICD-10-CM

## 2024-05-08 DIAGNOSIS — Z79.899 MEDICATION MANAGEMENT: ICD-10-CM

## 2024-05-08 DIAGNOSIS — Z71.89 ENCOUNTER FOR MEDICATION REVIEW AND COUNSELING: Primary | ICD-10-CM

## 2024-05-08 DIAGNOSIS — I48.20 CHRONIC ATRIAL FIBRILLATION, UNSPECIFIED (HCC): ICD-10-CM

## 2024-05-08 LAB
POC INR: 1.6
PROTHROMBIN TIME, POC: 19.5

## 2024-05-08 PROCEDURE — PBSHW AMB POC PT/INR: Performed by: PHARMACIST

## 2024-05-08 PROCEDURE — 85610 PROTHROMBIN TIME: CPT | Performed by: PHARMACIST

## 2024-05-08 RX ORDER — SACUBITRIL AND VALSARTAN 24; 26 MG/1; MG/1
TABLET, FILM COATED ORAL
Qty: 180 TABLET | Refills: 0 | OUTPATIENT
Start: 2024-05-08

## 2024-05-08 RX ORDER — ATORVASTATIN CALCIUM 40 MG/1
TABLET, FILM COATED ORAL
Qty: 90 TABLET | Refills: 0 | OUTPATIENT
Start: 2024-05-08

## 2024-05-08 RX ORDER — TAMSULOSIN HYDROCHLORIDE 0.4 MG/1
CAPSULE ORAL
Qty: 180 CAPSULE | Refills: 2 | Status: SHIPPED | OUTPATIENT
Start: 2024-05-08

## 2024-05-08 NOTE — PATIENT INSTRUCTIONS
from past headaches.  Call your doctor now or seek immediate medical care if:  You have any abnormal bleeding, such as:  Nosebleeds.  Vaginal bleeding that is different (heavier, more frequent, at a different time of the month) than what you are used to.  Bloody or black stools, or rectal bleeding.  Bloody or pink urine.  Watch closely for changes in your health, and be sure to contact your doctor if you have any problems.    Where can you learn more?  Go to http://www.[employer].net/[employer]iga[employer]ctions.  Enter N655 in the search box to learn more about \"Taking Warfarin Safely: Care Instructions.\"  Current as of: January 27, 2016  Content Version: 11.1  © 2796-6716 [employer]. Care instructions adapted under license by [employer] (which disclaims liability or warranty for this information). If you have questions about a medical condition or this instruction, always ask your healthcare professional. [employer] disclaims any warranty or liability for your use of this information.

## 2024-05-08 NOTE — PROGRESS NOTES
Pharmacy Progress Note - Comprehensive Medication Review    S/O: Mr. Corby Chun is a 89 y.o. male , referred by Ryan Nguyen MD , with a PMH of HTN, HLD, CAD s/p CABG, s/p mechanical AVR, CHF, AF s/p Biv ICD, MDD, hypothyroidism, who was seen today for a comprehensive medication review.    Medication Adherence/Access:  - Pt did bring in home medications to visit.   - Pt denies compliance to med regimen.   - Pt uses pill box/organizer at home: no  - Pt denies barriers to accessing/affording medications  - Pt does have prescription coverage/insurance ; Uses [facility]    Recent PCP appt with Dr Nguyen in April. Does not appear to be taking his medications consistently.     Thyroid:  TSH 91.30 (April 2024)  Brought in levothyroxine 150 mcg -- 30 day supply is still full. Patient admits he has not been taking this medication.     Cardiology:  Has carvedilol 12.5 mg BID   Has Entresto 24/26 mg BID -- [facility]   Has Atorvastatin 40 mg daily LDL 74; TG 74 (April 2024)     Anticoagulated with warfarin. Last INR was 2.1 in April.   May have missed 1 dose of his warfarin 2.5 mg daily.   No longer on ASA 81 mg, lasix    Urology:  Dr Aguilar - urology  H/o hematuria  Does not appear to be taking any medications   Recently picked up tamsulosin - 0.4 mg - two caps daily -- not taking  Has oxybutynin 10 mg ER daily -- bottle is from 2020.   Has finasteride 5 mg daily    CKD:  Scr 1.43 (April 2024), up from 1.35 (Oct 2023), 1.19 (April 2023)    Other:  Has allopurinol 300 mg daily for gout  Has paxil 20 mg daily for MDD    Takes a daily probiotic   Has metamucil 3-1 fiber supplement - takes 1 cap PRN for constipation     Wt Readings from Last 3 Encounters:   05/08/24 73.9 kg (163 lb)   04/26/24 73.7 kg (162 lb 6.4 oz)   04/16/24 73.9 kg (163 lb)     BP Readings from Last 3 Encounters:   04/26/24 128/80   02/27/24 139/85   01/16/24 (!) 141/85     Pulse Readings from Last 3 Encounters:

## 2024-05-09 ENCOUNTER — TELEPHONE (OUTPATIENT)
Age: 89
End: 2024-05-09

## 2024-05-09 NOTE — TELEPHONE ENCOUNTER
Informed wife that In[facility] did not have an alternate time to reschedule on 5/14. She is going to call Dr. Mills's office to see if they can reschedule his appointment to a later time. His appointment is currently at 10:40 with Dr. Mills.

## 2024-05-09 NOTE — TELEPHONE ENCOUNTER
----- Message from Dilcia Leyva sent at 5/9/2024  1:12 PM EDT -----  Subject: Appointment Request    Reason for Call: Established Patient Appointment needed: Routine Existing   Condition Follow Up    QUESTIONS    Reason for appointment request? Available appointments did not meet   patient need     Additional Information for Provider? Pt's wife is needing to r/s appt for   5/14 to a later time that day. Please call  ---------------------------------------------------------------------------  --------------  CALL BACK INFO  [phone]; OK to leave message on voicemail  ---------------------------------------------------------------------------  --------------  SCRIPT ANSWERS

## 2024-05-24 ENCOUNTER — TELEPHONE (OUTPATIENT)
Age: 89
End: 2024-05-24

## 2024-05-24 NOTE — TELEPHONE ENCOUNTER
States daughter is Ivette Chun, phone [phone]  States she helps with pts appts.    States lacho is having difficulty getting into pt mychart to assist

## 2024-05-27 PROCEDURE — 93295 DEV INTERROG REMOTE 1/2/MLT: CPT | Performed by: INTERNAL MEDICINE

## 2024-06-02 NOTE — PROGRESS NOTES
Pharmacy Progress Note - Medication Management    S/O: Mr. Corby Chun  is a 89 y.o. male, referred by Ryan Villarreal MD, was seen today for anticoagulation management for the diagnosis of Atrial Fibrillation and Aortic Valve Replacement  ([facility] -1996), s/p pacemaker.  Pt is very hard of hearing.     Interim history:  Saw Dr. Angeles (cardio) yesterday. Reports he had a good report. No med changes. Next f/up in 1 year  Reports he is now taking his medications consistently.     Warfarin start date: ~1996  INR Goal:  2.0-3.0    Current warfarin regimen:     5 mg x 1, then 2.5 mg daily  Warfarin tablet strength:   1 mg, 2.5 mg, 5 mg  -- wife states she does not want to split tablets in half ; generally takes 2.5 mg tab. Able to recognize tablet strength  Duration of therapy: indefinite    Today's pertinent positives includes:  No significant changes since last visit    Results for orders placed or performed in visit on 06/03/24   AMB POC PT/INR   Result Value Ref Range    Prothrombin time, POC 25.1     POC INR 2.0      Adherence:   Able to recall regimen? YES  Miss/extra dose? N/A   Need refill? NO    Upcoming procedure(s):  NO      Past Medical History:   Diagnosis Date    Acute on chronic systolic heart failure, NYHA class 4 (HCC) 03/01/2019    admit March 2019 7 days, EF 10% got Primacor, BIV implant    Anger     TAKES PAROXETINE TO CONTROL ANGER ISSUES    Arthritis     Burning with urination     CAD (coronary artery disease) 1996    CABG 1996, kendra roque 2015 fixed inferior defect    Cancer (HCC)     prostate    Chronic atrial fibrillation (HCC) 03/2019    RVR3/2019, had ablation of flutter- persistent    Chronic kidney disease     Chronic pain     bilat knees    Coagulation disorder (HCC)     Foot drop, left     WEARS METAL BRACE    Gout     Hard of hearing     Heart failure (HCC)     High cholesterol     Hypertension     Long term current use of anticoagulant therapy     Prostate enlargement     Sleep

## 2024-06-03 ENCOUNTER — PHARMACY VISIT (OUTPATIENT)
Age: 89
End: 2024-06-03
Payer: MEDICARE

## 2024-06-03 VITALS
HEART RATE: 70 BPM | HEIGHT: 63 IN | DIASTOLIC BLOOD PRESSURE: 75 MMHG | OXYGEN SATURATION: 99 % | SYSTOLIC BLOOD PRESSURE: 144 MMHG | WEIGHT: 166 LBS | BODY MASS INDEX: 29.41 KG/M2

## 2024-06-03 DIAGNOSIS — I48.20 CHRONIC ATRIAL FIBRILLATION, UNSPECIFIED (HCC): ICD-10-CM

## 2024-06-03 DIAGNOSIS — Z95.2 S/P AVR: Primary | ICD-10-CM

## 2024-06-03 DIAGNOSIS — Z71.89 ENCOUNTER FOR MEDICATION REVIEW AND COUNSELING: ICD-10-CM

## 2024-06-03 LAB
POC INR: 2
PROTHROMBIN TIME, POC: 25.1

## 2024-06-03 PROCEDURE — 85610 PROTHROMBIN TIME: CPT | Performed by: PHARMACIST

## 2024-06-03 PROCEDURE — 99211 OFF/OP EST MAY X REQ PHY/QHP: CPT | Performed by: PHARMACIST

## 2024-06-03 PROCEDURE — PBSHW AMB POC PT/INR: Performed by: PHARMACIST

## 2024-06-03 PROCEDURE — PBSHW PBB SHADOW CHARGE: Performed by: PHARMACIST

## 2024-06-03 NOTE — PATIENT INSTRUCTIONS
Today your INR was 2.0 .      Your goal INR is  2.0-3.0 .    You have a 1 mg, 2.5 mg and 5 mg tablet of Coumadin (warfarin).   Take Coumadin (warfarin) as follows:    - Take 5 mg (two tablets) tonight.   - Then change warfarin to 3.75 mg (one and one-half tablets) on Tuesday and 2.5 mg (one whole tablet) daily the rest of the week.     - Bring in all home medications to the next visit.    Come back in 2 week(s) for your next finger stick/INR blood test.        Avoid any over the counter items containing aspirin or ibuprofen, and avoid great swings in general diet.  Avoid alcohol consumption.  Please notify the INR nurse if you are started on any new medication including over the counter or herbal supplements. Also, please notify your INR nurse if any of your other prescription or over the counter medications have been discontinued.     Call [facility] at [phone] if you have any signs of abnormal bleeding/blood clot.  ------------------------------------------------------------------------------------------------------------------  Taking Warfarin Safely: Care Instructions    Your Care Instructions  Warfarin is a medicine that you take to prevent blood clots. It is often called a blood thinner. Doctors give warfarin (such as Coumadin) to reduce the risk of blood clots. You may be at risk for blood clots if you have atrial fibrillation or deep vein thrombosis. Some other health problems may also put you at risk.  Warfarin slows the amount of time it takes for your blood to clot. It can cause bleeding problems. Even if you've been taking warfarin for a while, it's important to know how to take it safely.  Foods and other medicines can affect the way warfarin works. Some can make warfarin work too well. This can cause bleeding problems. And some can make it work poorly, so that it does not prevent blood clots very well.  You will need regular blood tests to check how long it takes for your blood to

## 2024-06-06 ENCOUNTER — TELEPHONE (OUTPATIENT)
Age: 89
End: 2024-06-06

## 2024-06-06 NOTE — TELEPHONE ENCOUNTER
Patient's wife, Ashley engle patient was seen by Inez on 6/3/24 & she needs a call back from Dr. Nguyen's Nurse to discuss Instructions on how he is to take Coumadin until his next appt with Inez. Please call. Thank you

## 2024-06-06 NOTE — TELEPHONE ENCOUNTER
Spoke with patient wife, Ashley.    Confirmed with Inez via phone call regarding instructions.  Take 5 mg tonight. Then change warfarin to 3.75 mg (one and one-half tablets) on Tuesday and 2.5 mg daily the rest of the week.     Pt wife advised and verbalized understanding. States pt was confused and adamant to take two pills daily. Wife states she understands In[facility] instructions listed on pt discharge papers and will have pt follow that until next visit on 6/18/2024.  Wife had no further questions.

## 2024-06-29 NOTE — PROGRESS NOTES
Pharmacy Progress Note - Anticoagulation Management    S/O: Mr. Corby Chun  is a 89 y.o. male, referred by Ryan Villarreal MD, was seen today for anticoagulation management for the diagnosis of Atrial Fibrillation and Aortic Valve Replacement  ([facility] -1996), s/p pacemaker.  Pt is very hard of hearing.      Warfarin start date: ~1996  INR Goal:  2.0-3.0    Current warfarin regimen:     5 mg x 1, then 3.75 mg Tues, 2.5 mg daily ROW   Warfarin tablet strength:   1 mg, 2.5 mg, 5 mg  -- wife states she does not want to split tablets in half ; generally takes 2.5 mg tab. 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 Denies s/sx of CP/SOB/LE & UE pain/HA  Did not bring in home medications today.     Results for orders placed or performed in visit on 07/01/24   AMB POC PT/INR   Result Value Ref Range    Prothrombin time, POC 19.6     POC INR 1.6      Adherence:   Able to recall regimen? YES  Miss/extra dose? NO  Need refill? NO    Upcoming procedure(s):  NO      Past Medical History:   Diagnosis Date    Acute on chronic systolic heart failure, NYHA class 4 (HCC) 03/01/2019    admit March 2019 7 days, EF 10% got Primacor, BIV implant    Anger     TAKES PAROXETINE TO CONTROL ANGER ISSUES    Arthritis     Burning with urination     CAD (coronary artery disease) 1996    CABG 1996, kendra roque 2015 fixed inferior defect    Cancer (HCC)     prostate    Chronic atrial fibrillation (HCC) 03/2019    RVR3/2019, had ablation of flutter- persistent    Chronic kidney disease     Chronic pain     bilat knees    Coagulation disorder (HCC)     Foot drop, left     WEARS METAL BRACE    Gout     Hard of hearing     Heart failure (HCC)     High cholesterol     Hypertension     Long term current use of anticoagulant therapy     Prostate enlargement     Sleep apnea     Thyroid disease

## 2024-06-29 NOTE — PATIENT INSTRUCTIONS
Today your INR was 1.6.      Your goal INR is  2.0-3.0 .    You have a  1 mg, 2.5 mg, and 5 mg tablet of Coumadin (warfarin).   Take Coumadin (warfarin) as follows:    - Take 5 mg daily for the next 3 days. Then continue warfarin 3.75 mg (one and one-half tablets) on Tuesday and 2.5 mg daily the rest of the week.     - Bring all medications to the next visit    Come back in 1 week(s) for your next finger stick/INR blood test.      Avoid any over the counter items containing aspirin or ibuprofen, and avoid great swings in general diet.  Avoid alcohol consumption.  Please notify the INR nurse if you are started on any new medication including over the counter or herbal supplements. Also, please notify your INR nurse if any of your other prescription or over the counter medications have been discontinued.     Call [facility] at [phone] if you have any signs of abnormal bleeding/blood clot.  ------------------------------------------------------------------------------------------------------------------  Taking Warfarin Safely: Care Instructions    Your Care Instructions  Warfarin is a medicine that you take to prevent blood clots. It is often called a blood thinner. Doctors give warfarin (such as Coumadin) to reduce the risk of blood clots. You may be at risk for blood clots if you have atrial fibrillation or deep vein thrombosis. Some other health problems may also put you at risk.  Warfarin slows the amount of time it takes for your blood to clot. It can cause bleeding problems. Even if you've been taking warfarin for a while, it's important to know how to take it safely.  Foods and other medicines can affect the way warfarin works. Some can make warfarin work too well. This can cause bleeding problems. And some can make it work poorly, so that it does not prevent blood clots very well.  You will need regular blood tests to check how long it takes for your blood to form a clot. This test is called

## 2024-07-01 ENCOUNTER — PHARMACY VISIT (OUTPATIENT)
Age: 89
End: 2024-07-01
Payer: MEDICARE

## 2024-07-01 VITALS
DIASTOLIC BLOOD PRESSURE: 83 MMHG | BODY MASS INDEX: 28.17 KG/M2 | WEIGHT: 159 LBS | HEIGHT: 63 IN | HEART RATE: 69 BPM | SYSTOLIC BLOOD PRESSURE: 123 MMHG

## 2024-07-01 DIAGNOSIS — Z95.2 S/P AVR: Primary | ICD-10-CM

## 2024-07-01 DIAGNOSIS — I48.20 CHRONIC ATRIAL FIBRILLATION, UNSPECIFIED (HCC): ICD-10-CM

## 2024-07-01 LAB
POC INR: 1.6
PROTHROMBIN TIME, POC: 19.6

## 2024-07-01 PROCEDURE — PBSHW AMB POC PT/INR: Performed by: PHARMACIST

## 2024-07-01 PROCEDURE — 85610 PROTHROMBIN TIME: CPT | Performed by: PHARMACIST

## 2024-07-01 PROCEDURE — PBSHW PBB SHADOW CHARGE: Performed by: PHARMACIST

## 2024-07-01 PROCEDURE — 99211 OFF/OP EST MAY X REQ PHY/QHP: CPT | Performed by: PHARMACIST

## 2024-07-08 ENCOUNTER — TELEPHONE (OUTPATIENT)
Age: 89
End: 2024-07-08

## 2024-07-09 NOTE — PATIENT INSTRUCTIONS
Today your INR was 1.5.        - Stop warfarin.  - Start Eliquis 5 mg twice daily. Take one tablet in the morning and one tablet in the evening.  It is very important to take this medication every day.  This is the new blood thinner medication.

## 2024-07-09 NOTE — PROGRESS NOTES
person  Intervention Detail: Adherence Monitorin, Device Training, Discontinued Rx: 1, reason: Therapy Complete, Dose Adjustment: 2, reason: Therapy De-escalation, Therapy Optimization, Lab(s) Ordered, New Rx: 2, reason: Needs Additional Therapy, Patient Access Assistance/Sample Provided, and Scheduled Appointment  Intervention Accepted By: Patient/Caregiver: 10  Gap Closed?: Yes   Time Spent (min): 30

## 2024-07-10 ENCOUNTER — PHARMACY VISIT (OUTPATIENT)
Age: 89
End: 2024-07-10
Payer: MEDICARE

## 2024-07-10 VITALS — HEIGHT: 63 IN | BODY MASS INDEX: 28.53 KG/M2 | WEIGHT: 161 LBS

## 2024-07-10 DIAGNOSIS — Z95.2 S/P AVR: ICD-10-CM

## 2024-07-10 DIAGNOSIS — Z71.89 ENCOUNTER FOR MEDICATION REVIEW AND COUNSELING: Primary | ICD-10-CM

## 2024-07-10 DIAGNOSIS — I48.20 CHRONIC ATRIAL FIBRILLATION, UNSPECIFIED (HCC): ICD-10-CM

## 2024-07-10 LAB
POC INR: 1.5
PROTHROMBIN TIME, POC: 17.4

## 2024-07-10 PROCEDURE — 85610 PROTHROMBIN TIME: CPT | Performed by: PHARMACIST

## 2024-07-10 PROCEDURE — PBSHW AMB POC PT/INR: Performed by: PHARMACIST

## 2024-07-10 PROCEDURE — PBSHW PBB SHADOW CHARGE: Performed by: PHARMACIST

## 2024-07-10 PROCEDURE — 99211 OFF/OP EST MAY X REQ PHY/QHP: CPT | Performed by: PHARMACIST

## 2024-08-19 RX ORDER — LEVOTHYROXINE SODIUM 0.15 MG/1
TABLET ORAL
Qty: 30 TABLET | Refills: 8 | Status: SHIPPED | OUTPATIENT
Start: 2024-08-19 | End: 2024-08-23 | Stop reason: SDUPTHER

## 2024-08-23 RX ORDER — PAROXETINE HYDROCHLORIDE 20 MG/1
20 TABLET, FILM COATED ORAL DAILY
Qty: 90 TABLET | Refills: 3 | Status: SHIPPED | OUTPATIENT
Start: 2024-08-23

## 2024-08-23 RX ORDER — LEVOTHYROXINE SODIUM 0.15 MG/1
150 TABLET ORAL DAILY
Qty: 90 TABLET | Refills: 3 | Status: SHIPPED | OUTPATIENT
Start: 2024-08-23

## 2024-08-23 NOTE — TELEPHONE ENCOUNTER
Caller requests Refill of:    PARoxetine (PAXIL) 20 MG tablet     levothyroxine (SYNTHROID) 150 MCG tablet     **Wife states these need to be sent as 90 day supply for insurance covarge      Please send to:    [facility] - [place], [place] - [address] - P [phone] - F [phone] [address] [address]  Phone: [phone] Fax: [phone]         Visit / Appointment History:  Future Appointment at [facility]:  10/28/2024   Last Appointment With PCP:  4/26/2024       Caller confirmed instructions and dosages as correct.    Caller was advised that Meds will be refilled as soon as possible, however there can be a 48-72 business hour turn around on refill requests.

## 2024-08-23 NOTE — TELEPHONE ENCOUNTER
PCP: Ryan Nguyen MD    Last appt: 7/10/2024   Future Appointments   Date Time Provider Department Center   10/28/2024  1:30 PM Ryan Nguyen MD [facility]       Requested Prescriptions     Pending Prescriptions Disp Refills    levothyroxine (SYNTHROID) 150 MCG tablet 90 tablet 0     Sig: Take 1 tablet by mouth Daily    PARoxetine (PAXIL) 20 MG tablet 90 tablet 0     Sig: Take 1 tablet by mouth daily

## 2024-09-03 ENCOUNTER — TELEPHONE (OUTPATIENT)
Age: 89
End: 2024-09-03

## 2024-09-03 NOTE — TELEPHONE ENCOUNTER
Regarding medication:  apixaban (ELIQUIS) 5 MG TABS tablet     Problem:  2-3 days now pt \"talking about men in the other room and basement\" while having a gun in his pocket---states having hallucinations.  States this could be a dangerous situation.    Onset was when pt started Eliquis      Call pt wife to advise what to do   [phone]         Caller confirms readback of documented phone/fax number(s) as correct.

## 2024-09-03 NOTE — TELEPHONE ENCOUNTER
Pt can be seen tomorrow 9/4 at 2:30 pm w/ Inez or 9/11 @ 3:30. Pt will need to call back to schedule.

## 2024-09-10 ENCOUNTER — TELEPHONE (OUTPATIENT)
Age: 89
End: 2024-09-10

## 2024-09-10 NOTE — PROGRESS NOTES
4 Eyes Skin Assessment     NAME:  Cass Valerio  YOB: 1968  MEDICAL RECORD NUMBER:  [id number]    The patient is being assessed for  Admission    I agree that at least one RN has performed a thorough Head to Toe Skin Assessment on the patient. ALL assessment sites listed below have been assessed.      Areas assessed by both nurses:    Head, Face, Ears, Shoulders, Back, Chest, Arms, Elbows, Hands, Sacrum. Buttock, Coccyx, Ischium, Legs. Feet and Heels, and Under Medical Devices         Does the Patient have a Wound? No noted wound(s)       Ramana Prevention initiated by RN: No  Wound Care Orders initiated by RN: No    Pressure Injury (Stage 3,4, Unstageable, DTI, NWPT, and Complex wounds) if present, place Wound referral order by RN under : No    New Ostomies, if present place, Ostomy referral order under : No     Nurse 1 eSignature: Electronically signed by Abril Aranda RN on 9/10/24 at 6:37 PM EDT    **SHARE this note so that the co-signing nurse can place an eSignature**    Nurse 2 eSignature: Electronically signed by Chantelle Jenkins RN on 9/10/24 at 7:02 PM EDT     Taylor Sullivan     1935       Jose Maria De Guzman MD, [facility] [place]  Date of Visit-4/15/2019   PCP is DO [facility] [place]  [facility]  HPI:  Taylor Sullivan is a 80 y.o. male   Hospital stay 03/04-03/07 for acute heart failure NYHA 4   had ablation of atrial flutter on 03/04 with an upgrade to a biventricular pacer. The ejection fraction in the hospital was below 10%. His TSH was very high at 45 and went to nursing facility. Rehab from 03/07 to 04/01. He saw Dr. Ruben Nava for wound check on 03/14/2019. Home health has been following INR. Today Mr. Janee Martínez reports that he has had more energy and has been \"getting [his] legs back\" since returning home. He also notes having dramatically less shortness of breath. Once a week, he has right sided chest pain when first waking up. This lasts for about 5 minutes. Currently, Mr. Janee Martínez is taking Lasix, atorvastatin, Aspirin. Denies syncope or shortness of breath at rest, has no tachycardia, palpitations or sense of arrhythmia.    04/15/19   ECHO ADULT COMPLETE 04/15/2019 4/15/2019    Narrative · Left ventricular moderately decreased systolic function. Calculated left   ventricular ejection fraction is 40%. Left ventricular mild concentric   hypertrophy. · Left atrial cavity size is moderately dilated. · Right ventricular cavity size is mildly dilated. Pacer/ICD present. · Right atrial cavity size is mildly dilated. · Aortic valve is mechanical prosthetic. Mild aortic valve regurgitation   is present. · Mild mitral annular calcification. Mild to moderate mitral valve   regurgitation. · Mild to moderate tricuspid valve regurgitation is present. Pulmonary   hypertension is present. Signed by: Johny Jasso MD      Assessment/Plan:     1. Systolic Heart Failure, chronic: has improved from NYHA 4 to NYHA 1-2. EF has gone from below 10% to 40%. Was in the hospital on [address].  Has now been on Coreg and Lasix and he will start the [facility] which he was probably getting in Rehab. I think that the EF is better because of the BI-V, the Afib rate control, and the thyroid. Initially his thyroid was a large factor in the EF. 2. Atrial Fibrillation, Chronic: had RVR, now with ablation of flutter and being paced. Continue coreg, coumadin. 3. CAD, Native, No Angina: Given drop in EF, we will plan a stress test though I will be conservative in terms of a Cath. 4. Severe aortic stenosis s/p mechanical AVR: Valve today shows a mild gradient on echo. 5. HTN: Good control    6. Dyslipidemia: on high potency statin    Plans: stress nuclear, lab work including TSH and BMP. F/U in 2 months. Increase activity level as tolerated. Restart Entresto. They will call about the Spironolactone. Future Appointments   Date Time Provider Lexie Moreno   4/26/2019  1:00 PM NUCLEAR, Shayy Delgado JEANETTE SCHED   6/18/2019 10:20 AM Aleksandra Neumann  [address]   6/20/2019  1:45 PM [address] [place] [address], [address] [facility]   6/20/2019  2:00 PM Coral Fernandez  [address]      Patient Instructions   Please get your blood work completed Friday or Monday. You will be scheduled for a 2 month follow up. You will be scheduled for nuclear stress testing after your appointment today. Key CAD CHF Meds             carvedilol (COREG) 6.25 mg tablet (Taking) Take 1 Tab by mouth two (2) times a day. sacubitril-valsartan (ENTRESTO) 24 mg/26 mg tablet (Taking) Take 1 Tab by mouth every twelve (12) hours. warfarin (COUMADIN) 1 mg tablet (Taking) Take 1 Tab by mouth daily. aspirin delayed-release 81 mg tablet (Taking) Take 81 mg by mouth daily. atorvastatin (LIPITOR) 40 mg tablet (Taking) Take 40 mg by mouth daily. furosemide (LASIX) 20 mg tablet (Taking) Take 1 Tab by mouth daily. spironolactone (ALDACTONE) 25 mg tablet Take 1 Tab by mouth daily. Impression:   1.  Chronic systolic congestive heart failure ([facility] [address]    2. Chronic atrial fibrillation (HCC)    3. Coronary artery disease involving native coronary artery of native heart with unstable angina pectoris ([facility] [address]    4. Severe aortic stenosis    5. Hypertension, essential    6. Dyslipidemia       Cardiac History:   CAD s/p CABG 1996 with mechanical AVR for severe AS,     known occlusion to the RCA and vein graft to the RCA. Tory Fallow nuclear 6/24/15 showed inferior infarction with a small amount of emani infarct ischemia. There was inferior septal kinesis with an EF of 38%. Carotids 1/7/13 showed minimal disease. Monitor holter 4/11/13 showed 21 beats of Vtach and 4 beats of Vtach with at rate of 140, 14% of the beats were ventricular. Echo October 2018 showed EF showed EF of 20, moderate LVH, with dilated atrial, mechanical AVR seems to be functioning well, mild to moderate MR.     ROS-except as noted above. . A complete cardiac and respiratory are reviewed and negative except as above ; Resp-denies wheezing  or productive cough,. Const- No unusual weight loss or fever; Neuro-no recent seizure or CVA ; GI- No BRBPR, abdom pain, bloating ; - no  hematuria   see supplement sheet, initialed and to be scanned by staff  Past Medical History:   Diagnosis Date    Anger     TAKES PAROXETINE TO CONTROL ANGER ISSUES    CAD (coronary artery disease)     Foot drop, left     WEARS METAL BRACE    Gout     Hard of hearing     High cholesterol     Hypertension     Prostate enlargement     Thyroid disease     Unspecified sleep apnea     DOESN'T USE CPAP; \"IT MADE HIM SICK-COLDS, SINUS\", PER WIFE      Social Hx= reports that he has never smoked. He has never used smokeless tobacco. He reports that he drinks about 1.5 oz of alcohol per week. He reports that he does not use drugs.      Exam and Labs:  /80 (BP 1 Location: Left arm, BP Patient Position: Sitting)   Pulse 64   Resp 16   Ht 5' 5\" (1.651 m)   Wt 173 lb 9.6 oz (78.7 kg)   SpO2 98%   BMI 28.89 kg/m² Constitutional:  NAD, comfortable  Head: NC,AT. Eyes: No scleral icterus. Neck:  Neck supple. No JVD present. Throat: moist mucous membranes. Chest: Effort normal & normal respiratory excursion . Neurological: alert, conversant and oriented . Skin: Skin is not cold. No obvious systemic rash noted. Not diaphoretic. No erythema. Psychiatric:  Grossly normal mood and affect. Behavior appears normal. Extremities:  no clubbing or cyanosis. Abdomen: non distended    Lungs:breath sounds normal. No stridor. distress, wheezes or  Rales. Heart: IRIR, with a 1-2/6 CELIA at the RUSB to the LUSB, normal rate, normal S1, S2, no rubs, clicks or gallops , PMI non displaced. Edema: Edema sock-line Edema bilaterally. Lab Results   Component Value Date/Time    Sodium 140 03/07/2019 03:51 AM    Potassium 3.9 03/07/2019 03:51 AM    Chloride 105 03/07/2019 03:51 AM    CO2 28 03/07/2019 03:51 AM    Anion gap 7 03/07/2019 03:51 AM    Glucose 113 (H) 03/07/2019 03:51 AM    BUN 20 03/07/2019 03:51 AM    Creatinine 1.16 03/07/2019 03:51 AM    BUN/Creatinine ratio 17 03/07/2019 03:51 AM    GFR est AA >60 03/07/2019 03:51 AM    GFR est non-AA 60 (L) 03/07/2019 03:51 AM    Calcium 8.5 03/07/2019 03:51 AM      Wt Readings from Last 3 Encounters:   04/15/19 173 lb 9.6 oz (78.7 kg)   04/15/19 178 lb (80.7 kg)   03/14/19 175 lb (79.4 kg)      BP Readings from Last 3 Encounters:   04/15/19 130/80   03/14/19 90/60   03/07/19 129/84      Current Outpatient Medications   Medication Sig    levothyroxine (SYNTHROID) 150 mcg tablet Take 1 Tab by mouth Daily (before breakfast).  carvedilol (COREG) 6.25 mg tablet Take 1 Tab by mouth two (2) times a day.  sacubitril-valsartan (ENTRESTO) 24 mg/26 mg tablet Take 1 Tab by mouth every twelve (12) hours.  warfarin (COUMADIN) 1 mg tablet Take 1 Tab by mouth daily.  (Patient taking differently: Take 2.5 mg by mouth daily.)    aspirin delayed-release 81 mg tablet Take 81 mg by mouth daily.  therapeutic multivitamin (THERAGRAN) tablet Take 1 Tab by mouth daily.  ascorbic acid, vitamin C, (VITAMIN C) 500 mg tablet Take 1,000 mg by mouth daily.  B.infantis-B.ani-B.long-B.bifi (PROBIOTIC 4X) 10-15 mg TbEC Take 1 Tab by mouth daily.  cholecalciferol (VITAMIN D3) 1,000 unit tablet Take 1,000 Units by mouth nightly.  calcium citrate (CITRACAL PO) Take 1 Tab by mouth nightly.  COQ10, UBIQUINOL, PO Take 100-200 mg by mouth daily.  allopurinol (ZYLOPRIM) 300 mg tablet Take 300 mg by mouth daily.  atorvastatin (LIPITOR) 40 mg tablet Take 40 mg by mouth daily.  finasteride (PROSCAR) 5 mg tablet Take 5 mg by mouth daily.  oxybutynin chloride XL (DITROPAN XL) 10 mg CR tablet Take 10 mg by mouth daily.  PARoxetine (PAXIL) 20 mg tablet Take 20 mg by mouth daily.  tamsulosin (FLOMAX) 0.4 mg capsule Take 0.8 mg by mouth nightly.  FERROUS GLUCONATE PO Take 325 mg by mouth daily.  furosemide (LASIX) 20 mg tablet Take 1 Tab by mouth daily.  spironolactone (ALDACTONE) 25 mg tablet Take 1 Tab by mouth daily.  amoxicillin (AMOXIL) 875 mg tablet Take 875 mg by mouth two (2) times a day. No current facility-administered medications for this visit. Impression see above.       Written by Sania Flanagan, as dictated by Chad Cisneros MD.

## 2024-09-11 ENCOUNTER — PHARMACY VISIT (OUTPATIENT)
Age: 89
End: 2024-09-11

## 2024-09-11 VITALS — BODY MASS INDEX: 28.88 KG/M2 | HEIGHT: 63 IN | WEIGHT: 163 LBS

## 2024-09-11 DIAGNOSIS — E03.9 HYPOTHYROIDISM, UNSPECIFIED TYPE: ICD-10-CM

## 2024-09-11 DIAGNOSIS — I50.22 CHRONIC SYSTOLIC CONGESTIVE HEART FAILURE (HCC): ICD-10-CM

## 2024-09-11 DIAGNOSIS — Z79.899 MEDICATION MANAGEMENT: ICD-10-CM

## 2024-09-11 DIAGNOSIS — I10 ESSENTIAL (PRIMARY) HYPERTENSION: ICD-10-CM

## 2024-09-11 DIAGNOSIS — Z71.89 ENCOUNTER FOR MEDICATION REVIEW AND COUNSELING: Primary | ICD-10-CM

## 2024-09-12 LAB
ALBUMIN SERPL-MCNC: 4 G/DL (ref 3.5–5)
ALBUMIN/GLOB SERPL: 1.3 (ref 1.1–2.2)
ALP SERPL-CCNC: 140 U/L (ref 45–117)
ALT SERPL-CCNC: 22 U/L (ref 12–78)
ANION GAP SERPL CALC-SCNC: 5 MMOL/L (ref 2–12)
APPEARANCE UR: CLEAR
AST SERPL-CCNC: 20 U/L (ref 15–37)
BACTERIA URNS QL MICRO: ABNORMAL /HPF
BASOPHILS # BLD: 0 K/UL (ref 0–0.1)
BASOPHILS NFR BLD: 0 % (ref 0–1)
BILIRUB SERPL-MCNC: 1 MG/DL (ref 0.2–1)
BILIRUB UR QL: NEGATIVE
BUN SERPL-MCNC: 20 MG/DL (ref 6–20)
BUN/CREAT SERPL: 15 (ref 12–20)
CALCIUM SERPL-MCNC: 9.5 MG/DL (ref 8.5–10.1)
CHLORIDE SERPL-SCNC: 110 MMOL/L (ref 97–108)
CO2 SERPL-SCNC: 28 MMOL/L (ref 21–32)
COLOR UR: ABNORMAL
CREAT SERPL-MCNC: 1.32 MG/DL (ref 0.7–1.3)
DIFFERENTIAL METHOD BLD: ABNORMAL
EOSINOPHIL # BLD: 0.1 K/UL (ref 0–0.4)
EOSINOPHIL NFR BLD: 1 % (ref 0–7)
EPITH CASTS URNS QL MICRO: ABNORMAL /LPF
ERYTHROCYTE [DISTWIDTH] IN BLOOD BY AUTOMATED COUNT: 14.2 % (ref 11.5–14.5)
GLOBULIN SER CALC-MCNC: 3 G/DL (ref 2–4)
GLUCOSE SERPL-MCNC: 98 MG/DL (ref 65–100)
GLUCOSE UR STRIP.AUTO-MCNC: NEGATIVE MG/DL
HCT VFR BLD AUTO: 42.5 % (ref 36.6–50.3)
HGB BLD-MCNC: 13.6 G/DL (ref 12.1–17)
HGB UR QL STRIP: ABNORMAL
HYALINE CASTS URNS QL MICRO: ABNORMAL /LPF (ref 0–5)
IMM GRANULOCYTES # BLD AUTO: 0 K/UL (ref 0–0.04)
IMM GRANULOCYTES NFR BLD AUTO: 0 % (ref 0–0.5)
KETONES UR QL STRIP.AUTO: ABNORMAL MG/DL
LEUKOCYTE ESTERASE UR QL STRIP.AUTO: ABNORMAL
LYMPHOCYTES # BLD: 0.7 K/UL (ref 0.8–3.5)
LYMPHOCYTES NFR BLD: 11 % (ref 12–49)
MCH RBC QN AUTO: 31.9 PG (ref 26–34)
MCHC RBC AUTO-ENTMCNC: 32 G/DL (ref 30–36.5)
MCV RBC AUTO: 99.5 FL (ref 80–99)
MONOCYTES # BLD: 0.5 K/UL (ref 0–1)
MONOCYTES NFR BLD: 7 % (ref 5–13)
NEUTS SEG # BLD: 5.5 K/UL (ref 1.8–8)
NEUTS SEG NFR BLD: 81 % (ref 32–75)
NITRITE UR QL STRIP.AUTO: NEGATIVE
NRBC # BLD: 0 K/UL (ref 0–0.01)
NRBC BLD-RTO: 0 PER 100 WBC
PH UR STRIP: 6.5 (ref 5–8)
PLATELET # BLD AUTO: 159 K/UL (ref 150–400)
PMV BLD AUTO: 11.3 FL (ref 8.9–12.9)
POTASSIUM SERPL-SCNC: 4.1 MMOL/L (ref 3.5–5.1)
PROT SERPL-MCNC: 7 G/DL (ref 6.4–8.2)
PROT UR STRIP-MCNC: 30 MG/DL
RBC # BLD AUTO: 4.27 M/UL (ref 4.1–5.7)
RBC #/AREA URNS HPF: >100 /HPF (ref 0–5)
RBC MORPH BLD: ABNORMAL
SODIUM SERPL-SCNC: 143 MMOL/L (ref 136–145)
SP GR UR REFRACTOMETRY: 1.02 (ref 1–1.03)
SPECIMEN HOLD: NORMAL
TSH SERPL DL<=0.05 MIU/L-ACNC: 1.88 UIU/ML (ref 0.36–3.74)
UROBILINOGEN UR QL STRIP.AUTO: 1 EU/DL (ref 0.2–1)
WBC # BLD AUTO: 6.8 K/UL (ref 4.1–11.1)
WBC URNS QL MICRO: ABNORMAL /HPF (ref 0–4)

## 2024-09-12 RX ORDER — CIPROFLOXACIN 250 MG/1
250 TABLET, FILM COATED ORAL 2 TIMES DAILY
Qty: 14 TABLET | Refills: 0 | Status: SHIPPED | OUTPATIENT
Start: 2024-09-12 | End: 2024-09-19

## 2024-09-13 DIAGNOSIS — N39.0 URINARY TRACT INFECTION WITHOUT HEMATURIA, SITE UNSPECIFIED: Primary | ICD-10-CM

## 2024-09-14 LAB
BACTERIA SPEC CULT: NORMAL
CC UR VC: NORMAL
SERVICE CMNT-IMP: NORMAL

## 2024-09-18 ENCOUNTER — TELEPHONE (OUTPATIENT)
Age: 89
End: 2024-09-18

## 2024-09-19 ENCOUNTER — TELEPHONE (OUTPATIENT)
Age: 89
End: 2024-09-19

## 2024-09-20 ENCOUNTER — OFFICE VISIT (OUTPATIENT)
Age: 89
End: 2024-09-20
Payer: MEDICARE

## 2024-09-20 VITALS
DIASTOLIC BLOOD PRESSURE: 72 MMHG | HEART RATE: 69 BPM | SYSTOLIC BLOOD PRESSURE: 136 MMHG | WEIGHT: 167.4 LBS | TEMPERATURE: 97.9 F | RESPIRATION RATE: 16 BRPM | HEIGHT: 63 IN | OXYGEN SATURATION: 98 % | BODY MASS INDEX: 29.66 KG/M2

## 2024-09-20 DIAGNOSIS — F05 SUNDOWNING: ICD-10-CM

## 2024-09-20 DIAGNOSIS — R44.3 HALLUCINATIONS: Primary | ICD-10-CM

## 2024-09-20 DIAGNOSIS — R45.1 AGITATION: ICD-10-CM

## 2024-09-20 DIAGNOSIS — G47.33 OSA ON CPAP: ICD-10-CM

## 2024-09-20 PROCEDURE — 99214 OFFICE O/P EST MOD 30 MIN: CPT | Performed by: INTERNAL MEDICINE

## 2024-09-20 PROCEDURE — G8417 CALC BMI ABV UP PARAM F/U: HCPCS | Performed by: INTERNAL MEDICINE

## 2024-09-20 PROCEDURE — G8427 DOCREV CUR MEDS BY ELIG CLIN: HCPCS | Performed by: INTERNAL MEDICINE

## 2024-09-20 PROCEDURE — 1036F TOBACCO NON-USER: CPT | Performed by: INTERNAL MEDICINE

## 2024-09-20 PROCEDURE — 1123F ACP DISCUSS/DSCN MKR DOCD: CPT | Performed by: INTERNAL MEDICINE

## 2024-09-20 RX ORDER — WARFARIN SODIUM 2.5 MG/1
TABLET ORAL
Qty: 30 TABLET | Refills: 3 | Status: SHIPPED | OUTPATIENT
Start: 2024-09-20

## 2024-09-20 RX ORDER — ENOXAPARIN SODIUM 100 MG/ML
80 INJECTION SUBCUTANEOUS 2 TIMES DAILY
Status: CANCELLED | OUTPATIENT
Start: 2024-09-20

## 2024-09-20 RX ORDER — MULTIVITAMIN WITH IRON
TABLET ORAL
COMMUNITY

## 2024-09-21 ENCOUNTER — TELEPHONE (OUTPATIENT)
Age: 89
End: 2024-09-21

## 2024-09-23 ENCOUNTER — PHARMACY VISIT (OUTPATIENT)
Age: 89
End: 2024-09-23
Payer: MEDICARE

## 2024-09-23 VITALS — WEIGHT: 167 LBS | HEIGHT: 63 IN | BODY MASS INDEX: 29.59 KG/M2

## 2024-09-23 DIAGNOSIS — I48.20 CHRONIC ATRIAL FIBRILLATION, UNSPECIFIED (HCC): ICD-10-CM

## 2024-09-23 DIAGNOSIS — Z95.2 S/P AVR: Primary | ICD-10-CM

## 2024-09-23 LAB
POC INR: 1.8
PROTHROMBIN TIME, POC: 20.2

## 2024-09-23 PROCEDURE — PBSHW AMB POC PT/INR: Performed by: PHARMACIST

## 2024-09-23 PROCEDURE — 85610 PROTHROMBIN TIME: CPT | Performed by: PHARMACIST

## 2024-09-23 PROCEDURE — PBSHW PBB SHADOW CHARGE: Performed by: PHARMACIST

## 2024-09-23 PROCEDURE — 99211 OFF/OP EST MAY X REQ PHY/QHP: CPT | Performed by: PHARMACIST

## 2024-09-24 ENCOUNTER — TELEPHONE (OUTPATIENT)
Age: 89
End: 2024-09-24

## 2024-09-24 ENCOUNTER — PHARMACY VISIT (OUTPATIENT)
Age: 89
End: 2024-09-24
Payer: MEDICARE

## 2024-09-24 VITALS
DIASTOLIC BLOOD PRESSURE: 79 MMHG | WEIGHT: 167 LBS | HEART RATE: 67 BPM | BODY MASS INDEX: 29.59 KG/M2 | SYSTOLIC BLOOD PRESSURE: 130 MMHG | OXYGEN SATURATION: 98 % | HEIGHT: 63 IN

## 2024-09-24 DIAGNOSIS — I48.20 CHRONIC ATRIAL FIBRILLATION, UNSPECIFIED (HCC): ICD-10-CM

## 2024-09-24 DIAGNOSIS — Z95.2 S/P AVR: Primary | ICD-10-CM

## 2024-09-24 LAB
POC INR: 3
PROTHROMBIN TIME, POC: 36.2

## 2024-09-24 PROCEDURE — PBSHW AMB POC PT/INR: Performed by: PHARMACIST

## 2024-09-24 PROCEDURE — PBSHW PBB SHADOW CHARGE: Performed by: PHARMACIST

## 2024-09-24 PROCEDURE — 85610 PROTHROMBIN TIME: CPT | Performed by: PHARMACIST

## 2024-09-24 PROCEDURE — 99211 OFF/OP EST MAY X REQ PHY/QHP: CPT | Performed by: PHARMACIST

## 2024-09-24 NOTE — TELEPHONE ENCOUNTER
Regla from [facility]     States order received did not specify which dicipline was needed    Requested correction/update.      Per psr consult with Dr Nguyen, Dr Nguyen updated form to state: PT AND OT    Psr re faxed form and a requested demographic with insurance info.    No further action required.        (Confirmation scanned to media)

## 2024-09-28 NOTE — PROGRESS NOTES
instructed to avoid any OTC items containing aspirin or ibuprofen and prior to starting any new OTC products to consult with his physician or pharmacist to ensure no drug interactions are present.  Mr. Chun was instructed to avoid any major changes in his general diet and to avoid alcohol consumption.    Mr. Chun was provided information in the AVS that includes topics on understanding coumadin therapy, drug interaction considerations, vitamin K and coumadin use, interactions with foods and supplements containing vitamin K, and the use of herbal products.    Mr. Chun verbalized his understanding of all instructions and will call the office with any questions, concerns, or signs of abnormal bleeding or blood clot.  Notifications of recommendations will be sent to Ryan Villarreal MD for review.    Thank you,  Marisel Richardson, PharmD, BCACP, [facility]          For Pharmacy Admin Tracking Only    Program: Medical Group  CPA in place:  Yes  Recommendation Provided To: Patient/Caregiver: 3 via In person  Intervention Detail: Adherence Monitorin, Lab(s) Ordered, and Scheduled Appointment  Intervention Accepted By: Patient/Caregiver: 3  Gap Closed?: Yes   Time Spent (min): 20

## 2024-09-28 NOTE — PATIENT INSTRUCTIONS
Today your INR was 2.6.      Your goal INR is  2.0-3.0 .    You have a 2.5 mg tablet of Coumadin (warfarin).   Take Coumadin (warfarin) as follows:    Continue warfarin 2.5 mg daily.    Come back in 1  week(s) for your next finger stick/INR blood test.        Avoid any over the counter items containing aspirin or ibuprofen, and avoid great swings in general diet.  Avoid alcohol consumption.  Please notify the INR nurse if you are started on any new medication including over the counter or herbal supplements. Also, please notify your INR nurse if any of your other prescription or over the counter medications have been discontinued.     Call [facility] at [phone] if you have any signs of abnormal bleeding/blood clot.  ------------------------------------------------------------------------------------------------------------------  Taking Warfarin Safely: Care Instructions    Your Care Instructions  Warfarin is a medicine that you take to prevent blood clots. It is often called a blood thinner. Doctors give warfarin (such as Coumadin) to reduce the risk of blood clots. You may be at risk for blood clots if you have atrial fibrillation or deep vein thrombosis. Some other health problems may also put you at risk.  Warfarin slows the amount of time it takes for your blood to clot. It can cause bleeding problems. Even if you've been taking warfarin for a while, it's important to know how to take it safely.  Foods and other medicines can affect the way warfarin works. Some can make warfarin work too well. This can cause bleeding problems. And some can make it work poorly, so that it does not prevent blood clots very well.  You will need regular blood tests to check how long it takes for your blood to form a clot. This test is called a PT or prothrombin time test. The result of the test is called an INR level. Depending on the test results, your doctor or anticoagulation clinic may adjust your dose of

## 2024-09-30 ENCOUNTER — ANTI-COAG VISIT (OUTPATIENT)
Age: 89
End: 2024-09-30
Payer: MEDICARE

## 2024-09-30 VITALS — OXYGEN SATURATION: 97 % | DIASTOLIC BLOOD PRESSURE: 78 MMHG | HEART RATE: 68 BPM | SYSTOLIC BLOOD PRESSURE: 134 MMHG

## 2024-09-30 DIAGNOSIS — I48.20 CHRONIC ATRIAL FIBRILLATION, UNSPECIFIED (HCC): Primary | ICD-10-CM

## 2024-09-30 DIAGNOSIS — Z95.2 S/P AVR: ICD-10-CM

## 2024-09-30 LAB
POC INR: 2.6
PROTHROMBIN TIME, POC: 30.7

## 2024-09-30 PROCEDURE — 85610 PROTHROMBIN TIME: CPT | Performed by: PHARMACIST

## 2024-09-30 PROCEDURE — 99211 OFF/OP EST MAY X REQ PHY/QHP: CPT | Performed by: PHARMACIST

## 2024-10-05 NOTE — PATIENT INSTRUCTIONS
Today your INR was 2.0.      Your goal INR is  2.0-3.0 .    You have a   2.5 mg tablet of Coumadin (warfarin).   Take Coumadin (warfarin) as follows:    Take 3.75 mg tonight. Then continue warfarin 2.5 mg daily.    Come back in 3 week(s) for your next finger stick/INR blood test. Same day as your upcoming appt with Dr Nguyen     Avoid any over the counter items containing aspirin or ibuprofen, and avoid great swings in general diet.  Avoid alcohol consumption.  Please notify the INR nurse if you are started on any new medication including over the counter or herbal supplements. Also, please notify your INR nurse if any of your other prescription or over the counter medications have been discontinued.     Call [facility] at [phone] if you have any signs of abnormal bleeding/blood clot.  ------------------------------------------------------------------------------------------------------------------  Taking Warfarin Safely: Care Instructions    Your Care Instructions  Warfarin is a medicine that you take to prevent blood clots. It is often called a blood thinner. Doctors give warfarin (such as Coumadin) to reduce the risk of blood clots. You may be at risk for blood clots if you have atrial fibrillation or deep vein thrombosis. Some other health problems may also put you at risk.  Warfarin slows the amount of time it takes for your blood to clot. It can cause bleeding problems. Even if you've been taking warfarin for a while, it's important to know how to take it safely.  Foods and other medicines can affect the way warfarin works. Some can make warfarin work too well. This can cause bleeding problems. And some can make it work poorly, so that it does not prevent blood clots very well.  You will need regular blood tests to check how long it takes for your blood to form a clot. This test is called a PT or prothrombin time test. The result of the test is called an INR level. Depending on the test

## 2024-10-05 NOTE — PROGRESS NOTES
Pharmacy Progress Note - Anticoagulation Management    S/O: Mr. Corby Chun  is a 89 y.o. male, referred by Ryan Villarreal MD, was seen today for anticoagulation management for the diagnosis of Atrial Fibrillation and Aortic Valve Replacement  ([facility] -1996), s/p pacemaker.  Pt is very hard of hearing.       Warfarin start date: ~1996 / restart Sept 2024  INR Goal:  2.0-3.0    Current warfarin regimen:   2.5 mg daily   Warfarin tablet strength:  2.5 mg   Duration of therapy: indefinite    Today's pertinent positives includes:  No significant changes since last visit    Results for orders placed or performed in visit on 10/09/24   AMB POC PT/INR   Result Value Ref Range    Prothrombin time, POC 23.5     POC INR 2.0      Adherence:   Able to recall regimen? YES  Miss/extra dose? YES -- missed 1 dose on Monday PM  Need refill? NO    Upcoming procedure(s):  NO      Past Medical History:   Diagnosis Date    Acute on chronic systolic heart failure, NYHA class 4 (HCC) 03/01/2019    admit March 2019 7 days, EF 10% got Primacor, BIV implant    Anger     TAKES PAROXETINE TO CONTROL ANGER ISSUES    Arthritis     Burning with urination     CAD (coronary artery disease) 1996    CABG 1996, fu mya 2015 fixed inferior defect    Cancer (HCC)     prostate    Chronic atrial fibrillation (HCC) 03/2019    RVR3/2019, had ablation of flutter- persistent    Chronic kidney disease     Chronic pain     bilat knees    Coagulation disorder (HCC)     Foot drop, left     WEARS METAL BRACE    Gout     Hard of hearing     Heart failure (HCC)     High cholesterol     Hypertension     Long term current use of anticoagulant therapy     Prostate enlargement     Sleep apnea     Thyroid disease     Unspecified sleep apnea     DOESN'T USE CPAP; \"IT MADE HIM SICK-COLDS, SINUS\", PER WIFE     No Known Allergies  Current Outpatient Medications   Medication Sig    vitamin B-12 (CYANOCOBALAMIN) 50 MCG tablet 1000 mg daily    Multiple Vitamin

## 2024-10-09 ENCOUNTER — ANTI-COAG VISIT (OUTPATIENT)
Age: 89
End: 2024-10-09
Payer: MEDICARE

## 2024-10-09 VITALS — WEIGHT: 167 LBS | HEIGHT: 63 IN | BODY MASS INDEX: 29.59 KG/M2

## 2024-10-09 DIAGNOSIS — Z23 NEEDS FLU SHOT: ICD-10-CM

## 2024-10-09 DIAGNOSIS — Z95.2 S/P AVR: Primary | ICD-10-CM

## 2024-10-09 DIAGNOSIS — I48.20 CHRONIC ATRIAL FIBRILLATION, UNSPECIFIED (HCC): ICD-10-CM

## 2024-10-09 LAB
POC INR: 2
PROTHROMBIN TIME, POC: 23.5

## 2024-10-09 PROCEDURE — 99211 OFF/OP EST MAY X REQ PHY/QHP: CPT | Performed by: PHARMACIST

## 2024-10-09 PROCEDURE — PBSHW AMB POC PT/INR: Performed by: PHARMACIST

## 2024-10-09 PROCEDURE — PBSHW INFLUENZA, FLUAD TRIVALENT, (AGE 65 Y+), IM, PRESERVATIVE FREE, 0.5ML: Performed by: PHARMACIST

## 2024-10-09 PROCEDURE — PBSHW PBB SHADOW CHARGE: Performed by: PHARMACIST

## 2024-10-09 PROCEDURE — 90653 IIV ADJUVANT VACCINE IM: CPT | Performed by: PHARMACIST

## 2024-10-09 PROCEDURE — 85610 PROTHROMBIN TIME: CPT | Performed by: PHARMACIST

## 2024-10-22 ENCOUNTER — TELEPHONE (OUTPATIENT)
Age: 89
End: 2024-10-22

## 2024-10-22 ENCOUNTER — TELEPHONE (OUTPATIENT)
Age: 88
End: 2024-10-22

## 2024-10-22 DIAGNOSIS — R45.1 AGITATION: ICD-10-CM

## 2024-10-22 DIAGNOSIS — R44.3 HALLUCINATIONS: Primary | ICD-10-CM

## 2024-10-22 DIAGNOSIS — F05 SUNDOWNING: ICD-10-CM

## 2024-10-22 NOTE — TELEPHONE ENCOUNTER
Spoke with patient wife, Ashley. Advised referral opened. Pt scheduled. Advised per PCP needs to get head CT done before and will discuss Dr. Carlos scheduling after results. Wife verbalized understanding.   Central scheduling number given.

## 2024-10-22 NOTE — TELEPHONE ENCOUNTER
Regarding referral to : Dr Carlos  Unable to see pt until June 2025  Is there another provider pt can go to or is it ok to wait that long?      Regarding referral to: Dr Christie  States please re-open referral so pt can schedule.              Call to advise.

## 2024-10-23 ENCOUNTER — TELEPHONE (OUTPATIENT)
Age: 89
End: 2024-10-23

## 2024-10-23 DIAGNOSIS — R44.3 HALLUCINATIONS: Primary | ICD-10-CM

## 2024-10-23 RX ORDER — DONEPEZIL HYDROCHLORIDE 5 MG/1
5 TABLET, FILM COATED ORAL NIGHTLY
Qty: 30 TABLET | Refills: 1 | Status: SHIPPED | OUTPATIENT
Start: 2024-10-23

## 2024-10-23 RX ORDER — QUETIAPINE FUMARATE 25 MG/1
25 TABLET, FILM COATED ORAL
Qty: 30 TABLET | Refills: 0 | Status: SHIPPED | OUTPATIENT
Start: 2024-10-23

## 2024-10-23 NOTE — TELEPHONE ENCOUNTER
Returned page Tuesday oct 22 at 19:00.  Spoke with pts wife and daughter.   Pt with worsening sundowners with severe agitation and paranoid behavior.  Worried that somebody is in the house and going to kill them.   Has appt with dr dias next week.

## 2024-10-23 NOTE — TELEPHONE ENCOUNTER
Called pts daughter Ivette. She reports pt is having nightly auditory and visual hallucinations and becomes paranoid. Up much of the night sundowning, Seems to be ok during the day and no obvious memory or cognitive impairment during the day. Has severe hearing loss and we discussed seeing ent/audiologist. I sent in seroquel 25 mg hs and aricept 5 mg hs. Has fu appt with me next week and will see neuropsych in about 2 weeks

## 2024-10-25 ENCOUNTER — TELEPHONE (OUTPATIENT)
Age: 88
End: 2024-10-25

## 2024-10-25 NOTE — TELEPHONE ENCOUNTER
Spoke with Ivette. Advised [facility] should be able to see pt hx and meds through care everywhere. Ivette verbalized understanding, was concerned as did not have time to bring meds/list with her to ER
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
## 2024-10-31 ENCOUNTER — TELEPHONE (OUTPATIENT)
Age: 88
End: 2024-10-31
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
## 2024-10-31 NOTE — TELEPHONE ENCOUNTER
Patient's wife Ashley states she needs a call back from Dr. Nguyen in reference to patient that is inpatient at [facility]. Ashley states patient is due to have MRI today & be released but she is concerned about coming home with Medication that could possibly cause Hallucinations that Ashley reports that's why patient went in.. Please call to discuss & advise. Thank you

## 2024-11-04 ENCOUNTER — TELEPHONE (OUTPATIENT)
Age: 88
End: 2024-11-04

## 2024-11-04 ENCOUNTER — OFFICE VISIT (OUTPATIENT)
Age: 89
End: 2024-11-04
Payer: MEDICARE

## 2024-11-04 ENCOUNTER — TELEPHONE (OUTPATIENT)
Age: 89
End: 2024-11-04

## 2024-11-04 DIAGNOSIS — F03.B2 MODERATE DEMENTIA WITH PSYCHOTIC DISTURBANCE, UNSPECIFIED DEMENTIA TYPE (HCC): Primary | ICD-10-CM

## 2024-11-04 DIAGNOSIS — G30.1 LATE ONSET ALZHEIMER'S DEMENTIA WITHOUT BEHAVIORAL DISTURBANCE, PSYCHOTIC DISTURBANCE, MOOD DISTURBANCE, OR ANXIETY, UNSPECIFIED DEMENTIA SEVERITY (HCC): Primary | ICD-10-CM

## 2024-11-04 DIAGNOSIS — F02.80 LATE ONSET ALZHEIMER'S DEMENTIA WITHOUT BEHAVIORAL DISTURBANCE, PSYCHOTIC DISTURBANCE, MOOD DISTURBANCE, OR ANXIETY, UNSPECIFIED DEMENTIA SEVERITY (HCC): Primary | ICD-10-CM

## 2024-11-04 PROCEDURE — 1036F TOBACCO NON-USER: CPT | Performed by: CLINICAL NEUROPSYCHOLOGIST

## 2024-11-04 PROCEDURE — 90791 PSYCH DIAGNOSTIC EVALUATION: CPT | Performed by: CLINICAL NEUROPSYCHOLOGIST

## 2024-11-04 PROCEDURE — 1123F ACP DISCUSS/DSCN MKR DOCD: CPT | Performed by: CLINICAL NEUROPSYCHOLOGIST

## 2024-11-04 RX ORDER — OLANZAPINE 5 MG/1
5 TABLET ORAL 2 TIMES DAILY PRN
Qty: 60 TABLET | Refills: 3 | Status: ON HOLD | OUTPATIENT
Start: 2024-11-04

## 2024-11-04 RX ORDER — DONEPEZIL HYDROCHLORIDE 5 MG/1
5 TABLET, FILM COATED ORAL NIGHTLY
Qty: 30 TABLET | Refills: 3 | Status: SHIPPED | OUTPATIENT
Start: 2024-11-04 | End: 2024-11-04

## 2024-11-04 RX ORDER — QUETIAPINE FUMARATE 50 MG/1
50 TABLET, FILM COATED ORAL EVERY EVENING
Qty: 30 TABLET | Refills: 3 | Status: SHIPPED | OUTPATIENT
Start: 2024-11-04 | End: 2024-11-04

## 2024-11-04 NOTE — TELEPHONE ENCOUNTER
Pt daughter called in stating that pt is still hallucinating badly and would like for Dr Nguyen to send in medication he was on before just possibly a higher dose      Please call pt daughter hakan with any questions [phone]    Please call daughter [facility] stated he had a adverts reaction to a certain medication    Ambulatory

## 2024-11-04 NOTE — TELEPHONE ENCOUNTER
Returned call to daughter -still having hallucinations and paranoid behaviors.  Had initial appt at [facility] today and will get neuropsych testing.  Advised daughter to have pt  increase the zyprexa to 5 mg bid prn and restart aricept for probable dementia.

## 2024-11-04 NOTE — PROGRESS NOTES
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  These falls occur a few times per month and they attributed the following to balance issues.  They denied the presence of tremor, rigidity, or slowed movement.  He is experiencing some urinary incontinence but they denied other symptoms of autonomic dysfunction.  He has significant hearing loss but the pocket talker was helpful during the interview.    Family neurological history: Dementia in the patient's father    Current Outpatient Medications:     QUEtiapine (SEROQUEL) 25 MG tablet, Take 1 tablet by mouth nightly, Disp: 30 tablet, Rfl: 0    donepezil (ARICEPT) 5 MG tablet, Take 1 tablet by mouth nightly, Disp: 30 tablet, Rfl: 1    vitamin B-12 (CYANOCOBALAMIN) 50 MCG tablet, 1000 mg daily, Disp: , Rfl:     Multiple Vitamin (MULTIVITAMIN ADULT PO), take 1 tablet by oral route once daily, Disp: , Rfl:     warfarin (COUMADIN) 2.5 MG tablet, 2 tabs daily for 3 days then 1 tab daily, Disp: 30 tablet, Rfl: 3    levothyroxine (SYNTHROID) 150 MCG tablet, Take 1 tablet by mouth Daily, Disp: 90 tablet, Rfl: 3    PARoxetine (PAXIL) 20 MG tablet, Take 1 tablet by mouth daily, Disp: 90 tablet, Rfl: 3    tamsulosin (FLOMAX) 0.4 MG capsule, TAKE 2 CAPSULES BY MOUTH AT BEDTIME TO IMPROVE URINE FLOW (Patient taking differently: Take 1 capsule by mouth daily), Disp: 180 capsule, Rfl: 2    Psyllium (METAMUCIL 3 IN 1 DAILY FIBER PO), Take 1 Capful by mouth 1 (one) time if needed (constipation), Disp: , Rfl:     Probiotic Product (PROBIOTIC BLEND PO), Take 1 Capful by mouth Daily, Disp: , Rfl:     sacubitril-valsartan (ENTRESTO) 24-26 MG per tablet, TAKE 1 TABLET

## 2024-11-08 ENCOUNTER — APPOINTMENT (OUTPATIENT)
Facility: HOSPITAL | Age: 89
DRG: 690 | End: 2024-11-08
Payer: MEDICARE

## 2024-11-08 ENCOUNTER — HOSPITAL ENCOUNTER (INPATIENT)
Facility: HOSPITAL | Age: 89
LOS: 6 days | Discharge: NURSING, REHAB OR LONG-TERM CARE | DRG: 690 | End: 2024-11-14
Attending: EMERGENCY MEDICINE | Admitting: STUDENT IN AN ORGANIZED HEALTH CARE EDUCATION/TRAINING PROGRAM
Payer: MEDICARE

## 2024-11-08 DIAGNOSIS — R05.9 COUGH, UNSPECIFIED TYPE: ICD-10-CM

## 2024-11-08 DIAGNOSIS — J96.01 ACUTE RESPIRATORY FAILURE WITH HYPOXIA: ICD-10-CM
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
## 2024-11-08 DIAGNOSIS — H91.93 BILATERAL HEARING LOSS, UNSPECIFIED HEARING LOSS TYPE: Primary | ICD-10-CM

## 2024-11-08 DIAGNOSIS — R79.1 SUBTHERAPEUTIC INTERNATIONAL NORMALIZED RATIO (INR): ICD-10-CM

## 2024-11-08 PROBLEM — R05.8 COUGH WITH SPUTUM: Status: ACTIVE | Noted: 2024-11-08

## 2024-11-08 LAB
ALBUMIN SERPL-MCNC: 3.4 G/DL (ref 3.5–5)
ALBUMIN/GLOB SERPL: 1 (ref 1.1–2.2)
ALP SERPL-CCNC: 110 U/L (ref 45–117)
ALT SERPL-CCNC: 55 U/L (ref 12–78)
ANION GAP SERPL CALC-SCNC: 6 MMOL/L (ref 2–12)
AST SERPL-CCNC: 40 U/L (ref 15–37)
BASOPHILS # BLD: 0 K/UL (ref 0–0.1)
BASOPHILS NFR BLD: 1 % (ref 0–1)
BILIRUB SERPL-MCNC: 1 MG/DL (ref 0.2–1)
BUN SERPL-MCNC: 30 MG/DL (ref 6–20)
BUN/CREAT SERPL: 20 (ref 12–20)
CALCIUM SERPL-MCNC: 9.3 MG/DL (ref 8.5–10.1)
CHLORIDE SERPL-SCNC: 111 MMOL/L (ref 97–108)
CO2 SERPL-SCNC: 28 MMOL/L (ref 21–32)
CREAT SERPL-MCNC: 1.47 MG/DL (ref 0.7–1.3)
DIFFERENTIAL METHOD BLD: ABNORMAL
EOSINOPHIL # BLD: 0.1 K/UL (ref 0–0.4)
EOSINOPHIL NFR BLD: 2 % (ref 0–7)
ERYTHROCYTE [DISTWIDTH] IN BLOOD BY AUTOMATED COUNT: 14.2 % (ref 11.5–14.5)
FLUAV RNA SPEC QL NAA+PROBE: NOT DETECTED
FLUBV RNA SPEC QL NAA+PROBE: NOT DETECTED
GLOBULIN SER CALC-MCNC: 3.3 G/DL (ref 2–4)
GLUCOSE SERPL-MCNC: 126 MG/DL (ref 65–100)
HCT VFR BLD AUTO: 38.7 % (ref 36.6–50.3)
HGB BLD-MCNC: 12.2 G/DL (ref 12.1–17)
IMM GRANULOCYTES # BLD AUTO: 0 K/UL (ref 0–0.04)
IMM GRANULOCYTES NFR BLD AUTO: 0 % (ref 0–0.5)
INR PPP: 1.6 (ref 0.9–1.1)
LACTATE BLD-SCNC: 1.25 MMOL/L (ref 0.4–2)
LYMPHOCYTES # BLD: 0.4 K/UL (ref 0.8–3.5)
LYMPHOCYTES NFR BLD: 10 % (ref 12–49)
MCH RBC QN AUTO: 31.9 PG (ref 26–34)
MCHC RBC AUTO-ENTMCNC: 31.5 G/DL (ref 30–36.5)
MCV RBC AUTO: 101 FL (ref 80–99)
MONOCYTES # BLD: 0.5 K/UL (ref 0–1)
MONOCYTES NFR BLD: 11 % (ref 5–13)
NEUTS SEG # BLD: 3.1 K/UL (ref 1.8–8)
NEUTS SEG NFR BLD: 76 % (ref 32–75)
NRBC # BLD: 0 K/UL (ref 0–0.01)
NRBC BLD-RTO: 0 PER 100 WBC
PLATELET # BLD AUTO: 128 K/UL (ref 150–400)
PMV BLD AUTO: 10.9 FL (ref 8.9–12.9)
POTASSIUM SERPL-SCNC: 3.7 MMOL/L (ref 3.5–5.1)
PROCALCITONIN SERPL-MCNC: 0.29 NG/ML
PROT SERPL-MCNC: 6.7 G/DL (ref 6.4–8.2)
PROTHROMBIN TIME: 16.3 SEC (ref 9–11.1)
RBC # BLD AUTO: 3.83 M/UL (ref 4.1–5.7)
RBC MORPH BLD: ABNORMAL
SARS-COV-2 RNA RESP QL NAA+PROBE: NOT DETECTED
SODIUM SERPL-SCNC: 145 MMOL/L (ref 136–145)
SOURCE: NORMAL
TROPONIN I SERPL HS-MCNC: 81 NG/L (ref 0–76)
TROPONIN I SERPL HS-MCNC: 93 NG/L (ref 0–76)
WBC # BLD AUTO: 4.1 K/UL (ref 4.1–11.1)

## 2024-11-08 PROCEDURE — 36415 COLL VENOUS BLD VENIPUNCTURE: CPT

## 2024-11-08 PROCEDURE — 85610 PROTHROMBIN TIME: CPT

## 2024-11-08 PROCEDURE — 1100000000 HC RM PRIVATE

## 2024-11-08 PROCEDURE — 84145 PROCALCITONIN (PCT): CPT

## 2024-11-08 PROCEDURE — 70450 CT HEAD/BRAIN W/O DYE: CPT

## 2024-11-08 PROCEDURE — 87154 CUL TYP ID BLD PTHGN 6+ TRGT: CPT

## 2024-11-08 PROCEDURE — 93005 ELECTROCARDIOGRAM TRACING: CPT | Performed by: EMERGENCY MEDICINE

## 2024-11-08 PROCEDURE — 71045 X-RAY EXAM CHEST 1 VIEW: CPT

## 2024-11-08 PROCEDURE — 87636 SARSCOV2 & INF A&B AMP PRB: CPT

## 2024-11-08 PROCEDURE — 71250 CT THORAX DX C-: CPT

## 2024-11-08 PROCEDURE — 82306 VITAMIN D 25 HYDROXY: CPT

## 2024-11-08 PROCEDURE — 87077 CULTURE AEROBIC IDENTIFY: CPT

## 2024-11-08 PROCEDURE — 82607 VITAMIN B-12: CPT

## 2024-11-08 PROCEDURE — 99285 EMERGENCY DEPT VISIT HI MDM: CPT

## 2024-11-08 PROCEDURE — 87186 SC STD MICRODIL/AGAR DIL: CPT

## 2024-11-08 PROCEDURE — 83605 ASSAY OF LACTIC ACID: CPT

## 2024-11-08 PROCEDURE — 85025 COMPLETE CBC W/AUTO DIFF WBC: CPT

## 2024-11-08 PROCEDURE — 87040 BLOOD CULTURE FOR BACTERIA: CPT

## 2024-11-08 PROCEDURE — 84439 ASSAY OF FREE THYROXINE: CPT

## 2024-11-08 PROCEDURE — 82746 ASSAY OF FOLIC ACID SERUM: CPT

## 2024-11-08 PROCEDURE — 0202U NFCT DS 22 TRGT SARS-COV-2: CPT

## 2024-11-08 PROCEDURE — 84443 ASSAY THYROID STIM HORMONE: CPT

## 2024-11-08 PROCEDURE — 81001 URINALYSIS AUTO W/SCOPE: CPT

## 2024-11-08 PROCEDURE — 83880 ASSAY OF NATRIURETIC PEPTIDE: CPT

## 2024-11-08 PROCEDURE — 84484 ASSAY OF TROPONIN QUANT: CPT

## 2024-11-08 PROCEDURE — 80053 COMPREHEN METABOLIC PANEL: CPT

## 2024-11-08 RX ORDER — ALLOPURINOL 100 MG/1
300 TABLET ORAL DAILY
Status: DISCONTINUED | OUTPATIENT
Start: 2024-11-09 | End: 2024-11-14 | Stop reason: HOSPADM

## 2024-11-08 RX ORDER — ACETAMINOPHEN 325 MG/1
650 TABLET ORAL EVERY 6 HOURS PRN
Status: DISCONTINUED | OUTPATIENT
Start: 2024-11-08 | End: 2024-11-14 | Stop reason: HOSPADM

## 2024-11-08 RX ORDER — ONDANSETRON 2 MG/ML
4 INJECTION INTRAMUSCULAR; INTRAVENOUS EVERY 6 HOURS PRN
Status: DISCONTINUED | OUTPATIENT
Start: 2024-11-08 | End: 2024-11-14 | Stop reason: HOSPADM

## 2024-11-08 RX ORDER — FINASTERIDE 5 MG/1
5 TABLET, FILM COATED ORAL DAILY
Status: DISCONTINUED | OUTPATIENT
Start: 2024-11-09 | End: 2024-11-14 | Stop reason: HOSPADM

## 2024-11-08 RX ORDER — ACETAMINOPHEN 650 MG/1
650 SUPPOSITORY RECTAL EVERY 6 HOURS PRN
Status: DISCONTINUED | OUTPATIENT
Start: 2024-11-08 | End: 2024-11-14 | Stop reason: HOSPADM

## 2024-11-08 RX ORDER — ONDANSETRON 4 MG/1
4 TABLET, ORALLY DISINTEGRATING ORAL EVERY 8 HOURS PRN
Status: DISCONTINUED | OUTPATIENT
Start: 2024-11-08 | End: 2024-11-14 | Stop reason: HOSPADM

## 2024-11-08 RX ORDER — TAMSULOSIN HYDROCHLORIDE 0.4 MG/1
0.4 CAPSULE ORAL DAILY
Status: DISCONTINUED | OUTPATIENT
Start: 2024-11-09 | End: 2024-11-14 | Stop reason: HOSPADM

## 2024-11-08 RX ORDER — OLANZAPINE 5 MG/1
5 TABLET ORAL 2 TIMES DAILY PRN
Status: DISCONTINUED | OUTPATIENT
Start: 2024-11-08 | End: 2024-11-12

## 2024-11-08 RX ORDER — POLYETHYLENE GLYCOL 3350 17 G/17G
17 POWDER, FOR SOLUTION ORAL DAILY PRN
Status: DISCONTINUED | OUTPATIENT
Start: 2024-11-08 | End: 2024-11-14 | Stop reason: HOSPADM

## 2024-11-08 RX ORDER — ELECTROLYTES/DEXTROSE
1 SOLUTION, ORAL ORAL DAILY
Status: DISCONTINUED | OUTPATIENT
Start: 2024-11-09 | End: 2024-11-08 | Stop reason: SDUPTHER

## 2024-11-08 RX ORDER — SODIUM CHLORIDE 9 MG/ML
INJECTION, SOLUTION INTRAVENOUS PRN
Status: DISCONTINUED | OUTPATIENT
Start: 2024-11-08 | End: 2024-11-14 | Stop reason: HOSPADM

## 2024-11-08 RX ORDER — MAGNESIUM SULFATE IN WATER 40 MG/ML
2000 INJECTION, SOLUTION INTRAVENOUS PRN
Status: DISCONTINUED | OUTPATIENT
Start: 2024-11-08 | End: 2024-11-14 | Stop reason: HOSPADM

## 2024-11-08 RX ORDER — LEVOTHYROXINE SODIUM 75 UG/1
150 TABLET ORAL DAILY
Status: DISCONTINUED | OUTPATIENT
Start: 2024-11-09 | End: 2024-11-14 | Stop reason: HOSPADM

## 2024-11-08 RX ORDER — POTASSIUM CHLORIDE 7.45 MG/ML
10 INJECTION INTRAVENOUS PRN
Status: DISCONTINUED | OUTPATIENT
Start: 2024-11-08 | End: 2024-11-14 | Stop reason: HOSPADM

## 2024-11-08 RX ORDER — WARFARIN SODIUM 5 MG/1
2.5 TABLET ORAL ONCE
Status: COMPLETED | OUTPATIENT
Start: 2024-11-08 | End: 2024-11-09

## 2024-11-08 RX ORDER — UBIDECARENONE 75 MG
50 CAPSULE ORAL DAILY
Status: DISCONTINUED | OUTPATIENT
Start: 2024-11-09 | End: 2024-11-14 | Stop reason: HOSPADM

## 2024-11-08 RX ORDER — GUAIFENESIN 200 MG/10ML
200 LIQUID ORAL EVERY 4 HOURS PRN
Status: DISCONTINUED | OUTPATIENT
Start: 2024-11-08 | End: 2024-11-14 | Stop reason: HOSPADM

## 2024-11-08 RX ORDER — OXYBUTYNIN CHLORIDE 5 MG/1
10 TABLET, EXTENDED RELEASE ORAL DAILY
Status: DISCONTINUED | OUTPATIENT
Start: 2024-11-09 | End: 2024-11-14 | Stop reason: HOSPADM

## 2024-11-08 RX ORDER — BENZONATATE 100 MG/1
100 CAPSULE ORAL 3 TIMES DAILY PRN
Status: DISCONTINUED | OUTPATIENT
Start: 2024-11-08 | End: 2024-11-14 | Stop reason: HOSPADM

## 2024-11-08 RX ORDER — ATORVASTATIN CALCIUM 40 MG/1
40 TABLET, FILM COATED ORAL NIGHTLY
Status: DISCONTINUED | OUTPATIENT
Start: 2024-11-08 | End: 2024-11-14 | Stop reason: HOSPADM

## 2024-11-08 RX ORDER — M-VIT,TX,IRON,MINS/CALC/FOLIC 27MG-0.4MG
1 TABLET ORAL DAILY
Status: DISCONTINUED | OUTPATIENT
Start: 2024-11-09 | End: 2024-11-14 | Stop reason: HOSPADM

## 2024-11-08 RX ORDER — SODIUM CHLORIDE 0.9 % (FLUSH) 0.9 %
5-40 SYRINGE (ML) INJECTION EVERY 12 HOURS SCHEDULED
Status: DISCONTINUED | OUTPATIENT
Start: 2024-11-08 | End: 2024-11-14 | Stop reason: HOSPADM

## 2024-11-08 RX ORDER — POTASSIUM CHLORIDE 1500 MG/1
40 TABLET, EXTENDED RELEASE ORAL PRN
Status: DISCONTINUED | OUTPATIENT
Start: 2024-11-08 | End: 2024-11-14 | Stop reason: HOSPADM

## 2024-11-08 RX ORDER — ENOXAPARIN SODIUM 100 MG/ML
40 INJECTION SUBCUTANEOUS DAILY
Status: DISCONTINUED | OUTPATIENT
Start: 2024-11-09 | End: 2024-11-08

## 2024-11-08 RX ORDER — PAROXETINE 20 MG/1
20 TABLET, FILM COATED ORAL DAILY
Status: DISCONTINUED | OUTPATIENT
Start: 2024-11-09 | End: 2024-11-14 | Stop reason: HOSPADM

## 2024-11-08 RX ORDER — ALLOPURINOL 300 MG/1
TABLET ORAL
Qty: 30 TABLET | Refills: 11 | Status: ON HOLD | OUTPATIENT
Start: 2024-11-08

## 2024-11-08 RX ORDER — DONEPEZIL HYDROCHLORIDE 5 MG/1
5 TABLET, FILM COATED ORAL NIGHTLY
Status: DISCONTINUED | OUTPATIENT
Start: 2024-11-08 | End: 2024-11-14 | Stop reason: HOSPADM

## 2024-11-08 RX ORDER — SODIUM CHLORIDE 0.9 % (FLUSH) 0.9 %
5-40 SYRINGE (ML) INJECTION PRN
Status: DISCONTINUED | OUTPATIENT
Start: 2024-11-08 | End: 2024-11-14 | Stop reason: HOSPADM

## 2024-11-08 RX ORDER — PANTOPRAZOLE SODIUM 40 MG/1
40 TABLET, DELAYED RELEASE ORAL
Status: DISCONTINUED | OUTPATIENT
Start: 2024-11-09 | End: 2024-11-14 | Stop reason: HOSPADM

## 2024-11-08 ASSESSMENT — PAIN - FUNCTIONAL ASSESSMENT
PAIN_FUNCTIONAL_ASSESSMENT: NONE - DENIES PAIN
PAIN_FUNCTIONAL_ASSESSMENT: NONE - DENIES PAIN

## 2024-11-08 ASSESSMENT — PAIN SCALES - GENERAL: PAINLEVEL_OUTOF10: 0

## 2024-11-08 NOTE — ED PROVIDER NOTES
[facility] EMERGENCY DEPT  EMERGENCY DEPARTMENT ENCOUNTER       Pt Name: Corby Chun  MRN: [id number]  Birthdate 1935  Date of evaluation: 11/8/2024  Provider: Ken Bee MD   PCP: Ryan Nguyen MD  Note Started: 5:01 PM EST 11/8/24     CHIEF COMPLAINT       Chief Complaint   Patient presents with    Fall    Altered Mental Status     Pt arrives via EMS from home for ground level fall and altered mental status, wife called [facility] for possible pneumonia. Hx of HF, dementia, vent. Pacemaker, and recent hospitalization at [facility] ago for UTI and hallucinations. Started warfarin one week ago. A&O x1 to self.         HISTORY OF PRESENT ILLNESS: 1 or more elements      History From: patient, EMS, History limited by: AMS     Corby Chun is a 89 y.o. male with a history of atrial fibrillation on anticoagulation (unclear if warfarin or Eliquis), ICD in place, CAD status post CABG, CHF, anemia, CKD presents emergency department after multiple falls.    Patient poor historian.  Reportedly spouse reports multiple ground-level falls.  [facility] was called and initiated EMS due to concern for \"pneumonia.\"    On my assessment, patient denies any complaints.  Recently admitted to [facility] for [facility] week due to UTI and hallucinations.     Please See MDM for Additional Details of the HPI/PMH  Nursing Notes were all reviewed and agreed with or any disagreements were addressed in the HPI.     REVIEW OF SYSTEMS        Positives and Pertinent negatives as per HPI.    PAST HISTORY     Past Medical History:  Past Medical History:   Diagnosis Date    Acute on chronic systolic heart failure, NYHA class 4 (HCC) 03/01/2019    admit March 2019 7 days, EF 10% got Primacor, BIV implant    Anger     TAKES PAROXETINE TO CONTROL ANGER ISSUES    Arthritis     Burning with urination     CAD (coronary artery disease) 1996    CABG 1996, kendra roque 2015 fixed inferior defect    Cancer (HCC)     prostate    Chronic atrial fibrillation (HCC)  Influenza A By PCR Not detected NOTD      Rapid Influenza B By PCR Not detected NOTD     Troponin    Collection Time: 11/08/24  7:35 PM   Result Value Ref Range    Troponin, High Sensitivity 81 (H) 0 - 76 ng/L   POC Lactic Acid    Collection Time: 11/08/24  8:35 PM   Result Value Ref Range    POC Lactic Acid 1.25 0.40 - 2.00 mmol/L       EKG: If performed, independent interpretation documented below in the MDM section     RADIOLOGY:  Non-plain film images such as CT, Ultrasound and MRI are read by the radiologist. Plain radiographic images are visualized and preliminarily interpreted by the ED Provider with the findings documented in the MDM section.     Interpretation per the Radiologist below, if available at the time of this note:     XR CHEST PORTABLE   Final Result      No acute process on portable chest.          Electronically signed by Renetta hCu      CT HEAD WO CONTRAST   Final Result      1.   No acute intracranial findings.            Electronically signed by Brody Camejo           PROCEDURES   Unless otherwise noted below, none  Procedures     CRITICAL CARE TIME   0    EMERGENCY DEPARTMENT COURSE and DIFFERENTIAL DIAGNOSIS/MDM   Vitals:    Vitals:    11/08/24 1642 11/08/24 1956 11/08/24 1958 11/08/24 2000   BP: 108/74   (!) 141/77   Pulse: 70 71 71 70   Resp: 19 23 25 16   Temp: 97.9 °F (36.6 °C)      TempSrc: Oral      SpO2: 95% 98% (!) 89% 100%        Patient was given the following medications:  Medications - No data to display    Medical Decision Making  89-year-old male presents emerged part with chief plaint of ground-level fall.  Vital signs are unremarkable.  Does have a cough but not hypoxic, no respiratory distress.  Check basic labs including INR given patient is on Coumadin possibly.  Check CT head doubt intracranial hemorrhage or other posttraumatic injury.  Check urinalysis and chest x-ray.  Observe emergency department.    Amount and/or Complexity of Data Reviewed  Independent

## 2024-11-08 NOTE — TELEPHONE ENCOUNTER
OLANZapine (ZYPREXA) 5 MG tablet      [facility] - [place], [place] - [address] [phone] - F [phone]

## 2024-11-08 NOTE — TELEPHONE ENCOUNTER
Spoke with patient wife, Ashley. Advised rx was sent to pharmacy on 11/4/2024 and to contact pharmacy. Verbalized understanding.

## 2024-11-08 NOTE — TELEPHONE ENCOUNTER
PCP: Ryan Nguyen MD    Last appt: 9/24/2024   Future Appointments   Date Time Provider Department Center   11/21/2024  2:00 PM Saji Christie PSYD [facility] BS AMB   12/4/2024  1:45 PM Marisel [facility] DEP   6/30/2025  3:00 [facility]       Requested Prescriptions     Pending Prescriptions Disp Refills    allopurinol (ZYLOPRIM) 300 MG tablet 30 tablet 0     Sig: TAKE 1 TABLET BY MOUTH EVERY DAY TO PREVENT GOUT

## 2024-11-09 LAB
25(OH)D3 SERPL-MCNC: 35.7 NG/ML (ref 30–100)
ACB COMPLEX DNA BLD POS QL NAA+NON-PROBE: NOT DETECTED
ACCESSION NUMBER, LLC1M: NORMAL
ALBUMIN SERPL-MCNC: 2.8 G/DL (ref 3.5–5)
ALBUMIN/GLOB SERPL: 1 (ref 1.1–2.2)
ALP SERPL-CCNC: 82 U/L (ref 45–117)
ALT SERPL-CCNC: 43 U/L (ref 12–78)
ANION GAP SERPL CALC-SCNC: 6 MMOL/L (ref 2–12)
APPEARANCE UR: CLEAR
AST SERPL-CCNC: 35 U/L (ref 15–37)
B FRAGILIS DNA BLD POS QL NAA+NON-PROBE: NOT DETECTED
B PERT DNA SPEC QL NAA+PROBE: NOT DETECTED
BACTERIA URNS QL MICRO: ABNORMAL /HPF
BILIRUB SERPL-MCNC: 1.1 MG/DL (ref 0.2–1)
BILIRUB UR QL: NEGATIVE
BIOFIRE TEST COMMENT: NORMAL
BORDETELLA PARAPERTUSSIS BY PCR: NOT DETECTED
BUN SERPL-MCNC: 26 MG/DL (ref 6–20)
BUN/CREAT SERPL: 24 (ref 12–20)
C ALBICANS DNA BLD POS QL NAA+NON-PROBE: NOT DETECTED
C AURIS DNA BLD POS QL NAA+NON-PROBE: NOT DETECTED
C GATTII+NEOFOR DNA BLD POS QL NAA+N-PRB: NOT DETECTED
C GLABRATA DNA BLD POS QL NAA+NON-PROBE: NOT DETECTED
C KRUSEI DNA BLD POS QL NAA+NON-PROBE: NOT DETECTED
C PARAP DNA BLD POS QL NAA+NON-PROBE: NOT DETECTED
C PNEUM DNA SPEC QL NAA+PROBE: NOT DETECTED
C TROPICLS DNA BLD POS QL NAA+NON-PROBE: NOT DETECTED
CALCIUM SERPL-MCNC: 8.4 MG/DL (ref 8.5–10.1)
CHLORIDE SERPL-SCNC: 115 MMOL/L (ref 97–108)
CO2 SERPL-SCNC: 23 MMOL/L (ref 21–32)
COLOR UR: ABNORMAL
COMMENT:: NORMAL
CREAT SERPL-MCNC: 1.07 MG/DL (ref 0.7–1.3)
E CLOAC COMP DNA BLD POS NAA+NON-PROBE: NOT DETECTED
E COLI DNA BLD POS QL NAA+NON-PROBE: NOT DETECTED
E FAECALIS DNA BLD POS QL NAA+NON-PROBE: NOT DETECTED
E FAECIUM DNA BLD POS QL NAA+NON-PROBE: NOT DETECTED
ENTEROBACTERALES DNA BLD POS NAA+N-PRB: NOT DETECTED
EPITH CASTS URNS QL MICRO: ABNORMAL /LPF
ERYTHROCYTE [DISTWIDTH] IN BLOOD BY AUTOMATED COUNT: 14.1 % (ref 11.5–14.5)
FLUAV SUBTYP SPEC NAA+PROBE: NOT DETECTED
FLUBV RNA SPEC QL NAA+PROBE: NOT DETECTED
FOLATE SERPL-MCNC: 40.1 NG/ML (ref 5–21)
GLOBULIN SER CALC-MCNC: 2.9 G/DL (ref 2–4)
GLUCOSE SERPL-MCNC: 89 MG/DL (ref 65–100)
GLUCOSE UR STRIP.AUTO-MCNC: NEGATIVE MG/DL
GP B STREP DNA BLD POS QL NAA+NON-PROBE: NOT DETECTED
HADV DNA SPEC QL NAA+PROBE: NOT DETECTED
HAEM INFLU DNA BLD POS QL NAA+NON-PROBE: NOT DETECTED
HCOV 229E RNA SPEC QL NAA+PROBE: NOT DETECTED
HCOV HKU1 RNA SPEC QL NAA+PROBE: NOT DETECTED
HCOV NL63 RNA SPEC QL NAA+PROBE: NOT DETECTED
HCOV OC43 RNA SPEC QL NAA+PROBE: NOT DETECTED
HCT VFR BLD AUTO: 36.2 % (ref 36.6–50.3)
HGB BLD-MCNC: 11.5 G/DL (ref 12.1–17)
HGB UR QL STRIP: NEGATIVE
HMPV RNA SPEC QL NAA+PROBE: NOT DETECTED
HPIV1 RNA SPEC QL NAA+PROBE: NOT DETECTED
HPIV2 RNA SPEC QL NAA+PROBE: NOT DETECTED
HPIV3 RNA SPEC QL NAA+PROBE: NOT DETECTED
HPIV4 RNA SPEC QL NAA+PROBE: NOT DETECTED
HYALINE CASTS URNS QL MICRO: ABNORMAL /LPF (ref 0–2)
INR PPP: 1.5 (ref 0.9–1.1)
K OXYTOCA DNA BLD POS QL NAA+NON-PROBE: NOT DETECTED
KETONES UR QL STRIP.AUTO: NEGATIVE MG/DL
KLEBSIELLA SP DNA BLD POS QL NAA+NON-PRB: NOT DETECTED
KLEBSIELLA SP DNA BLD POS QL NAA+NON-PRB: NOT DETECTED
L MONOCYTOG DNA BLD POS QL NAA+NON-PROBE: NOT DETECTED
LEUKOCYTE ESTERASE UR QL STRIP.AUTO: ABNORMAL
M PNEUMO DNA SPEC QL NAA+PROBE: NOT DETECTED
MCH RBC QN AUTO: 31.7 PG (ref 26–34)
MCHC RBC AUTO-ENTMCNC: 31.8 G/DL (ref 30–36.5)
MCV RBC AUTO: 99.7 FL (ref 80–99)
N MEN DNA BLD POS QL NAA+NON-PROBE: NOT DETECTED
NITRITE UR QL STRIP.AUTO: NEGATIVE
NRBC # BLD: 0 K/UL (ref 0–0.01)
NRBC BLD-RTO: 0 PER 100 WBC
NT PRO BNP: 4967 PG/ML
P AERUGINOSA DNA BLD POS NAA+NON-PROBE: NOT DETECTED
PH UR STRIP: 6 (ref 5–8)
PLATELET # BLD AUTO: 116 K/UL (ref 150–400)
PMV BLD AUTO: 10.6 FL (ref 8.9–12.9)
POTASSIUM SERPL-SCNC: 3.8 MMOL/L (ref 3.5–5.1)
PROT SERPL-MCNC: 5.7 G/DL (ref 6.4–8.2)
PROT UR STRIP-MCNC: ABNORMAL MG/DL
PROTEUS SP DNA BLD POS QL NAA+NON-PROBE: NOT DETECTED
PROTHROMBIN TIME: 15.6 SEC (ref 9–11.1)
RBC # BLD AUTO: 3.63 M/UL (ref 4.1–5.7)
RBC #/AREA URNS HPF: ABNORMAL /HPF (ref 0–5)
RESISTANT GENE TARGETS: NORMAL
RSV RNA SPEC QL NAA+PROBE: NOT DETECTED
RV+EV RNA SPEC QL NAA+PROBE: DETECTED
S AUREUS DNA BLD POS QL NAA+NON-PROBE: NOT DETECTED
S AUREUS+CONS DNA BLD POS NAA+NON-PROBE: NOT DETECTED
S EPIDERMIDIS DNA BLD POS QL NAA+NON-PRB: NOT DETECTED
S LUGDUNENSIS DNA BLD POS QL NAA+NON-PRB: NOT DETECTED
S MALTOPHILIA DNA BLD POS QL NAA+NON-PRB: NOT DETECTED
S MARCESCENS DNA BLD POS NAA+NON-PROBE: NOT DETECTED
S PNEUM DNA BLD POS QL NAA+NON-PROBE: NOT DETECTED
S PYO DNA BLD POS QL NAA+NON-PROBE: NOT DETECTED
SALMONELLA DNA BLD POS QL NAA+NON-PROBE: NOT DETECTED
SARS-COV-2 RNA RESP QL NAA+PROBE: NOT DETECTED
SODIUM SERPL-SCNC: 144 MMOL/L (ref 136–145)
SP GR UR REFRACTOMETRY: 1.02
SPECIMEN HOLD: YELLOW
STREPTOCOCCUS DNA BLD POS NAA+NON-PROBE: NOT DETECTED
T4 FREE SERPL-MCNC: 1.3 NG/DL (ref 0.8–1.5)
TSH SERPL DL<=0.05 MIU/L-ACNC: 0.71 UIU/ML (ref 0.36–3.74)
URINE CULTURE IF INDICATED: ABNORMAL
UROBILINOGEN UR QL STRIP.AUTO: 1 EU/DL (ref 0.2–1)
VIT B12 SERPL-MCNC: 1241 PG/ML (ref 193–986)
WBC # BLD AUTO: 3.6 K/UL (ref 4.1–11.1)
WBC URNS QL MICRO: ABNORMAL /HPF (ref 0–4)

## 2024-11-09 PROCEDURE — 6360000002 HC RX W HCPCS: Performed by: INTERNAL MEDICINE

## 2024-11-09 PROCEDURE — 6370000000 HC RX 637 (ALT 250 FOR IP): Performed by: STUDENT IN AN ORGANIZED HEALTH CARE EDUCATION/TRAINING PROGRAM

## 2024-11-09 PROCEDURE — 36415 COLL VENOUS BLD VENIPUNCTURE: CPT

## 2024-11-09 PROCEDURE — 87086 URINE CULTURE/COLONY COUNT: CPT

## 2024-11-09 PROCEDURE — 6370000000 HC RX 637 (ALT 250 FOR IP): Performed by: INTERNAL MEDICINE

## 2024-11-09 PROCEDURE — 97162 PT EVAL MOD COMPLEX 30 MIN: CPT

## 2024-11-09 PROCEDURE — 6370000000 HC RX 637 (ALT 250 FOR IP): Performed by: NURSE PRACTITIONER

## 2024-11-09 PROCEDURE — 85027 COMPLETE CBC AUTOMATED: CPT

## 2024-11-09 PROCEDURE — 80053 COMPREHEN METABOLIC PANEL: CPT

## 2024-11-09 PROCEDURE — 85610 PROTHROMBIN TIME: CPT

## 2024-11-09 PROCEDURE — 1100000000 HC RM PRIVATE

## 2024-11-09 PROCEDURE — 97530 THERAPEUTIC ACTIVITIES: CPT

## 2024-11-09 PROCEDURE — 2580000003 HC RX 258: Performed by: NURSE PRACTITIONER

## 2024-11-09 PROCEDURE — 2580000003 HC RX 258: Performed by: INTERNAL MEDICINE

## 2024-11-09 RX ORDER — IPRATROPIUM BROMIDE AND ALBUTEROL SULFATE 2.5; .5 MG/3ML; MG/3ML
1 SOLUTION RESPIRATORY (INHALATION) EVERY 4 HOURS PRN
Status: DISCONTINUED | OUTPATIENT
Start: 2024-11-09 | End: 2024-11-14 | Stop reason: HOSPADM

## 2024-11-09 RX ORDER — GUAIFENESIN 600 MG/1
600 TABLET, EXTENDED RELEASE ORAL 2 TIMES DAILY
Status: DISCONTINUED | OUTPATIENT
Start: 2024-11-09 | End: 2024-11-14 | Stop reason: HOSPADM

## 2024-11-09 RX ADMIN — WARFARIN SODIUM 3 MG: 2 TABLET ORAL at 18:01

## 2024-11-09 RX ADMIN — GUAIFENESIN 600 MG: 600 TABLET, EXTENDED RELEASE ORAL at 10:37

## 2024-11-09 RX ADMIN — SACUBITRIL AND VALSARTAN 1 TABLET: 24; 26 TABLET, FILM COATED ORAL at 00:02

## 2024-11-09 RX ADMIN — TAMSULOSIN HYDROCHLORIDE 0.4 MG: 0.4 CAPSULE ORAL at 09:05

## 2024-11-09 RX ADMIN — DONEPEZIL HYDROCHLORIDE 5 MG: 5 TABLET, FILM COATED ORAL at 21:23

## 2024-11-09 RX ADMIN — OXYBUTYNIN CHLORIDE 10 MG: 5 TABLET, EXTENDED RELEASE ORAL at 09:04

## 2024-11-09 RX ADMIN — WATER 1000 MG: 1 INJECTION INTRAMUSCULAR; INTRAVENOUS; SUBCUTANEOUS at 13:12

## 2024-11-09 RX ADMIN — SODIUM CHLORIDE, PRESERVATIVE FREE 10 ML: 5 INJECTION INTRAVENOUS at 09:08

## 2024-11-09 RX ADMIN — DONEPEZIL HYDROCHLORIDE 5 MG: 5 TABLET, FILM COATED ORAL at 00:02

## 2024-11-09 RX ADMIN — ALLOPURINOL 300 MG: 100 TABLET ORAL at 09:06

## 2024-11-09 RX ADMIN — WARFARIN SODIUM 2.5 MG: 5 TABLET ORAL at 00:02

## 2024-11-09 RX ADMIN — PAROXETINE HYDROCHLORIDE 20 MG: 20 TABLET, FILM COATED ORAL at 09:04

## 2024-11-09 RX ADMIN — LEVOTHYROXINE SODIUM 150 MCG: 0.1 TABLET ORAL at 05:19

## 2024-11-09 RX ADMIN — GUAIFENESIN 600 MG: 600 TABLET, EXTENDED RELEASE ORAL at 21:22

## 2024-11-09 RX ADMIN — FINASTERIDE 5 MG: 5 TABLET, FILM COATED ORAL at 09:05

## 2024-11-09 RX ADMIN — SACUBITRIL AND VALSARTAN 1 TABLET: 24; 26 TABLET, FILM COATED ORAL at 21:22

## 2024-11-09 RX ADMIN — SACUBITRIL AND VALSARTAN 1 TABLET: 24; 26 TABLET, FILM COATED ORAL at 14:26

## 2024-11-09 RX ADMIN — ATORVASTATIN CALCIUM 40 MG: 40 TABLET, FILM COATED ORAL at 21:22

## 2024-11-09 RX ADMIN — SODIUM CHLORIDE, PRESERVATIVE FREE 10 ML: 5 INJECTION INTRAVENOUS at 21:23

## 2024-11-09 RX ADMIN — SODIUM CHLORIDE, PRESERVATIVE FREE 10 ML: 5 INJECTION INTRAVENOUS at 00:03

## 2024-11-09 RX ADMIN — Medication 50 MCG: at 09:06

## 2024-11-09 RX ADMIN — PANTOPRAZOLE SODIUM 40 MG: 40 TABLET, DELAYED RELEASE ORAL at 05:23

## 2024-11-09 RX ADMIN — ATORVASTATIN CALCIUM 40 MG: 40 TABLET, FILM COATED ORAL at 00:02

## 2024-11-09 RX ADMIN — Medication 1 TABLET: at 09:04

## 2024-11-09 ASSESSMENT — PAIN SCALES - PAIN ASSESSMENT IN ADVANCED DEMENTIA (PAINAD)
TOTALSCORE: 0
CONSOLABILITY: NO NEED TO CONSOLE
BODYLANGUAGE: RELAXED
TOTALSCORE: 0
BREATHING: NORMAL
BODYLANGUAGE: RELAXED
TOTALSCORE: 0
FACIALEXPRESSION: SMILING OR INEXPRESSIVE
BREATHING: NORMAL
FACIALEXPRESSION: SMILING OR INEXPRESSIVE
BREATHING: NORMAL
FACIALEXPRESSION: SMILING OR INEXPRESSIVE
BODYLANGUAGE: RELAXED
CONSOLABILITY: NO NEED TO CONSOLE
CONSOLABILITY: NO NEED TO CONSOLE

## 2024-11-09 NOTE — PLAN OF CARE
Testing At Implant performed by Ken Wells MD at [facility] CARDIAC CATH LAB    HERNIA REPAIR      HERNIA REPAIR Right 10/12/2022    Dr. Matos    INSJ/RPLCMT PERM DFB W/TRNSVNS LDS 1/DUAL CHMBR N/A 03/04/2019    INSERT ICD BIV MULTI performed by Ken Wells MD at [facility] CARDIAC CATH LAB    INTRACARDIAC ELECTROPHYSIOLOGIC 3D MAPPING N/A 03/04/2019    Ep 3d Mapping performed by Ken Wells MD at [facility] CARDIAC CATH LAB    ORTHOPEDIC SURGERY  2001    ORIF COMPOUND FRACTURE LEFT ANKLE    PACEMAKER  2019    MI UNLISTED PROCEDURE CARDIAC SURGERY      ANGIOPLASTY WITH CORONARY STENT    MI UNLISTED PROCEDURE CARDIAC SURGERY  1996    AORTIC VALVE REPLACEMENT    RETINAL DETACHMENT SURGERY  1980s    LEFT EYE       Home Situation:  Social/Functional History  Lives With: Spouse  Type of Home: House  Home Layout: Two level (per chart been staying on 1st floor recently)  Has the patient had two or more falls in the past year or any fall with injury in the past year?: Yes (at least 1 fall immediately prior to admission)    Cognitive/Behavioral Status:  Orientation  Overall Orientation Status: Impaired  Orientation Level: Oriented to person;Disoriented to situation;Disoriented to place;Disoriented to time  Cognition  Overall Cognitive Status: Exceptions  Following Commands: Follows one step commands with increased time;Follows one step commands with repetition  Memory: Decreased recall of recent events;Decreased short term memory  Safety Judgement: Decreased awareness of need for safety  Problem Solving: Impaired  Insights: Decreased awareness of deficits  Initiation: Requires cues for some  Sequencing: Requires cues for some    Hearing:   Hearing  Hearing: Exceptions to WFL  Hearing Exceptions: Hard of hearing/hearing concerns      Strength:    Strength: Generally decreased, functional    Tone & Sensation:   Tone: Normal       Coordination:  Coordination: Generally decreased, functional    Range Of Motion:  AROM: Generally decreased,  [x]  3  []  4   5.  Walking in hospital room? []  1 [x]  2 []  3  []  4   6.  Climbing 3-5 steps with a railing? []  1 [x]  2 []  3  []  4     Raw Score: 17/24                            Cutoff score <=171,2,3 had higher odds of discharging home with home health or need of SNF/IPR.    1. Kelly Pastrana, Janelle Morgan, Kishor Melton, Casi Hand, Jamar Chambers, Jovani Pastrana.  Validity of the AM-PAC “6-Clicks” Inpatient Daily Activity and Basic Mobility Short Forms. Physical Therapy Mar 2014, 94  379-391; DOI: 10.2522/ptj.07868062  2. Kofi PINA, Kerline CALERO, Estelle CALERO, Sena CALERO. Association of AM-PAC \"6-Clicks\" Basic Mobility and Daily Activity Scores With Discharge Destination. Phys Ther. 2021 4;101(4):ojyh515. doi: 10.1093/ptj/jcfe333. PMID: 79187356.  3. Tara CALERO, Mercedes D, Sofy S, Hernandez K, Sushma S. Activity Measure for Post-Acute Care \"6-Clicks\" Basic Mobility Scores Predict Discharge Destination After Acute Care Hospitalization in Select Patient Groups: A Retrospective, Observational Study. Arch Rehabil Res Clin Transl. 2022 16;4(3):595600. doi: 10.1016/j.arrct..199233. PMID: 50189703; PMCID: [id number].  4. Zeina MEEK, Maya WORKMAN, Marlin GRIER, Dora DANIELLE. AM-PAC Short Forms Manual 4.0. Revised 2020.                                                                                                                                                                                                                              Pain Ratin/10     Activity Tolerance:   Fair     After treatment:   Patient left in no apparent distress sitting up in chair, Call bell within reach, and Bed/ chair alarm activated    COMMUNICATION/EDUCATION:   The patient's plan of care was discussed with: registered nurse    Patient Education  Education Given To: Patient  Education Provided: Role of Therapy;Plan of Care;Transfer Training  Education Method: Verbal  Barriers to Learning: Cognition;Hearing  Education

## 2024-11-09 NOTE — PROGRESS NOTES
End of Shift Note    Bedside shift change report given to Donato HERNDON (oncoming nurse) by Minna Vásquez RN (offgoing nurse).  Report included the following information SBAR, Intake/Output, MAR, Recent Results, and Cardiac Rhythm V paced  At handoff report pt is sleeping in bed easily arousable.   Shift worked:  NIGHT      Shift summary and any significant changes:     2340H Pt is a new admit from ED, oriented to self only, very hard of hearing without hearing aids. Admission assessment done. Unable to complete database requirement as pt is disoriented. Will defer to day shift, once relatives are present.   -----------  -Pt had a calm night  -CHG bath done, Turning done  -Labs drawn  -Unable to collect sputum despite encouragement to spit out phlegm, pt is  confused and unable to follow commands to spit out.    Concerns for physician to address:  Cough meds    Zone phone for oncoming shift:        Activity:     Number times ambulated in hallways past shift: 0  Hallway to room: 1  Number of times OOB to chair past shift: 0    Cardiac:   Cardiac Monitoring: Yes           Access:  Current line(s): PIV     Genitourinary:   Urinary status: voiding and external catheter    Respiratory:      Chronic home O2 use?: NO  Incentive spirometer at bedside: NO       GI:     Current diet:  ADULT DIET; Regular  Passing flatus: YES  Tolerating current diet: YES       Pain Management:   Patient states pain is manageable on current regimen: YES    Skin:     Interventions: turn team, float heels, and internal/external urinary devices    Patient Safety:  Fall Score:    Interventions: bed/chair alarm, assistive device (walker, cane. etc), gripper socks, pt to call before getting OOB, and stay with me (per policy)       Length of Stay:  Expected LOS: 4  Actual LOS: 1      Minna Vásquez RN

## 2024-11-09 NOTE — PROGRESS NOTES
Pharmacist Daily Dosing of Warfarin    Indication & Goal INR: DVT, INR Goal 2-3    PTA Warfarin Dose: ?    Notable concurrent conditions and medications: Acetaminophen and Allopurinol    Labs:  Recent Labs     Units 11/08/24  1733 11/08/24  1714   INR   1.6*  --    HGB g/dL  --  12.2   PLT K/uL  --  128*         Impression/Plan:   INR 1.6 on admission; unsure of home dose - Rxquery shows 2.5 mg #30 for a 27 day supply; tried calling wife (Ashley [phone]) to confirm but no answer  Will order warfarin 2.5 mg PO x 1 dose.  Daily INR has been ordered  CBC w/o differential every other day has been ordered     Pharmacy will follow daily and adjust the dose as appropriate.    Thank you,  Noris Maynard [facility]      Warfarin Protocol    Located on pharmacy Teams site: Clinical Practice -> Anticoagulation & Cardiology -> Anticoagulation Policies, Protocols, Guidance

## 2024-11-09 NOTE — PROGRESS NOTES
End of Shift Note    Bedside shift change report given to ALANA Buitrago (oncoming nurse) by Maya Barker LPN (offgoing nurse).  Report included the following information SBAR, MAR, and Cardiac Rhythm V Paced    Shift worked:  7a - 7p     Shift summary and any significant changes:     Blood culture contains gram negative rods in 1/2 bottles. No complaints of pain. Patient had bowel movement. Assist x2 with transfers. Whiteboard for communication     Concerns for physician to address:  Resume Seroquel?      Zone phone for oncoming shift:   [phone]       Activity:     Number times ambulated in hallways past shift: 0  Number of times OOB to chair past shift: 1    Cardiac:   Cardiac Monitoring: Yes           Access:  Current line(s): PIV     Genitourinary:   Urinary status: voiding, incontinent, and external catheter    Respiratory:      Chronic home O2 use?: NO  Incentive spirometer at bedside: NO       GI:     Current diet:  ADULT DIET; Regular  Passing flatus: YES  Tolerating current diet: YES       Pain Management:   Patient states pain is manageable on current regimen: YES    Skin:     Interventions: float heels, increase time out of bed, and internal/external urinary devices    Patient Safety:  Fall Score:    Interventions: bed/chair alarm, assistive device (walker, cane. etc), gripper socks, and stay with me (per policy)       Length of Stay:  Expected LOS: 4  Actual LOS: 1      Maya Barker LPN

## 2024-11-09 NOTE — PROGRESS NOTES
Pharmacist Daily Dosing of Warfarin    Indication & Goal INR: DVT, INR Goal 2-3    PTA Warfarin Dose: ?    Notable concurrent conditions and medications: Acetaminophen and Allopurinol    Labs:  Recent Labs     Units 11/09/24  0519 11/08/24  1733 11/08/24  1714   INR   1.5* 1.6*  --    HGB g/dL 11.5*  --  12.2   PLT K/uL 116*  --  128*         Impression/Plan:   INR 1.6 on admission; unsure of home dose - Rxquery shows 2.5 mg #30 for a 27 day supply; tried calling wife (Ashley [phone]) to confirm but no answer  Will order warfarin 3 mg x 1 dose   Daily INR has been ordered  CBC w/o differential every other day has been ordered     Pharmacy will follow daily and adjust the dose as appropriate.    Thank you,  Mian Poole, [facility]      Warfarin Protocol    Located on pharmacy Teams site: Clinical Practice -> Anticoagulation & Cardiology -> Anticoagulation Policies, Protocols, Guidance

## 2024-11-09 NOTE — PROGRESS NOTES
Hospitalist Progress Note    NAME:   Corby Chun   : 1935   MRN: [id number]     Date/Time: 2024 12:31 PM  Patient PCP: Ryan Nguyen MD    Estimated discharge date:1-2 days  Barriers: ptot, ucx      Assessment / Plan:    Generalized weakness  Status post fall  URTI -rhinovirus  UTI  TSH, B12 normal  Blood pressure is negative so far flu COVID-negative  UA showing moderate leukoesterase  CT head unremarkable  CT chest pending  Chest x-ray no pneumonia  Will follow-up on urine culture  Started on ceftriaxone  Added Mucinex  Nebs as needed    Mildly elevated creatinine  Creatinine 1.47 on admission normalized    Chronic CHF with reduced ejection fraction EF 40% as of   Status post AICD  Mechanical aortic valve on warfarin goal INR 2-3  BPH  Hypothyroidism  Continue PTA Entresto, finasteride, Flomax  Pharmacy consult to manage Coumadin      Gout PTA allopurinol    Psychosis  Sundowners  -pt was recently discharged from [facility] for hallucinations stayed 1 week  in hospital - concluded - Sundowners   - Zyprexa Qtc 492  -resume Aricept  -resume paxil   AO x 1, confused hard of hearing  Per wife patient always have sundowning while in hospital with hallucinations and patient thinking somebody is trying to kill him       Medical Decision Making:   I personally reviewed labs: CBC BMP  I personally reviewed imaging: CT  I personally reviewed EKG:  Toxic drug monitoring: INR on warfarin  Discussed case with: pt rn    Wife updated over phone    Code Status: full  DVT Prophylaxis:   GI Prophylaxis:    Subjective:     Chief Complaint / Reason for Physician Visit  \"Confused, .  Discussed with RN events overnight.       Objective:     VITALS:   Last 24hrs VS reviewed since prior progress note. Most recent are:  Patient Vitals for the past 24 hrs:   BP Temp Temp src Pulse Resp SpO2 Height Weight   24 0813 (!) 137/100 97.5 °F (36.4 °C) Oral 70 16 100 % -- --   24 0316 133/87 97.3 °F (36.3

## 2024-11-09 NOTE — H&P
Hospitalist Admission Note    NAME:   Corby Chun   : 1935   MRN: [id number]     Date/Time: 2024 9:17 PM    Patient PCP: Ryan Nguyen MD    ______________________________________________________________________  Given the patient's current clinical presentation, I have a high level of concern for decompensation if discharged from the emergency department.  Complex decision making was performed, which includes reviewing the patient's available past medical records, laboratory results, and x-ray films.       My assessment of this patient's clinical condition and my plan of care is as follows.    Assessment / Plan:    88 YO presents to ED by suggestion of [facility] for concerns of possible PNA and fall.      Xray    IMPRESSION:   No acute process on portable chest.   CT head    IMPRESSION:   1.   No acute intracranial findings.  Obtain CT Chest  Admit medicine /telemetry  Fatigue  Upper respiratory congestion  Fall  Sundowners  Weakness   Debility   -ck UA -recently tx for UTI at [facility]   -blood cx paired   -trend labs  -ck vit D , vit b12  -ck TSH,   -PT OT eval  -fall precautions   -sputum cx   Covid  flu a/b negative   -robitussin / tessalon   -oxygen therapy protocol   -consult case management - likely will need placement     Systolic Heart Failure   -resume entresto  -ck proBNP  -daily wts  -strict I/O    Atrial fib  -telemetry  -resume coumadin (pharm consulted for dosing )     BPH  -resume Proscar  -resume Flomax   -resume Ditropan     Hypothyroidism   -resume Levothyroxine  -ck TSH     HTN  HLD  Hypothyroidism   -vital per routine  -resume statin   -resume levothyroxine - ck TSH     Psychosis  Sundowners  -pt was recently discharged from [facility] for hallucinations stayed 1 week  in hospital - concluded - owners   - Zyprexa Qtc 492  -resume Aricept  -resume paxil   -may need tele sitter     Supplements  -Vit b 12 - ck level     Gout   -resume allopurinol

## 2024-11-10 LAB
ALBUMIN SERPL-MCNC: 2.8 G/DL (ref 3.5–5)
ALBUMIN/GLOB SERPL: 0.9 (ref 1.1–2.2)
ALP SERPL-CCNC: 87 U/L (ref 45–117)
ALT SERPL-CCNC: 38 U/L (ref 12–78)
ANION GAP SERPL CALC-SCNC: 3 MMOL/L (ref 2–12)
AST SERPL-CCNC: 27 U/L (ref 15–37)
BACTERIA SPEC CULT: NORMAL
BILIRUB SERPL-MCNC: 0.6 MG/DL (ref 0.2–1)
BUN SERPL-MCNC: 27 MG/DL (ref 6–20)
BUN/CREAT SERPL: 19 (ref 12–20)
CALCIUM SERPL-MCNC: 8.5 MG/DL (ref 8.5–10.1)
CHLORIDE SERPL-SCNC: 114 MMOL/L (ref 97–108)
CO2 SERPL-SCNC: 25 MMOL/L (ref 21–32)
CREAT SERPL-MCNC: 1.41 MG/DL (ref 0.7–1.3)
EKG ATRIAL RATE: 163 BPM
EKG DIAGNOSIS: NORMAL
EKG Q-T INTERVAL: 456 MS
EKG QRS DURATION: 156 MS
EKG QTC CALCULATION (BAZETT): 492 MS
EKG R AXIS: -26 DEGREES
EKG T AXIS: 155 DEGREES
EKG VENTRICULAR RATE: 70 BPM
ERYTHROCYTE [DISTWIDTH] IN BLOOD BY AUTOMATED COUNT: 14.2 % (ref 11.5–14.5)
GLOBULIN SER CALC-MCNC: 3 G/DL (ref 2–4)
GLUCOSE SERPL-MCNC: 115 MG/DL (ref 65–100)
HCT VFR BLD AUTO: 35.3 % (ref 36.6–50.3)
HGB BLD-MCNC: 11.3 G/DL (ref 12.1–17)
INR PPP: 1.7 (ref 0.9–1.1)
MCH RBC QN AUTO: 32.2 PG (ref 26–34)
MCHC RBC AUTO-ENTMCNC: 32 G/DL (ref 30–36.5)
MCV RBC AUTO: 100.6 FL (ref 80–99)
NRBC # BLD: 0 K/UL (ref 0–0.01)
NRBC BLD-RTO: 0 PER 100 WBC
PLATELET # BLD AUTO: 138 K/UL (ref 150–400)
PMV BLD AUTO: 10.6 FL (ref 8.9–12.9)
POTASSIUM SERPL-SCNC: 3.8 MMOL/L (ref 3.5–5.1)
PROT SERPL-MCNC: 5.8 G/DL (ref 6.4–8.2)
PROTHROMBIN TIME: 16.9 SEC (ref 9–11.1)
RBC # BLD AUTO: 3.51 M/UL (ref 4.1–5.7)
SERVICE CMNT-IMP: NORMAL
SODIUM SERPL-SCNC: 142 MMOL/L (ref 136–145)
WBC # BLD AUTO: 5.4 K/UL (ref 4.1–11.1)

## 2024-11-10 PROCEDURE — 6360000002 HC RX W HCPCS: Performed by: INTERNAL MEDICINE

## 2024-11-10 PROCEDURE — 80053 COMPREHEN METABOLIC PANEL: CPT

## 2024-11-10 PROCEDURE — 2580000003 HC RX 258: Performed by: INTERNAL MEDICINE

## 2024-11-10 PROCEDURE — 1100000000 HC RM PRIVATE

## 2024-11-10 PROCEDURE — 97535 SELF CARE MNGMENT TRAINING: CPT | Performed by: OCCUPATIONAL THERAPIST

## 2024-11-10 PROCEDURE — 6370000000 HC RX 637 (ALT 250 FOR IP): Performed by: NURSE PRACTITIONER

## 2024-11-10 PROCEDURE — 6370000000 HC RX 637 (ALT 250 FOR IP): Performed by: INTERNAL MEDICINE

## 2024-11-10 PROCEDURE — 85610 PROTHROMBIN TIME: CPT

## 2024-11-10 PROCEDURE — 36415 COLL VENOUS BLD VENIPUNCTURE: CPT

## 2024-11-10 PROCEDURE — 85027 COMPLETE CBC AUTOMATED: CPT

## 2024-11-10 PROCEDURE — 97166 OT EVAL MOD COMPLEX 45 MIN: CPT | Performed by: OCCUPATIONAL THERAPIST

## 2024-11-10 PROCEDURE — 6370000000 HC RX 637 (ALT 250 FOR IP): Performed by: STUDENT IN AN ORGANIZED HEALTH CARE EDUCATION/TRAINING PROGRAM

## 2024-11-10 PROCEDURE — 87040 BLOOD CULTURE FOR BACTERIA: CPT

## 2024-11-10 PROCEDURE — 2580000003 HC RX 258: Performed by: NURSE PRACTITIONER

## 2024-11-10 RX ADMIN — Medication 1 TABLET: at 08:33

## 2024-11-10 RX ADMIN — ATORVASTATIN CALCIUM 40 MG: 40 TABLET, FILM COATED ORAL at 21:05

## 2024-11-10 RX ADMIN — SODIUM CHLORIDE, PRESERVATIVE FREE 10 ML: 5 INJECTION INTRAVENOUS at 21:05

## 2024-11-10 RX ADMIN — FINASTERIDE 5 MG: 5 TABLET, FILM COATED ORAL at 08:33

## 2024-11-10 RX ADMIN — CEFTRIAXONE SODIUM 2000 MG: 2 INJECTION, POWDER, FOR SOLUTION INTRAMUSCULAR; INTRAVENOUS at 13:56

## 2024-11-10 RX ADMIN — WARFARIN SODIUM 3 MG: 2 TABLET ORAL at 18:37

## 2024-11-10 RX ADMIN — GUAIFENESIN 600 MG: 600 TABLET, EXTENDED RELEASE ORAL at 21:05

## 2024-11-10 RX ADMIN — TAMSULOSIN HYDROCHLORIDE 0.4 MG: 0.4 CAPSULE ORAL at 08:33

## 2024-11-10 RX ADMIN — GUAIFENESIN 600 MG: 600 TABLET, EXTENDED RELEASE ORAL at 08:32

## 2024-11-10 RX ADMIN — OXYBUTYNIN CHLORIDE 10 MG: 5 TABLET, EXTENDED RELEASE ORAL at 08:33

## 2024-11-10 RX ADMIN — SODIUM CHLORIDE, PRESERVATIVE FREE 10 ML: 5 INJECTION INTRAVENOUS at 08:35

## 2024-11-10 RX ADMIN — Medication 50 MCG: at 08:32

## 2024-11-10 RX ADMIN — SACUBITRIL AND VALSARTAN 1 TABLET: 24; 26 TABLET, FILM COATED ORAL at 21:05

## 2024-11-10 RX ADMIN — PANTOPRAZOLE SODIUM 40 MG: 40 TABLET, DELAYED RELEASE ORAL at 05:52

## 2024-11-10 RX ADMIN — LEVOTHYROXINE SODIUM 150 MCG: 0.1 TABLET ORAL at 05:52

## 2024-11-10 RX ADMIN — PAROXETINE HYDROCHLORIDE 20 MG: 20 TABLET, FILM COATED ORAL at 08:32

## 2024-11-10 RX ADMIN — SACUBITRIL AND VALSARTAN 1 TABLET: 24; 26 TABLET, FILM COATED ORAL at 08:32

## 2024-11-10 RX ADMIN — DONEPEZIL HYDROCHLORIDE 5 MG: 5 TABLET, FILM COATED ORAL at 21:05

## 2024-11-10 RX ADMIN — ALLOPURINOL 300 MG: 100 TABLET ORAL at 08:32

## 2024-11-10 RX ADMIN — OLANZAPINE 5 MG: 5 TABLET, FILM COATED ORAL at 15:39

## 2024-11-10 ASSESSMENT — PAIN SCALES - PAIN ASSESSMENT IN ADVANCED DEMENTIA (PAINAD)
FACIALEXPRESSION: SMILING OR INEXPRESSIVE
TOTALSCORE: 0
BREATHING: NORMAL
BODYLANGUAGE: RELAXED
CONSOLABILITY: NO NEED TO CONSOLE

## 2024-11-10 ASSESSMENT — PAIN SCALES - GENERAL: PAINLEVEL_OUTOF10: 0

## 2024-11-10 NOTE — PROGRESS NOTES
End of Shift Note    Bedside shift change report given to Minna WARNER (oncoming nurse) by Petra Oliva LPN (offgoing nurse).  Report included the following information SBAR, Kardex, and MAR    Shift worked:  7 a to 7 p     Shift summary and any significant changes:     Having visual hallucinations of people try to kill and hurt him. Patient fearful and anxious. X1 Zyprexa given. Patient calm and cooperative post medication. Wife called with updates.     Concerns for physician to address:  Confusion   Zone phone for oncoming shift:   [phone]       Activity:     Number times ambulated in hallways past shift: 0  Number of times OOB to chair past shift: 3    Cardiac:   Cardiac Monitoring: Yes           Access:  Current line(s): PIV     Genitourinary:   Urinary status: voiding    Respiratory:      Chronic home O2 use?: NO  Incentive spirometer at bedside: NO       GI:     Current diet:  ADULT DIET; Regular  Passing flatus: YES  Tolerating current diet: YES       Pain Management:   Patient states pain is manageable on current regimen: YES    Skin:     Interventions: float heels and increase time out of bed    Patient Safety:  Fall Score:    Interventions: gripper socks and pt to call before getting OOB       Length of Stay:  Expected LOS: 4  Actual LOS: 2      Petra Oliva LPN

## 2024-11-10 NOTE — PROGRESS NOTES
OCCUPATIONAL THERAPY EVALUATION/DISCHARGE  Patient: Corby Chun (89 y.o. male)  Date: 11/10/2024  Primary Diagnosis: Cough with sputum [R05.8]  Subtherapeutic international normalized ratio (INR) [R79.1]  Acute respiratory failure with hypoxia [J96.01]  Cough, unspecified type [R05.9]  Ground-level fall [W18.30XA]         Precautions: Fall Risk                  ASSESSMENT :  Based on the objective data below, the patient may be functioning close to his adl baseline.  He reports that he is independent in self care and recently had bathroom renovations at his home.  There were no family members to provide PLOF, however, information gained from chart review and pt report.  Pt is very [place]--he cannot hear (no hearing aids--cochlear implants?), he does not read lips well, and best communication with pt is in written form.  Pt reports that he has not had any falls, but per medical record, he had a GLF prior to admission.  (Not a reliable .)  He has a history of dementia, sundowning, pyschosis, and neuropsychological testing (has been explored/scheduled?)  Per medical record, there have been home modifications, and family and friend assist in the home.  A  was assisting with Long term planning.  At this time, there are no acute OT needs.  Pt will likely need Supervision to min A for adls at home, which may be close to his baseline.   An OT Evaluation in the home may be most beneficial.  The [employer] may be a good resource to family for education, home, and caregiving needs.      Functional Outcome Measure:  The patient scored 55/100 on the Barthel Index outcome measure which is indicative of partially dependent.      Further skilled acute occupational therapy is not indicated at this time.     PLAN :  Recommend with staff: encourage adl participation.  Assist X2 due to impulsivity and difficulty with communication    Recommendation for discharge: (in order for the patient to meet

## 2024-11-10 NOTE — PROGRESS NOTES
End of Shift Note     Bedside shift change report given to Petra HERNDON (oncoming nurse) by Minna Vásquez RN (offgoing nurse).  Report included the following information SBAR, Intake/Output, MAR, Recent Results, and Cardiac Rhythm V paced  At handoff report pt is sleeping in bed easily arousable.   Shift worked:  NIGHT       Shift summary and any significant changes:      -Pt had a calm night   -Oriented to self, time & place   -He has been calm & cooperative, No episodes of sundowning   -Use of Writing on the white board was really effective to communicate with him.    Concerns for physician to address:     Zone phone for oncoming shift:         Activity:  Number times ambulated in hallways past shift: 0  Hallway to room: 1  Number of times OOB to chair past shift: 0     Cardiac:   Cardiac Monitoring: Yes         Access:  Current line(s): PIV      Genitourinary:   Urinary status: voiding and external catheter     Respiratory:   Chronic home O2 use?: NO  Incentive spirometer at bedside: NO     GI:  Current diet:  ADULT DIET; Regular  Passing flatus: YES  Tolerating current diet: YES     Pain Management:   Patient states pain is manageable on current regimen: YES     Skin:  Interventions: turn team, float heels, and internal/external urinary devices    Patient Safety:  Fall Score:    Interventions: bed/chair alarm, assistive device (walker, cane. etc), gripper socks, pt to call before getting OOB, and stay with me (per policy)     Length of Stay:  Expected LOS: 4  Actual LOS: 1

## 2024-11-10 NOTE — PROGRESS NOTES
Hospitalist Progress Note    NAME:   Corby Chun   : 1935   MRN: [id number]     Date/Time: 11/10/2024 3:26 PM  Patient PCP: Ryan Nguyen MD    Estimated discharge date:48hrs  Barriers: ptot, ucx, repeat blood cultures  PT recommending SNF    Assessment / Plan:    Generalized weakness  Status post fall  URTI -rhinovirus  UTI  GNR bacteria  TSH, B12 normal  Blood pressure is negative so far flu COVID-negative  UA showing moderate leukoesterase  CT head unremarkable  CT chest pending  Chest x-ray no pneumonia  Blood culture on admission  GNR  Repeat blood culture done on 11/10  Urine culture no  Started on ceftriaxone  Added Mucinex  Nebs as needed    Mildly elevated creatinine  Creatinine 1.47 on admission normalized    Chronic CHF with reduced ejection fraction EF 40% as of   Status post AICD  Mechanical aortic valve on warfarin goal INR 2-3  BPH  Hypothyroidism  Continue PTA Entresto, finasteride, Flomax  Pharmacy consult to manage Coumadin      Gout PTA allopurinol    Psychosis  Sundowners  -pt was recently discharged from [facility] for hallucinations stayed 1 week  in hospital - concluded - Sundflavio   - Zyprexa p.rn Qtc 492  -resume Aricept  -resume paxil   Alert and oriented times2  Per wife patient always have sundowning while in hospital with hallucinations and patient thinking somebody is trying to kill him  Seroquel was discontinued on previous hospitalization    Medical Decision Making:   I personally reviewed labs: CBC BMP  I personally reviewed imaging:   I personally reviewed EKG:  Toxic drug monitoring: INR on warfarin  Discussed case with: pt rn    Wife updated over phone    Code Status: full  DVT Prophylaxis:   GI Prophylaxis:    Subjective:     Chief Complaint / Reason for Physician Visit  Patient more alert today.  Able to communicate with the patient with writing stated cough is improving.  Discussed with RN events overnight.       Objective:     VITALS:   Last 24hrs

## 2024-11-10 NOTE — PROGRESS NOTES
Pharmacist Daily Dosing of Warfarin    Indication & Goal INR: DVT, INR Goal 2-3    PTA Warfarin Dose: ?    Notable concurrent conditions and medications: Acetaminophen and Allopurinol    Labs:  Recent Labs     Units 11/10/24  0426 11/09/24  0519 11/08/24  1733 11/08/24  1714   INR   1.7* 1.5* 1.6*  --    HGB g/dL 11.3* 11.5*  --  12.2   PLT K/uL 138* 116*  --  128*         Impression/Plan:   Warfarin prescription is a bit odd 30 tablets for 27 day supply. Please further clarify if able on Monday   Will order warfarin 3 mg x 1 dose   Daily INR has been ordered  CBC w/o differential every other day has been ordered     Pharmacy will follow daily and adjust the dose as appropriate.    Thank you,  Mian Poole [facility]      Warfarin Protocol    Located on pharmacy Teams site: Clinical Practice -> Anticoagulation & Cardiology -> Anticoagulation Policies, Protocols, Guidance

## 2024-11-11 LAB
ALBUMIN SERPL-MCNC: 2.6 G/DL (ref 3.5–5)
ALBUMIN/GLOB SERPL: 0.8 (ref 1.1–2.2)
ALP SERPL-CCNC: 79 U/L (ref 45–117)
ALT SERPL-CCNC: 36 U/L (ref 12–78)
ANION GAP SERPL CALC-SCNC: 6 MMOL/L (ref 2–12)
AST SERPL-CCNC: 28 U/L (ref 15–37)
BILIRUB SERPL-MCNC: 0.4 MG/DL (ref 0.2–1)
BUN SERPL-MCNC: 24 MG/DL (ref 6–20)
BUN/CREAT SERPL: 19 (ref 12–20)
CALCIUM SERPL-MCNC: 8.2 MG/DL (ref 8.5–10.1)
CHLORIDE SERPL-SCNC: 114 MMOL/L (ref 97–108)
CO2 SERPL-SCNC: 24 MMOL/L (ref 21–32)
CREAT SERPL-MCNC: 1.27 MG/DL (ref 0.7–1.3)
ERYTHROCYTE [DISTWIDTH] IN BLOOD BY AUTOMATED COUNT: 13.7 % (ref 11.5–14.5)
GLOBULIN SER CALC-MCNC: 3.3 G/DL (ref 2–4)
GLUCOSE SERPL-MCNC: 104 MG/DL (ref 65–100)
HCT VFR BLD AUTO: 35.8 % (ref 36.6–50.3)
HGB BLD-MCNC: 11.6 G/DL (ref 12.1–17)
INR PPP: 1.7 (ref 0.9–1.1)
MCH RBC QN AUTO: 32.1 PG (ref 26–34)
MCHC RBC AUTO-ENTMCNC: 32.4 G/DL (ref 30–36.5)
MCV RBC AUTO: 99.2 FL (ref 80–99)
NRBC # BLD: 0 K/UL (ref 0–0.01)
NRBC BLD-RTO: 0 PER 100 WBC
PLATELET # BLD AUTO: 129 K/UL (ref 150–400)
PMV BLD AUTO: 10.5 FL (ref 8.9–12.9)
POTASSIUM SERPL-SCNC: 3.7 MMOL/L (ref 3.5–5.1)
PROT SERPL-MCNC: 5.9 G/DL (ref 6.4–8.2)
PROTHROMBIN TIME: 17.2 SEC (ref 9–11.1)
RBC # BLD AUTO: 3.61 M/UL (ref 4.1–5.7)
SODIUM SERPL-SCNC: 144 MMOL/L (ref 136–145)
WBC # BLD AUTO: 4.5 K/UL (ref 4.1–11.1)

## 2024-11-11 PROCEDURE — 6370000000 HC RX 637 (ALT 250 FOR IP): Performed by: INTERNAL MEDICINE

## 2024-11-11 PROCEDURE — 36415 COLL VENOUS BLD VENIPUNCTURE: CPT

## 2024-11-11 PROCEDURE — 80053 COMPREHEN METABOLIC PANEL: CPT

## 2024-11-11 PROCEDURE — 6370000000 HC RX 637 (ALT 250 FOR IP): Performed by: STUDENT IN AN ORGANIZED HEALTH CARE EDUCATION/TRAINING PROGRAM

## 2024-11-11 PROCEDURE — 85027 COMPLETE CBC AUTOMATED: CPT

## 2024-11-11 PROCEDURE — 6370000000 HC RX 637 (ALT 250 FOR IP): Performed by: FAMILY MEDICINE

## 2024-11-11 PROCEDURE — 2580000003 HC RX 258: Performed by: INTERNAL MEDICINE

## 2024-11-11 PROCEDURE — 6370000000 HC RX 637 (ALT 250 FOR IP): Performed by: NURSE PRACTITIONER

## 2024-11-11 PROCEDURE — 97116 GAIT TRAINING THERAPY: CPT

## 2024-11-11 PROCEDURE — 1100000000 HC RM PRIVATE

## 2024-11-11 PROCEDURE — 85610 PROTHROMBIN TIME: CPT

## 2024-11-11 PROCEDURE — 6360000002 HC RX W HCPCS: Performed by: INTERNAL MEDICINE

## 2024-11-11 PROCEDURE — 2580000003 HC RX 258: Performed by: NURSE PRACTITIONER

## 2024-11-11 PROCEDURE — 6360000002 HC RX W HCPCS: Performed by: FAMILY MEDICINE

## 2024-11-11 RX ORDER — ENOXAPARIN SODIUM 100 MG/ML
40 INJECTION SUBCUTANEOUS DAILY
Status: DISCONTINUED | OUTPATIENT
Start: 2024-11-11 | End: 2024-11-11

## 2024-11-11 RX ORDER — ENOXAPARIN SODIUM 100 MG/ML
1 INJECTION SUBCUTANEOUS EVERY 12 HOURS
Status: DISCONTINUED | OUTPATIENT
Start: 2024-11-11 | End: 2024-11-13

## 2024-11-11 RX ADMIN — SACUBITRIL AND VALSARTAN 1 TABLET: 24; 26 TABLET, FILM COATED ORAL at 09:03

## 2024-11-11 RX ADMIN — Medication 50 MCG: at 09:04

## 2024-11-11 RX ADMIN — Medication 1 TABLET: at 09:03

## 2024-11-11 RX ADMIN — PAROXETINE HYDROCHLORIDE 20 MG: 20 TABLET, FILM COATED ORAL at 09:04

## 2024-11-11 RX ADMIN — FINASTERIDE 5 MG: 5 TABLET, FILM COATED ORAL at 09:03

## 2024-11-11 RX ADMIN — ATORVASTATIN CALCIUM 40 MG: 40 TABLET, FILM COATED ORAL at 22:10

## 2024-11-11 RX ADMIN — OLANZAPINE 5 MG: 5 TABLET, FILM COATED ORAL at 23:40

## 2024-11-11 RX ADMIN — LEVOTHYROXINE SODIUM 150 MCG: 0.1 TABLET ORAL at 06:41

## 2024-11-11 RX ADMIN — OXYBUTYNIN CHLORIDE 10 MG: 5 TABLET, EXTENDED RELEASE ORAL at 09:03

## 2024-11-11 RX ADMIN — ALLOPURINOL 300 MG: 100 TABLET ORAL at 09:03

## 2024-11-11 RX ADMIN — PANTOPRAZOLE SODIUM 40 MG: 40 TABLET, DELAYED RELEASE ORAL at 06:07

## 2024-11-11 RX ADMIN — DONEPEZIL HYDROCHLORIDE 5 MG: 5 TABLET, FILM COATED ORAL at 22:10

## 2024-11-11 RX ADMIN — GUAIFENESIN 600 MG: 600 TABLET, EXTENDED RELEASE ORAL at 22:10

## 2024-11-11 RX ADMIN — SODIUM CHLORIDE, PRESERVATIVE FREE 10 ML: 5 INJECTION INTRAVENOUS at 09:05

## 2024-11-11 RX ADMIN — WARFARIN SODIUM 3 MG: 2 TABLET ORAL at 10:07

## 2024-11-11 RX ADMIN — ENOXAPARIN SODIUM 80 MG: 100 INJECTION SUBCUTANEOUS at 12:44

## 2024-11-11 RX ADMIN — AMPICILLIN SODIUM AND SULBACTAM SODIUM 3000 MG: 2; 1 INJECTION, POWDER, FOR SOLUTION INTRAMUSCULAR; INTRAVENOUS at 17:53

## 2024-11-11 RX ADMIN — AMPICILLIN SODIUM AND SULBACTAM SODIUM 3000 MG: 2; 1 INJECTION, POWDER, FOR SOLUTION INTRAMUSCULAR; INTRAVENOUS at 12:42

## 2024-11-11 RX ADMIN — SACUBITRIL AND VALSARTAN 1 TABLET: 24; 26 TABLET, FILM COATED ORAL at 22:10

## 2024-11-11 RX ADMIN — TAMSULOSIN HYDROCHLORIDE 0.4 MG: 0.4 CAPSULE ORAL at 09:03

## 2024-11-11 RX ADMIN — GUAIFENESIN 600 MG: 600 TABLET, EXTENDED RELEASE ORAL at 09:04

## 2024-11-11 RX ADMIN — SODIUM CHLORIDE, PRESERVATIVE FREE 10 ML: 5 INJECTION INTRAVENOUS at 22:15

## 2024-11-11 NOTE — PLAN OF CARE
Problem: Physical Therapy - Adult  Goal: By Discharge: Performs mobility at highest level of function for planned discharge setting.  See evaluation for individualized goals.  Description: FUNCTIONAL STATUS PRIOR TO ADMISSION: Patient is poor historian, A.O x 1. Per chart he was [facility], hallucinations, UTI.  Has only been home for 1 week.    HOME SUPPORT PRIOR TO ADMISSION: Per chart patient lives with his wife, 2-story home but recently he has been staying on the 1st floor.    Physical Therapy Goals  Initiated 11/9/2024  1.  Patient will move from supine to sit and sit to supine in bed with supervision/set-up within 7 day(s).    2.  Patient will perform sit to stand with supervision/set-up within 7 day(s).  3.  Patient will transfer from bed to chair and chair to bed with contact guard assist using the least restrictive device within 7 day(s).  4.  Patient will ambulate with contact guard assist for 200 feet with the least restrictive device within 7 day(s).   5.  Patient will ascend/descend 8 stairs with 1 handrail(s) with contact guard assist within 7 day(s).   Outcome: Progressing   PHYSICAL THERAPY TREATMENT    Patient: Corby Cuhn (89 y.o. male)  Date: 11/11/2024  Diagnosis: Cough with sputum [R05.8]  Subtherapeutic international normalized ratio (INR) [R79.1]  Acute respiratory failure with hypoxia [J96.01]  Cough, unspecified type [R05.9]  Ground-level fall [W18.30XA] Cough with sputum      Precautions: Fall Risk                      ASSESSMENT:  Patient continues to benefit from skilled PT services and is slowly progressing towards goals. Pt received for PT session supine in bed, eager for mobility 2/2 urgent need to toilet. Pt highly impulsive and very hard of hearing. Utilized white board to communicate when needed. Bed mobility performed with CGA and rail, extra time. Sit <> stand with RW and Min A for balance. Pt highly unsafe during transfers and gait, pulling on RW  Impaired  Standing - Static: Constant support;Fair  Standing - Dynamic: Constant support;Fair   Ambulation/Gait Training:     Gait  Gait Training: Yes  Distance (ft): 15 Feet (x2)  Assistive Device: Gait belt;Walker, rolling  Speed/Lisa: Shuffled;Fluctuations  Step Length: Left shortened;Right shortened  Gait Abnormalities: Path deviations;Decreased step clearance;Shuffling gait       Pain Rating:  Did not report/rank pain this visit  Pain Intervention(s):   rest and repositioning    Activity Tolerance:   Fair  and requires frequent rest breaks    After treatment:   Patient left in no apparent distress sitting up in chair, Call bell within reach, Bed/ chair alarm activated, Heels elevated for pressure relief, and Updated patient's board on functional status and mobility recommendations      COMMUNICATION/EDUCATION:   The patient's plan of care was discussed with: registered nurse and     Patient Education  Education Given To: Patient  Education Provided: Role of Therapy;Plan of Care;Transfer Training  Education Method: Verbal  Barriers to Learning: Cognition;Hearing  Education Outcome: Continued education needed      Roxie Billings PT, DPT, NCS  Minutes: 16

## 2024-11-11 NOTE — PLAN OF CARE
Problem: Safety - Adult  Goal: Free from fall injury  11/10/2024 2232 by Minna Vásquez RN  Outcome: Progressing  11/10/2024 1249 by Petra Oliva LPN  Outcome: Progressing     Problem: Chronic Conditions and Co-morbidities  Goal: Patient's chronic conditions and co-morbidity symptoms are monitored and maintained or improved  Outcome: Progressing     Problem: Discharge Planning  Goal: Discharge to home or other facility with appropriate resources  Outcome: Progressing     Problem: Pain  Goal: Verbalizes/displays adequate comfort level or baseline comfort level  11/10/2024 2232 by Minna Vásquez RN  Outcome: Progressing  11/10/2024 1249 by Petra Oliva LPN  Outcome: Progressing     Problem: Skin/Tissue Integrity  Goal: Absence of new skin breakdown  Description: 1.  Monitor for areas of redness and/or skin breakdown  2.  Assess vascular access sites hourly  3.  Every 4-6 hours minimum:  Change oxygen saturation probe site  4.  Every 4-6 hours:  If on nasal continuous positive airway pressure, respiratory therapy assess nares and determine need for appliance change or resting period.  Outcome: Progressing     Problem: ABCDS Injury Assessment  Goal: Absence of physical injury  Outcome: Progressing     Problem: Confusion  Goal: Confusion, delirium, dementia, or psychosis is improved or at baseline  Description: INTERVENTIONS:  1. Assess for possible contributors to thought disturbance, including medications, impaired vision or hearing, underlying metabolic abnormalities, dehydration, psychiatric diagnoses, and notify attending LIP  2. [place] high risk fall precautions, as indicated  3. Provide frequent short contacts to provide reality reorientation, refocusing and direction  4. Decrease environmental stimuli, including noise as appropriate  5. Monitor and intervene to maintain adequate nutrition, hydration, elimination, sleep and activity  6. If unable to ensure safety without constant attention

## 2024-11-11 NOTE — PLAN OF CARE
Problem: Safety - Adult  Goal: Free from fall injury  11/11/2024 1157 by Rosalinda Quezada RN  Outcome: Progressing  11/10/2024 2232 by Minna Vásquez RN  Outcome: Progressing     Problem: Chronic Conditions and Co-morbidities  Goal: Patient's chronic conditions and co-morbidity symptoms are monitored and maintained or improved  11/11/2024 1157 by Rosalinda Quezada RN  Outcome: Progressing  11/10/2024 2232 by Minna Vásquez RN  Outcome: Progressing     Problem: Discharge Planning  Goal: Discharge to home or other facility with appropriate resources  11/11/2024 1157 by Rosalinda Quezada RN  Outcome: Progressing  11/10/2024 2232 by Minna Vásquez RN  Outcome: Progressing     Problem: Pain  Goal: Verbalizes/displays adequate comfort level or baseline comfort level  11/11/2024 1157 by Rosalinda Quezada RN  Outcome: Progressing  11/10/2024 2232 by Minna Vásquez RN  Outcome: Progressing     Problem: Skin/Tissue Integrity  Goal: Absence of new skin breakdown  Description: 1.  Monitor for areas of redness and/or skin breakdown  2.  Assess vascular access sites hourly  3.  Every 4-6 hours minimum:  Change oxygen saturation probe site  4.  Every 4-6 hours:  If on nasal continuous positive airway pressure, respiratory therapy assess nares and determine need for appliance change or resting period.  11/11/2024 1157 by Rosalinda Quezada RN  Outcome: Progressing  11/10/2024 2232 by Minna Vásquez RN  Outcome: Progressing     Problem: ABCDS Injury Assessment  Goal: Absence of physical injury  11/11/2024 1157 by Rosalinda Quezada RN  Outcome: Progressing  11/10/2024 2232 by Minna Vásquez RN  Outcome: Progressing     Problem: Confusion  Goal: Confusion, delirium, dementia, or psychosis is improved or at baseline  Description: INTERVENTIONS:  1. Assess for possible contributors to thought disturbance, including medications, impaired vision or hearing, underlying metabolic abnormalities, dehydration,

## 2024-11-11 NOTE — CARE COORDINATION
Care Management Initial Assessment       RUR: 16% Moderate Risk  Readmission? No  1st IM letter given? Yes   1st  letter given: N/A    Initial Assessment: Chart reviewed. CM spoke with pts spouse over the phone to introduce self and role. Verified contact information and demographics. Pt resides with spouse in a one three level home with 7 RENAN. Pt PCP is Dr. Nguyen with last visit being within the last 3 months. Preferred pharmacy is [facility] in [place], [place]. Pt has no hx needing SNF services with [facility] and  services however spouse does not remember name of agency. Pt recently needs assistance with ADL's and uses a rollator/cane to ambulate. Pt is not an active . ?Pts has no transportation and will need round trip for discharge.?      FOC list for SNF options was provided to patient/caregiver. CM explained FOC list is their right to choose any facility within the network that best meets their preferences. Pt wife wpould like a referral sent to [facility] and will choose a back from list. CM will continue to follow.    Plan A: SNF   Plan B: Home with HH  Full assessment below:          11/11/24 8746   Service Assessment   Patient Orientation Other (see comment)  (Completed with initial assessment spouse)   Cognition Other (see comment)  (Completed with initial assessment spouse)   History Provided By Spouse   Primary Caregiver Spouse   Support Systems Spouse/Significant Other   Patient's Healthcare Decision Maker is: Named in Scanned ACP Document   PCP Verified by CM Yes   Last Visit to PCP Within last 3 months   Prior Functional Level Independent in ADLs/IADLs   Current Functional Level Assistance with the following:   Can patient return to prior living arrangement Yes   Ability to make needs known: Good   Family able to assist with home care needs: Yes   Would you like for me to discuss the discharge plan with any other family members/significant others, and if so, who? Yes   Financial Resources  Medicare   Social/Functional History   Lives With Spouse   Type of Home House   Home Layout Multi-level   Home Equipment Cane;Walker - Standard   Active  No   Discharge Planning   Type of Residence House   Current Services Prior To Admission None   Patient expects to be discharged to: Other (comment)  (HH VS. Rehab)         Advance Care Planning     General Advance Care Planning (ACP) Conversation    Date of Conversation: 11/11/2024  Conducted with: Patient with Decision Making Capacity and Legal next of kin  Other persons present: None    Healthcare Decision Maker:   Primary Decision Maker: Ashley Chun - Spouse - [phone]       Content/Action Overview:  Has ACP document(s) on file - reflects the patient's care preferences  Reviewed DNR/DNI and patient elects Full Code (Attempt Resuscitation)        Length of Voluntary ACP Conversation in minutes:  <16 minutes (Non-Billable)    JOSETTE Cohen,  [phone]

## 2024-11-11 NOTE — PROGRESS NOTES
Pharmacist Daily Dosing of Warfarin    Indication & Goal INR: DVT, INR Goal 2-3    PTA Warfarin Dose: ?    Notable concurrent conditions and medications: Acetaminophen and Allopurinol    Labs:  Recent Labs     Units 11/11/24  0603 11/10/24  0426 11/09/24  0519   INR   1.7* 1.7* 1.5*   HGB g/dL 11.6* 11.3* 11.5*   PLT K/uL 129* 138* 116*         Impression/Plan:   Will order warfarin 3 mg x 1 dose   Daily INR has been ordered  CBC is ordered daily for now. Do at least every 2 days per protocol     Pharmacy will follow daily and adjust the dose as appropriate.    Thank you,  SRIKANTH MOORE, [facility]

## 2024-11-11 NOTE — PROGRESS NOTES
End of Shift Note     Bedside shift change report given to  Rosalinda WARNER (oncoming nurse) by Minna Vásquez RN (offgoing nurse).  Report included the following information SBAR, Intake/Output, MAR, Recent Results, and Cardiac Rhythm V paced  At handoff report pt is sleeping in bed easily arousable.   Shift worked:  NIGHT       Shift summary and any significant changes:      -Pt had a calm night   -CHG bath given, encouraged toileting.   -Labs drawn   -Use of Writing on the white board is really effective to communicate with him.   - 0631H RN informed NP [place] reg pt latest /88. Around 4am it was 194/99, and it just trend down to 160's SBP without any meds given. Pt BP just started to increase now. Pt is asymptomatic.     0633H As per NP \"As long as remains asymptomatic, please d/w day dr \"    \"as long as sbp below 185 and asymptomatic ok to monitor until day dr comes in. \"    Day shift nurse aware to monitor BP    Concerns for physician to address:  Needs PRN meds for elevated BP    Zone phone for oncoming shift:         Activity:  Number times ambulated in hallways past shift: 0  Hallway to room: 1  Number of times OOB to chair past shift: 0     Cardiac:   Cardiac Monitoring: Yes         Access:  Current line(s): PIV      Genitourinary:   Urinary status: voiding and external catheter     Respiratory:   Chronic home O2 use?: NO  Incentive spirometer at bedside: NO     GI:  Current diet:  ADULT DIET; Regular  Passing flatus: YES  Tolerating current diet: YES     Pain Management:   Patient states pain is manageable on current regimen: YES     Skin:  Interventions: turn team, float heels, and internal/external urinary devices    Patient Safety:  Fall Score:    Interventions: bed/chair alarm, assistive device (walker, cane. etc), gripper socks, pt to call before getting OOB, and stay with me (per policy)     Length of Stay:  Expected LOS: 4  Actual LOS: 1    Minna Vásquez RN

## 2024-11-11 NOTE — PROGRESS NOTES
End of Shift Note    Bedside shift change report given to Haroon HERNDON (oncoming nurse) by Rosalinda Quezada RN (offgoing nurse).  Report included the following information SBAR, Recent Results, Cardiac Rhythm NSR, and Quality Measures    Shift worked:  7a-7p     Shift summary and any significant changes:     Pt had no significant changes during shift. Pt up in chair for lunch and dinner. Pt pleasently  confused. Care is still progressing.      Concerns for physician to address:       Zone phone for oncoming shift:        Activity:     Number times ambulated in hallways past shift: 0  Number of times OOB to chair past shift: 2    Cardiac:   Cardiac Monitoring: Yes           Access:  Current line(s): PIV     Genitourinary:   Urinary status: voiding    Respiratory:      Chronic home O2 use?: NO  Incentive spirometer at bedside: NO       GI:     Current diet:  ADULT DIET; Regular  Passing flatus: YES  Tolerating current diet: YES       Pain Management:   Patient states pain is manageable on current regimen: YES    Skin:     Interventions: increase time out of bed    Patient Safety:  Fall Score:    Interventions: bed/chair alarm, assistive device (walker, cane. etc), gripper socks, pt to call before getting OOB, and gait belt       Length of Stay:  Expected LOS: 5  Actual LOS: 3      Rosalinda Quezada RN

## 2024-11-12 PROBLEM — Z86.73 HISTORY OF CVA (CEREBROVASCULAR ACCIDENT): Status: ACTIVE | Noted: 2024-11-12

## 2024-11-12 PROBLEM — R78.81 GRAM-NEGATIVE BACTEREMIA: Status: ACTIVE | Noted: 2024-11-12

## 2024-11-12 PROBLEM — Z95.1 HX OF CABG: Status: ACTIVE | Noted: 2019-04-07

## 2024-11-12 PROBLEM — R44.3 HALLUCINATIONS: Status: ACTIVE | Noted: 2024-11-12

## 2024-11-12 PROBLEM — A49.8 ACINETOBACTER LWOFFI INFECTION: Status: ACTIVE | Noted: 2024-11-12

## 2024-11-12 PROBLEM — B34.8 RHINOVIRUS INFECTION: Status: ACTIVE | Noted: 2024-11-12

## 2024-11-12 PROBLEM — Z95.810 ICD (IMPLANTABLE CARDIOVERTER-DEFIBRILLATOR) IN PLACE: Status: ACTIVE | Noted: 2019-03-04

## 2024-11-12 PROBLEM — J06.9 VIRAL UPPER RESPIRATORY TRACT INFECTION: Status: ACTIVE | Noted: 2024-11-12

## 2024-11-12 PROBLEM — F09 COGNITIVE DYSFUNCTION: Status: ACTIVE | Noted: 2024-11-12

## 2024-11-12 LAB
ALBUMIN SERPL-MCNC: 2.5 G/DL (ref 3.5–5)
ALBUMIN/GLOB SERPL: 0.8 (ref 1.1–2.2)
ALP SERPL-CCNC: 82 U/L (ref 45–117)
ALT SERPL-CCNC: 38 U/L (ref 12–78)
ANION GAP SERPL CALC-SCNC: 5 MMOL/L (ref 2–12)
AST SERPL-CCNC: 58 U/L (ref 15–37)
BACTERIA SPEC CULT: ABNORMAL
BILIRUB SERPL-MCNC: 0.4 MG/DL (ref 0.2–1)
BUN SERPL-MCNC: 21 MG/DL (ref 6–20)
BUN/CREAT SERPL: 17 (ref 12–20)
CALCIUM SERPL-MCNC: 7.5 MG/DL (ref 8.5–10.1)
CHLORIDE SERPL-SCNC: 115 MMOL/L (ref 97–108)
CO2 SERPL-SCNC: 23 MMOL/L (ref 21–32)
CREAT SERPL-MCNC: 1.27 MG/DL (ref 0.7–1.3)
ERYTHROCYTE [DISTWIDTH] IN BLOOD BY AUTOMATED COUNT: 13.5 % (ref 11.5–14.5)
GLOBULIN SER CALC-MCNC: 3.3 G/DL (ref 2–4)
GLUCOSE SERPL-MCNC: 109 MG/DL (ref 65–100)
HCT VFR BLD AUTO: 35.8 % (ref 36.6–50.3)
HGB BLD-MCNC: 11.7 G/DL (ref 12.1–17)
INR PPP: 2.2 (ref 0.9–1.1)
MCH RBC QN AUTO: 32.1 PG (ref 26–34)
MCHC RBC AUTO-ENTMCNC: 32.7 G/DL (ref 30–36.5)
MCV RBC AUTO: 98.1 FL (ref 80–99)
NRBC # BLD: 0 K/UL (ref 0–0.01)
NRBC BLD-RTO: 0 PER 100 WBC
PLATELET # BLD AUTO: 145 K/UL (ref 150–400)
PMV BLD AUTO: 10.4 FL (ref 8.9–12.9)
POTASSIUM SERPL-SCNC: 3.9 MMOL/L (ref 3.5–5.1)
POTASSIUM SERPL-SCNC: 5.2 MMOL/L (ref 3.5–5.1)
PROT SERPL-MCNC: 5.8 G/DL (ref 6.4–8.2)
PROTHROMBIN TIME: 21.5 SEC (ref 9–11.1)
RBC # BLD AUTO: 3.65 M/UL (ref 4.1–5.7)
SERVICE CMNT-IMP: ABNORMAL
SODIUM SERPL-SCNC: 143 MMOL/L (ref 136–145)
WBC # BLD AUTO: 5.1 K/UL (ref 4.1–11.1)

## 2024-11-12 PROCEDURE — 36415 COLL VENOUS BLD VENIPUNCTURE: CPT

## 2024-11-12 PROCEDURE — 2580000003 HC RX 258: Performed by: INTERNAL MEDICINE

## 2024-11-12 PROCEDURE — 85027 COMPLETE CBC AUTOMATED: CPT

## 2024-11-12 PROCEDURE — 6370000000 HC RX 637 (ALT 250 FOR IP): Performed by: NURSE PRACTITIONER

## 2024-11-12 PROCEDURE — 6370000000 HC RX 637 (ALT 250 FOR IP): Performed by: STUDENT IN AN ORGANIZED HEALTH CARE EDUCATION/TRAINING PROGRAM

## 2024-11-12 PROCEDURE — 85610 PROTHROMBIN TIME: CPT

## 2024-11-12 PROCEDURE — 84132 ASSAY OF SERUM POTASSIUM: CPT

## 2024-11-12 PROCEDURE — 2580000003 HC RX 258: Performed by: NURSE PRACTITIONER

## 2024-11-12 PROCEDURE — 6360000002 HC RX W HCPCS: Performed by: INTERNAL MEDICINE

## 2024-11-12 PROCEDURE — 1100000000 HC RM PRIVATE

## 2024-11-12 PROCEDURE — 6370000000 HC RX 637 (ALT 250 FOR IP): Performed by: INTERNAL MEDICINE

## 2024-11-12 PROCEDURE — 6360000002 HC RX W HCPCS: Performed by: FAMILY MEDICINE

## 2024-11-12 PROCEDURE — 99223 1ST HOSP IP/OBS HIGH 75: CPT | Performed by: INTERNAL MEDICINE

## 2024-11-12 PROCEDURE — 80053 COMPREHEN METABOLIC PANEL: CPT

## 2024-11-12 RX ORDER — OLANZAPINE 5 MG/1
5 TABLET ORAL ONCE
Status: DISCONTINUED | OUTPATIENT
Start: 2024-11-12 | End: 2024-11-14

## 2024-11-12 RX ORDER — OLANZAPINE 5 MG/1
5 TABLET ORAL NIGHTLY
Status: DISCONTINUED | OUTPATIENT
Start: 2024-11-12 | End: 2024-11-12

## 2024-11-12 RX ORDER — OLANZAPINE 5 MG/1
5 TABLET ORAL 2 TIMES DAILY PRN
Status: DISCONTINUED | OUTPATIENT
Start: 2024-11-12 | End: 2024-11-14 | Stop reason: HOSPADM

## 2024-11-12 RX ADMIN — SODIUM CHLORIDE, PRESERVATIVE FREE 10 ML: 5 INJECTION INTRAVENOUS at 10:01

## 2024-11-12 RX ADMIN — FINASTERIDE 5 MG: 5 TABLET, FILM COATED ORAL at 09:55

## 2024-11-12 RX ADMIN — LEVOTHYROXINE SODIUM 150 MCG: 0.1 TABLET ORAL at 06:47

## 2024-11-12 RX ADMIN — SACUBITRIL AND VALSARTAN 1 TABLET: 24; 26 TABLET, FILM COATED ORAL at 09:56

## 2024-11-12 RX ADMIN — OXYBUTYNIN CHLORIDE 10 MG: 5 TABLET, EXTENDED RELEASE ORAL at 09:55

## 2024-11-12 RX ADMIN — ENOXAPARIN SODIUM 80 MG: 100 INJECTION SUBCUTANEOUS at 00:33

## 2024-11-12 RX ADMIN — GUAIFENESIN 600 MG: 600 TABLET, EXTENDED RELEASE ORAL at 09:55

## 2024-11-12 RX ADMIN — DONEPEZIL HYDROCHLORIDE 5 MG: 5 TABLET, FILM COATED ORAL at 21:27

## 2024-11-12 RX ADMIN — ENOXAPARIN SODIUM 80 MG: 100 INJECTION SUBCUTANEOUS at 13:20

## 2024-11-12 RX ADMIN — SACUBITRIL AND VALSARTAN 1 TABLET: 24; 26 TABLET, FILM COATED ORAL at 21:27

## 2024-11-12 RX ADMIN — ATORVASTATIN CALCIUM 40 MG: 40 TABLET, FILM COATED ORAL at 21:27

## 2024-11-12 RX ADMIN — PAROXETINE HYDROCHLORIDE 20 MG: 20 TABLET, FILM COATED ORAL at 09:56

## 2024-11-12 RX ADMIN — TAMSULOSIN HYDROCHLORIDE 0.4 MG: 0.4 CAPSULE ORAL at 09:55

## 2024-11-12 RX ADMIN — AMPICILLIN SODIUM AND SULBACTAM SODIUM 3000 MG: 2; 1 INJECTION, POWDER, FOR SOLUTION INTRAMUSCULAR; INTRAVENOUS at 13:21

## 2024-11-12 RX ADMIN — Medication 50 MCG: at 09:56

## 2024-11-12 RX ADMIN — Medication 1 TABLET: at 09:56

## 2024-11-12 RX ADMIN — GUAIFENESIN 600 MG: 600 TABLET, EXTENDED RELEASE ORAL at 21:27

## 2024-11-12 RX ADMIN — AMPICILLIN SODIUM AND SULBACTAM SODIUM 3000 MG: 2; 1 INJECTION, POWDER, FOR SOLUTION INTRAMUSCULAR; INTRAVENOUS at 00:29

## 2024-11-12 RX ADMIN — AMPICILLIN SODIUM AND SULBACTAM SODIUM 3000 MG: 2; 1 INJECTION, POWDER, FOR SOLUTION INTRAMUSCULAR; INTRAVENOUS at 17:29

## 2024-11-12 RX ADMIN — PANTOPRAZOLE SODIUM 40 MG: 40 TABLET, DELAYED RELEASE ORAL at 06:47

## 2024-11-12 RX ADMIN — AMPICILLIN SODIUM AND SULBACTAM SODIUM 3000 MG: 2; 1 INJECTION, POWDER, FOR SOLUTION INTRAMUSCULAR; INTRAVENOUS at 06:46

## 2024-11-12 RX ADMIN — ALLOPURINOL 300 MG: 100 TABLET ORAL at 09:55

## 2024-11-12 RX ADMIN — SODIUM CHLORIDE, PRESERVATIVE FREE 10 ML: 5 INJECTION INTRAVENOUS at 21:27

## 2024-11-12 ASSESSMENT — PAIN SCALES - PAIN ASSESSMENT IN ADVANCED DEMENTIA (PAINAD)
FACIALEXPRESSION: SMILING OR INEXPRESSIVE
BODYLANGUAGE: RELAXED
BREATHING: NORMAL
BREATHING: NORMAL
TOTALSCORE: 0
CONSOLABILITY: NO NEED TO CONSOLE
CONSOLABILITY: NO NEED TO CONSOLE
FACIALEXPRESSION: SMILING OR INEXPRESSIVE
TOTALSCORE: 0
BODYLANGUAGE: RELAXED

## 2024-11-12 ASSESSMENT — PAIN SCALES - GENERAL: PAINLEVEL_OUTOF10: 0

## 2024-11-12 NOTE — PLAN OF CARE
Problem: Safety - Adult  Goal: Free from fall injury  Outcome: Progressing     Problem: Chronic Conditions and Co-morbidities  Goal: Patient's chronic conditions and co-morbidity symptoms are monitored and maintained or improved  Outcome: Progressing     Problem: Discharge Planning  Goal: Discharge to home or other facility with appropriate resources  Outcome: Progressing     Problem: Pain  Goal: Verbalizes/displays adequate comfort level or baseline comfort level  Outcome: Progressing     Problem: Skin/Tissue Integrity  Goal: Absence of new skin breakdown  Description: 1.  Monitor for areas of redness and/or skin breakdown  2.  Assess vascular access sites hourly  3.  Every 4-6 hours minimum:  Change oxygen saturation probe site  4.  Every 4-6 hours:  If on nasal continuous positive airway pressure, respiratory therapy assess nares and determine need for appliance change or resting period.  Outcome: Progressing     Problem: ABCDS Injury Assessment  Goal: Absence of physical injury  Outcome: Progressing     Problem: Confusion  Goal: Confusion, delirium, dementia, or psychosis is improved or at baseline  Description: INTERVENTIONS:  1. Assess for possible contributors to thought disturbance, including medications, impaired vision or hearing, underlying metabolic abnormalities, dehydration, psychiatric diagnoses, and notify attending LIP  2. [place] high risk fall precautions, as indicated  3. Provide frequent short contacts to provide reality reorientation, refocusing and direction  4. Decrease environmental stimuli, including noise as appropriate  5. Monitor and intervene to maintain adequate nutrition, hydration, elimination, sleep and activity  6. If unable to ensure safety without constant attention obtain sitter and review sitter guidelines with assigned personnel  7. Initiate Psychosocial CNS and Spiritual Care consult, as indicated  Outcome: Progressing

## 2024-11-12 NOTE — CONSULTS
Infectious Disease Consult    Date of Consultation:  November 12, 2024  Reason for Consultation: GNR bacteremia  Referring Physician: Dr Steiner  Date of Admission:11/8/2024      Impression    Acinetobacter lwoffii bacteremia  Blood cultures 11/8+ for Acinetobacter lwoffii 1/4  Antibiotic sensitivities pending  Negative repeat cultures 11/10  ? source-urine negative, CXR negative.  Superficial skin tears+  Recent hospitalization at [facility].    URI   Rhinovirus infection  Supportive care    CAD  S/p CABG    Chronic CHF  ICD present    H/o mechanical AV replacement  On Coumadin    H/o CVA  Cognitive decline  Recent admission at [facility] with mental status change and delusions, paranoia  Likely dementia    Hypothyroidism  On levothyroxine    Plan    Continue Unasyn IV x 10 days  Await  ASHLEIGH on blood cultures  Adequate fluids, daily probiotic  ID service to follow    Antimicrobial orders for discharge  -Unasyn 3 g   IV every 6 hours end date 11/19  -Pull line at end of therapy.    -Weekly CBC, CMP-  -Fax reports to [phone], call with critical labs  at [phone]  -Encourage adequate fluids, daily probiotic/yogurt  -If line malfunction occurs and home health cannot reposition  please send patient to ED immediately  -ID follow-up -no follow-up required  - If persistent side effects occur stop antibiotic and call-ID/PCP    Possible adverse effects of long term antibiotics are inclusive of but not limited to following  BM suppression, neutropenia , cytopenias , aplastic anemia hemorrhage liver & renal dysfunction/ liver , renal failure  , GI dysfunction- N, V  Diarrhea,C.difficile disease, rash , allergy , anaphylaxis.toxic epidermal necrolysis  Neuro toxicity , seizure disorder  Side effects tend to be more pronounced in the elderly        Abx  Unasyn-11/11  Ceftriaxone 11/9    Extensive review of chart notes, labs, imaging, cultures done  Additionally review of done: Recent reports-Labs, cultures, imaging  D/w -hospitalist,  constipation   Genitourinary: Negative for genital pain or genital discharge     Neuro: Negative for headache, numbness, tingling, extremity weakness,  syncope, seizures    Skin: Negative for rash, sores/open wounds   Musculoskeletal: Negative for joint pain, joint swelling, joint erythema    Endocrine: Negative for high or low blood sugars, heat or cold intolerance    Psych: Negative for manic behavior     Vitals:    11/11/24 2213   BP: (!) 146/73   Pulse: 70   Resp:    Temp:    SpO2:            PHYSICAL EXAM:  General:          Awake,, cooperative, no acute distress    EENT:              EOMI. Anicteric sclerae. MMM  Resp:               CTA bilaterally, no wheezing or rales.  No accessory muscle use  CV:                  Regular  rhythm,  No edema  GI:                   Soft, Non distended, Non tender.  +Bowel sounds  Neurologic:      Awake not oriented  normal speech,   Psych:             Poor insight. Not anxious nor agitated  Skin:                No rashes.  No jaundice.  Extremities:No edema    Recent Results (from the past 24 hour(s))   Protime-INR    Collection Time: 11/11/24  6:03 AM   Result Value Ref Range    INR 1.7 (H) 0.9 - 1.1      Protime 17.2 (H) 9.0 - 11.1 sec   Comprehensive Metabolic Panel    Collection Time: 11/11/24  6:03 AM   Result Value Ref Range    Sodium 144 136 - 145 mmol/L    Potassium 3.7 3.5 - 5.1 mmol/L    Chloride 114 (H) 97 - 108 mmol/L    CO2 24 21 - 32 mmol/L    Anion Gap 6 2 - 12 mmol/L    Glucose 104 (H) 65 - 100 mg/dL    BUN 24 (H) 6 - 20 MG/DL    Creatinine 1.27 0.70 - 1.30 MG/DL    BUN/Creatinine Ratio 19 12 - 20      Est, Glom Filt Rate 54 (L) >60 ml/min/1.73m2    Calcium 8.2 (L) 8.5 - 10.1 MG/DL    Total Bilirubin 0.4 0.2 - 1.0 MG/DL    ALT 36 12 - 78 U/L    AST 28 15 - 37 U/L    Alk Phosphatase 79 45 - 117 U/L    Total Protein 5.9 (L) 6.4 - 8.2 g/dL    Albumin 2.6 (L) 3.5 - 5.0 g/dL    Globulin 3.3 2.0 - 4.0 g/dL    Albumin/Globulin Ratio 0.8 (L) 1.1 - 2.2     CBC

## 2024-11-12 NOTE — PROGRESS NOTES
Hospitalist Progress Note    NAME:   Corby Chun   : 1935   MRN: [id number]     Date/Time: 2024 6:52 PM  Patient PCP: Ryan Nguyen MD    Estimated discharge date:48hrs  Barriers: ptot, repeat blood cultures, ID consult and recommendation  PT recommending SNF    Assessment / Plan:    Generalized weakness  Status post fall  URTI + rhinovirus  UTI  GNR bacteria  TSH, B12 normal  Blood pressure is negative so far flu and COVID-negative  UA showing moderate leukoesterase  CT head unremarkable  CT chest reviewed   Chest x-ray no pneumonia  Blood culture on admission 1/2 GNR  Repeat blood culture done on 11/10  Urine culture no  Continue empiric IV ceftriaxone  Continue Mucinex  Nebs as needed  Blood culture + acinetobacter lwoffii bacteremia 2 bottles  Repeat blood culture until negative   ID consulted     Mildly elevated creatinine  Creatinine 1.47 on admission  Resolved     Chronic CHF with reduced ejection fraction EF 40% as of   Status post AICD  Mechanical aortic valve on warfarin goal INR 2-3   [place] today s/p  lovenox bridge with wafarin     BPH  Hypothyroidism  Continue PTA Entresto, finasteride, Flomax  Pharmacy consult to manage Coumadin      Gout PTA allopurinol    Agitation   - Zyprexa 5mg bid prn     - consult Psych       Psychosis  Sundowners  -pt was recently discharged from [facility] for hallucinations stayed 1 week  in hospital - concluded - Sundowners   - Zyprexa p.rn Qtc 492  -resume Aricept  -resume paxil   Alert and oriented times2  Per wife patient always have sundowning while in hospital with hallucinations and patient thinking somebody is trying to kill him  Seroquel was discontinued on previous hospitalization    Medical Decision Making:   I personally reviewed labs: CBC BMP  I personally reviewed imaging:   I personally reviewed EKG:  Toxic drug monitoring: INR on warfarin  Discussed case with: pt rn        Code Status: full code   DVT
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
## 2024-11-12 NOTE — PROGRESS NOTES
End of Shift Note    Bedside shift change report given to ALANA Tellez(oncoming nurse) by Fabby Quinteros LPN (offgoing nurse).  Report included the following information SBAR REPORTS LIST: SBAR    Shift worked:  [id number]     Shift summary and any significant changes:     No significant changes during shift. Pt up in chair, then in bed for the night. Pt pleasantly confused. Pt pulled out IV, new IV placed. Care progressing.      Concerns for physician to address:       Zone phone for oncoming shift:          Activity:  {Activity:85874:::1}  Number times ambulated in hallways past shift: 0  Number of times OOB to chair past shift: 1    Cardiac:   Cardiac Monitoring: YES / NO: Yes        Access:  Current line(s): PIV     Genitourinary:   Urinary status: Voiding     Respiratory:   oxygen delivery: room air  Chronic home O2 use?: YES / NO: No  Incentive spirometer at bedside: YES / NO: No      GI:  Current diet:  DIET: regular  Passing flatus: YES / NO: Yes  Tolerating current diet: YES / NO: Yes      Pain Management:   Patient states pain is manageable on current regimen: YES / NO: Yes    Skin:    Interventions: Increase time out of bed     Patient Safety:  Fall Score:   Interventions: Fall Interventions Provided: Implemented/recommended use of non-skid footwear and Implemented/recommended resources for alarm system (personal alarm, bed alarm, call bell, etc.)       Length of Stay:  Expected LOS: 5  Actual LOS: 4      Fabby Quinteros LPN

## 2024-11-12 NOTE — PROGRESS NOTES
Hospitalist Progress Note    NAME:   Corby Chun   : 1935   MRN: [id number]     Date/Time: 2024 7:00 PM  Patient PCP: Ryan Nguyen MD    Estimated discharge date:48hrs  Barriers: ptot, repeat blood cultures, ID consult and recommendation  PT recommending SNF    Assessment / Plan:    Generalized weakness  Status post fall  URTI + rhinovirus  UTI  GNR bacteria  TSH, B12 normal  Blood pressure is negative so far flu and COVID-negative  UA showing moderate leukoesterase  CT head unremarkable  CT chest reviewed   Chest x-ray no pneumonia  Blood culture on admission  GNR  Repeat blood culture done on 11/10  Urine culture no  Continue empiric IV ceftriaxone  Continue Mucinex  Nebs as needed  ID consulted for bacteremia     Mildly elevated creatinine  Creatinine 1.47 on admission  Resolved     Chronic CHF with reduced ejection fraction EF 40% as of   Status post AICD  Mechanical aortic valve on warfarin goal INR 2-3 - subtherapeutic 1.7 today- lovenox bridge with wafarin   BPH  Hypothyroidism  Continue PTA Entresto, finasteride, Flomax  Pharmacy consult to manage Coumadin      Gout PTA allopurinol    Psychosis  Sundowners  -pt was recently discharged from [facility] for hallucinations stayed 1 week  in hospital - concluded - [facility]   - Zyprexa p.rn Qtc 492  -resume Aricept  -resume paxil   Alert and oriented times2  Per wife patient always have sundowning while in hospital with hallucinations and patient thinking somebody is trying to kill him  Seroquel was discontinued on previous hospitalization    Medical Decision Making:   I personally reviewed labs: CBC BMP  I personally reviewed imaging:   I personally reviewed EKG:  Toxic drug monitoring: INR on warfarin  Discussed case with: pt rn        Code Status: full  DVT Prophylaxis: Lovenox/coumadin  GI Prophylaxis:    Subjective:     Chief Complaint / Reason for Physician Visit  Patient more alert today.  Able to communicate with the  patient with cough is improving.  Discussed with RN events overnight.       Objective:     VITALS:   Last 24hrs VS reviewed since prior progress note. Most recent are:  Patient Vitals for the past 24 hrs:   BP Temp Temp src Pulse Resp SpO2   11/11/24 1511 138/86 98.2 °F (36.8 °C) Oral 73 16 97 %   11/11/24 0829 (!) 175/91 97.3 °F (36.3 °C) Oral 69 16 97 %   11/11/24 0635 (!) 168/65 -- -- 69 -- --   11/11/24 0618 (!) 172/88 -- -- 75 -- --   11/11/24 0600 (!) 160/65 -- -- 68 -- --   11/11/24 0530 (!) 160/69 -- -- 70 -- --   11/11/24 0445 (!) 168/70 -- -- 69 -- --   11/11/24 0403 (!) 194/99 97.5 °F (36.4 °C) -- 67 -- 98 %   11/10/24 2105 130/65 -- -- -- -- --   11/10/24 1952 (!) 147/86 98.2 °F (36.8 °C) Oral 72 15 100 %         Intake/Output Summary (Last 24 hours) at 11/11/2024 1900  Last data filed at 11/11/2024 0907  Gross per 24 hour   Intake 300 ml   Output 800 ml   Net -500 ml        I had a face to face encounter and independently examined this patient on 11/11/2024, as outlined below:  PHYSICAL EXAM:  General: Alert, cooperative  EENT:  EOMI. Anicteric sclerae.  Resp:  CTA bilaterally,  wheezing or rales.  No accessory muscle use  CV:  Regular  rhythm,  No edema  GI:  Soft, Non distended, Non tender.  +Bowel sounds  Neurologic: aox2 hard of hearing, normal speech,   Psych:   Not agitated  Skin:  No rashes.  No jaundice    Reviewed most current lab test results and cultures  YES  Reviewed most current radiology test results   YES  Review and summation of old records today    NO  Reviewed patient's current orders and MAR    YES  PMH/SH reviewed - no change compared to H&P    Procedures: see electronic medical records for all procedures/Xrays and details which were not copied into this note but were reviewed prior to creation of Plan.      LABS:  I reviewed today's most current labs and imaging studies.  Pertinent labs include:  Recent Labs     11/09/24  0519 11/10/24  0426 11/11/24  0603   WBC 3.6* 5.4 4.5   HGB

## 2024-11-12 NOTE — BSMART NOTE
BSMART Liaison attempted visit. Pt sleeping soundly. Spoke with ALANA Kemp and discussed Pt's hallucinations, behaviors, and events prior to assessment. Decision made to defer assessment until so Pt can rest, as rest is essential to physical and mental health and recovery.
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
## 2024-11-12 NOTE — CARE COORDINATION
Transition of Care Plan:    RUR: 17% Moderate Risk  Prior Level of Functioning: Independent with ADL's  Disposition: SNF  ARACELIS: 11/14  If SNF or IPR: Date FOC offered: 11/11  Date FOC received: 11/11  Accepting facility:   [facility]  Date authorization started with reference number:   Date authorization received and expires:   Follow up appointments: Defer to rehab  DME needed: Defer to rehab  Transportation at discharge: BLS VS Family  IM/IMM Medicare/ letter given: 2 IM needed prior to d/c  Is patient a  and connected with [facility]? N/A  Caregiver Contact:   Ashley Chun (Spouse)  [phone] (Mobile)   Discharge Caregiver contacted prior to discharge? Yes  Care Conference needed? N/A  Barriers to discharge:  Medical clearance    Initial Note: Chart Reviewed: Pt pending medical clearance for d/c. Per MD pt is in need of ptot, repeat blood cultures, ID consult and recommendation. Pt has been accepted to [facility] for SNF intervention. CM will continue to follow.    JOSETTE Moseley,  [phone]

## 2024-11-13 LAB
ALBUMIN SERPL-MCNC: 2.5 G/DL (ref 3.5–5)
ALBUMIN/GLOB SERPL: 0.8 (ref 1.1–2.2)
ALP SERPL-CCNC: 82 U/L (ref 45–117)
ALT SERPL-CCNC: 33 U/L (ref 12–78)
ANION GAP SERPL CALC-SCNC: 4 MMOL/L (ref 2–12)
AST SERPL-CCNC: 27 U/L (ref 15–37)
BACTERIA SPEC CULT: NORMAL
BILIRUB SERPL-MCNC: 0.6 MG/DL (ref 0.2–1)
BUN SERPL-MCNC: 21 MG/DL (ref 6–20)
BUN/CREAT SERPL: 17 (ref 12–20)
CALCIUM SERPL-MCNC: 7.9 MG/DL (ref 8.5–10.1)
CHLORIDE SERPL-SCNC: 114 MMOL/L (ref 97–108)
CO2 SERPL-SCNC: 26 MMOL/L (ref 21–32)
CREAT SERPL-MCNC: 1.23 MG/DL (ref 0.7–1.3)
ERYTHROCYTE [DISTWIDTH] IN BLOOD BY AUTOMATED COUNT: 13.5 % (ref 11.5–14.5)
GLOBULIN SER CALC-MCNC: 3.1 G/DL (ref 2–4)
GLUCOSE SERPL-MCNC: 102 MG/DL (ref 65–100)
HCT VFR BLD AUTO: 34.7 % (ref 36.6–50.3)
HGB BLD-MCNC: 11.2 G/DL (ref 12.1–17)
INR PPP: 2.4 (ref 0.9–1.1)
MCH RBC QN AUTO: 31.3 PG (ref 26–34)
MCHC RBC AUTO-ENTMCNC: 32.3 G/DL (ref 30–36.5)
MCV RBC AUTO: 96.9 FL (ref 80–99)
NRBC # BLD: 0 K/UL (ref 0–0.01)
NRBC BLD-RTO: 0 PER 100 WBC
PLATELET # BLD AUTO: 139 K/UL (ref 150–400)
PMV BLD AUTO: 10.1 FL (ref 8.9–12.9)
POTASSIUM SERPL-SCNC: 3.7 MMOL/L (ref 3.5–5.1)
PROT SERPL-MCNC: 5.6 G/DL (ref 6.4–8.2)
PROTHROMBIN TIME: 23.6 SEC (ref 9–11.1)
RBC # BLD AUTO: 3.58 M/UL (ref 4.1–5.7)
SERVICE CMNT-IMP: NORMAL
SODIUM SERPL-SCNC: 144 MMOL/L (ref 136–145)
WBC # BLD AUTO: 3.9 K/UL (ref 4.1–11.1)

## 2024-11-13 PROCEDURE — 85610 PROTHROMBIN TIME: CPT

## 2024-11-13 PROCEDURE — 6360000002 HC RX W HCPCS: Performed by: FAMILY MEDICINE

## 2024-11-13 PROCEDURE — 6370000000 HC RX 637 (ALT 250 FOR IP): Performed by: FAMILY MEDICINE

## 2024-11-13 PROCEDURE — 97116 GAIT TRAINING THERAPY: CPT

## 2024-11-13 PROCEDURE — 36415 COLL VENOUS BLD VENIPUNCTURE: CPT

## 2024-11-13 PROCEDURE — 2500000003 HC RX 250 WO HCPCS: Performed by: NURSE PRACTITIONER

## 2024-11-13 PROCEDURE — 85027 COMPLETE CBC AUTOMATED: CPT

## 2024-11-13 PROCEDURE — 6370000000 HC RX 637 (ALT 250 FOR IP): Performed by: NURSE PRACTITIONER

## 2024-11-13 PROCEDURE — 2580000003 HC RX 258: Performed by: INTERNAL MEDICINE

## 2024-11-13 PROCEDURE — 2580000003 HC RX 258: Performed by: NURSE PRACTITIONER

## 2024-11-13 PROCEDURE — 1100000000 HC RM PRIVATE

## 2024-11-13 PROCEDURE — 6370000000 HC RX 637 (ALT 250 FOR IP): Performed by: STUDENT IN AN ORGANIZED HEALTH CARE EDUCATION/TRAINING PROGRAM

## 2024-11-13 PROCEDURE — 6360000002 HC RX W HCPCS: Performed by: INTERNAL MEDICINE

## 2024-11-13 PROCEDURE — 80053 COMPREHEN METABOLIC PANEL: CPT

## 2024-11-13 PROCEDURE — 6370000000 HC RX 637 (ALT 250 FOR IP): Performed by: INTERNAL MEDICINE

## 2024-11-13 RX ORDER — WARFARIN SODIUM 2.5 MG/1
2.5 TABLET ORAL
Status: COMPLETED | OUTPATIENT
Start: 2024-11-13 | End: 2024-11-13

## 2024-11-13 RX ADMIN — GUAIFENESIN 600 MG: 600 TABLET, EXTENDED RELEASE ORAL at 20:56

## 2024-11-13 RX ADMIN — PAROXETINE HYDROCHLORIDE 20 MG: 20 TABLET, FILM COATED ORAL at 09:39

## 2024-11-13 RX ADMIN — WARFARIN SODIUM 2.5 MG: 2.5 TABLET ORAL at 17:48

## 2024-11-13 RX ADMIN — Medication 1 TABLET: at 09:38

## 2024-11-13 RX ADMIN — GUAIFENESIN 200 MG: 200 SOLUTION ORAL at 17:51

## 2024-11-13 RX ADMIN — SACUBITRIL AND VALSARTAN 1 TABLET: 24; 26 TABLET, FILM COATED ORAL at 19:58

## 2024-11-13 RX ADMIN — AMPICILLIN SODIUM AND SULBACTAM SODIUM 3000 MG: 2; 1 INJECTION, POWDER, FOR SOLUTION INTRAMUSCULAR; INTRAVENOUS at 06:09

## 2024-11-13 RX ADMIN — OXYBUTYNIN CHLORIDE 10 MG: 5 TABLET, EXTENDED RELEASE ORAL at 09:39

## 2024-11-13 RX ADMIN — AMPICILLIN SODIUM AND SULBACTAM SODIUM 3000 MG: 2; 1 INJECTION, POWDER, FOR SOLUTION INTRAMUSCULAR; INTRAVENOUS at 00:30

## 2024-11-13 RX ADMIN — AMPICILLIN SODIUM AND SULBACTAM SODIUM 3000 MG: 2; 1 INJECTION, POWDER, FOR SOLUTION INTRAMUSCULAR; INTRAVENOUS at 17:56

## 2024-11-13 RX ADMIN — AMPICILLIN SODIUM AND SULBACTAM SODIUM 3000 MG: 2; 1 INJECTION, POWDER, FOR SOLUTION INTRAMUSCULAR; INTRAVENOUS at 12:29

## 2024-11-13 RX ADMIN — SODIUM CHLORIDE, PRESERVATIVE FREE 10 ML: 5 INJECTION INTRAVENOUS at 20:02

## 2024-11-13 RX ADMIN — FINASTERIDE 5 MG: 5 TABLET, FILM COATED ORAL at 09:39

## 2024-11-13 RX ADMIN — LEVOTHYROXINE SODIUM 150 MCG: 0.1 TABLET ORAL at 06:06

## 2024-11-13 RX ADMIN — ALLOPURINOL 300 MG: 100 TABLET ORAL at 09:38

## 2024-11-13 RX ADMIN — TAMSULOSIN HYDROCHLORIDE 0.4 MG: 0.4 CAPSULE ORAL at 09:39

## 2024-11-13 RX ADMIN — GUAIFENESIN 600 MG: 600 TABLET, EXTENDED RELEASE ORAL at 09:39

## 2024-11-13 RX ADMIN — ENOXAPARIN SODIUM 80 MG: 100 INJECTION SUBCUTANEOUS at 00:30

## 2024-11-13 RX ADMIN — Medication 50 MCG: at 09:40

## 2024-11-13 RX ADMIN — ATORVASTATIN CALCIUM 40 MG: 40 TABLET, FILM COATED ORAL at 19:58

## 2024-11-13 RX ADMIN — SODIUM CHLORIDE, PRESERVATIVE FREE 10 ML: 5 INJECTION INTRAVENOUS at 09:42

## 2024-11-13 RX ADMIN — SACUBITRIL AND VALSARTAN 1 TABLET: 24; 26 TABLET, FILM COATED ORAL at 09:38

## 2024-11-13 RX ADMIN — PANTOPRAZOLE SODIUM 40 MG: 40 TABLET, DELAYED RELEASE ORAL at 06:06

## 2024-11-13 RX ADMIN — DONEPEZIL HYDROCHLORIDE 5 MG: 5 TABLET, FILM COATED ORAL at 19:59

## 2024-11-13 ASSESSMENT — PAIN SCALES - PAIN ASSESSMENT IN ADVANCED DEMENTIA (PAINAD)
CONSOLABILITY: NO NEED TO CONSOLE
TOTALSCORE: 0
BODYLANGUAGE: RELAXED
BREATHING: NORMAL
FACIALEXPRESSION: SMILING OR INEXPRESSIVE

## 2024-11-13 NOTE — PLAN OF CARE
Problem: Safety - Adult  Goal: Free from fall injury  11/13/2024 1255 by Rosalinda Quezada RN  Outcome: Progressing  11/13/2024 1254 by Rosalinda Quezada RN  Outcome: Progressing     Problem: Chronic Conditions and Co-morbidities  Goal: Patient's chronic conditions and co-morbidity symptoms are monitored and maintained or improved  11/13/2024 1255 by Rosalinda Quezada RN  Outcome: Progressing  11/13/2024 1254 by Rosalinda uQezada RN  Outcome: Progressing     Problem: Discharge Planning  Goal: Discharge to home or other facility with appropriate resources  11/13/2024 1255 by Rosalinda Quezada RN  Outcome: Progressing  11/13/2024 1254 by Rosalinda Quezada RN  Outcome: Progressing     Problem: Pain  Goal: Verbalizes/displays adequate comfort level or baseline comfort level  11/13/2024 1255 by Rosalinda Quezada RN  Outcome: Progressing  11/13/2024 1254 by Rosalinda Quezada RN  Outcome: Progressing     Problem: Skin/Tissue Integrity  Goal: Absence of new skin breakdown  Description: 1.  Monitor for areas of redness and/or skin breakdown  2.  Assess vascular access sites hourly  3.  Every 4-6 hours minimum:  Change oxygen saturation probe site  4.  Every 4-6 hours:  If on nasal continuous positive airway pressure, respiratory therapy assess nares and determine need for appliance change or resting period.  11/13/2024 1255 by Rosalinda Quezada RN  Outcome: Progressing  11/13/2024 1254 by Rosalinda Quezada RN  Outcome: Progressing     Problem: ABCDS Injury Assessment  Goal: Absence of physical injury  11/13/2024 1255 by Rosalinda Quezada RN  Outcome: Progressing  11/13/2024 1254 by Rosalinda Quezada RN  Outcome: Progressing     Problem: Confusion  Goal: Confusion, delirium, dementia, or psychosis is improved or at baseline  Description: INTERVENTIONS:  1. Assess for possible contributors to thought disturbance, including medications, impaired vision or hearing, underlying metabolic abnormalities, dehydration, psychiatric diagnoses, and notify

## 2024-11-13 NOTE — CONSULTS
PSYCHIATRY CONSULT NOTE    REASON FOR CONSULT:  Agitation, hallucinations    HISTORY OF PRESENTING COMPLAINT:  Corby Chun is a 89 y.o. White (non-) male who is currently admitted to the medical floor at [facility]. Corby is alert to self. He is observed lying on the bed and quite pleasant. Psychiatry was consulted due to agitation and hallucinatiosn. Corby has a history of dementia. According to chart, he has experienced hallucinations while in the hospital before-according to chart. Corby is an unreliable source of information due to mental status. According to nurse, he has been doing well today.     PAST PSYCHIATRIC HISTORY and SUBSTANCE ABUSE HISTORY:  Psychiatric hx: dementia      PAST MEDICAL HISTORY:    Please see H&P for details.     Past Medical History:   Diagnosis Date    Acute on chronic systolic heart failure, NYHA class 4 ([facility]) 03/01/2019    admit March 2019 7 days, EF 10% got Primacor, BIV implant    Anger     TAKES PAROXETINE TO CONTROL ANGER ISSUES    Arthritis     Burning with urination     CAD (coronary artery disease) 1996    CABG 1996, kendra roque 2015 fixed inferior defect    Cancer (HCC)     prostate    Chronic atrial fibrillation (HCC) 03/2019    RVR3/2019, had ablation of flutter- persistent    Chronic kidney disease     Chronic pain     bilat knees    Coagulation disorder (HCC)     Foot drop, left     WEARS METAL BRACE    Gout     Hard of hearing     Heart failure (HCC)     High cholesterol     Hypertension     Long term current use of anticoagulant therapy     Prostate enlargement     Sleep apnea     Thyroid disease     Unspecified sleep apnea     DOESN'T USE CPAP; \"IT MADE HIM SICK-COLDS, SINUS\", PER WIFE     Prior to Admission medications    Medication Sig Start Date End Date Taking? Authorizing Provider   allopurinol (ZYLOPRIM) 300 MG tablet TAKE 1 TABLET BY MOUTH EVERY DAY TO PREVENT GOUT 11/8/24   Ryan Nguyen MD   OLANZapine (ZYPREXA) 5 MG tablet Take 1 tablet by mouth 2  times daily as needed (agitation) 11/4/24   Ryan Nguyen MD   donepezil (ARICEPT) 5 MG tablet Take 1 tablet by mouth nightly 10/23/24   Ryan Nguyen MD   vitamin B-12 (CYANOCOBALAMIN) 50 MCG tablet 1000 mg daily    Phi Pennington MD   Multiple Vitamin (MULTIVITAMIN ADULT PO) take 1 tablet by oral route once daily 12/9/10   Phi Pennington MD   warfarin (COUMADIN) 2.5 MG tablet 2 tabs daily for 3 days then 1 tab daily 9/20/24   Ryan Nguyen MD   levothyroxine (SYNTHROID) 150 MCG tablet Take 1 tablet by mouth Daily 8/23/24   Ryan Nguyen MD   PARoxetine (PAXIL) 20 MG tablet Take 1 tablet by mouth daily 8/23/24   Ryan Nguyen MD   tamsulosin (FLOMAX) 0.4 MG capsule TAKE 2 CAPSULES BY MOUTH AT BEDTIME TO IMPROVE URINE FLOW  Patient taking differently: Take 1 capsule by mouth daily 5/8/24   Ryan Nguyen MD   Psyllium (METAMUCIL 3 IN 1 DAILY FIBER PO) Take 1 Capful by mouth 1 (one) time if needed (constipation)    ProviderPhi MD   Probiotic Product (PROBIOTIC BLEND PO) Take 1 Capful by mouth Daily    ProviderPhi MD   sacubitril-valsartan (ENTRESTO) 24-26 MG per tablet TAKE 1 TABLET BY MOUTH EVERY TWELVE (12) HOURS. 7/17/23   Mckay Gallegos MD   atorvastatin (LIPITOR) 40 MG tablet  2/23/23   Automatic Reconciliation, Ar   finasteride (PROSCAR) 5 MG tablet Take by mouth daily 2/8/19   Automatic Reconciliation, Ar   oxybutynin (DITROPAN-XL) 10 MG extended release tablet Take 1 tablet by mouth daily    Automatic Reconciliation, Ar     [unfilled]  Lab Results   Component Value Date    WBC 3.9 (L) 11/13/2024    HGB 11.2 (L) 11/13/2024    HCT 34.7 (L) 11/13/2024    MCV 96.9 11/13/2024     (L) 11/13/2024     Lab Results   Component Value Date     11/13/2024    K 3.7 11/13/2024     (H) 11/13/2024    CO2 26 11/13/2024    BUN 21 (H) 11/13/2024    CREATININE 1.23 11/13/2024    GLUCOSE 102 (H) 11/13/2024    CALCIUM 7.9 (L) 11/13/2024    BILITOT 0.6 11/13/2024    ALKPHOS 82

## 2024-11-13 NOTE — PLAN OF CARE
Problem: Safety - Adult  Goal: Free from fall injury  11/12/2024 2224 by Minna Vásquez RN  Outcome: Progressing  11/12/2024 1343 by Ac Sanabria RN  Outcome: Progressing     Problem: Chronic Conditions and Co-morbidities  Goal: Patient's chronic conditions and co-morbidity symptoms are monitored and maintained or improved  11/12/2024 2224 by Minna Vásquez RN  Outcome: Progressing  11/12/2024 1343 by Ac Sanabria RN  Outcome: Progressing     Problem: Discharge Planning  Goal: Discharge to home or other facility with appropriate resources  11/12/2024 2224 by Minna Vásquez RN  Outcome: Progressing  11/12/2024 1343 by Ac Sanabria RN  Outcome: Progressing     Problem: Pain  Goal: Verbalizes/displays adequate comfort level or baseline comfort level  11/12/2024 2224 by Minna Vásquez RN  Outcome: Progressing  11/12/2024 1343 by Ac Sanabria RN  Outcome: Progressing     Problem: Skin/Tissue Integrity  Goal: Absence of new skin breakdown  Description: 1.  Monitor for areas of redness and/or skin breakdown  2.  Assess vascular access sites hourly  3.  Every 4-6 hours minimum:  Change oxygen saturation probe site  4.  Every 4-6 hours:  If on nasal continuous positive airway pressure, respiratory therapy assess nares and determine need for appliance change or resting period.  11/12/2024 2224 by Minna Vásquez RN  Outcome: Progressing  11/12/2024 1343 by Ac Sanabria RN  Outcome: Progressing     Problem: ABCDS Injury Assessment  Goal: Absence of physical injury  11/12/2024 2224 by Minna Vásquez RN  Outcome: Progressing  11/12/2024 1343 by Ac Sanabria RN  Outcome: Progressing     Problem: Confusion  Goal: Confusion, delirium, dementia, or psychosis is improved or at baseline  Description: INTERVENTIONS:  1. Assess for possible contributors to thought disturbance, including medications, impaired vision or hearing, underlying metabolic abnormalities, dehydration,

## 2024-11-13 NOTE — PROGRESS NOTES
End of Shift Note     Bedside shift change report given to  Rosalinda WARNER (oncoming nurse) by Minna Vásquez RN (offgoing nurse).  Report included the following information SBAR, Intake/Output, MAR, Recent Results, and Cardiac Rhythm V paced  At handoff report pt is sleeping in bed easily arousable.   Shift worked:  NIGHT       Shift summary and any significant changes:      -Pt had a calm night   -CHG bath given, encouraged toileting.   -Labs drawn   -Use of Writing on the white board is really effective to communicate with him.    -May try to remove tele-sitter in the morning   Concerns for physician to address:    Zone phone for oncoming shift:    [phone]       Activity:  Number times ambulated in hallways past shift: 0  Hallway to room: 1  Number of times OOB to chair past shift: 0     Cardiac:   Cardiac Monitoring: Yes         Access:  Current line(s): PIV      Genitourinary:   Urinary status: voiding and external catheter     Respiratory:   Chronic home O2 use?: NO  Incentive spirometer at bedside: NO     GI:  Current diet:  ADULT DIET; Regular  Passing flatus: YES  Tolerating current diet: YES     Pain Management:   Patient states pain is manageable on current regimen: YES     Skin:  Interventions: turn team, float heels, and internal/external urinary devices    Patient Safety:  Fall Score:    Interventions: bed/chair alarm, assistive device (walker, cane. etc), gripper socks, pt to call before getting OOB, and stay with me (per policy)     Length of Stay:  Expected LOS: 4  Actual LOS: 1     Minna Vásquez RN

## 2024-11-13 NOTE — PROGRESS NOTES
Pharmacist Daily Dosing of Warfarin    Indication & Goal INR:  Hx of DVT  INR Goal 2-3    PTA Warfarin Dose: ? 2.5mg daily    Notable concurrent conditions and medications: Acetaminophen and Allopurinol    Labs:  Recent Labs     Units 11/13/24  0305 11/12/24  0551 11/11/24  0603   INR   2.4* 2.2* 1.7*   HGB g/dL 11.2* 11.7* 11.6*   PLT K/uL 139* 145* 129*         Impression/Plan:   Will order warfarin 2.5 mg x 1 dose   Daily INR has been ordered  CBC is ordered daily for now. Do at least every 2 days per protocol     Pharmacy will follow daily and adjust the dose as appropriate.    Thank you,  SRIKANTH MOORE, [facility]

## 2024-11-13 NOTE — PLAN OF CARE
Problem: Physical Therapy - Adult  Goal: By Discharge: Performs mobility at highest level of function for planned discharge setting.  See evaluation for individualized goals.  Description: FUNCTIONAL STATUS PRIOR TO ADMISSION: Patient is poor historian, A.O x 1. Per chart he [facility], hallucinations, UTI.  Has only been home for 1 week.    HOME SUPPORT PRIOR TO ADMISSION: Per chart patient lives with his wife, 2-story home but recently he has been staying on the 1st floor.    Physical Therapy Goals  Initiated 11/9/2024  1.  Patient will move from supine to sit and sit to supine in bed with supervision/set-up within 7 day(s).    2.  Patient will perform sit to stand with supervision/set-up within 7 day(s).  3.  Patient will transfer from bed to chair and chair to bed with contact guard assist using the least restrictive device within 7 day(s).  4.  Patient will ambulate with contact guard assist for 200 feet with the least restrictive device within 7 day(s).   5.  Patient will ascend/descend 8 stairs with 1 handrail(s) with contact guard assist within 7 day(s).   Outcome: Progressing   PHYSICAL THERAPY TREATMENT    Patient: Corby Chun (89 y.o. male)  Date: 11/13/2024  Diagnosis: Cough with sputum [R05.8]  Subtherapeutic international normalized ratio (INR) [R79.1]  Acute respiratory failure with hypoxia [J96.01]  Cough, unspecified type [R05.9]  Ground-level fall [W18.30XA] Cough with sputum      Precautions: Fall Risk                      ASSESSMENT:  Patient continues to benefit from skilled PT services and is slowly progressing towards goals. Pt received supine in bed, attempting to stand and tele sitter alarming. Pt expressed need to void, assisted EOB with CGA. Sit <> stand with hand held assist and Min A. Pt walked 15ft bed > toilet with hand held assist, min A for balance. Time spent sitting on toilet with supervision for balance. Pt then walked 25ft x2 with RW and CGA-Min  A, short steps and shuffling gait. Path deviations noted and cues needed for RW management. Pt returned to room, assisted back to supine in bed with CGA and left with all needs met, call bell in reach, bed alarm on, tele sitter in room. He is below a safe mobility baseline to [facility] home, will need moderate intensity rehab at [facility] prior to returning home with wife assist. Continue to follow.         PLAN:  Patient continues to benefit from skilled intervention to address the above impairments.  Continue treatment per established plan of care.        Recommendation for discharge: (in order for the patient to meet his/her long term goals):   Moderate intensity short-term skilled physical therapy up to 5x/week    Other factors to consider for discharge: poor safety awareness, impaired cognition, and high risk for falls    IF patient discharges home will need the following DME: continuing to assess with progress       SUBJECTIVE:   Patient stated, \"I need to use the bathroom.\"    OBJECTIVE DATA SUMMARY:       Functional Mobility Training:  Bed Mobility:  Bed Mobility Training  Bed Mobility Training: Yes  Rolling: Supervision  Supine to Sit: Supervision  Scooting: Supervision  Transfers:  Transfer Training  Transfer Training: Yes  Interventions: Verbal cues;Safety awareness training  Sit to Stand: Contact-guard assistance  Stand to Sit: Contact-guard assistance  Toilet Transfer: Minimum assistance;Additional time  Balance:  Balance  Sitting: Intact  Standing: Impaired  Standing - Static: Constant support;Fair  Standing - Dynamic: Constant support;Fair   Ambulation/Gait Training:     Gait  Gait Training: Yes  Overall Level of Assistance: Contact-guard assistance;Minimum assistance  Distance (ft): 25 Feet (x2)  Assistive Device: Gait belt;Walker, rolling  Interventions: Safety awareness training;Verbal cues  Speed/Lisa: Fluctuations;Shuffled  Step Length: Left shortened;Right shortened  Gait Abnormalities: Path

## 2024-11-13 NOTE — PROGRESS NOTES
End of Shift Note    Bedside shift change report given to Minna WARNER (oncoming nurse) by Rosalinda Quezada RN (offgoing nurse).  Report included the following information SBAR, MAR, Cardiac Rhythm NSR, and Quality Measures    Shift worked:  7a-7p     Shift summary and any significant changes:     Pt had no complaints of care. Pt is pleasently confused, Pt is reaaly hard of hearing. Pschy consult completed. Care is still progressing.      Concerns for physician to address:       Zone phone for oncoming shift:          Activity:     Number times ambulated in hallways past shift: 1  Number of times OOB to chair past shift: 1    Cardiac:   Cardiac Monitoring: No           Access:  Current line(s): PIV     Genitourinary:   Urinary status: voiding    Respiratory:      Chronic home O2 use?: NO  Incentive spirometer at bedside: NO       GI:     Current diet:  ADULT DIET; Regular  Passing flatus: YES  Tolerating current diet: YES       Pain Management:   Patient states pain is manageable on current regimen: YES    Skin:     Interventions: turn team and increase time out of bed    Patient Safety:  Fall Score:    Interventions: bed/chair alarm, assistive device (walker, cane. etc), gripper socks, pt to call before getting OOB, and stay with me (per policy)       Length of Stay:  Expected LOS: 6  Actual LOS: 5      Rosalinda Quezada, ALANA

## 2024-11-13 NOTE — BSMART NOTE
BSMART Liaison Team Note     LOS:  5 days     Patient goal(s) for today: take medications as prescribed, make needs known in an appropriate manner, engage in supportive counseling if needed  BSMART Liaison team focus/goals: assess needs, provide support and education, coordinate care, provide supportive counseling as needed     Progress note: Liaison met f/f with pt in his room on the medical floor of [facility]. Pt resting, watching the tv upon arrival. Pt very hard of hearing, and he is not wearing hearing aides. He appears alert, calm, cooperative, pleasant, smiling, and in no apparent emotional distress. Pt does appear slightly confused, although this is difficult to ascertain b/c of pt's extensive hearing loss. It is difficult to determine if he is confabulating responses b/c he can't hear or because he is confused. He is able to provide person, place, and his . He says that his wife is on the third floor of the hospital, but then he says that she is at home with the dog. Pt does say that he continues to cut his own grass, and he likes to do his own home repairs.     Liaison finally had to use a marker with a white board to communicate with the pt. He clearly says that he's not sad or anxious. He says that he has a good support system with his wife and children. Pt denies any SIHI/AH/VH. He denies any hx of SI or mental health issues. He denies any sleep or appetite disturbance. Liaison provided emotional support as well as validation of his feelings. Liaison will continue to monitor and to support.      Barriers to Discharge: Medical clearance     Outpatient provider(s):  none reported  Insurance info/prescription coverage: MEDICARE PART A AND B, [facility] FEDERAL     Diagnosis:   Past Medical History:   Diagnosis Date    Acute on chronic systolic heart failure, NYHA class 4 ([facility]) 2019    admit 2019 7 days, EF 10% got Primacor, BIV implant    Anger     TAKES PAROXETINE TO CONTROL ANGER ISSUES     Arthritis     Burning with urination     CAD (coronary artery disease) 1996    CABG 1996, fu nuke 2015 fixed inferior defect    Cancer (HCC)     prostate    Chronic atrial fibrillation (HCC) 03/2019    RVR3/2019, had ablation of flutter- persistent    Chronic kidney disease     Chronic pain     bilat knees    Coagulation disorder (HCC)     Foot drop, left     WEARS METAL BRACE    Gout     Hard of hearing     Heart failure (HCC)     High cholesterol     Hypertension     Long term current use of anticoagulant therapy     Prostate enlargement     Sleep apnea     Thyroid disease     Unspecified sleep apnea     DOESN'T USE CPAP; \"IT MADE HIM SICK-COLDS, SINUS\", PER WIFE     Plan: Please defer to psychiatric consult note for recommendations and/or disposition. .   Follow up Psych Consult placed? yes.   Psychiatrist updated? no     Participating treatment team members: Corby Chun, Garret Saeed LCSW

## 2024-11-13 NOTE — PLAN OF CARE
Problem: Safety - Adult  Goal: Free from fall injury  Outcome: Progressing     Problem: Chronic Conditions and Co-morbidities  Goal: Patient's chronic conditions and co-morbidity symptoms are monitored and maintained or improved  Outcome: Progressing     Problem: Discharge Planning  Goal: Discharge to home or other facility with appropriate resources  Outcome: Progressing     Problem: Pain  Goal: Verbalizes/displays adequate comfort level or baseline comfort level  Outcome: Progressing     Problem: Skin/Tissue Integrity  Goal: Absence of new skin breakdown  Description: 1.  Monitor for areas of redness and/or skin breakdown  2.  Assess vascular access sites hourly  3.  Every 4-6 hours minimum:  Change oxygen saturation probe site  4.  Every 4-6 hours:  If on nasal continuous positive airway pressure, respiratory therapy assess nares and determine need for appliance change or resting period.  Outcome: Progressing     Problem: ABCDS Injury Assessment  Goal: Absence of physical injury  Outcome: Progressing     Problem: Confusion  Goal: Confusion, delirium, dementia, or psychosis is improved or at baseline  Description: INTERVENTIONS:  1. Assess for possible contributors to thought disturbance, including medications, impaired vision or hearing, underlying metabolic abnormalities, dehydration, psychiatric diagnoses, and notify attending LIP  2. Sandy high risk fall precautions, as indicated  3. Provide frequent short contacts to provide reality reorientation, refocusing and direction  4. Decrease environmental stimuli, including noise as appropriate  5. Monitor and intervene to maintain adequate nutrition, hydration, elimination, sleep and activity  6. If unable to ensure safety without constant attention obtain sitter and review sitter guidelines with assigned personnel  7. Initiate Psychosocial CNS and Spiritual Care consult, as indicated  Outcome: Progressing

## 2024-11-14 VITALS
RESPIRATION RATE: 16 BRPM | TEMPERATURE: 97.7 F | HEIGHT: 62 IN | OXYGEN SATURATION: 99 % | DIASTOLIC BLOOD PRESSURE: 77 MMHG | SYSTOLIC BLOOD PRESSURE: 133 MMHG | BODY MASS INDEX: 29.62 KG/M2 | WEIGHT: 160.94 LBS | HEART RATE: 70 BPM

## 2024-11-14 LAB
ALBUMIN SERPL-MCNC: 2.5 G/DL (ref 3.5–5)
ALBUMIN/GLOB SERPL: 0.8 (ref 1.1–2.2)
ALP SERPL-CCNC: 84 U/L (ref 45–117)
ALT SERPL-CCNC: 32 U/L (ref 12–78)
ANION GAP SERPL CALC-SCNC: 2 MMOL/L (ref 2–12)
AST SERPL-CCNC: 27 U/L (ref 15–37)
BILIRUB SERPL-MCNC: 0.4 MG/DL (ref 0.2–1)
BUN SERPL-MCNC: 21 MG/DL (ref 6–20)
BUN/CREAT SERPL: 16 (ref 12–20)
CALCIUM SERPL-MCNC: 8.4 MG/DL (ref 8.5–10.1)
CHLORIDE SERPL-SCNC: 112 MMOL/L (ref 97–108)
CO2 SERPL-SCNC: 27 MMOL/L (ref 21–32)
CREAT SERPL-MCNC: 1.28 MG/DL (ref 0.7–1.3)
ERYTHROCYTE [DISTWIDTH] IN BLOOD BY AUTOMATED COUNT: 13.4 % (ref 11.5–14.5)
GLOBULIN SER CALC-MCNC: 3.1 G/DL (ref 2–4)
GLUCOSE SERPL-MCNC: 128 MG/DL (ref 65–100)
HCT VFR BLD AUTO: 34.9 % (ref 36.6–50.3)
HGB BLD-MCNC: 11.3 G/DL (ref 12.1–17)
INR PPP: 2 (ref 0.9–1.1)
MCH RBC QN AUTO: 31.5 PG (ref 26–34)
MCHC RBC AUTO-ENTMCNC: 32.4 G/DL (ref 30–36.5)
MCV RBC AUTO: 97.2 FL (ref 80–99)
NRBC # BLD: 0 K/UL (ref 0–0.01)
NRBC BLD-RTO: 0 PER 100 WBC
PLATELET # BLD AUTO: 145 K/UL (ref 150–400)
PMV BLD AUTO: 9.9 FL (ref 8.9–12.9)
POTASSIUM SERPL-SCNC: 3.9 MMOL/L (ref 3.5–5.1)
PROT SERPL-MCNC: 5.6 G/DL (ref 6.4–8.2)
PROTHROMBIN TIME: 19.6 SEC (ref 9–11.1)
RBC # BLD AUTO: 3.59 M/UL (ref 4.1–5.7)
SODIUM SERPL-SCNC: 141 MMOL/L (ref 136–145)
WBC # BLD AUTO: 4.8 K/UL (ref 4.1–11.1)

## 2024-11-14 PROCEDURE — 36415 COLL VENOUS BLD VENIPUNCTURE: CPT

## 2024-11-14 PROCEDURE — 6370000000 HC RX 637 (ALT 250 FOR IP): Performed by: INTERNAL MEDICINE

## 2024-11-14 PROCEDURE — 85610 PROTHROMBIN TIME: CPT

## 2024-11-14 PROCEDURE — 76937 US GUIDE VASCULAR ACCESS: CPT

## 2024-11-14 PROCEDURE — 2580000003 HC RX 258: Performed by: NURSE PRACTITIONER

## 2024-11-14 PROCEDURE — 6370000000 HC RX 637 (ALT 250 FOR IP): Performed by: NURSE PRACTITIONER

## 2024-11-14 PROCEDURE — 85027 COMPLETE CBC AUTOMATED: CPT

## 2024-11-14 PROCEDURE — 05H933Z INSERTION OF INFUSION DEVICE INTO RIGHT BRACHIAL VEIN, PERCUTANEOUS APPROACH: ICD-10-PCS | Performed by: INTERNAL MEDICINE

## 2024-11-14 PROCEDURE — C1751 CATH, INF, PER/CENT/MIDLINE: HCPCS

## 2024-11-14 PROCEDURE — 36410 VNPNXR 3YR/> PHY/QHP DX/THER: CPT

## 2024-11-14 PROCEDURE — 2580000003 HC RX 258: Performed by: INTERNAL MEDICINE

## 2024-11-14 PROCEDURE — 80053 COMPREHEN METABOLIC PANEL: CPT

## 2024-11-14 PROCEDURE — 6360000002 HC RX W HCPCS: Performed by: INTERNAL MEDICINE

## 2024-11-14 PROCEDURE — 6370000000 HC RX 637 (ALT 250 FOR IP): Performed by: STUDENT IN AN ORGANIZED HEALTH CARE EDUCATION/TRAINING PROGRAM

## 2024-11-14 RX ORDER — PANTOPRAZOLE SODIUM 40 MG/1
40 TABLET, DELAYED RELEASE ORAL
Qty: 30 TABLET | Refills: 0 | Status: SHIPPED | OUTPATIENT
Start: 2024-11-15 | End: 2024-12-15

## 2024-11-14 RX ORDER — AMPICILLIN AND SULBACTAM 2; 1 G/1; G/1
3000 INJECTION, POWDER, FOR SOLUTION INTRAMUSCULAR; INTRAVENOUS EVERY 6 HOURS
Qty: 20 EACH | Refills: 0 | Status: SHIPPED | OUTPATIENT
Start: 2024-11-14 | End: 2024-11-19

## 2024-11-14 RX ADMIN — PAROXETINE HYDROCHLORIDE 20 MG: 20 TABLET, FILM COATED ORAL at 09:56

## 2024-11-14 RX ADMIN — FINASTERIDE 5 MG: 5 TABLET, FILM COATED ORAL at 09:56

## 2024-11-14 RX ADMIN — AMPICILLIN SODIUM AND SULBACTAM SODIUM 3000 MG: 2; 1 INJECTION, POWDER, FOR SOLUTION INTRAMUSCULAR; INTRAVENOUS at 05:59

## 2024-11-14 RX ADMIN — Medication 50 MCG: at 09:56

## 2024-11-14 RX ADMIN — SODIUM CHLORIDE, PRESERVATIVE FREE 10 ML: 5 INJECTION INTRAVENOUS at 09:57

## 2024-11-14 RX ADMIN — GUAIFENESIN 600 MG: 600 TABLET, EXTENDED RELEASE ORAL at 09:56

## 2024-11-14 RX ADMIN — OXYBUTYNIN CHLORIDE 10 MG: 5 TABLET, EXTENDED RELEASE ORAL at 09:56

## 2024-11-14 RX ADMIN — AMPICILLIN SODIUM AND SULBACTAM SODIUM 3000 MG: 2; 1 INJECTION, POWDER, FOR SOLUTION INTRAMUSCULAR; INTRAVENOUS at 12:14

## 2024-11-14 RX ADMIN — LEVOTHYROXINE SODIUM 150 MCG: 0.1 TABLET ORAL at 06:02

## 2024-11-14 RX ADMIN — Medication 1 TABLET: at 09:56

## 2024-11-14 RX ADMIN — SODIUM CHLORIDE: 9 INJECTION, SOLUTION INTRAVENOUS at 12:13

## 2024-11-14 RX ADMIN — SACUBITRIL AND VALSARTAN 1 TABLET: 24; 26 TABLET, FILM COATED ORAL at 09:56

## 2024-11-14 RX ADMIN — ALLOPURINOL 300 MG: 100 TABLET ORAL at 09:56

## 2024-11-14 RX ADMIN — AMPICILLIN SODIUM AND SULBACTAM SODIUM 3000 MG: 2; 1 INJECTION, POWDER, FOR SOLUTION INTRAMUSCULAR; INTRAVENOUS at 00:07

## 2024-11-14 RX ADMIN — TAMSULOSIN HYDROCHLORIDE 0.4 MG: 0.4 CAPSULE ORAL at 09:56

## 2024-11-14 RX ADMIN — PANTOPRAZOLE SODIUM 40 MG: 40 TABLET, DELAYED RELEASE ORAL at 06:02

## 2024-11-14 ASSESSMENT — PAIN SCALES - PAIN ASSESSMENT IN ADVANCED DEMENTIA (PAINAD)
TOTALSCORE: 0
CONSOLABILITY: NO NEED TO CONSOLE
BODYLANGUAGE: RELAXED
BREATHING: NORMAL
FACIALEXPRESSION: SMILING OR INEXPRESSIVE

## 2024-11-14 NOTE — PROGRESS NOTES
Physician Progress Note      PATIENT:               ARMEN ROBERTSON  CSN #:                  [id number]  :                       1935  ADMIT DATE:       2024 4:16 PM  DISCH DATE:  RESPONDING  PROVIDER #:        Annabel Steiner MD        QUERY TEXT:    Stage of Chronic Kidney Disease: Please provide further specificity, if known.    Clinical indicators include:  Options provided:  -- Chronic kidney disease stage 1  -- Chronic kidney disease stage 2  -- Chronic kidney disease stage 3  -- Chronic kidney disease stage 3a  -- Chronic kidney disease stage 3b  -- Chronic kidney disease stage 4  -- Chronic kidney disease stage 5  -- Chronic kidney disease stage 5, requiring dialysis  -- End stage renal disease  -- Other - I will add my own diagnosis  -- Disagree - Not applicable / Not valid  -- Disagree - Clinically Unable to determine / Unknown        PROVIDER RESPONSE TEXT:    The patient has chronic kidney disease stage 3.      Electronically signed by:  Annabel Steiner MD 2024 7:25 PM

## 2024-11-14 NOTE — PROGRESS NOTES
Pt is discharging at 5:00 pm , pt is going to [facility] room 201 A  Report is given to the facility nurse   Transfer pt with [facility] transport service   Pt is Alert and Ox 1 , Room air , assisted x 1 .  Pt had Midline on rt brachial .  No complain during discharge

## 2024-11-14 NOTE — PROGRESS NOTES
End of Shift Note     Bedside shift change report given to  Helen WARNER (oncoming nurse) by Minna Vásquez RN (offgoing nurse).  Report included the following information SBAR, Intake/Output, MAR, Recent Results, and Cardiac Rhythm V paced  At handoff report pt is sleeping in bed easily arousable.   Shift worked:  NIGHT       Shift summary and any significant changes:      -Pt slept most of the night.    -CHG bath given, encouraged toileting.   -Labs drawn   -Use of Writing on the white board is really effective to communicate with him.       0650H Received a call from IR that they do not perform Midline. PICC team has to do it first and they will just do it once PICC team failed.      Concerns for physician to address:  Needs Midline for long term IV antibiotics   Zone phone for oncoming shift:    [phone]       Activity:  Number times ambulated in hallways past shift: 0  Hallway to room: 1  Number of times OOB to chair past shift: 0     Cardiac:   Cardiac Monitoring: Yes         Access:  Current line(s): PIV      Genitourinary:   Urinary status: voiding and external catheter     Respiratory:   Chronic home O2 use?: NO  Incentive spirometer at bedside: NO     GI:  Current diet:  ADULT DIET; Regular  Passing flatus: YES  Tolerating current diet: YES     Pain Management:   Patient states pain is manageable on current regimen: YES     Skin:  Interventions: turn team, float heels, and internal/external urinary devices    Patient Safety:  Fall Score:    Interventions: bed/chair alarm, assistive device (walker, cane. etc), gripper socks, pt to call before getting OOB, and stay with me (per policy)     Length of Stay:  Expected LOS: 4  Actual LOS: 1     Minna Vásquez RN

## 2024-11-14 NOTE — CARE COORDINATION
Transition of Care Plan:     RUR: 17% Moderate Risk  Prior Level of Functioning: Independent with ADL's  Disposition: SNF  ARACELIS: 11/14  If SNF or IPR: Date FOC offered: 11/11  Date FOC received: 11/11  Accepting facility:   [facility]  Room#201A  Report#[phone]  Date authorization started with reference number:   Date authorization received and expires:   Follow up appointments: Defer to rehab  DME needed: Defer to rehab  Transportation at discharge: [facility] @3:00PM  IM/IMM Medicare/ letter given: 2 IM needed prior to d/c  Is patient a  and connected with VA? N/A  Caregiver Contact:   Ashley Chun (Spouse)  [phone] (Mobile)   Discharge Caregiver contacted prior to discharge? Yes  Care Conference needed? N/A  Barriers to discharge:  N/A           CM acknowledged d/c. Chart reviewed.  Pt will d/c to [facility] for SNF intervention. [facility] will transport pt @3:00M. Pt, pts spouse, and nurse notified. Transportation documents left in pts chart. ?2 IM letter given 11/14. CM will remain accessible if any needs arise prior to discharge.      Transition of Care Plan to SNF/Rehab    Communication to Patient/Family:  Met with patient and family and they are agreeable to the transition plan. The Plan for Transition of Care is related to the following treatment goals:     The Patient and/or patient representative was provided with a choice of provider and agrees  with the discharge plan.      Yes [x] No []    A Freedom of choice list was provided with basic dialogue that supports the patient's individualized plan of care/goals and shares the quality data associated with the providers.       Yes [x] No []    SNF/Rehab Transition:  Patient has been accepted to [facility] SNF/Rehab and meets criteria for admission.   Date of Inpatient Status Order:   Patient will transported by [facility] and expected to leave at 3:00PM.    Communication to SNF/Rehab:  Bedside RN,  has been notified to update the transition

## 2024-11-14 NOTE — CONSULTS
Power Glide MIDLINE Insertion Procedure  Note:     Procedure explained to  pt   along with risks and benefits.  Procedure teaching completed.    Pt  denies questions or concerns at this time. Pre procedure assessment done.   Sterile barrier precautions observed throughout procedure.   Cannulated   basilic   vein using ultrasound guidance.  Inserted  20 G,  10 cm long power glide midline to   right upper arm.  Blood return verified and  flushed with 20ml normal saline. Sterile dressing applied with biopatch, statLock and occlusive dressing as per protocol. Curos cap applied to port.    Patient tolerated procedure well with minimal blood loss.   Reason for access :  Discharge to SNF with Midline Catheter for IV medication administration  (End date : 11/19) ( 5 more days)  Complications related to insertion : None   Midline is CT and MRI compatible.  Inserted by : Feroz Soni RN. VIKTORIA. CMSRHARISH. Vascular Access Nurse. PICC Nurse  Assisted by : Iwona De La Rosa RN Vascular Access Nurse  Total Catheter Length : 10  cm   Internal length  :  10  cm  Arm Circumference :   30   cm  Catheter occupies  6 % of vesel  External Length :     0     cm    Type of Midline:  Bard  Power Lawrence Midline   Ref # : [id number]  Lot#:    [id number]  Expiration Date:    2025-08-31  Informed primary nurse Helen WARNER that midline is ready for use and to hang new infusion tubing prior to use and apply tourniquet above the catheter tip for blood draw.       Feroz Soni RN. VIKTORIA.CMSRN.PICC nurse. Vascular Access Team

## 2024-11-14 NOTE — PROGRESS NOTES
Hospitalist Progress Note    NAME:   Corby Chun   : 1935   MRN: [id number]     Date/Time: 2024 7:07 PM  Patient PCP: Ryan Nguyen MD    Estimated discharge date:48hrs  Barriers: ptot, repeat blood cultures, ID consult and recommendation  PT recommending SNF    Assessment / Plan:    Generalized weakness  Status post fall  URTI + rhinovirus  UTI  GNR bacteria  TSH, B12 normal  Blood pressure is negative so far flu and COVID-negative  UA showing moderate leukoesterase  CT head unremarkable  CT chest reviewed   Chest x-ray no pneumonia  Blood culture on admission 1 GNR  Repeat blood culture done on 11/10  Urine culture no  Continue empiric IV ceftriaxone  Continue Mucinex  Nebs as needed  Blood culture + acinetobacter lwoffii bacteremia  bottles  Repeat blood culture until negative   ID consulted.  Recommend continue Unasyn 3 g IV end date   Poor line at end of therapy  -Weekly CBC, CMP-  -Fax reports to [phone], call with critical labs  at [phone]  -Encourage adequate fluids, daily probiotic/yogurt  -If line malfunction occurs and home health cannot reposition  please send patient to ED immediately  -ID follow-up -no follow-up required  - If persistent side effects occur stop antibiotic and call-ID/PCP    Mildly elevated creatinine  Creatinine 1.47 on admission  Resolved     Chronic CHF with reduced ejection fraction EF 40% as of   Status post AICD  Mechanical aortic valve on warfarin goal INR 2-3   INR now therapeutic at 2.4  Continue warfarin, pharmacy dosing  Repeat INR in a.m.    BPH  Hypothyroidism  Continue PTA Entresto, finasteride, Flomax  Pharmacy consult to manage Coumadin      Gout PTA allopurinol    Agitation   - Zyprexa 5mg bid prn     - consult Psych       Psychosis  Sundowners  -pt was recently discharged from [facility] for hallucinations stayed 1 week  in hospital - concluded - Sundjosselyners   - Zyprexa p.rn Qtc 492  -resume Aricept  -resume paxil   Alert and  oriented times2  Per wife patient always have sundowning while in hospital with hallucinations and patient thinking somebody is trying to kill him  Seroquel was discontinued on previous hospitalization    Medical Decision Making:   I personally reviewed labs: CBC BMP  I personally reviewed imaging:   I personally reviewed EKG:  Toxic drug monitoring: INR on warfarin  Discussed case with: pt rn        Code Status: full code   DVT Prophylaxis:coumadin  GI Prophylaxis:    Subjective:     Chief Complaint / Reason for Physician Visit  Per nurse patient is agitated and hallucinating.  Discussed with RN events overnight.       Objective:     VITALS:   Last 24hrs VS reviewed since prior progress note. Most recent are:  Patient Vitals for the past 24 hrs:   BP Temp Temp src Pulse Resp SpO2 Weight   11/13/24 1529 (!) 136/95 97.9 °F (36.6 °C) -- 73 18 98 % --   11/13/24 0832 (!) 175/98 98.1 °F (36.7 °C) Oral 70 18 97 % --   11/13/24 0625 -- -- -- -- -- -- 72.8 kg (160 lb 7.9 oz)   11/12/24 2124 129/78 97.5 °F (36.4 °C) Oral 69 16 95 % --         Intake/Output Summary (Last 24 hours) at 11/13/2024 1907  Last data filed at 11/13/2024 0947  Gross per 24 hour   Intake 600 ml   Output 775 ml   Net -175 ml        I had a face to face encounter and independently examined this patient on 11/13/2024, as outlined below:  PHYSICAL EXAM:  General: Alert, cooperative  EENT:  EOMI. Anicteric sclerae.  Resp:  CTA bilaterally,  wheezing or rales.  No accessory muscle use  CV:  Regular  rhythm,  No edema  GI:  Soft, Non distended, Non tender.  +Bowel sounds  Neurologic: aox2 hard of hearing, normal speech,   Psych:   Not agitated  Skin:  No rashes.  No jaundice    Reviewed most current lab test results and cultures  YES  Reviewed most current radiology test results   YES  Review and summation of old records today    NO  Reviewed patient's current orders and MAR    YES  PMH/SH reviewed - no change compared to H&P    Procedures: see electronic

## 2024-11-14 NOTE — DISCHARGE SUMMARY
Discharge Summary    Name: Corby Chun  [id number]  YOB: 1935 (Age: 89 y.o.)   Date of Admission: 11/8/2024  Date of Discharge: 11/14/2024  Attending Physician: Annabel Steiner *    Discharge Diagnosis: URI, + Rhinovirus, Acinetobacter bacteremia     Consultations:  IP CONSULT TO PHARMACY  PHARMACY TO DOSE WARFARIN  IP CONSULT TO INFECTIOUS DISEASES  IP CONSULT TO PSYCHIATRY  IP CONSULT TO VASCULAR ACCESS TEAM  IP CONSULT TO CASE MANAGEMENT      Brief Admission History/Reason for Admission Per Con Sloan, DO:     Admitting H&P:  Corby Chun is a 89 y.o.  male with PMHx significant for systolic heart failure, CAD , arthritis who present to the ED [facility]. Pt is a poor historian, therefore history was obtained from wife. She notes he was feeling bad since home from a week long hospitalization at [facility] for confusion and AMS. Since he's been back at home she noticed was very weak and had a bad cough. The cough became worse, wet sounding therefore she contacted [facility] to evaluate. While opening the door for the team to evaluate the patient stood up and fell. She notes it was a struggle lifting him from the floor. He did not hit his head, he did not suffer any acute injuries.  [facility] suggested to go to the ED for evaluation .      Wife made note of sundowners, pt gets very confused in the evening, while at [facility] they concluded no acute brain disease, no atrophy no brain deterioration. He is scheduled to follow up with neurology this month.     We were asked to admit for work up and evaluation of the above problems.     Brief Hospital Course by Main Problems:     Interval summary:  Generalized weakness  Status post fall  URTI + rhinovirus  UTI  GNR bacteria  TSH, B12 normal  Blood pressure is negative so far flu and COVID-negative  UA showing moderate  extended release tablet  Commonly known as: DITROPAN-XL     PARoxetine 20 MG tablet  Commonly known as: PAXIL  Take 1 tablet by mouth daily     PROBIOTIC BLEND PO     vitamin B-12 50 MCG tablet  Commonly known as: CYANOCOBALAMIN     warfarin 2.5 MG tablet  Commonly known as: COUMADIN  Take as directed. If you are unsure how to take this medication, talk to your nurse or doctor.  Original instructions: 2 tabs daily for 3 days then 1 tab daily               Where to Get Your Medications        These medications were sent to [facility] - [place], [place] - [address] - P [phone] - F [phone] [address] [address]      Phone: [phone]   ampicillin-sulbactam 3 (2-1) g Solr  pantoprazole 40 MG tablet             DISPOSITION:    Home with Family:       Home with HH/PT/OT/RN:    SNF/LTC: X   AMADEO:    OTHER:            Code status: Full code  Recommended diet: regular diet  Recommended activity: activity as tolerated  Wound care: None      Follow up with:   PCP : Ryan Nguyen MD    [facility] (LINK)  [address] [address]  [phone]        Ryan Nguyen MD  [address] [place] [address]  [phone]    Schedule an appointment as soon as possible for a visit      Ryan Nguyen MD  [address] [facility] [address] [place] [address]  [phone]                Total time in minutes spent coordinating this discharge (includes going over instructions, follow-up, prescriptions, and preparing report for sign off to her PCP) :  35 minutes

## 2024-11-14 NOTE — PLAN OF CARE
Problem: Safety - Adult  Goal: Free from fall injury  11/14/2024 1004 by Helen Sanabria RN  Outcome: Progressing  11/13/2024 2155 by Minna Vásquez RN  Outcome: Progressing     Problem: Chronic Conditions and Co-morbidities  Goal: Patient's chronic conditions and co-morbidity symptoms are monitored and maintained or improved  11/14/2024 1004 by Helen Sanabria RN  Outcome: Progressing  11/13/2024 2155 by Minna Vásquez RN  Outcome: Progressing     Problem: Discharge Planning  Goal: Discharge to home or other facility with appropriate resources  11/14/2024 1004 by Helen Sanabria RN  Outcome: Progressing  11/13/2024 2155 by Minna Vásquez RN  Outcome: Progressing     Problem: Pain  Goal: Verbalizes/displays adequate comfort level or baseline comfort level  11/14/2024 1004 by Helen Sanabria RN  Outcome: Progressing  11/13/2024 2155 by Minna Vásquez RN  Outcome: Progressing     Problem: Skin/Tissue Integrity  Goal: Absence of new skin breakdown  Description: 1.  Monitor for areas of redness and/or skin breakdown  2.  Assess vascular access sites hourly  3.  Every 4-6 hours minimum:  Change oxygen saturation probe site  4.  Every 4-6 hours:  If on nasal continuous positive airway pressure, respiratory therapy assess nares and determine need for appliance change or resting period.  11/14/2024 1004 by Helen Sanabria RN  Outcome: Progressing  11/13/2024 2155 by Minna Vásquez RN  Outcome: Progressing     Problem: ABCDS Injury Assessment  Goal: Absence of physical injury  11/14/2024 1004 by Helen Sanabria RN  Outcome: Progressing  11/13/2024 2155 by Minna Vásquez RN  Outcome: Progressing     Problem: Confusion  Goal: Confusion, delirium, dementia, or psychosis is improved or at baseline  Description: INTERVENTIONS:  1. Assess for possible contributors to thought disturbance, including medications, impaired vision or hearing, underlying metabolic abnormalities, dehydration, psychiatric  diagnoses, and notify attending LIP  2. [place] high risk fall precautions, as indicated  3. Provide frequent short contacts to provide reality reorientation, refocusing and direction  4. Decrease environmental stimuli, including noise as appropriate  5. Monitor and intervene to maintain adequate nutrition, hydration, elimination, sleep and activity  6. If unable to ensure safety without constant attention obtain sitter and review sitter guidelines with assigned personnel  7. Initiate Psychosocial CNS and Spiritual Care consult, as indicated  11/14/2024 1004 by Helen Sanabria, RN  Outcome: Progressing  11/13/2024 2155 by Minna Vásquez, RN  Outcome: Progressing

## 2024-11-14 NOTE — PROGRESS NOTES
Pharmacist Daily Dosing of Warfarin    Indication & Goal INR:  Hx of DVT  INR Goal 2-3    PTA Warfarin Dose: ? 2.5mg daily    Notable concurrent conditions and medications: Acetaminophen and Allopurinol    Labs:  Recent Labs     Units 11/14/24  0358 11/13/24  0305 11/12/24  0551   INR   2.0* 2.4* 2.2*   HGB g/dL 11.3* 11.2* 11.7*   PLT K/uL 145* 139* 145*         Impression/Plan:   Will order warfarin 3 mg x 1 dose   Daily INR has been ordered  CBC is ordered daily thru Friday.  Do at least every 2 days per protocol     Pharmacy will follow daily and adjust the dose as appropriate.    Thank you,  SRIKANTH MOORE, [facility]

## 2024-11-14 NOTE — PLAN OF CARE
assist using the least restrictive device within 7 day(s).  4.  Patient will ambulate with contact guard assist for 200 feet with the least restrictive device within 7 day(s).   5.  Patient will ascend/descend 8 stairs with 1 handrail(s) with contact guard assist within 7 day(s).   11/13/2024 1343 by Roxie Billings, PT  Outcome: Progressing

## 2024-11-14 NOTE — PROGRESS NOTES
Physical Therapy     Chart reviewed up to date. Attempted to see pt for PT session. Pt currently receiving picc line placement at bedside, not available. Will defer and follow up later as able. Of note, pt scheduled for DC to SNF today at 3pm.     Ramona Long PT, DPT, NCS

## 2024-11-15 LAB
BACTERIA SPEC CULT: NORMAL
BACTERIA SPEC CULT: NORMAL
SERVICE CMNT-IMP: NORMAL
SERVICE CMNT-IMP: NORMAL

## 2024-11-16 LAB
BACTERIA ISLT: ABNORMAL
OTHER ANTIBIOTIC SUSC ISLT: ABNORMAL
OTHER ANTIBIOTIC SUSC ISLT: ABNORMAL
SOURCE: ABNORMAL
SPECIMEN SOURCE: ABNORMAL

## 2024-11-27 ENCOUNTER — TELEPHONE (OUTPATIENT)
Age: 88
End: 2024-11-27

## 2024-11-27 NOTE — TELEPHONE ENCOUNTER
Spoke with Paulette with [facility]. Advised. Paulette will follow up with patient to confirm if discharged today.   Verbalized understanding.     Per Dr. Nguyen  I can [facility] HH but pt went to SNF -not sure he is back home again

## 2024-11-27 NOTE — TELEPHONE ENCOUNTER
Paulette with [facility]    Phone [phone]      States:  Will pcp follow for home health services again (resume HH)?  Physical therapy  Occupational therapy  Skilled nursing.    Call to advise.                Appointments:  Last seen at [facility]:   9/24/2024   Future appointment [facility]:  12/4/2024         .            Caller confirms readback of documented phone/fax number(s) as correct.

## 2024-11-27 NOTE — TELEPHONE ENCOUNTER
Pt wife said pt got prescribed ampicillin intravenously every 6 hours for 5 days by [facility] and states she does not know how to go about this medication because she can not give it intravenously     Pt would like a call back regarding this with any suggestions on what she could do     [facility] states he has a bacterial infection and that's why he was prescribed this

## 2024-12-11 ENCOUNTER — TELEPHONE (OUTPATIENT)
Age: 88
End: 2024-12-11

## 2024-12-11 NOTE — TELEPHONE ENCOUNTER
Attempted to contact patient, voicemail full. Patient needs to schedule a 45 min med review with [facility].

## 2024-12-11 NOTE — TELEPHONE ENCOUNTER
----- Message from Janis CALERO sent at 12/10/2024  2:17 PM EST -----    ----- Message -----  From: Marisel Richardson [facility]  Sent: 12/10/2024   9:53 AM EST  To: Janis Saeed; Josefina Krishnamurthy RN    Need minimum 45 mins if we are doing med review. Thank you!  ----- Message -----  From: Josefina Krishnamurthy, ALANA  Sent: 12/10/2024   9:45 AM EST  To: Janis Saeed; Marisel Richardson [facility],    Can you please call Mrs. Chun and schedule appt with [facility] for INR check and med review.    Last INR 3.58 on 11/27    Thanks!    Ludivina

## 2024-12-17 ENCOUNTER — TELEPHONE (OUTPATIENT)
Age: 88
End: 2024-12-17

## 2024-12-17 DIAGNOSIS — N39.0 URINARY TRACT INFECTION WITHOUT HEMATURIA, SITE UNSPECIFIED: Primary | ICD-10-CM

## 2024-12-17 NOTE — TELEPHONE ENCOUNTER
Pt's daughter called in states pt has had re-occurrent UTI.     Pt is experiencing hallucinations & loss of bladder control    Daughter doesn't believe pt has dementia. Daughter wants pt to have urinalysis to make sure pt's UTI has cleared.     Pt has hosp f/u with PCP scheduled for 12/27/24.     Please call daughter to discuss.

## 2024-12-18 DIAGNOSIS — N39.0 URINARY TRACT INFECTION WITHOUT HEMATURIA, SITE UNSPECIFIED: ICD-10-CM

## 2024-12-19 LAB
APPEARANCE UR: CLEAR
BACTERIA URNS QL MICRO: NEGATIVE /HPF
BILIRUB UR QL: NEGATIVE
COLOR UR: ABNORMAL
EPITH CASTS URNS QL MICRO: ABNORMAL /LPF
GLUCOSE UR STRIP.AUTO-MCNC: NEGATIVE MG/DL
HGB UR QL STRIP: ABNORMAL
HYALINE CASTS URNS QL MICRO: ABNORMAL /LPF (ref 0–5)
KETONES UR QL STRIP.AUTO: NEGATIVE MG/DL
LEUKOCYTE ESTERASE UR QL STRIP.AUTO: ABNORMAL
NITRITE UR QL STRIP.AUTO: NEGATIVE
PH UR STRIP: 6.5 (ref 5–8)
PROT UR STRIP-MCNC: NEGATIVE MG/DL
RBC #/AREA URNS HPF: >100 /HPF (ref 0–5)
SP GR UR REFRACTOMETRY: 1.01 (ref 1–1.03)
URINE CULTURE IF INDICATED: ABNORMAL
UROBILINOGEN UR QL STRIP.AUTO: 0.2 EU/DL (ref 0.2–1)
WBC URNS QL MICRO: ABNORMAL /HPF (ref 0–4)

## 2024-12-27 ENCOUNTER — OFFICE VISIT (OUTPATIENT)
Age: 88
End: 2024-12-27
Payer: MEDICARE

## 2024-12-27 VITALS
HEIGHT: 62 IN | RESPIRATION RATE: 18 BRPM | BODY MASS INDEX: 29.44 KG/M2 | DIASTOLIC BLOOD PRESSURE: 66 MMHG | TEMPERATURE: 98.3 F | HEART RATE: 74 BPM | SYSTOLIC BLOOD PRESSURE: 112 MMHG | OXYGEN SATURATION: 97 %

## 2024-12-27 DIAGNOSIS — I50.9 CONGESTIVE HEART FAILURE, UNSPECIFIED HF CHRONICITY, UNSPECIFIED HEART FAILURE TYPE (HCC): ICD-10-CM

## 2024-12-27 DIAGNOSIS — R44.3 HALLUCINATIONS: ICD-10-CM

## 2024-12-27 DIAGNOSIS — Z71.89 ACP (ADVANCE CARE PLANNING): ICD-10-CM

## 2024-12-27 DIAGNOSIS — Z79.01 CHRONIC ANTICOAGULATION: Primary | ICD-10-CM

## 2024-12-27 DIAGNOSIS — R53.1 WEAKNESS: ICD-10-CM

## 2024-12-27 DIAGNOSIS — R41.3 MEMORY LOSS: ICD-10-CM

## 2024-12-27 LAB
INR PPP: 3.1 (ref 0.9–1.1)
PROTHROMBIN TIME: 30.1 SEC (ref 9–11.1)

## 2024-12-27 PROCEDURE — 99214 OFFICE O/P EST MOD 30 MIN: CPT | Performed by: INTERNAL MEDICINE

## 2024-12-27 RX ORDER — OLANZAPINE 5 MG/1
5 TABLET ORAL 2 TIMES DAILY
Qty: 180 TABLET | Refills: 3 | Status: ON HOLD | OUTPATIENT
Start: 2024-12-27

## 2024-12-27 RX ORDER — PANTOPRAZOLE SODIUM 40 MG/1
40 TABLET, DELAYED RELEASE ORAL
Qty: 90 TABLET | Refills: 3 | Status: ON HOLD | OUTPATIENT
Start: 2024-12-27 | End: 2025-12-27

## 2024-12-27 NOTE — PROGRESS NOTES
\"Have you been to the ER, urgent care clinic since your last visit?  Hospitalized since your last visit?\"    Critical access hospital    “Have you seen or consulted any other health care providers outside our system since your last visit?”    NO           
tablet Take 1 tablet by mouth nightly      ferrous sulfate (IRON 325) 325 (65 Fe) MG tablet Take 1 tablet by mouth daily (with breakfast)      Coenzyme Q10 (CO Q 10 PO) Take by mouth      allopurinol (ZYLOPRIM) 300 MG tablet TAKE 1 TABLET BY MOUTH EVERY DAY TO PREVENT GOUT 30 tablet 11    OLANZapine (ZYPREXA) 5 MG tablet Take 1 tablet by mouth 2 times daily as needed (agitation) 60 tablet 3    vitamin B-12 (CYANOCOBALAMIN) 50 MCG tablet 1000 mg daily      Multiple Vitamin (MULTIVITAMIN ADULT PO) take 1 tablet by oral route once daily      warfarin (COUMADIN) 2.5 MG tablet 2 tabs daily for 3 days then 1 tab daily 30 tablet 3    levothyroxine (SYNTHROID) 150 MCG tablet Take 1 tablet by mouth Daily 90 tablet 3    PARoxetine (PAXIL) 20 MG tablet Take 1 tablet by mouth daily 90 tablet 3    tamsulosin (FLOMAX) 0.4 MG capsule TAKE 2 CAPSULES BY MOUTH AT BEDTIME TO IMPROVE URINE FLOW 180 capsule 2    Psyllium (METAMUCIL 3 IN 1 DAILY FIBER PO) Take 1 Capful by mouth 1 (one) time if needed (constipation)      Probiotic Product (PROBIOTIC BLEND PO) Take 1 Capful by mouth Daily      sacubitril-valsartan (ENTRESTO) 24-26 MG per tablet TAKE 1 TABLET BY MOUTH EVERY TWELVE (12) HOURS. 180 tablet 0    atorvastatin (LIPITOR) 40 MG tablet       finasteride (PROSCAR) 5 MG tablet Take by mouth daily      oxybutynin (DITROPAN-XL) 10 MG extended release tablet Take 1 tablet by mouth daily       No current facility-administered medications for this visit.     No Known Allergies  Social History     Tobacco Use    Smoking status: Never    Smokeless tobacco: Never   Substance Use Topics    Alcohol use: Yes     Alcohol/week: 3.0 standard drinks of alcohol        BMP:   Lab Results   Component Value Date/Time     11/14/2024 03:58 AM    K 3.9 11/14/2024 03:58 AM     11/14/2024 03:58 AM    CO2 27 11/14/2024 03:58 AM    BUN 21 11/14/2024 03:58 AM    CREATININE 1.28 11/14/2024 03:58 AM    GLUCOSE 128 11/14/2024 03:58 AM    CALCIUM 8.4

## 2024-12-27 NOTE — PATIENT INSTRUCTIONS

## 2024-12-29 ENCOUNTER — APPOINTMENT (OUTPATIENT)
Facility: HOSPITAL | Age: 88
End: 2024-12-29
Payer: MEDICARE

## 2024-12-29 ENCOUNTER — TELEPHONE (OUTPATIENT)
Age: 88
End: 2024-12-29

## 2024-12-29 ENCOUNTER — HOSPITAL ENCOUNTER (INPATIENT)
Facility: HOSPITAL | Age: 88
LOS: 3 days | Discharge: ANOTHER HOSPITAL | End: 2025-01-03
Attending: STUDENT IN AN ORGANIZED HEALTH CARE EDUCATION/TRAINING PROGRAM | Admitting: INTERNAL MEDICINE
Payer: MEDICARE

## 2024-12-29 DIAGNOSIS — R53.1 GENERAL WEAKNESS: Primary | ICD-10-CM

## 2024-12-29 PROBLEM — R26.2 AMBULATORY DYSFUNCTION: Status: ACTIVE | Noted: 2024-12-29

## 2024-12-29 LAB
ALBUMIN SERPL-MCNC: 3.1 G/DL (ref 3.5–5)
ALBUMIN/GLOB SERPL: 0.9 (ref 1.1–2.2)
ALP SERPL-CCNC: 120 U/L (ref 45–117)
ALT SERPL-CCNC: 32 U/L (ref 12–78)
ANION GAP SERPL CALC-SCNC: 4 MMOL/L (ref 2–12)
APPEARANCE UR: CLEAR
AST SERPL-CCNC: 31 U/L (ref 15–37)
BACTERIA URNS QL MICRO: NEGATIVE /HPF
BASOPHILS # BLD: 0 K/UL (ref 0–0.1)
BASOPHILS NFR BLD: 0 % (ref 0–1)
BILIRUB SERPL-MCNC: 0.8 MG/DL (ref 0.2–1)
BILIRUB UR QL: NEGATIVE
BUN SERPL-MCNC: 17 MG/DL (ref 6–20)
BUN/CREAT SERPL: 15 (ref 12–20)
CALCIUM SERPL-MCNC: 8.7 MG/DL (ref 8.5–10.1)
CHLORIDE SERPL-SCNC: 108 MMOL/L (ref 97–108)
CO2 SERPL-SCNC: 27 MMOL/L (ref 21–32)
COLOR UR: ABNORMAL
COMMENT:: NORMAL
CREAT SERPL-MCNC: 1.11 MG/DL (ref 0.7–1.3)
DIFFERENTIAL METHOD BLD: ABNORMAL
EOSINOPHIL # BLD: 0.1 K/UL (ref 0–0.4)
EOSINOPHIL NFR BLD: 2 % (ref 0–7)
EPITH CASTS URNS QL MICRO: ABNORMAL /LPF
ERYTHROCYTE [DISTWIDTH] IN BLOOD BY AUTOMATED COUNT: 14.6 % (ref 11.5–14.5)
FLUAV RNA SPEC QL NAA+PROBE: NOT DETECTED
FLUBV RNA SPEC QL NAA+PROBE: NOT DETECTED
GLOBULIN SER CALC-MCNC: 3.3 G/DL (ref 2–4)
GLUCOSE SERPL-MCNC: 132 MG/DL (ref 65–100)
GLUCOSE UR STRIP.AUTO-MCNC: NEGATIVE MG/DL
HCT VFR BLD AUTO: 33.3 % (ref 36.6–50.3)
HGB BLD-MCNC: 10.9 G/DL (ref 12.1–17)
HGB UR QL STRIP: ABNORMAL
HYALINE CASTS URNS QL MICRO: ABNORMAL /LPF (ref 0–2)
IMM GRANULOCYTES # BLD AUTO: 0 K/UL (ref 0–0.04)
IMM GRANULOCYTES NFR BLD AUTO: 0 % (ref 0–0.5)
INR PPP: 3.2 (ref 0.9–1.1)
KETONES UR QL STRIP.AUTO: NEGATIVE MG/DL
LEUKOCYTE ESTERASE UR QL STRIP.AUTO: NEGATIVE
LYMPHOCYTES # BLD: 0.3 K/UL (ref 0.8–3.5)
LYMPHOCYTES NFR BLD: 6 % (ref 12–49)
MAGNESIUM SERPL-MCNC: 1.8 MG/DL (ref 1.6–2.4)
MCH RBC QN AUTO: 31.6 PG (ref 26–34)
MCHC RBC AUTO-ENTMCNC: 32.7 G/DL (ref 30–36.5)
MCV RBC AUTO: 96.5 FL (ref 80–99)
MONOCYTES # BLD: 0.5 K/UL (ref 0–1)
MONOCYTES NFR BLD: 9 % (ref 5–13)
NEUTS SEG # BLD: 4.2 K/UL (ref 1.8–8)
NEUTS SEG NFR BLD: 83 % (ref 32–75)
NITRITE UR QL STRIP.AUTO: NEGATIVE
NRBC # BLD: 0 K/UL (ref 0–0.01)
NRBC BLD-RTO: 0 PER 100 WBC
PH UR STRIP: 6.5 (ref 5–8)
PLATELET # BLD AUTO: 132 K/UL (ref 150–400)
PMV BLD AUTO: 9.9 FL (ref 8.9–12.9)
POTASSIUM SERPL-SCNC: 4 MMOL/L (ref 3.5–5.1)
PROT SERPL-MCNC: 6.4 G/DL (ref 6.4–8.2)
PROT UR STRIP-MCNC: ABNORMAL MG/DL
PROTHROMBIN TIME: 31.3 SEC (ref 9–11.1)
RBC # BLD AUTO: 3.45 M/UL (ref 4.1–5.7)
RBC #/AREA URNS HPF: ABNORMAL /HPF (ref 0–5)
RBC MORPH BLD: ABNORMAL
SARS-COV-2 RNA RESP QL NAA+PROBE: NOT DETECTED
SODIUM SERPL-SCNC: 139 MMOL/L (ref 136–145)
SOURCE: NORMAL
SP GR UR REFRACTOMETRY: 1.02
SPECIMEN HOLD: NORMAL
TROPONIN I SERPL HS-MCNC: 45 NG/L (ref 0–76)
URINE CULTURE IF INDICATED: ABNORMAL
UROBILINOGEN UR QL STRIP.AUTO: 1 EU/DL (ref 0.2–1)
WBC # BLD AUTO: 5.1 K/UL (ref 4.1–11.1)
WBC URNS QL MICRO: ABNORMAL /HPF (ref 0–4)

## 2024-12-29 PROCEDURE — 80053 COMPREHEN METABOLIC PANEL: CPT

## 2024-12-29 PROCEDURE — 87636 SARSCOV2 & INF A&B AMP PRB: CPT

## 2024-12-29 PROCEDURE — 93005 ELECTROCARDIOGRAM TRACING: CPT | Performed by: STUDENT IN AN ORGANIZED HEALTH CARE EDUCATION/TRAINING PROGRAM

## 2024-12-29 PROCEDURE — G0378 HOSPITAL OBSERVATION PER HR: HCPCS

## 2024-12-29 PROCEDURE — 85610 PROTHROMBIN TIME: CPT

## 2024-12-29 PROCEDURE — 36415 COLL VENOUS BLD VENIPUNCTURE: CPT

## 2024-12-29 PROCEDURE — 2500000003 HC RX 250 WO HCPCS: Performed by: INTERNAL MEDICINE

## 2024-12-29 PROCEDURE — 83735 ASSAY OF MAGNESIUM: CPT

## 2024-12-29 PROCEDURE — 81001 URINALYSIS AUTO W/SCOPE: CPT

## 2024-12-29 PROCEDURE — 84484 ASSAY OF TROPONIN QUANT: CPT

## 2024-12-29 PROCEDURE — 70450 CT HEAD/BRAIN W/O DYE: CPT

## 2024-12-29 PROCEDURE — 85025 COMPLETE CBC W/AUTO DIFF WBC: CPT

## 2024-12-29 PROCEDURE — 71045 X-RAY EXAM CHEST 1 VIEW: CPT

## 2024-12-29 PROCEDURE — 6370000000 HC RX 637 (ALT 250 FOR IP): Performed by: INTERNAL MEDICINE

## 2024-12-29 PROCEDURE — 99285 EMERGENCY DEPT VISIT HI MDM: CPT

## 2024-12-29 RX ORDER — POLYETHYLENE GLYCOL 3350 17 G/17G
17 POWDER, FOR SOLUTION ORAL DAILY PRN
Status: DISCONTINUED | OUTPATIENT
Start: 2024-12-29 | End: 2025-01-04 | Stop reason: HOSPADM

## 2024-12-29 RX ORDER — ASCORBIC ACID 500 MG
500 TABLET ORAL DAILY
Status: DISCONTINUED | OUTPATIENT
Start: 2024-12-30 | End: 2025-01-04 | Stop reason: HOSPADM

## 2024-12-29 RX ORDER — FINASTERIDE 5 MG/1
5 TABLET, FILM COATED ORAL DAILY
Status: DISCONTINUED | OUTPATIENT
Start: 2024-12-30 | End: 2025-01-04 | Stop reason: HOSPADM

## 2024-12-29 RX ORDER — SODIUM CHLORIDE 0.9 % (FLUSH) 0.9 %
5-40 SYRINGE (ML) INJECTION PRN
Status: DISCONTINUED | OUTPATIENT
Start: 2024-12-29 | End: 2025-01-04 | Stop reason: HOSPADM

## 2024-12-29 RX ORDER — LACTOBACILLUS RHAMNOSUS GG 10B CELL
1 CAPSULE ORAL DAILY
Status: DISCONTINUED | OUTPATIENT
Start: 2024-12-30 | End: 2025-01-04 | Stop reason: HOSPADM

## 2024-12-29 RX ORDER — OLANZAPINE 5 MG/1
5 TABLET ORAL 2 TIMES DAILY PRN
Status: DISCONTINUED | OUTPATIENT
Start: 2024-12-29 | End: 2025-01-04 | Stop reason: HOSPADM

## 2024-12-29 RX ORDER — OXYBUTYNIN CHLORIDE 5 MG/1
10 TABLET, EXTENDED RELEASE ORAL DAILY
Status: DISCONTINUED | OUTPATIENT
Start: 2024-12-30 | End: 2025-01-04 | Stop reason: HOSPADM

## 2024-12-29 RX ORDER — OLANZAPINE 5 MG/1
5 TABLET ORAL 2 TIMES DAILY
Status: DISCONTINUED | OUTPATIENT
Start: 2024-12-29 | End: 2025-01-04 | Stop reason: HOSPADM

## 2024-12-29 RX ORDER — ACETAMINOPHEN 325 MG/1
650 TABLET ORAL EVERY 6 HOURS PRN
Status: DISCONTINUED | OUTPATIENT
Start: 2024-12-29 | End: 2025-01-04 | Stop reason: HOSPADM

## 2024-12-29 RX ORDER — TAMSULOSIN HYDROCHLORIDE 0.4 MG/1
0.4 CAPSULE ORAL
Status: DISCONTINUED | OUTPATIENT
Start: 2024-12-29 | End: 2025-01-04 | Stop reason: HOSPADM

## 2024-12-29 RX ORDER — WARFARIN SODIUM 5 MG/1
2.5 TABLET ORAL
Status: COMPLETED | OUTPATIENT
Start: 2024-12-29 | End: 2024-12-29

## 2024-12-29 RX ORDER — PANTOPRAZOLE SODIUM 40 MG/1
40 TABLET, DELAYED RELEASE ORAL
Status: DISCONTINUED | OUTPATIENT
Start: 2024-12-30 | End: 2025-01-04 | Stop reason: HOSPADM

## 2024-12-29 RX ORDER — IBUPROFEN 200 MG
200 CAPSULE ORAL NIGHTLY
Status: DISCONTINUED | OUTPATIENT
Start: 2024-12-29 | End: 2025-01-04 | Stop reason: HOSPADM

## 2024-12-29 RX ORDER — MULTIVITAMIN WITH IRON
1000 TABLET ORAL DAILY
Status: DISCONTINUED | OUTPATIENT
Start: 2024-12-30 | End: 2025-01-04 | Stop reason: HOSPADM

## 2024-12-29 RX ORDER — M-VIT,TX,IRON,MINS/CALC/FOLIC 27MG-0.4MG
1 TABLET ORAL DAILY
Status: DISCONTINUED | OUTPATIENT
Start: 2024-12-30 | End: 2025-01-04 | Stop reason: HOSPADM

## 2024-12-29 RX ORDER — SODIUM CHLORIDE 9 MG/ML
INJECTION, SOLUTION INTRAVENOUS PRN
Status: DISCONTINUED | OUTPATIENT
Start: 2024-12-29 | End: 2025-01-04 | Stop reason: HOSPADM

## 2024-12-29 RX ORDER — ASPIRIN 81 MG/1
81 TABLET ORAL DAILY
Status: DISCONTINUED | OUTPATIENT
Start: 2024-12-30 | End: 2025-01-04 | Stop reason: HOSPADM

## 2024-12-29 RX ORDER — FERROUS SULFATE 325(65) MG
325 TABLET ORAL
Status: DISCONTINUED | OUTPATIENT
Start: 2024-12-30 | End: 2025-01-04 | Stop reason: HOSPADM

## 2024-12-29 RX ORDER — ACETAMINOPHEN 650 MG/1
650 SUPPOSITORY RECTAL EVERY 6 HOURS PRN
Status: DISCONTINUED | OUTPATIENT
Start: 2024-12-29 | End: 2025-01-04 | Stop reason: HOSPADM

## 2024-12-29 RX ORDER — ONDANSETRON 4 MG/1
4 TABLET, ORALLY DISINTEGRATING ORAL EVERY 8 HOURS PRN
Status: DISCONTINUED | OUTPATIENT
Start: 2024-12-29 | End: 2025-01-04 | Stop reason: HOSPADM

## 2024-12-29 RX ORDER — SODIUM CHLORIDE 0.9 % (FLUSH) 0.9 %
5-40 SYRINGE (ML) INJECTION EVERY 12 HOURS SCHEDULED
Status: DISCONTINUED | OUTPATIENT
Start: 2024-12-29 | End: 2025-01-04 | Stop reason: HOSPADM

## 2024-12-29 RX ORDER — AMPICILLIN TRIHYDRATE 250 MG
100 CAPSULE ORAL DAILY
Status: DISCONTINUED | OUTPATIENT
Start: 2024-12-30 | End: 2025-01-04 | Stop reason: HOSPADM

## 2024-12-29 RX ORDER — LEVOTHYROXINE SODIUM 150 UG/1
150 TABLET ORAL DAILY
Status: DISCONTINUED | OUTPATIENT
Start: 2024-12-30 | End: 2025-01-04 | Stop reason: HOSPADM

## 2024-12-29 RX ORDER — CASTOR OIL AND BALSAM, PERU 788; 87 MG/G; MG/G
OINTMENT TOPICAL 2 TIMES DAILY
Status: DISCONTINUED | OUTPATIENT
Start: 2024-12-29 | End: 2025-01-04 | Stop reason: HOSPADM

## 2024-12-29 RX ORDER — PAROXETINE 20 MG/1
20 TABLET, FILM COATED ORAL DAILY
Status: DISCONTINUED | OUTPATIENT
Start: 2024-12-30 | End: 2025-01-04 | Stop reason: HOSPADM

## 2024-12-29 RX ORDER — ALLOPURINOL 300 MG/1
300 TABLET ORAL DAILY
Status: DISCONTINUED | OUTPATIENT
Start: 2024-12-30 | End: 2025-01-04 | Stop reason: HOSPADM

## 2024-12-29 RX ORDER — ONDANSETRON 2 MG/ML
4 INJECTION INTRAMUSCULAR; INTRAVENOUS EVERY 6 HOURS PRN
Status: DISCONTINUED | OUTPATIENT
Start: 2024-12-29 | End: 2025-01-04 | Stop reason: HOSPADM

## 2024-12-29 RX ORDER — ATORVASTATIN CALCIUM 40 MG/1
40 TABLET, FILM COATED ORAL NIGHTLY
Status: DISCONTINUED | OUTPATIENT
Start: 2024-12-29 | End: 2025-01-04 | Stop reason: HOSPADM

## 2024-12-29 RX ADMIN — SACUBITRIL AND VALSARTAN 1 TABLET: 24; 26 TABLET, FILM COATED ORAL at 17:05

## 2024-12-29 RX ADMIN — TAMSULOSIN HYDROCHLORIDE 0.4 MG: 0.4 CAPSULE ORAL at 21:42

## 2024-12-29 RX ADMIN — ATORVASTATIN CALCIUM 40 MG: 40 TABLET, FILM COATED ORAL at 21:42

## 2024-12-29 RX ADMIN — Medication: at 21:42

## 2024-12-29 RX ADMIN — OLANZAPINE 5 MG: 5 TABLET, FILM COATED ORAL at 21:42

## 2024-12-29 RX ADMIN — Medication 200 MG: at 21:42

## 2024-12-29 RX ADMIN — WARFARIN SODIUM 2.5 MG: 5 TABLET ORAL at 18:07

## 2024-12-29 RX ADMIN — SODIUM CHLORIDE, PRESERVATIVE FREE 10 ML: 5 INJECTION INTRAVENOUS at 21:44

## 2024-12-29 ASSESSMENT — LIFESTYLE VARIABLES
HOW OFTEN DO YOU HAVE A DRINK CONTAINING ALCOHOL: PATIENT UNABLE TO ANSWER
HOW MANY STANDARD DRINKS CONTAINING ALCOHOL DO YOU HAVE ON A TYPICAL DAY: PATIENT UNABLE TO ANSWER

## 2024-12-29 NOTE — PROGRESS NOTES
Pharmacist Daily Dosing of Warfarin    Indication & Goal INR: AFib (s/p ablation), Mechanical Aortic Valve+ Risk Factors, INR Goal 2.5-3.5     PTA Warfarin Dose: 2.5 mg daily    Notable concurrent conditions and medications: None    Labs:  Recent Labs     Units 12/29/24  1145 12/27/24  1214   INR   3.2* 3.1*   HGB g/dL 10.9*  --    PLT K/uL 132*  --      Impression/Plan:   INR within therapeutic goal   Ordered warfarin 2.5mg x 1 dose today  Daily INR has been ordered  CBC w/o differential every other day has been ordered     Pharmacy will follow daily and adjust the dose as appropriate.    Thank you,  SANDOVAL AYALA [facility]

## 2024-12-29 NOTE — ED NOTES
Attempted to call report. Advised by ALANA Riley that RN for Rm 114, ALANA Najera, is unavailable att; extension/name provided for call back.

## 2024-12-29 NOTE — ED NOTES
Pt chart/[facility] indicates pt has right brachial midline. Pt does not have this present upon arrival.

## 2024-12-29 NOTE — H&P
Hospitalist Admission Note    NAME:   Corby Chun   : 1935   MRN: [id number]     Date/Time: 2024 4:14 PM    Patient PCP: Ryan Nguyen MD    ______________________________________________________________________  Given the patient's current clinical presentation, I have a high level of concern for decompensation if discharged from the emergency department.  Complex decision making was performed, which includes reviewing the patient's available past medical records, laboratory results, and x-ray films.       My assessment of this patient's clinical condition and my plan of care is as follows.    Assessment / Plan:    Inability to ambulate  PT/OT consult  Will check COVID-19 and flu as it can cause progressive decline in functional status.   Palliative care consult for goals of care discussion      Dementia with behavioral disturbance:  Hallucination    Continue paroxetine  Continue olanzapine      Hypertension  CHF:  CAD  Continue aspirin, statin  Continue entresto      Hypothyroidism:  Continue levothyroxine      BPH:  Continue finasteride and tamsulosin      Mechanical AVR  Afib  Continue warfarin            Medical Decision Making:   I personally reviewed labs: cbc: hb stable at 10.9  Low platelet count 132- which is around his baseline.  BMP  Troponin 45  LFT  UA    I personally reviewed imaging:CXR report, CT head report  I personally reviewed EKG: interpreted by me- ventricular paced rhythm  Toxic drug monitoring: INR while patient is on warfarin, INR 3.2 today  Discussed case with: ED provider. After discussion I am in agreement that acuity of patient's medical condition necessitates hospital stay.      Code Status: full  DVT Prophylaxis:warfarin   Baseline:     Subjective:   CHIEF COMPLAINT: inability to ambulate      HISTORY OF PRESENT ILLNESS:     Corby Chun is a 89 y.o.  male with PMHx significant for Hypertension, Hyperlipidemia, s/p mech AVR, CHF s/p BIV ICD EF 15%, last EF upto  Lt Atrial Pace & Record During Ep Study performed by Ken Wells MD at [facility] CARDIAC CATH LAB    EPHYS EVAL PACG CVDFB PRGRMG/REPRGRMG PARAMETERS N/A 03/04/2019    Eval Icd Generator & Leads W Testing At Implant performed by Ken Wells MD at [facility] CARDIAC CATH LAB    EPHYS EVAL PACG CVDFB PRGRMG/REPRGRMG PARAMETERS N/A 12/06/2019    Eval Icd Generator & Leads W Testing At Implant performed by Ken Wells MD at [facility] CARDIAC CATH LAB    HERNIA REPAIR      HERNIA REPAIR Right 10/12/2022    Dr. Matos    INSJ/RPLCMT PERM DFB W/TRNSVNS LDS 1/DUAL CHMBR N/A 03/04/2019    INSERT ICD BIV MULTI performed by Ken Wells MD at [facility] CARDIAC CATH LAB    INTRACARDIAC ELECTROPHYSIOLOGIC 3D MAPPING N/A 03/04/2019    Ep 3d Mapping performed by Ken Wells MD at [facility] CARDIAC CATH LAB    ORTHOPEDIC SURGERY  2001    ORIF COMPOUND FRACTURE LEFT ANKLE    PACEMAKER  2019    VA UNLISTED PROCEDURE CARDIAC SURGERY      ANGIOPLASTY WITH CORONARY STENT    VA UNLISTED PROCEDURE CARDIAC SURGERY  1996    AORTIC VALVE REPLACEMENT    RETINAL DETACHMENT SURGERY  1980s    LEFT EYE       Social History     Tobacco Use    Smoking status: Never    Smokeless tobacco: Never   Substance Use Topics    Alcohol use: Not Currently     Alcohol/week: 3.0 standard drinks of alcohol     Types: 3 Standard drinks or equivalent per week        Family History   Problem Relation Age of Onset    Heart Attack Father     Diabetes Mother     Stroke Mother         ANEURYSM       No Known Allergies     Prior to Admission medications    Medication Sig Start Date End Date Taking? Authorizing Provider   pantoprazole (PROTONIX) 40 MG tablet Take 1 tablet by mouth every morning (before breakfast) 12/27/24 12/27/25  Ryan Nguyen MD   OLANZapine (ZYPREXA) 5 MG tablet Take 1 tablet by mouth in the morning and at bedtime 12/27/24   Ryan Nguyen MD   ascorbic acid (VITAMIN C) 500 MG tablet Take 1 tablet by mouth daily    Provider, MD Phi   aspirin 81 MG EC tablet Take  has replaced Nonspecific intraventricular block     CBC with Auto Differential    Collection Time: 12/29/24 11:45 AM   Result Value Ref Range    WBC 5.1 4.1 - 11.1 K/uL    RBC 3.45 (L) 4.10 - 5.70 M/uL    Hemoglobin 10.9 (L) 12.1 - 17.0 g/dL    Hematocrit 33.3 (L) 36.6 - 50.3 %    MCV 96.5 80.0 - 99.0 FL    MCH 31.6 26.0 - 34.0 PG    MCHC 32.7 30.0 - 36.5 g/dL    RDW 14.6 (H) 11.5 - 14.5 %    Platelets 132 (L) 150 - 400 K/uL    MPV 9.9 8.9 - 12.9 FL    Nucleated RBCs 0.0 0  WBC    nRBC 0.00 0.00 - 0.01 K/uL    Neutrophils % 83 (H) 32 - 75 %    Lymphocytes % 6 (L) 12 - 49 %    Monocytes % 9 5 - 13 %    Eosinophils % 2 0 - 7 %    Basophils % 0 0 - 1 %    Immature Granulocytes % 0 0.0 - 0.5 %    Neutrophils Absolute 4.2 1.8 - 8.0 K/UL    Lymphocytes Absolute 0.3 (L) 0.8 - 3.5 K/UL    Monocytes Absolute 0.5 0.0 - 1.0 K/UL    Eosinophils Absolute 0.1 0.0 - 0.4 K/UL    Basophils Absolute 0.0 0.0 - 0.1 K/UL    Immature Granulocytes Absolute 0.0 0.00 - 0.04 K/UL    Differential Type SMEAR SCANNED      RBC Comment NORMOCYTIC, NORMOCHROMIC     Comprehensive Metabolic Panel    Collection Time: 12/29/24 11:45 AM   Result Value Ref Range    Sodium 139 136 - 145 mmol/L    Potassium 4.0 3.5 - 5.1 mmol/L    Chloride 108 97 - 108 mmol/L    CO2 27 21 - 32 mmol/L    Anion Gap 4 2 - 12 mmol/L    Glucose 132 (H) 65 - 100 mg/dL    BUN 17 6 - 20 MG/DL    Creatinine 1.11 0.70 - 1.30 MG/DL    BUN/Creatinine Ratio 15 12 - 20      Est, Glom Filt Rate 63 >60 ml/min/1.73m2    Calcium 8.7 8.5 - 10.1 MG/DL    Total Bilirubin 0.8 0.2 - 1.0 MG/DL    ALT 32 12 - 78 U/L    AST 31 15 - 37 U/L    Alk Phosphatase 120 (H) 45 - 117 U/L    Total Protein 6.4 6.4 - 8.2 g/dL    Albumin 3.1 (L) 3.5 - 5.0 g/dL    Globulin 3.3 2.0 - 4.0 g/dL    Albumin/Globulin Ratio 0.9 (L) 1.1 - 2.2     Extra Tubes Hold    Collection Time: 12/29/24 11:45 AM   Result Value Ref Range    Specimen HOld SST MELODIE PST RED     Comment:        Add-on orders for these samples will

## 2024-12-29 NOTE — ED NOTES
TRANSFER - OUT REPORT:    Verbal report given to ALANA Najera on Corby Chun  being transferred to [facility] for routine progression of patient care       Report consisted of patient's Situation, Background, Assessment and   Recommendations(SBAR).     Information from the following report(s) Nurse Handoff Report, ED Encounter Summary, ED SBAR, and MAR was reviewed with the receiving nurse.    [place] Fall Assessment:    Presents to emergency department  because of falls (Syncope, seizure, or loss of consciousness): No  Age > 70: Yes  Altered Mental Status, Intoxication with alcohol or substance confusion (Disorientation, impaired judgment, poor safety awaremess, or inability to follow instructions): Yes  Impaired Mobility: Ambulates or transfers with assistive devices or assistance; Unable to ambulate or transer.: Yes  Nursing Judgement: Yes          Lines:       Opportunity for questions and clarification was provided.      Patient transported with:  Monitor and Tech         Moderate

## 2024-12-29 NOTE — ED TRIAGE NOTES
Pt presents to ED via EMS from home and is able to transfer to bed with 1 assist. EMS reports cc generalized weakness. Pt has been dealing with UTI off/on x7 months and baseline 2 months ago was A&OX4 but recently has been A&OX2-3 likely due to UTIs. Pt A&OX2 att. EMS also reports cc bilateral eye blood shot with right worse than left. H/o HTN, CAD, and CKD.     Pt was recently evaluated at [facility] for dementia which came back negative.
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
## 2024-12-29 NOTE — ED NOTES
This RN missed call back from ALANA Najera. Attempted to call back for report but no answer. Spoke with Ortho Charge ALANA Riley and advised she will have ALANA Najera try again.

## 2024-12-30 ENCOUNTER — TELEPHONE (OUTPATIENT)
Age: 88
End: 2024-12-30

## 2024-12-30 LAB
ANION GAP SERPL CALC-SCNC: 4 MMOL/L (ref 2–12)
APPEARANCE UR: CLEAR
BACTERIA #/AREA URNS HPF: ABNORMAL /[HPF]
BACTERIA UR CULT: ABNORMAL
BASOPHILS # BLD: 0 K/UL (ref 0–0.1)
BASOPHILS NFR BLD: 0 % (ref 0–1)
BILIRUB UR QL STRIP: NEGATIVE
BUN SERPL-MCNC: 18 MG/DL (ref 6–20)
BUN/CREAT SERPL: 17 (ref 12–20)
CALCIUM SERPL-MCNC: 8.4 MG/DL (ref 8.5–10.1)
CASTS URNS QL MICRO: ABNORMAL /LPF
CHLORIDE SERPL-SCNC: 108 MMOL/L (ref 97–108)
CO2 SERPL-SCNC: 26 MMOL/L (ref 21–32)
COLOR UR: YELLOW
CREAT SERPL-MCNC: 1.07 MG/DL (ref 0.7–1.3)
DIFFERENTIAL METHOD BLD: ABNORMAL
EOSINOPHIL # BLD: 0.1 K/UL (ref 0–0.4)
EOSINOPHIL NFR BLD: 2 % (ref 0–7)
EPI CELLS #/AREA URNS HPF: ABNORMAL /HPF (ref 0–10)
ERYTHROCYTE [DISTWIDTH] IN BLOOD BY AUTOMATED COUNT: 14.5 % (ref 11.5–14.5)
GLUCOSE SERPL-MCNC: 144 MG/DL (ref 65–100)
GLUCOSE UR QL STRIP: NEGATIVE
HCT VFR BLD AUTO: 31.3 % (ref 36.6–50.3)
HGB BLD-MCNC: 10.2 G/DL (ref 12.1–17)
HGB UR QL STRIP: ABNORMAL
IMM GRANULOCYTES # BLD AUTO: 0 K/UL (ref 0–0.04)
IMM GRANULOCYTES NFR BLD AUTO: 0 % (ref 0–0.5)
INR PPP: 3.6 (ref 0.9–1.1)
KETONES UR QL STRIP: NEGATIVE
LEUKOCYTE ESTERASE UR QL STRIP: NEGATIVE
LYMPHOCYTES # BLD: 0.3 K/UL (ref 0.8–3.5)
LYMPHOCYTES NFR BLD: 6 % (ref 12–49)
MCH RBC QN AUTO: 30.5 PG (ref 26–34)
MCHC RBC AUTO-ENTMCNC: 32.6 G/DL (ref 30–36.5)
MCV RBC AUTO: 93.7 FL (ref 80–99)
MICRO URNS: ABNORMAL
MONOCYTES # BLD: 0.4 K/UL (ref 0–1)
MONOCYTES NFR BLD: 8 % (ref 5–13)
NEUTS SEG # BLD: 4 K/UL (ref 1.8–8)
NEUTS SEG NFR BLD: 83 % (ref 32–75)
NITRITE UR QL STRIP: NEGATIVE
NRBC # BLD: 0 K/UL (ref 0–0.01)
NRBC BLD-RTO: 0 PER 100 WBC
PH UR STRIP: 5.5 [PH] (ref 5–7.5)
PLATELET # BLD AUTO: 125 K/UL (ref 150–400)
PMV BLD AUTO: 10 FL (ref 8.9–12.9)
POTASSIUM SERPL-SCNC: 4.1 MMOL/L (ref 3.5–5.1)
PROT UR QL STRIP: ABNORMAL
PROTHROMBIN TIME: 34.4 SEC (ref 9–11.1)
RBC # BLD AUTO: 3.34 M/UL (ref 4.1–5.7)
RBC #/AREA URNS HPF: ABNORMAL /HPF (ref 0–2)
SODIUM SERPL-SCNC: 138 MMOL/L (ref 136–145)
SP GR UR STRIP: 1.02 (ref 1–1.03)
URINALYSIS REFLEX: ABNORMAL
UROBILINOGEN UR STRIP-MCNC: 0.2 MG/DL (ref 0.2–1)
WBC # BLD AUTO: 4.8 K/UL (ref 4.1–11.1)
WBC #/AREA URNS HPF: ABNORMAL /HPF (ref 0–5)

## 2024-12-30 PROCEDURE — G0378 HOSPITAL OBSERVATION PER HR: HCPCS

## 2024-12-30 PROCEDURE — 97116 GAIT TRAINING THERAPY: CPT

## 2024-12-30 PROCEDURE — 85610 PROTHROMBIN TIME: CPT

## 2024-12-30 PROCEDURE — 97161 PT EVAL LOW COMPLEX 20 MIN: CPT

## 2024-12-30 PROCEDURE — 85025 COMPLETE CBC W/AUTO DIFF WBC: CPT

## 2024-12-30 PROCEDURE — 6370000000 HC RX 637 (ALT 250 FOR IP): Performed by: INTERNAL MEDICINE

## 2024-12-30 PROCEDURE — 97167 OT EVAL HIGH COMPLEX 60 MIN: CPT

## 2024-12-30 PROCEDURE — 36415 COLL VENOUS BLD VENIPUNCTURE: CPT

## 2024-12-30 PROCEDURE — 2500000003 HC RX 250 WO HCPCS: Performed by: INTERNAL MEDICINE

## 2024-12-30 PROCEDURE — 97535 SELF CARE MNGMENT TRAINING: CPT

## 2024-12-30 PROCEDURE — 80048 BASIC METABOLIC PNL TOTAL CA: CPT

## 2024-12-30 RX ADMIN — SODIUM CHLORIDE, PRESERVATIVE FREE 10 ML: 5 INJECTION INTRAVENOUS at 21:43

## 2024-12-30 RX ADMIN — FINASTERIDE 5 MG: 5 TABLET, FILM COATED ORAL at 09:08

## 2024-12-30 RX ADMIN — LEVOTHYROXINE SODIUM 150 MCG: 0.15 TABLET ORAL at 05:33

## 2024-12-30 RX ADMIN — Medication: at 09:10

## 2024-12-30 RX ADMIN — TAMSULOSIN HYDROCHLORIDE 0.4 MG: 0.4 CAPSULE ORAL at 21:42

## 2024-12-30 RX ADMIN — PANTOPRAZOLE SODIUM 40 MG: 40 TABLET, DELAYED RELEASE ORAL at 05:33

## 2024-12-30 RX ADMIN — PAROXETINE HYDROCHLORIDE 20 MG: 20 TABLET, FILM COATED ORAL at 09:08

## 2024-12-30 RX ADMIN — Medication 200 MG: at 21:41

## 2024-12-30 RX ADMIN — Medication 1 TABLET: at 09:08

## 2024-12-30 RX ADMIN — ALLOPURINOL 300 MG: 300 TABLET ORAL at 09:08

## 2024-12-30 RX ADMIN — OLANZAPINE 5 MG: 5 TABLET, FILM COATED ORAL at 09:09

## 2024-12-30 RX ADMIN — ATORVASTATIN CALCIUM 40 MG: 40 TABLET, FILM COATED ORAL at 21:42

## 2024-12-30 RX ADMIN — OXYBUTYNIN CHLORIDE 10 MG: 5 TABLET, EXTENDED RELEASE ORAL at 09:09

## 2024-12-30 RX ADMIN — FERROUS SULFATE TAB 325 MG (65 MG ELEMENTAL FE) 325 MG: 325 (65 FE) TAB at 09:09

## 2024-12-30 RX ADMIN — OXYCODONE HYDROCHLORIDE AND ACETAMINOPHEN 500 MG: 500 TABLET ORAL at 09:08

## 2024-12-30 RX ADMIN — Medication 1 CAPSULE: at 05:33

## 2024-12-30 RX ADMIN — SACUBITRIL AND VALSARTAN 1 TABLET: 24; 26 TABLET, FILM COATED ORAL at 05:33

## 2024-12-30 RX ADMIN — Medication 100 MG: at 09:08

## 2024-12-30 RX ADMIN — ASPIRIN 81 MG: 81 TABLET, COATED ORAL at 09:08

## 2024-12-30 RX ADMIN — Medication: at 21:41

## 2024-12-30 RX ADMIN — CYANOCOBALAMIN TAB 500 MCG 1000 MCG: 500 TAB at 09:08

## 2024-12-30 NOTE — PROGRESS NOTES
End of Shift Note    Bedside shift change report given to Tia (oncoming nurse) by Bernie Cabrera RN (offgoing nurse).  Report included the following information SBAR, Kardex, MAR, Recent Results, and Cardiac Rhythm      Shift worked:  7p-7a     Shift summary and any significant changes:     Extremely confused but pleasant, able ambulate with walker x 1 asst. Required sitter for impulsive behavior     Concerns for physician to address:  INR 3.6      Zone phone for oncoming shift:          Activity:  Level of Assistance: Standby assist, set-up cues, supervision of patient - no hands on  Number times ambulated in hallways past shift:   Number of times OOB to chair past shift:     Cardiac:   Cardiac Monitoring:       Cardiac Rhythm: Ventricular paced    Access:  Current line(s):     Genitourinary:   Urinary Status: Voiding, Incontinent briefs    Respiratory:   O2 Device: None (Room air)  Chronic home O2 use?:   Incentive spirometer at bedside:     GI:  Last BM (including prior to admit): 12/29/24  Current diet:  ADULT DIET; Regular  Passing flatus:     Pain Management:   Patient states pain is manageable on current regimen:     Skin:  Grant Scale Score: 21  Interventions: Wound Offloading (Prevention Methods): Pillows, Repositioning, Turning, Walker, Elevate heels    Patient Safety:  Fall Risk: Nursing Judgement-Fall Risk High(Add Comments): Yes  Fall Risk Interventions  Nursing Judgement-Fall Risk High(Add Comments): Yes  Toilet Every 2 Hours-In Advance of Need: Yes  Hourly Visual Checks: In bed, Awake  Fall Visual Posted: Socks, Fall sign posted, Armband  Room Door Open: Yes  Alarm On: Bed  Patient Moved Closer to Nursing Station: No    Active Consults:   IP CONSULT TO PALLIATIVE CARE  IP CONSULT TO PHARMACY  IP CONSULT TO PHARMACY    Length of Stay:  Expected LOS: 3  Actual LOS: 0    Bernie Cabrera RN

## 2024-12-30 NOTE — PLAN OF CARE
Problem: Occupational Therapy - Adult  Goal: By Discharge: Performs self-care activities at highest level of function for planned discharge setting.  See evaluation for individualized goals.  Description: FUNCTIONAL STATUS PRIOR TO ADMISSION:  has had dramatic increase in falls since last d/c from hospital 11-24, falling daily for week prior to admission   , Prior Level of Assist for ADLs: Needs assistance, Bath: Moderate assistance, Dressing: Moderate assistance, Grooming: Minimal assistance, Feeding: Supervision, Toileting: Needs assistance,  ,  , Prior Level of Assist for Transfers: Needs assistance, Active : No;   L AFO used post foot drop /L ankle fx 2001  stair lift upstairs, tub shower, chair, grab bars (would benefit from TTB)    HOME SUPPORT: Patient lived with spouse and family able to provide assist post frequent falls (daily for 1 week PTA) Some falls able to get up with chair and assist x 2, other falls EMS activated.    Occupational Therapy Goals:  Initiated 12/30/2024  1.  Patient will perform grooming with Contact Guard Assist within 7 day(s).  2.  Patient will perform upper body dressing with Minimal Assist within 7 day(s).  3.  Patient will perform lower body dressing with Moderate Assist within 7 day(s).  4.  Patient will perform toilet transfers with Contact Guard Assist  within 7 day(s).  5.  Patient will perform all aspects of toileting with Minimal Assist within 7 day(s).  6.  Patient will participate in upper extremity therapeutic exercise/activities with Minimal Assist for 5 minutes within 7 day(s).    7.  Patient will be Ox2 person and place with visual cues within 7 day(s).   Outcome: Progressing Slowly  OCCUPATIONAL THERAPY EVALUATION    Patient: Corby Chun (89 y.o. male)  Date: 12/30/2024  Primary Diagnosis: General weakness [R53.1]  Ambulatory dysfunction [R26.2]         Precautions: Fall Risk, General Precautions, Bed Alarm ([place]; falling daily PTA per family)               Contact-guard assistance  Stand to Sit: Contact-guard assistance                     Balance:      Balance  Sitting: Impaired  Sitting - Static: Good (unsupported)  Sitting - Dynamic: Fair (occasional)  Standing: Impaired  Standing - Static: Constant support;Fair  Standing - Dynamic: Constant support      ADL Assessment:          Feeding: Setup;Supervision       Grooming: Setup;Minimal assistance       UE Bathing: Based on clinical judgement;Minimal assistance;Moderate assistance            LE Bathing: Based on clinical judgement;Moderate assistance       UE Dressing: Moderate assistance       LE Dressing: Maximum assistance       Toileting: Moderate assistance;Minimal assistance                         ADL Intervention and task modifications:    Safety with multiple falls, need for assistance/not to stand up alone, orientation                                                                                                                                                                                                                                         Barthel Index:    Barthel Index Scale  Feeding: Needs help, i.e. for cutting  Bathing: Cannot perform activity  Grooming: Cannot perform activity  Dressing: Cannot perform activity  Bowel Control: Occasional accidents or needs help with device  Bladder Control: Occasional accidents or needs help with device  Toilet Transfers: Needs help for balance, handling clothes or toilet paper  Chair/Bed Trannsfers: Minimum assistance or supervision required  Ambulation: Cannot perform activity  Stairs: Cannot perform activity  Total Barthel Index Score: 30       The Barthel ADL Index: Guidelines  1. The index should be used as a record of what a patient does, not as a record of what a patient could do.  2. The main aim is to establish degree of independence from any help, physical or verbal, however minor and for whatever reason.  3. The need for supervision renders the

## 2024-12-30 NOTE — PROGRESS NOTES
Pharmacist Daily Dosing of Warfarin    Indication & Goal INR: AFib (s/p ablation), Mechanical Aortic Valve+ Risk Factors,    INR Goal 2.5-3.5     PTA Warfarin Dose: 2.5 mg daily    Notable concurrent conditions and medications: None    Labs:  Recent Labs     Units 12/30/24  0516 12/29/24  1145 12/29/24  1145 12/27/24  1214   INR   3.6*  --  3.2* 3.1*   HGB g/dL 10.2*   < > 10.9*  --    PLT K/uL 125*  --  132*  --     < > = values in this interval not displayed.     Impression/Plan:   INR slight supra therapeutic with 0.5 INR rise In last 2 days, 0.4 INR rise since 12/29  Will hold Warfarin 12/30  Plan to redose Warfarin 12/31  Daily INR ordered  CBC w/o differential every other day ordered     Pharmacy will follow daily and adjust the dose as appropriate.    Thank you,  TANYA SARAVIA [facility]

## 2024-12-30 NOTE — PLAN OF CARE
Problem: Physical Therapy - Adult  Goal: By Discharge: Performs mobility at highest level of function for planned discharge setting.  See evaluation for individualized goals.  Description: FUNCTIONAL STATUS PRIOR TO ADMISSION: Patient is poor historian.    HOME SUPPORT PRIOR TO ADMISSION: The patient lives with his wife.    Physical Therapy Goals  Initiated 12/30/2024  1.  Patient will move from supine to sit and sit to supine in bed with supervision/set-up within 7 day(s).    2.  Patient will perform sit to stand with supervision/set-up within 7 day(s).  3.  Patient will transfer from bed to chair and chair to bed with supervision/set-up using the least restrictive device within 7 day(s).  4.  Patient will ambulate with supervision/set-up for 300 feet with the least restrictive device within 7 day(s).   5.  Patient will ascend/descend 5 stairs with 1 handrail(s) with contact guard assist within 7 day(s).   Outcome: Progressing     PHYSICAL THERAPY EVALUATION    Patient: Corby Chun (89 y.o. male)  Date: 12/30/2024  Primary Diagnosis: General weakness [R53.1]  Ambulatory dysfunction [R26.2]       Precautions: Restrictions/Precautions: Fall Risk                      ASSESSMENT :   DEFICITS/IMPAIRMENTS:   The patient is limited by decreased functional mobility, activity tolerance, safety awareness, cognition, command following, balance. Patient cleared by nursing to mobilize. Received patient sitting up in chair, sitter present.    Based on the impairments listed above the patient is needing contact guard assist for transfers and minimum assist for ambulation using RW. Patient needed most help with walker management during ambulation due to his tendency to push too far in front of him. Patient is very [place] and per family friend not able to wear hearing aids, which along with his cognitive impairment, makes it very challenging for the patient to understand and follow commands. All vitals stable during session. At  clearance    Wheelchair Management  Wheelchair Management: No                                                                                                                                                                                                                                    Tinetti test:    Tinetti  Sitting Balance: Steady, safe  Arises: Able, uses arms to help  Attempts to Arise: Able to arise, one attempt  Immediate Standing Balance (First 5 Seconds): Steady but uses walker or other support  Standing Balance: Steady but wide stance, uses cane or other support  Nudged: Begins to fall  Eyes Closed: Unsteady  Turned 360 Degrees: Steadiness: Steady  Turned 360 Degrees: Continuity of Steps: Discontinuous steps  Sitting Down: Uses arms or not a smooth motion  Balance Score: 8  Initiation of Gait: No hesitancy  Step Height: R Swing Foot: Right foot complete clears floor  Step Length: R Swing Foot: Passes left stance foot  Step Height: L Swing Foot: Left foot complete clears floor  Step Length: L Swing Foot: Passes right stance foot  Step Symmetry: Right and left step appear equal  Step Continuity: Steps appear continuous  Path: Mild/moderate deviation or uses walking aid  Trunk: Marked sway or uses walking aid  Walking Time: Heels apart  Gait Score: 8  Tinetti Total Score: 16           Tinetti Tool Score Risk of Falls  <19 = High Fall Risk  19-24 = Moderate Fall Risk  25-28 = Low Fall Risk  Tinetti ME. Performance-Oriented Assessment of Mobility Problems in Elderly Patients. JAGS 1986; 34:119-126. (Scoring Description: PT Bulletin Feb. 10, 1993)    Older adults: (Ilya et al, 2009; n = 1000 [place] elderly evaluated with ABC, OWEN, ADL, and IADL)  · Mean OWEN score for males aged 65-79 years = 26.21(3.40)  · Mean OWEN score for females age 65-79 years = 25.16(4.30)  · Mean OWEN score for males over 80 years = 23.29(6.02)  · Mean OWEN score for females over 80 years = 17.20(8.32)

## 2024-12-30 NOTE — CARE COORDINATION
Care Management Initial Assessment       RUR: n/a - observation status  Readmission? No  1st IM letter given? No  1st  letter given: No    Met with pt and daughter Ivette Dorado at bedside to discuss discharge planning. Ivette requested referral to [facility]; CM validated this preference, educated on Medicare eligibility guidelines, Ivette verbalized understanding and reiterated request for referral to [facility]. Discussed possible need for SNF rehab, reviewed Medicare 3 midnight rule, informed Ivette that pt is in Observation status and may not be appropriate for Inpatient status. CM looking into possible waiver of Medicare 3 midnight rule. Discussed home health as backup plan, Ivette voiced interest in [facility] for the [facility] program, requested CM discuss all referrals and obtain choice from pt's wife Ashley when she arrives, CM agreed.    Met with pt, wife Ashley, and family friend Su in room to review demographics on face sheet and discuss discharge plan as well as potential barriers. Ashley confirmed demographics including PCP Dr Ryan Nguyen. CM updated Ashley and Su on all referrals, barriers, and available resources as discussed with Ivette; both verbalized understanding and agreement with multi-pronged discharge planning approach. Ashley shared that pt had been at [facility] in November, discharged home the day before Thanksgiving with \"all that paraphernalia\" and a prescription for IV abx with no teaching on how to administer or home supports arranged, stated she will not agree with [facility] referral, CM validated this preference. CM offered referral to [facility] to assist family with home and community supports, Ashley verbalized interest in speaking with someone from [facility], CM sending referral.     12/30/24 2573   Service Assessment   Patient Orientation Person;Place;Self   Cognition Dementia / Early Alzheimer's   History Provided By

## 2024-12-30 NOTE — PROGRESS NOTES
Hospitalist Progress Note    NAME:   Corby Chun   : 1935   MRN: [id number]     Date/Time: 2024 4:00 PM  Patient PCP: Ryan Nguyen MD    Estimated discharge date: 24  Barriers: Placement      Assessment / Plan:    Inability to ambulate  PT/OT consult  Palliative care consult for goals of care discussion        Dementia with behavioral disturbance:  Hallucination   as per daughter hallucination is already improving  Continue paroxetine  Continue olanzapine        Hypertension  CHF:  CAD  Continue aspirin, statin  Continue entresto        Hypothyroidism:  Continue levothyroxine        BPH:  Continue finasteride and tamsulosin        Mechanical AVR  Afib  Continue warfarin             Medical Decision Making:   I personally reviewed labs:cbc, BMP, and INR  I personally reviewed imaging:  I personally reviewed EKG:  Toxic drug monitoring: will monitor INR and hb while patient is on warfarin  Discussed case with:         Code Status: full  DVT Prophylaxis:warfarin   GI Prophylaxis:    Subjective:     Chief Complaint / Reason for Physician Visit  Patient denies any new complaints  Ivette Trujillo by the bedside. Daughter concerned about sepsis. I discussed with daughter that patient doesn't appear to be septic and all the blood work, test and vitals does not favor sepsis- so we are not concerned about sepsis at this time. Daughter stated that she googled so many things as she was concerned about her dad, however, she didn't have printer, so was not able to print  Daughter also concerned about B/L leg clots. I discussed with daughter that patient is not complaining of leg pain and the fact that he is on blood thinner put him at low risk for blood clots.   After talking to me- daughter states that she is already feeling better.       Objective:     VITALS:   Last 24hrs VS reviewed since prior progress note. Most recent are:  Patient Vitals for the past 24 hrs:   BP Temp Temp src Pulse Resp SpO2

## 2024-12-31 LAB
EKG ATRIAL RATE: 394 BPM
EKG DIAGNOSIS: NORMAL
EKG Q-T INTERVAL: 436 MS
EKG QRS DURATION: 174 MS
EKG QTC CALCULATION (BAZETT): 483 MS
EKG R AXIS: 114 DEGREES
EKG T AXIS: -23 DEGREES
EKG VENTRICULAR RATE: 74 BPM
INR PPP: 3 (ref 0.9–1.1)
PROTHROMBIN TIME: 29.5 SEC (ref 9–11.1)

## 2024-12-31 PROCEDURE — 6370000000 HC RX 637 (ALT 250 FOR IP): Performed by: INTERNAL MEDICINE

## 2024-12-31 PROCEDURE — 97116 GAIT TRAINING THERAPY: CPT

## 2024-12-31 PROCEDURE — 85610 PROTHROMBIN TIME: CPT

## 2024-12-31 PROCEDURE — 2500000003 HC RX 250 WO HCPCS: Performed by: INTERNAL MEDICINE

## 2024-12-31 PROCEDURE — 36415 COLL VENOUS BLD VENIPUNCTURE: CPT

## 2024-12-31 PROCEDURE — 97530 THERAPEUTIC ACTIVITIES: CPT

## 2024-12-31 PROCEDURE — 1100000003 HC PRIVATE W/ TELEMETRY

## 2024-12-31 RX ORDER — WARFARIN SODIUM 2.5 MG/1
2.5 TABLET ORAL
Status: COMPLETED | OUTPATIENT
Start: 2024-12-31 | End: 2024-12-31

## 2024-12-31 RX ADMIN — ATORVASTATIN CALCIUM 40 MG: 40 TABLET, FILM COATED ORAL at 20:29

## 2024-12-31 RX ADMIN — OLANZAPINE 5 MG: 5 TABLET, FILM COATED ORAL at 20:29

## 2024-12-31 RX ADMIN — LEVOTHYROXINE SODIUM 150 MCG: 0.15 TABLET ORAL at 06:03

## 2024-12-31 RX ADMIN — Medication 1 TABLET: at 09:22

## 2024-12-31 RX ADMIN — FINASTERIDE 5 MG: 5 TABLET, FILM COATED ORAL at 09:22

## 2024-12-31 RX ADMIN — Medication 1 CAPSULE: at 06:03

## 2024-12-31 RX ADMIN — TAMSULOSIN HYDROCHLORIDE 0.4 MG: 0.4 CAPSULE ORAL at 20:29

## 2024-12-31 RX ADMIN — ACETAMINOPHEN 650 MG: 325 TABLET ORAL at 20:29

## 2024-12-31 RX ADMIN — Medication: at 09:22

## 2024-12-31 RX ADMIN — PANTOPRAZOLE SODIUM 40 MG: 40 TABLET, DELAYED RELEASE ORAL at 06:03

## 2024-12-31 RX ADMIN — Medication 100 MG: at 09:22

## 2024-12-31 RX ADMIN — SODIUM CHLORIDE, PRESERVATIVE FREE 10 ML: 5 INJECTION INTRAVENOUS at 20:30

## 2024-12-31 RX ADMIN — Medication: at 20:30

## 2024-12-31 RX ADMIN — FERROUS SULFATE TAB 325 MG (65 MG ELEMENTAL FE) 325 MG: 325 (65 FE) TAB at 09:22

## 2024-12-31 RX ADMIN — WARFARIN SODIUM 2.5 MG: 2.5 TABLET ORAL at 17:23

## 2024-12-31 RX ADMIN — ASPIRIN 81 MG: 81 TABLET, COATED ORAL at 09:22

## 2024-12-31 RX ADMIN — PAROXETINE HYDROCHLORIDE 20 MG: 20 TABLET, FILM COATED ORAL at 09:22

## 2024-12-31 RX ADMIN — OXYBUTYNIN CHLORIDE 10 MG: 5 TABLET, EXTENDED RELEASE ORAL at 09:22

## 2024-12-31 RX ADMIN — OLANZAPINE 5 MG: 5 TABLET, FILM COATED ORAL at 09:22

## 2024-12-31 RX ADMIN — ALLOPURINOL 300 MG: 300 TABLET ORAL at 09:22

## 2024-12-31 RX ADMIN — SACUBITRIL AND VALSARTAN 1 TABLET: 24; 26 TABLET, FILM COATED ORAL at 04:52

## 2024-12-31 RX ADMIN — Medication 200 MG: at 20:29

## 2024-12-31 RX ADMIN — CYANOCOBALAMIN TAB 500 MCG 1000 MCG: 500 TAB at 09:22

## 2024-12-31 RX ADMIN — OXYCODONE HYDROCHLORIDE AND ACETAMINOPHEN 500 MG: 500 TABLET ORAL at 09:22

## 2024-12-31 ASSESSMENT — PAIN DESCRIPTION - LOCATION: LOCATION: BACK

## 2024-12-31 ASSESSMENT — PAIN - FUNCTIONAL ASSESSMENT: PAIN_FUNCTIONAL_ASSESSMENT: ACTIVITIES ARE NOT PREVENTED

## 2024-12-31 ASSESSMENT — PAIN DESCRIPTION - DESCRIPTORS: DESCRIPTORS: ACHING

## 2024-12-31 ASSESSMENT — PAIN SCALES - GENERAL
PAINLEVEL_OUTOF10: 2
PAINLEVEL_OUTOF10: 4

## 2024-12-31 ASSESSMENT — PAIN DESCRIPTION - ORIENTATION: ORIENTATION: MID;LOWER

## 2024-12-31 NOTE — PLAN OF CARE
Problem: Chronic Conditions and Co-morbidities  Goal: Patient's chronic conditions and co-morbidity symptoms are monitored and maintained or improved  12/31/2024 1028 by Amanda Terrazas LPN  Outcome: Progressing  Flowsheets (Taken 12/31/2024 0706)  Care Plan - Patient's Chronic Conditions and Co-Morbidity Symptoms are Monitored and Maintained or Improved:   Monitor and assess patient's chronic conditions and comorbid symptoms for stability, deterioration, or improvement   Collaborate with multidisciplinary team to address chronic and comorbid conditions and prevent exacerbation or deterioration   Update acute care plan with appropriate goals if chronic or comorbid symptoms are exacerbated and prevent overall improvement and discharge  12/31/2024 0612 by Kvng Philip, RN  Outcome: Progressing     Problem: Safety - Adult  Goal: Free from fall injury  12/31/2024 1028 by Amanda Terrazas LPN  Outcome: Progressing  Flowsheets (Taken 12/31/2024 0706)  Free From Fall Injury: Instruct family/caregiver on patient safety  12/31/2024 0612 by Kvng Philip, RN  Outcome: Progressing

## 2024-12-31 NOTE — CONSULTS
Palliative Medicine  Patient Name: Corby Chun  YOB: 1935  MRN: [id number]  Age: 89 y.o.  Gender: male    Date of Initial Consult: 12/31/2024  Date of Service: 12/31/2024  Time: 10:03 AM  Provider: IVY Anguiano NP  Hospital Day: 3  Admit Date: 12/29/2024  Referring Provider:  Karli Davison MD       Spoke with the attending  Mr Chun is very connected with his PCP- given that they have a close/trusting relationship, will defer CODE status conversations to them in the outpatient setting    IVY Anguiano NP    
information could not be obtained

## 2024-12-31 NOTE — CARE COORDINATION
[facility] following for therapy tolerance. If accepted, will need pt to have sitter out of room 48+ hours prior to admission. No response from Tali Arellano at this time. Pt admitted to Inpatient status; 3 midnight waiver program no longer required, additional referrals may be sent, CM to follow up with pt's family for SNF options.    Raysa Herrera, [facility]  Care Management  [phone]

## 2024-12-31 NOTE — PROGRESS NOTES
Occupational Therapy    Chart reviewed in prep for skilled OT treatment; however, pt declined OOB activity this date despite education on importance of dynamic activity engagement.  OT to defer and continue to follow.    Thank you,  Matthew Kerley, OT

## 2024-12-31 NOTE — PROGRESS NOTES
Pharmacist Daily Dosing of Warfarin    Indication & Goal INR: AFib (s/p ablation), Mechanical Aortic Valve+ Risk Factors,    INR Goal 2.5-3.5     PTA Warfarin Dose: 2.5 mg daily    Notable concurrent conditions and medications: None    Labs:  Recent Labs     Units 12/31/24  0458 12/30/24  0516 12/29/24  1145   INR   3.0* 3.6* 3.2*   HGB g/dL  --  10.2* 10.9*   PLT K/uL  --  125* 132*     Impression/Plan:   Will give warfarin 2.5mg x1 tonight  Daily INR ordered  CBC w/o differential every other day ordered     Pharmacy will follow daily and adjust the dose as appropriate.    Thank you,  Shubham Cerda [facility]

## 2024-12-31 NOTE — PLAN OF CARE
Problem: Physical Therapy - Adult  Goal: By Discharge: Performs mobility at highest level of function for planned discharge setting.  See evaluation for individualized goals.  Description: FUNCTIONAL STATUS PRIOR TO ADMISSION: Patient is poor historian.    HOME SUPPORT PRIOR TO ADMISSION: The patient lives with his wife.    Physical Therapy Goals  Initiated 12/30/2024  1.  Patient will move from supine to sit and sit to supine in bed with supervision/set-up within 7 day(s).    2.  Patient will perform sit to stand with supervision/set-up within 7 day(s).  3.  Patient will transfer from bed to chair and chair to bed with supervision/set-up using the least restrictive device within 7 day(s).  4.  Patient will ambulate with supervision/set-up for 300 feet with the least restrictive device within 7 day(s).   5.  Patient will ascend/descend 5 stairs with 1 handrail(s) with contact guard assist within 7 day(s).   Outcome: Progressing   PHYSICAL THERAPY TREATMENT    Patient: Corby Chun (89 y.o. male)  Date: 12/31/2024  Diagnosis: General weakness [R53.1]  Ambulatory dysfunction [R26.2] Ambulatory dysfunction      Precautions: Fall Risk, General Precautions, Bed Alarm ([place]; falling daily PTA per family)                      ASSESSMENT:  Patient continues to benefit from skilled PT services and is progressing towards goals. Pt received semi fowlers in bed, agreeable to participate in therapy with coaxing. Patient very [place] and easily distracted, requiring frequent redirection to tasks. Patient reports not wanting to ambulate today however willing to mobilize to/from bathroom. Continues to require additional assist for RW management and step sequencing. Pt continues to perform below baseline and would benefit from skilled PT services in order to promote full return to PLOF.       PLAN:  Patient continues to benefit from skilled intervention to address the above impairments.  Continue treatment per established plan of

## 2024-12-31 NOTE — PROGRESS NOTES
End of Shift Note    Bedside shift change report given to Malissa (oncoming nurse) by Kvng Philip RN (offgoing nurse).  Report included the following information SBAR, Kardex, OR Summary, Procedure Summary, Intake/Output, MAR, and Recent Results    Shift worked:  7pm-7am     Shift summary and any significant changes:    Pt A&O2 on room air.   Confused, sitter at bedside for safety reason.  Voiding  No complain or distress noticed at this time.     Concerns for physician to address:  ..     Zone phone for oncoming shift:   ..       Activity:  Level of Assistance: Standby assist, set-up cues, supervision of patient - no hands on  Number times ambulated in hallways past shift: 0  Number of times OOB to chair past shift: 0    Cardiac:   Cardiac Monitoring: No      Cardiac Rhythm: Ventricular paced    Access:  Current line(s): PIV     Genitourinary:   Urinary Status: Voiding    Respiratory:   O2 Device: None (Room air)  Chronic home O2 use?: NO  Incentive spirometer at bedside: N/A    GI:  Last BM (including prior to admit): 12/29/24  Current diet:  ADULT DIET; Regular  Passing flatus: YES    Pain Management:   Patient states pain is manageable on current regimen: N/A    Skin:  Grant Scale Score: 18  Interventions: Wound Offloading (Prevention Methods): Repositioning    Patient Safety:  Fall Risk: Nursing Judgement-Fall Risk High(Add Comments): Yes  Fall Risk Interventions  Nursing Judgement-Fall Risk High(Add Comments): Yes  Toilet Every 2 Hours-In Advance of Need: Yes  Hourly Visual Checks: In bed, Awake, Agitated  Fall Visual Posted: Socks, Fall sign posted  Room Door Open: Deferred to promote rest  Alarm On: Bed  Patient Moved Closer to Nursing Station: No    Active Consults:   IP CONSULT TO PALLIATIVE CARE  IP CONSULT TO PHARMACY  IP CONSULT TO PHARMACY    Length of Stay:  Expected LOS: 2  Actual LOS: 0    Kvng hPilip RN

## 2025-01-01 LAB
COMMENT:: NORMAL
ERYTHROCYTE [DISTWIDTH] IN BLOOD BY AUTOMATED COUNT: 14.3 % (ref 11.5–14.5)
HCT VFR BLD AUTO: 34.3 % (ref 36.6–50.3)
HGB BLD-MCNC: 11.2 G/DL (ref 12.1–17)
INR PPP: 2.3 (ref 0.9–1.1)
MCH RBC QN AUTO: 31 PG (ref 26–34)
MCHC RBC AUTO-ENTMCNC: 32.7 G/DL (ref 30–36.5)
MCV RBC AUTO: 95 FL (ref 80–99)
NRBC # BLD: 0 K/UL (ref 0–0.01)
NRBC BLD-RTO: 0 PER 100 WBC
PLATELET # BLD AUTO: 145 K/UL (ref 150–400)
PMV BLD AUTO: 10.1 FL (ref 8.9–12.9)
PROTHROMBIN TIME: 22.5 SEC (ref 9–11.1)
RBC # BLD AUTO: 3.61 M/UL (ref 4.1–5.7)
SPECIMEN HOLD: NORMAL
WBC # BLD AUTO: 5.3 K/UL (ref 4.1–11.1)

## 2025-01-01 PROCEDURE — 6370000000 HC RX 637 (ALT 250 FOR IP): Performed by: STUDENT IN AN ORGANIZED HEALTH CARE EDUCATION/TRAINING PROGRAM

## 2025-01-01 PROCEDURE — 6370000000 HC RX 637 (ALT 250 FOR IP): Performed by: INTERNAL MEDICINE

## 2025-01-01 PROCEDURE — 85027 COMPLETE CBC AUTOMATED: CPT

## 2025-01-01 PROCEDURE — 36415 COLL VENOUS BLD VENIPUNCTURE: CPT

## 2025-01-01 PROCEDURE — 2500000003 HC RX 250 WO HCPCS: Performed by: INTERNAL MEDICINE

## 2025-01-01 PROCEDURE — 85610 PROTHROMBIN TIME: CPT

## 2025-01-01 PROCEDURE — 1100000003 HC PRIVATE W/ TELEMETRY

## 2025-01-01 RX ORDER — WARFARIN SODIUM 2.5 MG/1
2.5 TABLET ORAL
Status: COMPLETED | OUTPATIENT
Start: 2025-01-01 | End: 2025-01-01

## 2025-01-01 RX ADMIN — Medication 1 CAPSULE: at 05:53

## 2025-01-01 RX ADMIN — OLANZAPINE 5 MG: 5 TABLET, FILM COATED ORAL at 21:19

## 2025-01-01 RX ADMIN — LEVOTHYROXINE SODIUM 150 MCG: 0.15 TABLET ORAL at 05:53

## 2025-01-01 RX ADMIN — SACUBITRIL AND VALSARTAN 1 TABLET: 24; 26 TABLET, FILM COATED ORAL at 08:24

## 2025-01-01 RX ADMIN — FERROUS SULFATE TAB 325 MG (65 MG ELEMENTAL FE) 325 MG: 325 (65 FE) TAB at 08:24

## 2025-01-01 RX ADMIN — TAMSULOSIN HYDROCHLORIDE 0.4 MG: 0.4 CAPSULE ORAL at 21:19

## 2025-01-01 RX ADMIN — OXYCODONE HYDROCHLORIDE AND ACETAMINOPHEN 500 MG: 500 TABLET ORAL at 08:24

## 2025-01-01 RX ADMIN — SACUBITRIL AND VALSARTAN 1 TABLET: 24; 26 TABLET, FILM COATED ORAL at 21:19

## 2025-01-01 RX ADMIN — Medication: at 21:20

## 2025-01-01 RX ADMIN — Medication 100 MG: at 08:24

## 2025-01-01 RX ADMIN — FINASTERIDE 5 MG: 5 TABLET, FILM COATED ORAL at 08:24

## 2025-01-01 RX ADMIN — PAROXETINE HYDROCHLORIDE 20 MG: 20 TABLET, FILM COATED ORAL at 08:24

## 2025-01-01 RX ADMIN — ASPIRIN 81 MG: 81 TABLET, COATED ORAL at 08:24

## 2025-01-01 RX ADMIN — ALLOPURINOL 300 MG: 300 TABLET ORAL at 08:24

## 2025-01-01 RX ADMIN — Medication 1 TABLET: at 08:24

## 2025-01-01 RX ADMIN — Medication 200 MG: at 21:19

## 2025-01-01 RX ADMIN — CYANOCOBALAMIN TAB 500 MCG 1000 MCG: 500 TAB at 08:24

## 2025-01-01 RX ADMIN — OLANZAPINE 5 MG: 5 TABLET, FILM COATED ORAL at 08:24

## 2025-01-01 RX ADMIN — WARFARIN SODIUM 2.5 MG: 2.5 TABLET ORAL at 18:07

## 2025-01-01 RX ADMIN — ATORVASTATIN CALCIUM 40 MG: 40 TABLET, FILM COATED ORAL at 21:19

## 2025-01-01 RX ADMIN — SODIUM CHLORIDE, PRESERVATIVE FREE 10 ML: 5 INJECTION INTRAVENOUS at 21:20

## 2025-01-01 RX ADMIN — Medication: at 08:24

## 2025-01-01 RX ADMIN — OXYBUTYNIN CHLORIDE 10 MG: 5 TABLET, EXTENDED RELEASE ORAL at 08:24

## 2025-01-01 RX ADMIN — PANTOPRAZOLE SODIUM 40 MG: 40 TABLET, DELAYED RELEASE ORAL at 05:53

## 2025-01-01 NOTE — PROGRESS NOTES
Pharmacist Daily Dosing of Warfarin    Indication & Goal INR: AFib (s/p ablation), Mechanical Aortic Valve+ Risk Factors,    INR Goal 2.5-3.5     PTA Warfarin Dose: 2.5 mg daily    Notable concurrent conditions and medications: None    Labs:  Recent Labs     Units 01/01/25  0500 12/31/24  0458 12/30/24  0516   INR   2.3* 3.0* 3.6*   HGB g/dL 11.2*  --  10.2*   PLT K/uL 145*  --  125*     Impression/Plan:   Will give warfarin 2.5mg x1 tonight. Likely seeing the effects of holding the warfarin dose on 12/30  Daily INR ordered  CBC w/o differential every other day ordered     Pharmacy will follow daily and adjust the dose as appropriate.    Thank you,  Shubham Cerda [facility]

## 2025-01-01 NOTE — PROGRESS NOTES
Pharmacist Daily Dosing of Warfarin    Indication & Goal INR: AFib (s/p ablation), Mechanical Aortic Valve+ Risk Factors,    INR Goal 2.5-3.5     PTA Warfarin Dose: 2.5 mg daily    Notable concurrent conditions and medications: None    Labs:  Recent Labs     Units 01/01/25  0500 12/31/24  0458 12/30/24  0516   INR   2.3* 3.0* 3.6*   HGB g/dL 11.2*  --  10.2*   PLT K/uL 145*  --  125*     Impression/Plan:   Will give warfarin 2.5mg x1 tonight  Daily INR ordered  CBC w/o differential every other day ordered     Pharmacy will follow daily and adjust the dose as appropriate.    Thank you,  Shubham Cerda [facility]

## 2025-01-01 NOTE — PLAN OF CARE
Problem: Chronic Conditions and Co-morbidities  Goal: Patient's chronic conditions and co-morbidity symptoms are monitored and maintained or improved  1/1/2025 1313 by Amanda Terrazas LPN  Outcome: Progressing  Flowsheets (Taken 1/1/2025 0730)  Care Plan - Patient's Chronic Conditions and Co-Morbidity Symptoms are Monitored and Maintained or Improved:   Monitor and assess patient's chronic conditions and comorbid symptoms for stability, deterioration, or improvement   Collaborate with multidisciplinary team to address chronic and comorbid conditions and prevent exacerbation or deterioration   Update acute care plan with appropriate goals if chronic or comorbid symptoms are exacerbated and prevent overall improvement and discharge  1/1/2025 0009 by Yovanny Bauer, RN  Outcome: Progressing     Problem: Safety - Adult  Goal: Free from fall injury  1/1/2025 1313 by Amanda Terrazas LPN  Outcome: Progressing  Flowsheets (Taken 1/1/2025 0730)  Free From Fall Injury: Instruct family/caregiver on patient safety  1/1/2025 0009 by Yovanny Bauer, RN  Outcome: Progressing     Problem: Pain  Goal: Verbalizes/displays adequate comfort level or baseline comfort level  1/1/2025 1313 by Amanda Terrazas LPN  Outcome: Progressing  1/1/2025 0009 by Yovanny Bauer, RN  Outcome: Progressing

## 2025-01-01 NOTE — PROGRESS NOTES
Hospitalist Progress Note    NAME:   Corby Chun   : 1935   MRN: [id number]     Date/Time: 2025 2:19 PM  Patient PCP: Ryan Nguyen MD    Estimated discharge date: 24  Barriers: Placement      Assessment / Plan:    Inability to ambulate  PT/OT consult  Palliative care consult for goals of care discussion     Dementia with behavioral disturbance:  Hallucination   as per daughter hallucination is already improving  Continue paroxetine  Continue olanzapine     Hypertension  CHF:  CAD  Continue aspirin, statin  Continue entresto     Hypothyroidism:  Continue levothyroxine     BPH:  Continue finasteride and tamsulosin     Mechanical AVR  Afib  Continue warfarin     Medical Decision Making:   I personally reviewed labs: INR  I personally reviewed imaging:  I personally reviewed EKG:  Toxic drug monitoring: will monitor INR and hb while patient is on warfarin, toxicity of anemia versus subtherapeutic levels  Discussed case with:         Code Status: Full  DVT Prophylaxis: Coumadin  GI Prophylaxis:    Subjective:     Chief Complaint / Reason for Physician Visit  No acute overnight events.  Patient alert, disoriented.  Denies pain.    Objective:     VITALS:   Last 24hrs VS reviewed since prior progress note. Most recent are:  Patient Vitals for the past 24 hrs:   BP Temp Temp src Pulse Resp SpO2   25 1238 (!) 143/80 97.5 °F (36.4 °C) -- 83 16 99 %   25 0730 137/85 97.9 °F (36.6 °C) Oral 83 16 100 %   25 0430 105/68 98.4 °F (36.9 °C) Oral 72 16 97 %   24 117/69 98 °F (36.7 °C) Oral 77 16 98 %   24 1501 109/71 98.2 °F (36.8 °C) Oral 73 18 97 %         Intake/Output Summary (Last 24 hours) at 2025 1419  Last data filed at 2024 1727  Gross per 24 hour   Intake 422 ml   Output --   Net 422 ml        I had a face to face encounter and independently examined this patient on 2025, as outlined below:  PHYSICAL EXAM:  General: Alert, cooperative, hard of  hearing  EENT:  EOMI. Anicteric sclerae.  Resp:  CTA bilaterally, no wheezing or rales.  No accessory muscle use  CV:  Regular  rhythm,  No edema  GI:  Soft, Non distended, Non tender.  +Bowel sounds  Neurologic:  Alert and awake, normal speech,   Psych:   poor insight. Not anxious nor agitated  Skin:  No rashes.  No jaundice    Reviewed most current lab test results and cultures  YES  Reviewed most current radiology test results   YES  Review and summation of old records today    NO  Reviewed patient's current orders and MAR    YES  PMH/SH reviewed - no change compared to H&P    Procedures: see electronic medical records for all procedures/Xrays and details which were not copied into this note but were reviewed prior to creation of Plan.      LABS:  I reviewed today's most current labs and imaging studies.  Pertinent labs include:  Recent Labs     12/30/24  0516 01/01/25  0500   WBC 4.8 5.3   HGB 10.2* 11.2*   HCT 31.3* 34.3*   * 145*     Recent Labs     12/30/24  0516 12/31/24  0458 01/01/25  0500     --   --    K 4.1  --   --      --   --    CO2 26  --   --    GLUCOSE 144*  --   --    BUN 18  --   --    CREATININE 1.07  --   --    CALCIUM 8.4*  --   --    INR 3.6* 3.0* 2.3*       Signed: Christa Frey MD

## 2025-01-01 NOTE — PLAN OF CARE
Problem: Chronic Conditions and Co-morbidities  Goal: Patient's chronic conditions and co-morbidity symptoms are monitored and maintained or improved  1/1/2025 0009 by Yovanny Bauer RN  Outcome: Progressing  12/31/2024 1028 by Amanda Terrazas LPN  Outcome: Progressing  Flowsheets (Taken 12/31/2024 0706)  Care Plan - Patient's Chronic Conditions and Co-Morbidity Symptoms are Monitored and Maintained or Improved:   Monitor and assess patient's chronic conditions and comorbid symptoms for stability, deterioration, or improvement   Collaborate with multidisciplinary team to address chronic and comorbid conditions and prevent exacerbation or deterioration   Update acute care plan with appropriate goals if chronic or comorbid symptoms are exacerbated and prevent overall improvement and discharge     Problem: Safety - Adult  Goal: Free from fall injury  1/1/2025 0009 by Yovanny Bauer RN  Outcome: Progressing  12/31/2024 1028 by Amanda Terrazas LPN  Outcome: Progressing  Flowsheets (Taken 12/31/2024 0706)  Free From Fall Injury: Instruct family/caregiver on patient safety     Problem: Physical Therapy - Adult  Goal: By Discharge: Performs mobility at highest level of function for planned discharge setting.  See evaluation for individualized goals.  Description: FUNCTIONAL STATUS PRIOR TO ADMISSION: Patient is poor historian.    HOME SUPPORT PRIOR TO ADMISSION: The patient lives with his wife.    Physical Therapy Goals  Initiated 12/30/2024  1.  Patient will move from supine to sit and sit to supine in bed with supervision/set-up within 7 day(s).    2.  Patient will perform sit to stand with supervision/set-up within 7 day(s).  3.  Patient will transfer from bed to chair and chair to bed with supervision/set-up using the least restrictive device within 7 day(s).  4.  Patient will ambulate with supervision/set-up for 300 feet with the least restrictive device within 7 day(s).   5.  Patient will ascend/descend 5 stairs

## 2025-01-02 LAB
ALBUMIN SERPL-MCNC: 3 G/DL (ref 3.5–5)
ANION GAP SERPL CALC-SCNC: 3 MMOL/L (ref 2–12)
BUN SERPL-MCNC: 16 MG/DL (ref 6–20)
BUN/CREAT SERPL: 15 (ref 12–20)
CALCIUM SERPL-MCNC: 8.8 MG/DL (ref 8.5–10.1)
CHLORIDE SERPL-SCNC: 108 MMOL/L (ref 97–108)
CO2 SERPL-SCNC: 27 MMOL/L (ref 21–32)
CREAT SERPL-MCNC: 1.1 MG/DL (ref 0.7–1.3)
ERYTHROCYTE [DISTWIDTH] IN BLOOD BY AUTOMATED COUNT: 14.5 % (ref 11.5–14.5)
GLUCOSE SERPL-MCNC: 117 MG/DL (ref 65–100)
HCT VFR BLD AUTO: 33.9 % (ref 36.6–50.3)
HGB BLD-MCNC: 11.1 G/DL (ref 12.1–17)
INR PPP: 2.2 (ref 0.9–1.1)
MCH RBC QN AUTO: 31.4 PG (ref 26–34)
MCHC RBC AUTO-ENTMCNC: 32.7 G/DL (ref 30–36.5)
MCV RBC AUTO: 95.8 FL (ref 80–99)
NRBC # BLD: 0 K/UL (ref 0–0.01)
NRBC BLD-RTO: 0 PER 100 WBC
PHOSPHATE SERPL-MCNC: 2.7 MG/DL (ref 2.6–4.7)
PLATELET # BLD AUTO: 154 K/UL (ref 150–400)
PMV BLD AUTO: 10.2 FL (ref 8.9–12.9)
POTASSIUM SERPL-SCNC: 3.9 MMOL/L (ref 3.5–5.1)
PROTHROMBIN TIME: 21.7 SEC (ref 9–11.1)
RBC # BLD AUTO: 3.54 M/UL (ref 4.1–5.7)
SODIUM SERPL-SCNC: 138 MMOL/L (ref 136–145)
WBC # BLD AUTO: 6.2 K/UL (ref 4.1–11.1)

## 2025-01-02 PROCEDURE — 97116 GAIT TRAINING THERAPY: CPT

## 2025-01-02 PROCEDURE — 85610 PROTHROMBIN TIME: CPT

## 2025-01-02 PROCEDURE — 6370000000 HC RX 637 (ALT 250 FOR IP): Performed by: INTERNAL MEDICINE

## 2025-01-02 PROCEDURE — 97530 THERAPEUTIC ACTIVITIES: CPT

## 2025-01-02 PROCEDURE — 6370000000 HC RX 637 (ALT 250 FOR IP): Performed by: STUDENT IN AN ORGANIZED HEALTH CARE EDUCATION/TRAINING PROGRAM

## 2025-01-02 PROCEDURE — 2500000003 HC RX 250 WO HCPCS: Performed by: INTERNAL MEDICINE

## 2025-01-02 PROCEDURE — 80069 RENAL FUNCTION PANEL: CPT

## 2025-01-02 PROCEDURE — 1100000003 HC PRIVATE W/ TELEMETRY

## 2025-01-02 PROCEDURE — 36415 COLL VENOUS BLD VENIPUNCTURE: CPT

## 2025-01-02 PROCEDURE — 85027 COMPLETE CBC AUTOMATED: CPT

## 2025-01-02 RX ORDER — WARFARIN SODIUM 2.5 MG/1
2.5 TABLET ORAL
Status: COMPLETED | OUTPATIENT
Start: 2025-01-02 | End: 2025-01-02

## 2025-01-02 RX ADMIN — Medication 1 TABLET: at 09:46

## 2025-01-02 RX ADMIN — OXYCODONE HYDROCHLORIDE AND ACETAMINOPHEN 500 MG: 500 TABLET ORAL at 09:46

## 2025-01-02 RX ADMIN — Medication 1 CAPSULE: at 05:17

## 2025-01-02 RX ADMIN — SACUBITRIL AND VALSARTAN 1 TABLET: 24; 26 TABLET, FILM COATED ORAL at 23:05

## 2025-01-02 RX ADMIN — LEVOTHYROXINE SODIUM 150 MCG: 0.15 TABLET ORAL at 05:17

## 2025-01-02 RX ADMIN — PAROXETINE HYDROCHLORIDE 20 MG: 20 TABLET, FILM COATED ORAL at 09:46

## 2025-01-02 RX ADMIN — ATORVASTATIN CALCIUM 40 MG: 40 TABLET, FILM COATED ORAL at 23:05

## 2025-01-02 RX ADMIN — OXYBUTYNIN CHLORIDE 10 MG: 5 TABLET, EXTENDED RELEASE ORAL at 09:47

## 2025-01-02 RX ADMIN — FINASTERIDE 5 MG: 5 TABLET, FILM COATED ORAL at 09:47

## 2025-01-02 RX ADMIN — SACUBITRIL AND VALSARTAN 1 TABLET: 24; 26 TABLET, FILM COATED ORAL at 09:46

## 2025-01-02 RX ADMIN — TAMSULOSIN HYDROCHLORIDE 0.4 MG: 0.4 CAPSULE ORAL at 23:05

## 2025-01-02 RX ADMIN — ALLOPURINOL 300 MG: 300 TABLET ORAL at 09:47

## 2025-01-02 RX ADMIN — Medication 100 MG: at 09:46

## 2025-01-02 RX ADMIN — Medication 200 MG: at 23:05

## 2025-01-02 RX ADMIN — WARFARIN SODIUM 2.5 MG: 2.5 TABLET ORAL at 17:43

## 2025-01-02 RX ADMIN — OLANZAPINE 5 MG: 5 TABLET, FILM COATED ORAL at 23:05

## 2025-01-02 RX ADMIN — ASPIRIN 81 MG: 81 TABLET, COATED ORAL at 09:47

## 2025-01-02 RX ADMIN — FERROUS SULFATE TAB 325 MG (65 MG ELEMENTAL FE) 325 MG: 325 (65 FE) TAB at 09:46

## 2025-01-02 RX ADMIN — Medication: at 23:06

## 2025-01-02 RX ADMIN — SODIUM CHLORIDE, PRESERVATIVE FREE 10 ML: 5 INJECTION INTRAVENOUS at 23:05

## 2025-01-02 RX ADMIN — PANTOPRAZOLE SODIUM 40 MG: 40 TABLET, DELAYED RELEASE ORAL at 05:17

## 2025-01-02 RX ADMIN — Medication: at 09:51

## 2025-01-02 RX ADMIN — CYANOCOBALAMIN TAB 500 MCG 1000 MCG: 500 TAB at 09:46

## 2025-01-02 RX ADMIN — OLANZAPINE 5 MG: 5 TABLET, FILM COATED ORAL at 09:47

## 2025-01-02 ASSESSMENT — PAIN SCALES - GENERAL
PAINLEVEL_OUTOF10: 2
PAINLEVEL_OUTOF10: 0

## 2025-01-02 NOTE — PLAN OF CARE
Problem: Chronic Conditions and Co-morbidities  Goal: Patient's chronic conditions and co-morbidity symptoms are monitored and maintained or improved  1/2/2025 0033 by Yovanny Bauer RN  Outcome: Progressing  1/1/2025 1313 by Amanda Terrazas LPN  Outcome: Progressing  Flowsheets (Taken 1/1/2025 0730)  Care Plan - Patient's Chronic Conditions and Co-Morbidity Symptoms are Monitored and Maintained or Improved:   Monitor and assess patient's chronic conditions and comorbid symptoms for stability, deterioration, or improvement   Collaborate with multidisciplinary team to address chronic and comorbid conditions and prevent exacerbation or deterioration   Update acute care plan with appropriate goals if chronic or comorbid symptoms are exacerbated and prevent overall improvement and discharge     Problem: Safety - Adult  Goal: Free from fall injury  1/2/2025 0033 by Yovanny Bauer RN  Outcome: Progressing  1/1/2025 1313 by Amanda Terrazas LPN  Outcome: Progressing  Flowsheets (Taken 1/1/2025 0730)  Free From Fall Injury: Instruct family/caregiver on patient safety     Problem: Pain  Goal: Verbalizes/displays adequate comfort level or baseline comfort level  1/2/2025 0033 by Yovanny Bauer RN  Outcome: Progressing  1/1/2025 1313 by Amanda Terrazas LPN  Outcome: Progressing

## 2025-01-02 NOTE — PROGRESS NOTES
Hospitalist Progress Note    NAME:   Corby Chun   : 1935   MRN: [id number]     Date/Time: 2025 4:54 PM  Patient PCP: Ryan Nguyen MD    Estimated discharge date: Placement  Barriers: Placement      Assessment / Plan:    Inability to ambulate  PT/OT consult  Palliative care consult for goals of care discussion     Dementia with behavioral disturbance:  Hallucination   as per daughter hallucination is already improving  Continue paroxetine  Continue olanzapine  No sitter needed, off and working on placement     Hypertension  CHF:  CAD  Continue aspirin, statin  Continue entresto     Hypothyroidism:  Continue levothyroxine     BPH:  Continue finasteride and tamsulosin     Mechanical AVR  Afib  Continue warfarin     Medical Decision Making:   I personally reviewed labs: INR  I personally reviewed imaging:  I personally reviewed EKG:  Toxic drug monitoring: will monitor INR and hb while patient is on warfarin, toxicity of anemia versus subtherapeutic levels  Discussed case with: -d/w CM, will work on placement, no sitter needed        Code Status: Full  DVT Prophylaxis: Coumadin  GI Prophylaxis:    Subjective:     Chief Complaint / Reason for Physician Visit  No acute overnight events.  Alert, [place], oriented to self and location, desires to know how to use phone to dial out.    Objective:     VITALS:   Last 24hrs VS reviewed since prior progress note. Most recent are:  Patient Vitals for the past 24 hrs:   BP Temp Temp src Pulse Resp SpO2   25 1653 (!) 140/78 97.7 °F (36.5 °C) -- 62 18 99 %   25 1146 133/77 98.4 °F (36.9 °C) -- 72 16 --   25 0945 117/80 99.7 °F (37.6 °C) Oral 75 18 99 %   25 0245 126/75 97.7 °F (36.5 °C) Oral 84 20 96 %   25 132/79 97.7 °F (36.5 °C) Oral 80 16 98 %         Intake/Output Summary (Last 24 hours) at 2025 1654  Last data filed at 2025 1653  Gross per 24 hour   Intake --   Output 850 ml   Net -850 ml        I had a face to face

## 2025-01-02 NOTE — PLAN OF CARE
Problem: Safety - Adult  Goal: Free from fall injury  1/2/2025 1256 by Jordy Trujillo, RN  Outcome: Progressing  1/2/2025 0033 by Yovanny Bauer, RN  Outcome: Progressing     Problem: Pain  Goal: Verbalizes/displays adequate comfort level or baseline comfort level  1/2/2025 1256 by Jordy Trujillo, RN  Outcome: Progressing  1/2/2025 0033 by Yovanny Bauer, RN  Outcome: Progressing

## 2025-01-02 NOTE — PLAN OF CARE
Problem: Physical Therapy - Adult  Goal: By Discharge: Performs mobility at highest level of function for planned discharge setting.  See evaluation for individualized goals.  Description: FUNCTIONAL STATUS PRIOR TO ADMISSION: Patient is poor historian.    HOME SUPPORT PRIOR TO ADMISSION: The patient lives with his wife.    Physical Therapy Goals  Initiated 12/30/2024  1.  Patient will move from supine to sit and sit to supine in bed with supervision/set-up within 7 day(s).    2.  Patient will perform sit to stand with supervision/set-up within 7 day(s).  3.  Patient will transfer from bed to chair and chair to bed with supervision/set-up using the least restrictive device within 7 day(s).  4.  Patient will ambulate with supervision/set-up for 300 feet with the least restrictive device within 7 day(s).   5.  Patient will ascend/descend 5 stairs with 1 handrail(s) with contact guard assist within 7 day(s).   Outcome: Progressing   PHYSICAL THERAPY TREATMENT    Patient: Corby Chun (89 y.o. male)  Date: 1/2/2025  Diagnosis: General weakness [R53.1]  Ambulatory dysfunction [R26.2] Ambulatory dysfunction      Precautions: Fall Risk, General Precautions, Bed Alarm ([place]; falling daily PTA per family)                      ASSESSMENT:  Patient continues to benefit from skilled PT services and is progressing towards goals. Pt received semi fowlers in bed, agreeable to participate in therapy. Pt significant [place], requires use of whiteboard to successfully communicate with. Continues to demonstrate improvements with mobility however is limited 2/2 cognition, impaired gait mechanics, sequencing, processing, coordination, and balance impairments. Progressed with increased gait distance however unsteadiness noted with poor RW management. Pt continues to benefit from skilled PT services and is a high fall risk and would benefit from skilled PT services in order to promote full return to PLOF.       PLAN:  Patient continues to  Impaired  Standing - Static: Constant support;Fair  Standing - Dynamic: Fair;Constant support   Ambulation/Gait Training:     Gait  Gait Training: Yes  Overall Level of Assistance: Minimum assistance (for RW management)  Distance (ft): 45 Feet  Assistive Device: Gait belt;Walker, rolling  Interventions: Safety awareness training;Verbal cues;Manual cues  Base of Support: Widened  Speed/Lisa: Pace decreased (< 100 feet/min);Shuffled  Step Length: Left shortened;Right shortened  Gait Abnormalities: Decreased step clearance;Shuffling gait (flexed posture)        Neuro Re-Education:                    Pain Rating:  None reported  Pain Intervention(s):       Activity Tolerance:   Fair     After treatment:   Patient left in no apparent distress sitting up in chair, Call bell within reach, Bed/ chair alarm activated, and Updated patient's board on functional status and mobility recommendations      COMMUNICATION/EDUCATION:   The patient's plan of care was discussed with: registered nurse    Patient Education  Education Given To: Patient  Education Provided: Role of Therapy;Plan of Care;Transfer Training;Mobility Training;Fall Prevention Strategies  Education Method: Verbal  Barriers to Learning: Cognition;Hearing (pt has whiteboard in room to communicate)  Education Outcome: Continued education needed;Verbalized understanding      Yaquelin López PTA  Minutes: 27

## 2025-01-02 NOTE — PROGRESS NOTES
Pharmacist Daily Dosing of Warfarin    Indication & Goal INR: AFib (s/p ablation), Mechanical Aortic Valve+ Risk Factors,    INR Goal 2.5-3.5     PTA Warfarin Dose: 2.5 mg daily    Notable concurrent conditions and medications: None    Labs:  Recent Labs     Units 01/02/25  0529 01/01/25  0500 01/01/25  0500 12/31/24  0458   INR   2.2*  --  2.3* 3.0*   HGB g/dL 11.1*   < > 11.2*  --    PLT K/uL 154  --  145*  --     < > = values in this interval not displayed.     Impression/Plan:   Will give warfarin 2.5mg x1 tonight.  Daily INR ordered  CBC w/o differential every other day ordered     Pharmacy will follow daily and adjust the dose as appropriate.    Thank you,  Melida López [facility]

## 2025-01-02 NOTE — PROGRESS NOTES
End of Shift Note    Bedside shift change report given to Jordy (oncoming nurse) by Yovanny Bauer RN (offgoing nurse).  Report included the following information SBAR, Kardex, Intake/Output, MAR, and Recent Results    Shift worked:  Night     Shift summary and any significant changes:     None     Concerns for physician to address:  None     Zone phone for oncoming shift:   [phone]       Activity:  Level of Assistance: Moderate assist, patient does 50-74%  Number times ambulated in hallways past shift: 0  Number of times OOB to chair past shift: 0    Cardiac:   Cardiac Monitoring: No      Cardiac Rhythm: Ventricular paced    Access:  Current line(s): PIV     Genitourinary:   Urinary Status: Voiding    Respiratory:   O2 Device: None (Room air)  Chronic home O2 use?: NO  Incentive spirometer at bedside: YES    GI:  Last BM (including prior to admit): 12/31/24  Current diet:  ADULT DIET; Regular  Passing flatus: YES    Pain Management:   Patient states pain is manageable on current regimen: YES    Skin:  Grant Scale Score: 19  Interventions: Wound Offloading (Prevention Methods): Bed, pressure redistribution/air, Elevate heels, Pillows, Repositioning    Patient Safety:  Fall Risk: Nursing Judgement-Fall Risk High(Add Comments): Yes  Fall Risk Interventions  Nursing Judgement-Fall Risk High(Add Comments): Yes  Toilet Every 2 Hours-In Advance of Need: Yes  Hourly Visual Checks: Awake  Fall Visual Posted: [place]  Room Door Open: Yes  Alarm On: Bed  Patient Moved Closer to Nursing Station: No    Active Consults:   IP CONSULT TO PALLIATIVE CARE  IP CONSULT TO PHARMACY  IP CONSULT TO PHARMACY    Length of Stay:  Expected LOS: 5  Actual LOS: 2    Yovanny Bauer RN

## 2025-01-03 VITALS
HEART RATE: 65 BPM | OXYGEN SATURATION: 94 % | HEIGHT: 62 IN | RESPIRATION RATE: 18 BRPM | BODY MASS INDEX: 31.28 KG/M2 | DIASTOLIC BLOOD PRESSURE: 65 MMHG | SYSTOLIC BLOOD PRESSURE: 131 MMHG | WEIGHT: 169.97 LBS | TEMPERATURE: 97.3 F

## 2025-01-03 LAB
ALBUMIN SERPL-MCNC: 2.6 G/DL (ref 3.5–5)
ANION GAP SERPL CALC-SCNC: 5 MMOL/L (ref 2–12)
BUN SERPL-MCNC: 16 MG/DL (ref 6–20)
BUN/CREAT SERPL: 15 (ref 12–20)
CALCIUM SERPL-MCNC: 8.4 MG/DL (ref 8.5–10.1)
CHLORIDE SERPL-SCNC: 108 MMOL/L (ref 97–108)
CO2 SERPL-SCNC: 26 MMOL/L (ref 21–32)
CREAT SERPL-MCNC: 1.04 MG/DL (ref 0.7–1.3)
ERYTHROCYTE [DISTWIDTH] IN BLOOD BY AUTOMATED COUNT: 14.5 % (ref 11.5–14.5)
GLUCOSE SERPL-MCNC: 107 MG/DL (ref 65–100)
HCT VFR BLD AUTO: 32.8 % (ref 36.6–50.3)
HGB BLD-MCNC: 10.5 G/DL (ref 12.1–17)
INR PPP: 2.1 (ref 0.9–1.1)
MCH RBC QN AUTO: 30.3 PG (ref 26–34)
MCHC RBC AUTO-ENTMCNC: 32 G/DL (ref 30–36.5)
MCV RBC AUTO: 94.8 FL (ref 80–99)
NRBC # BLD: 0 K/UL (ref 0–0.01)
NRBC BLD-RTO: 0 PER 100 WBC
PHOSPHATE SERPL-MCNC: 2.6 MG/DL (ref 2.6–4.7)
PLATELET # BLD AUTO: 166 K/UL (ref 150–400)
PMV BLD AUTO: 10.1 FL (ref 8.9–12.9)
POTASSIUM SERPL-SCNC: 3.8 MMOL/L (ref 3.5–5.1)
PROTHROMBIN TIME: 21.3 SEC (ref 9–11.1)
RBC # BLD AUTO: 3.46 M/UL (ref 4.1–5.7)
SODIUM SERPL-SCNC: 139 MMOL/L (ref 136–145)
WBC # BLD AUTO: 5.7 K/UL (ref 4.1–11.1)

## 2025-01-03 PROCEDURE — 85610 PROTHROMBIN TIME: CPT

## 2025-01-03 PROCEDURE — 36415 COLL VENOUS BLD VENIPUNCTURE: CPT

## 2025-01-03 PROCEDURE — 6370000000 HC RX 637 (ALT 250 FOR IP): Performed by: STUDENT IN AN ORGANIZED HEALTH CARE EDUCATION/TRAINING PROGRAM

## 2025-01-03 PROCEDURE — 6370000000 HC RX 637 (ALT 250 FOR IP): Performed by: INTERNAL MEDICINE

## 2025-01-03 PROCEDURE — 80069 RENAL FUNCTION PANEL: CPT

## 2025-01-03 PROCEDURE — 85027 COMPLETE CBC AUTOMATED: CPT

## 2025-01-03 RX ORDER — WARFARIN SODIUM 1 MG/1
4 TABLET ORAL
Status: COMPLETED | OUTPATIENT
Start: 2025-01-03 | End: 2025-01-03

## 2025-01-03 RX ADMIN — TAMSULOSIN HYDROCHLORIDE 0.4 MG: 0.4 CAPSULE ORAL at 21:13

## 2025-01-03 RX ADMIN — Medication 1 CAPSULE: at 06:52

## 2025-01-03 RX ADMIN — OLANZAPINE 5 MG: 5 TABLET, FILM COATED ORAL at 21:16

## 2025-01-03 RX ADMIN — Medication 200 MG: at 21:13

## 2025-01-03 RX ADMIN — ATORVASTATIN CALCIUM 40 MG: 40 TABLET, FILM COATED ORAL at 21:13

## 2025-01-03 RX ADMIN — FINASTERIDE 5 MG: 5 TABLET, FILM COATED ORAL at 09:27

## 2025-01-03 RX ADMIN — Medication 100 MG: at 09:26

## 2025-01-03 RX ADMIN — PANTOPRAZOLE SODIUM 40 MG: 40 TABLET, DELAYED RELEASE ORAL at 06:52

## 2025-01-03 RX ADMIN — Medication 1 TABLET: at 09:27

## 2025-01-03 RX ADMIN — FERROUS SULFATE TAB 325 MG (65 MG ELEMENTAL FE) 325 MG: 325 (65 FE) TAB at 09:27

## 2025-01-03 RX ADMIN — LEVOTHYROXINE SODIUM 150 MCG: 0.15 TABLET ORAL at 06:52

## 2025-01-03 RX ADMIN — ALLOPURINOL 300 MG: 300 TABLET ORAL at 09:27

## 2025-01-03 RX ADMIN — OXYCODONE HYDROCHLORIDE AND ACETAMINOPHEN 500 MG: 500 TABLET ORAL at 09:27

## 2025-01-03 RX ADMIN — ASPIRIN 81 MG: 81 TABLET, COATED ORAL at 09:27

## 2025-01-03 RX ADMIN — OXYBUTYNIN CHLORIDE 10 MG: 5 TABLET, EXTENDED RELEASE ORAL at 09:27

## 2025-01-03 RX ADMIN — CYANOCOBALAMIN TAB 500 MCG 1000 MCG: 500 TAB at 09:27

## 2025-01-03 RX ADMIN — OLANZAPINE 5 MG: 5 TABLET, FILM COATED ORAL at 09:27

## 2025-01-03 RX ADMIN — PAROXETINE HYDROCHLORIDE 20 MG: 20 TABLET, FILM COATED ORAL at 09:27

## 2025-01-03 RX ADMIN — Medication: at 09:27

## 2025-01-03 RX ADMIN — SACUBITRIL AND VALSARTAN 1 TABLET: 24; 26 TABLET, FILM COATED ORAL at 21:13

## 2025-01-03 RX ADMIN — SACUBITRIL AND VALSARTAN 1 TABLET: 24; 26 TABLET, FILM COATED ORAL at 09:27

## 2025-01-03 RX ADMIN — Medication: at 21:13

## 2025-01-03 RX ADMIN — WARFARIN SODIUM 4 MG: 1 TABLET ORAL at 17:27

## 2025-01-03 NOTE — DISCHARGE SUMMARY
Discharge Summary    Name: Corby Chun  [id number]  YOB: 1935 (Age: 89 y.o.)   Date of Admission: 12/29/2024  Date of Discharge: 1/3/2025  Attending Physician: Christa Frey MD    Discharge Diagnosis:     Inability to ambulate  Dementia with behavioral disturbance  Hallucination  Hypertension  CHF  CAD  Hypothyroidism  BPH  Mechanical AVR  Afib    Consultations:  IP CONSULT TO PALLIATIVE CARE  IP CONSULT TO PHARMACY  IP CONSULT TO PHARMACY      Brief Admission History/Reason for Admission Per Karli Davison MD:     Corby Chun is a 89 y.o.  male with PMHx significant for Hypertension, Hyperlipidemia, s/p mech AVR, CHF s/p BIV ICD EF 15%, last EF upto 42%, CAD s/p CABG, aflutter s/p ablation, mdd, hypothyroidism, CKD 3, hearing loss presented to ED with c/o inability to ambulate.  Unable to obtain hx from patient due to dementia and hard of hearing  Tried calling family, unable to reach.   As per ED notes, EMS reports patient has been dealing with frequent UTIs. Has had a progressive decline due to UTI and possibly dementia.   Patient is hard of hearing, denied any complaints at the time of my evaluation.      We were asked to admit for work up and evaluation of the above problems.     Brief Hospital Course by Main Problems:     Inability to ambulate  PT/OT recommending SNF  Recommend outpatient follow-up with PCP to discuss goals of care     Dementia with behavioral disturbance:  Hallucination   as per daughter hallucination is already improving  Continue paroxetine  Continue olanzapine  Recommend outpatient follow-up with PCP to discuss behavioral management     Hypertension  CHF:  CAD  Continue aspirin, statin  Continue entresto     Hypothyroidism:  Continue levothyroxine     BPH:  Continue finasteride and tamsulosin     Mechanical AVR  Afib  Continue warfarin    Discharge Exam:  Patient seen and examined by me on discharge day.  Pertinent

## 2025-01-03 NOTE — PLAN OF CARE
Problem: Chronic Conditions and Co-morbidities  Goal: Patient's chronic conditions and co-morbidity symptoms are monitored and maintained or improved  Outcome: Progressing  Flowsheets (Taken 1/1/2025 0730 by Amanda Terrazas LPN)  Care Plan - Patient's Chronic Conditions and Co-Morbidity Symptoms are Monitored and Maintained or Improved:   Monitor and assess patient's chronic conditions and comorbid symptoms for stability, deterioration, or improvement   Collaborate with multidisciplinary team to address chronic and comorbid conditions and prevent exacerbation or deterioration   Update acute care plan with appropriate goals if chronic or comorbid symptoms are exacerbated and prevent overall improvement and discharge     Problem: Safety - Adult  Goal: Free from fall injury  Outcome: Progressing  Flowsheets (Taken 1/1/2025 0730 by Amanda Terrazas LPN)  Free From Fall Injury: Instruct family/caregiver on patient safety     Problem: Pain  Goal: Verbalizes/displays adequate comfort level or baseline comfort level  Outcome: Progressing  Flowsheets (Taken 1/3/2025 0627)  Verbalizes/displays adequate comfort level or baseline comfort level:   Encourage patient to monitor pain and request assistance   Assess pain using appropriate pain scale   Administer analgesics based on type and severity of pain and evaluate response   Implement non-pharmacological measures as appropriate and evaluate response   Consider cultural and social influences on pain and pain management   Notify Licensed Independent Practitioner if interventions unsuccessful or patient reports new pain     Problem: Neurosensory - Adult  Goal: Achieves stable or improved neurological status  Outcome: Progressing  Flowsheets (Taken 1/3/2025 0627)  Achieves stable or improved neurological status:   Assess for and report changes in neurological status   Maintain blood pressure and fluid volume within ordered parameters to optimize cerebral perfusion and minimize

## 2025-01-03 NOTE — PROGRESS NOTES
End of Shift Note    Bedside shift change report given to ALANA Spence (oncoming nurse) by Raúl Silvestre RN (offgoing nurse).  Report included the following information SBAR, Procedure Summary, Intake/Output, MAR, and Recent Results    Shift worked:  night     Shift summary and any significant changes:     VSS, pt c/o minimal pain, pt confused, baseline dementia, pt getting up with assist to bathroom with walker and gaitbelt, bed alarm on for safety and pt voiding at the side of the bed with urinal overnight.     Concerns for physician to address:  See above     Zone phone for oncoming shift:   [phone]       Activity:  Level of Assistance: Moderate assist, patient does 50-74%  Number times ambulated in hallways past shift: 0  Number of times OOB to chair past shift: 0    Cardiac:   Cardiac Monitoring: No      Cardiac Rhythm: Ventricular paced    Access:  Current line(s): PIV     Genitourinary:   Urinary Status: Voiding    Respiratory:   O2 Device: None (Room air)  Chronic home O2 use?: NO  Incentive spirometer at bedside: N/A    GI:  Last BM (including prior to admit): 12/31/24  Current diet:  ADULT DIET; Regular  Passing flatus: YES    Pain Management:   Patient states pain is manageable on current regimen: YES    Skin:  Grant Scale Score: 18  Interventions: Wound Offloading (Prevention Methods): Bed, pressure reduction mattress, Elevate heels, Pillows, Repositioning, Turning    Patient Safety:  Fall Risk: Nursing Judgement-Fall Risk High(Add Comments): Yes  Fall Risk Interventions  Nursing Judgement-Fall Risk High(Add Comments): Yes  Toilet Every 2 Hours-In Advance of Need: Yes  Hourly Visual Checks: Awake, In bed  Fall Visual Posted: Fall sign posted, Socks  Room Door Open: Deferred to promote rest  Alarm On: Bed  Patient Moved Closer to Nursing Station: No    Active Consults:   IP CONSULT TO PALLIATIVE CARE  IP CONSULT TO PHARMACY  IP CONSULT TO PHARMACY    Length of Stay:  Expected LOS: 5  Actual LOS: 3    Raúl  GALILEA Silvestre RN

## 2025-01-03 NOTE — PROGRESS NOTES
Pharmacist Daily Dosing of Warfarin    Indication & Goal INR: AFib (s/p ablation), Mechanical Aortic Valve+ Risk Factors,    INR Goal 2.5-3.5     PTA Warfarin Dose: 2.5 mg daily    Notable concurrent conditions and medications: None    Labs:  Recent Labs     Units 01/03/25  0530 01/02/25  0529 01/01/25  0500   INR   2.1* 2.2* 2.3*   HGB g/dL 10.5* 11.1* 11.2*   PLT K/uL 166 154 145*     Impression/Plan:   Will give warfarin 4 mg x1 tonight  Daily INR ordered  CBC w/o differential every other day ordered     Pharmacy will follow daily and adjust the dose as appropriate.    Thank you,  Melida López [facility]

## 2025-01-03 NOTE — PLAN OF CARE
Problem: Safety - Adult  Goal: Free from fall injury  1/3/2025 1029 by Jordy Trujillo, RN  Outcome: Progressing  1/3/2025 0627 by Raúl Silvestre, RN  Outcome: Progressing  Flowsheets (Taken 1/1/2025 0730 by Amanda Terrazas LPN)  Free From Fall Injury: Instruct family/caregiver on patient safety     Problem: Pain  Goal: Verbalizes/displays adequate comfort level or baseline comfort level  1/3/2025 0627 by Raúl Silvestre, RN  Outcome: Progressing  Flowsheets (Taken 1/3/2025 0627)  Verbalizes/displays adequate comfort level or baseline comfort level:   Encourage patient to monitor pain and request assistance   Assess pain using appropriate pain scale   Administer analgesics based on type and severity of pain and evaluate response   Implement non-pharmacological measures as appropriate and evaluate response   Consider cultural and social influences on pain and pain management   Notify Licensed Independent Practitioner if interventions unsuccessful or patient reports new pain

## 2025-01-03 NOTE — DISCHARGE INSTRUCTIONS
All of your medications from before your hospitalization are the same.  Please take all of your medications as prescribed. If prescribed any medications, please read all pharmacy instructions and inserts. Inform your doctor and pharmacist about all other medications and alternative therapies.  Please follow up with your PCP in 1-2 weeks to be reassessed after your hospital stay. Please discuss goals of care, palliative, and eventual hospice with your PCP.  If you start feeling any symptoms similar to what brought you into the hospital, please come back to the ED to be re-evaluated.

## 2025-01-03 NOTE — CARE COORDINATION
[facility], room 406B  Call report to [phone]  1730 [facility] transport scheduled    Transition of Care Plan:    RUR: 18% moderate risk  Prior Level of Functioning: increasing assistance needed  [facility]  ARACELIS: 1/3/25  If SNF or IPR: Date FOC offered: 1/3/25 (SNF choice after IPR declined)  Date FOC received: 1/3/25  Accepting facility: [facility]  Date authorization started with reference number:   Date authorization received and expires:   Follow up appointments: defer to facility  DME needed: defer to facility  Transportation at discharge: BLS needed  IM/IMM Medicare/ letter given: CM to provide  Is patient a [place] and connected with [facility]?    If yes, was  transfer form completed and [facility] notified?   Caregiver Contact: wife Ashley   Discharge Caregiver contacted prior to discharge? yes  Care Conference needed? no  Barriers to discharge: none    1605 - Notified by [facility] liaison that a bed is available for pt today. Called Ashley, who verbalized understanding and voiced continued agreement with discharge today to [facility]. CM arranging transport, will update pt with time when known.    Initial note - Spoke with pt's wife Ashley [phone] re referrals for post-acute needs; informed Ashley that [facility] has declined, Ashley verbalized understanding and disappointment. CM informed Ashley that [facility] and [facility] had all accepted for SNF rehab; Ashley voiced preference for [facility] as it is the closest to their home and pt is more likely to have visitors there. CM validated this, will proceed with [facility]. Advised Ashley that if bed is available today, pt will admit to [facility] today, Ashley verbalized understanding that bed availability is only barrier to discharge at present. CM will contact Ashley when bed is available, provided weekend CM number to Ashley. Bed availability requested from  [facility]. Care team updated.    Raysa Herrera, [facility]  [facility] Management  [phone]

## 2025-01-04 ENCOUNTER — OFFICE VISIT (OUTPATIENT)
Facility: CLINIC | Age: 89
End: 2025-01-04

## 2025-01-04 DIAGNOSIS — Z86.73 HISTORY OF CVA (CEREBROVASCULAR ACCIDENT): Chronic | ICD-10-CM

## 2025-01-04 DIAGNOSIS — Z95.0 HX OF CARDIAC PACEMAKER: Chronic | ICD-10-CM

## 2025-01-04 DIAGNOSIS — N18.32 STAGE 3B CHRONIC KIDNEY DISEASE (HCC): Chronic | ICD-10-CM

## 2025-01-04 DIAGNOSIS — E03.9 ACQUIRED HYPOTHYROIDISM: Chronic | ICD-10-CM

## 2025-01-04 DIAGNOSIS — E78.2 MIXED HYPERLIPIDEMIA: Chronic | ICD-10-CM

## 2025-01-04 DIAGNOSIS — R26.2 AMBULATORY DYSFUNCTION: ICD-10-CM

## 2025-01-04 DIAGNOSIS — Z98.890 STATUS POST CATHETER ABLATION OF ATRIAL FLUTTER: Chronic | ICD-10-CM

## 2025-01-04 DIAGNOSIS — N40.1 BENIGN PROSTATIC HYPERPLASIA (BPH) WITH URINARY URGE INCONTINENCE: Chronic | ICD-10-CM

## 2025-01-04 DIAGNOSIS — F01.B2 MODERATE VASCULAR DEMENTIA WITH PSYCHOTIC DISTURBANCE (HCC): Chronic | ICD-10-CM

## 2025-01-04 DIAGNOSIS — I48.11 LONGSTANDING PERSISTENT ATRIAL FIBRILLATION (HCC): Chronic | ICD-10-CM

## 2025-01-04 DIAGNOSIS — I35.0 SEVERE AORTIC STENOSIS: Chronic | ICD-10-CM

## 2025-01-04 DIAGNOSIS — M15.9 GENERALIZED OSTEOARTHRITIS: Chronic | ICD-10-CM

## 2025-01-04 DIAGNOSIS — I10 HYPERTENSION, ESSENTIAL: Chronic | ICD-10-CM

## 2025-01-04 DIAGNOSIS — I50.22 CHRONIC SYSTOLIC CONGESTIVE HEART FAILURE (HCC): Chronic | ICD-10-CM

## 2025-01-04 DIAGNOSIS — N39.0 RECURRENT URINARY TRACT INFECTION: Chronic | ICD-10-CM

## 2025-01-04 DIAGNOSIS — N39.41 BENIGN PROSTATIC HYPERPLASIA (BPH) WITH URINARY URGE INCONTINENCE: Chronic | ICD-10-CM

## 2025-01-04 DIAGNOSIS — H91.93 PROFOUND HEARING LOSS OF BOTH EARS: Chronic | ICD-10-CM

## 2025-01-04 DIAGNOSIS — Z95.2 H/O MECHANICAL AORTIC VALVE REPLACEMENT: Chronic | ICD-10-CM

## 2025-01-04 DIAGNOSIS — I25.110 CORONARY ARTERY DISEASE INVOLVING NATIVE CORONARY ARTERY OF NATIVE HEART WITH UNSTABLE ANGINA PECTORIS (HCC): Primary | Chronic | ICD-10-CM

## 2025-01-05 VITALS
HEART RATE: 72 BPM | WEIGHT: 166.5 LBS | DIASTOLIC BLOOD PRESSURE: 83 MMHG | BODY MASS INDEX: 30.45 KG/M2 | TEMPERATURE: 97.2 F | RESPIRATION RATE: 18 BRPM | OXYGEN SATURATION: 97 % | SYSTOLIC BLOOD PRESSURE: 140 MMHG

## 2025-01-05 PROBLEM — N40.1 BENIGN PROSTATIC HYPERPLASIA (BPH) WITH URINARY URGE INCONTINENCE: Chronic | Status: ACTIVE | Noted: 2025-01-05

## 2025-01-05 PROBLEM — I48.91 A-FIB (HCC): Status: RESOLVED | Noted: 2019-03-01 | Resolved: 2025-01-05

## 2025-01-05 PROBLEM — K40.90 RIGHT INGUINAL HERNIA: Chronic | Status: ACTIVE | Noted: 2022-10-12

## 2025-01-05 PROBLEM — N39.41 BENIGN PROSTATIC HYPERPLASIA (BPH) WITH URINARY URGE INCONTINENCE: Chronic | Status: ACTIVE | Noted: 2025-01-05

## 2025-01-05 PROBLEM — N39.0 RECURRENT URINARY TRACT INFECTION: Chronic | Status: ACTIVE | Noted: 2025-01-05

## 2025-01-05 PROBLEM — F09 COGNITIVE DYSFUNCTION: Status: RESOLVED | Noted: 2024-11-12 | Resolved: 2025-01-05

## 2025-01-05 PROBLEM — E78.2 MIXED HYPERLIPIDEMIA: Chronic | Status: ACTIVE | Noted: 2025-01-05

## 2025-01-05 PROBLEM — H91.93 PROFOUND HEARING LOSS OF BOTH EARS: Chronic | Status: ACTIVE | Noted: 2025-01-05

## 2025-01-05 PROBLEM — E78.5 DYSLIPIDEMIA: Status: RESOLVED | Noted: 2019-04-07 | Resolved: 2025-01-05

## 2025-01-05 PROBLEM — Z95.1 HX OF CABG: Chronic | Status: ACTIVE | Noted: 2019-04-07

## 2025-01-05 PROBLEM — E03.9 HYPOTHYROIDISM: Chronic | Status: ACTIVE | Noted: 2018-10-09

## 2025-01-05 PROBLEM — I48.3 TYPICAL ATRIAL FLUTTER (HCC): Chronic | Status: ACTIVE | Noted: 2019-03-04

## 2025-01-05 PROBLEM — I50.22 CHRONIC SYSTOLIC CONGESTIVE HEART FAILURE (HCC): Chronic | Status: ACTIVE | Noted: 2019-04-07

## 2025-01-05 PROBLEM — R79.1 SUPRATHERAPEUTIC INR: Status: RESOLVED | Noted: 2018-10-09 | Resolved: 2025-01-05

## 2025-01-05 PROBLEM — Z95.2 H/O MECHANICAL AORTIC VALVE REPLACEMENT: Chronic | Status: ACTIVE | Noted: 2018-10-09

## 2025-01-05 PROBLEM — I10 HYPERTENSION, ESSENTIAL: Chronic | Status: ACTIVE | Noted: 2019-04-07

## 2025-01-05 PROBLEM — Z98.890 STATUS POST CATHETER ABLATION OF ATRIAL FLUTTER: Chronic | Status: ACTIVE | Noted: 2019-03-04

## 2025-01-05 PROBLEM — M15.9 GENERALIZED OSTEOARTHRITIS: Chronic | Status: ACTIVE | Noted: 2025-01-05

## 2025-01-05 PROBLEM — Z95.810 ICD (IMPLANTABLE CARDIOVERTER-DEFIBRILLATOR) IN PLACE: Chronic | Status: ACTIVE | Noted: 2019-03-04

## 2025-01-05 PROBLEM — F01.B2 MODERATE VASCULAR DEMENTIA WITH PSYCHOTIC DISTURBANCE (HCC): Chronic | Status: ACTIVE | Noted: 2025-01-05

## 2025-01-05 PROBLEM — Z86.73 HISTORY OF CVA (CEREBROVASCULAR ACCIDENT): Chronic | Status: ACTIVE | Noted: 2024-11-12

## 2025-01-05 PROBLEM — I48.11 LONGSTANDING PERSISTENT ATRIAL FIBRILLATION (HCC): Chronic | Status: ACTIVE | Noted: 2025-01-05

## 2025-01-05 PROBLEM — I25.110 CORONARY ARTERY DISEASE INVOLVING NATIVE CORONARY ARTERY OF NATIVE HEART WITH UNSTABLE ANGINA PECTORIS (HCC): Chronic | Status: ACTIVE | Noted: 2019-04-07

## 2025-01-05 PROBLEM — Z95.0 HX OF CARDIAC PACEMAKER: Chronic | Status: ACTIVE | Noted: 2019-12-06

## 2025-01-05 PROBLEM — I35.0 SEVERE AORTIC STENOSIS: Chronic | Status: ACTIVE | Noted: 2019-04-07

## 2025-01-05 PROBLEM — I49.3 PVC (PREMATURE VENTRICULAR CONTRACTION): Status: RESOLVED | Noted: 2019-04-07 | Resolved: 2025-01-05

## 2025-01-06 ENCOUNTER — OFFICE VISIT (OUTPATIENT)
Facility: CLINIC | Age: 89
End: 2025-01-06
Payer: MEDICARE

## 2025-01-06 ENCOUNTER — CARE COORDINATION (OUTPATIENT)
Dept: CARE COORDINATION | Age: 89
End: 2025-01-06

## 2025-01-06 VITALS
DIASTOLIC BLOOD PRESSURE: 68 MMHG | HEART RATE: 72 BPM | RESPIRATION RATE: 18 BRPM | BODY MASS INDEX: 30.45 KG/M2 | TEMPERATURE: 97.9 F | WEIGHT: 166.5 LBS | SYSTOLIC BLOOD PRESSURE: 122 MMHG | OXYGEN SATURATION: 97 %

## 2025-01-06 DIAGNOSIS — R26.2 AMBULATORY DYSFUNCTION: ICD-10-CM

## 2025-01-06 DIAGNOSIS — Z95.0 HX OF CARDIAC PACEMAKER: ICD-10-CM

## 2025-01-06 DIAGNOSIS — I25.110 CORONARY ARTERY DISEASE INVOLVING NATIVE CORONARY ARTERY OF NATIVE HEART WITH UNSTABLE ANGINA PECTORIS (HCC): ICD-10-CM

## 2025-01-06 DIAGNOSIS — M15.9 GENERALIZED OSTEOARTHRITIS: ICD-10-CM

## 2025-01-06 DIAGNOSIS — I48.11 LONGSTANDING PERSISTENT ATRIAL FIBRILLATION (HCC): ICD-10-CM

## 2025-01-06 DIAGNOSIS — N40.1 BENIGN PROSTATIC HYPERPLASIA (BPH) WITH URINARY URGE INCONTINENCE: ICD-10-CM

## 2025-01-06 DIAGNOSIS — N39.41 BENIGN PROSTATIC HYPERPLASIA (BPH) WITH URINARY URGE INCONTINENCE: ICD-10-CM

## 2025-01-06 DIAGNOSIS — F01.B2 MODERATE VASCULAR DEMENTIA WITH PSYCHOTIC DISTURBANCE (HCC): ICD-10-CM

## 2025-01-06 DIAGNOSIS — E78.2 MIXED HYPERLIPIDEMIA: ICD-10-CM

## 2025-01-06 DIAGNOSIS — E03.9 ACQUIRED HYPOTHYROIDISM: ICD-10-CM

## 2025-01-06 DIAGNOSIS — Z95.2 H/O MECHANICAL AORTIC VALVE REPLACEMENT: ICD-10-CM

## 2025-01-06 DIAGNOSIS — I50.22 CHRONIC SYSTOLIC CONGESTIVE HEART FAILURE (HCC): Primary | ICD-10-CM

## 2025-01-06 DIAGNOSIS — N18.32 STAGE 3B CHRONIC KIDNEY DISEASE (HCC): ICD-10-CM

## 2025-01-06 DIAGNOSIS — H91.93 PROFOUND HEARING LOSS OF BOTH EARS: ICD-10-CM

## 2025-01-06 DIAGNOSIS — I10 HYPERTENSION, ESSENTIAL: ICD-10-CM

## 2025-01-06 PROCEDURE — 99309 SBSQ NF CARE MODERATE MDM 30: CPT | Performed by: NURSE PRACTITIONER

## 2025-01-06 PROCEDURE — 1123F ACP DISCUSS/DSCN MKR DOCD: CPT | Performed by: NURSE PRACTITIONER

## 2025-01-06 PROCEDURE — M1299 PR FLU IMMUNIZE ORDER/ADMIN: HCPCS | Performed by: NURSE PRACTITIONER

## 2025-01-06 NOTE — CARE COORDINATION
SECURE email notification sent to identified IDT members at   [facility] and [facility]

## 2025-01-06 NOTE — PROGRESS NOTES
twice daily  Not on any other blood pressure medications  Monitor blood pressures   8. Mixed hyperlipidemia     Stable  Continue Lipitor 40 mg p.o. daily   9. Acquired hypothyroidism     Stable  Continue levothyroxine 150 mcg p.o. daily   10. Stage 3b chronic kidney disease (HCC)     Last labs from hospitalization showed creatinine 1.04 and stable  Periodically monitor   11. Benign prostatic hyperplasia (BPH) with urinary urge incontinence     Stable  Continues on Proscar 5 mg p.o. daily  Continue tamsulosin 0.8 mg p.o. nightly   12. Generalized osteoarthritis     Denies any acute complaints of pain  As needed Tylenol for pain management   13. Profound hearing loss of both ears     Stable   14. 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 twice daily for history of hallucinations            IVY Rodriguez - NP

## 2025-01-07 ENCOUNTER — TELEPHONE (OUTPATIENT)
Age: 89
End: 2025-01-07

## 2025-01-07 NOTE — TELEPHONE ENCOUNTER
Wife, Ashley calling to make you aware that       Pt has been admitted to [facility] #[phone]      Pt's INR 3.8      The point is Ashley states that pt is to get 5 mg coumadin 2/day.  She knows this is not correct.  Blood coming out of mouth, in urine and nose bleeds.  He had this when he discharged from the hospital.  The hospital gave these coumadin instructions.      Please call Ashley to go over this and what can be done.

## 2025-01-08 ENCOUNTER — OFFICE VISIT (OUTPATIENT)
Facility: CLINIC | Age: 89
End: 2025-01-08

## 2025-01-08 VITALS
BODY MASS INDEX: 30.45 KG/M2 | RESPIRATION RATE: 18 BRPM | TEMPERATURE: 98 F | WEIGHT: 166.5 LBS | SYSTOLIC BLOOD PRESSURE: 135 MMHG | DIASTOLIC BLOOD PRESSURE: 77 MMHG | HEART RATE: 70 BPM | OXYGEN SATURATION: 97 %

## 2025-01-08 VITALS
HEART RATE: 70 BPM | DIASTOLIC BLOOD PRESSURE: 77 MMHG | SYSTOLIC BLOOD PRESSURE: 135 MMHG | OXYGEN SATURATION: 98 % | RESPIRATION RATE: 18 BRPM | TEMPERATURE: 98 F

## 2025-01-08 DIAGNOSIS — I10 HYPERTENSION, ESSENTIAL: ICD-10-CM

## 2025-01-08 DIAGNOSIS — I48.11 LONGSTANDING PERSISTENT ATRIAL FIBRILLATION (HCC): ICD-10-CM

## 2025-01-08 DIAGNOSIS — N39.41 BENIGN PROSTATIC HYPERPLASIA (BPH) WITH URINARY URGE INCONTINENCE: ICD-10-CM

## 2025-01-08 DIAGNOSIS — R26.2 AMBULATORY DYSFUNCTION: Primary | ICD-10-CM

## 2025-01-08 DIAGNOSIS — N40.1 BENIGN PROSTATIC HYPERPLASIA (BPH) WITH URINARY URGE INCONTINENCE: ICD-10-CM

## 2025-01-08 DIAGNOSIS — N18.32 STAGE 3B CHRONIC KIDNEY DISEASE (HCC): ICD-10-CM

## 2025-01-08 DIAGNOSIS — F01.B2 MODERATE VASCULAR DEMENTIA WITH PSYCHOTIC DISTURBANCE (HCC): ICD-10-CM

## 2025-01-08 DIAGNOSIS — Z95.0 HX OF CARDIAC PACEMAKER: ICD-10-CM

## 2025-01-08 DIAGNOSIS — M15.9 GENERALIZED OSTEOARTHRITIS: ICD-10-CM

## 2025-01-08 DIAGNOSIS — H91.93 PROFOUND HEARING LOSS OF BOTH EARS: ICD-10-CM

## 2025-01-08 DIAGNOSIS — I25.110 CORONARY ARTERY DISEASE INVOLVING NATIVE CORONARY ARTERY OF NATIVE HEART WITH UNSTABLE ANGINA PECTORIS (HCC): ICD-10-CM

## 2025-01-08 DIAGNOSIS — E78.2 MIXED HYPERLIPIDEMIA: ICD-10-CM

## 2025-01-08 DIAGNOSIS — I50.22 CHRONIC SYSTOLIC CONGESTIVE HEART FAILURE (HCC): ICD-10-CM

## 2025-01-08 DIAGNOSIS — E03.9 ACQUIRED HYPOTHYROIDISM: ICD-10-CM

## 2025-01-08 DIAGNOSIS — Z95.2 H/O MECHANICAL AORTIC VALVE REPLACEMENT: ICD-10-CM

## 2025-01-08 NOTE — PROGRESS NOTES
[address] [facility] [place] [address]  Phone: [phone]  Fax: [phone]  Also available via [employer]     PLACE OF SERVICE:  [facility] [address] [place] [address]    SKILLED VISIT    Chief Complaint:   Chief Complaint   Patient presents with    Follow-up         HPI : Corby Chun is a 89 y.o. male here for follow up.    Previous history prior to admission:  Patient was hospitalized from 12/29/2024 until 1/3/2025.  He presented to the emergency room after he had been dealing with frequent urinary tract infections, progressive decline and ultimately inability to ambulate.  He was admitted for workup and evaluation.  There were no acute findings and was recommended that he do PT and OT in SNF setting.  He has past medical history of dementia with behavioral disturbance and hallucinations and he continues on paroxetine and olanzapine.  He has other past medical history of hypertension hyperlipidemia severe aortic stenosis status post AVR, CHF status post biventricular ICD, previously documented ejection fraction 15% but last EF was up to 42%, coronary artery disease status post CABG, atrial flutter status post ablation, major depressive disorder, hypothyroidism, chronic kidney disease stage III, significant hearing loss.  He was discharged to [facility] and rehabilitation for strengthening and conditioning.    Current visit:  01/06/2025 -patient sitting up in chair this morning he is awake alert very pleasant he is significantly hard of hearing.  He does deny pain he denies shortness of breath and denies chest pain.  Other review of systems were difficult to obtain due to hearing loss.  His vital signs thus far have been stable he is afebrile heart rate is rate controlled.  His oxygen saturations on room air are stable does not have any increased respiratory effort.  Per documentation eating 75 to 100%.  Bowel sounds are

## 2025-01-08 NOTE — PROGRESS NOTES
[address] [facility] [place] [address]  Phone: [phone]  Fax: [phone]  Also available via Perfect Serve     PLACE OF SERVICE:  [facility] [address] [place] [address]    SKILLED VISIT    Chief Complaint:   Chief Complaint   Patient presents with    Follow-up         HPI : Corby Chun is a 89 y.o. male here for follow up.    Previous history prior to admission:  Patient was hospitalized from 12/29/2024 until 1/3/2025.  He presented to the emergency room after he had been dealing with frequent urinary tract infections, progressive decline and ultimately inability to ambulate.  He was admitted for workup and evaluation.  There were no acute findings and was recommended that he do PT and OT in SNF setting.  He has past medical history of dementia with behavioral disturbance and hallucinations and he continues on paroxetine and olanzapine.  He has other past medical history of hypertension hyperlipidemia severe aortic stenosis status post AVR, CHF status post biventricular ICD, previously documented ejection fraction 15% but last EF was up to 42%, coronary artery disease status post CABG, atrial flutter status post ablation, major depressive disorder, hypothyroidism, chronic kidney disease stage III, significant hearing loss.  He was discharged to [facility] and rehabilitation for strengthening and conditioning.    Current visit:  01/06/2025 -patient sitting up in chair this morning he is awake alert very pleasant he is significantly hard of hearing.  He does deny pain he denies shortness of breath and denies chest pain.  Other review of systems were difficult to obtain due to hearing loss.  His vital signs thus far have been stable he is afebrile heart rate is rate controlled.  His oxygen saturations on room air are stable does not have any increased respiratory effort.  Per documentation eating 75 to 100%.  Bowel sounds are

## 2025-01-09 ENCOUNTER — TELEPHONE (OUTPATIENT)
Age: 89
End: 2025-01-09

## 2025-01-09 NOTE — TELEPHONE ENCOUNTER
On-call page:    Pt currently in inpt rehab.   Patient's wife calling to asking for on-call doctor to review inpatient rehab's management of coumadin and give opinion.   I defer to inpatient rehab team for coumadin management, unable to review after hours, advised to make follow up with PCP to discuss if desired.     Toro Marks MD

## 2025-01-10 ENCOUNTER — TELEPHONE (OUTPATIENT)
Age: 89
End: 2025-01-10

## 2025-01-10 ENCOUNTER — OFFICE VISIT (OUTPATIENT)
Facility: CLINIC | Age: 89
End: 2025-01-10

## 2025-01-10 VITALS
RESPIRATION RATE: 16 BRPM | HEART RATE: 66 BPM | SYSTOLIC BLOOD PRESSURE: 116 MMHG | TEMPERATURE: 97.7 F | DIASTOLIC BLOOD PRESSURE: 82 MMHG | OXYGEN SATURATION: 99 % | WEIGHT: 162.7 LBS | BODY MASS INDEX: 29.76 KG/M2

## 2025-01-10 DIAGNOSIS — I10 HYPERTENSION, ESSENTIAL: ICD-10-CM

## 2025-01-10 DIAGNOSIS — E78.2 MIXED HYPERLIPIDEMIA: ICD-10-CM

## 2025-01-10 DIAGNOSIS — I50.22 CHRONIC SYSTOLIC CONGESTIVE HEART FAILURE (HCC): ICD-10-CM

## 2025-01-10 DIAGNOSIS — N39.41 BENIGN PROSTATIC HYPERPLASIA (BPH) WITH URINARY URGE INCONTINENCE: ICD-10-CM

## 2025-01-10 DIAGNOSIS — N18.32 STAGE 3B CHRONIC KIDNEY DISEASE (HCC): ICD-10-CM

## 2025-01-10 DIAGNOSIS — M15.9 GENERALIZED OSTEOARTHRITIS: ICD-10-CM

## 2025-01-10 DIAGNOSIS — H91.93 PROFOUND HEARING LOSS OF BOTH EARS: ICD-10-CM

## 2025-01-10 DIAGNOSIS — E03.9 ACQUIRED HYPOTHYROIDISM: ICD-10-CM

## 2025-01-10 DIAGNOSIS — R26.2 AMBULATORY DYSFUNCTION: Primary | ICD-10-CM

## 2025-01-10 DIAGNOSIS — I48.11 LONGSTANDING PERSISTENT ATRIAL FIBRILLATION (HCC): ICD-10-CM

## 2025-01-10 DIAGNOSIS — Z95.2 H/O MECHANICAL AORTIC VALVE REPLACEMENT: ICD-10-CM

## 2025-01-10 DIAGNOSIS — Z95.0 HX OF CARDIAC PACEMAKER: ICD-10-CM

## 2025-01-10 DIAGNOSIS — N40.1 BENIGN PROSTATIC HYPERPLASIA (BPH) WITH URINARY URGE INCONTINENCE: ICD-10-CM

## 2025-01-10 DIAGNOSIS — I25.110 CORONARY ARTERY DISEASE INVOLVING NATIVE CORONARY ARTERY OF NATIVE HEART WITH UNSTABLE ANGINA PECTORIS (HCC): ICD-10-CM

## 2025-01-10 NOTE — TELEPHONE ENCOUNTER
Spoke with patient daughter, Ivette.  Pt resides at [facility] and rehab. Pt having a hard time walking. UTI off and on for several months. Blood in nose and blood shot eyes over the holidays. See telephone encounter 12/29/24.  Daughter does not want patient at [facility] anymore. Daughter states called [facility] to see if can take patient but has not received any call back. States she was advised that PCP needs to request a transfer.   Pt fell yesterday, not brushing his teeth, INR levels up to 4.6 yesterday. States coumadin was doubled and pt is receiving 10mg and given shot of vitamin k.  Pt not given water, urine smell on pt. Pt laying in the dark.     Family has not contacted  or  at rehab. Recommended to get in contact with  and  at facility to help plan out pt care and transfer. Recommended to daughter to reach out back to [facility] to check on transfer.    Please advise

## 2025-01-10 NOTE — PROGRESS NOTES
[address] [facility] [place] [address]  Phone: [phone]  Fax: [phone]  Also available via Perfect Serve     PLACE OF SERVICE:  [facility] [address] [place] [address]    SKILLED VISIT    Chief Complaint:   Chief Complaint   Patient presents with    Follow-up         HPI : Corby Chun is a 89 y.o. male here for follow up.    Previous history prior to admission:  Patient was hospitalized from 12/29/2024 until 1/3/2025.  He presented to the emergency room after he had been dealing with frequent urinary tract infections, progressive decline and ultimately inability to ambulate.  He was admitted for workup and evaluation.  There were no acute findings and was recommended that he do PT and OT in SNF setting.  He has past medical history of dementia with behavioral disturbance and hallucinations and he continues on paroxetine and olanzapine.  He has other past medical history of hypertension hyperlipidemia severe aortic stenosis status post AVR, CHF status post biventricular ICD, previously documented ejection fraction 15% but last EF was up to 42%, coronary artery disease status post CABG, atrial flutter status post ablation, major depressive disorder, hypothyroidism, chronic kidney disease stage III, significant hearing loss.  He was discharged to [facility] and rehabilitation for strengthening and conditioning.    Current visit:  01/06/2025 -patient sitting up in chair this morning he is awake alert very pleasant he is significantly hard of hearing.  He does deny pain he denies shortness of breath and denies chest pain.  Other review of systems were difficult to obtain due to hearing loss.  His vital signs thus far have been stable he is afebrile heart rate is rate controlled.  His oxygen saturations on room air are stable does not have any increased respiratory effort.  Per documentation eating 75 to 100%.  Bowel sounds are

## 2025-01-10 NOTE — TELEPHONE ENCOUNTER
Ivette is calling as pt is in [facility].    Family is not happy at all.  He is not being attended to.      Coumadin seems to be a very high concern for Ivette.    She states that pt needs to be moved today.  , [facility] and [facility] Hernandez were given and  they picked [facility] due to it being close.       Ivette needs to know what to do.  Ivette has a lot of medication situations to discuss.      Ivette gave me a lot of info that I could not chart /please call so she can give you the clinical situation.

## 2025-01-11 RX ORDER — WARFARIN SODIUM 2 MG/1
2 TABLET ORAL DAILY
COMMUNITY

## 2025-01-13 ENCOUNTER — OFFICE VISIT (OUTPATIENT)
Facility: CLINIC | Age: 89
End: 2025-01-13
Payer: MEDICARE

## 2025-01-13 VITALS
DIASTOLIC BLOOD PRESSURE: 72 MMHG | BODY MASS INDEX: 29.58 KG/M2 | SYSTOLIC BLOOD PRESSURE: 124 MMHG | OXYGEN SATURATION: 96 % | WEIGHT: 161.7 LBS | RESPIRATION RATE: 20 BRPM | TEMPERATURE: 98.2 F | HEART RATE: 80 BPM

## 2025-01-13 DIAGNOSIS — M15.9 GENERALIZED OSTEOARTHRITIS: ICD-10-CM

## 2025-01-13 DIAGNOSIS — Z95.2 H/O MECHANICAL AORTIC VALVE REPLACEMENT: ICD-10-CM

## 2025-01-13 DIAGNOSIS — U07.1 COVID-19: ICD-10-CM

## 2025-01-13 DIAGNOSIS — I25.110 CORONARY ARTERY DISEASE INVOLVING NATIVE CORONARY ARTERY OF NATIVE HEART WITH UNSTABLE ANGINA PECTORIS (HCC): ICD-10-CM

## 2025-01-13 DIAGNOSIS — E03.9 ACQUIRED HYPOTHYROIDISM: ICD-10-CM

## 2025-01-13 DIAGNOSIS — I48.11 LONGSTANDING PERSISTENT ATRIAL FIBRILLATION (HCC): ICD-10-CM

## 2025-01-13 DIAGNOSIS — Z95.0 HX OF CARDIAC PACEMAKER: ICD-10-CM

## 2025-01-13 DIAGNOSIS — E78.2 MIXED HYPERLIPIDEMIA: ICD-10-CM

## 2025-01-13 DIAGNOSIS — I50.22 CHRONIC SYSTOLIC CONGESTIVE HEART FAILURE (HCC): ICD-10-CM

## 2025-01-13 DIAGNOSIS — N40.1 BENIGN PROSTATIC HYPERPLASIA (BPH) WITH URINARY URGE INCONTINENCE: ICD-10-CM

## 2025-01-13 DIAGNOSIS — H91.93 PROFOUND HEARING LOSS OF BOTH EARS: ICD-10-CM

## 2025-01-13 DIAGNOSIS — R26.2 AMBULATORY DYSFUNCTION: Primary | ICD-10-CM

## 2025-01-13 DIAGNOSIS — N39.41 BENIGN PROSTATIC HYPERPLASIA (BPH) WITH URINARY URGE INCONTINENCE: ICD-10-CM

## 2025-01-13 DIAGNOSIS — N18.32 STAGE 3B CHRONIC KIDNEY DISEASE (HCC): ICD-10-CM

## 2025-01-13 DIAGNOSIS — I10 HYPERTENSION, ESSENTIAL: ICD-10-CM

## 2025-01-13 PROCEDURE — 99309 SBSQ NF CARE MODERATE MDM 30: CPT | Performed by: NURSE PRACTITIONER

## 2025-01-13 PROCEDURE — 1123F ACP DISCUSS/DSCN MKR DOCD: CPT | Performed by: NURSE PRACTITIONER

## 2025-01-13 RX ORDER — HYDROCHLOROTHIAZIDE 25 MG/1
25 TABLET ORAL DAILY
COMMUNITY

## 2025-01-13 NOTE — PROGRESS NOTES
7. Hypertension, essential     Blood pressure controlled  Continue Entresto twice daily  Not on any other blood pressure medications  Monitor blood pressures   8. Mixed hyperlipidemia     Stable  Continue Lipitor 40 mg p.o. daily   9. Acquired hypothyroidism     Stable  Continue levothyroxine 150 mcg p.o. daily   10. Stage 3b chronic kidney disease (HCC)     Last labs from hospitalization showed creatinine 1.04 and stable  Labs scheduled on 01/14/2025 bmp  Periodically monitor   11. Benign prostatic hyperplasia (BPH) with urinary urge incontinence     Stable  Continues on Proscar 5 mg p.o. daily  Continue tamsulosin 0.8 mg p.o. nightly   12. Generalized osteoarthritis     Denies any acute complaints of pain  As needed Tylenol for pain management   13. Profound hearing loss of both ears     Stable   14. 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 twice daily for history of hallucinations    15. Covid 19   Symptoms started evening of01/10/2025  Covid tested 01/11/2025 was negative  Symptoms still present but improving - tested on 01/13/2025 POSITIVE  Isolation per facility protocol  Interactions with meds for Paxlovid - therefore not used  Treat symptoms - start robitussin 10 ml PO Q4H PRN  Monitor respiratory status           IVY Rodriguez - NP

## 2025-01-15 ENCOUNTER — OFFICE VISIT (OUTPATIENT)
Facility: CLINIC | Age: 89
End: 2025-01-15

## 2025-01-15 VITALS
TEMPERATURE: 98.1 F | OXYGEN SATURATION: 95 % | SYSTOLIC BLOOD PRESSURE: 124 MMHG | WEIGHT: 160 LBS | HEART RATE: 83 BPM | DIASTOLIC BLOOD PRESSURE: 77 MMHG | RESPIRATION RATE: 17 BRPM | BODY MASS INDEX: 29.26 KG/M2

## 2025-01-15 DIAGNOSIS — M15.9 GENERALIZED OSTEOARTHRITIS: ICD-10-CM

## 2025-01-15 DIAGNOSIS — N18.32 STAGE 3B CHRONIC KIDNEY DISEASE (HCC): ICD-10-CM

## 2025-01-15 DIAGNOSIS — E03.9 ACQUIRED HYPOTHYROIDISM: ICD-10-CM

## 2025-01-15 DIAGNOSIS — Z95.0 HX OF CARDIAC PACEMAKER: ICD-10-CM

## 2025-01-15 DIAGNOSIS — I10 HYPERTENSION, ESSENTIAL: ICD-10-CM

## 2025-01-15 DIAGNOSIS — I25.110 CORONARY ARTERY DISEASE INVOLVING NATIVE CORONARY ARTERY OF NATIVE HEART WITH UNSTABLE ANGINA PECTORIS (HCC): ICD-10-CM

## 2025-01-15 DIAGNOSIS — I50.22 CHRONIC SYSTOLIC CONGESTIVE HEART FAILURE (HCC): ICD-10-CM

## 2025-01-15 DIAGNOSIS — H91.93 PROFOUND HEARING LOSS OF BOTH EARS: ICD-10-CM

## 2025-01-15 DIAGNOSIS — N40.1 BENIGN PROSTATIC HYPERPLASIA (BPH) WITH URINARY URGE INCONTINENCE: ICD-10-CM

## 2025-01-15 DIAGNOSIS — U07.1 COVID-19: ICD-10-CM

## 2025-01-15 DIAGNOSIS — Z95.2 H/O MECHANICAL AORTIC VALVE REPLACEMENT: ICD-10-CM

## 2025-01-15 DIAGNOSIS — N39.41 BENIGN PROSTATIC HYPERPLASIA (BPH) WITH URINARY URGE INCONTINENCE: ICD-10-CM

## 2025-01-15 DIAGNOSIS — R26.2 AMBULATORY DYSFUNCTION: Primary | ICD-10-CM

## 2025-01-15 DIAGNOSIS — I48.11 LONGSTANDING PERSISTENT ATRIAL FIBRILLATION (HCC): ICD-10-CM

## 2025-01-15 DIAGNOSIS — E78.2 MIXED HYPERLIPIDEMIA: ICD-10-CM

## 2025-01-15 NOTE — PROGRESS NOTES
systems negative except for that which is listed. + Positive for fatigue, but no cough/congestion    Medications: Reviewed in EMR and assessment and plan    Social History:    Social History     Tobacco Use    Smoking status: Never    Smokeless tobacco: Never   Vaping Use    Vaping status: Never Used   Substance Use Topics    Alcohol use: Not Currently     Alcohol/week: 3.0 standard drinks of alcohol     Types: 3 Standard drinks or equivalent per week    Drug use: No       Family History:    Family History   Problem Relation Age of Onset    Heart Attack Father     Diabetes Mother     Stroke Mother         ANEURYSM            Vitals:   Vitals:    01/15/25 1129   BP: 124/77   Pulse: 83   Resp: 17   Temp: 98.1 °F (36.7 °C)   SpO2: 95%           Exam:  Constitutional: No acute distress;   Eyes: Sclera clear, PERRLA;   Ears/nose/mouth/throat:mmm, OP clear, trachea midline; very hard of hearing  Cardiovascular: RRR,nml S1 and S2, no rubs murmurs or gallops, trace BLE edema, no cyanosis;   Respiratory: Clear to auscultation, symmetric, no respiratory distress; dry cough appreciated during exam  Gastrointestinal: Abdomen soft, NT, ND, no masses, normal bowel sounds;  Neurologic: Cranial nerves II through VII grossly intact, no speech or motor deficits A&O to self only  Skin: No rash, warm and dry;  Musculoskeletal: No erythema swelling or joint tenderness, extremities without cyanosis, neck supple, ROM intact spine and extremities;  Psychiatric: Not agitated.  Appropriate affect, mood, judgment and insight.  Genitourinary: No suprapubic tenderness or flank tenderness  Heme, lymph, immuno: No pallor;       Labs: Last labs reviewed from hospitalization white blood cell count 5.7 hemoglobin 10.5 hematocrit 32.8 platelet 166 sodium 139 potassium 3.8 chloride 108 BUN 16 creatinine 1.04    01/06/2025 - INR was 3.8    01/09/2025 - INR 4.8   01/10/2025 - 1.7  01/13/2025 - INR 1.7  01/14/2025 -white blood cell count 5.1 hemoglobin

## 2025-01-17 ENCOUNTER — OFFICE VISIT (OUTPATIENT)
Facility: CLINIC | Age: 89
End: 2025-01-17

## 2025-01-17 VITALS
SYSTOLIC BLOOD PRESSURE: 108 MMHG | WEIGHT: 161.8 LBS | DIASTOLIC BLOOD PRESSURE: 61 MMHG | TEMPERATURE: 97.9 F | RESPIRATION RATE: 18 BRPM | HEART RATE: 70 BPM | BODY MASS INDEX: 29.59 KG/M2 | OXYGEN SATURATION: 96 %

## 2025-01-17 DIAGNOSIS — I48.11 LONGSTANDING PERSISTENT ATRIAL FIBRILLATION (HCC): ICD-10-CM

## 2025-01-17 DIAGNOSIS — N39.41 BENIGN PROSTATIC HYPERPLASIA (BPH) WITH URINARY URGE INCONTINENCE: ICD-10-CM

## 2025-01-17 DIAGNOSIS — Z95.0 HX OF CARDIAC PACEMAKER: ICD-10-CM

## 2025-01-17 DIAGNOSIS — N18.32 STAGE 3B CHRONIC KIDNEY DISEASE (HCC): ICD-10-CM

## 2025-01-17 DIAGNOSIS — I50.22 CHRONIC SYSTOLIC CONGESTIVE HEART FAILURE (HCC): ICD-10-CM

## 2025-01-17 DIAGNOSIS — E78.2 MIXED HYPERLIPIDEMIA: ICD-10-CM

## 2025-01-17 DIAGNOSIS — H91.93 PROFOUND HEARING LOSS OF BOTH EARS: ICD-10-CM

## 2025-01-17 DIAGNOSIS — R26.2 AMBULATORY DYSFUNCTION: Primary | ICD-10-CM

## 2025-01-17 DIAGNOSIS — I25.110 CORONARY ARTERY DISEASE INVOLVING NATIVE CORONARY ARTERY OF NATIVE HEART WITH UNSTABLE ANGINA PECTORIS (HCC): ICD-10-CM

## 2025-01-17 DIAGNOSIS — E03.9 ACQUIRED HYPOTHYROIDISM: ICD-10-CM

## 2025-01-17 DIAGNOSIS — M15.9 GENERALIZED OSTEOARTHRITIS: ICD-10-CM

## 2025-01-17 DIAGNOSIS — Z95.2 H/O MECHANICAL AORTIC VALVE REPLACEMENT: ICD-10-CM

## 2025-01-17 DIAGNOSIS — I10 HYPERTENSION, ESSENTIAL: ICD-10-CM

## 2025-01-17 DIAGNOSIS — N40.1 BENIGN PROSTATIC HYPERPLASIA (BPH) WITH URINARY URGE INCONTINENCE: ICD-10-CM

## 2025-01-17 DIAGNOSIS — U07.1 COVID-19: ICD-10-CM

## 2025-01-17 NOTE — PROGRESS NOTES
[place] [address] [address] [place] [address]  Phone: [phone]  Fax: [phone]  Also available via Perfect Serve     PLACE OF SERVICE:  [facility] [address] [place] [address]    SKILLED VISIT    Chief Complaint:   Chief Complaint   Patient presents with    Follow-up         HPI : Corby Chun is a 89 y.o. male here for follow up.    Previous history prior to admission:  Patient was hospitalized from 12/29/2024 until 1/3/2025.  He presented to the emergency room after he had been dealing with frequent urinary tract infections, progressive decline and ultimately inability to ambulate.  He was admitted for workup and evaluation.  There were no acute findings and was recommended that he do PT and OT in SNF setting.  He has past medical history of dementia with behavioral disturbance and hallucinations and he continues on paroxetine and olanzapine.  He has other past medical history of hypertension hyperlipidemia severe aortic stenosis status post AVR, CHF status post biventricular ICD, previously documented ejection fraction 15% but last EF was up to 42%, coronary artery disease status post CABG, atrial flutter status post ablation, major depressive disorder, hypothyroidism, chronic kidney disease stage III, significant hearing loss.  He was discharged to [facility] and rehabilitation for strengthening and conditioning.    Current visit:  01/17/2025 - patient i sitting on the side of the bed.  This morning. he is awake alert very pleasant he is significantly hard of hearing, this provider having to wear N95 mask makes his comprehension even worse. Did attempt to use writing board at the bedside.  He does deny pain he denies shortness of breath.  Previously during stay, nursing staff report some increased edema to attending Dr. Carroll who had placed patient on HCTZ 25mg PO daily for edema. Ankles today have trace  edema in  feet and

## 2025-01-21 ENCOUNTER — OFFICE VISIT (OUTPATIENT)
Facility: CLINIC | Age: 89
End: 2025-01-21

## 2025-01-21 VITALS
DIASTOLIC BLOOD PRESSURE: 64 MMHG | BODY MASS INDEX: 28 KG/M2 | SYSTOLIC BLOOD PRESSURE: 108 MMHG | OXYGEN SATURATION: 96 % | HEART RATE: 70 BPM | TEMPERATURE: 97.6 F | RESPIRATION RATE: 17 BRPM | WEIGHT: 153.1 LBS

## 2025-01-21 DIAGNOSIS — E03.9 ACQUIRED HYPOTHYROIDISM: ICD-10-CM

## 2025-01-21 DIAGNOSIS — Z95.0 HX OF CARDIAC PACEMAKER: ICD-10-CM

## 2025-01-21 DIAGNOSIS — I10 HYPERTENSION, ESSENTIAL: ICD-10-CM

## 2025-01-21 DIAGNOSIS — R26.2 AMBULATORY DYSFUNCTION: Primary | ICD-10-CM

## 2025-01-21 DIAGNOSIS — N39.41 BENIGN PROSTATIC HYPERPLASIA (BPH) WITH URINARY URGE INCONTINENCE: ICD-10-CM

## 2025-01-21 DIAGNOSIS — U07.1 COVID-19: ICD-10-CM

## 2025-01-21 DIAGNOSIS — N18.32 STAGE 3B CHRONIC KIDNEY DISEASE (HCC): ICD-10-CM

## 2025-01-21 DIAGNOSIS — M15.9 GENERALIZED OSTEOARTHRITIS: ICD-10-CM

## 2025-01-21 DIAGNOSIS — H91.93 PROFOUND HEARING LOSS OF BOTH EARS: ICD-10-CM

## 2025-01-21 DIAGNOSIS — I48.11 LONGSTANDING PERSISTENT ATRIAL FIBRILLATION (HCC): ICD-10-CM

## 2025-01-21 DIAGNOSIS — N40.1 BENIGN PROSTATIC HYPERPLASIA (BPH) WITH URINARY URGE INCONTINENCE: ICD-10-CM

## 2025-01-21 DIAGNOSIS — Z95.2 H/O MECHANICAL AORTIC VALVE REPLACEMENT: ICD-10-CM

## 2025-01-21 DIAGNOSIS — I50.22 CHRONIC SYSTOLIC CONGESTIVE HEART FAILURE (HCC): ICD-10-CM

## 2025-01-21 DIAGNOSIS — E78.2 MIXED HYPERLIPIDEMIA: ICD-10-CM

## 2025-01-21 DIAGNOSIS — I25.110 CORONARY ARTERY DISEASE INVOLVING NATIVE CORONARY ARTERY OF NATIVE HEART WITH UNSTABLE ANGINA PECTORIS (HCC): ICD-10-CM

## 2025-01-21 NOTE — PROGRESS NOTES
[place] [address] [facility] [place] [address]  Phone: [phone]  Fax: [phone]  Also available via Perfect Serve     PLACE OF SERVICE:  [facility] [address] [place] [address]    SKILLED VISIT    Chief Complaint:   Chief Complaint   Patient presents with    Follow-up         HPI : Corby Chun is a 89 y.o. male here for follow up.    Previous history prior to admission:  Patient was hospitalized from 12/29/2024 until 1/3/2025.  He presented to the emergency room after he had been dealing with frequent urinary tract infections, progressive decline and ultimately inability to ambulate.  He was admitted for workup and evaluation.  There were no acute findings and was recommended that he do PT and OT in SNF setting.  He has past medical history of dementia with behavioral disturbance and hallucinations and he continues on paroxetine and olanzapine.  He has other past medical history of hypertension hyperlipidemia severe aortic stenosis status post AVR, CHF status post biventricular ICD, previously documented ejection fraction 15% but last EF was up to 42%, coronary artery disease status post CABG, atrial flutter status post ablation, major depressive disorder, hypothyroidism, chronic kidney disease stage III, significant hearing loss.  He was discharged to [facility] and rehabilitation for strengthening and conditioning.    Current visit:  01/21/2025 - patient i sitting on the side of the bed.  This morning. he is awake alert very pleasant he is significantly hard of hearing, this provider having to wear N95 mask makes his comprehension even worse.  He does deny pain he denies shortness of breath.  Previously during stay, nursing staff report some increased edema to attending Dr. Carroll who had placed patient on HCTZ 25mg PO daily for edema. Ankles today have trace  edema in  feet and ankles while sitting up in dependent position. His

## 2025-01-22 LAB
BACTERIA ISLT: ABNORMAL
OTHER ANTIBIOTIC SUSC ISLT: ABNORMAL
SOURCE: ABNORMAL

## 2025-01-23 ENCOUNTER — OFFICE VISIT (OUTPATIENT)
Facility: CLINIC | Age: 89
End: 2025-01-23

## 2025-01-23 VITALS
TEMPERATURE: 97.9 F | HEART RATE: 71 BPM | RESPIRATION RATE: 18 BRPM | WEIGHT: 158.7 LBS | DIASTOLIC BLOOD PRESSURE: 65 MMHG | OXYGEN SATURATION: 97 % | BODY MASS INDEX: 29.03 KG/M2 | SYSTOLIC BLOOD PRESSURE: 109 MMHG

## 2025-01-23 DIAGNOSIS — M15.9 GENERALIZED OSTEOARTHRITIS: ICD-10-CM

## 2025-01-23 DIAGNOSIS — I10 HYPERTENSION, ESSENTIAL: ICD-10-CM

## 2025-01-23 DIAGNOSIS — N18.32 STAGE 3B CHRONIC KIDNEY DISEASE (HCC): ICD-10-CM

## 2025-01-23 DIAGNOSIS — I25.110 CORONARY ARTERY DISEASE INVOLVING NATIVE CORONARY ARTERY OF NATIVE HEART WITH UNSTABLE ANGINA PECTORIS (HCC): ICD-10-CM

## 2025-01-23 DIAGNOSIS — E03.9 ACQUIRED HYPOTHYROIDISM: ICD-10-CM

## 2025-01-23 DIAGNOSIS — E78.2 MIXED HYPERLIPIDEMIA: ICD-10-CM

## 2025-01-23 DIAGNOSIS — R26.2 AMBULATORY DYSFUNCTION: Primary | ICD-10-CM

## 2025-01-23 DIAGNOSIS — N39.41 BENIGN PROSTATIC HYPERPLASIA (BPH) WITH URINARY URGE INCONTINENCE: ICD-10-CM

## 2025-01-23 DIAGNOSIS — N40.1 BENIGN PROSTATIC HYPERPLASIA (BPH) WITH URINARY URGE INCONTINENCE: ICD-10-CM

## 2025-01-23 DIAGNOSIS — I50.22 CHRONIC SYSTOLIC CONGESTIVE HEART FAILURE (HCC): ICD-10-CM

## 2025-01-23 DIAGNOSIS — H91.93 PROFOUND HEARING LOSS OF BOTH EARS: ICD-10-CM

## 2025-01-23 DIAGNOSIS — Z95.0 HX OF CARDIAC PACEMAKER: ICD-10-CM

## 2025-01-23 DIAGNOSIS — I48.11 LONGSTANDING PERSISTENT ATRIAL FIBRILLATION (HCC): ICD-10-CM

## 2025-01-23 DIAGNOSIS — Z95.2 H/O MECHANICAL AORTIC VALVE REPLACEMENT: ICD-10-CM

## 2025-01-23 NOTE — PROGRESS NOTES
[place] [address] [place] [address]  Phone: [phone]  Fax: [phone]  Also available via Perfect Serve     PLACE OF SERVICE:  [facility] [address] [place] [address]    SKILLED VISIT    Chief Complaint:   Chief Complaint   Patient presents with    Follow-up         HPI : Corby Chun is a 89 y.o. male here for follow up.    Previous history prior to admission:  Patient was hospitalized from 12/29/2024 until 1/3/2025.  He presented to the emergency room after he had been dealing with frequent urinary tract infections, progressive decline and ultimately inability to ambulate.  He was admitted for workup and evaluation.  There were no acute findings and was recommended that he do PT and OT in SNF setting.  He has past medical history of dementia with behavioral disturbance and hallucinations and he continues on paroxetine and olanzapine.  He has other past medical history of hypertension hyperlipidemia severe aortic stenosis status post AVR, CHF status post biventricular ICD, previously documented ejection fraction 15% but last EF was up to 42%, coronary artery disease status post CABG, atrial flutter status post ablation, major depressive disorder, hypothyroidism, chronic kidney disease stage III, significant hearing loss.  He was discharged to [facility] and rehabilitation for strengthening and conditioning.    Current visit:  01/23/2025 - patient i sitting on the side of the bed.  This morning. he is awake alert very pleasant he is significantly hard of hearing, this provider having to wear N95 mask makes his comprehension even worse.  He does deny pain he denies shortness of breath.  Previously during stay, nursing staff report some increased edema to attending Dr. Carroll who had placed patient on HCTZ 25mg PO daily for edema. Ankles today have trace  edema in  feet and ankles while sitting up in dependent position. His

## 2025-01-28 ENCOUNTER — OFFICE VISIT (OUTPATIENT)
Facility: CLINIC | Age: 89
End: 2025-01-28
Payer: MEDICARE

## 2025-01-28 VITALS
SYSTOLIC BLOOD PRESSURE: 146 MMHG | TEMPERATURE: 98.1 F | OXYGEN SATURATION: 98 % | WEIGHT: 157 LBS | HEART RATE: 80 BPM | BODY MASS INDEX: 28.72 KG/M2 | RESPIRATION RATE: 18 BRPM | DIASTOLIC BLOOD PRESSURE: 64 MMHG

## 2025-01-28 DIAGNOSIS — I48.11 LONGSTANDING PERSISTENT ATRIAL FIBRILLATION (HCC): ICD-10-CM

## 2025-01-28 DIAGNOSIS — Z95.0 HX OF CARDIAC PACEMAKER: ICD-10-CM

## 2025-01-28 DIAGNOSIS — I25.110 CORONARY ARTERY DISEASE INVOLVING NATIVE CORONARY ARTERY OF NATIVE HEART WITH UNSTABLE ANGINA PECTORIS (HCC): ICD-10-CM

## 2025-01-28 DIAGNOSIS — E78.2 MIXED HYPERLIPIDEMIA: ICD-10-CM

## 2025-01-28 DIAGNOSIS — H91.93 PROFOUND HEARING LOSS OF BOTH EARS: ICD-10-CM

## 2025-01-28 DIAGNOSIS — E03.9 ACQUIRED HYPOTHYROIDISM: ICD-10-CM

## 2025-01-28 DIAGNOSIS — N18.32 STAGE 3B CHRONIC KIDNEY DISEASE (HCC): ICD-10-CM

## 2025-01-28 DIAGNOSIS — M15.9 GENERALIZED OSTEOARTHRITIS: ICD-10-CM

## 2025-01-28 DIAGNOSIS — N39.41 BENIGN PROSTATIC HYPERPLASIA (BPH) WITH URINARY URGE INCONTINENCE: ICD-10-CM

## 2025-01-28 DIAGNOSIS — I50.22 CHRONIC SYSTOLIC CONGESTIVE HEART FAILURE (HCC): ICD-10-CM

## 2025-01-28 DIAGNOSIS — R26.2 AMBULATORY DYSFUNCTION: Primary | ICD-10-CM

## 2025-01-28 DIAGNOSIS — U07.1 COVID-19: ICD-10-CM

## 2025-01-28 DIAGNOSIS — Z95.2 H/O MECHANICAL AORTIC VALVE REPLACEMENT: ICD-10-CM

## 2025-01-28 DIAGNOSIS — N40.1 BENIGN PROSTATIC HYPERPLASIA (BPH) WITH URINARY URGE INCONTINENCE: ICD-10-CM

## 2025-01-28 DIAGNOSIS — I10 HYPERTENSION, ESSENTIAL: ICD-10-CM

## 2025-01-28 PROCEDURE — 1123F ACP DISCUSS/DSCN MKR DOCD: CPT | Performed by: NURSE PRACTITIONER

## 2025-01-28 PROCEDURE — 99309 SBSQ NF CARE MODERATE MDM 30: CPT | Performed by: NURSE PRACTITIONER

## 2025-01-28 NOTE — PROGRESS NOTES
[place] [id number] [facility] [place] [address]  Phone: [phone]  Fax: [phone]  Also available via Perfect Serve     PLACE OF SERVICE:  [facility] [address] [place] [address]    SKILLED VISIT    Chief Complaint:   Chief Complaint   Patient presents with    Follow-up         HPI : Corby Chun is a 89 y.o. male here for follow up.    Previous history prior to admission:  Patient was hospitalized from 12/29/2024 until 1/3/2025.  He presented to the emergency room after he had been dealing with frequent urinary tract infections, progressive decline and ultimately inability to ambulate.  He was admitted for workup and evaluation.  There were no acute findings and was recommended that he do PT and OT in SNF setting.  He has past medical history of dementia with behavioral disturbance and hallucinations and he continues on paroxetine and olanzapine.  He has other past medical history of hypertension hyperlipidemia severe aortic stenosis status post AVR, CHF status post biventricular ICD, previously documented ejection fraction 15% but last EF was up to 42%, coronary artery disease status post CABG, atrial flutter status post ablation, major depressive disorder, hypothyroidism, chronic kidney disease stage III, significant hearing loss.  He was discharged to [facility] and rehabilitation for strengthening and conditioning.    Current visit:  01/28/2025 - patient sitting up in wheelchair today.   he is awake alert very pleasant he is significantly hard of hearing, this provider having to wear N95 mask makes his comprehension even worse.  He does deny pain he denies shortness of breath.  Previously during stay, nursing staff report some increased edema to attending Dr. Carroll who had placed patient on HCTZ 25mg PO daily for edema. Ankles today have no edema in  feet and ankles while sitting up in dependent position. His lung sounds are clear

## 2025-01-30 ENCOUNTER — OFFICE VISIT (OUTPATIENT)
Facility: CLINIC | Age: 89
End: 2025-01-30

## 2025-01-30 VITALS
DIASTOLIC BLOOD PRESSURE: 72 MMHG | WEIGHT: 155.5 LBS | TEMPERATURE: 97.7 F | HEART RATE: 75 BPM | RESPIRATION RATE: 17 BRPM | BODY MASS INDEX: 28.44 KG/M2 | OXYGEN SATURATION: 98 % | SYSTOLIC BLOOD PRESSURE: 119 MMHG

## 2025-01-30 DIAGNOSIS — I50.22 CHRONIC SYSTOLIC CONGESTIVE HEART FAILURE (HCC): ICD-10-CM

## 2025-01-30 DIAGNOSIS — E03.9 ACQUIRED HYPOTHYROIDISM: ICD-10-CM

## 2025-01-30 DIAGNOSIS — R26.2 AMBULATORY DYSFUNCTION: Primary | ICD-10-CM

## 2025-01-30 DIAGNOSIS — Z95.0 HX OF CARDIAC PACEMAKER: ICD-10-CM

## 2025-01-30 DIAGNOSIS — H91.93 PROFOUND HEARING LOSS OF BOTH EARS: ICD-10-CM

## 2025-01-30 DIAGNOSIS — R41.0 CONFUSION: ICD-10-CM

## 2025-01-30 DIAGNOSIS — M15.9 GENERALIZED OSTEOARTHRITIS: ICD-10-CM

## 2025-01-30 DIAGNOSIS — F01.B2 MODERATE VASCULAR DEMENTIA WITH PSYCHOTIC DISTURBANCE (HCC): ICD-10-CM

## 2025-01-30 DIAGNOSIS — Z95.2 H/O MECHANICAL AORTIC VALVE REPLACEMENT: ICD-10-CM

## 2025-01-30 DIAGNOSIS — N18.32 STAGE 3B CHRONIC KIDNEY DISEASE (HCC): ICD-10-CM

## 2025-01-30 DIAGNOSIS — N39.41 BENIGN PROSTATIC HYPERPLASIA (BPH) WITH URINARY URGE INCONTINENCE: ICD-10-CM

## 2025-01-30 DIAGNOSIS — I10 HYPERTENSION, ESSENTIAL: ICD-10-CM

## 2025-01-30 DIAGNOSIS — N40.1 BENIGN PROSTATIC HYPERPLASIA (BPH) WITH URINARY URGE INCONTINENCE: ICD-10-CM

## 2025-01-30 DIAGNOSIS — I48.11 LONGSTANDING PERSISTENT ATRIAL FIBRILLATION (HCC): ICD-10-CM

## 2025-01-30 DIAGNOSIS — E78.2 MIXED HYPERLIPIDEMIA: ICD-10-CM

## 2025-01-30 DIAGNOSIS — I25.110 CORONARY ARTERY DISEASE INVOLVING NATIVE CORONARY ARTERY OF NATIVE HEART WITH UNSTABLE ANGINA PECTORIS (HCC): ICD-10-CM

## 2025-01-30 NOTE — PROGRESS NOTES
[place] [address] [address] [place] [address]  Phone: [phone]  Fax: [phone]  Also available via Perfect Serve     PLACE OF SERVICE:  [facility] [address] [place] [address]    SKILLED VISIT    Chief Complaint:   Chief Complaint   Patient presents with    Follow-up         HPI : Corby Chun is a 89 y.o. male here for follow up.    Previous history prior to admission:  Patient was hospitalized from 12/29/2024 until 1/3/2025.  He presented to the emergency room after he had been dealing with frequent urinary tract infections, progressive decline and ultimately inability to ambulate.  He was admitted for workup and evaluation.  There were no acute findings and was recommended that he do PT and OT in SNF setting.  He has past medical history of dementia with behavioral disturbance and hallucinations and he continues on paroxetine and olanzapine.  He has other past medical history of hypertension hyperlipidemia severe aortic stenosis status post AVR, CHF status post biventricular ICD, previously documented ejection fraction 15% but last EF was up to 42%, coronary artery disease status post CABG, atrial flutter status post ablation, major depressive disorder, hypothyroidism, chronic kidney disease stage III, significant hearing loss.  He was discharged to [facility] and rehabilitation for strengthening and conditioning.    Current visit:  01/30/2025 - patient lying in bed this morning.   He was arousable, but said he didn't want to get up right now. Nursing reported some increase confusion with hallucinations yesterday. Doesn't appear to have change in appetite, but hasn't yet eaten this morning and not yet up. He is afebrile. Will check labs on 01/31/25 CBC BMP and urinalysis. He denies pain, but hard of hearing and wants to be \"left alone\" so not really participating in review of systems. No evidence of difficulty breathing. Vitals

## 2025-02-04 ENCOUNTER — OFFICE VISIT (OUTPATIENT)
Facility: CLINIC | Age: 89
End: 2025-02-04
Payer: MEDICARE

## 2025-02-04 VITALS
WEIGHT: 158.9 LBS | OXYGEN SATURATION: 98 % | RESPIRATION RATE: 16 BRPM | SYSTOLIC BLOOD PRESSURE: 103 MMHG | TEMPERATURE: 97.4 F | DIASTOLIC BLOOD PRESSURE: 71 MMHG | HEART RATE: 84 BPM | BODY MASS INDEX: 29.06 KG/M2

## 2025-02-04 DIAGNOSIS — I10 HYPERTENSION, ESSENTIAL: ICD-10-CM

## 2025-02-04 DIAGNOSIS — H91.93 PROFOUND HEARING LOSS OF BOTH EARS: ICD-10-CM

## 2025-02-04 DIAGNOSIS — Z95.2 H/O MECHANICAL AORTIC VALVE REPLACEMENT: ICD-10-CM

## 2025-02-04 DIAGNOSIS — I50.22 CHRONIC SYSTOLIC CONGESTIVE HEART FAILURE (HCC): ICD-10-CM

## 2025-02-04 DIAGNOSIS — Z95.0 HX OF CARDIAC PACEMAKER: ICD-10-CM

## 2025-02-04 DIAGNOSIS — N39.41 BENIGN PROSTATIC HYPERPLASIA (BPH) WITH URINARY URGE INCONTINENCE: ICD-10-CM

## 2025-02-04 DIAGNOSIS — R41.0 CONFUSION: ICD-10-CM

## 2025-02-04 DIAGNOSIS — E03.9 ACQUIRED HYPOTHYROIDISM: ICD-10-CM

## 2025-02-04 DIAGNOSIS — E78.2 MIXED HYPERLIPIDEMIA: ICD-10-CM

## 2025-02-04 DIAGNOSIS — N40.1 BENIGN PROSTATIC HYPERPLASIA (BPH) WITH URINARY URGE INCONTINENCE: ICD-10-CM

## 2025-02-04 DIAGNOSIS — R26.2 AMBULATORY DYSFUNCTION: Primary | ICD-10-CM

## 2025-02-04 DIAGNOSIS — I48.11 LONGSTANDING PERSISTENT ATRIAL FIBRILLATION (HCC): ICD-10-CM

## 2025-02-04 DIAGNOSIS — F01.B2 MODERATE VASCULAR DEMENTIA WITH PSYCHOTIC DISTURBANCE (HCC): ICD-10-CM

## 2025-02-04 DIAGNOSIS — M15.9 GENERALIZED OSTEOARTHRITIS: ICD-10-CM

## 2025-02-04 DIAGNOSIS — N18.32 STAGE 3B CHRONIC KIDNEY DISEASE (HCC): ICD-10-CM

## 2025-02-04 DIAGNOSIS — I25.110 CORONARY ARTERY DISEASE INVOLVING NATIVE CORONARY ARTERY OF NATIVE HEART WITH UNSTABLE ANGINA PECTORIS (HCC): ICD-10-CM

## 2025-02-04 PROCEDURE — 1123F ACP DISCUSS/DSCN MKR DOCD: CPT | Performed by: NURSE PRACTITIONER

## 2025-02-04 PROCEDURE — 99309 SBSQ NF CARE MODERATE MDM 30: CPT | Performed by: NURSE PRACTITIONER

## 2025-02-04 NOTE — PROGRESS NOTES
[place] [address] [place] [address]  Phone: [phone]  Fax: [phone]  Also available via Perfect Serve     PLACE OF SERVICE:  [facility] [address] [place] [address]    SKILLED VISIT    Chief Complaint:   Chief Complaint   Patient presents with    Other     30 DAY RECERTIFICATION         HPI : Corby Chun is a 89 y.o. male here for follow up.    Previous history prior to admission:  Patient was hospitalized from 12/29/2024 until 1/3/2025.  He presented to the emergency room after he had been dealing with frequent urinary tract infections, progressive decline and ultimately inability to ambulate.  He was admitted for workup and evaluation.  There were no acute findings and was recommended that he do PT and OT in SNF setting.  He has past medical history of dementia with behavioral disturbance and hallucinations and he continues on paroxetine and olanzapine.  He has other past medical history of hypertension hyperlipidemia severe aortic stenosis status post AVR, CHF status post biventricular ICD, previously documented ejection fraction 15% but last EF was up to 42%, coronary artery disease status post CABG, atrial flutter status post ablation, major depressive disorder, hypothyroidism, chronic kidney disease stage III, significant hearing loss.  He was discharged to [facility] and rehabilitation for strengthening and conditioning.    Current visit:  02/04/2025 - patient sitting up in wheelchair this morning.   He was alert, pleasant, answered some questions appropriately always limited due to severe hearing loss. Last visit, the nurse reported some increase confusion with hallucinations. No evident change in appetite. His labs on 01/31/25 were negative, urine negative. His BUN slightly elevated at 36 - will reduce HCT to 12.5mg daily. Spoke with speech therapy today who just finished their session - she didn't note any

## 2025-02-06 ENCOUNTER — OFFICE VISIT (OUTPATIENT)
Facility: CLINIC | Age: 89
End: 2025-02-06

## 2025-02-06 VITALS
WEIGHT: 158.2 LBS | BODY MASS INDEX: 28.94 KG/M2 | HEART RATE: 88 BPM | DIASTOLIC BLOOD PRESSURE: 71 MMHG | OXYGEN SATURATION: 98 % | TEMPERATURE: 97.8 F | RESPIRATION RATE: 18 BRPM | SYSTOLIC BLOOD PRESSURE: 138 MMHG

## 2025-02-06 DIAGNOSIS — E03.9 ACQUIRED HYPOTHYROIDISM: ICD-10-CM

## 2025-02-06 DIAGNOSIS — N40.1 BENIGN PROSTATIC HYPERPLASIA (BPH) WITH URINARY URGE INCONTINENCE: ICD-10-CM

## 2025-02-06 DIAGNOSIS — H91.93 PROFOUND HEARING LOSS OF BOTH EARS: ICD-10-CM

## 2025-02-06 DIAGNOSIS — N39.41 BENIGN PROSTATIC HYPERPLASIA (BPH) WITH URINARY URGE INCONTINENCE: ICD-10-CM

## 2025-02-06 DIAGNOSIS — Z95.0 HX OF CARDIAC PACEMAKER: ICD-10-CM

## 2025-02-06 DIAGNOSIS — M15.9 GENERALIZED OSTEOARTHRITIS: ICD-10-CM

## 2025-02-06 DIAGNOSIS — E78.2 MIXED HYPERLIPIDEMIA: ICD-10-CM

## 2025-02-06 DIAGNOSIS — Z95.2 H/O MECHANICAL AORTIC VALVE REPLACEMENT: ICD-10-CM

## 2025-02-06 DIAGNOSIS — R26.2 AMBULATORY DYSFUNCTION: Primary | ICD-10-CM

## 2025-02-06 DIAGNOSIS — I48.11 LONGSTANDING PERSISTENT ATRIAL FIBRILLATION (HCC): ICD-10-CM

## 2025-02-06 DIAGNOSIS — F01.B2 MODERATE VASCULAR DEMENTIA WITH PSYCHOTIC DISTURBANCE (HCC): ICD-10-CM

## 2025-02-06 DIAGNOSIS — I50.22 CHRONIC SYSTOLIC CONGESTIVE HEART FAILURE (HCC): ICD-10-CM

## 2025-02-06 DIAGNOSIS — I10 HYPERTENSION, ESSENTIAL: ICD-10-CM

## 2025-02-06 DIAGNOSIS — I25.110 CORONARY ARTERY DISEASE INVOLVING NATIVE CORONARY ARTERY OF NATIVE HEART WITH UNSTABLE ANGINA PECTORIS (HCC): ICD-10-CM

## 2025-02-06 DIAGNOSIS — N18.32 STAGE 3B CHRONIC KIDNEY DISEASE (HCC): ICD-10-CM

## 2025-02-06 NOTE — PROGRESS NOTES
[place] [id number] [place] [address]  Phone: [phone]  Fax: [phone]  Also available via Perfect Serve     PLACE OF SERVICE:  [facility] [address] [place] [address]    SKILLED VISIT    Chief Complaint:   Chief Complaint   Patient presents with    Follow-up         HPI : Corby Chun is a 89 y.o. male here for follow up.    Previous history prior to admission:  Patient was hospitalized from 12/29/2024 until 1/3/2025.  He presented to the emergency room after he had been dealing with frequent urinary tract infections, progressive decline and ultimately inability to ambulate.  He was admitted for workup and evaluation.  There were no acute findings and was recommended that he do PT and OT in SNF setting.  He has past medical history of dementia with behavioral disturbance and hallucinations and he continues on paroxetine and olanzapine.  He has other past medical history of hypertension hyperlipidemia severe aortic stenosis status post AVR, CHF status post biventricular ICD, previously documented ejection fraction 15% but last EF was up to 42%, coronary artery disease status post CABG, atrial flutter status post ablation, major depressive disorder, hypothyroidism, chronic kidney disease stage III, significant hearing loss.  He was discharged to [facility] and rehabilitation for strengthening and conditioning.    Current visit:  02/06/2025 - patient sitting up in wheelchair this morning.   He was alert, pleasant, answered some questions appropriately always limited due to severe hearing loss. No evident change in appetite. His labs on 01/31/25 were negative, urine negative. Last visit his HCTZ was reduced to 12.5mg daily.  No cough/congestion/SOB. Staff do not report any acute changes in  or GI cardiac respiratory status or pain levels.   Per documentation eating 50 to 100%, but he has some weight loss documented was

## 2025-02-11 ENCOUNTER — OFFICE VISIT (OUTPATIENT)
Facility: CLINIC | Age: 89
End: 2025-02-11
Payer: MEDICARE

## 2025-02-11 VITALS
TEMPERATURE: 98 F | WEIGHT: 158.2 LBS | RESPIRATION RATE: 18 BRPM | HEART RATE: 56 BPM | DIASTOLIC BLOOD PRESSURE: 63 MMHG | SYSTOLIC BLOOD PRESSURE: 114 MMHG | BODY MASS INDEX: 28.94 KG/M2 | OXYGEN SATURATION: 100 %

## 2025-02-11 DIAGNOSIS — Z95.0 HX OF CARDIAC PACEMAKER: ICD-10-CM

## 2025-02-11 DIAGNOSIS — N18.32 STAGE 3B CHRONIC KIDNEY DISEASE (HCC): ICD-10-CM

## 2025-02-11 DIAGNOSIS — N39.41 BENIGN PROSTATIC HYPERPLASIA (BPH) WITH URINARY URGE INCONTINENCE: ICD-10-CM

## 2025-02-11 DIAGNOSIS — E78.2 MIXED HYPERLIPIDEMIA: ICD-10-CM

## 2025-02-11 DIAGNOSIS — Z95.2 H/O MECHANICAL AORTIC VALVE REPLACEMENT: ICD-10-CM

## 2025-02-11 DIAGNOSIS — F01.B2 MODERATE VASCULAR DEMENTIA WITH PSYCHOTIC DISTURBANCE (HCC): ICD-10-CM

## 2025-02-11 DIAGNOSIS — R26.2 AMBULATORY DYSFUNCTION: Primary | ICD-10-CM

## 2025-02-11 DIAGNOSIS — M15.9 GENERALIZED OSTEOARTHRITIS: ICD-10-CM

## 2025-02-11 DIAGNOSIS — I10 HYPERTENSION, ESSENTIAL: ICD-10-CM

## 2025-02-11 DIAGNOSIS — I48.11 LONGSTANDING PERSISTENT ATRIAL FIBRILLATION (HCC): ICD-10-CM

## 2025-02-11 DIAGNOSIS — N40.1 BENIGN PROSTATIC HYPERPLASIA (BPH) WITH URINARY URGE INCONTINENCE: ICD-10-CM

## 2025-02-11 DIAGNOSIS — I25.110 CORONARY ARTERY DISEASE INVOLVING NATIVE CORONARY ARTERY OF NATIVE HEART WITH UNSTABLE ANGINA PECTORIS (HCC): ICD-10-CM

## 2025-02-11 DIAGNOSIS — I50.22 CHRONIC SYSTOLIC CONGESTIVE HEART FAILURE (HCC): ICD-10-CM

## 2025-02-11 DIAGNOSIS — H91.93 PROFOUND HEARING LOSS OF BOTH EARS: ICD-10-CM

## 2025-02-11 DIAGNOSIS — E03.9 ACQUIRED HYPOTHYROIDISM: ICD-10-CM

## 2025-02-11 PROCEDURE — 99309 SBSQ NF CARE MODERATE MDM 30: CPT | Performed by: NURSE PRACTITIONER

## 2025-02-11 PROCEDURE — 1123F ACP DISCUSS/DSCN MKR DOCD: CPT | Performed by: NURSE PRACTITIONER

## 2025-02-11 NOTE — PROGRESS NOTES
[place] [id number]  [address] [facility] [place] [address]  Phone: [phone]  Fax: [phone]  Also available via Perfect Serve     PLACE OF SERVICE:  [facility] [address] [place] [address]    SKILLED VISIT    Chief Complaint:   Chief Complaint   Patient presents with    Follow-up         HPI : Corby Chun is a 89 y.o. male here for follow up.    Previous history prior to admission:  Patient was hospitalized from 12/29/2024 until 1/3/2025.  He presented to the emergency room after he had been dealing with frequent urinary tract infections, progressive decline and ultimately inability to ambulate.  He was admitted for workup and evaluation.  There were no acute findings and was recommended that he do PT and OT in SNF setting.  He has past medical history of dementia with behavioral disturbance and hallucinations and he continues on paroxetine and olanzapine.  He has other past medical history of hypertension hyperlipidemia severe aortic stenosis status post AVR, CHF status post biventricular ICD, previously documented ejection fraction 15% but last EF was up to 42%, coronary artery disease status post CABG, atrial flutter status post ablation, major depressive disorder, hypothyroidism, chronic kidney disease stage III, significant hearing loss.  He was discharged to [facility] and rehabilitation for strengthening and conditioning.    Current visit:  02/11/2025 - patient sitting up in wheelchair this morning.   He was alert, pleasant, answered some questions appropriately always limited due to severe hearing loss. No evident change in appetite. His labs on 01/31/25 were negative, urine negative. Previously,his HCTZ was reduced to 12.5mg daily.  No cough/congestion/SOB. Staff do not report any acute changes in  or GI cardiac respiratory status or pain levels.   Per documentation eating 50 to 100%, but he has some weight loss documented was

## 2025-02-13 ENCOUNTER — TELEPHONE (OUTPATIENT)
Age: 89
End: 2025-02-13

## 2025-02-13 NOTE — TELEPHONE ENCOUNTER
Hospital Follow up appt requested.    DETAILS     Admit date:  Faheem 3  Discharge Date:  Feb 13  Diagnosis:   Heart failure  Facility:   [facility]  :  Shira  at [facility]&  [phone] ext [phone]    No available appts with pcp for this psr to schedule in recommended time frame of 2 weeks of discharge     Please call pt directly to schedule as soon as able.          Appointment history:  Last seen at [facility]:   12/27/2024

## 2025-02-13 NOTE — TELEPHONE ENCOUNTER
Notified Shira,  at [facility] that Mr. Chun's follow up with Dr. Nguyen has been scheduled for 2/20/24 @ 12:00.

## 2025-02-14 ENCOUNTER — OFFICE VISIT (OUTPATIENT)
Facility: CLINIC | Age: 89
End: 2025-02-14

## 2025-02-14 VITALS
SYSTOLIC BLOOD PRESSURE: 121 MMHG | RESPIRATION RATE: 18 BRPM | DIASTOLIC BLOOD PRESSURE: 74 MMHG | WEIGHT: 153.3 LBS | HEART RATE: 70 BPM | TEMPERATURE: 97.6 F | BODY MASS INDEX: 28.04 KG/M2 | OXYGEN SATURATION: 96 %

## 2025-02-14 DIAGNOSIS — N40.1 BENIGN PROSTATIC HYPERPLASIA (BPH) WITH URINARY URGE INCONTINENCE: ICD-10-CM

## 2025-02-14 DIAGNOSIS — M15.9 GENERALIZED OSTEOARTHRITIS: ICD-10-CM

## 2025-02-14 DIAGNOSIS — Z95.0 HX OF CARDIAC PACEMAKER: ICD-10-CM

## 2025-02-14 DIAGNOSIS — I50.22 CHRONIC SYSTOLIC CONGESTIVE HEART FAILURE (HCC): ICD-10-CM

## 2025-02-14 DIAGNOSIS — N18.32 STAGE 3B CHRONIC KIDNEY DISEASE (HCC): ICD-10-CM

## 2025-02-14 DIAGNOSIS — I25.110 CORONARY ARTERY DISEASE INVOLVING NATIVE CORONARY ARTERY OF NATIVE HEART WITH UNSTABLE ANGINA PECTORIS (HCC): ICD-10-CM

## 2025-02-14 DIAGNOSIS — F01.B2 MODERATE VASCULAR DEMENTIA WITH PSYCHOTIC DISTURBANCE (HCC): ICD-10-CM

## 2025-02-14 DIAGNOSIS — H91.93 PROFOUND HEARING LOSS OF BOTH EARS: ICD-10-CM

## 2025-02-14 DIAGNOSIS — Z95.2 H/O MECHANICAL AORTIC VALVE REPLACEMENT: ICD-10-CM

## 2025-02-14 DIAGNOSIS — I10 HYPERTENSION, ESSENTIAL: ICD-10-CM

## 2025-02-14 DIAGNOSIS — N39.41 BENIGN PROSTATIC HYPERPLASIA (BPH) WITH URINARY URGE INCONTINENCE: ICD-10-CM

## 2025-02-14 DIAGNOSIS — R26.2 AMBULATORY DYSFUNCTION: Primary | ICD-10-CM

## 2025-02-14 DIAGNOSIS — E03.9 ACQUIRED HYPOTHYROIDISM: ICD-10-CM

## 2025-02-14 DIAGNOSIS — E78.2 MIXED HYPERLIPIDEMIA: ICD-10-CM

## 2025-02-14 DIAGNOSIS — I48.11 LONGSTANDING PERSISTENT ATRIAL FIBRILLATION (HCC): ICD-10-CM

## 2025-02-14 DIAGNOSIS — R44.3 HALLUCINATIONS: ICD-10-CM

## 2025-02-14 RX ORDER — ATORVASTATIN CALCIUM 40 MG/1
40 TABLET, FILM COATED ORAL
Qty: 90 TABLET | Refills: 0 | Status: SHIPPED | OUTPATIENT
Start: 2025-02-14

## 2025-02-14 RX ORDER — TAMSULOSIN HYDROCHLORIDE 0.4 MG/1
0.8 CAPSULE ORAL DAILY
Qty: 180 CAPSULE | Refills: 0 | Status: SHIPPED | OUTPATIENT
Start: 2025-02-14

## 2025-02-14 RX ORDER — ASCORBIC ACID 500 MG
500 TABLET ORAL DAILY
Qty: 90 TABLET | Refills: 0 | Status: SHIPPED | OUTPATIENT
Start: 2025-02-14

## 2025-02-14 RX ORDER — ELECTROLYTES/DEXTROSE
1 SOLUTION, ORAL ORAL DAILY
Qty: 90 TABLET | Refills: 0 | Status: SHIPPED | OUTPATIENT
Start: 2025-02-14

## 2025-02-14 RX ORDER — ALLOPURINOL 300 MG/1
TABLET ORAL
Qty: 90 TABLET | Refills: 0 | Status: SHIPPED | OUTPATIENT
Start: 2025-02-14

## 2025-02-14 RX ORDER — FERROUS SULFATE 325(65) MG
325 TABLET ORAL
Qty: 90 TABLET | Refills: 0 | Status: SHIPPED | OUTPATIENT
Start: 2025-02-14

## 2025-02-14 RX ORDER — MULTIVITAMIN WITH IRON
1000 TABLET ORAL DAILY
Qty: 180 TABLET | Refills: 0 | Status: SHIPPED | OUTPATIENT
Start: 2025-02-14 | End: 2025-05-15

## 2025-02-14 RX ORDER — ASPIRIN 81 MG/1
81 TABLET ORAL DAILY
Qty: 90 TABLET | Refills: 0 | Status: SHIPPED | OUTPATIENT
Start: 2025-02-14

## 2025-02-14 RX ORDER — IBUPROFEN 200 MG
1 CAPSULE ORAL NIGHTLY
Qty: 90 TABLET | Refills: 0 | Status: SHIPPED | OUTPATIENT
Start: 2025-02-14

## 2025-02-14 RX ORDER — LEVOTHYROXINE SODIUM 150 UG/1
150 TABLET ORAL DAILY
Qty: 90 TABLET | Refills: 0 | Status: SHIPPED | OUTPATIENT
Start: 2025-02-14

## 2025-02-14 RX ORDER — PANTOPRAZOLE SODIUM 40 MG/1
40 TABLET, DELAYED RELEASE ORAL
Qty: 90 TABLET | Refills: 0 | Status: SHIPPED | OUTPATIENT
Start: 2025-02-14 | End: 2025-05-15

## 2025-02-14 RX ORDER — FINASTERIDE 5 MG/1
5 TABLET, FILM COATED ORAL DAILY
Qty: 90 TABLET | Refills: 0 | Status: SHIPPED | OUTPATIENT
Start: 2025-02-14

## 2025-02-14 RX ORDER — SENNOSIDES 8.6 MG
1 TABLET ORAL DAILY
Qty: 90 CAPSULE | Refills: 0 | Status: SHIPPED | OUTPATIENT
Start: 2025-02-14

## 2025-02-14 RX ORDER — WARFARIN SODIUM 2 MG/1
2 TABLET ORAL DAILY
Qty: 90 TABLET | Refills: 0 | Status: SHIPPED | OUTPATIENT
Start: 2025-02-14

## 2025-02-14 RX ORDER — OXYBUTYNIN CHLORIDE 10 MG/1
10 TABLET, EXTENDED RELEASE ORAL DAILY
Qty: 90 TABLET | Refills: 0 | Status: SHIPPED | OUTPATIENT
Start: 2025-02-14

## 2025-02-14 RX ORDER — OLANZAPINE 5 MG/1
5 TABLET ORAL 2 TIMES DAILY
Qty: 180 TABLET | Refills: 0 | Status: SHIPPED | OUTPATIENT
Start: 2025-02-14

## 2025-02-14 RX ORDER — SACUBITRIL AND VALSARTAN 24; 26 MG/1; MG/1
1 TABLET, FILM COATED ORAL EVERY 12 HOURS
Qty: 180 TABLET | Refills: 0 | Status: SHIPPED | OUTPATIENT
Start: 2025-02-14

## 2025-02-14 RX ORDER — PAROXETINE 20 MG/1
20 TABLET, FILM COATED ORAL DAILY
Qty: 90 TABLET | Refills: 0 | Status: SHIPPED | OUTPATIENT
Start: 2025-02-14

## 2025-02-14 RX ORDER — HYDROCHLOROTHIAZIDE 25 MG/1
12.5 TABLET ORAL DAILY
Qty: 45 TABLET | Refills: 0 | Status: SHIPPED | OUTPATIENT
Start: 2025-02-14

## 2025-02-14 NOTE — PROGRESS NOTES
[address] [facility] [place] [address]  Phone: [phone]  Fax: [phone]  Also available via Perfect Serve     Place of Service:  [facility] [address] [place] [address]                                                                     Discharge Summary       Admit Dx: Urinary tract infection, debility, ambulatory dysfunction    Disposition: Home and Home Health Services    Presentation:  Patient was hospitalized from 12/29/2024 until 1/3/2025. He presented to the emergency room after he had been dealing with frequent urinary tract infections, progressive decline and ultimately inability to ambulate. He was admitted for workup and evaluation. There were no acute findings and was recommended that he do PT and OT in SNF setting. He has past medical history of dementia with behavioral disturbance and hallucinations and he continues on paroxetine and olanzapine. He has other past medical history of hypertension hyperlipidemia severe aortic stenosis status post AVR, CHF status post biventricular ICD, previously documented ejection fraction 15% but last EF was up to 42%, coronary artery disease status post CABG, atrial flutter status post ablation, major depressive disorder, hypothyroidism, chronic kidney disease stage III, significant hearing loss. He was discharged to [facility] for strengthening and conditioning.     Discharge time : > 30 mins    Brief SNF Course:  This is an 89 y.o. male who was admitted to [facility] on 1/3/2025 and will be discharged on 2/14/2025.  Patient severely demented and worsened by significant hearing loss.  Vital signs have been reviewed he is afebrile heart rate is rate controlled he denies any pain cough congestion chest pain.  His appetite has been 75 to 100% however he has lost some weight during stay.  He had some edema during initial stay he was

## 2025-02-19 RX ORDER — BENZONATATE 100 MG/1
100 CAPSULE ORAL 3 TIMES DAILY PRN
Qty: 30 CAPSULE | Refills: 0 | Status: SHIPPED | OUTPATIENT
Start: 2025-02-19 | End: 2025-03-01

## 2025-02-20 ENCOUNTER — TELEPHONE (OUTPATIENT)
Age: 89
End: 2025-02-20

## 2025-02-20 NOTE — TELEPHONE ENCOUNTER
Pt notified of rx sent to pharmacy by care management Josefina Krishnamurthy.   See case management note.

## 2025-02-23 ENCOUNTER — TELEPHONE (OUTPATIENT)
Age: 89
End: 2025-02-23

## 2025-02-23 NOTE — TELEPHONE ENCOUNTER
Inr 3.9 reported by HH nurse on Saturday    No sx routine inr check    She reports he takes coumadin 2mg nightly    Advised to hold Saturday dose--then resume nl regimen    Office to contact pt on Monday to discuss continued adjustment and inr monitoring

## 2025-02-25 ENCOUNTER — TELEPHONE (OUTPATIENT)
Age: 89
End: 2025-02-25

## 2025-02-25 NOTE — TELEPHONE ENCOUNTER
Spoke with Keya.   Please see telephone encounter from on call provider.   Keya will like to know if PCP wants INR redrawn today or Thursday. She is seeing patient today.     Please advise.

## 2025-02-25 NOTE — TELEPHONE ENCOUNTER
Keya, Nurse with Care [facility]    Phone [phone]    States:  INR: Saturday was 3.9  Contacted on-call dr: dr ramirez.  Please see telephone encounter: 2/23/2025  Asking if any parameters and when should next INR be taken    Call to advise please        Appointments:  Last seen at [facility]:   12/27/2024   Future appointment [facility]:  3/18/2025         .            Caller confirms readback of documented phone/fax number(s) as correct.

## 2025-02-25 NOTE — TELEPHONE ENCOUNTER
Pharmacy Progress Note - Telephone Encounter    S/O: Keya PRADHAN RN was contacted regarding Mr. Corby Chun 89 y.o. today.     - INR was 3.9 this Saturday.   - Keya PRADHAN visit schedules are currently on Tuesday and Thursday    A/P:  - Recheck INR on Thursday.   - Keya PRADHAN RN endorses understanding to the provided information. All questions answered at this time.         Thank you,  Marisel Richardson, PharmD, BCACP, CDCES      For Pharmacy Admin Tracking Only    Program: Medical Group  CPA in place:  Yes  Recommendation Provided To: Patient/Caregiver: 1 via Telephone  Intervention Detail: Lab(s) Ordered  Intervention Accepted By: Patient/Caregiver: 1  Gap Closed?: Yes   Time Spent (min): 10

## 2025-02-27 ENCOUNTER — TELEPHONE (OUTPATIENT)
Age: 89
End: 2025-02-27

## 2025-02-27 NOTE — TELEPHONE ENCOUNTER
Keya with [facility] states would need INR for the pt, states it is 3.2 and would need to know how to proceed.     Please call to discuss.

## 2025-03-03 ENCOUNTER — TELEPHONE (OUTPATIENT)
Age: 89
End: 2025-03-03

## 2025-03-03 LAB — INR BLD: 3.6

## 2025-03-03 NOTE — TELEPHONE ENCOUNTER
Keya garcia [facility] states pt's INR is 3.6.    Please call to discuss as soon as possible.

## 2025-03-06 ENCOUNTER — TELEPHONE (OUTPATIENT)
Age: 89
End: 2025-03-06

## 2025-03-06 LAB — INR BLD: 2.8

## 2025-03-06 RX ORDER — WARFARIN SODIUM 2 MG/1
1-2 TABLET ORAL SEE ADMIN INSTRUCTIONS
Qty: 90 TABLET | Refills: 0
Start: 2025-03-06

## 2025-03-06 RX ORDER — WARFARIN SODIUM 1 MG/1
1 TABLET ORAL SEE ADMIN INSTRUCTIONS
Qty: 30 TABLET | Refills: 1 | Status: SHIPPED | OUTPATIENT
Start: 2025-03-06

## 2025-03-06 NOTE — TELEPHONE ENCOUNTER
Nena called in from [facility] to notify  patients INR is 2.8 and wants to know when she should return to check again & also asking if he   Should keep same medications?    # nena [phone]  
Patient/Caregiver: 4  Gap Closed?: Yes   Time Spent (min): 10

## 2025-03-13 ENCOUNTER — TELEPHONE (OUTPATIENT)
Age: 89
End: 2025-03-13

## 2025-03-13 LAB — INR BLD: 1.7

## 2025-03-13 NOTE — TELEPHONE ENCOUNTER
Pharmacy Progress Note - Telephone Encounter    S/O: Keya PRADHAN RN was contacted regarding Mr. Corby Chun 89 y.o.. 's anticoagulation management today.     Indication:  Atrial Fibrillation and Aortic Valve Replacement  (Cisse Medtronic -), s/p pacemaker   Warfarin start date: ~ / restart 2024  INR Goal:  2.0-3.0    Current warfarin regimen: 1 mg on Monday, Wednesday, Friday and 2 mg daily ROW.   Warfarin tablet strength: 1 mg and 2 mg    Duration of therapy: indefinite    - INR 1.7 today, down from 2.8 last week.   - Patient had an increased in vitamin K - turnip greens over the past few days.  - Denies CP/SOB/LE & UE pain. No missed doses.     A/P:  - INR subtherapeutic for goal secondary to increased in vit K intake.   - Take 2 mg tomorrow instead of 1 mg. Then continue 1 mg Monday, Wednesday, Friday and 2 mg daily ROW.  Avoid vit K containing vegetables until next INR check.   - Recheck INR Tuesday.   - Keya PRADHAN RN endorses understanding to the provided information. All questions answered at this time.       Thank you,  Marisel Richardson, PharmD, BCACP, CDCES      For Pharmacy Admin Tracking Only    Program: Medical Group  CPA in place:  Yes  Recommendation Provided To: Patient/Caregiver: 3 via Telephone  Intervention Detail: Adherence Monitorin, Dose Adjustment: 1, reason: Therapy Optimization, and Lab(s) Ordered  Intervention Accepted By: Patient/Caregiver: 3  Gap Closed?: Yes   Time Spent (min): 10

## 2025-03-13 NOTE — TELEPHONE ENCOUNTER
Keya [facility] states pt's INR is 1.7, dropped from 2.8 last week.      Please call to discuss.

## 2025-03-13 NOTE — TELEPHONE ENCOUNTER
Patients wife Ashley called to schedule an apptmt with Dr. Christie. Please call her at [phone]  
Patients wife called, she would like to reschedule pts apptmt for 11/6. She can be reached at [phone]  
Returned call to the patient's wife. Appt rescheduled.  
Returned call to the patient's wife. Appt scheduled.  
Is This A New Presentation, Or A Follow-Up?: Skin Lesion
What Type Of Note Output Would You Prefer (Optional)?: Standard Output
How Severe Is Your Skin Lesion?: mild

## 2025-03-18 ENCOUNTER — OFFICE VISIT (OUTPATIENT)
Age: 89
End: 2025-03-18
Payer: MEDICARE

## 2025-03-18 VITALS
OXYGEN SATURATION: 100 % | BODY MASS INDEX: 29.08 KG/M2 | SYSTOLIC BLOOD PRESSURE: 132 MMHG | RESPIRATION RATE: 16 BRPM | WEIGHT: 158 LBS | TEMPERATURE: 97.3 F | DIASTOLIC BLOOD PRESSURE: 88 MMHG | HEIGHT: 62 IN | HEART RATE: 75 BPM

## 2025-03-18 DIAGNOSIS — Z95.2 H/O MECHANICAL AORTIC VALVE REPLACEMENT: Primary | ICD-10-CM

## 2025-03-18 DIAGNOSIS — I48.20 CHRONIC A-FIB (HCC): ICD-10-CM

## 2025-03-18 DIAGNOSIS — H91.93 BILATERAL HEARING LOSS, UNSPECIFIED HEARING LOSS TYPE: ICD-10-CM

## 2025-03-18 DIAGNOSIS — R26.2 DIFFICULTY WALKING: ICD-10-CM

## 2025-03-18 DIAGNOSIS — F03.92 HALLUCINATIONS DUE TO LATE ONSET DEMENTIA (HCC): ICD-10-CM

## 2025-03-18 DIAGNOSIS — I25.10 CORONARY ARTERY DISEASE INVOLVING NATIVE CORONARY ARTERY OF NATIVE HEART WITHOUT ANGINA PECTORIS: ICD-10-CM

## 2025-03-18 DIAGNOSIS — I50.22 CHRONIC SYSTOLIC CONGESTIVE HEART FAILURE (HCC): ICD-10-CM

## 2025-03-18 DIAGNOSIS — E03.9 HYPOTHYROIDISM, UNSPECIFIED TYPE: ICD-10-CM

## 2025-03-18 LAB
POC INR: 1.9
PROTHROMBIN TIME, POC: 19.3

## 2025-03-18 PROCEDURE — 1159F MED LIST DOCD IN RCRD: CPT | Performed by: INTERNAL MEDICINE

## 2025-03-18 PROCEDURE — 1036F TOBACCO NON-USER: CPT | Performed by: INTERNAL MEDICINE

## 2025-03-18 PROCEDURE — PBSHW AMB POC PT/INR: Performed by: INTERNAL MEDICINE

## 2025-03-18 PROCEDURE — 85610 PROTHROMBIN TIME: CPT | Performed by: INTERNAL MEDICINE

## 2025-03-18 PROCEDURE — 1123F ACP DISCUSS/DSCN MKR DOCD: CPT | Performed by: INTERNAL MEDICINE

## 2025-03-18 PROCEDURE — G8427 DOCREV CUR MEDS BY ELIG CLIN: HCPCS | Performed by: INTERNAL MEDICINE

## 2025-03-18 PROCEDURE — G8417 CALC BMI ABV UP PARAM F/U: HCPCS | Performed by: INTERNAL MEDICINE

## 2025-03-18 PROCEDURE — 99214 OFFICE O/P EST MOD 30 MIN: CPT | Performed by: INTERNAL MEDICINE

## 2025-03-18 ASSESSMENT — PATIENT HEALTH QUESTIONNAIRE - PHQ9
SUM OF ALL RESPONSES TO PHQ QUESTIONS 1-9: 7
2. FEELING DOWN, DEPRESSED OR HOPELESS: MORE THAN HALF THE DAYS
5. POOR APPETITE OR OVEREATING: NOT AT ALL
9. THOUGHTS THAT YOU WOULD BE BETTER OFF DEAD, OR OF HURTING YOURSELF: NOT AT ALL
SUM OF ALL RESPONSES TO PHQ QUESTIONS 1-9: 7
8. MOVING OR SPEAKING SO SLOWLY THAT OTHER PEOPLE COULD HAVE NOTICED. OR THE OPPOSITE, BEING SO FIGETY OR RESTLESS THAT YOU HAVE BEEN MOVING AROUND A LOT MORE THAN USUAL: NOT AT ALL
4. FEELING TIRED OR HAVING LITTLE ENERGY: SEVERAL DAYS
6. FEELING BAD ABOUT YOURSELF - OR THAT YOU ARE A FAILURE OR HAVE LET YOURSELF OR YOUR FAMILY DOWN: NOT AT ALL
1. LITTLE INTEREST OR PLEASURE IN DOING THINGS: MORE THAN HALF THE DAYS
7. TROUBLE CONCENTRATING ON THINGS, SUCH AS READING THE NEWSPAPER OR WATCHING TELEVISION: MORE THAN HALF THE DAYS
3. TROUBLE FALLING OR STAYING ASLEEP: NOT AT ALL
10. IF YOU CHECKED OFF ANY PROBLEMS, HOW DIFFICULT HAVE THESE PROBLEMS MADE IT FOR YOU TO DO YOUR WORK, TAKE CARE OF THINGS AT HOME, OR GET ALONG WITH OTHER PEOPLE: SOMEWHAT DIFFICULT
SUM OF ALL RESPONSES TO PHQ QUESTIONS 1-9: 7
SUM OF ALL RESPONSES TO PHQ QUESTIONS 1-9: 7
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
## 2025-03-19 ENCOUNTER — TELEPHONE (OUTPATIENT)
Age: 89
End: 2025-03-19

## 2025-03-19 NOTE — TELEPHONE ENCOUNTER
Left message for patient's spouse to call back for scheduling.    Trying to have Dr. Dubon on same day; 4/16. Dr Nguyen @ 12 brian Wiley at 1:00 (60 min visit)

## 2025-03-21 ENCOUNTER — TELEPHONE (OUTPATIENT)
Age: 89
End: 2025-03-21

## 2025-03-21 NOTE — TELEPHONE ENCOUNTER
Keya needs to know the next time pt needs INR done.     Please call to let her know.   You may leave a detailed vm

## 2025-03-21 NOTE — TELEPHONE ENCOUNTER
Spoke with Keya// [facility] - gave INR results in office.   Advised per Inez.  Advised of next upcoming appts with PCP and pharm.  Keya verbalized understanding.

## 2025-04-10 ENCOUNTER — TELEPHONE (OUTPATIENT)
Age: 89
End: 2025-04-10

## 2025-04-10 LAB — INR BLD: 1.4

## 2025-04-10 NOTE — TELEPHONE ENCOUNTER
Spoke with Keya, [facility].  Advised per PCP - Take an extra 1mg dose today then get further recs tomorrow from Dr Debra Laura verbalized understanding total of 3mg dose tonight.

## 2025-04-11 NOTE — TELEPHONE ENCOUNTER
Pharmacy Progress Note - Telephone Encounter    S/O: Keya PRADHAN RN was contacted telephonically regarding Mr. Corby Chun 89 y.o. 's INR today. Verified patient’s identifiers (name & ) per HIPAA policy.     Indication:  Atrial Fibrillation and Aortic Valve Replacement  (Cisse Medtronic -), s/p pacemaker   Warfarin start date: ~ / restart 2024  INR Goal:  2.0-3.0    Current warfarin regimen:  1 mg on Monday, Friday and 2 mg daily the rest of the week.  Warfarin tablet strength: 1 mg and 2 mg    Duration of therapy: indefinite    - Recall previous INR was 1.9 in office on 3/19/25.  Dosage changed to 1 mg Mon, Fri and 2 mg daily ROW with HH recheck in 1 week.   - Keya shared that patient is still taking 1 mg Mon, Wed, Fri and 2 mg daily ROW. Did not implement previously recommended adjustment.  - INR was 1.4 yesterday.  Patient was recommended to take 3 mg last night.       A/P:  - INR remains subtherapeutic for goal.   - Take warfarin 3 mg tonight. Change warfarin to 1 mg Monday, Wednesday and 2 mg daily the rest of the week.   - Patient has a scheduled appt with me next week - 25. Will repeat INR then.  Also recommend scheduling HH follow up for week of  to check INR.   - Patient endorses understanding to the provided information. All questions answered at this time.     Thank you,  Marisel Richardson, PharmD, BCACP, CDCES            For Pharmacy Admin Tracking Only    Program: Medical Group  CPA in place:  Yes  Recommendation Provided To: Patient/Caregiver: 3 via Telephone  Intervention Detail: Adherence Monitorin, Dose Adjustment: 1, reason: Therapy Optimization, and Lab(s) Ordered  Intervention Accepted By: Patient/Caregiver: 3  Gap Closed?: Yes   Time Spent (min): 10

## 2025-04-12 NOTE — PATIENT INSTRUCTIONS
Today your INR was 1.7.      Your goal INR is 2.0-3.0 .    You have a 1 mg and 2 mg tablet of Coumadin (warfarin).   Take Coumadin (warfarin) as follows:    Take 2 mg tonight.   Then continue warfarin 1 mg Monday, Wednesday and 2 mg daily the rest of the week.     Come back in 1  week(s) for your next finger stick/INR blood test with Home Health.     Avoid any over the counter items containing aspirin or ibuprofen, and avoid great swings in general diet.  Avoid alcohol consumption.  Please notify the INR nurse if you are started on any new medication including over the counter or herbal supplements. Also, please notify your INR nurse if any of your other prescription or over the counter medications have been discontinued.     Call [facility] at [phone] if you have any signs of abnormal bleeding/blood clot.  ------------------------------------------------------------------------------------------------------------------  Taking Warfarin Safely: Care Instructions    Your Care Instructions  Warfarin is a medicine that you take to prevent blood clots. It is often called a blood thinner. Doctors give warfarin (such as Coumadin) to reduce the risk of blood clots. You may be at risk for blood clots if you have atrial fibrillation or deep vein thrombosis. Some other health problems may also put you at risk.  Warfarin slows the amount of time it takes for your blood to clot. It can cause bleeding problems. Even if you've been taking warfarin for a while, it's important to know how to take it safely.  Foods and other medicines can affect the way warfarin works. Some can make warfarin work too well. This can cause bleeding problems. And some can make it work poorly, so that it does not prevent blood clots very well.  You will need regular blood tests to check how long it takes for your blood to form a clot. This test is called a PT or prothrombin time test. The result of the test is called an INR level.

## 2025-04-14 ENCOUNTER — TELEPHONE (OUTPATIENT)
Age: 89
End: 2025-04-14

## 2025-04-14 NOTE — TELEPHONE ENCOUNTER
Cristino, Physical Therapist, [facility]    Phone [phone]    States:  Pt being seen for physical therapy  Patient is at the end of orders duration.    Requesting verbal orders:  Extend  Physical therapy for 1 time a week for duration of 6 additional weeks        Appointments:  Last seen at [facility]:   3/18/2025   Future appointment [facility]:  4/16/2025         .            Caller confirms readback of documented phone/fax number(s) as correct.

## 2025-04-14 NOTE — TELEPHONE ENCOUNTER
Spoke with Cristino, physical therapy with [facility] .   Per PCP - ok to extend PT  Verbalized understanding.

## 2025-04-16 ENCOUNTER — PHARMACY VISIT (OUTPATIENT)
Age: 89
End: 2025-04-16
Payer: MEDICARE

## 2025-04-16 ENCOUNTER — OFFICE VISIT (OUTPATIENT)
Age: 89
End: 2025-04-16
Payer: MEDICARE

## 2025-04-16 VITALS
RESPIRATION RATE: 20 BRPM | HEART RATE: 55 BPM | OXYGEN SATURATION: 99 % | TEMPERATURE: 97 F | HEIGHT: 62 IN | WEIGHT: 153.8 LBS | DIASTOLIC BLOOD PRESSURE: 70 MMHG | SYSTOLIC BLOOD PRESSURE: 102 MMHG | BODY MASS INDEX: 28.3 KG/M2

## 2025-04-16 VITALS — BODY MASS INDEX: 28.16 KG/M2 | WEIGHT: 153 LBS | HEIGHT: 62 IN

## 2025-04-16 DIAGNOSIS — Z95.2 S/P AVR: Primary | ICD-10-CM

## 2025-04-16 DIAGNOSIS — Z95.2 H/O MECHANICAL AORTIC VALVE REPLACEMENT: ICD-10-CM

## 2025-04-16 DIAGNOSIS — F03.90 DEMENTIA, UNSPECIFIED DEMENTIA SEVERITY, UNSPECIFIED DEMENTIA TYPE, UNSPECIFIED WHETHER BEHAVIORAL, PSYCHOTIC, OR MOOD DISTURBANCE OR ANXIETY (HCC): ICD-10-CM

## 2025-04-16 DIAGNOSIS — Z79.899 MEDICATION MANAGEMENT: ICD-10-CM

## 2025-04-16 DIAGNOSIS — R44.1 HALLUCINATION, VISUAL: ICD-10-CM

## 2025-04-16 DIAGNOSIS — R44.1 HALLUCINATION, VISUAL: Primary | ICD-10-CM

## 2025-04-16 DIAGNOSIS — I48.91 ATRIAL FIBRILLATION, UNSPECIFIED TYPE (HCC): ICD-10-CM

## 2025-04-16 LAB
BILIRUBIN, URINE, POC: NEGATIVE
BLOOD URINE, POC: ABNORMAL
GLUCOSE URINE, POC: NEGATIVE
KETONES, URINE, POC: NEGATIVE
LEUKOCYTE ESTERASE, URINE, POC: ABNORMAL
NITRITE, URINE, POC: NEGATIVE
PH, URINE, POC: 6 (ref 4.6–8)
POC INR: 1.7
PROTEIN,URINE, POC: NEGATIVE
PROTHROMBIN TIME, POC: 21
SPECIFIC GRAVITY, URINE, POC: 1.01 (ref 1–1.03)
URINALYSIS CLARITY, POC: ABNORMAL
URINALYSIS COLOR, POC: YELLOW
UROBILINOGEN, POC: ABNORMAL

## 2025-04-16 PROCEDURE — G8417 CALC BMI ABV UP PARAM F/U: HCPCS | Performed by: INTERNAL MEDICINE

## 2025-04-16 PROCEDURE — 99214 OFFICE O/P EST MOD 30 MIN: CPT | Performed by: INTERNAL MEDICINE

## 2025-04-16 PROCEDURE — 1126F AMNT PAIN NOTED NONE PRSNT: CPT | Performed by: INTERNAL MEDICINE

## 2025-04-16 PROCEDURE — 81003 URINALYSIS AUTO W/O SCOPE: CPT | Performed by: INTERNAL MEDICINE

## 2025-04-16 PROCEDURE — PBSHW AMB POC URINALYSIS DIP STICK AUTO W/O MICRO: Performed by: INTERNAL MEDICINE

## 2025-04-16 PROCEDURE — 85610 PROTHROMBIN TIME: CPT | Performed by: PHARMACIST

## 2025-04-16 PROCEDURE — G8427 DOCREV CUR MEDS BY ELIG CLIN: HCPCS | Performed by: INTERNAL MEDICINE

## 2025-04-16 PROCEDURE — 1159F MED LIST DOCD IN RCRD: CPT | Performed by: INTERNAL MEDICINE

## 2025-04-16 PROCEDURE — PBSHW AMB POC PT/INR: Performed by: PHARMACIST

## 2025-04-16 PROCEDURE — 1123F ACP DISCUSS/DSCN MKR DOCD: CPT | Performed by: INTERNAL MEDICINE

## 2025-04-16 PROCEDURE — 1036F TOBACCO NON-USER: CPT | Performed by: INTERNAL MEDICINE

## 2025-04-16 RX ORDER — OLANZAPINE 5 MG/1
5 TABLET ORAL 2 TIMES DAILY
Qty: 90 TABLET | Refills: 5 | Status: SHIPPED | OUTPATIENT
Start: 2025-04-16

## 2025-04-16 SDOH — ECONOMIC STABILITY: FOOD INSECURITY: WITHIN THE PAST 12 MONTHS, THE FOOD YOU BOUGHT JUST DIDN'T LAST AND YOU DIDN'T HAVE MONEY TO GET MORE.: NEVER TRUE

## 2025-04-16 SDOH — ECONOMIC STABILITY: FOOD INSECURITY: WITHIN THE PAST 12 MONTHS, YOU WORRIED THAT YOUR FOOD WOULD RUN OUT BEFORE YOU GOT MONEY TO BUY MORE.: NEVER TRUE

## 2025-04-16 ASSESSMENT — PATIENT HEALTH QUESTIONNAIRE - PHQ9
8. MOVING OR SPEAKING SO SLOWLY THAT OTHER PEOPLE COULD HAVE NOTICED. OR THE OPPOSITE, BEING SO FIGETY OR RESTLESS THAT YOU HAVE BEEN MOVING AROUND A LOT MORE THAN USUAL: NOT AT ALL
2. FEELING DOWN, DEPRESSED OR HOPELESS: NOT AT ALL
6. FEELING BAD ABOUT YOURSELF - OR THAT YOU ARE A FAILURE OR HAVE LET YOURSELF OR YOUR FAMILY DOWN: NOT AT ALL
SUM OF ALL RESPONSES TO PHQ QUESTIONS 1-9: 0
9. THOUGHTS THAT YOU WOULD BE BETTER OFF DEAD, OR OF HURTING YOURSELF: NOT AT ALL
5. POOR APPETITE OR OVEREATING: SEVERAL DAYS
10. IF YOU CHECKED OFF ANY PROBLEMS, HOW DIFFICULT HAVE THESE PROBLEMS MADE IT FOR YOU TO DO YOUR WORK, TAKE CARE OF THINGS AT HOME, OR GET ALONG WITH OTHER PEOPLE: SOMEWHAT DIFFICULT
SUM OF ALL RESPONSES TO PHQ QUESTIONS 1-9: 0
SUM OF ALL RESPONSES TO PHQ QUESTIONS 1-9: 0
SUM OF ALL RESPONSES TO PHQ QUESTIONS 1-9: 4
SUM OF ALL RESPONSES TO PHQ QUESTIONS 1-9: 4
7. TROUBLE CONCENTRATING ON THINGS, SUCH AS READING THE NEWSPAPER OR WATCHING TELEVISION: NOT AT ALL
1. LITTLE INTEREST OR PLEASURE IN DOING THINGS: NOT AT ALL
SUM OF ALL RESPONSES TO PHQ QUESTIONS 1-9: 0
SUM OF ALL RESPONSES TO PHQ QUESTIONS 1-9: 4
2. FEELING DOWN, DEPRESSED OR HOPELESS: NOT AT ALL
3. TROUBLE FALLING OR STAYING ASLEEP: MORE THAN HALF THE DAYS
1. LITTLE INTEREST OR PLEASURE IN DOING THINGS: NOT AT ALL
4. FEELING TIRED OR HAVING LITTLE ENERGY: SEVERAL DAYS
SUM OF ALL RESPONSES TO PHQ QUESTIONS 1-9: 4

## 2025-04-16 NOTE — PROGRESS NOTES
Patient identified with two identification factors, Name and Date of Birth.    Chief Complaint   Patient presents with    Follow-up     Pt has been experiencing some confusion in the last month        /70 (BP Site: Left Upper Arm, Patient Position: Sitting, BP Cuff Size: Medium Adult)   Pulse 55   Temp 97 °F (36.1 °C) (Temporal)   Resp 20   Ht 1.575 m (5' 2\")   Wt 69.8 kg (153 lb 12.8 oz)   SpO2 99%   BMI 28.13 kg/m²       1. \"Have you been to the ER, urgent care clinic since your last visit?  Hospitalized since your last visit?\" No     2. \"Have you seen or consulted any other health care providers outside of the [facility] since your last visit?\" No     
tablet by mouth nightly 90 tablet 0    PARoxetine (PAXIL) 20 MG tablet Take 1 tablet by mouth daily 90 tablet 0    sacubitril-valsartan (ENTRESTO) 24-26 MG per tablet Take 1 tablet by mouth in the morning and 1 tablet in the evening. 180 tablet 0    finasteride (PROSCAR) 5 MG tablet Take 1 tablet by mouth daily 90 tablet 0    ferrous sulfate (IRON 325) 325 (65 Fe) MG tablet Take 1 tablet by mouth daily (with breakfast) 90 tablet 0    ascorbic acid (VITAMIN C) 500 MG tablet Take 1 tablet by mouth daily 90 tablet 0    oxyBUTYnin (DITROPAN-XL) 10 MG extended release tablet Take 1 tablet by mouth daily 90 tablet 0    levothyroxine (SYNTHROID) 150 MCG tablet Take 1 tablet by mouth Daily 90 tablet 0    psyllium (KONSYL) 28.3 % PACK Take 1 packet by mouth daily as needed for Constipation 30 each 2    Coenzyme Q10 (COQ-10) 400 MG CAPS Take 1 capsule by mouth daily 90 capsule 0    pantoprazole (PROTONIX) 40 MG tablet Take 1 tablet by mouth every morning (before breakfast) 90 tablet 0    Multiple Vitamin (MULTIVITAMIN ADULT) TABS Take 1 tablet by mouth daily 90 tablet 0    tamsulosin (FLOMAX) 0.4 MG capsule Take 2 capsules by mouth daily 180 capsule 0    vitamin B-12 (CYANOCOBALAMIN) 500 MCG tablet Take 2 tablets by mouth daily 180 tablet 0    OLANZapine (ZYPREXA) 5 MG tablet Take 1 tablet by mouth in the morning and at bedtime 180 tablet 0    hydroCHLOROthiazide (HYDRODIURIL) 25 MG tablet Take 0.5 tablets by mouth daily 45 tablet 0    Probiotic Product (PROBIOTIC BLEND PO) Take 1 Capful by mouth Daily       No current facility-administered medications for this visit.     No Known Allergies  Social History     Tobacco Use    Smoking status: Never    Smokeless tobacco: Never   Substance Use Topics    Alcohol use: Not Currently     Alcohol/week: 3.0 standard drinks of alcohol     Types: 3 Standard drinks or equivalent per week        BMP:   Lab Results   Component Value Date/Time     01/03/2025 05:30 AM    K 3.8

## 2025-04-17 LAB
APPEARANCE UR: CLEAR
BACTERIA URNS QL MICRO: NEGATIVE /HPF
BILIRUB UR QL: NEGATIVE
COLOR UR: ABNORMAL
EPITH CASTS URNS QL MICRO: ABNORMAL /LPF
GLUCOSE UR STRIP.AUTO-MCNC: NEGATIVE MG/DL
HGB UR QL STRIP: ABNORMAL
HYALINE CASTS URNS QL MICRO: ABNORMAL /LPF (ref 0–5)
KETONES UR QL STRIP.AUTO: NEGATIVE MG/DL
LEUKOCYTE ESTERASE UR QL STRIP.AUTO: ABNORMAL
NITRITE UR QL STRIP.AUTO: NEGATIVE
PH UR STRIP: 6 (ref 5–8)
PROT UR STRIP-MCNC: NEGATIVE MG/DL
RBC #/AREA URNS HPF: ABNORMAL /HPF (ref 0–5)
SP GR UR REFRACTOMETRY: 1.01 (ref 1–1.03)
URINE CULTURE IF INDICATED: ABNORMAL
UROBILINOGEN UR QL STRIP.AUTO: 0.2 EU/DL (ref 0.2–1)
WBC URNS QL MICRO: ABNORMAL /HPF (ref 0–4)

## 2025-04-23 LAB — INR BLD: 1.9

## 2025-04-23 RX ORDER — HYDROCHLOROTHIAZIDE 25 MG/1
12.5 TABLET ORAL DAILY
Qty: 45 TABLET | Refills: 5 | Status: SHIPPED | OUTPATIENT
Start: 2025-04-23

## 2025-04-23 NOTE — TELEPHONE ENCOUNTER
PCP: Ryan Nguyen MD    Last appt: 4/16/2025   Future Appointments   Date Time Provider Department Center   6/30/2025  3:00 PM Mau Carlos DO [id number]       Requested Prescriptions     Pending Prescriptions Disp Refills    hydroCHLOROthiazide (HYDRODIURIL) 25 MG tablet 45 tablet 0     Sig: Take 0.5 tablets by mouth daily

## 2025-04-23 NOTE — TELEPHONE ENCOUNTER
Keya, Nurse, [facility]    Phone [phone]     States:  Patient INR:  1.9  today  What are recommendations for warfarin.  HCTZ refill needed  [facility] - [place], [place] - [address] - P [phone] - F [phone]            Appointments:  Last seen at [facility]:   4/16/2025   Future appointment [facility]:  Visit date not found         .            Caller confirms readback of documented phone/fax number(s) as correct.

## 2025-04-24 ENCOUNTER — TELEPHONE (OUTPATIENT)
Age: 89
End: 2025-04-24

## 2025-04-28 ENCOUNTER — TELEPHONE (OUTPATIENT)
Age: 89
End: 2025-04-28

## 2025-04-28 NOTE — TELEPHONE ENCOUNTER
Ashley states she is concerned about new meds.  No swelling but awful diarrhea.       She has heard back from urinalysis.  Does he have a uti?  He has blood in urine.      These symptoms are side effects from this new medication.  Do you really want pt to take this medication?    Pantoprozole   also a problem taking with other meds.  This med is from [employer], but Dr. Nguyen filled this.     Both of these meds cause blood in urine.      What is she to do?  She needs a call back today to discuss.

## 2025-04-28 NOTE — TELEPHONE ENCOUNTER
Spoke with patient spouse, Ashley.  Last OV 4/16/2025    States patient had diarrhea this morning.   Took imodium. Staying hydrated and good appetite    No swelling present.    Pt started new medication hctz to help with fluid.   UA done on 4/16/2025 - please advise of results.   Pt still having blood in urine.  Pt also taking protonix     States both medications are causing blood in urine and kidney issues.    Please advise

## 2025-04-28 NOTE — TELEPHONE ENCOUNTER
Spoke with patient spouse, Ashley.  Advised. Verbalized understanding.     Per Dr. Nguyen  His last UA just showed microscopic blood, no UTI. Has nixon in the past for microscopic blood due to kidney stones. If having visible blood now-needs to go back [facility] Marin or associate. Agree imodium for diarrhea and hydrate, his hctz and PPI should not be causing the blood or diarrhea

## 2025-04-30 ENCOUNTER — TELEPHONE (OUTPATIENT)
Age: 89
End: 2025-04-30

## 2025-04-30 LAB — INR BLD: 1.6

## 2025-04-30 NOTE — TELEPHONE ENCOUNTER
Pharmacy Progress Note     S/O: Keya PRADHAN RN was contacted telephonically regarding Mr. Corby Chun 89 y.o. 's INR today. Verified patient’s identifiers (name & ) per HIPAA policy.      Indication:  Atrial Fibrillation and Aortic Valve Replacement  (Cisse Medtronic -), s/p pacemaker   Warfarin start date: ~ / restart 2024  INR Goal:  2.0-3.0    Current warfarin regimen:  1 mg on Monday, Wednesday and 2 mg daily the rest of the week.  Warfarin tablet strength: 1 mg and 2 mg    Duration of therapy: indefinite     Keya shared that INR was 1.9 last Wednesday. It was 1.6 this week.   No recent dosage changes.     A/P:  - Based on recent INR patterns, patient's advanced age, and recent reports of hematuria, will be cautious with dosage adjustment.   - Change warfarin to 1 mg on Monday and 2 mg daily the rest of the week.  Recheck INR in 1 week.     All questions answered at this time.       Thank you for the consult,  Marisel Richardson, PharmD, BCACP, CDCES                    For Pharmacy Admin Tracking Only    Program: Medical Group  CPA in place:  Yes  Recommendation Provided To: Patient/Caregiver: 3 via Telephone  Intervention Detail: Dose Adjustment: 1, reason: Therapy Optimization, Lab(s) Ordered, and Scheduled Appointment  Intervention Accepted By: Patient/Caregiver: 3  Gap Closed?: Yes   Time Spent (min): 10

## 2025-04-30 NOTE — TELEPHONE ENCOUNTER
Pt's INR this week is 1.6    [place] states didn't receive a call last week and would like a call from clinical staff in regards to INR.

## 2025-05-07 ENCOUNTER — TELEPHONE (OUTPATIENT)
Age: 89
End: 2025-05-07

## 2025-05-07 LAB — INR BLD: 1.5

## 2025-05-07 NOTE — TELEPHONE ENCOUNTER
Keya mcdonough states pt's INR is 1.5 and would need a call back today with change of orders.    Please call to discuss.

## 2025-05-07 NOTE — TELEPHONE ENCOUNTER
Pharmacy Progress Note - Telephone Encounter    S/O: Mr. Corby Chun 89 y.o. male, referred by Ryan Villarreal MD, was contacted via an outbound telephone call to discuss his INR result today. Verified patient’s identifiers (name & ) per HIPAA policy.     Keya PRADHAN RN - [phone]    Indication:  Atrial Fibrillation and Aortic Valve Replacement  (Cisse Medtronic -), s/p pacemaker   Warfarin start date: ~ / restart 2024  INR Goal:  2.0-3.0    Current warfarin regimen:  1 mg on Monday and 2 mg daily the rest of the week.  Warfarin tablet strength: 1 mg and 2 mg    Duration of therapy: indefinite    Wife expressed concern about hematuria. \"His urine has a pinkish color\"   Denies other s/sx of bleeding/bruises.   Patient has follow up with Dr Mills (Urology) 25    Recall INR 1.6 (25), 1.9 (25).     A/P:  - INR remains subtherapeutic.   - Take 3 mg tonight and tomorrow. Then change warfarin 2 mg daily.  - Recheck in INR 1 week.   - Recommend repeat UA.   - Call discussed with LORNE RN and patient's wife. All endorses understanding to the provided information. All questions answered at this time.     Thank you,  Marisel Richardson, PharmD, BCACP, CDCES      For Pharmacy Admin Tracking Only    Program: Medical Group  CPA in place:  Yes  Recommendation Provided To: Patient/Caregiver: 2 via Telephone  Intervention Detail: Dose Adjustment: 1, reason: Therapy Optimization and Lab(s) Ordered  Intervention Accepted By: Patient/Caregiver: 2  Gap Closed?: Yes   Time Spent (min): 15

## 2025-05-15 ENCOUNTER — TELEPHONE (OUTPATIENT)
Age: 89
End: 2025-05-15

## 2025-05-15 LAB — INR BLD: 1.9

## 2025-05-15 NOTE — TELEPHONE ENCOUNTER
Tess with [facility] states pt's INR is 1.9    Pro-time 23.2    Taking 2mg of warfin daily       Please call to discuss.

## 2025-05-16 NOTE — TELEPHONE ENCOUNTER
Pharmacy Progress Note - Telephone Encounter    S/O: Dulce PRADHAN RN was contacted regarding Mr. Corby Chun 89 y.o.'s INR result today. Verified patient’s identifiers (name & ) per HIPAA policy.     [phone]  Indication:  Atrial Fibrillation and Aortic Valve Replacement  (Cisse Medtronic -), s/p pacemaker   Warfarin start date: ~ / restart 2024  INR Goal:  2.0-3.0    Current warfarin regimen:  3 mg x 2, then 2 mg daily  Warfarin tablet strength: 1 mg and 2 mg    Duration of therapy: indefinite    INR was 1.9 yesterday. No new changes.   Recall it was 1.5 (), 1.6 (25), 1.9 (25).     A/P:  - INR trending up but remains subtherapeutic for goal  - Take 3 mg tonight. Continue warfarin 2 mg daily.  - Dulce endorses understanding to the provided information. All questions answered at this time.     Thank you,  Marisel Richardson, PharmD, BCACP, CDCES      For Pharmacy Admin Tracking Only    Program: Medical Group  CPA in place:  Yes  Recommendation Provided To: Patient/Caregiver: 2 via Telephone  Intervention Detail: Dose Adjustment: 1, reason: Therapy Optimization and Lab(s) Ordered  Intervention Accepted By: Patient/Caregiver: 2  Gap Closed?: Yes   Time Spent (min): 10

## 2025-05-23 ENCOUNTER — TELEPHONE (OUTPATIENT)
Age: 89
End: 2025-05-23

## 2025-05-23 LAB — INR BLD: 2

## 2025-05-23 NOTE — TELEPHONE ENCOUNTER
Caller:  Ivette panchal, daughter    Concern: medication management and frequent UTI    States last bs labs missed uti as did previous BS labs.    States had 7 months of uti's that many thought was dementia or \"unspecified infection\"    Pt taking medication for sundowning, Cefdinir 300 mg  Advised medication would be adjusted in 3 months, but states that is too long in her opinion.    States pt hallucinating again.      Requested follow up:   Requests pcp consult with urologist about recent care.  Asks if pcp thinks medication is strong enough or should be changed.      Please call to advise.

## 2025-05-27 NOTE — TELEPHONE ENCOUNTER
Pharmacy Progress Note - Telephone Encounter    Mr. Corby Chun 89 y.o. was contacted via an outbound telephone call regarding his INR result today.  Unable to leave  for patient at this time.     Also tried to call Ivette - [phone] - without success     Indication:  Atrial Fibrillation and Aortic Valve Replacement  (Cisse Medtronic -1996), s/p pacemaker   Warfarin start date: ~1996 / restart Sept 2024  INR Goal:  2.0-3.0    Current warfarin regimen:  3 mg x 1 , then 2 mg daily  Warfarin tablet strength: 1 mg and 2 mg    Duration of therapy: indefinite    - INR was 2.0 on 5/23/25.   - Recall it was 1.9 (5/15/25), 1.5 (5/7), 1.6 (4/30/25), 1.9 (4/16/25).     Recommend to continue warfarin 2 mg daily. Recheck INR this week.       Thank you,  Marisel Richardson, PharmD, BCACP, CDCES      For Pharmacy Admin Tracking Only    Program: Medical Group  CPA in place:  Yes  Recommendation Provided To: Patient/Caregiver: 1 via Telephone  Intervention Detail: Lab(s) Ordered  Intervention Accepted By: Patient/Caregiver: 1  Gap Closed?: Yes   Time Spent (min): 5

## 2025-05-28 NOTE — TELEPHONE ENCOUNTER
Attempted to reach Ebony with [facility]. Left message on vm to return call   Pt called and advised [facility] will be next week.  Pt verbalizes understanding.

## 2025-05-29 ENCOUNTER — TELEPHONE (OUTPATIENT)
Age: 89
End: 2025-05-29

## 2025-05-29 LAB — INR BLD: 2.5

## 2025-05-29 NOTE — TELEPHONE ENCOUNTER
PABLO - Spoke with Keya with [facility]. Pt unable to give enough urine sample during visit. Keya states she left a urine sample kit for patient. States family member, Su visits patient and will help assist with urine sample. Keya states will  urine sample from patient later on today.   INR taken today.

## 2025-05-29 NOTE — TELEPHONE ENCOUNTER
Pharmacy Progress Note - Anticoagulation Management    Keya PRADHAN RN shared Mr. Corby Chun 89 y.o. 's recent INR result today.     Indication:  Atrial Fibrillation and Aortic Valve Replacement  (Cisse Medtronic -1996), s/p pacemaker   Warfarin start date: ~1996 / restart Sept 2024  INR Goal:  2.0-3.0    Current warfarin regimen:     2 mg daily  Warfarin tablet strength: 1 mg and 2 mg    Duration of therapy: indefinite     - INR was 2.5 today.   - Recall it was 2.0 (5/23/25), 1.9 (5/15/25), 1.5 (5/7), 1.6 (4/30/25), 1.9 (4/16/25).     Continue warfarin 2 mg daily. Recheck INR in 1-2 weeks.     There are no discontinued medications.  Orders Placed This Encounter    Protime-INR     This order was created through the anticoagulation tracking navigator section.          Thank you for the consult,  Micah GraceD, BCACP, CDCES                For Pharmacy Admin Tracking Only    Program: Medical Group  CPA in place:  Yes  Recommendation Provided To: Patient/Caregiver: 1 via Telephone  Intervention Detail: Lab(s) Ordered  Intervention Accepted By: Patient/Caregiver: 1  Gap Closed?: Yes   Time Spent (min): 10

## 2025-05-29 NOTE — TELEPHONE ENCOUNTER
Spoke with Mitchell Laura . Advised per Inez recommendations. Keya states will recheck INR next thurs  Verbalized understanding.     Per Inez  Thanks for the update. Continue warfarin 2 mg daily. Recheck INR again in 1-2 weeks.

## 2025-05-29 NOTE — TELEPHONE ENCOUNTER
Spoke with Ebony with [facility].   Verbal per PCP to have HH do a UA w/culture and PT/INR.  Ebony verbalized HH is scheduled for visit today 5/29/2025 and will reach out to scheduled nurse.  Verbalized understanding.

## 2025-05-29 NOTE — TELEPHONE ENCOUNTER
Keya [facility] would like you to know that INR today is 2.5       last Friday INR 2.0 /sent to on call.

## 2025-06-05 ENCOUNTER — TELEPHONE (OUTPATIENT)
Age: 89
End: 2025-06-05

## 2025-06-05 NOTE — TELEPHONE ENCOUNTER
Keya with [facility] states pt INR is 2.9 and would like to know if there is any RX for possible UTI.     Keya states would like to know if there is a way to have an order for pt to have INR machine, states pt is about to be discharged from [facility] because they can't come out only for INR and pt may have trouble coming into office every week.    Please call to discuss.

## 2025-06-06 DIAGNOSIS — N39.0 URINARY TRACT INFECTION WITHOUT HEMATURIA, SITE UNSPECIFIED: Primary | ICD-10-CM

## 2025-06-06 RX ORDER — CEPHALEXIN 500 MG/1
500 CAPSULE ORAL 2 TIMES DAILY
Qty: 14 CAPSULE | Refills: 0 | Status: SHIPPED | OUTPATIENT
Start: 2025-06-06 | End: 2025-06-13

## 2025-06-09 ENCOUNTER — TELEPHONE (OUTPATIENT)
Age: 89
End: 2025-06-09

## 2025-06-09 NOTE — TELEPHONE ENCOUNTER
Pharmacy Progress Note     Remote INR order placed for Mr. Corby Chun 89 y.o.   Verdict pending.   Medicare + FEP       Thank you for the consult,  Marisel Richardson, PharmD, BCACP, CDCES                For Pharmacy Admin Tracking Only    Program: Medical Group  CPA in place:  Yes  Recommendation Provided To: Other: 1  Intervention Detail: New Rx: 1, reason: Needs Additional Therapy  Intervention Accepted By: Other: 1  Gap Closed?: Yes   Time Spent (min): 10

## 2025-06-09 NOTE — TELEPHONE ENCOUNTER
Remote INR order created. Pending provider's signature.     Marisel Richardson, PharmD, BCACP, CDCES        For Pharmacy Admin Tracking Only    Program: Medical Group  CPA in place:  Yes  Recommendation Provided To: Patient/Caregiver: 1 via Telephone  Intervention Detail: New Rx: 1, reason: Needs Additional Therapy  Intervention Accepted By: Patient/Caregiver: 1  Gap Closed?: Yes   Time Spent (min): 10

## 2025-06-16 RX ORDER — WARFARIN SODIUM 2 MG/1
1-2 TABLET ORAL SEE ADMIN INSTRUCTIONS
Qty: 90 TABLET | Refills: 1 | Status: SHIPPED | OUTPATIENT
Start: 2025-06-16

## 2025-06-16 NOTE — TELEPHONE ENCOUNTER
PCP: Ryan Nguyen MD    Last appt: 4/16/2025  Future Appointments   Date Time Provider Department Center   6/30/2025  3:00 PM Mau Carlos DO [id number]   9/9/2025  2:15 PM Marisel Richardson, [facility]   9/9/2025  3:15 PM Ryan Nguyen MD [facility]       Requested Prescriptions     Pending Prescriptions Disp Refills    warfarin (COUMADIN) 2 MG tablet 90 tablet 1     Sig: Take 0.5-1 tablets by mouth See Admin Instructions 1 mg on Monday, Wednesday and 2 mg daily the rest of the week. Patient also has 1 mg dosage strength.

## 2025-07-08 ENCOUNTER — TELEPHONE (OUTPATIENT)
Age: 89
End: 2025-07-08

## 2025-07-08 DIAGNOSIS — I50.22 CHRONIC SYSTOLIC CONGESTIVE HEART FAILURE (HCC): ICD-10-CM

## 2025-07-08 DIAGNOSIS — N18.30 STAGE 3 CHRONIC KIDNEY DISEASE, UNSPECIFIED WHETHER STAGE 3A OR 3B CKD (HCC): ICD-10-CM

## 2025-07-08 DIAGNOSIS — F03.90 DEMENTIA, UNSPECIFIED DEMENTIA SEVERITY, UNSPECIFIED DEMENTIA TYPE, UNSPECIFIED WHETHER BEHAVIORAL, PSYCHOTIC, OR MOOD DISTURBANCE OR ANXIETY (HCC): Primary | ICD-10-CM

## 2025-07-08 NOTE — TELEPHONE ENCOUNTER
Melva is stating that she needs an order for hospice eval and treat.      Fax to  #[phone]  Attn: Melva

## 2025-07-13 LAB — INR BLD: 2

## 2025-07-15 ENCOUNTER — TELEPHONE (OUTPATIENT)
Age: 89
End: 2025-07-15

## 2025-07-15 NOTE — TELEPHONE ENCOUNTER
Pharmacy Progress Note - Telephone Encounter    S/O: Mr. Corby Chun 89 y.o. male, referred by Ryan Villarreal MD, was contacted via an outbound telephone call to discuss his home INR result today. Verified patient’s identifiers (name & ) per HIPAA policy.  Call assisted by his wife. PHI cleared.     Indication:  Atrial Fibrillation and Aortic Valve Replacement  (Cisse Medtronic -), s/p pacemaker   Warfarin start date: ~ / restart 2024  INR Goal:  2.0-3.0    Current warfarin regimen:    2 mg daily  Warfarin tablet strength: 1 mg and 2 mg    Duration of therapy: indefinite    - Received report INR 2.0 on 25  - Has enough warfarin. No missed doses.     A/P:  - INR in range.   - Take 3 mg tonight. Then continue warfarin 2 mg daily.   - Recheck in 2 weeks.   - Patient's wife endorses understanding to the provided information. All questions answered at this time.     Thank you,  Marisel Richardson, PharmD, BCACP, CDCES      For Pharmacy Admin Tracking Only    Program: Medical Group  CPA in place:  Yes  Recommendation Provided To: Patient/Caregiver: 2 via Telephone  Intervention Detail: Dose Adjustment: 1, reason: Therapy Optimization and Lab(s) Ordered  Intervention Accepted By: Patient/Caregiver: 2  Gap Closed?: Yes   Time Spent (min): 10

## (undated) DEVICE — CABLE PACE ALGTR CLP SAF 12FT --

## (undated) DEVICE — FILTER CLP DISP FOR 5513E CLIPVAC

## (undated) DEVICE — SUTURE VCRL SZ 2-0 L27IN ABSRB UD L26MM SH 1/2 CIR J417H

## (undated) DEVICE — SYSTEM INTRO 9.5FR L13CM STD WHT CAP HEMSTAT SPLITTABLE

## (undated) DEVICE — MAJOR LAPAROTOMY-MRMC: Brand: MEDLINE INDUSTRIES, INC.

## (undated) DEVICE — SUTURE VCRL SZ 3-0 L27IN ABSRB VLT L26MM SH 1/2 CIR J316H

## (undated) DEVICE — CATHETER DIAG 6FR L110CM INTRO 6FR BLLN DIA9MM 1CC PULM ART

## (undated) DEVICE — CATHETER EP L110CM OD8FR L10MM 2.5MM SPC QPLR STR TIP BLZR

## (undated) DEVICE — NEEDLE ANGIO 18GAX7CM SECURELOC

## (undated) DEVICE — PENCIL ES L3M BTTN SWCH S STL HEX LOK BLDE ELECTRD HOLSTER

## (undated) DEVICE — PLASMABLADE PS200-040 4.0: Brand: PLASMABLADE™

## (undated) DEVICE — CABLE FOR DIAGNOSTIC CATHETER: Brand: CABLE, SURELINK™

## (undated) DEVICE — INTRO SHTH 9FR 13X20CM -- USE ITEM# 341577

## (undated) DEVICE — BLADE ASSEMB CLP HAIR FINE --

## (undated) DEVICE — (D)STRIP SKN CLSR 0.5X4IN WHT --

## (undated) DEVICE — INTRODUCER SHTH 6FR L12CM DIA0.038IN HEMSTAS CLOSE TOL

## (undated) DEVICE — GLOVE SURG SZ 8 CRM LTX FREE POLYISOPRENE POLYMER BEAD ANTI

## (undated) DEVICE — SPONGE: SPECIALTY PEANUT XR 100/CS: Brand: MEDICAL ACTION INDUSTRIES

## (undated) DEVICE — 3M™ IOBAN™ 2 ANTIMICROBIAL INCISE DRAPE 6640EZ: Brand: IOBAN™ 2

## (undated) DEVICE — INTRODUCER SHTH 8FR L63CM 8FR DIL GWIRE L145CM DIA0.038IN

## (undated) DEVICE — CATHETER ABLATN D CRV 2-5-2 MM 4 MM 7 FRX115 CM QPLR CELSIUS

## (undated) DEVICE — SUT ETHBND 0 18IN MO6 MP GRN --

## (undated) DEVICE — SUTURE ETHBND EXCEL SZ 2 L30IN NONABSORBABLE GRN L40MM V-37 MX69G

## (undated) DEVICE — INTRO SHTH 7FR 13X20CM -- TEARAWAY

## (undated) DEVICE — Device

## (undated) DEVICE — AGENT HEMSTAT 3GM PURIFIED PLNT STARCH PWD ABSRB ARISTA AH

## (undated) DEVICE — 3M™ IOBAN™ 2 ANTIMICROBIAL INCISE DRAPE 6650EZ: Brand: IOBAN™ 2

## (undated) DEVICE — RADIFOCUS GLIDEWIRE: Brand: GLIDEWIRE

## (undated) DEVICE — LLD ACCESSORY KIT: Brand: LLD ACCESSORY KIT

## (undated) DEVICE — 3M™ TEGADERM™ TRANSPARENT FILM DRESSING FRAME STYLE, 1626W, 4 IN X 4-3/4 IN (10 CM X 12 CM), 50/CT 4CT/CASE: Brand: 3M™ TEGADERM™

## (undated) DEVICE — SUTURE VCRL SZ 3-0 L27IN ABSRB UD L26MM SH 1/2 CIR J416H

## (undated) DEVICE — REM POLYHESIVE ADULT PATIENT RETURN ELECTRODE: Brand: VALLEYLAB

## (undated) DEVICE — LEAD CUTTER: Brand: LEAD CUTTER

## (undated) DEVICE — HYPODERMIC SAFETY NEEDLE: Brand: MAGELLAN

## (undated) DEVICE — SOLUTION SURG PREP 26 CC PURPREP

## (undated) DEVICE — Device: Brand: LLD EZ LEAD LOCKING DEVICE

## (undated) DEVICE — SUTURE VCRL SZ 2-0 L36IN ABSRB UD L40MM CT 1/2 CIR J957H

## (undated) DEVICE — CATHETER ELECTROPHYSIOLOGY D 2-5-2 MM 1 MM 6 FRX115 CM 4 FIX

## (undated) DEVICE — Device: Brand: PADPRO

## (undated) DEVICE — CATH EP 5F JSN  5MMX120CM -- SUPREME

## (undated) DEVICE — CATHETER DIAG 5FR L75CM ID0.046IN HK CRV VEIN SEL UNIQUE

## (undated) DEVICE — DERMABOND SKIN ADH 0.7ML -- DERMABOND ADVANCED 12/BX

## (undated) DEVICE — GDWIRE WHISPER HITORQ EDS CSJ -- ACUITY SOLD BY BX ONLY 4648

## (undated) DEVICE — SYR 10ML LUER LOK 1/5ML GRAD --

## (undated) DEVICE — PACEMAKER PACK: Brand: MEDLINE INDUSTRIES, INC.

## (undated) DEVICE — STERILE POLYISOPRENE POWDER-FREE SURGICAL GLOVES: Brand: PROTEXIS

## (undated) DEVICE — GLOVE SURG SZ 85 CRM LTX FREE POLYISOPRENE POLYMER BEAD ANTI

## (undated) DEVICE — PACK PROCEDURE SURG HRT CATH

## (undated) DEVICE — DRAIN SURG L18IN DIA025IN 100% SIL RADPQ FOR CLS WND DRNAGE

## (undated) DEVICE — PRESSURE MONITORING SET: Brand: TRUWAVE

## (undated) DEVICE — SUT VCRL 3-0 18IN TIE MP VIO --

## (undated) DEVICE — TIGHTRAIL MINI ROTATING DILATOR SHEATH: Brand: TIGHTRAIL MINI

## (undated) DEVICE — SUTURE MCRYL SZ 4-0 L27IN ABSRB UD L19MM PS-2 1/2 CIR PRIM Y426H

## (undated) DEVICE — TELFA NON-ADHERENT PADS PREPAK: Brand: TELFA

## (undated) DEVICE — CABLE RMFG EXT CATH --

## (undated) DEVICE — KENDALL RADIOLUCENT FOAM MONITORING ELECTRODE RECTANGULAR SHAPE: Brand: KENDALL

## (undated) DEVICE — CATHETER LD DEL OD9FR ID7FR L51CM 115DEG CRV OUTER GUID

## (undated) DEVICE — KIT ELECTRD SURF FOR DISPLAYING THE 3D POS OF EP CATH

## (undated) DEVICE — CATH RMFG EP DCAPLR 6FR 125CM --

## (undated) DEVICE — GOWN,SIRUS,NONRNF,SETINSLV,2XL,18/CS: Brand: MEDLINE

## (undated) DEVICE — BULB SYRINGE, IRRIGATION WITH PROTECTIVE CAP, 60 CC, INDIVIDUALLY WRAPPED: Brand: DOVER

## (undated) DEVICE — STRIP SKIN CLSR W0.25XL4IN WHT SPUNBOUND FBR NYL HI ADH